# Patient Record
Sex: FEMALE | Race: WHITE | Employment: OTHER | RURAL
[De-identification: names, ages, dates, MRNs, and addresses within clinical notes are randomized per-mention and may not be internally consistent; named-entity substitution may affect disease eponyms.]

---

## 2017-01-02 ENCOUNTER — TELEPHONE (OUTPATIENT)
Dept: FAMILY MEDICINE CLINIC | Age: 52
End: 2017-01-02

## 2017-01-02 DIAGNOSIS — N30.00 ACUTE CYSTITIS WITHOUT HEMATURIA: Primary | ICD-10-CM

## 2017-01-02 DIAGNOSIS — D72.829 LEUKOCYTOSIS, UNSPECIFIED TYPE: ICD-10-CM

## 2017-01-02 RX ORDER — NITROFURANTOIN 25; 75 MG/1; MG/1
100 CAPSULE ORAL 2 TIMES DAILY
Qty: 20 CAP | Refills: 0 | Status: SHIPPED | OUTPATIENT
Start: 2017-01-02 | End: 2017-04-24 | Stop reason: ALTCHOICE

## 2017-01-03 NOTE — TELEPHONE ENCOUNTER
LVM - I will try to reach you on your cell. LVM - Dr Bear Max ordered a antibiotic for you. Please call back.

## 2017-01-04 ENCOUNTER — TELEPHONE (OUTPATIENT)
Dept: FAMILY MEDICINE CLINIC | Age: 52
End: 2017-01-04

## 2017-01-04 NOTE — TELEPHONE ENCOUNTER
Patient called and was under the impression that she Miranda Deysideepa would be calling in a prescription for a antibiotic. Please advise if this can be done today. The only one I saw was Cindi.      [facility]

## 2017-01-04 NOTE — TELEPHONE ENCOUNTER
[facility] did not get order. Also she has changed insurance. She will get info and call back. Called in antibiotic. They have it.

## 2017-01-04 NOTE — TELEPHONE ENCOUNTER
Please obtain [facility] referral for appt with Dr. Tia Julian. Also fax her recent labs to his office.

## 2017-01-06 ENCOUNTER — TELEPHONE (OUTPATIENT)
Dept: FAMILY MEDICINE CLINIC | Age: 52
End: 2017-01-06

## 2017-01-06 NOTE — TELEPHONE ENCOUNTER
According to [facility] website the pt does not require a referral authorization for seeing Dr Tommy Mejia for appt on 1/9/2017

## 2017-02-03 ENCOUNTER — OFFICE VISIT (OUTPATIENT)
Dept: FAMILY MEDICINE CLINIC | Age: 52
End: 2017-02-03

## 2017-02-03 VITALS
WEIGHT: 193 LBS | TEMPERATURE: 98.2 F | HEART RATE: 89 BPM | RESPIRATION RATE: 16 BRPM | BODY MASS INDEX: 34.2 KG/M2 | HEIGHT: 63 IN | SYSTOLIC BLOOD PRESSURE: 136 MMHG | DIASTOLIC BLOOD PRESSURE: 83 MMHG | OXYGEN SATURATION: 96 %

## 2017-02-03 DIAGNOSIS — M15.9 PRIMARY OSTEOARTHRITIS INVOLVING MULTIPLE JOINTS: ICD-10-CM

## 2017-02-03 DIAGNOSIS — J02.9 SORE THROAT: ICD-10-CM

## 2017-02-03 DIAGNOSIS — J06.9 URI WITH COUGH AND CONGESTION: Primary | ICD-10-CM

## 2017-02-03 DIAGNOSIS — R82.90 URINE MALODOR: ICD-10-CM

## 2017-02-03 DIAGNOSIS — R60.0 PEDAL EDEMA: ICD-10-CM

## 2017-02-03 LAB
BILIRUB UR QL STRIP: NEGATIVE
GLUCOSE UR-MCNC: NEGATIVE MG/DL
KETONES P FAST UR STRIP-MCNC: NEGATIVE MG/DL
PH UR STRIP: 6 [PH] (ref 4.6–8)
PROT UR QL STRIP: NEGATIVE MG/DL
S PYO AG THROAT QL: NEGATIVE
SP GR UR STRIP: 1 (ref 1–1.03)
UA UROBILINOGEN AMB POC: NORMAL (ref 0.2–1)
URINALYSIS CLARITY POC: CLEAR
URINALYSIS COLOR POC: NORMAL
URINE BLOOD POC: NORMAL
URINE LEUKOCYTES POC: NEGATIVE
URINE NITRITES POC: NEGATIVE
VALID INTERNAL CONTROL?: YES

## 2017-02-03 RX ORDER — FUROSEMIDE 20 MG/1
20 TABLET ORAL
Qty: 30 TAB | Refills: 2 | Status: SHIPPED | OUTPATIENT
Start: 2017-02-03 | End: 2017-04-05 | Stop reason: SDUPTHER

## 2017-02-03 NOTE — PROGRESS NOTES
Subjective:      Margie Clay is a 45 y.o. female here with complaint of sore throat, post nasal drip, bilateral ear fullness, green nasal discharge and pain while swallowing for 1 days. She denies a history of chills, fatigue, nausea, shortness of breath and vomiting. Evaluation to date: none  Treatment to date: nasal decongestant     - She reports swelling in the hands and feet for which she believes that she needs to be back on her fluid pill. - Would like to have her urine checked to see if her UTI has cleared. She has completed a course of antibiotics. - Reports that she has inflammatory arthritis for which she has been unable to tolerate NSAID therapy due to \"my stomach\". She would like referral to pain management specialist. She has been seen by Dr. Justine Wells with [facility] and Dr. Jimmy Coats for elevated WBC. Current Outpatient Prescriptions   Medication Sig Dispense Refill    nitrofurantoin, macrocrystal-monohydrate, (MACROBID) 100 mg capsule Take 1 Cap by mouth two (2) times a day. 20 Cap 0    benzonatate (TESSALON) 200 mg capsule       Iron-FA-Q83-P-Qsghanc Sodium 94-7--50 mg-mg-mcg-mg-mg tab Take  by mouth.  TURMERIC ROOT EXTRACT PO Take 1 Cap by mouth daily.  venlafaxine-SR (EFFEXOR XR) 75 mg capsule Take 1 Cap by mouth daily. 30 Cap 5    glucose blood VI test strips (ASCENSIA AUTODISC VI, ONE TOUCH ULTRA TEST VI) strip Test sugar daily. DX E 11.9 100 Strip 3    diltiazem hcl 180 mg Tb24 1 tab daily 30 Tab 5    losartan (COZAAR) 100 mg tablet Take 1 Tab by mouth daily. 30 Tab 5    metFORMIN (GLUCOPHAGE) 500 mg tablet Take 2 Tabs by mouth two (2) times daily (with meals). Indications: TYPE 2 DIABETES MELLITUS 120 Tab 5    gabapentin (NEURONTIN) 600 mg tablet Take 1 Tab by mouth four (4) times daily. 120 Tab 5    atorvastatin (LIPITOR) 40 mg tablet Take 1 Tab by mouth nightly.  Indications: HYPERCHOLESTEROLEMIA 30 Tab 5    rOPINIRole (REQUIP) 2 mg tablet Take 1 Tab by mouth two (2) times a day. 60 Tab 5    fluticasone-salmeterol (ADVAIR HFA) 115-21 mcg/actuation inhaler Take 2 Puffs by inhalation daily.  L GASSERI/B BIFIDUM/B LONGUM (mygola PO) Take  by mouth daily.  cinnamon bark 500 mg cap Take 1,000 mg by mouth two (2) times a day.  fish,bora,flax oils-om3,6,9 #1 1,200 mg cap Take 2 Caps by mouth daily.  prasterone, dhea, 50 mg tab Take  by mouth daily.  esomeprazole (NEXIUM) 20 mg capsule Take 40 mg by mouth daily. Allergies   Allergen Reactions    Mobic [Meloxicam] Hives    Penicillins Unknown (comments)     DOESN'T REMEMBER    Plaquenil [Hydroxychloroquine] Hives       Past Medical History   Diagnosis Date    Arrhythmia      TACHYCARDIA    Arthritis     Autoimmune disease ([facility] [address]      LUPUS    Diabetes ([facility] [address]     GERD (gastroesophageal reflux disease)     Hypertension     Psychiatric disorder      PANIC ATTACKS    Sleep apnea        Social History   Substance Use Topics    Smoking status: Current Every Day Smoker     Packs/day: 0.50     Years: 40.00    Smokeless tobacco: Never Used    Alcohol use No        Review of Systems  Pertinent items are noted in HPI.      Objective:     Visit Vitals    /83 (BP 1 Location: Left arm, BP Patient Position: Sitting)    Pulse 89    Temp 98.2 °F (36.8 °C) (Oral)    Resp 16    Ht 5' 3\" (1.6 m)    Wt 193 lb (87.5 kg)    SpO2 96%    BMI 34.19 kg/m2      General appearance - alert, well appearing, and in no distress  Eyes - pupils equal and reactive, extraocular eye movements intact, sclera anicteric  Ears - bilateral TM's and external ear canals normal  Nose - mucosal congestion, mucosal erythema and sinuses normal and nontender  Oropharyngx - mucous membranes moist, pharynx normal without lesions and erythematous  Neck - supple, no significant adenopathy  Chest - clear to auscultation, no wheezes, rales or rhonchi, symmetric air entry, no tachypnea, retractions or cyanosis  Heart - normal rate, regular rhythm, normal S1, S2, no murmurs, rubs, clicks or gallops      Recent Results (from the past 12 hour(s))   AMB POC RAPID STREP A    Collection Time: 02/03/17  9:10 AM   Result Value Ref Range    VALID INTERNAL CONTROL POC Yes     Group A Strep Ag Negative Negative   AMB POC URINALYSIS DIP STICK AUTO W/O MICRO    Collection Time: 02/03/17  9:10 AM   Result Value Ref Range    Color (UA POC) Eneida     Clarity (UA POC) Clear     Glucose (UA POC) Negative Negative    Bilirubin (UA POC) Negative Negative    Ketones (UA POC) Negative Negative    Specific gravity (UA POC) 1.005 1.001 - 1.035    Blood (UA POC) Trace Negative    pH (UA POC) 6.0 4.6 - 8.0    Protein (UA POC) Negative Negative mg/dL    Urobilinogen (UA POC) 0.2 mg/dL 0.2 - 1    Nitrites (UA POC) Negative Negative    Leukocyte esterase (UA POC) Negative Negative         Assessment/Plan:   Kirby Mcdaniels is a 45 y.o. female seen for:     1. URI with cough and congestion: likely viral in etiology. - Symptomatic therapy suggested: push fluids, rest, gargle warm salt water and use acetaminophen, cough suppressant of choice prn.    2. Sore throat  - AMB POC RAPID STREP A    3. Urine malodor: UA normal and appears as though infection has cleared. Patient reassured. - AMB POC URINALYSIS DIP STICK AUTO W/O MICRO    4. Primary osteoarthritis involving multiple joints: provided with list of area Pain Management physicians and she will see who is in-network for her. 5. Pedal edema: start low dose furosemide therapy as needed. Discussed that medication does have antihypertensive effects and will need to monitor BP to ensure not causing hypotension.   - furosemide (LASIX) 20 mg tablet; Take 1 Tab by mouth daily as needed. Indications: Edema  Dispense: 30 Tab; Refill: 2    I have discussed the diagnosis with the patient and the intended plan as seen in the above orders.  The patient has received an after-visit summary and questions were answered concerning future plans. I have discussed medication side effects and warnings with the patient as well. Patient verbalizes understanding of plan of care and denies further questions or concerns at this time. Informed patient to return to the office if symptoms worsen or if new symptoms arise. Follow-up Disposition:  Return if symptoms worsen or fail to improve.

## 2017-02-03 NOTE — PROGRESS NOTES
Chief Complaint   Patient presents with    Cough     all symptoms started yesterday    Sore Throat    Ear Pain     both ears feel full since yesterday    Urinary Odor     following up from recent UTI treated by Dr. Mattie Avitia  wants urine tested today     \"REVIEWED RECORD IN PREPARATION FOR VISIT AND HAVE OBTAINED THE NECESSARY DOCUMENTATION\"  1. Have you been to the ER, urgent care clinic since your last visit? Hospitalized since your last visit? No    2. Have you seen or consulted any other health care providers outside of the [facility] since your last visit? Include any pap smears or colon screening. No  Patient does not have advanced directives.

## 2017-02-03 NOTE — PATIENT INSTRUCTIONS

## 2017-02-03 NOTE — MR AVS SNAPSHOT
Visit Information Date & Time Provider Department Dept. Phone Encounter #  
 2/3/2017  8:45 AM Edmund Brunner Ajith [phone] [id number] Follow-up Instructions Return if symptoms worsen or fail to improve. Upcoming Health Maintenance Date Due  
 EYE EXAM RETINAL OR DILATED Q1 7/23/1974 Pneumococcal 19-64 Highest Risk (1 of 3 - PCV13) 7/23/1983 PAP AKA CERVICAL CYTOLOGY 7/23/1985 FOBT Q 1 YEAR AGE 50-75 7/23/2014 HEMOGLOBIN A1C Q6M 6/30/2017 FOOT EXAM Q1 12/30/2017 MICROALBUMIN Q1 12/30/2017 LIPID PANEL Q1 12/30/2017 BREAST CANCER SCRN MAMMOGRAM 4/18/2018 DTaP/Tdap/Td series (2 - Td) 9/7/2026 Allergies as of 2/3/2017  Review Complete On: 2/3/2017 By: David Diaz MD  
  
 Severity Noted Reaction Type Reactions Mobic [Meloxicam]  08/08/2016    Hives Penicillins  08/08/2016    Unknown (comments) DOESN'T REMEMBER Plaquenil [Hydroxychloroquine]  08/08/2016    Hives Current Immunizations  Never Reviewed No immunizations on file. Not reviewed this visit You Were Diagnosed With   
  
 Codes Comments URI with cough and congestion    -  Primary ICD-10-CM: J06.9 ICD-9-CM: 465.9 Sore throat     ICD-10-CM: J02.9 ICD-9-CM: 516 Urine malodor     ICD-10-CM: R82.90 ICD-9-CM: 791.9 Primary osteoarthritis involving multiple joints     ICD-10-CM: M15.0 ICD-9-CM: 715.09 Vitals BP Pulse Temp Resp Height(growth percentile) Weight(growth percentile) 136/83 (BP 1 Location: Left arm, BP Patient Position: Sitting) 89 98.2 °F (36.8 °C) (Oral) 16 5' 3\" (1.6 m) 193 lb (87.5 kg) SpO2 BMI OB Status Smoking Status 96% 34.19 kg/m2 Hysterectomy Current Every Day Smoker BMI and BSA Data Body Mass Index Body Surface Area  
 34.19 kg/m 2 1.97 m 2 Preferred Pharmacy Pharmacy Name Phone 5 [facility], [address] [place] [address] [phone] Your Updated Medication List  
  
   
This list is accurate as of: 2/3/17  9:29 AM.  Always use your most recent med list.  
  
  
  
  
 atorvastatin 40 mg tablet Commonly known as:  LIPITOR Take 1 Tab by mouth nightly. Indications: HYPERCHOLESTEROLEMIA  
  
 benzonatate 200 mg capsule Commonly known as:  TESSALON  
  
 cinnamon bark 500 mg Cap Take 1,000 mg by mouth two (2) times a day. diltiazem hcl 180 mg Tb24  
1 tab daily  
  
 esomeprazole 20 mg capsule Commonly known as:  Glendell Simper Take 40 mg by mouth daily. fish,bora,flax oils-om3,6,9 #1 1,200 mg Cap Take 2 Caps by mouth daily. fluticasone-salmeterol 115-21 mcg/actuation inhaler Commonly known as:  ADVAIR HFA Take 2 Puffs by inhalation daily. gabapentin 600 mg tablet Commonly known as:  NEURONTIN Take 1 Tab by mouth four (4) times daily. glucose blood VI test strips strip Commonly known as:  ASCENSIA AUTODISC VI, ONE TOUCH ULTRA TEST VI Test sugar daily. DX E 11.9 Iron-FA-K54-I-Bgbzkwd Sodium 86-8--50 mg-mg-mcg-mg-mg Tab Take  by mouth.  
  
 losartan 100 mg tablet Commonly known as:  COZAAR Take 1 Tab by mouth daily. metFORMIN 500 mg tablet Commonly known as:  GLUCOPHAGE Take 2 Tabs by mouth two (2) times daily (with meals). Indications: TYPE 2 DIABETES MELLITUS  
  
 nitrofurantoin (macrocrystal-monohydrate) 100 mg capsule Commonly known as:  MACROBID Take 1 Cap by mouth two (2) times a day. [address] Take  by mouth daily. prasterone (dhea) 50 mg Tab Take  by mouth daily. rOPINIRole 2 mg tablet Commonly known as:  Kenya Schimke Take 1 Tab by mouth two (2) times a day. TURMERIC ROOT EXTRACT PO Take 1 Cap by mouth daily. venlafaxine-SR 75 mg capsule Commonly known as:  EFFEXOR XR Take 1 Cap by mouth daily. We Performed the Following AMB POC RAPID STREP A [93365 CPT(R)] AMB POC URINALYSIS DIP STICK AUTO W/O MICRO [76421 CPT(R)] Follow-up Instructions Return if symptoms worsen or fail to improve. Patient Instructions Upper Respiratory Infection (Cold): Care Instructions Your Care Instructions An upper respiratory infection, or URI, is an infection of the nose, sinuses, or throat. URIs are spread by coughs, sneezes, and direct contact. The common cold is the most frequent kind of URI. The flu and sinus infections are other kinds of URIs. Almost all URIs are caused by viruses. Antibiotics won't cure them. But you can treat most infections with home care. This may include drinking lots of fluids and taking over-the-counter pain medicine. You will probably feel better in 4 to 10 days. The doctor has checked you carefully, but problems can develop later. If you notice any problems or new symptoms, get medical treatment right away. Follow-up care is a key part of your treatment and safety. Be sure to make and go to all appointments, and call your doctor if you are having problems. It's also a good idea to know your test results and keep a list of the medicines you take. How can you care for yourself at home? · To prevent dehydration, drink plenty of fluids, enough so that your urine is light yellow or clear like water. Choose water and other caffeine-free clear liquids until you feel better. If you have kidney, heart, or liver disease and have to limit fluids, talk with your doctor before you increase the amount of fluids you drink. · Take an over-the-counter pain medicine, such as acetaminophen (Tylenol), ibuprofen (Advil, Motrin), or naproxen (Aleve). Read and follow all instructions on the label. · Before you use cough and cold medicines, check the label. These medicines may not be safe for young children or for people with certain health problems.  
· Be careful when taking over-the-counter cold or flu medicines and Tylenol at the same time. Many of these medicines have acetaminophen, which is Tylenol. Read the labels to make sure that you are not taking more than the recommended dose. Too much acetaminophen (Tylenol) can be harmful. · Get plenty of rest. 
· Do not smoke or allow others to smoke around you. If you need help quitting, talk to your doctor about stop-smoking programs and medicines. These can increase your chances of quitting for good. When should you call for help? Call 911 anytime you think you may need emergency care. For example, call if: 
· You have severe trouble breathing. Call your doctor now or seek immediate medical care if: 
· You seem to be getting much sicker. · You have new or worse trouble breathing. · You have a new or higher fever. · You have a new rash. Watch closely for changes in your health, and be sure to contact your doctor if: 
· You have a new symptom, such as a sore throat, an earache, or sinus pain. · You cough more deeply or more often, especially if you notice more mucus or a change in the color of your mucus. · You do not get better as expected. Where can you learn more? Go to http://matti-yinka.info/. Enter V006 in the search box to learn more about \"Upper Respiratory Infection (Cold): Care Instructions. \" Current as of: June 30, 2016 Content Version: 11.1 © 2694-7094 [employer], Incorporated. Care instructions adapted under license by [employer] (which disclaims liability or warranty for this information). If you have questions about a medical condition or this instruction, always ask your healthcare professional. [place] [address] any warranty or liability for your use of this information. Introducing [address]! Dear Mitzi Pearce: Thank you for requesting a [employer] account. Our records indicate that you already have an active [employer] account.   You can access your account anytime at [email or web address] Did you know that you can access your hospital and ER discharge instructions at any time in [email or web address]? You can also review all of your test results from your hospital stay or ER visit. Additional Information If you have questions, please visit the Frequently Asked Questions section of the [email or web address] website at [email or web address] Remember, [email or web address] is NOT to be used for urgent needs. For medical emergencies, dial 911. Now available from your iPhone and Android! Please provide this summary of care documentation to your next provider. Your primary care clinician is listed as Sary Coffey. If you have any questions after today's visit, please call [phone].

## 2017-02-21 ENCOUNTER — TELEPHONE (OUTPATIENT)
Dept: FAMILY MEDICINE CLINIC | Age: 52
End: 2017-02-21

## 2017-02-21 DIAGNOSIS — G89.4 CHRONIC PAIN SYNDROME: Primary | ICD-10-CM

## 2017-02-21 NOTE — TELEPHONE ENCOUNTER
Pt called stating she did some research and found a pain management dr that her insurance would accept and she would need a dr referral and insurance referral.    The Dr she will be seeing is Dr. Saige Baca at [address] [place] [address] [facility]. She made an appt for March 15th.

## 2017-04-04 DIAGNOSIS — I10 ESSENTIAL HYPERTENSION WITH GOAL BLOOD PRESSURE LESS THAN 140/90: ICD-10-CM

## 2017-04-04 DIAGNOSIS — R60.0 PEDAL EDEMA: ICD-10-CM

## 2017-04-04 DIAGNOSIS — G25.81 RLS (RESTLESS LEGS SYNDROME): ICD-10-CM

## 2017-04-04 DIAGNOSIS — G63 POLYNEUROPATHY ASSOCIATED WITH UNDERLYING DISEASE (HCC): ICD-10-CM

## 2017-04-05 RX ORDER — FUROSEMIDE 20 MG/1
20 TABLET ORAL
Qty: 30 TAB | Refills: 2 | Status: SHIPPED | OUTPATIENT
Start: 2017-04-05 | End: 2017-08-05 | Stop reason: SDUPTHER

## 2017-04-05 RX ORDER — GABAPENTIN 600 MG/1
600 TABLET ORAL 4 TIMES DAILY
Qty: 120 TAB | Refills: 5 | Status: SHIPPED | OUTPATIENT
Start: 2017-04-05 | End: 2017-10-26 | Stop reason: SDUPTHER

## 2017-04-05 RX ORDER — ROPINIROLE 2 MG/1
TABLET, FILM COATED ORAL
Qty: 60 TAB | Refills: 5 | Status: SHIPPED | OUTPATIENT
Start: 2017-04-05 | End: 2017-10-05 | Stop reason: SDUPTHER

## 2017-04-05 RX ORDER — LOSARTAN POTASSIUM 100 MG/1
TABLET ORAL
Qty: 30 TAB | Refills: 5 | Status: SHIPPED | OUTPATIENT
Start: 2017-04-05 | End: 2017-10-05 | Stop reason: SDUPTHER

## 2017-04-05 RX ORDER — DILTIAZEM HYDROCHLORIDE 180 MG/1
CAPSULE, COATED, EXTENDED RELEASE ORAL
Qty: 30 CAP | Refills: 5 | Status: SHIPPED | OUTPATIENT
Start: 2017-04-05 | End: 2017-10-05 | Stop reason: SDUPTHER

## 2017-04-05 RX ORDER — METFORMIN HYDROCHLORIDE 500 MG/1
1000 TABLET ORAL 2 TIMES DAILY WITH MEALS
Qty: 120 TAB | Refills: 5 | Status: SHIPPED | OUTPATIENT
Start: 2017-04-05 | End: 2017-10-26 | Stop reason: SDUPTHER

## 2017-04-05 RX ORDER — ATORVASTATIN CALCIUM 40 MG/1
40 TABLET, FILM COATED ORAL
Qty: 30 TAB | Refills: 5 | Status: SHIPPED | OUTPATIENT
Start: 2017-04-05 | End: 2017-10-26 | Stop reason: SDUPTHER

## 2017-04-06 DIAGNOSIS — F34.1 DYSTHYMIA: ICD-10-CM

## 2017-04-06 RX ORDER — VENLAFAXINE HYDROCHLORIDE 75 MG/1
75 CAPSULE, EXTENDED RELEASE ORAL DAILY
Qty: 30 CAP | Refills: 5 | Status: CANCELLED | OUTPATIENT
Start: 2017-04-06

## 2017-04-06 NOTE — TELEPHONE ENCOUNTER
Patient called stating that she was planning on speaking with Dr Devante Paul about a change in medication tomorrow 4/7/2017 but changed her appointment to 4/24/17 due to provider out of office. Pt would like to know if it can be changed from 75mg back to 150mg.      Best contact:   (Self) [phone]

## 2017-04-24 ENCOUNTER — OFFICE VISIT (OUTPATIENT)
Dept: FAMILY MEDICINE CLINIC | Age: 52
End: 2017-04-24

## 2017-04-24 VITALS
TEMPERATURE: 98.4 F | BODY MASS INDEX: 33.98 KG/M2 | HEIGHT: 63 IN | RESPIRATION RATE: 20 BRPM | DIASTOLIC BLOOD PRESSURE: 68 MMHG | OXYGEN SATURATION: 96 % | SYSTOLIC BLOOD PRESSURE: 112 MMHG | HEART RATE: 91 BPM | WEIGHT: 191.8 LBS

## 2017-04-24 DIAGNOSIS — M17.11 PRIMARY LOCALIZED OSTEOARTHRITIS OF RIGHT KNEE: ICD-10-CM

## 2017-04-24 DIAGNOSIS — F34.1 DYSTHYMIA: Primary | ICD-10-CM

## 2017-04-24 DIAGNOSIS — H10.10 ALLERGIC CONJUNCTIVITIS, UNSPECIFIED LATERALITY: ICD-10-CM

## 2017-04-24 DIAGNOSIS — R10.11 RUQ ABDOMINAL PAIN: ICD-10-CM

## 2017-04-24 DIAGNOSIS — F41.0 PANIC ATTACKS: ICD-10-CM

## 2017-04-24 DIAGNOSIS — F17.210 CIGARETTE NICOTINE DEPENDENCE WITHOUT COMPLICATION: ICD-10-CM

## 2017-04-24 DIAGNOSIS — D72.829 LEUKOCYTOSIS, UNSPECIFIED TYPE: ICD-10-CM

## 2017-04-24 DIAGNOSIS — G47.33 OSA (OBSTRUCTIVE SLEEP APNEA): ICD-10-CM

## 2017-04-24 RX ORDER — VENLAFAXINE 75 MG/1
75 TABLET ORAL 2 TIMES DAILY
Qty: 60 TAB | Refills: 3 | Status: CANCELLED | OUTPATIENT
Start: 2017-04-24

## 2017-04-24 RX ORDER — VARENICLINE TARTRATE 1 MG/1
TABLET, FILM COATED ORAL
Qty: 60 TAB | Refills: 2 | Status: SHIPPED | OUTPATIENT
Start: 2017-04-24 | End: 2017-12-12 | Stop reason: SDUPTHER

## 2017-04-24 RX ORDER — LORAZEPAM 2 MG/1
TABLET ORAL
COMMUNITY
End: 2017-04-24 | Stop reason: DRUGHIGH

## 2017-04-24 RX ORDER — KETOTIFEN FUMARATE 0.35 MG/ML
1 SOLUTION/ DROPS OPHTHALMIC 2 TIMES DAILY
Qty: 5 ML | Refills: 3 | Status: SHIPPED | OUTPATIENT
Start: 2017-04-24 | End: 2018-06-20 | Stop reason: ALTCHOICE

## 2017-04-24 RX ORDER — TRAMADOL HYDROCHLORIDE 50 MG/1
50 TABLET ORAL
COMMUNITY
Start: 2017-01-24 | End: 2017-07-07 | Stop reason: ALTCHOICE

## 2017-04-24 RX ORDER — PREDNISOLONE ACETATE 10 MG/ML
SUSPENSION/ DROPS OPHTHALMIC
COMMUNITY
Start: 2017-03-23 | End: 2017-04-24 | Stop reason: ALTCHOICE

## 2017-04-24 RX ORDER — LORAZEPAM 0.5 MG/1
0.5 TABLET ORAL
Qty: 60 TAB | Refills: 0 | Status: SHIPPED | OUTPATIENT
Start: 2017-04-24 | End: 2018-02-14 | Stop reason: SDUPTHER

## 2017-04-24 RX ORDER — VENLAFAXINE HYDROCHLORIDE 150 MG/1
150 CAPSULE, EXTENDED RELEASE ORAL DAILY
Qty: 30 CAP | Refills: 5 | Status: SHIPPED | OUTPATIENT
Start: 2017-04-24 | End: 2017-10-05 | Stop reason: SDUPTHER

## 2017-04-24 RX ORDER — VARENICLINE TARTRATE 25 MG
KIT ORAL
Qty: 42 DOSE PACK | Refills: 0 | Status: SHIPPED | OUTPATIENT
Start: 2017-04-24 | End: 2017-07-07 | Stop reason: ALTCHOICE

## 2017-04-24 NOTE — PROGRESS NOTES
Identified pt with two pt identifiers(name and ). Chief Complaint   Patient presents with    Medication Evaluation     increase effexor back to 150mg    Nail Problem     both great toenails - coming back and just started bothering her    Rib Pain     right side under ribs hurting last 3 or 4 days - constant with [facility] - can't find pain management MD    Nicotine Dependence     chantex    Eye Drainage     went to Dr Claudy Valle - eye still draining        Health Maintenance Due   Topic    Pneumococcal 19-64 Highest Risk (1 of 3 - PCV13)    PAP AKA CERVICAL CYTOLOGY     FOBT Q 1 YEAR AGE 50-75        Wt Readings from Last 3 Encounters:   17 191 lb 12.8 oz (87 kg)   17 193 lb (87.5 kg)   16 190 lb 12.8 oz (86.5 kg)     Temp Readings from Last 3 Encounters:   17 98.4 °F (36.9 °C) (Oral)   17 98.2 °F (36.8 °C) (Oral)   16 98.3 °F (36.8 °C) (Oral)     BP Readings from Last 3 Encounters:   17 112/68   17 136/83   16 128/81     Pulse Readings from Last 3 Encounters:   17 91   17 89   16 77         Learning Assessment:  :     Learning Assessment 2016   PRIMARY LEARNER Patient   HIGHEST LEVEL OF EDUCATION - PRIMARY LEARNER  SOME COLLEGE   BARRIERS PRIMARY LEARNER NONE   CO-LEARNER CAREGIVER No   PRIMARY LANGUAGE ENGLISH   LEARNER PREFERENCE PRIMARY DEMONSTRATION   ANSWERED BY self   RELATIONSHIP SELF       Depression Screening:  :     PHQ 2 / 9, over the last two weeks 2016   Little interest or pleasure in doing things Several days   Feeling down, depressed or hopeless Several days   Total Score PHQ 2 2       Fall Risk Assessment:  :     Fall Risk Assessment, last 12 mths 2016   Able to walk? Yes   Fall in past 12 months? No       Abuse Screening:  :     Abuse Screening Questionnaire 2016   Do you ever feel afraid of your partner? N   Are you in a relationship with someone who physically or mentally threatens you?  Lev Acosta Is it safe for you to go home? Y       Coordination of Care Questionnaire:  :     1) Have you been to an emergency room, urgent care clinic since your last visit? no   Hospitalized since your last visit? no             2) Have you seen or consulted any other health care providers outside of [address] [place] [address] since your last visit? yes   Dr Hermes Peguero eye MD and Dr Froylan Theodore ortho injection in right knee. (Include any pap smears or colon screenings in this section.)    3) Do you have an Advance Directive on file? yes  Are you interested in receiving information about Advance Directives? no    Reviewed record in preparation for visit and have obtained necessary documentation. Medication reconciliation up to date and corrected with patient at this time.

## 2017-04-24 NOTE — PROGRESS NOTES
HISTORY OF PRESENT ILLNESS  Gt Dotson is a 45 y.o. female. HPI  FU chronic med conditions: depression, panic attacks, nicotine abuse, chronic pain, sleep apnea. She feels needs to increase dose of Effexor XR to 150 mg. She used to take this dose with good effect. Need refill Lorazepam PRN. She has not been able to get appt with Pain Clinic. She also has not scheduled appt with Sleep Clinic. Did not think she FU with Jose [facility] Covarrubias for elevated WBC since last blood test.  New problem RUQ abdo pain x 4 days. Hx cholecystectomy. She has been taking BC PRN for pain. She was seen by Ophthalmology for eye discharge in Feb.  Still crusting in AM.  Interested in retrying Chantix for smoking cessation. She has taken in past without adverse side effects. Review of Systems   Eyes: Positive for discharge. Negative for pain and redness. Crusting   Gastrointestinal: Positive for abdominal pain. Musculoskeletal: Positive for joint pain. Psychiatric/Behavioral: Positive for depression. The patient is nervous/anxious. All other systems reviewed and are negative.     Patient Active Problem List   Diagnosis Code    Primary localized osteoarthritis of right knee M17.11    Primary osteoarthritis of right knee M17.11    Essential hypertension with goal blood pressure less than 140/90 I10    Chronic obstructive pulmonary disease (HCC) J44.9    Diabetes mellitus type 2, controlled ([address] E11.9    Polyneuropathy associated with underlying disease ([facility] G63    RLS (restless legs syndrome) G25.81    Paroxysmal tachycardia (HCC) I47.9    Arthritis M19.90    Elevated WBC count D72.829    Cigarette nicotine dependence without complication H39.700    Dysthymia F34.1    YOLANDA (obstructive sleep apnea) G47.33     Current Outpatient Prescriptions on File Prior to Visit   Medication Sig Dispense Refill    rOPINIRole (REQUIP) 2 mg tablet TAKE ONE TABLET BY MOUTH TWO TIMES A DAY 60 Tab 5    losartan (COZAAR) 100 mg tablet TAKE ONE TABLET BY MOUTH EVERY DAY 30 Tab 5    dilTIAZem CD (CARDIZEM CD) 180 mg ER capsule TAKE ONE CAPSULE BY MOUTH EVERY DAY 30 Cap 5    furosemide (LASIX) 20 mg tablet Take 1 Tab by mouth daily as needed. Indications: Edema 30 Tab 2    gabapentin (NEURONTIN) 600 mg tablet Take 1 Tab by mouth four (4) times daily. 120 Tab 5    metFORMIN (GLUCOPHAGE) 500 mg tablet Take 2 Tabs by mouth two (2) times daily (with meals). Indications: type 2 diabetes mellitus 120 Tab 5    atorvastatin (LIPITOR) 40 mg tablet Take 1 Tab by mouth nightly. Indications: hypercholesterolemia 30 Tab 5    Iron-FA-G02-G-Nqlqpkn Sodium 51-3--50 mg-mg-mcg-mg-mg tab Take  by mouth.  TURMERIC ROOT EXTRACT PO Take 1 Cap by mouth daily.  glucose blood VI test strips (ASCENSIA AUTODISC VI, ONE TOUCH ULTRA TEST VI) strip Test sugar daily. DX E 11.9 100 Strip 3    fluticasone-salmeterol (ADVAIR HFA) 115-21 mcg/actuation inhaler Take 2 Puffs by inhalation daily.  L GASSERI/B BIFIDUM/B LONGUM (scrible PO) Take  by mouth daily.  cinnamon bark 500 mg cap Take 1,000 mg by mouth two (2) times a day.  fish,bora,flax oils-om3,6,9 #1 1,200 mg cap Take 2 Caps by mouth daily.  esomeprazole (NEXIUM) 20 mg capsule Take 40 mg by mouth daily. No current facility-administered medications on file prior to visit. Visit Vitals    /68 (BP 1 Location: Right arm, BP Patient Position: Sitting)    Pulse 91    Temp 98.4 °F (36.9 °C) (Oral)    Resp 20    Ht 5' 3\" (1.6 m)    Wt 191 lb 12.8 oz (87 kg)    SpO2 96%    BMI 33.98 kg/m2       Physical Exam   Constitutional: She appears well-developed and well-nourished. Abdominal: Normal appearance and bowel sounds are normal. She exhibits no mass. There is tenderness in the right upper quadrant. There is no rigidity, no rebound and no guarding. Vitals reviewed.       ASSESSMENT and PLAN    ICD-10-CM ICD-9-CM 1. Dysthymia F34.1 300.4 venlafaxine-SR (EFFEXOR XR) 150 mg capsule   2. Panic attacks F41.0 300.01 LORazepam (ATIVAN) 0.5 mg tablet   3. Primary localized osteoarthritis of right knee M17.11 715.16    4. YOLANDA (obstructive sleep apnea) G47.33 327.23 SLEEP MEDICINE REFERRAL   5. Cigarette nicotine dependence without complication P82.575 667.9 varenicline (CHANTIX STARTER MARCO) 0.5 mg (11)- 1 mg (42) DsPk      varenicline (CHANTIX) 1 mg tablet   6. Allergic conjunctivitis, unspecified laterality H10.10 372.14 ketotifen (ALAWAY) 0.025 % (0.035 %) ophthalmic solution   7. RUQ abdominal pain Q75.97 461.09 METABOLIC PANEL, COMPREHENSIVE      US ABD COMP   8. Leukocytosis, unspecified type D72.829 288.60 CBC WITH AUTOMATED DIFF     Increase dose Effexor  mg daily. Lorazepam PRN for panic attacks.  records show last filled June 2016. Alaway drops for eyes. Refer to Sleep Clinic. Order labs. Abdominal US. Chantix for smoking cessation. I have reviewed/discussed the above normal BMI with the patient. I have recommended the following interventions: dietary management education, guidance, and counseling and encourage exercise . Aura Ballesteros The patient was counseled on the dangers of tobacco use, and was advised to quit. Reviewed strategies to maximize success, including pharmacotherapy (Chantix).

## 2017-04-24 NOTE — MR AVS SNAPSHOT
Visit Information Date & Time Provider Department Dept. Phone Encounter #  
 7/44/2031  1:69 PM Dominick Arndt Edmund Ajith [phone] [id number] Upcoming Health Maintenance Date Due Pneumococcal 19-64 Highest Risk (1 of 3 - PCV13) 7/23/1983 PAP AKA CERVICAL CYTOLOGY 7/23/1985 FOBT Q 1 YEAR AGE 50-75 7/23/2014 HEMOGLOBIN A1C Q6M 6/30/2017 FOOT EXAM Q1 12/30/2017 MICROALBUMIN Q1 12/30/2017 LIPID PANEL Q1 12/30/2017 EYE EXAM RETINAL OR DILATED Q1 2/28/2018 BREAST CANCER SCRN MAMMOGRAM 4/18/2018 DTaP/Tdap/Td series (2 - Td) 9/7/2026 Allergies as of 4/24/2017  Review Complete On: 9/05/5792 By: Dominick Arndt MD  
  
 Severity Noted Reaction Type Reactions Mobic [Meloxicam]  08/08/2016    Hives Penicillins  08/08/2016    Unknown (comments) DOESN'T REMEMBER Plaquenil [Hydroxychloroquine]  08/08/2016    Hives Current Immunizations  Never Reviewed No immunizations on file. Not reviewed this visit You Were Diagnosed With   
  
 Codes Comments Dysthymia    -  Primary ICD-10-CM: F34.1 ICD-9-CM: 300.4 Panic attacks     ICD-10-CM: F41.0 ICD-9-CM: 300.01 Primary localized osteoarthritis of right knee     ICD-10-CM: M17.11 ICD-9-CM: 715.16   
 YOLANDA (obstructive sleep apnea)     ICD-10-CM: G47.33 
ICD-9-CM: 327.23 Cigarette nicotine dependence without complication     KDU-52-PZ: F17.210 ICD-9-CM: 305.1 Allergic conjunctivitis, unspecified laterality     ICD-10-CM: H10.10 ICD-9-CM: 372.14   
 RUQ abdominal pain     ICD-10-CM: R10.11 ICD-9-CM: 789.01 Leukocytosis, unspecified type     ICD-10-CM: D72.829 ICD-9-CM: 288.60 Vitals BP Pulse Temp Resp Height(growth percentile) Weight(growth percentile) 112/68 (BP 1 Location: Right arm, BP Patient Position: Sitting) 91 98.4 °F (36.9 °C) (Oral) 20 5' 3\" (1.6 m) 191 lb 12.8 oz (87 kg) SpO2 BMI OB Status Smoking Status 96% 33.98 kg/m2 Hysterectomy Current Every Day Smoker Vitals History BMI and BSA Data Body Mass Index Body Surface Area 33.98 kg/m 2 1.97 m 2 Preferred Pharmacy Pharmacy Name Phone [address], [address] [place] [address] [phone] Your Updated Medication List  
  
   
This list is accurate as of: 4/24/17  4:52 PM.  Always use your most recent med list.  
  
  
  
  
 atorvastatin 40 mg tablet Commonly known as:  LIPITOR Take 1 Tab by mouth nightly. Indications: hypercholesterolemia BC HEADACHE POWDER PO Take  by mouth.  
  
 cinnamon bark 500 mg Cap Take 1,000 mg by mouth two (2) times a day. dilTIAZem  mg ER capsule Commonly known as:  CARDIZEM CD  
TAKE ONE CAPSULE BY MOUTH EVERY DAY  
  
 esomeprazole 20 mg capsule Commonly known as:  Linda Bride Take 40 mg by mouth daily. fish,bora,flax oils-om3,6,9 #1 1,200 mg Cap Take 2 Caps by mouth daily. fluticasone-salmeterol 115-21 mcg/actuation inhaler Commonly known as:  ADVAIR HFA Take 2 Puffs by inhalation daily. furosemide 20 mg tablet Commonly known as:  LASIX Take 1 Tab by mouth daily as needed. Indications: Edema  
  
 gabapentin 600 mg tablet Commonly known as:  NEURONTIN Take 1 Tab by mouth four (4) times daily. glucose blood VI test strips strip Commonly known as:  ASCENSIA AUTODISC VI, ONE TOUCH ULTRA TEST VI Test sugar daily. DX E 11.9 Iron-FA-Z83-E-Evxfcjq Sodium 47-7--50 mg-mg-mcg-mg-mg Tab Take  by mouth.  
  
 ketotifen 0.025 % (0.035 %) ophthalmic solution Commonly known as:  ALAWAY Administer 1 Drop to both eyes two (2) times a day. LORazepam 0.5 mg tablet Commonly known as:  ATIVAN Take 1 Tab by mouth two (2) times daily as needed for Anxiety. Max Daily Amount: 1 mg.  
  
 losartan 100 mg tablet Commonly known as:  COZAAR  
TAKE ONE TABLET BY MOUTH EVERY DAY  
  
 metFORMIN 500 mg tablet Commonly known as:  GLUCOPHAGE Take 2 Tabs by mouth two (2) times daily (with meals). Indications: type 2 diabetes mellitus [address] Take  by mouth daily. rOPINIRole 2 mg tablet Commonly known as:  REQUIP  
TAKE ONE TABLET BY MOUTH TWO TIMES A DAY  
  
 traMADol 50 mg tablet Commonly known as:  ULTRAM  
Take 50 mg by mouth every eight (8) hours as needed. TURMERIC ROOT EXTRACT PO Take 1 Cap by mouth daily. * varenicline 0.5 mg (11)- 1 mg (42) Dspk Commonly known as:  CHANTIX STARTER MARCO As directed  Indications: SMOKING CESSATION  
  
 * varenicline 1 mg tablet Commonly known as:  Meliton Head Take 1 tab twice a day  Indications: SMOKING CESSATION  
  
 venlafaxine- mg capsule Commonly known as:  EFFEXOR XR Take 1 Cap by mouth daily. Indications: major depressive disorder * Notice: This list has 2 medication(s) that are the same as other medications prescribed for you. Read the directions carefully, and ask your doctor or other care provider to review them with you. Prescriptions Printed Refills LORazepam (ATIVAN) 0.5 mg tablet 0 Sig: Take 1 Tab by mouth two (2) times daily as needed for Anxiety. Max Daily Amount: 1 mg. Class: Print Route: Oral  
  
Prescriptions Sent to Pharmacy Refills  
 venlafaxine-SR (EFFEXOR XR) 150 mg capsule 5 Sig: Take 1 Cap by mouth daily. Indications: major depressive disorder Class: Normal  
 Pharmacy: [address] [place] [address] Ph #: [phone] Route: Oral  
 varenicline (CHANTIX STARTER MARCO) 0.5 mg (11)- 1 mg (42) DsPk 0 Sig: As directed  Indications: SMOKING CESSATION Class: Normal  
 Pharmacy: [address] [place] [address] Ph #: [phone]  
 varenicline (CHANTIX) 1 mg tablet 2 Sig: Take 1 tab twice a day  Indications: SMOKING CESSATION  Class: Normal  
 Pharmacy: [address] [place] [address] Ph #: [phone]  
 ketotifen (ALAWAY) 0.025 % (0.035 %) ophthalmic solution 3 Sig: Administer 1 Drop to both eyes two (2) times a day. Class: Normal  
 Pharmacy: [address] [place] [address] Ph #: [phone] Route: Both Eyes We Performed the Following CBC WITH AUTOMATED DIFF [75691 CPT(R)] METABOLIC PANEL, COMPREHENSIVE [38379 CPT(R)] SLEEP MEDICINE REFERRAL [AZW592 Custom] Comments:  
 Please evaluate patient for YOLANDA. To-Do List   
 04/24/2017 Imaging:  US ABD COMP Referral Information Referral ID Referred By Referred To  
  
 [id number] Franny CISNEROS MD   
   [address] [place] [address] Phone: [phone] Fax: [phone] Visits Status Start Date End Date 1 New Request 4/24/17 4/24/18 If your referral has a status of pending review or denied, additional information will be sent to support the outcome of this decision. Introducing [facility]! Dear Rhona Cedeno: Thank you for requesting a [employer] account. Our records indicate that you already have an active [employer] account. You can access your account anytime at [email or web address]. [email or web address] Did you know that you can access your hospital and ER discharge instructions at any time in [employer]? You can also review all of your test results from your hospital stay or ER visit. Additional Information If you have questions, please visit the Frequently Asked Questions section of the [employer] website at [email or web address] Remember, [employer] is NOT to be used for urgent needs. For medical emergencies, dial 911. Now available from your iPhone and Android! Please provide this summary of care documentation to your next provider. Your primary care clinician is listed as Guillermina Peterson. If you have any questions after today's visit, please call [phone].

## 2017-04-24 NOTE — LETTER
5/1/2017 5:36 PM 
 
Ms. Silvia Woo [address] [facility] [address] Dear Silvia Woo: 
 
Please find your most recent results below. Resulted Orders METABOLIC PANEL, COMPREHENSIVE Result Value Ref Range Glucose 101 (H) 65 - 99 mg/dL BUN 14 6 - 24 mg/dL Creatinine 0.58 0.57 - 1.00 mg/dL GFR est non- >59 mL/min/1.73 GFR est  >59 mL/min/1.73  
 BUN/Creatinine ratio 24 (H) 9 - 23 Sodium 145 (H) 134 - 144 mmol/L Potassium 4.1 3.5 - 5.2 mmol/L Chloride 104 96 - 106 mmol/L  
 CO2 22 18 - 29 mmol/L Calcium 9.3 8.7 - 10.2 mg/dL Protein, total 6.5 6.0 - 8.5 g/dL Albumin 4.3 3.5 - 5.5 g/dL GLOBULIN, TOTAL 2.2 1.5 - 4.5 g/dL A-G Ratio 2.0 1.2 - 2.2 Bilirubin, total <0.2 0.0 - 1.2 mg/dL Alk. phosphatase 134 (H) 39 - 117 IU/L  
 AST (SGOT) 15 0 - 40 IU/L  
 ALT (SGPT) 27 0 - 32 IU/L Narrative Performed at:  [address]  [id number] : Garry Choi MD, Phone:  [phone] CBC WITH AUTOMATED DIFF Result Value Ref Range WBC 15.3 (H) 3.4 - 10.8 x10E3/uL  
 RBC 4.81 3.77 - 5.28 x10E6/uL HGB 13.3 11.1 - 15.9 g/dL HCT 40.8 34.0 - 46.6 % MCV 85 79 - 97 fL  
 MCH 27.7 26.6 - 33.0 pg  
 MCHC 32.6 31.5 - 35.7 g/dL  
 RDW 14.2 12.3 - 15.4 % PLATELET 266 402 - 645 x10E3/uL NEUTROPHILS 73 % Lymphocytes 19 % MONOCYTES 3 % EOSINOPHILS 4 % BASOPHILS 1 %  
 ABS. NEUTROPHILS 11.1 (H) 1.4 - 7.0 x10E3/uL Abs Lymphocytes 2.9 0.7 - 3.1 x10E3/uL  
 ABS. MONOCYTES 0.5 0.1 - 0.9 x10E3/uL  
 ABS. EOSINOPHILS 0.6 (H) 0.0 - 0.4 x10E3/uL  
 ABS. BASOPHILS 0.1 0.0 - 0.2 x10E3/uL IMMATURE GRANULOCYTES 0 %  
 ABS. IMM. GRANS. 0.0 0.0 - 0.1 x10E3/uL Narrative Performed at:  [address]  [id number] : Garry Choi MD, Phone:  [phone] RECOMMENDATIONS: 
 Labs show normal liver and kidney tests. White blood counts remain elevated.  Make sure to follow up with Diego Kellogg. Please call me if you have any questions: [phone] Sincerely, Regi Galicia MD

## 2017-04-25 ENCOUNTER — LAB ONLY (OUTPATIENT)
Dept: FAMILY MEDICINE CLINIC | Age: 52
End: 2017-04-25

## 2017-04-26 LAB
ALBUMIN SERPL-MCNC: 4.3 G/DL (ref 3.5–5.5)
ALBUMIN/GLOB SERPL: 2 {RATIO} (ref 1.2–2.2)
ALP SERPL-CCNC: 134 IU/L (ref 39–117)
ALT SERPL-CCNC: 27 IU/L (ref 0–32)
AST SERPL-CCNC: 15 IU/L (ref 0–40)
BASOPHILS # BLD AUTO: 0.1 X10E3/UL (ref 0–0.2)
BASOPHILS NFR BLD AUTO: 1 %
BILIRUB SERPL-MCNC: <0.2 MG/DL (ref 0–1.2)
BUN SERPL-MCNC: 14 MG/DL (ref 6–24)
BUN/CREAT SERPL: 24 (ref 9–23)
CALCIUM SERPL-MCNC: 9.3 MG/DL (ref 8.7–10.2)
CHLORIDE SERPL-SCNC: 104 MMOL/L (ref 96–106)
CO2 SERPL-SCNC: 22 MMOL/L (ref 18–29)
CREAT SERPL-MCNC: 0.58 MG/DL (ref 0.57–1)
EOSINOPHIL # BLD AUTO: 0.6 X10E3/UL (ref 0–0.4)
EOSINOPHIL NFR BLD AUTO: 4 %
ERYTHROCYTE [DISTWIDTH] IN BLOOD BY AUTOMATED COUNT: 14.2 % (ref 12.3–15.4)
GLOBULIN SER CALC-MCNC: 2.2 G/DL (ref 1.5–4.5)
GLUCOSE SERPL-MCNC: 101 MG/DL (ref 65–99)
HCT VFR BLD AUTO: 40.8 % (ref 34–46.6)
HGB BLD-MCNC: 13.3 G/DL (ref 11.1–15.9)
IMM GRANULOCYTES # BLD: 0 X10E3/UL (ref 0–0.1)
IMM GRANULOCYTES NFR BLD: 0 %
LYMPHOCYTES # BLD AUTO: 2.9 X10E3/UL (ref 0.7–3.1)
LYMPHOCYTES NFR BLD AUTO: 19 %
MCH RBC QN AUTO: 27.7 PG (ref 26.6–33)
MCHC RBC AUTO-ENTMCNC: 32.6 G/DL (ref 31.5–35.7)
MCV RBC AUTO: 85 FL (ref 79–97)
MONOCYTES # BLD AUTO: 0.5 X10E3/UL (ref 0.1–0.9)
MONOCYTES NFR BLD AUTO: 3 %
NEUTROPHILS # BLD AUTO: 11.1 X10E3/UL (ref 1.4–7)
NEUTROPHILS NFR BLD AUTO: 73 %
PLATELET # BLD AUTO: 278 X10E3/UL (ref 150–379)
POTASSIUM SERPL-SCNC: 4.1 MMOL/L (ref 3.5–5.2)
PROT SERPL-MCNC: 6.5 G/DL (ref 6–8.5)
RBC # BLD AUTO: 4.81 X10E6/UL (ref 3.77–5.28)
SODIUM SERPL-SCNC: 145 MMOL/L (ref 134–144)
WBC # BLD AUTO: 15.3 X10E3/UL (ref 3.4–10.8)

## 2017-04-27 ENCOUNTER — HOSPITAL ENCOUNTER (OUTPATIENT)
Dept: ULTRASOUND IMAGING | Age: 52
Discharge: HOME | End: 2017-04-27
Attending: FAMILY MEDICINE
Payer: COMMERCIAL

## 2017-04-27 DIAGNOSIS — R10.11 RUQ ABDOMINAL PAIN: ICD-10-CM

## 2017-04-27 PROCEDURE — 76700 US EXAM ABDOM COMPLETE: CPT

## 2017-04-28 ENCOUNTER — TELEPHONE (OUTPATIENT)
Dept: FAMILY MEDICINE CLINIC | Age: 52
End: 2017-04-28

## 2017-04-28 NOTE — TELEPHONE ENCOUNTER
Call to advise pt her US was fine. Very small kidney cyst but I don't think this is cause of pain. I think probably muscle pain.

## 2017-04-28 NOTE — PROGRESS NOTES
Labs show normal liver and kidney tests. White blood counts remain elevated. Make sure to follow up with Vaishali North.

## 2017-04-28 NOTE — TELEPHONE ENCOUNTER
She was good. She discussed the labs and she wanted to make sure referrals were done. I told her I would check.

## 2017-05-23 ENCOUNTER — OFFICE VISIT (OUTPATIENT)
Dept: SLEEP MEDICINE | Age: 52
End: 2017-05-23

## 2017-05-23 VITALS
HEART RATE: 96 BPM | OXYGEN SATURATION: 98 % | BODY MASS INDEX: 33.49 KG/M2 | WEIGHT: 189 LBS | SYSTOLIC BLOOD PRESSURE: 112 MMHG | DIASTOLIC BLOOD PRESSURE: 74 MMHG | TEMPERATURE: 97.9 F | HEIGHT: 63 IN

## 2017-05-23 DIAGNOSIS — G47.61 PERIODIC LIMB MOVEMENTS OF SLEEP: ICD-10-CM

## 2017-05-23 DIAGNOSIS — G63 POLYNEUROPATHY ASSOCIATED WITH UNDERLYING DISEASE (HCC): ICD-10-CM

## 2017-05-23 DIAGNOSIS — G25.81 RLS (RESTLESS LEGS SYNDROME): ICD-10-CM

## 2017-05-23 DIAGNOSIS — I10 ESSENTIAL HYPERTENSION WITH GOAL BLOOD PRESSURE LESS THAN 140/90: ICD-10-CM

## 2017-05-23 DIAGNOSIS — G47.33 OSA (OBSTRUCTIVE SLEEP APNEA): Primary | ICD-10-CM

## 2017-05-23 DIAGNOSIS — J44.9 CHRONIC OBSTRUCTIVE PULMONARY DISEASE, UNSPECIFIED COPD TYPE (HCC): ICD-10-CM

## 2017-05-23 NOTE — PROGRESS NOTES
[address] [facility]. [place], [address]  Tel.  [phone]  Fax. 100 [facility] 60  [place], [address] [facility]  Tel.  [phone]  Fax. [phone] [address]   Tel.  [phone]  Fax. [phone]         Subjective:      Estrella Enrique is an 45 y.o. female referred for evaluation for a sleep disorder. She complains of snoring associated with periods of not breathing and daytime tiredness. Symptoms began several years ago, she had a sleep test in 2012 that indicated YOLANDA (AHI - 6.5 per hour of sleep). She was prescribed CPAP Therapy - device has been lost in the process of relocation. She not been on therapy since that time. She usually can fall asleep in <5 minutes. Family or house members note snoring and periods of not breathing. She denies feeling completely or partially paralyzed while falling asleep or waking up. Estrella Enrique does not wake up frequently at night. She is bothered by waking up too early and left unable to get back to sleep. She actually sleeps about 7 hours at night and wakes up about 3 times during the night. She does not work shifts:  . Mi Carr indicates she does not get too little sleep at night. Her bedtime is 2030. She awakens at 0430. She does not take naps. She has the following observed behaviors: Loud snoring, Light snoring, Kicking with legs, Twitching of legs or feet;  . Other remarks:   She denies of an urge to move extremities due to discomfort on medication (rRequip), denies of dream enactment behavior.       Corrigan Sleepiness Score: 4     Allergies   Allergen Reactions    Mobic [Meloxicam] Hives    Penicillins Unknown (comments)     DOESN'T REMEMBER    Plaquenil [Hydroxychloroquine] Hives         Current Outpatient Prescriptions:     Blood-Glucose Meter monitoring kit, E11.9, Disp: 1 Kit, Rfl: 0    glucose blood VI test strips (BLOOD GLUCOSE TEST) strip, Check Daily E11.9, Disp: 100 Strip, Rfl: 0    ASPIRIN/SALICYLAMIDE/CAFFEINE (BC HEADACHE POWDER PO), Take  by mouth., Disp: , Rfl:     venlafaxine-SR (EFFEXOR XR) 150 mg capsule, Take 1 Cap by mouth daily. Indications: major depressive disorder, Disp: 30 Cap, Rfl: 5    LORazepam (ATIVAN) 0.5 mg tablet, Take 1 Tab by mouth two (2) times daily as needed for Anxiety. Max Daily Amount: 1 mg., Disp: 60 Tab, Rfl: 0    varenicline (CHANTIX STARTER MARCO) 0.5 mg (11)- 1 mg (42) DsPk, As directed  Indications: SMOKING CESSATION, Disp: 42 Dose Pack, Rfl: 0    varenicline (CHANTIX) 1 mg tablet, Take 1 tab twice a day  Indications: SMOKING CESSATION, Disp: 60 Tab, Rfl: 2    rOPINIRole (REQUIP) 2 mg tablet, TAKE ONE TABLET BY MOUTH TWO TIMES A DAY, Disp: 60 Tab, Rfl: 5    losartan (COZAAR) 100 mg tablet, TAKE ONE TABLET BY MOUTH EVERY DAY, Disp: 30 Tab, Rfl: 5    dilTIAZem CD (CARDIZEM CD) 180 mg ER capsule, TAKE ONE CAPSULE BY MOUTH EVERY DAY, Disp: 30 Cap, Rfl: 5    furosemide (LASIX) 20 mg tablet, Take 1 Tab by mouth daily as needed. Indications: Edema, Disp: 30 Tab, Rfl: 2    gabapentin (NEURONTIN) 600 mg tablet, Take 1 Tab by mouth four (4) times daily. , Disp: 120 Tab, Rfl: 5    metFORMIN (GLUCOPHAGE) 500 mg tablet, Take 2 Tabs by mouth two (2) times daily (with meals). Indications: type 2 diabetes mellitus, Disp: 120 Tab, Rfl: 5    atorvastatin (LIPITOR) 40 mg tablet, Take 1 Tab by mouth nightly. Indications: hypercholesterolemia, Disp: 30 Tab, Rfl: 5    Iron-FA-D71-X-Kdliyfu Sodium 68-7--50 mg-mg-mcg-mg-mg tab, Take  by mouth., Disp: , Rfl:     TURMERIC ROOT EXTRACT PO, Take 1 Cap by mouth daily. , Disp: , Rfl:     fluticasone-salmeterol (ADVAIR HFA) 115-21 mcg/actuation inhaler, Take 2 Puffs by inhalation daily. , Disp: , Rfl:     L GASSERI/B BIFIDUM/B LONGUM (CRISTOBAL' [address]), Take  by mouth daily. , Disp: , Rfl:     cinnamon bark 500 mg cap, Take 1,000 mg by mouth two (2) times a day., Disp: , Rfl:     fish,bora,flax oils-om3,6,9 #1 1,200 mg cap, Take 2 Caps by mouth daily. , Disp: , Rfl:     esomeprazole (NEXIUM) 20 mg capsule, Take 40 mg by mouth daily. , Disp: , Rfl:     traMADol (ULTRAM) 50 mg tablet, Take 50 mg by mouth every eight (8) hours as needed. , Disp: , Rfl:     ketotifen (ALAWAY) 0.025 % (0.035 %) ophthalmic solution, Administer 1 Drop to both eyes two (2) times a day., Disp: 5 mL, Rfl: 3     She  has a past medical history of Arrhythmia; Arthritis; Autoimmune disease ([facility] Diabetes ([facility] [address] GERD (gastroesophageal reflux disease); Hypertension; Psychiatric disorder; and Sleep apnea. She  has a past surgical history that includes heent (1993); cholecystectomy (1998); tubal ligation (1989); hysterectomy (2008); cardiac surg procedure unlist (2013); knee arthroscopy (Bilateral, 2012); knee arthroscopy (Left, 2012); knee arthroscopy (Right, 2014); knee replacement (Left, 2012); and knee replacement (Right, 08/18/2016). She family history includes Alcohol abuse in her father; Charolet Killings in her brother, father, and mother; Diabetes in her brother; Heart Disease in her father; Heart Surgery in her father; Hypertension in her brother and father; Psychiatric Disorder in her father. There is no history of Anesth Problems. She  reports that she has been smoking. She has a 20.00 pack-year smoking history. She has never used smokeless tobacco. She reports that she does not drink alcohol or use illicit drugs.      Review of Systems:  Constitutional:  No significant weight loss or weight gain  Eyes:  No blurred vision  CVS:  No significant chest pain  Pulm:  No significant shortness of breath - she has COPD - this is current controlled with breathing treatments  GI:  No significant nausea or vomiting  :  No significant nocturia  Musculoskeletal:  significant joint pain at night  Skin:  No significant rashes  Neuro:  No significant dizziness   Psych:  No active mood issues    Sleep Review of Systems: notable for no difficulty falling asleep; infrequent awakenings at night;  regular dreaming not reported; no nightmares ; no early morning headaches; memory problems;  concentration issues; no history of any automobile or occupational accidents due to daytime drowsiness. Objective:     Visit Vitals    /74    Pulse 96    Temp 97.9 °F (36.6 °C)    Ht 5' 3\" (1.6 m)    Wt 189 lb (85.7 kg)    SpO2 98%    BMI 33.48 kg/m2         General:   Not in acute distress   Eyes:  Anicteric sclerae, no obvious strabismus   Nose:  No obvious nasal septum deviation    Oropharynx:   Class 4 oropharyngeal outlet, thick tongue base, uvula could not be seen due to low-lying soft palate, narrow tonsilo-pharyngeal pilars   Tonsils:   tonsils are not seen due to low-lying soft palate   Neck:   Neck circ. in \"inches\": 14; midline trachea   Chest/Lungs:  Equal lung expansion, clear on auscultation    CVS:  Normal rate, regular rhythm; no JVD   Skin:  Warm to touch; no obvious rashes   Neuro:  No focal deficits ; no obvious tremor    Psych:  Normal affect,  normal countenance;          Assessment:       ICD-10-CM ICD-9-CM    1. YOLANDA (obstructive sleep apnea) G47.33 327.23 POLYSOMNOGRAPHY 1 NIGHT   2. Chronic obstructive pulmonary disease, unspecified COPD type ([facility] J44.9 496    3. Essential hypertension with goal blood pressure less than 140/90 I10 401.9    4. Polyneuropathy associated with underlying disease ([facility] G63 357.4    5. Periodic limb movements of sleep G47.61 327.51    6. RLS (restless legs syndrome) G25.81 333.94          Plan:     * The patient currently has a Moderate Risk for having sleep apnea. STOP-BANG score 5.  * Sleep testing was ordered for initial evaluation. * She was provided information on sleep apnea including coresponding risk factors and the importance of proper treatment. * Treatment options if indicated were reviewed today. Patient agrees to a trial of OAT therapy if indicated.   * Counseling was provided regarding proper sleep hygiene (including effect of light on sleep) and safe driving. * Effect of sleep disturbance on weight was reviewed. We have recommended a dedicated weight loss through appropriate diet and an exercise regiment as significant weight reduction has been shown to reduce severity of obstructive sleep apnea. * Telephone [phone]  follow-up shortly after sleep study to review results and plan final management.     (patient has given permission for a message to be left regarding test results and further management if patient cannot be cannot be reached directly). Thank you for allowing us to participate in your patient's medical care. We'll keep you updated on these investigations. Randy Vásquez MD, FAASM  Electronically signed.  May 23, 2017

## 2017-05-23 NOTE — PATIENT INSTRUCTIONS
[facility] [place]. [place], [address]  Tel.  [phone]  Fax. 100 [facility] 60  [place], [address] [facility]  Tel.  [phone]  Fax. [phone] [address] [address] [address]  Tel.  [phone]  Fax. [phone]     Sleep Apnea: After Your Visit  Your Care Instructions  Sleep apnea occurs when you frequently stop breathing for 10 seconds or longer during sleep. It can be mild to severe, based on the number of times per hour that you stop breathing or have slowed breathing. Blocked or narrowed airways in your nose, mouth, or throat can cause sleep apnea. Your airway can become blocked when your throat muscles and tongue relax during sleep. Sleep apnea is common, occurring in 1 out of 20 individuals. Individuals having any of the following characteristics should be evaluated and treated right away due to high risk and detrimental consequences from untreated sleep apnea:  1. Obesity  2. Congestive Heart failure  3. Atrial Fibrillation  4. Uncontrolled Hypertension  5. Type II Diabetes  6. Night-time Arrhythmias  7. Stroke  8. Pulmonary Hypertension  9. High-risk Driving Populations (pilots, truck drivers, etc.)  10. Patients Considering Weight-loss Surgery    How do you know you have sleep apnea? You probably have sleep apnea if you answer 'yes' to 3 or more of the following questions:  S - Have you been told that you Snore? T - Are you often Tired during the day? O - Has anyone Observed you stop breathing while sleeping? P- Do you have (or are being treated for) high blood Pressure? B - Are you obese (Body Mass Index > 35)? A - Is your Age 48years old or older? N - Is your Neck size greater than 16 inches? G - Are you male Gender? A sleep physician can prescribe a breathing device that prevents tissues in the throat from blocking your airway.  Or your doctor may recommend using a dental device (oral breathing device) to help keep your airway open. In some cases, surgery may be needed to remove enlarged tissues in the throat. Follow-up care is a key part of your treatment and safety. Be sure to make and go to all appointments, and call your doctor if you are having problems. It's also a good idea to know your test results and keep a list of the medicines you take. How can you care for yourself at home? · Lose weight, if needed. It may reduce the number of times you stop breathing or have slowed breathing. · Go to bed at the same time every night. · Sleep on your side. It may stop mild apnea. If you tend to roll onto your back, sew a pocket in the back of your pajama top. Put a tennis ball into the pocket, and stitch the pocket shut. This will help keep you from sleeping on your back. · Avoid alcohol and medicines such as sleeping pills and sedatives before bed. · Do not smoke. Smoking can make sleep apnea worse. If you need help quitting, talk to your doctor about stop-smoking programs and medicines. These can increase your chances of quitting for good. · Prop up the head of your bed 4 to 6 inches by putting bricks under the legs of the bed. · Treat breathing problems, such as a stuffy nose, caused by a cold or allergies. · Use a continuous positive airway pressure (CPAP) breathing machine if lifestyle changes do not help your apnea and your doctor recommends it. The machine keeps your airway from closing when you sleep. · If CPAP does not help you, ask your doctor whether you should try other breathing machines. A bilevel positive airway pressure machine has two types of air pressureâone for breathing in and one for breathing out. Another device raises or lowers air pressure as needed while you breathe. · If your nose feels dry or bleeds when using one of these machines, talk with your doctor about increasing moisture in the air. A humidifier may help.   · If your nose is runny or stuffy from using a breathing machine, talk with your doctor about using decongestants or a corticosteroid nasal spray. When should you call for help? Watch closely for changes in your health, and be sure to contact your doctor if:  · You still have sleep apnea even though you have made lifestyle changes. · You are thinking of trying a device such as CPAP. · You are having problems using a CPAP or similar machine. Where can you learn more? Go to [employer]. Enter K823 in the search box to learn more about \"Sleep Apnea: After Your Visit. \"   © 7437-7536 [employer], [employer]. Care instructions adapted under license by Sriram Ojeda (which disclaims liability or warranty for this information). This care instruction is for use with your licensed healthcare professional. If you have questions about a medical condition or this instruction, always ask your healthcare professional. Marc Chamberlainss any warranty or liability for your use of this information. PROPER SLEEP HYGIENE    What to avoid  · Do not have drinks with caffeine, such as coffee or black tea, for 8 hours before bed. · Do not smoke or use other types of tobacco near bedtime. Nicotine is a stimulant and can keep you awake. · Avoid drinking alcohol late in the evening, because it can cause you to wake in the middle of the night. · Do not eat a big meal close to bedtime. If you are hungry, eat a light snack. · Do not drink a lot of water close to bedtime, because the need to urinate may wake you up during the night. · Do not read or watch TV in bed. Use the bed only for sleeping and sexual activity. What to try  · Go to bed at the same time every night, and wake up at the same time every morning. Do not take naps during the day. · Keep your bedroom quiet, dark, and cool. · Get regular exercise, but not within 3 to 4 hours of your bedtime. .  · Sleep on a comfortable pillow and mattress.   · If watching the clock makes you anxious, turn it facing away from you so you cannot see the time. · If you worry when you lie down, start a worry book. Well before bedtime, write down your worries, and then set the book and your concerns aside. · Try meditation or other relaxation techniques before you go to bed. · If you cannot fall asleep, get up and go to another room until you feel sleepy. Do something relaxing. Repeat your bedtime routine before you go to bed again. · Make your house quiet and calm about an hour before bedtime. Turn down the lights, turn off the TV, log off the computer, and turn down the volume on music. This can help you relax after a busy day. Drowsy Driving  The [address] [place] [address] Road Traffic Safety Administration cites drowsiness as a causing factor in more than 434,337 police reported crashes annually, resulting in 76,000 injuries and 1,500 deaths. Other surveys suggest 55% of people polled have driven while drowsy in the past year, 23% had fallen asleep but not crashed, 3% crashed, and 2% had and accident due to drowsy driving. Who is at risk? Young Drivers: One study of drowsy driving accidents states that 55% of the drivers were under 25 years. Of those, 75% were male. Shift Workers and Travelers: People who work overnight or travel across time zones frequently are at higher risk of experiencing Circadian Rhythm Disorders. They are trying to work and function when their body is programed to sleep. Sleep Deprived: Lack of sleep has a serious impact on your ability to pay attention or focus on a task. Consistently getting less than the average of 8 hours your body needs creates partial or cumulative sleep deprivation. Untreated Sleep Disorders: Sleep Apnea, Narcolepsy, R.L.S., and other sleep disorders (untreated) prevent a person from getting enough restful sleep. This leads to excessive daytime sleepiness and increases the risk for drowsy driving accidents by up to 7 times.   Medications / Alcohol: Even over the counter medications can cause drowsiness. Medications that impair a drivers attention should have a warning label. Alcohol naturally makes you sleepy and on its own can cause accidents. Combined with excessive drowsiness its effects are amplified. Signs of Drowsy Driving:   * You don't remember driving the last few miles   * You may drift out of your lakia   * You are unable to focus and your thoughts wander   * You may yawn more often than normal   * You have difficulty keeping your eyes open / nodding off   * Missing traffic signs, speeding, or tailgating  Prevention-   Good sleep hygiene, lifestyle and behavioral choices have the most impact on drowsy driving. There is no substitute for sleep and the average person requires 8 hours nightly. If you find yourself driving drowsy, stop and sleep. Consider the sleep hygiene tips provided during your visit as well. Medication Refill Policy: Refills for all medications require 1 week advance notice. Please have your pharmacy fax a refill request. We are unable to fax, or call in \"controled substance\" medications and you will need to pick these prescriptions up from our office. [employer] Activation    Thank you for requesting access to [employer]. Please follow the instructions below to securely access and download your online medical record. [employer]rix allows you to send messages to your doctor, view your test results, renew your prescriptions, schedule appointments, and more. How Do I Sign Up? 1. In your internet browser, go to [email or web address]. 2. Click on the First Time User? Click Here link in the Sign In box. You will see the New Member Sign Up page. 3. Enter your Equ[employer] Access Code exactly as it appears below. You will not need to use this code after youve completed the sign-up process. If you do not sign up before the expiration date, you must request a new code. [employer] Access Code:  Activation code not generated  Current [employer] Status: Active (This is the date your Digital Ocean access code will )    4. Enter the last four digits of your Social Security Number (xxxx) and Date of Birth (mm/dd/yyyy) as indicated and click Submit. You will be taken to the next sign-up page. 5. Create a [employer] ID. This will be your Digital Ocean login ID and cannot be changed, so think of one that is secure and easy to remember. 6. Create a Digital Ocean password. You can change your password at any time. 7. Enter your Password Reset Question and Answer. This can be used at a later time if you forget your password. 8. Enter your e-mail address. You will receive e-mail notification when new information is available in [address] 9. Click Sign Up. You can now view and download portions of your medical record. 10. Click the Download Summary menu link to download a portable copy of your medical information. Additional Information    If you have questions, please call [phone]. Remember, [employer] Ocean is NOT to be used for urgent needs. For medical emergencies, dial 911.

## 2017-05-24 ENCOUNTER — OFFICE VISIT (OUTPATIENT)
Dept: FAMILY MEDICINE CLINIC | Age: 52
End: 2017-05-24

## 2017-05-24 VITALS
WEIGHT: 188.8 LBS | TEMPERATURE: 98.5 F | DIASTOLIC BLOOD PRESSURE: 72 MMHG | RESPIRATION RATE: 20 BRPM | HEART RATE: 97 BPM | SYSTOLIC BLOOD PRESSURE: 111 MMHG | BODY MASS INDEX: 33.45 KG/M2 | OXYGEN SATURATION: 95 % | HEIGHT: 63 IN

## 2017-05-24 DIAGNOSIS — N39.0 URINARY TRACT INFECTION WITHOUT HEMATURIA, SITE UNSPECIFIED: ICD-10-CM

## 2017-05-24 DIAGNOSIS — J40 BRONCHITIS: Primary | ICD-10-CM

## 2017-05-24 DIAGNOSIS — Z12.39 SCREENING FOR BREAST CANCER: ICD-10-CM

## 2017-05-24 DIAGNOSIS — M17.11 PRIMARY OSTEOARTHRITIS OF RIGHT KNEE: ICD-10-CM

## 2017-05-24 DIAGNOSIS — Z12.11 SCREEN FOR COLON CANCER: ICD-10-CM

## 2017-05-24 LAB
BILIRUB UR QL STRIP: NEGATIVE
GLUCOSE UR-MCNC: NEGATIVE MG/DL
KETONES P FAST UR STRIP-MCNC: NEGATIVE MG/DL
PH UR STRIP: 6.5 [PH] (ref 4.6–8)
PROT UR QL STRIP: NEGATIVE MG/DL
SP GR UR STRIP: 1.01 (ref 1–1.03)
UA UROBILINOGEN AMB POC: NORMAL (ref 0.2–1)
URINALYSIS CLARITY POC: CLEAR
URINALYSIS COLOR POC: YELLOW
URINE BLOOD POC: NEGATIVE
URINE LEUKOCYTES POC: NEGATIVE
URINE NITRITES POC: NEGATIVE

## 2017-05-24 RX ORDER — PREDNISONE 10 MG/1
2.5 TABLET ORAL DAILY
COMMUNITY
End: 2018-02-14 | Stop reason: ALTCHOICE

## 2017-05-24 RX ORDER — AZITHROMYCIN 250 MG/1
TABLET, FILM COATED ORAL
Qty: 6 TAB | Refills: 0 | Status: SHIPPED | OUTPATIENT
Start: 2017-05-24 | End: 2017-05-29

## 2017-05-24 NOTE — PROGRESS NOTES
Identified pt with two pt identifiers(name and ). Chief Complaint   Patient presents with    Breast Problem     history - need mammogram     Cough     bad cough for over a week     Sore Throat     irritated throat     Abdominal Pain     Dr Lyndsay Hagen yesterday thought gallstones but US neg     Bladder Infection     burning for the last 3 days        Health Maintenance Due   Topic    Pneumococcal 19-64 Highest Risk (1 of 3 - PCV13)    PAP AKA CERVICAL CYTOLOGY     FOBT Q 1 YEAR AGE 50-75        Wt Readings from Last 3 Encounters:   17 188 lb 12.8 oz (85.6 kg)   17 189 lb (85.7 kg)   17 191 lb 12.8 oz (87 kg)     Temp Readings from Last 3 Encounters:   17 98.5 °F (36.9 °C) (Oral)   17 97.9 °F (36.6 °C)   17 98.4 °F (36.9 °C) (Oral)     BP Readings from Last 3 Encounters:   17 111/72   17 112/74   17 112/68     Pulse Readings from Last 3 Encounters:   17 97   17 96   17 91         Learning Assessment:  :     Learning Assessment 2016   PRIMARY LEARNER Patient Patient   HIGHEST LEVEL OF EDUCATION - PRIMARY LEARNER  - SOME COLLEGE   BARRIERS PRIMARY LEARNER - NONE   CO-LEARNER CAREGIVER - No   PRIMARY LANGUAGE ENGLISH ENGLISH   LEARNER PREFERENCE PRIMARY - DEMONSTRATION   ANSWERED BY patient self   RELATIONSHIP SELF SELF       Depression Screening:  :     PHQ over the last two weeks 2016   Little interest or pleasure in doing things Several days   Feeling down, depressed or hopeless Several days   Total Score PHQ 2 2       Fall Risk Assessment:  :     Fall Risk Assessment, last 12 mths 2016   Able to walk? Yes   Fall in past 12 months? No       Abuse Screening:  :     Abuse Screening Questionnaire 2016   Do you ever feel afraid of your partner? N   Are you in a relationship with someone who physically or mentally threatens you? N   Is it safe for you to go home?  Y       Coordination of Care Questionnaire:  : 1) Have you been to an emergency room, urgent care clinic since your last visit? no   Hospitalized since your last visit? no             2) Have you seen or consulted any other health care providers outside of [address] [place] [address] since your last visit? yes  Dr Shay Schroeder and Dr Jesica Whitten (Include any pap smears or colon screenings in this section.)    3) Do you have an Advance Directive on file? yes  Are you interested in receiving information about Advance Directives? no    Reviewed record in preparation for visit and have obtained necessary documentation. Medication reconciliation up to date and corrected with patient at this time.      Results for orders placed or performed in visit on 05/24/17   AMB POC URINALYSIS DIP STICK AUTO W/O MICRO   Result Value Ref Range    Color (UA POC) Yellow     Clarity (UA POC) Clear     Glucose (UA POC) Negative Negative    Bilirubin (UA POC) Negative Negative    Ketones (UA POC) Negative Negative    Specific gravity (UA POC) 1.015 1.001 - 1.035    Blood (UA POC) Negative Negative    pH (UA POC) 6.5 4.6 - 8.0    Protein (UA POC) Negative Negative mg/dL    Urobilinogen (UA POC) 0.2 mg/dL 0.2 - 1    Nitrites (UA POC) Negative Negative    Leukocyte esterase (UA POC) Negative Negative

## 2017-05-24 NOTE — PROGRESS NOTES
Subjective:   Perlita Dwyer is a 45 y.o. female who complains of sore throat and productive cough for 7 days, gradually worsening since that time. She denies a history of fevers and shortness of breath. Evaluation to date: none. Treatment to date: none. Patient does smoke cigarettes. Relevant PMH: . Patient Active Problem List    Diagnosis Date Noted    Cigarette nicotine dependence without complication 00/99/7982    Dysthymia 12/30/2016    YOLANDA (obstructive sleep apnea) 12/30/2016    Paroxysmal tachycardia ([facility] 09/09/2016    Arthritis 09/09/2016    Elevated WBC count 09/09/2016    Essential hypertension with goal blood pressure less than 140/90 09/07/2016    Chronic obstructive pulmonary disease ([facility] 09/07/2016    Diabetes mellitus type 2, controlled ([facility] 09/07/2016    Polyneuropathy associated with underlying disease ([facility] 09/07/2016    RLS (restless legs syndrome) 09/07/2016    Primary osteoarthritis of right knee 08/18/2016    Primary localized osteoarthritis of right knee 08/17/2016     Current Outpatient Prescriptions   Medication Sig Dispense Refill    predniSONE (DELTASONE) 10 mg tablet Take 10 mg by mouth daily. For 10 days then 5mg for 10 days then 2.5mg and see      azithromycin (ZITHROMAX) 250 mg tablet Take 2 tabs today, then 1 tab daily x 4 days. 6 Tab 0    Blood-Glucose Meter monitoring kit E11.9 1 Kit 0    glucose blood VI test strips (BLOOD GLUCOSE TEST) strip Check Daily E11.9 100 Strip 0    venlafaxine-SR (EFFEXOR XR) 150 mg capsule Take 1 Cap by mouth daily. Indications: major depressive disorder 30 Cap 5    LORazepam (ATIVAN) 0.5 mg tablet Take 1 Tab by mouth two (2) times daily as needed for Anxiety.  Max Daily Amount: 1 mg. 60 Tab 0    rOPINIRole (REQUIP) 2 mg tablet TAKE ONE TABLET BY MOUTH TWO TIMES A DAY 60 Tab 5    losartan (COZAAR) 100 mg tablet TAKE ONE TABLET BY MOUTH EVERY DAY 30 Tab 5    dilTIAZem CD (CARDIZEM CD) 180 mg ER capsule TAKE ONE CAPSULE BY MOUTH EVERY DAY 30 Cap 5    furosemide (LASIX) 20 mg tablet Take 1 Tab by mouth daily as needed. Indications: Edema 30 Tab 2    gabapentin (NEURONTIN) 600 mg tablet Take 1 Tab by mouth four (4) times daily. 120 Tab 5    metFORMIN (GLUCOPHAGE) 500 mg tablet Take 2 Tabs by mouth two (2) times daily (with meals). Indications: type 2 diabetes mellitus 120 Tab 5    atorvastatin (LIPITOR) 40 mg tablet Take 1 Tab by mouth nightly. Indications: hypercholesterolemia 30 Tab 5    Iron-FA-C61-U-Edqaach Sodium 74-1--50 mg-mg-mcg-mg-mg tab Take  by mouth.  TURMERIC ROOT EXTRACT PO Take 1 Cap by mouth daily.  fluticasone-salmeterol (ADVAIR HFA) 115-21 mcg/actuation inhaler Take 2 Puffs by inhalation daily.  L GASSERI/B BIFIDUM/B LONGUM ([employer] PO) Take  by mouth daily.  cinnamon bark 500 mg cap Take 1,000 mg by mouth two (2) times a day.  fish,bora,flax oils-om3,6,9 #1 1,200 mg cap Take 2 Caps by mouth daily.  esomeprazole (NEXIUM) 20 mg capsule Take 40 mg by mouth daily.  traMADol (ULTRAM) 50 mg tablet Take 50 mg by mouth every eight (8) hours as needed.  ASPIRIN/SALICYLAMIDE/CAFFEINE (BC HEADACHE POWDER PO) Take  by mouth.  varenicline (CHANTIX STARTER MARCO) 0.5 mg (11)- 1 mg (42) DsPk As directed  Indications: SMOKING CESSATION 42 Dose Pack 0    varenicline (CHANTIX) 1 mg tablet Take 1 tab twice a day  Indications: SMOKING CESSATION 60 Tab 2    ketotifen (ALAWAY) 0.025 % (0.035 %) ophthalmic solution Administer 1 Drop to both eyes two (2) times a day. 5 mL 3     Allergies   Allergen Reactions    Mobic [Meloxicam] Hives    Penicillins Unknown (comments)     DOESN'T REMEMBER    Plaquenil [Hydroxychloroquine] Hives        Review of Systems  Pertinent items are noted in HPI.     Objective:     Visit Vitals    /72 (BP 1 Location: Right arm, BP Patient Position: Sitting)    Pulse 97    Temp 98.5 °F (36.9 °C) (Oral)    Resp 20    Ht 5' 3\" (1.6 m)  Wt 188 lb 12.8 oz (85.6 kg)    SpO2 95%    BMI 33.44 kg/m2     General:  alert, cooperative, no distress   Eyes: negative   Ears: normal TM's and external ear canals AU   Sinuses: Normal paranasal sinuses without tenderness   Mouth:  Lips, mucosa, and tongue normal. Teeth and gums normal   Neck: supple, symmetrical, trachea midline and no adenopathy. Heart: S1 and S2 normal, no murmurs noted. Lungs: clear to auscultation bilaterally   Abdomen:         Results for orders placed or performed in visit on 05/24/17   AMB POC URINALYSIS DIP STICK AUTO W/O MICRO   Result Value Ref Range    Color (UA POC) Yellow     Clarity (UA POC) Clear     Glucose (UA POC) Negative Negative    Bilirubin (UA POC) Negative Negative    Ketones (UA POC) Negative Negative    Specific gravity (UA POC) 1.015 1.001 - 1.035    Blood (UA POC) Negative Negative    pH (UA POC) 6.5 4.6 - 8.0    Protein (UA POC) Negative Negative mg/dL    Urobilinogen (UA POC) 0.2 mg/dL 0.2 - 1    Nitrites (UA POC) Negative Negative    Leukocyte esterase (UA POC) Negative Negative       Assessment/Plan:         ICD-10-CM ICD-9-CM    1. Bronchitis J40 490 azithromycin (ZITHROMAX) 250 mg tablet   2. Urinary tract infection without hematuria, site unspecified N39.0 599.0 AMB POC URINALYSIS DIP STICK AUTO W/O MICRO   3. Screening for breast cancer Z12.39 V76.10 BRUNO MAMMO BI SCREENING INCL CAD   4. Primary osteoarthritis of right knee M17.11 715.16    5. Screen for colon cancer Z12.11 V76.51 OCCULT BLOOD, IMMUNOASSAY (FIT)   . She will try to make arrangement with Pain Clinic that is in network. Order mammogram.  Stool FIT kit given. Z [place] for bronchitis.

## 2017-05-24 NOTE — MR AVS SNAPSHOT
Visit Information Date & Time Provider Department Dept. Phone Encounter #  
 3/72/0757  3:19 PM Fredo PbEdmund lisa [phone] [id number] Upcoming Health Maintenance Date Due Pneumococcal 19-64 Highest Risk (1 of 3 - PCV13) 7/23/1983 PAP AKA CERVICAL CYTOLOGY 7/23/1985 FOBT Q 1 YEAR AGE 50-75 7/23/2014 HEMOGLOBIN A1C Q6M 6/30/2017 INFLUENZA AGE 9 TO ADULT 8/1/2017 FOOT EXAM Q1 12/30/2017 MICROALBUMIN Q1 12/30/2017 LIPID PANEL Q1 12/30/2017 EYE EXAM RETINAL OR DILATED Q1 2/28/2018 BREAST CANCER SCRN MAMMOGRAM 4/18/2018 DTaP/Tdap/Td series (2 - Td) 9/7/2026 Allergies as of 5/24/2017  Review Complete On: 2/30/8442 By: Fredo Duenas MD  
  
 Severity Noted Reaction Type Reactions Mobic [Meloxicam]  08/08/2016    Hives Penicillins  08/08/2016    Unknown (comments) DOESN'T REMEMBER Plaquenil [Hydroxychloroquine]  08/08/2016    Hives Current Immunizations  Never Reviewed No immunizations on file. Not reviewed this visit You Were Diagnosed With   
  
 Codes Comments Bronchitis    -  Primary ICD-10-CM: X39 ICD-9-CM: 671 Urinary tract infection without hematuria, site unspecified     ICD-10-CM: N39.0 ICD-9-CM: 599.0 Screening for breast cancer     ICD-10-CM: Z12.39 
ICD-9-CM: V76.10 Primary osteoarthritis of right knee     ICD-10-CM: M17.11 ICD-9-CM: 715.16 Vitals BP Pulse Temp Resp Height(growth percentile) Weight(growth percentile) 111/72 (BP 1 Location: Right arm, BP Patient Position: Sitting) 97 98.5 °F (36.9 °C) (Oral) 20 5' 3\" (1.6 m) 188 lb 12.8 oz (85.6 kg) SpO2 BMI OB Status Smoking Status 95% 33.44 kg/m2 Hysterectomy Current Every Day Smoker Vitals History BMI and BSA Data Body Mass Index Body Surface Area  
 33.44 kg/m 2 1.95 m 2 Preferred Pharmacy Pharmacy Name Phone [address] [place] [address] [phone] Your Updated Medication List  
  
   
This list is accurate as of: 5/24/17  3:19 PM.  Always use your most recent med list.  
  
  
  
  
 atorvastatin 40 mg tablet Commonly known as:  LIPITOR Take 1 Tab by mouth nightly. Indications: hypercholesterolemia  
  
 azithromycin 250 mg tablet Commonly known as:  Ruby Royal Take 2 tabs today, then 1 tab daily x 4 days. BC HEADACHE POWDER PO Take  by mouth. Blood-Glucose Meter monitoring kit E11.9  
  
 cinnamon bark 500 mg Cap Take 1,000 mg by mouth two (2) times a day. dilTIAZem  mg ER capsule Commonly known as:  CARDIZEM CD  
TAKE ONE CAPSULE BY MOUTH EVERY DAY  
  
 esomeprazole 20 mg capsule Commonly known as:  Diana Monserrat Take 40 mg by mouth daily. fish,bora,flax oils-om3,6,9 #1 1,200 mg Cap Take 2 Caps by mouth daily. fluticasone-salmeterol 115-21 mcg/actuation inhaler Commonly known as:  ADVAIR HFA Take 2 Puffs by inhalation daily. furosemide 20 mg tablet Commonly known as:  LASIX Take 1 Tab by mouth daily as needed. Indications: Edema  
  
 gabapentin 600 mg tablet Commonly known as:  NEURONTIN Take 1 Tab by mouth four (4) times daily. glucose blood VI test strips strip Commonly known as:  blood glucose test  
Check Daily E11.9 Iron-FA-I14-C-Jyftjqk Sodium 96-3--50 mg-mg-mcg-mg-mg Tab Take  by mouth.  
  
 ketotifen 0.025 % (0.035 %) ophthalmic solution Commonly known as:  ALAWAY Administer 1 Drop to both eyes two (2) times a day. LORazepam 0.5 mg tablet Commonly known as:  ATIVAN Take 1 Tab by mouth two (2) times daily as needed for Anxiety. Max Daily Amount: 1 mg.  
  
 losartan 100 mg tablet Commonly known as:  COZAAR  
TAKE ONE TABLET BY MOUTH EVERY DAY  
  
 metFORMIN 500 mg tablet Commonly known as:  GLUCOPHAGE  
 Take 2 Tabs by mouth two (2) times daily (with meals). Indications: type 2 diabetes mellitus [address] Take  by mouth daily. predniSONE 10 mg tablet Commonly known as:  Job Jose Maria Take 10 mg by mouth daily. For 10 days then 5mg for 10 days then 2.5mg and see  
  
 rOPINIRole 2 mg tablet Commonly known as:  REQUIP  
TAKE ONE TABLET BY MOUTH TWO TIMES A DAY  
  
 traMADol 50 mg tablet Commonly known as:  ULTRAM  
Take 50 mg by mouth every eight (8) hours as needed. TURMERIC ROOT EXTRACT PO Take 1 Cap by mouth daily. * varenicline 0.5 mg (11)- 1 mg (42) Dspk Commonly known as:  CHANTIX STARTER MARCO As directed  Indications: SMOKING CESSATION  
  
 * varenicline 1 mg tablet Commonly known as:  Shahida Hy Take 1 tab twice a day  Indications: SMOKING CESSATION  
  
 venlafaxine- mg capsule Commonly known as:  EFFEXOR XR Take 1 Cap by mouth daily. Indications: major depressive disorder * Notice: This list has 2 medication(s) that are the same as other medications prescribed for you. Read the directions carefully, and ask your doctor or other care provider to review them with you. Prescriptions Sent to Pharmacy Refills  
 azithromycin (ZITHROMAX) 250 mg tablet 0 Sig: Take 2 tabs today, then 1 tab daily x 4 days. Class: Normal  
 Pharmacy: [address] [place] [address] #: [phone] We Performed the Following AMB POC URINALYSIS DIP STICK AUTO W/O MICRO [76470 CPT(R)] To-Do List   
 05/24/2017 Imaging:  [facility] BI SCREENING INCL CAD   
  
 07/24/2017 8:30 PM  
  Appointment with BEDROOM 8 [facility] at [facility] ([phone]) 1. Do not take a nap the day of the study  2. No caffeine after 12 noon the day of the study  3. Bring a 2 piece sleeping garment  4.  Hair should be clean and dry, no oils, sprays, powders and remove wigs, weaves or other hair accessories  6. Patient should eat dinner prior to arriving for the test and a light breakfast will be provided upon discharge in the morning  7. Patient encouraged to bring activity items such as books, magazines, laptop, IPAD, etc, as well as toiletry items needed for the next morning  8. Bring all medications with you to the center  9. For specific questions please contact the sleep center directly, 830a to 5p  10. Do not arrive more than 15 minutes prior to appt time and use the doorbell to enter the sleep center. Introducing [facility] & [facility] SERVICES! Dear Yuliana Anglin: Thank you for requesting a GamingTurf account. Our records indicate that you already have an active GamingTurf account. You can access your account anytime at [email or web address] Did you know that you can access your hospital and ER discharge instructions at any time in GamingTurf? You can also review all of your test results from your hospital stay or ER visit. Additional Information If you have questions, please visit the Frequently Asked Questions section of the GamingTurf website at [email or web address] Remember, GamingTurf is NOT to be used for urgent needs. For medical emergencies, dial 911. Now available from your iPhone and Android! Please provide this summary of care documentation to your next provider. Your primary care clinician is listed as Vladimir Duffy. If you have any questions after today's visit, please call [phone].

## 2017-06-01 ENCOUNTER — HOSPITAL ENCOUNTER (OUTPATIENT)
Dept: MAMMOGRAPHY | Age: 52
Discharge: HOME | End: 2017-06-01
Attending: FAMILY MEDICINE
Payer: COMMERCIAL

## 2017-06-01 DIAGNOSIS — Z12.39 SCREENING FOR BREAST CANCER: ICD-10-CM

## 2017-06-01 PROCEDURE — 77067 SCR MAMMO BI INCL CAD: CPT

## 2017-06-07 LAB — HEMOCCULT STL QL IA: NEGATIVE

## 2017-06-26 ENCOUNTER — TELEPHONE (OUTPATIENT)
Dept: FAMILY MEDICINE CLINIC | Age: 52
End: 2017-06-26

## 2017-06-26 DIAGNOSIS — M25.512 LEFT SHOULDER PAIN, UNSPECIFIED CHRONICITY: Primary | ICD-10-CM

## 2017-06-26 NOTE — TELEPHONE ENCOUNTER
Pt needs a doctors referral and insurance referral for Dr Edilma Das with [facility] appt is set for July 5, 2017 - pt is being the left shoulder and fell into a fence

## 2017-07-07 ENCOUNTER — OFFICE VISIT (OUTPATIENT)
Dept: FAMILY MEDICINE CLINIC | Age: 52
End: 2017-07-07

## 2017-07-07 VITALS
HEART RATE: 77 BPM | RESPIRATION RATE: 20 BRPM | HEIGHT: 63 IN | TEMPERATURE: 98.1 F | SYSTOLIC BLOOD PRESSURE: 134 MMHG | BODY MASS INDEX: 33.84 KG/M2 | WEIGHT: 191 LBS | DIASTOLIC BLOOD PRESSURE: 77 MMHG | OXYGEN SATURATION: 96 %

## 2017-07-07 DIAGNOSIS — E11.9 CONTROLLED TYPE 2 DIABETES MELLITUS WITHOUT COMPLICATION, WITHOUT LONG-TERM CURRENT USE OF INSULIN (HCC): Primary | ICD-10-CM

## 2017-07-07 DIAGNOSIS — R53.83 FATIGUE, UNSPECIFIED TYPE: ICD-10-CM

## 2017-07-07 DIAGNOSIS — Z23 ENCOUNTER FOR IMMUNIZATION: ICD-10-CM

## 2017-07-07 NOTE — PROGRESS NOTES
Subjective:     Dilma Hsu is a 45 y.o. female seen for follow up of diabetes. She also has hyperlipidemia and obesity. Diabetic Review of Systems - medication compliance: compliant all of the time, diabetic diet compliance: compliant most of the time, home glucose monitoring: is performed sporadically. Other symptoms and concerns: ongoing fatigue. Due for A1C and TSH. She injured left shoulder and went to see Dr. Corrinne Lemma for cortisone injection. Hematology Dr. Tulio Flowers put her on tapering prednisone to treat whet he believes to be chronic inflammatory arthritis that is cause of elevated WBC. Her joints feel much better. She is on prednisone 2.5 mg daily. Patient Active Problem List    Diagnosis Date Noted    Cigarette nicotine dependence without complication 15/52/3782    Dysthymia 12/30/2016    YOLANDA (obstructive sleep apnea) 12/30/2016    Paroxysmal tachycardia ([facility] 09/09/2016    Arthritis 09/09/2016    Elevated WBC count 09/09/2016    Essential hypertension with goal blood pressure less than 140/90 09/07/2016    [facility] 09/07/2016    Diabetes mellitus type 2, controlled ([facility] 09/07/2016    Polyneuropathy associated with underlying disease ([facility] 09/07/2016    RLS (restless legs syndrome) 09/07/2016    Primary osteoarthritis of right knee 08/18/2016    Primary localized osteoarthritis of right knee 08/17/2016     Current Outpatient Prescriptions   Medication Sig Dispense Refill    pneumococcal 13 david conj dip (PREVNAR-13) 0.5 mL syrg injection 0.5 mL by IntraMUSCular route once for 1 dose. 0.5 mL 0    predniSONE (DELTASONE) 10 mg tablet Take 2.5 mg by mouth daily. For 10 days then 5mg for 10 days then 2.5mg and see       Blood-Glucose Meter monitoring kit E11.9 1 Kit 0    glucose blood VI test strips (BLOOD GLUCOSE TEST) strip Check Daily E11.9 100 Strip 0    venlafaxine-SR (EFFEXOR XR) 150 mg capsule Take 1 Cap by mouth daily.  Indications: major depressive disorder 30 Cap 5    LORazepam (ATIVAN) 0.5 mg tablet Take 1 Tab by mouth two (2) times daily as needed for Anxiety. Max Daily Amount: 1 mg. 60 Tab 0    varenicline (CHANTIX) 1 mg tablet Take 1 tab twice a day  Indications: SMOKING CESSATION 60 Tab 2    ketotifen (ALAWAY) 0.025 % (0.035 %) ophthalmic solution Administer 1 Drop to both eyes two (2) times a day. 5 mL 3    rOPINIRole (REQUIP) 2 mg tablet TAKE ONE TABLET BY MOUTH TWO TIMES A DAY 60 Tab 5    losartan (COZAAR) 100 mg tablet TAKE ONE TABLET BY MOUTH EVERY DAY 30 Tab 5    dilTIAZem CD (CARDIZEM CD) 180 mg ER capsule TAKE ONE CAPSULE BY MOUTH EVERY DAY 30 Cap 5    furosemide (LASIX) 20 mg tablet Take 1 Tab by mouth daily as needed. Indications: Edema 30 Tab 2    gabapentin (NEURONTIN) 600 mg tablet Take 1 Tab by mouth four (4) times daily. 120 Tab 5    metFORMIN (GLUCOPHAGE) 500 mg tablet Take 2 Tabs by mouth two (2) times daily (with meals). Indications: type 2 diabetes mellitus 120 Tab 5    atorvastatin (LIPITOR) 40 mg tablet Take 1 Tab by mouth nightly. Indications: hypercholesterolemia 30 Tab 5    Iron-FA-Y74-H-Fzmvgle Sodium 41-9--50 mg-mg-mcg-mg-mg tab Take  by mouth.  TURMERIC ROOT EXTRACT PO Take 1 Cap by mouth daily.  fluticasone-salmeterol (ADVAIR HFA) 115-21 mcg/actuation inhaler Take 2 Puffs by inhalation daily.  L GASSERI/B BIFIDUM/B LONGUM (mascotsecret PO) Take  by mouth daily.  cinnamon bark 500 mg cap Take 1,000 mg by mouth two (2) times a day.  esomeprazole (NEXIUM) 20 mg capsule Take 40 mg by mouth daily.  ASPIRIN/SALICYLAMIDE/CAFFEINE (BC HEADACHE POWDER PO) Take  by mouth.        Allergies   Allergen Reactions    Mobic [Meloxicam] Hives    Penicillins Unknown (comments)     DOESN'T REMEMBER    Plaquenil [Hydroxychloroquine] Hives     Past Medical History:   Diagnosis Date    Arrhythmia     TACHYCARDIA    Arthritis     Autoimmune disease ([facility]     LUPUS    Diabetes ([address]     GERD (gastroesophageal reflux disease)     Hypertension     Psychiatric disorder     PANIC ATTACKS    Sleep apnea      Past Surgical History:   Procedure Laterality Date    CARDIAC SURG PROCEDURE UNLIST  2013    ABLATION    HX BREAST BIOPSY Bilateral     benign    HX CHOLECYSTECTOMY  1998    HX HEENT  1993    SEPTUM SCRAPPED    HX HYSTERECTOMY  2008    HX KNEE ARTHROSCOPY Bilateral 2012    HX KNEE ARTHROSCOPY Left 2012    HX KNEE ARTHROSCOPY Right 2014    HX KNEE REPLACEMENT Left 2012    HX KNEE REPLACEMENT Right 08/18/2016    HX TUBAL LIGATION  1989     Family History   Problem Relation Age of Onset    Arthritis-osteo Mother     Psychiatric Disorder Father     Arthritis-osteo Father     Alcohol abuse Father     Heart Disease Father     Heart Surgery Father     Hypertension Father     Diabetes Brother     Arthritis-osteo Brother     Hypertension Brother     Anesth Problems Neg Hx      Social History   Substance Use Topics    Smoking status: Current Every Day Smoker     Packs/day: 0.50     Years: 40.00    Smokeless tobacco: Never Used    Alcohol use No             Review of Systems  Pertinent items are noted in HPI. Objective:     Visit Vitals    /77 (BP 1 Location: Right arm, BP Patient Position: Sitting)    Pulse 77    Temp 98.1 °F (36.7 °C)    Resp 20    Ht 5' 3\" (1.6 m)    Wt 191 lb (86.6 kg)    SpO2 96%    BMI 33.83 kg/m2     Appearance: alert, well appearing, and in no distress and overweight. Exam: heart sounds normal rate, regular rhythm, normal S1, S2, no murmurs, rubs, clicks or gallops, chest clear  Lab review: orders written for new lab studies as appropriate; see orders. Assessment/Plan:     diabetes . Diabetic issues reviewed with her: annual eye examinations at Ophthalmology discussed. ICD-10-CM ICD-9-CM    1.  Controlled type 2 diabetes mellitus without complication, without long-term current use of insulin ([facility]) E11.9 250.00 HEMOGLOBIN A1C WITH EAG      TSH 3RD GENERATION      pneumococcal 13 david conj dip (PREVNAR-13) 0.5 mL syrg injection   2. Encounter for immunization Z23 V03.89 pneumococcal 13 david conj dip (PREVNAR-13) 0.5 mL syrg injection      CANCELED: PNEUMOCOCCAL CONJ VACCINE 13 VALENT IM   3.  Fatigue, unspecified type R53.83 780.79

## 2017-07-07 NOTE — MR AVS SNAPSHOT
Visit Information Date & Time Provider Department Dept. Phone Encounter #  
 4/7/0057  2:26 PM Wilma Navas Tammy [address] [phone] [id number] Upcoming Health Maintenance Date Due Pneumococcal 19-64 Highest Risk (1 of 3 - PCV13) 7/23/1983 INFLUENZA AGE 9 TO ADULT 8/1/2017 FOOT EXAM Q1 12/30/2017 MICROALBUMIN Q1 12/30/2017 LIPID PANEL Q1 12/30/2017 HEMOGLOBIN A1C Q6M 1/7/2018 EYE EXAM RETINAL OR DILATED Q1 2/28/2018 FOBT Q 1 YEAR AGE 50-75 5/24/2018 BREAST CANCER SCRN MAMMOGRAM 6/1/2019 PAP AKA CERVICAL CYTOLOGY 5/24/2020 DTaP/Tdap/Td series (2 - Td) 9/7/2026 Allergies as of 7/7/2017  Review Complete On: 0/1/4060 By: Wilma Navas MD  
  
 Severity Noted Reaction Type Reactions Mobic [Meloxicam]  08/08/2016    Hives Penicillins  08/08/2016    Unknown (comments) DOESN'T REMEMBER Plaquenil [Hydroxychloroquine]  08/08/2016    Hives Current Immunizations  Reviewed on 7/7/2017 No immunizations on file. Reviewed by Wilma Navas MD on 7/7/2017 at  4:19 PM  
You Were Diagnosed With   
  
 Codes Comments Controlled type 2 diabetes mellitus without complication, without long-term current use of insulin ([facility]    -  Primary ICD-10-CM: E11.9 ICD-9-CM: 250.00 Encounter for immunization     ICD-10-CM: Q44 ICD-9-CM: V03.89 Fatigue, unspecified type     ICD-10-CM: R53.83 ICD-9-CM: 780.79 Vitals BP Pulse Temp Resp Height(growth percentile) Weight(growth percentile) 134/77 (BP 1 Location: Right arm, BP Patient Position: Sitting) 77 98.1 °F (36.7 °C) 20 5' 3\" (1.6 m) 191 lb (86.6 kg) SpO2 BMI OB Status Smoking Status 96% 33.83 kg/m2 Hysterectomy Current Every Day Smoker Vitals History BMI and BSA Data Body Mass Index Body Surface Area  
 33.83 kg/m 2 1.96 m 2 Preferred Pharmacy Pharmacy Name Phone [address] [place] [address] [phone] Your Updated Medication List  
  
   
This list is accurate as of: 7/7/17  4:34 PM.  Always use your most recent med list.  
  
  
  
  
 atorvastatin 40 mg tablet Commonly known as:  LIPITOR Take 1 Tab by mouth nightly. Indications: hypercholesterolemia BC HEADACHE POWDER PO Take  by mouth. Blood-Glucose Meter monitoring kit E11.9  
  
 cinnamon bark 500 mg Cap Take 1,000 mg by mouth two (2) times a day. dilTIAZem  mg ER capsule Commonly known as:  CARDIZEM CD  
TAKE ONE CAPSULE BY MOUTH EVERY DAY  
  
 esomeprazole 20 mg capsule Commonly known as:  Clemetine [place] Take 40 mg by mouth daily. fluticasone-salmeterol 115-21 mcg/actuation inhaler Commonly known as:  ADVAIR HFA Take 2 Puffs by inhalation daily. furosemide 20 mg tablet Commonly known as:  LASIX Take 1 Tab by mouth daily as needed. Indications: Edema  
  
 gabapentin 600 mg tablet Commonly known as:  NEURONTIN Take 1 Tab by mouth four (4) times daily. glucose blood VI test strips strip Commonly known as:  blood glucose test  
Check Daily E11.9 Iron-FA-P77-I-Qhdueoo Sodium 85-3--50 mg-mg-mcg-mg-mg Tab Take  by mouth.  
  
 ketotifen 0.025 % (0.035 %) ophthalmic solution Commonly known as:  ALAWAY Administer 1 Drop to both eyes two (2) times a day. LORazepam 0.5 mg tablet Commonly known as:  ATIVAN Take 1 Tab by mouth two (2) times daily as needed for Anxiety. Max Daily Amount: 1 mg.  
  
 losartan 100 mg tablet Commonly known as:  COZAAR  
TAKE ONE TABLET BY MOUTH EVERY DAY  
  
 metFORMIN 500 mg tablet Commonly known as:  GLUCOPHAGE Take 2 Tabs by mouth two (2) times daily (with meals). Indications: type 2 diabetes mellitus [address] Take  by mouth daily. pneumococcal 13 david conj dip 0.5 mL Syrg injection Commonly known as:  PREVNAR-13  
 0.5 mL by IntraMUSCular route once for 1 dose. predniSONE 10 mg tablet Commonly known as:  [facility] [place] Take 2.5 mg by mouth daily. For 10 days then 5mg for 10 days then 2.5mg and see  
  
 rOPINIRole 2 mg tablet Commonly known as:  REQUIP  
TAKE ONE TABLET BY MOUTH TWO TIMES A DAY  
  
 TURMERIC ROOT EXTRACT PO Take 1 Cap by mouth daily. varenicline 1 mg tablet Commonly known as:  Nedra Lipps Take 1 tab twice a day  Indications: SMOKING CESSATION  
  
 venlafaxine- mg capsule Commonly known as:  EFFEXOR XR Take 1 Cap by mouth daily. Indications: major depressive disorder Prescriptions Printed Refills  
 pneumococcal 13 david conj dip (PREVNAR-13) 0.5 mL syrg injection 0 Si.5 mL by IntraMUSCular route once for 1 dose. Class: Print Route: IntraMUSCular We Performed the Following HEMOGLOBIN A1C WITH EAG [49131 CPT(R)] TSH 3RD GENERATION [09361 CPT(R)] Introducing [facility] & [facility] SERVICES! Dear Nick: Thank you for requesting a Comic Reply account. Our records indicate that you already have an active Comic Reply account. You can access your account anytime at [email or web address] Did you know that you can access your hospital and ER discharge instructions at any time in Comic Reply? You can also review all of your test results from your hospital stay or ER visit. Additional Information If you have questions, please visit the Frequently Asked Questions section of the Comic Reply website at [email or web address] Remember, Comic Reply is NOT to be used for urgent needs. For medical emergencies, dial 911. Now available from your iPhone and Android! Please provide this summary of care documentation to your next provider. Your primary care clinician is listed as Marybel Ruby. If you have any questions after today's visit, please call [phone].

## 2017-07-07 NOTE — PROGRESS NOTES
Identified pt with two pt identifiers(name and ). Chief Complaint   Patient presents with    Diabetes     A1C    Fall     stepped off step and hurt her left shoulder - injection in shoulder         Health Maintenance Due   Topic    Pneumococcal 19-64 Highest Risk (1 of 3 - PCV13)    HEMOGLOBIN A1C Q6M        Wt Readings from Last 3 Encounters:   17 191 lb (86.6 kg)   17 188 lb 12.8 oz (85.6 kg)   17 189 lb (85.7 kg)     Temp Readings from Last 3 Encounters:   17 98.1 °F (36.7 °C)   17 98.5 °F (36.9 °C) (Oral)   17 97.9 °F (36.6 °C)     BP Readings from Last 3 Encounters:   17 134/77   17 111/72   17 112/74     Pulse Readings from Last 3 Encounters:   17 77   17 97   17 96         Learning Assessment:  :     Learning Assessment 2016   PRIMARY LEARNER Patient Patient   HIGHEST LEVEL OF EDUCATION - PRIMARY LEARNER  - SOME COLLEGE   BARRIERS PRIMARY LEARNER - NONE   CO-LEARNER CAREGIVER - No   PRIMARY LANGUAGE ENGLISH ENGLISH   LEARNER PREFERENCE PRIMARY - DEMONSTRATION   ANSWERED BY patient self   RELATIONSHIP SELF SELF       Depression Screening:  :     PHQ over the last two weeks 2016   Little interest or pleasure in doing things Several days   Feeling down, depressed or hopeless Several days   Total Score PHQ 2 2       Fall Risk Assessment:  :     Fall Risk Assessment, last 12 mths 2016   Able to walk? Yes   Fall in past 12 months? No       Abuse Screening:  :     Abuse Screening Questionnaire 2016   Do you ever feel afraid of your partner? N   Are you in a relationship with someone who physically or mentally threatens you? N   Is it safe for you to go home?  Y       Coordination of Care Questionnaire:  :     1) Have you been to an emergency room, urgent care clinic since your last visit? no   Hospitalized since your last visit? no             2) Have you seen or consulted any other health care providers outside of [address] [place] [address] since your last visit? yes  (Include any pap smears or colon screenings in this section.)    3) Do you have an Advance Directive on file? yes  Are you interested in receiving information about Advance Directives? no    Reviewed record in preparation for visit and have obtained necessary documentation. Medication reconciliation up to date and corrected with patient at this time.

## 2017-07-08 LAB
EST. AVERAGE GLUCOSE BLD GHB EST-MCNC: 128 MG/DL
HBA1C MFR BLD: 6.1 % (ref 4.8–5.6)
TSH SERPL DL<=0.005 MIU/L-ACNC: 1.91 UIU/ML (ref 0.45–4.5)

## 2017-07-11 ENCOUNTER — TELEPHONE (OUTPATIENT)
Dept: FAMILY MEDICINE CLINIC | Age: 52
End: 2017-07-11

## 2017-07-12 NOTE — TELEPHONE ENCOUNTER
I spoke with pharmacist and Pneumovax is NOT covered either by [employer]. Please let pt know that at this point there are no other alternatives. Will need to just wait until she is 71.

## 2017-07-12 NOTE — TELEPHONE ENCOUNTER
----- Message from Manjeet Velez LPN sent at 7/59/5094  6:22 PM EDT -----  Regarding: FW: Prescription Question  Contact: [phone]      ----- Message -----     From: Michelle Bundy     Sent: 7/10/2017   6:07 PM       To: Annika Henao  Subject: Prescription Question                            [facility] told me the prescription you gave me 7/7, is not covered by my insurance. Can you request my insurance to cover me because I'm a diabetic? Thank you.

## 2017-07-13 NOTE — TELEPHONE ENCOUNTER
LVM - Dr Sebastian Amin checked on the pneumovax and it is not covered until age 71. She said at this point it will have to wait till then.

## 2017-07-27 ENCOUNTER — HOSPITAL ENCOUNTER (OUTPATIENT)
Dept: MRI IMAGING | Age: 52
Discharge: HOME | End: 2017-07-27
Attending: ORTHOPAEDIC SURGERY
Payer: COMMERCIAL

## 2017-07-27 DIAGNOSIS — M25.512 LEFT SHOULDER PAIN: ICD-10-CM

## 2017-07-27 PROCEDURE — 73221 MRI JOINT UPR EXTREM W/O DYE: CPT

## 2017-08-05 DIAGNOSIS — R60.0 PEDAL EDEMA: ICD-10-CM

## 2017-08-06 RX ORDER — FUROSEMIDE 20 MG/1
TABLET ORAL
Qty: 30 TAB | Refills: 5 | Status: SHIPPED | OUTPATIENT
Start: 2017-08-06 | End: 2017-08-06 | Stop reason: SDUPTHER

## 2017-08-06 RX ORDER — FUROSEMIDE 20 MG/1
TABLET ORAL
Qty: 30 TAB | Refills: 5 | Status: SHIPPED | OUTPATIENT
Start: 2017-08-06 | End: 2018-02-01 | Stop reason: SDUPTHER

## 2017-10-05 DIAGNOSIS — G25.81 RLS (RESTLESS LEGS SYNDROME): ICD-10-CM

## 2017-10-05 DIAGNOSIS — F34.1 DYSTHYMIA: ICD-10-CM

## 2017-10-05 DIAGNOSIS — I10 ESSENTIAL HYPERTENSION WITH GOAL BLOOD PRESSURE LESS THAN 140/90: ICD-10-CM

## 2017-10-05 RX ORDER — VENLAFAXINE HYDROCHLORIDE 150 MG/1
CAPSULE, EXTENDED RELEASE ORAL
Qty: 30 CAP | Refills: 5 | Status: SHIPPED | OUTPATIENT
Start: 2017-10-05 | End: 2018-05-08 | Stop reason: SDUPTHER

## 2017-10-05 RX ORDER — ROPINIROLE 2 MG/1
TABLET, FILM COATED ORAL
Qty: 60 TAB | Refills: 5 | Status: SHIPPED | OUTPATIENT
Start: 2017-10-05 | End: 2018-05-11 | Stop reason: SDUPTHER

## 2017-10-05 RX ORDER — DILTIAZEM HYDROCHLORIDE 180 MG/1
CAPSULE, COATED, EXTENDED RELEASE ORAL
Qty: 30 CAP | Refills: 5 | Status: SHIPPED | OUTPATIENT
Start: 2017-10-05 | End: 2018-05-08 | Stop reason: SDUPTHER

## 2017-10-05 RX ORDER — LOSARTAN POTASSIUM 100 MG/1
TABLET ORAL
Qty: 30 TAB | Refills: 5 | Status: SHIPPED | OUTPATIENT
Start: 2017-10-05 | End: 2018-05-11 | Stop reason: SDUPTHER

## 2017-10-26 DIAGNOSIS — G63 POLYNEUROPATHY ASSOCIATED WITH UNDERLYING DISEASE (HCC): ICD-10-CM

## 2017-10-26 RX ORDER — ATORVASTATIN CALCIUM 40 MG/1
TABLET, FILM COATED ORAL
Qty: 30 TAB | Refills: 5 | Status: SHIPPED | OUTPATIENT
Start: 2017-10-26 | End: 2018-05-08 | Stop reason: SDUPTHER

## 2017-10-26 RX ORDER — GABAPENTIN 600 MG/1
TABLET ORAL
Qty: 120 TAB | Refills: 5 | Status: SHIPPED | OUTPATIENT
Start: 2017-10-26 | End: 2018-05-08 | Stop reason: SDUPTHER

## 2017-10-26 RX ORDER — METFORMIN HYDROCHLORIDE 500 MG/1
TABLET ORAL
Qty: 120 TAB | Refills: 5 | Status: SHIPPED | OUTPATIENT
Start: 2017-10-26 | End: 2018-05-11 | Stop reason: SDUPTHER

## 2017-11-02 ENCOUNTER — TELEPHONE (OUTPATIENT)
Dept: FAMILY MEDICINE CLINIC | Age: 52
End: 2017-11-02

## 2017-11-02 DIAGNOSIS — Z96.653 STATUS POST BILATERAL KNEE REPLACEMENTS: Primary | ICD-10-CM

## 2017-11-02 RX ORDER — CLINDAMYCIN HYDROCHLORIDE 300 MG/1
300 CAPSULE ORAL 2 TIMES DAILY
Qty: 14 CAP | Refills: 0 | Status: SHIPPED | OUTPATIENT
Start: 2017-11-02 | End: 2017-11-09

## 2017-11-02 NOTE — TELEPHONE ENCOUNTER
Patient called stating that she has a Dental appt today at 2:00PM and has 2 replaced knees and needs an antibiotic before going. I advised patient I can't answer to if this may be done but will pass the message along.  Thanks

## 2017-11-02 NOTE — TELEPHONE ENCOUNTER
I have sent in Clindamycin. Please let patient know that this is something that should have been discussed earlier, and we have 24-48 hours to respond to rx requests. In addition, antibiotics are usually supposed to be at least 1 hour before procedure, so would confer with dentist.  Thanks!

## 2017-12-12 DIAGNOSIS — F17.210 CIGARETTE NICOTINE DEPENDENCE WITHOUT COMPLICATION: ICD-10-CM

## 2017-12-13 RX ORDER — VARENICLINE TARTRATE 1 MG/1
TABLET, FILM COATED ORAL
Qty: 60 TAB | Refills: 2 | Status: SHIPPED | OUTPATIENT
Start: 2017-12-13 | End: 2018-02-14 | Stop reason: CLARIF

## 2018-01-16 ENCOUNTER — TELEPHONE (OUTPATIENT)
Dept: FAMILY MEDICINE CLINIC | Age: 53
End: 2018-01-16

## 2018-01-16 NOTE — TELEPHONE ENCOUNTER
Patient called stating that she feels that she is getting a cold but cannot come in because her insurance doesn't take effect till 2/1/18. Patient wants to know what she can take over the counter.      Best contact: [phone]

## 2018-02-01 DIAGNOSIS — R60.0 PEDAL EDEMA: ICD-10-CM

## 2018-02-01 RX ORDER — FUROSEMIDE 20 MG/1
TABLET ORAL
Qty: 30 TAB | Refills: 0 | Status: SHIPPED | OUTPATIENT
Start: 2018-02-01 | End: 2018-04-03 | Stop reason: SDUPTHER

## 2018-02-14 ENCOUNTER — OFFICE VISIT (OUTPATIENT)
Dept: FAMILY MEDICINE CLINIC | Age: 53
End: 2018-02-14

## 2018-02-14 VITALS
RESPIRATION RATE: 20 BRPM | OXYGEN SATURATION: 95 % | WEIGHT: 199.6 LBS | HEIGHT: 63 IN | SYSTOLIC BLOOD PRESSURE: 132 MMHG | DIASTOLIC BLOOD PRESSURE: 84 MMHG | TEMPERATURE: 98 F | BODY MASS INDEX: 35.37 KG/M2 | HEART RATE: 98 BPM

## 2018-02-14 DIAGNOSIS — N39.0 URINARY TRACT INFECTION WITHOUT HEMATURIA, SITE UNSPECIFIED: ICD-10-CM

## 2018-02-14 DIAGNOSIS — F17.210 CIGARETTE NICOTINE DEPENDENCE WITHOUT COMPLICATION: ICD-10-CM

## 2018-02-14 DIAGNOSIS — I10 ESSENTIAL HYPERTENSION WITH GOAL BLOOD PRESSURE LESS THAN 140/90: ICD-10-CM

## 2018-02-14 DIAGNOSIS — F41.0 PANIC ATTACKS: ICD-10-CM

## 2018-02-14 DIAGNOSIS — E11.9 CONTROLLED TYPE 2 DIABETES MELLITUS WITHOUT COMPLICATION, WITHOUT LONG-TERM CURRENT USE OF INSULIN (HCC): Primary | ICD-10-CM

## 2018-02-14 DIAGNOSIS — Z79.899 ENCOUNTER FOR LONG-TERM CURRENT USE OF MEDICATION: ICD-10-CM

## 2018-02-14 DIAGNOSIS — J44.9 CHRONIC OBSTRUCTIVE PULMONARY DISEASE, UNSPECIFIED COPD TYPE (HCC): ICD-10-CM

## 2018-02-14 PROBLEM — E11.40 TYPE 2 DIABETES MELLITUS WITH DIABETIC NEUROPATHY (HCC): Status: ACTIVE | Noted: 2018-02-14

## 2018-02-14 LAB
BILIRUB UR QL STRIP: NEGATIVE
GLUCOSE UR-MCNC: NEGATIVE MG/DL
KETONES P FAST UR STRIP-MCNC: NEGATIVE MG/DL
PH UR STRIP: 7 [PH] (ref 4.6–8)
PROT UR QL STRIP: NEGATIVE
SP GR UR STRIP: 1.01 (ref 1–1.03)
UA UROBILINOGEN AMB POC: NORMAL (ref 0.2–1)
URINALYSIS CLARITY POC: NORMAL
URINALYSIS COLOR POC: YELLOW
URINE BLOOD POC: NORMAL
URINE LEUKOCYTES POC: NORMAL
URINE NITRITES POC: POSITIVE

## 2018-02-14 RX ORDER — BENZONATATE 100 MG/1
100 CAPSULE ORAL
Qty: 60 CAP | Refills: 3 | Status: SHIPPED | OUTPATIENT
Start: 2018-02-14 | End: 2019-08-26 | Stop reason: SDUPTHER

## 2018-02-14 RX ORDER — DULOXETIN HYDROCHLORIDE 60 MG/1
60 CAPSULE, DELAYED RELEASE ORAL DAILY
COMMUNITY
End: 2018-02-14 | Stop reason: ALTCHOICE

## 2018-02-14 RX ORDER — BENZONATATE 100 MG/1
100 CAPSULE ORAL
COMMUNITY
End: 2018-02-14 | Stop reason: SDUPTHER

## 2018-02-14 RX ORDER — CIPROFLOXACIN 250 MG/1
250 TABLET, FILM COATED ORAL EVERY 12 HOURS
Qty: 14 TAB | Refills: 0 | Status: SHIPPED | OUTPATIENT
Start: 2018-02-14 | End: 2018-02-18 | Stop reason: ALTCHOICE

## 2018-02-14 RX ORDER — AA/PROT/LYSINE/METHIO/VIT C/B6 50-12.5 MG
TABLET ORAL
Status: ON HOLD | COMMUNITY
End: 2019-03-01

## 2018-02-14 RX ORDER — FLUTICASONE PROPIONATE AND SALMETEROL 250; 50 UG/1; UG/1
1 POWDER RESPIRATORY (INHALATION) 2 TIMES DAILY
Qty: 60 INHALER | Refills: 5 | Status: SHIPPED | OUTPATIENT
Start: 2018-02-14 | End: 2018-06-08 | Stop reason: SDUPTHER

## 2018-02-14 RX ORDER — DOXYCYCLINE 100 MG/1
100 CAPSULE ORAL DAILY
COMMUNITY
End: 2018-06-20 | Stop reason: ALTCHOICE

## 2018-02-14 RX ORDER — ALBUTEROL SULFATE 90 UG/1
2 AEROSOL, METERED RESPIRATORY (INHALATION)
Qty: 1 INHALER | Refills: 3 | Status: SHIPPED | OUTPATIENT
Start: 2018-02-14 | End: 2018-08-30

## 2018-02-14 RX ORDER — BUPROPION HYDROCHLORIDE 150 MG/1
150 TABLET ORAL
Qty: 30 TAB | Refills: 3 | Status: SHIPPED | OUTPATIENT
Start: 2018-02-14 | End: 2018-08-10

## 2018-02-14 RX ORDER — LORAZEPAM 0.5 MG/1
0.5 TABLET ORAL
Qty: 60 TAB | Refills: 2 | Status: SHIPPED | OUTPATIENT
Start: 2018-02-14 | End: 2018-06-12 | Stop reason: SDUPTHER

## 2018-02-14 NOTE — LETTER
2/19/2018 11:46 AM 
 
Ms. Beatrice Hamilton [address] [place] [address] Dear Beatrice Hamilton: 
 
Please find your most recent results below. Resulted Orders AMB POC URINALYSIS DIP STICK AUTO W/O MICRO Result Value Ref Range Color (UA POC) Yellow Clarity (UA POC) Slightly Cloudy Glucose (UA POC) Negative Negative Bilirubin (UA POC) Negative Negative Ketones (UA POC) Negative Negative Specific gravity (UA POC) 1.010 1.001 - 1.035 Blood (UA POC) Trace Negative pH (UA POC) 7.0 4.6 - 8.0 Protein (UA POC) Negative Negative Urobilinogen (UA POC) 0.2 mg/dL 0.2 - 1 Nitrites (UA POC) Positive Negative Leukocyte esterase (UA POC) Trace Negative CBC W/O DIFF Result Value Ref Range WBC 14.8 (H) 3.4 - 10.8 x10E3/uL  
 RBC 4.87 3.77 - 5.28 x10E6/uL HGB 13.5 11.1 - 15.9 g/dL HCT 40.0 34.0 - 46.6 % MCV 82 79 - 97 fL  
 MCH 27.7 26.6 - 33.0 pg  
 MCHC 33.8 31.5 - 35.7 g/dL  
 RDW 14.5 12.3 - 15.4 % PLATELET 101 037 - 792 x10E3/uL Narrative Performed at:  [address]  [id number] : Caroline Yun MD, Phone:  [phone] METABOLIC PANEL, COMPREHENSIVE Result Value Ref Range Glucose 85 65 - 99 mg/dL BUN 12 6 - 24 mg/dL Creatinine 0.63 0.57 - 1.00 mg/dL GFR est non- >59 mL/min/1.73 GFR est  >59 mL/min/1.73  
 BUN/Creatinine ratio 19 9 - 23 Sodium 150 (H) 134 - 144 mmol/L Potassium 4.4 3.5 - 5.2 mmol/L Chloride 102 96 - 106 mmol/L  
 CO2 27 18 - 29 mmol/L Calcium 10.2 8.7 - 10.2 mg/dL Protein, total 6.7 6.0 - 8.5 g/dL Albumin 4.5 3.5 - 5.5 g/dL GLOBULIN, TOTAL 2.2 1.5 - 4.5 g/dL A-G Ratio 2.0 1.2 - 2.2 Bilirubin, total 0.2 0.0 - 1.2 mg/dL Alk. phosphatase 161 (H) 39 - 117 IU/L  
 AST (SGOT) 22 0 - 40 IU/L  
 ALT (SGPT) 35 (H) 0 - 32 IU/L Narrative Performed at:  [address]  [id number] : Regla Fernando MD, Phone:  [phone] LIPID PANEL Result Value Ref Range Cholesterol, total 128 100 - 199 mg/dL Triglyceride 131 0 - 149 mg/dL HDL Cholesterol 43 >39 mg/dL VLDL, calculated 26 5 - 40 mg/dL LDL, calculated 59 0 - 99 mg/dL Narrative Performed at:  [address]  [id number] : Regla Fernando MD, Phone:  [phone] TSH 3RD GENERATION Result Value Ref Range TSH 1.520 0.450 - 4.500 uIU/mL Narrative Performed at:  [address]  [id number] : Regla Fernando MD, Phone:  [phone] HEMOGLOBIN A1C WITH EAG Result Value Ref Range Hemoglobin A1c 6.8 (H) 4.8 - 5.6 % Comment:  
            Pre-diabetes: 5.7 - 6.4 Diabetes: >6.4 Glycemic control for adults with diabetes: <7.0 Estimated average glucose 148 mg/dL Narrative Performed at:  [address]  [id number] : Regla Fernando MD, Phone:  [phone] CULTURE, URINE Result Value Ref Range Urine Culture, Routine Final report (A) Result 1 Escherichia coli (A) Comment:  
   Greater than 100,000 colony forming units per mL Cefazolin with an TJ <=16 predicts susceptibility to the oral agents 
cefaclor, cefdinir, cefpodoxime, cefprozil, cefuroxime, cephalexin, 
and loracarbef when used for therapy of uncomplicated urinary tract 
infections due to E. coli, Klebsiella pneumoniae, and Proteus 
mirabilis. Susceptibility Comment Comment:  
         ** S = Susceptible; I = Intermediate; R = Resistant ** P = Positive; N = Negative MICS are expressed in micrograms per mL Antibiotic                 RSLT#1    RSLT#2    RSLT#3    RSLT#4 Amoxicillin/Clavulanic Acid    I Ampicillin                     R 
Cefazolin                      S 
 Cefepime                       S 
Ceftriaxone                    S 
Cefuroxime                     I 
Cephalothin                    R 
Ciprofloxacin                  R 
Ertapenem                      S 
Gentamicin                     R 
Imipenem                       S 
Levofloxacin                   R 
Nitrofurantoin                 S 
Piperacillin                   R 
Tetracycline                   R 
Tobramycin                     I 
Trimethoprim/Sulfa             R 
  
 Narrative Performed at:  [address] [address], [place], [place]  [id number] : Dianna Zamora MD, Phone:  [phone] CVD REPORT Result Value Ref Range INTERPRETATION Note Comment:  
   Supplemental report is available. Narrative Performed at:  [address] [place] [address]  [id number] : Deandre Guardado PhD, Phone:  [phone] DIABETES PATIENT EDUCATION Result Value Ref Range PDF Image Not applicable Narrative Performed at:  [address] [place] [address]  [id number] : Deandre Guardado PhD, Phone:  [phone] RECOMMENDATIONS: 
Labs show stable elevated white blood count. A1C level is a little higher but is acceptable.  Sodium level is high.  Need to cut back on sodium in diet. Drink more water. Normal cholesterol numbers. Normal thyroid level.  Positive urine culture.  Need to switch antibiotics based on results. New prescription sent to pharmacy. Please call me if you have any questions: [phone] Sincerely, Rafa Hermosillo MD

## 2018-02-14 NOTE — PATIENT INSTRUCTIONS
Recommend discontinue Cymbalta due to similarity to Effexor XR taking for mood disorder. Let Dr. Balderas Courser know. Deciding About Using Medicines To Quit Smoking  Deciding About Using Medicines To Quit Smoking  What are the medicines you can use? Your doctor may prescribe varenicline (Chantix) or bupropion (Zyban). These medicines can help you cope with cravings for tobacco. They are pills that don't contain nicotine. You also can use nicotine replacement products. These do contain nicotine. There are many types. · Gum and lozenges slowly release nicotine into your mouth. · Patches stick to your skin. They slowly release nicotine into your bloodstream.  · An inhaler has a duff that contains nicotine. You breathe in a puff of nicotine vapor through your mouth and throat. · Nasal spray releases a mist that contains nicotine. What are key points about this decision? · Using medicines can double your chances of quitting smoking. They can ease cravings and withdrawal symptoms. · Getting counseling along with using medicine can raise your chances of quitting even more. · If you smoke fewer than 5 cigarettes a day, you may not need medicines to help you quit smoking. · These medicines have less nicotine than cigarettes. And by itself, nicotine is not nearly as harmful as smoking. The tars, carbon monoxide, and other toxic chemicals in tobacco cause the harmful effects. · The side effects of nicotine replacement products depend on the type of product. For example, a patch can make your skin red and itchy. Medicines in pill form can make you sick to your stomach. They can also cause dry mouth and trouble sleeping. For most people, the side effects are not bad enough to make them stop using the products. Why might you choose to use medicines to quit smoking? · You have tried on your own to stop smoking, but you were not able to stop. · You smoke more than 5 cigarettes a day.   · You want to increase your chances of quitting smoking. · You want to reduce your cravings and withdrawal symptoms. · You feel the benefits of medicine outweigh the side effects. Why might you choose not to use medicine? · You want to try quitting on your own by stopping all at once (\"cold turkey\"). · You want to cut back slowly on the number of cigarettes you smoke. · You smoke fewer than 5 cigarettes a day. · You do not like using medicine. · You feel the side effects of medicines outweigh the benefits. · You are worried about the cost of medicines. Your decision  Thinking about the facts and your feelings can help you make a decision that is right for you. Be sure you understand the benefits and risks of your options, and think about what else you need to do before you make the decision. Where can you learn more? Go to http://matti-yinka.info/. Enter O007 in the search box to learn more about \"Deciding About Using Medicines To Quit Smoking. \"  Current as of: March 20, 2017  Content Version: 11.4  © 1084-2979 [employer], Incorporated. Care instructions adapted under license by [employer] (which disclaims liability or warranty for this information). If you have questions about a medical condition or this instruction, always ask your healthcare professional. [address] any warranty or liability for your use of this information.

## 2018-02-14 NOTE — PROGRESS NOTES
Identified pt with two pt identifiers(name and ). Chief Complaint   Patient presents with    Bladder Infection     she has had since .  Diabetes     labs    Hypertension    Cough     she is still coughing - ? COPD is getting worse        Health Maintenance Due   Topic    Pneumococcal 19-64 Highest Risk (1 of 3 - PCV13)    LIPID PANEL Q1     EYE EXAM RETINAL OR DILATED Q1        Wt Readings from Last 3 Encounters:   18 199 lb 9.6 oz (90.5 kg)   17 191 lb (86.6 kg)   17 188 lb 12.8 oz (85.6 kg)     Temp Readings from Last 3 Encounters:   18 98 °F (36.7 °C) (Oral)   17 98.1 °F (36.7 °C)   17 98.5 °F (36.9 °C) (Oral)     BP Readings from Last 3 Encounters:   18 132/84   17 134/77   17 111/72     Pulse Readings from Last 3 Encounters:   18 98   17 77   17 97         Learning Assessment:  :     Learning Assessment 2016   PRIMARY LEARNER Patient Patient   HIGHEST LEVEL OF EDUCATION - PRIMARY LEARNER  - SOME COLLEGE   BARRIERS PRIMARY LEARNER - NONE   CO-LEARNER CAREGIVER - No   PRIMARY LANGUAGE ENGLISH ENGLISH   LEARNER PREFERENCE PRIMARY - DEMONSTRATION   ANSWERED BY patient self   RELATIONSHIP SELF SELF       Depression Screening:  :     PHQ over the last two weeks 2018   Little interest or pleasure in doing things Not at all   Feeling down, depressed or hopeless Several days   Total Score PHQ 2 1       Fall Risk Assessment:  :     Fall Risk Assessment, last 12 mths 2018   Able to walk? Yes   Fall in past 12 months? No       Abuse Screening:  :     Abuse Screening Questionnaire 2018   Do you ever feel afraid of your partner? N N   Are you in a relationship with someone who physically or mentally threatens you? N N   Is it safe for you to go home?  Y Y       Coordination of Care Questionnaire:  :     1) Have you been to an emergency room, urgent care clinic since your last visit? no Hospitalized since your last visit? no             2) Have you seen or consulted any other health care providers outside of [address] [place] [address] since your last visit? Yes Dr Tasha Morejon, Dr Tania Muro and Monday Dr Miky Cleary (Include any pap smears or colon screenings in this section.)    3) Do you have an Advance Directive on file? yes  Are you interested in receiving information about Advance Directives? no    Reviewed record in preparation for visit and have obtained necessary documentation. Medication reconciliation up to date and corrected with patient at this time.      Results for orders placed or performed in visit on 02/14/18   AMB POC URINALYSIS DIP STICK AUTO W/O MICRO   Result Value Ref Range    Color (UA POC) Yellow     Clarity (UA POC) Slightly Cloudy     Glucose (UA POC) Negative Negative    Bilirubin (UA POC) Negative Negative    Ketones (UA POC) Negative Negative    Specific gravity (UA POC) 1.010 1.001 - 1.035    Blood (UA POC) Trace Negative    pH (UA POC) 7.0 4.6 - 8.0    Protein (UA POC) Negative Negative    Urobilinogen (UA POC) 0.2 mg/dL 0.2 - 1    Nitrites (UA POC) Positive Negative    Leukocyte esterase (UA POC) Trace Negative

## 2018-02-14 NOTE — MR AVS SNAPSHOT
[address] [facility] 
[phone] Patient: Beatrice Hamilton MRN: [id number] :1965 Visit Information Date & Time Provider Department Dept. Phone Encounter #  
   9:76 PM Edmund Gandhi [phone] [id number] Upcoming Health Maintenance Date Due Pneumococcal 19-64 Highest Risk (1 of 3 - PCV13) 1983 EYE EXAM RETINAL OR DILATED Q1 2018 FOBT Q 1 YEAR AGE 50-75 2018 HEMOGLOBIN A1C Q6M 2018 FOOT EXAM Q1 2019 MICROALBUMIN Q1 2019 LIPID PANEL Q1 2019 BREAST CANCER SCRN MAMMOGRAM 2019 PAP AKA CERVICAL CYTOLOGY 2020 DTaP/Tdap/Td series (2 - Td) 2026 Allergies as of 2018  Review Complete On:  By: Luisa Ayon MD  
  
 Severity Noted Reaction Type Reactions Mobic [Meloxicam]  2016    Hives Penicillins  2016    Unknown (comments) DOESN'T REMEMBER Plaquenil [Hydroxychloroquine]  2016    Hives Current Immunizations  Reviewed on 2017 No immunizations on file. Not reviewed this visit You Were Diagnosed With   
  
 Codes Comments Controlled type 2 diabetes mellitus without complication, without long-term current use of insulin ([facility]    -  Primary ICD-10-CM: E11.9 ICD-9-CM: 250.00 Urinary tract infection without hematuria, site unspecified     ICD-10-CM: N39.0 ICD-9-CM: 599.0 Panic attacks     ICD-10-CM: F41.0 ICD-9-CM: 300.01 Essential hypertension with goal blood pressure less than 140/90     ICD-10-CM: I10 
ICD-9-CM: 401.9 Chronic obstructive pulmonary disease, unspecified COPD type ([facility]     ICD-10-CM: J44.9 ICD-9-CM: 522 Encounter for long-term current use of medication     ICD-10-CM: Z79.899 ICD-9-CM: V58.69 Cigarette nicotine dependence without complication     CGB-78-SD: F17.210 ICD-9-CM: 305.1 Vitals BP Pulse Temp Resp Height(growth percentile) Weight(growth percentile) 132/84 (BP 1 Location: Right arm, BP Patient Position: Sitting) 98 98 °F (36.7 °C) (Oral) 20 5' 3\" (1.6 m) 199 lb 9.6 oz (90.5 kg) SpO2 BMI OB Status Smoking Status 95% 35.36 kg/m2 Hysterectomy Current Every Day Smoker Vitals History BMI and BSA Data Body Mass Index Body Surface Area  
 35.36 kg/m 2 2.01 m 2 Preferred Pharmacy Pharmacy Name Phone [facility], [address] [place] [address] [phone] Your Updated Medication List  
  
   
This list is accurate as of: 2/14/18  2:54 PM.  Always use your most recent med list.  
  
  
  
  
 albuterol 90 mcg/actuation inhaler Commonly known as:  PROAIR HFA Take 2 Puffs by inhalation every four (4) hours as needed for Wheezing or Shortness of Breath. atorvastatin 40 mg tablet Commonly known as:  LIPITOR  
TAKE ONE TABLET BY MOUTH AT NIGHT  
  
 BC HEADACHE POWDER PO Take  by mouth.  
  
 benzonatate 100 mg capsule Commonly known as:  TESSALON Take 1 Cap by mouth three (3) times daily as needed for Cough. Blood-Glucose Meter monitoring kit E11.9 buPROPion  mg tablet Commonly known as:  Tisha Drew Take 1 Tab by mouth every morning. Indications: Smoking Cessation  
  
 cinnamon bark 500 mg Cap Take 1,000 mg by mouth two (2) times a day. ciprofloxacin HCl 250 mg tablet Commonly known as:  CIPRO Take 1 Tab by mouth every twelve (12) hours for 7 days. CO Q-10 10 mg Cap Generic drug:  coenzyme q10 Take  by mouth. dilTIAZem  mg ER capsule Commonly known as:  CARDIZEM CD  
TAKE ONE CAPSULE BY MOUTH EVERY DAY  
  
 doxycycline 100 mg capsule Commonly known as:  Maria Alejandrae Clemon Take 100 mg by mouth daily. esomeprazole 20 mg capsule Commonly known as:  Cheko Russell Take 40 mg by mouth daily. fluticasone-salmeterol 250-50 mcg/dose diskus inhaler Commonly known as:  ADVAIR Take 1 Puff by inhalation two (2) times a day. Indications: BRONCHOSPASM PREVENTION WITH COPD  
  
 furosemide 20 mg tablet Commonly known as:  LASIX TAKE ONE TABLET BY MOUTH EVERY DAY AS NEEDED  
  
 gabapentin 600 mg tablet Commonly known as:  NEURONTIN  
TAKE ONE TABLET BY MOUTH FOUR TIMES A DAY  
  
 glucose blood VI test strips strip Commonly known as:  blood glucose test  
Check Daily E11.9 Iron-[id number] Sodium 84-3--50 mg-mg-mcg-mg-mg Tab Take  by mouth.  
  
 ketotifen 0.025 % (0.035 %) ophthalmic solution Commonly known as:  ALAWAY Administer 1 Drop to both eyes two (2) times a day. LORazepam 0.5 mg tablet Commonly known as:  ATIVAN Take 1 Tab by mouth two (2) times daily as needed for Anxiety. Max Daily Amount: 1 mg.  
  
 losartan 100 mg tablet Commonly known as:  COZAAR  
TAKE ONE TABLET BY MOUTH EVERY DAY  
  
 metFORMIN 500 mg tablet Commonly known as:  GLUCOPHAGE  
TAKE TWO TABLETS BY MOUTH TWO TIMES A DAY WITH MEALS [address] Take  by mouth daily. rOPINIRole 2 mg tablet Commonly known as:  REQUIP  
TAKE ONE TABLET BY MOUTH TWO TIMES A DAY  
  
 TURMERIC ROOT EXTRACT PO Take 1 Cap by mouth daily. venlafaxine- mg capsule Commonly known as:  EFFEXOR-XR  
TAKE ONE CAPSULE BY MOUTH EVERY DAY Prescriptions Printed Refills LORazepam (ATIVAN) 0.5 mg tablet 2 Sig: Take 1 Tab by mouth two (2) times daily as needed for Anxiety. Max Daily Amount: 1 mg. Class: Print Route: Oral  
  
Prescriptions Sent to Pharmacy Refills  
 benzonatate (TESSALON) 100 mg capsule 3 Sig: Take 1 Cap by mouth three (3) times daily as needed for Cough. Class: Normal  
 Pharmacy: [address] [place] [address] Ph #: [phone]  Route: Oral  
 ciprofloxacin HCl (CIPRO) 250 mg tablet 0  
 Sig: Take 1 Tab by mouth every twelve (12) hours for 7 days. Class: Normal  
 Pharmacy: [address] [place] [address] Ph #: [phone] Route: Oral  
 fluticasone-salmeterol (ADVAIR) 250-50 mcg/dose diskus inhaler 5 Sig: Take 1 Puff by inhalation two (2) times a day. Indications: BRONCHOSPASM PREVENTION WITH COPD Class: Normal  
 Pharmacy: [address] [place] [address] Ph #: [phone] Route: Inhalation buPROPion XL (WELLBUTRIN XL) 150 mg tablet 3 Sig: Take 1 Tab by mouth every morning. Indications: Smoking Cessation Class: Normal  
 Pharmacy: [address] [place] [address] Ph #: [phone] Route: Oral  
 albuterol (PROAIR HFA) 90 mcg/actuation inhaler 3 Sig: Take 2 Puffs by inhalation every four (4) hours as needed for Wheezing or Shortness of Breath. Class: Normal  
 Pharmacy: [address] [place] [address] Ph #: [phone] Route: Inhalation We Performed the Following AMB POC URINALYSIS DIP STICK AUTO W/O MICRO [64157 CPT(R)] CBC W/O DIFF [52930 CPT(R)] CULTURE, URINE [id number] CPT(R)] HEMOGLOBIN A1C WITH EAG [10186 CPT(R)] LIPID PANEL [38560 CPT(R)] METABOLIC PANEL, COMPREHENSIVE [03026 CPT(R)] TSH 3RD GENERATION [92623 CPT(R)] Patient Instructions Recommend discontinue Cymbalta due to similarity to Effexor XR taking for mood disorder. Let Dr. Nathaniel Vance know. Deciding About Using Medicines To Quit Smoking Deciding About Using Medicines To Quit Smoking What are the medicines you can use? Your doctor may prescribe varenicline (Chantix) or bupropion (Zyban). These medicines can help you cope with cravings for tobacco. They are pills that don't contain nicotine. You also can use nicotine replacement products. These do contain nicotine. There are many types. · Gum and lozenges slowly release nicotine into your mouth. · Patches stick to your skin. They slowly release nicotine into your bloodstream. 
· An inhaler has a duff that contains nicotine. You breathe in a puff of nicotine vapor through your mouth and throat. · Nasal spray releases a mist that contains nicotine. What are key points about this decision? · Using medicines can double your chances of quitting smoking. They can ease cravings and withdrawal symptoms. · Getting counseling along with using medicine can raise your chances of quitting even more. · If you smoke fewer than 5 cigarettes a day, you may not need medicines to help you quit smoking. · These medicines have less nicotine than cigarettes. And by itself, nicotine is not nearly as harmful as smoking. The tars, carbon monoxide, and other toxic chemicals in tobacco cause the harmful effects. · The side effects of nicotine replacement products depend on the type of product. For example, a patch can make your skin red and itchy. Medicines in pill form can make you sick to your stomach. They can also cause dry mouth and trouble sleeping. For most people, the side effects are not bad enough to make them stop using the products. Why might you choose to use medicines to quit smoking? · You have tried on your own to stop smoking, but you were not able to stop. · You smoke more than 5 cigarettes a day. · You want to increase your chances of quitting smoking. · You want to reduce your cravings and withdrawal symptoms. · You feel the benefits of medicine outweigh the side effects. Why might you choose not to use medicine? · You want to try quitting on your own by stopping all at once (\"cold turkey\"). · You want to cut back slowly on the number of cigarettes you smoke. · You smoke fewer than 5 cigarettes a day. · You do not like using medicine. · You feel the side effects of medicines outweigh the benefits. · You are worried about the cost of medicines. Your decision Thinking about the facts and your feelings can help you make a decision that is right for you. Be sure you understand the benefits and risks of your options, and think about what else you need to do before you make the decision. Where can you learn more? Go to http://matti-yinka.info/. Enter T602 in the search box to learn more about \"Deciding About Using Medicines To Quit Smoking. \" Current as of: March 20, 2017 Content Version: 11.4 © 0055-3636 [employer]. Care instructions adapted under license by [employer] (which disclaims liability or warranty for this information). If you have questions about a medical condition or this instruction, always ask your healthcare professional. [address] any warranty or liability for your use of this information. Introducing [facility]! Dear Marisol Sykes: Thank you for requesting a [employer] account. Our records indicate that you have previously registered for a [employer] account but its currently inactive. Please call our [employer] support line at [phone]. Additional Information If you have questions, please visit the Frequently Asked Questions section of the [employer] website at [email or web address] Remember, [employer] is NOT to be used for urgent needs. For medical emergencies, dial 911. Now available from your iPhone and Android! Please provide this summary of care documentation to your next provider. Your primary care clinician is listed as Talha Benton. If you have any questions after today's visit, please call [phone].

## 2018-02-17 LAB
ALBUMIN SERPL-MCNC: 4.5 G/DL (ref 3.5–5.5)
ALBUMIN/GLOB SERPL: 2 {RATIO} (ref 1.2–2.2)
ALP SERPL-CCNC: 161 IU/L (ref 39–117)
ALT SERPL-CCNC: 35 IU/L (ref 0–32)
AST SERPL-CCNC: 22 IU/L (ref 0–40)
BACTERIA UR CULT: ABNORMAL
BACTERIA UR CULT: ABNORMAL
BILIRUB SERPL-MCNC: 0.2 MG/DL (ref 0–1.2)
BUN SERPL-MCNC: 12 MG/DL (ref 6–24)
BUN/CREAT SERPL: 19 (ref 9–23)
CALCIUM SERPL-MCNC: 10.2 MG/DL (ref 8.7–10.2)
CHLORIDE SERPL-SCNC: 102 MMOL/L (ref 96–106)
CHOLEST SERPL-MCNC: 128 MG/DL (ref 100–199)
CO2 SERPL-SCNC: 27 MMOL/L (ref 18–29)
CREAT SERPL-MCNC: 0.63 MG/DL (ref 0.57–1)
ERYTHROCYTE [DISTWIDTH] IN BLOOD BY AUTOMATED COUNT: 14.5 % (ref 12.3–15.4)
EST. AVERAGE GLUCOSE BLD GHB EST-MCNC: 148 MG/DL
GFR SERPLBLD CREATININE-BSD FMLA CKD-EPI: 103 ML/MIN/1.73
GFR SERPLBLD CREATININE-BSD FMLA CKD-EPI: 118 ML/MIN/1.73
GLOBULIN SER CALC-MCNC: 2.2 G/DL (ref 1.5–4.5)
GLUCOSE SERPL-MCNC: 85 MG/DL (ref 65–99)
HBA1C MFR BLD: 6.8 % (ref 4.8–5.6)
HCT VFR BLD AUTO: 40 % (ref 34–46.6)
HDLC SERPL-MCNC: 43 MG/DL
HGB BLD-MCNC: 13.5 G/DL (ref 11.1–15.9)
INTERPRETATION, 910389: NORMAL
LDLC SERPL CALC-MCNC: 59 MG/DL (ref 0–99)
Lab: NORMAL
MCH RBC QN AUTO: 27.7 PG (ref 26.6–33)
MCHC RBC AUTO-ENTMCNC: 33.8 G/DL (ref 31.5–35.7)
MCV RBC AUTO: 82 FL (ref 79–97)
OTHER ANTIBIOTIC SUSC ISLT: ABNORMAL
PLATELET # BLD AUTO: 281 X10E3/UL (ref 150–379)
POTASSIUM SERPL-SCNC: 4.4 MMOL/L (ref 3.5–5.2)
PROT SERPL-MCNC: 6.7 G/DL (ref 6–8.5)
RBC # BLD AUTO: 4.87 X10E6/UL (ref 3.77–5.28)
SODIUM SERPL-SCNC: 150 MMOL/L (ref 134–144)
TRIGL SERPL-MCNC: 131 MG/DL (ref 0–149)
TSH SERPL DL<=0.005 MIU/L-ACNC: 1.52 UIU/ML (ref 0.45–4.5)
VLDLC SERPL CALC-MCNC: 26 MG/DL (ref 5–40)
WBC # BLD AUTO: 14.8 X10E3/UL (ref 3.4–10.8)

## 2018-02-18 ENCOUNTER — TELEPHONE (OUTPATIENT)
Dept: FAMILY MEDICINE CLINIC | Age: 53
End: 2018-02-18

## 2018-02-18 DIAGNOSIS — N30.90 CYSTITIS: Primary | ICD-10-CM

## 2018-02-18 RX ORDER — NITROFURANTOIN 25; 75 MG/1; MG/1
100 CAPSULE ORAL 2 TIMES DAILY
Qty: 20 CAP | Refills: 0 | Status: SHIPPED | OUTPATIENT
Start: 2018-02-18 | End: 2018-06-20 | Stop reason: ALTCHOICE

## 2018-02-19 ENCOUNTER — TELEPHONE (OUTPATIENT)
Dept: FAMILY MEDICINE CLINIC | Age: 53
End: 2018-02-19

## 2018-02-19 NOTE — PROGRESS NOTES
Labs show stable elevated white blood count. A1C level is a little higher but is acceptable. Sodium level is high. Need to cut back on sodium in diet. Drink more water. Normal cholesterol numbers. Normal thyroid level. Positive urine culture. Need to switch antibiotics based on results. New prescription sent to pharmacy.

## 2018-02-19 NOTE — TELEPHONE ENCOUNTER
Please call pt to advise her urine CX showed bacteria is NOT sensitive to Cipro. Need to switch med to [employer]. I sent new RX to pharmacy. Stop taking Cipro.

## 2018-02-20 NOTE — TELEPHONE ENCOUNTER
She has almost finished cipro. She did get the macrobid. March 14, 2018 she will have left shoulder operated on by Dr Ara Hoffmann.

## 2018-02-21 ENCOUNTER — TELEPHONE (OUTPATIENT)
Dept: FAMILY MEDICINE CLINIC | Age: 53
End: 2018-02-21

## 2018-02-21 NOTE — TELEPHONE ENCOUNTER
Message forwarded from call center    Pt [phone] says that the free slip that was given to her to go to the [facility] has  in 2016 and pt would like a current one if available.

## 2018-02-21 NOTE — TELEPHONE ENCOUNTER
Please let pt know we have not received any current slips. She can still try to take it to her local [place]. It should work.

## 2018-02-22 NOTE — TELEPHONE ENCOUNTER
I explained we did not have anymore. She had talked to the [facility] and they were not doing it anymore. They will help with her anyway. She is waiting on letter I sent to take it to them.

## 2018-03-19 RX ORDER — FLUCONAZOLE 150 MG/1
150 TABLET ORAL DAILY
Qty: 1 TAB | Refills: 0 | Status: SHIPPED | OUTPATIENT
Start: 2018-03-19 | End: 2018-03-20

## 2018-03-19 NOTE — TELEPHONE ENCOUNTER
Patient called and stated that she was seen in hospital over the weekend and was given antibiotics. She now has yeast infection she is asking if Dr. Vilam Rice will send over oral medication for this. If this can be done please send over to [address] [place] [address].

## 2018-04-03 DIAGNOSIS — R60.0 PEDAL EDEMA: ICD-10-CM

## 2018-04-03 RX ORDER — FUROSEMIDE 20 MG/1
TABLET ORAL
Qty: 30 TAB | Refills: 0 | Status: SHIPPED | OUTPATIENT
Start: 2018-04-03 | End: 2018-05-08 | Stop reason: SDUPTHER

## 2018-05-07 DIAGNOSIS — R60.0 PEDAL EDEMA: ICD-10-CM

## 2018-05-07 DIAGNOSIS — G63 POLYNEUROPATHY ASSOCIATED WITH UNDERLYING DISEASE (HCC): ICD-10-CM

## 2018-05-07 DIAGNOSIS — F34.1 DYSTHYMIA: ICD-10-CM

## 2018-05-07 DIAGNOSIS — N30.90 CYSTITIS: ICD-10-CM

## 2018-05-07 NOTE — TELEPHONE ENCOUNTER
Patient is calling requesting to speak with Marion Israel. Patient states she works at [employer] and knows that there was a refill request sent to us last Thursday. We have no record of this. Patient states she needs all of her medications. I advised her for us to put the request in correctly we will need the pharmacy to fax this to us.      Best contact: [phone]

## 2018-05-08 RX ORDER — GABAPENTIN 600 MG/1
TABLET ORAL
Qty: 120 TAB | Refills: 5 | Status: SHIPPED | OUTPATIENT
Start: 2018-05-08 | End: 2018-06-11 | Stop reason: SDUPTHER

## 2018-05-08 RX ORDER — VENLAFAXINE HYDROCHLORIDE 150 MG/1
CAPSULE, EXTENDED RELEASE ORAL
Qty: 30 CAP | Refills: 5 | Status: SHIPPED | OUTPATIENT
Start: 2018-05-08 | End: 2018-06-12 | Stop reason: SDUPTHER

## 2018-05-08 RX ORDER — NITROFURANTOIN 25; 75 MG/1; MG/1
100 CAPSULE ORAL 2 TIMES DAILY
Qty: 20 CAP | Refills: 0 | Status: CANCELLED | OUTPATIENT
Start: 2018-05-08

## 2018-05-08 RX ORDER — ATORVASTATIN CALCIUM 40 MG/1
TABLET, FILM COATED ORAL
Qty: 30 TAB | Refills: 5 | Status: SHIPPED | OUTPATIENT
Start: 2018-05-08 | End: 2018-06-08 | Stop reason: SDUPTHER

## 2018-05-08 RX ORDER — DILTIAZEM HYDROCHLORIDE 180 MG/1
CAPSULE, COATED, EXTENDED RELEASE ORAL
Qty: 30 CAP | Refills: 5 | Status: SHIPPED | OUTPATIENT
Start: 2018-05-08 | End: 2018-06-08 | Stop reason: SDUPTHER

## 2018-05-08 RX ORDER — FUROSEMIDE 20 MG/1
TABLET ORAL
Qty: 30 TAB | Refills: 3 | Status: SHIPPED | OUTPATIENT
Start: 2018-05-08 | End: 2018-06-11 | Stop reason: SDUPTHER

## 2018-05-08 NOTE — TELEPHONE ENCOUNTER
She states this was faxed and e scribed last thur. They did it again yesterday and today. Last seen on 18. Last written on. Dr Los Pham looked her up and [facility] had the wrong . Dr Los Pham called her and we have the correct .

## 2018-05-08 NOTE — TELEPHONE ENCOUNTER
I spoke with pt. Pharmacy has incorrect birth date that is why the escribe refills are not crossing over. Her birth date is correct in our system as [birth year]. I will manually approve refills.

## 2018-05-10 DIAGNOSIS — I10 ESSENTIAL HYPERTENSION WITH GOAL BLOOD PRESSURE LESS THAN 140/90: ICD-10-CM

## 2018-05-10 DIAGNOSIS — G25.81 RLS (RESTLESS LEGS SYNDROME): ICD-10-CM

## 2018-05-10 NOTE — TELEPHONE ENCOUNTER
Message forwarded from the call center      Pt is requesting a refill on Rx metformin 500mg, losartan 100mg and requip(dosage unknown) sent to [address] [place] [address] on file.  P [phone]

## 2018-05-11 ENCOUNTER — TELEPHONE (OUTPATIENT)
Dept: FAMILY MEDICINE CLINIC | Age: 53
End: 2018-05-11

## 2018-05-11 RX ORDER — LOSARTAN POTASSIUM 100 MG/1
TABLET ORAL
Qty: 30 TAB | Refills: 5 | Status: SHIPPED | OUTPATIENT
Start: 2018-05-11 | End: 2018-06-11 | Stop reason: SDUPTHER

## 2018-05-11 RX ORDER — ROPINIROLE 2 MG/1
TABLET, FILM COATED ORAL
Qty: 60 TAB | Refills: 5 | Status: SHIPPED | OUTPATIENT
Start: 2018-05-11 | End: 2018-06-12 | Stop reason: SDUPTHER

## 2018-05-11 RX ORDER — METFORMIN HYDROCHLORIDE 500 MG/1
TABLET ORAL
Qty: 120 TAB | Refills: 5 | Status: SHIPPED | OUTPATIENT
Start: 2018-05-11 | End: 2018-06-12 | Stop reason: SDUPTHER

## 2018-05-11 NOTE — TELEPHONE ENCOUNTER
Message forwarded from call center      Pt stated it has been a week and she still have not received metformin,losartan, and genric for requil . But she is using [facility]. [phone].

## 2018-06-08 DIAGNOSIS — J44.9 CHRONIC OBSTRUCTIVE PULMONARY DISEASE, UNSPECIFIED COPD TYPE (HCC): ICD-10-CM

## 2018-06-08 RX ORDER — ATORVASTATIN CALCIUM 40 MG/1
TABLET, FILM COATED ORAL
Qty: 90 TAB | Refills: 1 | Status: SHIPPED | COMMUNITY
Start: 2018-06-08 | End: 2018-06-19 | Stop reason: SDUPTHER

## 2018-06-08 RX ORDER — DILTIAZEM HYDROCHLORIDE 180 MG/1
CAPSULE, COATED, EXTENDED RELEASE ORAL
Qty: 90 CAP | Refills: 1 | Status: SHIPPED | COMMUNITY
Start: 2018-06-08 | End: 2018-06-19 | Stop reason: SDUPTHER

## 2018-06-08 RX ORDER — FLUTICASONE PROPIONATE AND SALMETEROL 250; 50 UG/1; UG/1
1 POWDER RESPIRATORY (INHALATION) 2 TIMES DAILY
Qty: 3 INHALER | Refills: 1 | Status: SHIPPED | COMMUNITY
Start: 2018-06-08 | End: 2018-06-19 | Stop reason: SDUPTHER

## 2018-06-11 DIAGNOSIS — R60.0 PEDAL EDEMA: ICD-10-CM

## 2018-06-11 DIAGNOSIS — I10 ESSENTIAL HYPERTENSION WITH GOAL BLOOD PRESSURE LESS THAN 140/90: ICD-10-CM

## 2018-06-11 DIAGNOSIS — G63 POLYNEUROPATHY ASSOCIATED WITH UNDERLYING DISEASE (HCC): ICD-10-CM

## 2018-06-11 RX ORDER — GABAPENTIN 600 MG/1
TABLET ORAL
Qty: 360 TAB | Refills: 0 | Status: SHIPPED | OUTPATIENT
Start: 2018-06-11 | End: 2019-04-22

## 2018-06-11 RX ORDER — LOSARTAN POTASSIUM 100 MG/1
TABLET ORAL
Qty: 90 TAB | Refills: 0 | Status: SHIPPED | OUTPATIENT
Start: 2018-06-11 | End: 2018-08-13 | Stop reason: SDUPTHER

## 2018-06-11 RX ORDER — FUROSEMIDE 20 MG/1
TABLET ORAL
Qty: 90 TAB | Refills: 0 | Status: SHIPPED | OUTPATIENT
Start: 2018-06-11 | End: 2018-08-13 | Stop reason: SDUPTHER

## 2018-06-12 DIAGNOSIS — F34.1 DYSTHYMIA: ICD-10-CM

## 2018-06-12 DIAGNOSIS — G25.81 RLS (RESTLESS LEGS SYNDROME): ICD-10-CM

## 2018-06-12 DIAGNOSIS — F41.0 PANIC ATTACKS: ICD-10-CM

## 2018-06-12 RX ORDER — METFORMIN HYDROCHLORIDE 500 MG/1
TABLET ORAL
Qty: 360 TAB | Refills: 1 | Status: SHIPPED | COMMUNITY
Start: 2018-06-12 | End: 2020-01-10 | Stop reason: SDUPTHER

## 2018-06-12 RX ORDER — ROPINIROLE 2 MG/1
TABLET, FILM COATED ORAL
Qty: 180 TAB | Refills: 1 | Status: SHIPPED | COMMUNITY
Start: 2018-06-12 | End: 2018-12-03

## 2018-06-12 RX ORDER — LORAZEPAM 0.5 MG/1
0.5 TABLET ORAL
Qty: 180 TAB | Refills: 0 | Status: SHIPPED | OUTPATIENT
Start: 2018-06-12 | End: 2019-08-26 | Stop reason: SDUPTHER

## 2018-06-12 RX ORDER — INSULIN PUMP SYRINGE, 3 ML
EACH MISCELLANEOUS
Qty: 1 KIT | Refills: 0 | Status: SHIPPED | COMMUNITY
Start: 2018-06-12 | End: 2021-02-09 | Stop reason: SDDI

## 2018-06-12 RX ORDER — VENLAFAXINE HYDROCHLORIDE 150 MG/1
CAPSULE, EXTENDED RELEASE ORAL
Qty: 90 CAP | Refills: 1 | Status: SHIPPED | COMMUNITY
Start: 2018-06-12 | End: 2018-12-03 | Stop reason: SDUPTHER

## 2018-06-13 DIAGNOSIS — F41.0 PANIC ATTACKS: ICD-10-CM

## 2018-06-13 DIAGNOSIS — G25.81 RLS (RESTLESS LEGS SYNDROME): ICD-10-CM

## 2018-06-13 RX ORDER — LORAZEPAM 0.5 MG/1
0.5 TABLET ORAL
Qty: 180 TAB | Refills: 0 | Status: CANCELLED | OUTPATIENT
Start: 2018-06-13

## 2018-06-13 RX ORDER — METFORMIN HYDROCHLORIDE 500 MG/1
TABLET ORAL
Qty: 360 TAB | Refills: 1 | Status: CANCELLED | OUTPATIENT
Start: 2018-06-13

## 2018-06-13 RX ORDER — ROPINIROLE 2 MG/1
TABLET, FILM COATED ORAL
Qty: 180 TAB | Refills: 1 | Status: CANCELLED | OUTPATIENT
Start: 2018-06-13

## 2018-06-13 NOTE — TELEPHONE ENCOUNTER
Pt stated that [facility] sent over order to be filled. Pt can't afford advair anywhere can you suggest alternative medication?

## 2018-06-15 NOTE — TELEPHONE ENCOUNTER
[employer] formulary shows all asthma steroid inhalers are Tier 3 or higher. Advair is on the formulary list but is Tier 3. No cheaper options. She needs it for COPD.

## 2018-06-19 DIAGNOSIS — J44.9 CHRONIC OBSTRUCTIVE PULMONARY DISEASE, UNSPECIFIED COPD TYPE (HCC): ICD-10-CM

## 2018-06-20 ENCOUNTER — OFFICE VISIT (OUTPATIENT)
Dept: FAMILY MEDICINE CLINIC | Age: 53
End: 2018-06-20

## 2018-06-20 VITALS
BODY MASS INDEX: 35.51 KG/M2 | DIASTOLIC BLOOD PRESSURE: 82 MMHG | SYSTOLIC BLOOD PRESSURE: 134 MMHG | HEIGHT: 63 IN | WEIGHT: 200.4 LBS | RESPIRATION RATE: 20 BRPM | OXYGEN SATURATION: 98 % | TEMPERATURE: 97.8 F | HEART RATE: 87 BPM

## 2018-06-20 DIAGNOSIS — R53.83 FATIGUE, UNSPECIFIED TYPE: Primary | ICD-10-CM

## 2018-06-20 DIAGNOSIS — M79.671 RIGHT FOOT PAIN: ICD-10-CM

## 2018-06-20 DIAGNOSIS — G47.33 OSA (OBSTRUCTIVE SLEEP APNEA): ICD-10-CM

## 2018-06-20 DIAGNOSIS — E55.9 VITAMIN D DEFICIENCY: ICD-10-CM

## 2018-06-20 DIAGNOSIS — Z12.39 SCREENING FOR BREAST CANCER: ICD-10-CM

## 2018-06-20 DIAGNOSIS — M15.9 PRIMARY OSTEOARTHRITIS INVOLVING MULTIPLE JOINTS: ICD-10-CM

## 2018-06-20 DIAGNOSIS — Z79.899 ENCOUNTER FOR LONG-TERM CURRENT USE OF MEDICATION: ICD-10-CM

## 2018-06-20 DIAGNOSIS — D72.829 LEUKOCYTOSIS, UNSPECIFIED TYPE: ICD-10-CM

## 2018-06-20 PROBLEM — E66.01 SEVERE OBESITY (BMI 35.0-39.9): Status: ACTIVE | Noted: 2018-06-20

## 2018-06-20 RX ORDER — ATORVASTATIN CALCIUM 40 MG/1
TABLET, FILM COATED ORAL
Qty: 90 TAB | Refills: 1 | Status: SHIPPED | OUTPATIENT
Start: 2018-06-20 | End: 2019-05-24 | Stop reason: SDUPTHER

## 2018-06-20 RX ORDER — FLUTICASONE PROPIONATE AND SALMETEROL 250; 50 UG/1; UG/1
1 POWDER RESPIRATORY (INHALATION) 2 TIMES DAILY
Qty: 3 INHALER | Refills: 1 | Status: SHIPPED | OUTPATIENT
Start: 2018-06-20 | End: 2019-01-08 | Stop reason: ALTCHOICE

## 2018-06-20 RX ORDER — DILTIAZEM HYDROCHLORIDE 180 MG/1
CAPSULE, COATED, EXTENDED RELEASE ORAL
Qty: 90 CAP | Refills: 1 | Status: SHIPPED | OUTPATIENT
Start: 2018-06-20 | End: 2019-05-08 | Stop reason: SDUPTHER

## 2018-06-20 NOTE — PROGRESS NOTES
Identified pt with two pt identifiers(name and ). Chief Complaint   Patient presents with    Fatigue     no energy, occurs every 2 weeks    Arthritis     need new rheumatologist per insurance        Health Maintenance Due   Topic    Pneumococcal 19-64 Highest Risk (1 of 3 - PCV13)    EYE EXAM RETINAL OR DILATED Q1     MEDICARE YEARLY EXAM     FOBT Q 1 YEAR AGE 50-75        Wt Readings from Last 3 Encounters:   18 200 lb (90.7 kg)   18 199 lb 9.6 oz (90.5 kg)   17 191 lb (86.6 kg)     Temp Readings from Last 3 Encounters:   18 97.8 °F (36.6 °C) (Oral)   18 98 °F (36.7 °C) (Oral)   17 98.1 °F (36.7 °C)     BP Readings from Last 3 Encounters:   18 134/82   18 132/84   17 134/77     Pulse Readings from Last 3 Encounters:   18 87   18 98   17 77         Learning Assessment:  :     Learning Assessment 2016   PRIMARY LEARNER Patient Patient   HIGHEST LEVEL OF EDUCATION - PRIMARY LEARNER  - SOME COLLEGE   BARRIERS PRIMARY LEARNER - NONE   CO-LEARNER CAREGIVER - No   PRIMARY LANGUAGE ENGLISH ENGLISH   LEARNER PREFERENCE PRIMARY - DEMONSTRATION   ANSWERED BY patient self   RELATIONSHIP SELF SELF       Depression Screening:  :     PHQ over the last two weeks 2018   Little interest or pleasure in doing things Not at all   Feeling down, depressed or hopeless Several days   Total Score PHQ 2 1       Fall Risk Assessment:  :     Fall Risk Assessment, last 12 mths 2018   Able to walk? Yes   Fall in past 12 months? No       Abuse Screening:  :     Abuse Screening Questionnaire 2018   Do you ever feel afraid of your partner? N N   Are you in a relationship with someone who physically or mentally threatens you? N N   Is it safe for you to go home?  Y Y       Coordination of Care Questionnaire:  :     1) Have you been to an emergency room, urgent care clinic since your last visit? no   Hospitalized since your last visit? no             2) Have you seen or consulted any other health care providers outside of [address] [place] [address] since your last visit? yes  Dr Jerry Love (Include any pap smears or colon screenings in this section.)    3) Do you have an Advance Directive on file? yes  Are you interested in receiving information about Advance Directives? no    Reviewed record in preparation for visit and have obtained necessary documentation. Medication reconciliation up to date and corrected with patient at this time.

## 2018-06-20 NOTE — MR AVS SNAPSHOT
[address] [place] [address] 
 
 
 [address] [facility] 
[phone] Patient: Ally Mantilla MRN: [id number] :1965 Visit Information Date & Time Provider Department Dept. Phone Encounter #  
   5:46 PM Allie QuezadaTammy 108 [phone] [id number] Your Appointments 2018  9:00 AM  
New Patient with Luc Sampson MD  
[address] ([facility]) Appt Note: np est pcp ks 18  
 [address] [place] [address]  
[phone]  
  
   
 [address] [facility] [address] Upcoming Health Maintenance Date Due Pneumococcal 19-64 Highest Risk (1 of 3 - PCV13) 1983 EYE EXAM RETINAL OR DILATED Q1 2018 MEDICARE YEARLY EXAM 3/14/2018 FOBT Q 1 YEAR AGE 50-75 2018 Influenza Age 5 to Adult 2018 HEMOGLOBIN A1C Q6M 2018 FOOT EXAM Q1 2019 MICROALBUMIN Q1 2019 LIPID PANEL Q1 2019 BREAST CANCER SCRN MAMMOGRAM 2019 PAP AKA CERVICAL CYTOLOGY 2020 DTaP/Tdap/Td series (2 - Td) 2026 Allergies as of 2018  Review Complete On:  By: Allie Quezada MD  
  
 Severity Noted Reaction Type Reactions Mobic [Meloxicam]  2016    Hives Penicillins  2016    Unknown (comments) DOESN'T REMEMBER Plaquenil [Hydroxychloroquine]  2016    Hives Current Immunizations  Reviewed on 2017 No immunizations on file. Not reviewed this visit You Were Diagnosed With   
  
 Codes Comments Fatigue, unspecified type    -  Primary ICD-10-CM: R53.83 ICD-9-CM: 780.79 Leukocytosis, unspecified type     ICD-10-CM: D72.829 ICD-9-CM: 288.60 Encounter for long-term current use of medication     ICD-10-CM: Z79.899 ICD-9-CM: V58.69 Vitamin D deficiency     ICD-10-CM: E55.9 ICD-9-CM: 268.9 YOLANDA (obstructive sleep apnea)     ICD-10-CM: G47.33 
ICD-9-CM: 327.23 Screening for breast cancer     ICD-10-CM: Z12.31 
ICD-9-CM: V76.10 Right foot pain     ICD-10-CM: M79.671 ICD-9-CM: 729.5 Vitals BP Pulse Temp Resp Height(growth percentile) Weight(growth percentile) 134/82 (BP 1 Location: Right arm, BP Patient Position: Sitting) 87 97.8 °F (36.6 °C) (Oral) 20 5' 3\" (1.6 m) 200 lb 6.4 oz (90.9 kg) SpO2 BMI OB Status Smoking Status 98% 35.5 kg/m2 Hysterectomy Current Every Day Smoker Vitals History BMI and BSA Data Body Mass Index Body Surface Area 35.5 kg/m 2 2.01 m 2 [facility] [phone] Your Updated Medication List  
  
   
This list is accurate as of 6/20/18  2:34 PM.  Always use your most recent med list.  
  
  
  
  
 albuterol 90 mcg/actuation inhaler Commonly known as:  PROAIR HFA Take 2 Puffs by inhalation every four (4) hours as needed for Wheezing or Shortness of Breath. atorvastatin 40 mg tablet Commonly known as:  LIPITOR  
TAKE ONE TABLET BY MOUTH AT NIGHT  
  
 BC HEADACHE POWDER PO Take  by mouth.  
  
 benzonatate 100 mg capsule Commonly known as:  TESSALON Take 1 Cap by mouth three (3) times daily as needed for Cough. Blood-Glucose Meter monitoring kit E11.9 Accu-Chek Joyce Plus Meter buPROPion  mg tablet Commonly known as:  Harden Fuel Take 1 Tab by mouth every morning. Indications: Smoking Cessation  
  
 cinnamon bark 500 mg Cap Take 1,000 mg by mouth two (2) times a day. CO Q-10 10 mg Cap Generic drug:  coenzyme q10 Take  by mouth. dilTIAZem  mg ER capsule Commonly known as:  CARDIZEM CD  
TAKE ONE CAPSULE BY MOUTH EVERY DAY  
  
 esomeprazole 20 mg capsule Commonly known as:  Lyle Hayde Take 40 mg by mouth daily. fluticasone-salmeterol 250-50 mcg/dose diskus inhaler Commonly known as:  ADVAIR Take 1 Puff by inhalation two (2) times a day. Indications: BRONCHOSPASM PREVENTION WITH COPD  
  
 furosemide 20 mg tablet Commonly known as:  LASIX TAKE 1 TABLET BY MOUTH EVERY DAY AS NEEDED  
  
 gabapentin 600 mg tablet Commonly known as:  NEURONTIN  
TAKE ONE TABLET BY MOUTH FOUR TIMES A DAY  
  
 glucose blood VI test strips strip Commonly known as:  blood glucose test  
Check Daily E11.9 Accu-Chek Joyce Plus Test Strips Iron-FA-M81-Q-Menpghu Sodium 42-1--50 mg-mg-mcg-mg-mg Tab Take  by mouth. LORazepam 0.5 mg tablet Commonly known as:  ATIVAN Take 1 Tab by mouth two (2) times daily as needed for Anxiety. Max Daily Amount: 1 mg.  
  
 losartan 100 mg tablet Commonly known as:  COZAAR  
TAKE ONE TABLET BY MOUTH EVERY DAY  
  
 metFORMIN 500 mg tablet Commonly known as:  GLUCOPHAGE  
TAKE TWO TABLETS BY MOUTH TWO TIMES A DAY WITH MEALS [address] Take  by mouth daily. rOPINIRole 2 mg tablet Commonly known as:  REQUIP  
TAKE ONE TABLET BY MOUTH TWO TIMES A DAY  
  
 TURMERIC ROOT EXTRACT PO Take 1 Cap by mouth daily. venlafaxine- mg capsule Commonly known as:  EFFEXOR-XR  
TAKE ONE CAPSULE BY MOUTH EVERY DAY We Performed the Following CBC WITH AUTOMATED DIFF [33617 CPT(R)] METABOLIC PANEL, COMPREHENSIVE [98289 CPT(R)] REFERRAL TO PODIATRY [REF90 Custom] Comments:  
 Right [facility] SLEEP MEDICINE REFERRAL [OOI480 Custom] Comments:  
 Please evaluate patient for YOLANDA. Pt prefer to go to [place] [address]. She will call back. T4, FREE [id number] CPT(R)] TSH 3RD GENERATION [52748 CPT(R)] VITAMIN D, 25 HYDROXY [id number] CPT(R)] To-Do List   
 06/20/2018 Imaging:  [facility] BI SCREENING INCL CAD Referral Information Referral ID Referred By Referred To  
  
 [id number] Yoly Lopez Not Available Visits Status Start Date End Date 1 New Request 6/20/18 6/20/19 If your referral has a status of pending review or denied, additional information will be sent to support the outcome of this decision. Referral ID Referred By Referred To  
 [id number] Johnson Rodriguez Not Available Visits Status Start Date End Date 1 New Request 6/20/18 6/20/19 If your referral has a status of pending review or denied, additional information will be sent to support the outcome of this decision. Introducing [facility]! Dear Magnus Tellez: Thank you for requesting a [employer] account. Our records indicate that you have previously registered for a MediCard account but its currently inactive. Please call our MediCard support line at [phone]. Additional Information If you have questions, please visit the Frequently Asked Questions section of the MediCard website at [email or web address] Remember, MediCard is NOT to be used for urgent needs. For medical emergencies, dial 911. Now available from your iPhone and Android! Please provide this summary of care documentation to your next provider. Your primary care clinician is listed as Clarisse De La O. If you have any questions after today's visit, please call [phone].

## 2018-06-20 NOTE — LETTER
6/22/2018 5:38 PM 
 
Ms. Canales Handler [address] [place] [address] Dear Ally Handler: 
 
Please find your most recent results below. Resulted Orders CBC WITH AUTOMATED DIFF Result Value Ref Range WBC 15.9 (H) 3.4 - 10.8 x10E3/uL  
 RBC 5.01 3.77 - 5.28 x10E6/uL HGB 14.1 11.1 - 15.9 g/dL HCT 42.8 34.0 - 46.6 % MCV 85 79 - 97 fL  
 MCH 28.1 26.6 - 33.0 pg  
 MCHC 32.9 31.5 - 35.7 g/dL  
 RDW 14.9 12.3 - 15.4 % PLATELET 935 532 - 516 x10E3/uL NEUTROPHILS 63 Not Estab. % Lymphocytes 25 Not Estab. % MONOCYTES 4 Not Estab. % EOSINOPHILS 7 Not Estab. % BASOPHILS 1 Not Estab. %  
 ABS. NEUTROPHILS 10.0 (H) 1.4 - 7.0 x10E3/uL Abs Lymphocytes 4.0 (H) 0.7 - 3.1 x10E3/uL  
 ABS. MONOCYTES 0.6 0.1 - 0.9 x10E3/uL  
 ABS. EOSINOPHILS 1.0 (H) 0.0 - 0.4 x10E3/uL  
 ABS. BASOPHILS 0.1 0.0 - 0.2 x10E3/uL IMMATURE GRANULOCYTES 0 Not Estab. %  
 ABS. IMM. GRANS. 0.0 0.0 - 0.1 x10E3/uL Hematology comments: Note:   
   Comment:  
   Verified by microscopic examination. Narrative Performed at:  [address]  [id number] : Rosa Elena Kern MD, Phone:  [phone] METABOLIC PANEL, COMPREHENSIVE Result Value Ref Range Glucose 99 65 - 99 mg/dL BUN 12 6 - 24 mg/dL Creatinine 0.73 0.57 - 1.00 mg/dL GFR est non-AA 94 >59 mL/min/1.73 GFR est  >59 mL/min/1.73  
 BUN/Creatinine ratio 16 9 - 23 Sodium 145 (H) 134 - 144 mmol/L Potassium 4.8 3.5 - 5.2 mmol/L Chloride 103 96 - 106 mmol/L  
 CO2 20 20 - 29 mmol/L Comment: **Please note reference interval change** Calcium 10.4 (H) 8.7 - 10.2 mg/dL Protein, total 7.1 6.0 - 8.5 g/dL Albumin 4.6 3.5 - 5.5 g/dL GLOBULIN, TOTAL 2.5 1.5 - 4.5 g/dL A-G Ratio 1.8 1.2 - 2.2 Bilirubin, total <0.2 0.0 - 1.2 mg/dL Alk.  phosphatase 167 (H) 39 - 117 IU/L  
 AST (SGOT) 23 0 - 40 IU/L  
 ALT (SGPT) 24 0 - 32 IU/L  
 Narrative Performed at:  [address]  [id number] : Magali Oseguera MD, Phone:  [phone] TSH 3RD GENERATION Result Value Ref Range TSH 3.070 0.450 - 4.500 uIU/mL Narrative Performed at:  [address]  [id number] : Magali Oseguera MD, Phone:  [phone] T4, FREE Result Value Ref Range T4, Free 0.96 0.82 - 1.77 ng/dL Narrative Performed at:  [address]  [id number] : Magali Oseguera MD, Phone:  [phone] VITAMIN D, 25 HYDROXY Result Value Ref Range VITAMIN D, 25-HYDROXY 28.4 (L) 30.0 - 100.0 ng/mL Comment:  
   Vitamin D deficiency has been defined by the [address] [place] [address] practice guideline as a 
level of serum 25-OH vitamin D less than 20 ng/mL (1,2). The Endocrine Society went on to further define vitamin D 
insufficiency as a level between 21 and 29 ng/mL (2). 1. IOM ([place] of [employer]). 2010. Dietary reference 
   intakes for calcium and D. [facility]: The 
   Lycera. 2. Nicky MF, Lyle NC, Doreen MENDES, et al. 
   Evaluation, treatment, and prevention of vitamin D 
   deficiency: an Endocrine Society clinical practice 
   guideline. JCEM. 2011 Jul; 96(7):1911-30. Narrative Performed at:  [address]  [id number] : Magali Oseguera MD, Phone:  [phone] RECOMMENDATIONS: 
Labs cont to show high white blood cell count. Calcium is elevated. Try to cut back on supplement you may be taking. Vit D is still below desired value. Take a Vit D supplement 1,000 units daily. Normal thyroid. Make sure you follow up with Sleep Clinic. Please call me if you have any questions: [phone] Sincerely, Jennifer Gregory MD

## 2018-06-20 NOTE — PROGRESS NOTES
HISTORY OF PRESENT ILLNESS  Chilango Kramer is a 47 y.o. female. HPI  C/O fatigue. Lack of energy. Can sleep all the time. She gets ample amount of sleep at night. Hx YOLANDA but does not use CPAP! She never went back for sleep study last year. Request referral elsewhere. Asthma is bad. Needing use rescue inhaler because can't afford Advair. Increase stress. Her [de-identified]year old mother moved in with her. She will need referral to a different [facility] because Dr. Cassi Morgan is not in network. Has appt with Podiatrist on Friday for right bunion. Review of Systems   Constitutional: Positive for malaise/fatigue. Respiratory: Positive for shortness of breath and wheezing. Musculoskeletal: Positive for joint pain. All other systems reviewed and are negative.     Patient Active Problem List   Diagnosis Code    Primary localized osteoarthritis of right knee M17.11    Primary osteoarthritis of right knee M17.11    Essential hypertension with goal blood pressure less than 140/90 I10    Chronic obstructive pulmonary disease ([facility]) J44.9    Diabetes mellitus type 2, controlled ([facility] E11.9    Polyneuropathy associated with underlying disease ([facility] G63    RLS (restless legs syndrome) G25.81    Paroxysmal tachycardia ([facility]) I47.9    Arthritis M19.90    Elevated WBC count D72.829    Cigarette nicotine dependence without complication I62.500    Dysthymia F34.1    YOLANDA (obstructive sleep apnea) G47.33    Status post bilateral knee replacements Z96.653    Type 2 diabetes mellitus with diabetic neuropathy ([facility]) E11.40    Severe obesity (BMI 35.0-39.9) ([facility]) E66.01       Current Outpatient Prescriptions   Medication Sig Dispense Refill    atorvastatin (LIPITOR) 40 mg tablet TAKE ONE TABLET BY MOUTH AT NIGHT 90 Tab 1    dilTIAZem CD (CARDIZEM CD) 180 mg ER capsule TAKE ONE CAPSULE BY MOUTH EVERY DAY 90 Cap 1    venlafaxine-SR (EFFEXOR-XR) 150 mg capsule TAKE ONE CAPSULE BY MOUTH EVERY DAY 90 Cap 1    rOPINIRole (REQUIP) 2 mg tablet TAKE ONE TABLET BY MOUTH TWO TIMES A  Tab 1    metFORMIN (GLUCOPHAGE) 500 mg tablet TAKE TWO TABLETS BY MOUTH TWO TIMES A DAY WITH MEALS 360 Tab 1    LORazepam (ATIVAN) 0.5 mg tablet Take 1 Tab by mouth two (2) times daily as needed for Anxiety. Max Daily Amount: 1 mg. 180 Tab 0    furosemide (LASIX) 20 mg tablet TAKE 1 TABLET BY MOUTH EVERY DAY AS NEEDED 90 Tab 0    gabapentin (NEURONTIN) 600 mg tablet TAKE ONE TABLET BY MOUTH FOUR TIMES A  Tab 0    losartan (COZAAR) 100 mg tablet TAKE ONE TABLET BY MOUTH EVERY DAY 90 Tab 0    coenzyme q10 (CO Q-10) 10 mg cap Take  by mouth.  benzonatate (TESSALON) 100 mg capsule Take 1 Cap by mouth three (3) times daily as needed for Cough. 60 Cap 3    albuterol (PROAIR HFA) 90 mcg/actuation inhaler Take 2 Puffs by inhalation every four (4) hours as needed for Wheezing or Shortness of Breath. 1 Inhaler 3    ASPIRIN/SALICYLAMIDE/CAFFEINE (BC HEADACHE POWDER PO) Take  by mouth.  Iron-FA-V80-I-Huphpsx Sodium 43-3--50 mg-mg-mcg-mg-mg tab Take  by mouth.  TURMERIC ROOT EXTRACT PO Take 1 Cap by mouth daily.  L GASSERI/B BIFIDUM/B LONGUM ([facility] PO) Take  by mouth daily.  cinnamon bark 500 mg cap Take 1,000 mg by mouth two (2) times a day.  esomeprazole (NEXIUM) 20 mg capsule Take 40 mg by mouth daily.  fluticasone-salmeterol (ADVAIR) 250-50 mcg/dose diskus inhaler Take 1 Puff by inhalation two (2) times a day. Indications: BRONCHOSPASM PREVENTION WITH COPD 3 Inhaler 1    Blood-Glucose Meter monitoring kit E11.9 Accu-Chek Joyce Plus Meter 1 Kit 0    glucose blood VI test strips (BLOOD GLUCOSE TEST) strip Check Daily E11.9 Accu-Chek Joyce Plus Test Strips 100 Strip 3    buPROPion XL (WELLBUTRIN XL) 150 mg tablet Take 1 Tab by mouth every morning.  Indications: Smoking Cessation 30 Tab 3         Visit Vitals    /82 (BP 1 Location: Right arm, BP Patient Position: Sitting)  Comment: manual    Pulse 87    Temp 97.8 °F (36.6 °C) (Oral)    Resp 20    Ht 5' 3\" (1.6 m)    Wt 200 lb 6.4 oz (90.9 kg)    SpO2 98%    BMI 35.5 kg/m2       Physical Exam   Constitutional: She appears well-developed and well-nourished. Cardiovascular: Normal rate, regular rhythm and normal heart sounds. Pulmonary/Chest: Effort normal and breath sounds normal.   Neurological: She is alert. Vitals reviewed. ASSESSMENT and PLAN    ICD-10-CM ICD-9-CM    1. Fatigue, unspecified type R53.83 780.79 TSH 3RD GENERATION      T4, FREE   2. Leukocytosis, unspecified type D72.829 288.60 CBC WITH AUTOMATED DIFF   3. Encounter for long-term current use of medication O14.345 T68.44 METABOLIC PANEL, COMPREHENSIVE   4. Vitamin D deficiency E55.9 268.9 VITAMIN D, 25 HYDROXY   5. YOLANDA (obstructive sleep apnea) G47.33 327.23 SLEEP MEDICINE REFERRAL   6. Screening for breast cancer Z12.31 V76.10 [facility] MAMMO BI SCREENING INCL CAD   7.  Right foot pain M79.671 729.5 REFERRAL TO PODIATRY

## 2018-06-21 ENCOUNTER — TELEPHONE (OUTPATIENT)
Dept: FAMILY MEDICINE CLINIC | Age: 53
End: 2018-06-21

## 2018-06-21 DIAGNOSIS — D72.829 LEUKOCYTOSIS, UNSPECIFIED TYPE: Primary | ICD-10-CM

## 2018-06-21 LAB
25(OH)D3+25(OH)D2 SERPL-MCNC: 28.4 NG/ML (ref 30–100)
ALBUMIN SERPL-MCNC: 4.6 G/DL (ref 3.5–5.5)
ALBUMIN/GLOB SERPL: 1.8 {RATIO} (ref 1.2–2.2)
ALP SERPL-CCNC: 167 IU/L (ref 39–117)
ALT SERPL-CCNC: 24 IU/L (ref 0–32)
AST SERPL-CCNC: 23 IU/L (ref 0–40)
BASOPHILS # BLD AUTO: 0.1 X10E3/UL (ref 0–0.2)
BASOPHILS NFR BLD AUTO: 1 %
BILIRUB SERPL-MCNC: <0.2 MG/DL (ref 0–1.2)
BUN SERPL-MCNC: 12 MG/DL (ref 6–24)
BUN/CREAT SERPL: 16 (ref 9–23)
CALCIUM SERPL-MCNC: 10.4 MG/DL (ref 8.7–10.2)
CHLORIDE SERPL-SCNC: 103 MMOL/L (ref 96–106)
CO2 SERPL-SCNC: 20 MMOL/L (ref 20–29)
CREAT SERPL-MCNC: 0.73 MG/DL (ref 0.57–1)
EOSINOPHIL # BLD AUTO: 1 X10E3/UL (ref 0–0.4)
EOSINOPHIL NFR BLD AUTO: 7 %
ERYTHROCYTE [DISTWIDTH] IN BLOOD BY AUTOMATED COUNT: 14.9 % (ref 12.3–15.4)
GFR SERPLBLD CREATININE-BSD FMLA CKD-EPI: 109 ML/MIN/1.73
GFR SERPLBLD CREATININE-BSD FMLA CKD-EPI: 94 ML/MIN/1.73
GLOBULIN SER CALC-MCNC: 2.5 G/DL (ref 1.5–4.5)
GLUCOSE SERPL-MCNC: 99 MG/DL (ref 65–99)
HCT VFR BLD AUTO: 42.8 % (ref 34–46.6)
HGB BLD-MCNC: 14.1 G/DL (ref 11.1–15.9)
IMM GRANULOCYTES # BLD: 0 X10E3/UL (ref 0–0.1)
IMM GRANULOCYTES NFR BLD: 0 %
LYMPHOCYTES # BLD AUTO: 4 X10E3/UL (ref 0.7–3.1)
LYMPHOCYTES NFR BLD AUTO: 25 %
MCH RBC QN AUTO: 28.1 PG (ref 26.6–33)
MCHC RBC AUTO-ENTMCNC: 32.9 G/DL (ref 31.5–35.7)
MCV RBC AUTO: 85 FL (ref 79–97)
MONOCYTES # BLD AUTO: 0.6 X10E3/UL (ref 0.1–0.9)
MONOCYTES NFR BLD AUTO: 4 %
MORPHOLOGY BLD-IMP: ABNORMAL
NEUTROPHILS # BLD AUTO: 10 X10E3/UL (ref 1.4–7)
NEUTROPHILS NFR BLD AUTO: 63 %
PLATELET # BLD AUTO: 284 X10E3/UL (ref 150–379)
POTASSIUM SERPL-SCNC: 4.8 MMOL/L (ref 3.5–5.2)
PROT SERPL-MCNC: 7.1 G/DL (ref 6–8.5)
RBC # BLD AUTO: 5.01 X10E6/UL (ref 3.77–5.28)
SODIUM SERPL-SCNC: 145 MMOL/L (ref 134–144)
T4 FREE SERPL-MCNC: 0.96 NG/DL (ref 0.82–1.77)
TSH SERPL DL<=0.005 MIU/L-ACNC: 3.07 UIU/ML (ref 0.45–4.5)
WBC # BLD AUTO: 15.9 X10E3/UL (ref 3.4–10.8)

## 2018-06-21 NOTE — TELEPHONE ENCOUNTER
Patient called and ask that either Tl Ruby or Dr. Christina Ramirez call her. Please call her at [phone]. She was asking about something being scheduled at [facility].  I could not find anything.

## 2018-06-22 NOTE — PROGRESS NOTES
Labs cont to show high white blood cell count. Calcium is elevated. Try to cut back on supplement you may be taking. Vit D is still below desired value. Take a Vit D supplement 1,000 units daily. Normal thyroid. Make sure you follow up with Sleep Clinic.

## 2018-06-22 NOTE — TELEPHONE ENCOUNTER
Pt stated she got a call from someone at [facility] but not sure for what and stated it was something Dr Swati Askew ordered - pt is wondering what it is

## 2018-06-22 NOTE — TELEPHONE ENCOUNTER
She went to foot MD today and will have surgery end of oct. She will cut back on calcium, take Vit D 1,000 units. Should she see someone other than Dr Elbert Gonzalez? I spoke to Dr Marge He and she will refer her to another.

## 2018-08-08 ENCOUNTER — TELEPHONE (OUTPATIENT)
Dept: RHEUMATOLOGY | Age: 53
End: 2018-08-08

## 2018-08-08 NOTE — TELEPHONE ENCOUNTER
----- Message from Cornel Chaves sent at 8/6/2018  3:53 PM EDT -----  Regarding: Dr. Saul Spears / Telephone  Pt would like to know if the practice has received a referral from [facility] Dr. Adrianne Mittal ([phone]) for her upcoming appt on August 30, 2018.   Pt's best contact: [phone]

## 2018-08-10 ENCOUNTER — OFFICE VISIT (OUTPATIENT)
Dept: FAMILY MEDICINE CLINIC | Age: 53
End: 2018-08-10

## 2018-08-10 VITALS
WEIGHT: 200.8 LBS | RESPIRATION RATE: 20 BRPM | OXYGEN SATURATION: 97 % | HEART RATE: 98 BPM | DIASTOLIC BLOOD PRESSURE: 88 MMHG | SYSTOLIC BLOOD PRESSURE: 136 MMHG | BODY MASS INDEX: 35.58 KG/M2 | HEIGHT: 63 IN | TEMPERATURE: 98.1 F

## 2018-08-10 DIAGNOSIS — M25.511 BILATERAL SHOULDER PAIN, UNSPECIFIED CHRONICITY: ICD-10-CM

## 2018-08-10 DIAGNOSIS — I10 ESSENTIAL HYPERTENSION WITH GOAL BLOOD PRESSURE LESS THAN 140/90: ICD-10-CM

## 2018-08-10 DIAGNOSIS — M79.671 PAIN IN BOTH FEET: ICD-10-CM

## 2018-08-10 DIAGNOSIS — D72.829 LEUKOCYTOSIS, UNSPECIFIED TYPE: ICD-10-CM

## 2018-08-10 DIAGNOSIS — E11.40 TYPE 2 DIABETES MELLITUS WITH DIABETIC NEUROPATHY, WITHOUT LONG-TERM CURRENT USE OF INSULIN (HCC): Primary | ICD-10-CM

## 2018-08-10 DIAGNOSIS — M25.512 BILATERAL SHOULDER PAIN, UNSPECIFIED CHRONICITY: ICD-10-CM

## 2018-08-10 DIAGNOSIS — M19.90 ARTHRITIS: ICD-10-CM

## 2018-08-10 DIAGNOSIS — M79.672 PAIN IN BOTH FEET: ICD-10-CM

## 2018-08-10 NOTE — PROGRESS NOTES
Subjective:     Jassi Valdez is a 46 y.o. female who presents for follow up of diabetes, hypertension, hyperlipidemia and obesity. Diet and Lifestyle: generally follows a low fat low cholesterol diet, generally follows a low sodium diet, exercises sporadically  Home BP Monitoring: is not measured at home    Cardiovascular ROS: taking medications as instructed, no medication side effects noted, no TIA's, no chest pain on exertion, no dyspnea on exertion, no swelling of ankles. New concerns: her mother moved in with her. Increased stress. Feels has no privacy or time alone. Has appt lined up for [facility] and Hematology. For shoulder pains, foot pain, and high WBC.      Patient Active Problem List    Diagnosis Date Noted    Severe obesity (BMI 35.0-39.9) ([facility] 06/20/2018    Type 2 diabetes mellitus with diabetic neuropathy ([facility] 02/14/2018    Status post bilateral knee replacements 11/02/2017    Cigarette nicotine dependence without complication 80/13/7865    Dysthymia 12/30/2016    YOLANDA (obstructive sleep apnea) 12/30/2016    Paroxysmal tachycardia (HCC) 09/09/2016    Arthritis 09/09/2016    Elevated WBC count 09/09/2016    Essential hypertension with goal blood pressure less than 140/90 09/07/2016    Chronic obstructive pulmonary disease (HCC) 09/07/2016    Diabetes mellitus type 2, controlled ([facility] 09/07/2016    Polyneuropathy associated with underlying disease ([facility] 09/07/2016    RLS (restless legs syndrome) 09/07/2016    Primary osteoarthritis of right knee 08/18/2016     Current Outpatient Prescriptions   Medication Sig Dispense Refill    atorvastatin (LIPITOR) 40 mg tablet TAKE ONE TABLET BY MOUTH AT NIGHT 90 Tab 1    dilTIAZem CD (CARDIZEM CD) 180 mg ER capsule TAKE ONE CAPSULE BY MOUTH EVERY DAY 90 Cap 1    venlafaxine-SR (EFFEXOR-XR) 150 mg capsule TAKE ONE CAPSULE BY MOUTH EVERY DAY 90 Cap 1    rOPINIRole (REQUIP) 2 mg tablet TAKE ONE TABLET BY MOUTH TWO TIMES A  Tab 1    metFORMIN (GLUCOPHAGE) 500 mg tablet TAKE TWO TABLETS BY MOUTH TWO TIMES A DAY WITH MEALS 360 Tab 1    Blood-Glucose Meter monitoring kit E11.9 Accu-Chek Joyce Plus Meter 1 Kit 0    glucose blood VI test strips (BLOOD GLUCOSE TEST) strip Check Daily E11.9 Accu-Chek Joyce Plus Test Strips 100 Strip 3    furosemide (LASIX) 20 mg tablet TAKE 1 TABLET BY MOUTH EVERY DAY AS NEEDED 90 Tab 0    gabapentin (NEURONTIN) 600 mg tablet TAKE ONE TABLET BY MOUTH FOUR TIMES A  Tab 0    losartan (COZAAR) 100 mg tablet TAKE ONE TABLET BY MOUTH EVERY DAY 90 Tab 0    coenzyme q10 (CO Q-10) 10 mg cap Take  by mouth.  Iron-FA-P47-X-Yjmwfci Sodium 86-5--50 mg-mg-mcg-mg-mg tab Take  by mouth.  TURMERIC ROOT EXTRACT PO Take 1 Cap by mouth daily.  L GASSERI/B BIFIDUM/B LONGUM ([facility] PO) Take  by mouth daily.  cinnamon bark 500 mg cap Take 1,000 mg by mouth two (2) times a day.  esomeprazole (NEXIUM) 20 mg capsule Take 40 mg by mouth daily.  fluticasone-salmeterol (ADVAIR) 250-50 mcg/dose diskus inhaler Take 1 Puff by inhalation two (2) times a day. Indications: BRONCHOSPASM PREVENTION WITH COPD 3 Inhaler 1    LORazepam (ATIVAN) 0.5 mg tablet Take 1 Tab by mouth two (2) times daily as needed for Anxiety. Max Daily Amount: 1 mg. 180 Tab 0    benzonatate (TESSALON) 100 mg capsule Take 1 Cap by mouth three (3) times daily as needed for Cough. 60 Cap 3    albuterol (PROAIR HFA) 90 mcg/actuation inhaler Take 2 Puffs by inhalation every four (4) hours as needed for Wheezing or Shortness of Breath. 1 Inhaler 3    ASPIRIN/SALICYLAMIDE/CAFFEINE (BC HEADACHE POWDER PO) Take  by mouth.        Allergies   Allergen Reactions    Mobic [Meloxicam] Hives    Penicillins Unknown (comments)     DOESN'T REMEMBER    Plaquenil [Hydroxychloroquine] Hives     Past Medical History:   Diagnosis Date    Arrhythmia     TACHYCARDIA    Arthritis     [facility]immune disease [facility]     LUPUS    Diabetes ([facility]     GERD (gastroesophageal reflux disease)     Hypertension     Psychiatric disorder     PANIC ATTACKS    Sleep apnea      Past Surgical History:   Procedure Laterality Date    CARDIAC SURG PROCEDURE UNLIST  2013    ABLATION    HX BREAST BIOPSY Bilateral     benign    HX CHOLECYSTECTOMY  1998    HX HEENT  1993    SEPTUM SCRAPPED    HX HYSTERECTOMY  2008    HX KNEE ARTHROSCOPY Bilateral 2012    HX KNEE ARTHROSCOPY Left 2012    HX KNEE ARTHROSCOPY Right 2014    HX KNEE REPLACEMENT Left 2012    HX KNEE REPLACEMENT Right 08/18/2016    HX TUBAL LIGATION  1989     Family History   Problem Relation Age of Onset    Arthritis-osteo Mother     Psychiatric Disorder Father     Arthritis-osteo Father     Alcohol abuse Father     Heart Disease Father     Heart Surgery Father     Hypertension Father     Diabetes Brother     Arthritis-osteo Brother     Hypertension Brother     Anesth Problems Neg Hx      Social History   Substance Use Topics    Smoking status: Current Every Day Smoker     Packs/day: 1.00     Years: 40.00    Smokeless tobacco: Never Used    Alcohol use No             Review of Systems, additional:  Pertinent items are noted in HPI. Objective:     Visit Vitals    /88 (BP 1 Location: Left arm, BP Patient Position: Sitting)  Comment: manual    Pulse 98    Temp 98.1 °F (36.7 °C) (Oral)    Resp 20    Ht 5' 3\" (1.6 m)    Wt 200 lb 12.8 oz (91.1 kg)    SpO2 97%    BMI 35.57 kg/m2     Appearance: alert, well appearing, and in no distress and overweight. General exam: CVS exam BP noted to be well controlled today in office, S1, S2 normal, no gallop, no murmur, chest clear, no JVD, no HSM, no edema. Lab review: orders written for new lab studies as appropriate; see orders. Assessment/Plan:     .  reviewed diet, exercise and weight control. ICD-10-CM ICD-9-CM    1.  Type 2 diabetes mellitus with diabetic neuropathy, without long-term current use of insulin ([facility] E11.40 250.60 LIPID PANEL     357.2 HEMOGLOBIN A1C WITH EAG   2. Essential hypertension with goal blood pressure less than 140/90 I10 401.9    3. Leukocytosis, unspecified type D72.829 288.60 CBC WITH AUTOMATED DIFF   4. Arthritis M19.90 716.90 SED RATE (ESR)      C REACTIVE PROTEIN, QT      REFERRAL TO RHEUMATOLOGY   5. Bilateral shoulder pain, unspecified chronicity M25.511 719.41 REFERRAL TO ORTHOPEDIC SURGERY    M25.512     6.  Pain in both feet M79.671 729.5 REFERRAL TO ORTHOPEDIC SURGERY    M79.672       Remind to schedule AWV

## 2018-08-10 NOTE — PATIENT INSTRUCTIONS
Arthritis: Care Instructions  Your Care Instructions  Arthritis, also called osteoarthritis, is a breakdown of the cartilage that cushions your joints. When the cartilage wears down, your bones rub against each other. This causes pain and stiffness. Many people have some arthritis as they age. Arthritis most often affects the joints of the spine, hands, hips, knees, or feet. You can take simple measures to protect your joints, ease your pain, and help you stay active. Follow-up care is a key part of your treatment and safety. Be sure to make and go to all appointments, and call your doctor if you are having problems. It's also a good idea to know your test results and keep a list of the medicines you take. How can you care for yourself at home? · Stay at a healthy weight. Being overweight puts extra strain on your joints. · Talk to your doctor or physical therapist about exercises that will help ease joint pain. ¨ Stretch. You may enjoy gentle forms of yoga to help keep your joints and muscles flexible. ¨ Walk instead of jog. Other types of exercise that are less stressful on the joints include riding a bicycle, swimming, elliot chi, or water exercise. ¨ Lift weights. Strong muscles help reduce stress on your joints. Stronger thigh muscles, for example, take some of the stress off of the knees and hips. Learn the right way to lift weights so you do not make joint pain worse. · Take your medicines exactly as prescribed. Call your doctor if you think you are having a problem with your medicine. · Take pain medicines exactly as directed. ¨ If the doctor gave you a prescription medicine for pain, take it as prescribed. ¨ If you are not taking a prescription pain medicine, ask your doctor if you can take an over-the-counter medicine. · Use a cane, crutch, walker, or another device if you need help to get around. These can help rest your joints.  You also can use other things to make life easier, such as a higher toilet seat and padded handles on kitchen utensils. · Do not sit in low chairs, which can make it hard to get up. · Put heat or cold on your sore joints as needed. Use whichever helps you most. You also can take turns with hot and cold packs. ¨ Apply heat 2 or 3 times a day for 20 to 30 minutes-using a heating pad, hot shower, or hot pack-to relieve pain and stiffness. ¨ Put ice or a cold pack on your sore joint for 10 to 20 minutes at a time. Put a thin cloth between the ice and your skin. When should you call for help? Call your doctor now or seek immediate medical care if:    · You have sudden swelling, warmth, or pain in any joint.     · You have joint pain and a fever or rash.     · You have such bad pain that you cannot use a joint.    Watch closely for changes in your health, and be sure to contact your doctor if:    · You have mild joint symptoms that continue even with more than 6 weeks of care at home.     · You have stomach pain or other problems with your medicine. Where can you learn more? Go to http://matti-yinka.info/. Enter T901 in the search box to learn more about \"Arthritis: Care Instructions. \"  Current as of: October 10, 2017  Content Version: 11.7  © 8788-8712 [employer]. Care instructions adapted under license by [employer] (which disclaims liability or warranty for this information). If you have questions about a medical condition or this instruction, always ask your healthcare professional. [place] [address] any warranty or liability for your use of this information.

## 2018-08-10 NOTE — PROGRESS NOTES
Identified pt with two pt identifiers(name and ). Chief Complaint   Patient presents with    Diabetes    Arm Pain     tues and wed she could not lift her arms    Foot Pain     both feet are hurting today    Hypertension     she said it has been high        Health Maintenance Due   Topic    Pneumococcal 19-64 Highest Risk (1 of 3 - PCV13)    EYE EXAM RETINAL OR DILATED Q1     MEDICARE YEARLY EXAM     FOBT Q 1 YEAR AGE 50-75     Influenza Age 5 to Adult     HEMOGLOBIN A1C Q6M    [employer] 3/2/18    Wt Readings from Last 3 Encounters:   08/10/18 200 lb 12.8 oz (91.1 kg)   18 200 lb 6.4 oz (90.9 kg)   18 199 lb 9.6 oz (90.5 kg)     Temp Readings from Last 3 Encounters:   08/10/18 98.1 °F (36.7 °C) (Oral)   18 97.8 °F (36.6 °C) (Oral)   18 98 °F (36.7 °C) (Oral)     BP Readings from Last 3 Encounters:   08/10/18 136/88   18 134/82   18 132/84     Pulse Readings from Last 3 Encounters:   08/10/18 98   18 87   18 98         Learning Assessment:  :     Learning Assessment 2016   PRIMARY LEARNER Patient Patient   HIGHEST LEVEL OF EDUCATION - PRIMARY LEARNER  - SOME COLLEGE   BARRIERS PRIMARY LEARNER - NONE   CO-LEARNER CAREGIVER - No   PRIMARY LANGUAGE ENGLISH ENGLISH   LEARNER PREFERENCE PRIMARY - DEMONSTRATION   ANSWERED BY patient self   RELATIONSHIP SELF SELF       Depression Screening:  :     PHQ over the last two weeks 2018   Little interest or pleasure in doing things Not at all   Feeling down, depressed, irritable, or hopeless Several days   Total Score PHQ 2 1       Fall Risk Assessment:  :     Fall Risk Assessment, last 12 mths 2018   Able to walk? Yes   Fall in past 12 months? No       Abuse Screening:  :     Abuse Screening Questionnaire 2018   Do you ever feel afraid of your partner? N N   Are you in a relationship with someone who physically or mentally threatens you? N N   Is it safe for you to go home?  Lluvia Solomon Y       Coordination of Care Questionnaire:  :     1) Have you been to an emergency room, urgent care clinic since your last visit? no   Hospitalized since your last visit? no             2) Have you seen or consulted any other health care providers outside of [facility] since your last visit? no  (Include any pap smears or colon screenings in this section.)    3) Do you have an Advance Directive on file? yes  Are you interested in receiving information about Advance Directives? no    Reviewed record in preparation for visit and have obtained necessary documentation. Medication reconciliation up to date and corrected with patient at this time.    Wanted to ask about Bridger Ernst on 8/30/18  Dr Nan Mac 8/13/18  Dr Stan Rivera 8/21/18  Dr Estelle Cosme 9/5/18

## 2018-08-10 NOTE — MR AVS SNAPSHOT
[address] [facility] 
 
 
 [address] [facility] 
[phone] Patient: Chula Yost MRN: [id number] :1965 Visit Information Date & Time Provider Department Dept. Phone Encounter #  
  87:25 AM Edmund Bernardo Ajith [phone] [id number] Your Appointments 2018  9:00 AM  
New Patient with Teetee Peraza MD  
[facility]) Appt Note: np est pcp ks 18; . ..  
 [facility] [id number]  
47 [facility] [address]  
  
    
 2018 10:00 AM  
New Patient with Kristin Poe MD  
[facility] JULIANE Oncology at [facility] Appt Note: new pt- Leukocytosis (referred by Dr. Precious Leung) 217 [facility] [address]  
[phone]  
  
   
 [address] [facility] [address] Upcoming Health Maintenance Date Due Pneumococcal 19-64 Highest Risk (1 of 3 - PCV13) 1983 EYE EXAM RETINAL OR DILATED Q1 2018 MEDICARE YEARLY EXAM 3/14/2018 FOBT Q 1 YEAR AGE 50-75 2018 Influenza Age 5 to Adult 2018 HEMOGLOBIN A1C Q6M 2018 FOOT EXAM Q1 2019 MICROALBUMIN Q1 2019 LIPID PANEL Q1 2019 BREAST CANCER SCRN MAMMOGRAM 2019 PAP AKA CERVICAL CYTOLOGY 2020 DTaP/Tdap/Td series (2 - Td) 2026 Allergies as of 8/10/2018  Review Complete On:  By: Angelica Gaona MD  
  
 Severity Noted Reaction Type Reactions Mobic [Meloxicam]  2016    Hives Penicillins  2016    Unknown (comments) DOESN'T REMEMBER Plaquenil [Hydroxychloroquine]  2016    Hives Current Immunizations  Reviewed on 2017 No immunizations on file. Not reviewed this visit You Were Diagnosed With   
  
 Codes Comments Type 2 diabetes mellitus with diabetic neuropathy, without long-term current use of insulin (HCC)    -  Primary ICD-10-CM: E11.40 ICD-9-CM: 250.60, 357.2 Essential hypertension with goal blood pressure less than 140/90     ICD-10-CM: I10 
ICD-9-CM: 401.9 Leukocytosis, unspecified type     ICD-10-CM: D72.829 ICD-9-CM: 288.60 Arthritis     ICD-10-CM: M19.90 ICD-9-CM: 716.90 Bilateral shoulder pain, unspecified chronicity     ICD-10-CM: M25.511, M25.512 ICD-9-CM: 719.41 Pain in both feet     ICD-10-CM: M79.671, Z46.904 ICD-9-CM: 729.5 Vitals BP Pulse Temp Resp Height(growth percentile) Weight(growth percentile) 136/88 (BP 1 Location: Left arm, BP Patient Position: Sitting) 98 98.1 °F (36.7 °C) (Oral) 20 5' 3\" (1.6 m) 200 lb 12.8 oz (91.1 kg) SpO2 BMI OB Status Smoking Status 97% 35.57 kg/m2 Hysterectomy Current Every Day Smoker Vitals History BMI and BSA Data Body Mass Index Body Surface Area 35.57 kg/m 2 2.01 m 2 Preferred Pharmacy Pharmacy Name Phone Humberto Urbano [address] [place] [address] 5165 [facility] [address] [phone] Your Updated Medication List  
  
   
This list is accurate as of 8/10/18 10:47 AM.  Always use your most recent med list.  
  
  
  
  
 albuterol 90 mcg/actuation inhaler Commonly known as:  PROAIR HFA Take 2 Puffs by inhalation every four (4) hours as needed for Wheezing or Shortness of Breath. atorvastatin 40 mg tablet Commonly known as:  LIPITOR  
TAKE ONE TABLET BY MOUTH AT NIGHT  
  
 BC HEADACHE POWDER PO Take  by mouth.  
  
 benzonatate 100 mg capsule Commonly known as:  TESSALON Take 1 Cap by mouth three (3) times daily as needed for Cough. Blood-Glucose Meter monitoring kit E11.9 Accu-Chek Joyce Plus Meter  
  
 cinnamon bark 500 mg Cap Take 1,000 mg by mouth two (2) times a day. CO Q-10 10 mg Cap Generic drug:  coenzyme q10 Take  by mouth. dilTIAZem  mg ER capsule Commonly known as:  CARDIZEM CD  
TAKE ONE CAPSULE BY MOUTH EVERY DAY  
  
 esomeprazole 20 mg capsule Commonly known as:  Alejandrina Perez Take 40 mg by mouth daily. fluticasone-salmeterol 250-50 mcg/dose diskus inhaler Commonly known as:  ADVAIR Take 1 Puff by inhalation two (2) times a day. Indications: BRONCHOSPASM PREVENTION WITH COPD  
  
 furosemide 20 mg tablet Commonly known as:  LASIX TAKE 1 TABLET BY MOUTH EVERY DAY AS NEEDED  
  
 gabapentin 600 mg tablet Commonly known as:  NEURONTIN  
TAKE ONE TABLET BY MOUTH FOUR TIMES A DAY  
  
 glucose blood VI test strips strip Commonly known as:  blood glucose test  
Check Daily E11.9 Accu-Chek Joyce Plus Test Strips Iron-FA-M43-R-Wkwinsx Sodium 20-2--50 mg-mg-mcg-mg-mg Tab Take  by mouth. LORazepam 0.5 mg tablet Commonly known as:  ATIVAN Take 1 Tab by mouth two (2) times daily as needed for Anxiety. Max Daily Amount: 1 mg.  
  
 losartan 100 mg tablet Commonly known as:  COZAAR  
TAKE ONE TABLET BY MOUTH EVERY DAY  
  
 metFORMIN 500 mg tablet Commonly known as:  GLUCOPHAGE  
TAKE TWO TABLETS BY MOUTH TWO TIMES A DAY WITH MEALS [address] Take  by mouth daily. rOPINIRole 2 mg tablet Commonly known as:  REQUIP  
TAKE ONE TABLET BY MOUTH TWO TIMES A DAY  
  
 TURMERIC ROOT EXTRACT PO Take 1 Cap by mouth daily. venlafaxine- mg capsule Commonly known as:  EFFEXOR-XR  
TAKE ONE CAPSULE BY MOUTH EVERY DAY We Performed the Following C REACTIVE PROTEIN, QT [71479 CPT(R)] CBC WITH AUTOMATED DIFF [27702 CPT(R)] HEMOGLOBIN A1C WITH EAG [46245 CPT(R)] LIPID PANEL [98300 CPT(R)] REFERRAL TO ORTHOPEDIC SURGERY [REF62 Custom] Comments:  
 Shoulder pain. Appt 8/13/18 REFERRAL TO ORTHOPEDIC SURGERY [REF62 Custom] Comments:  
 Appt 8/21/18 REFERRAL TO RHEUMATOLOGY [NLB43 Custom] Comments: Please evaluate patient for arthritis. Appt 8/30/18 SED RATE (ESR) [id number] CPT(R)] Referral Information Referral ID Referred By Referred To  
  
 [id number] AMELIA, [address] [facility] Visits Status Start Date End Date 1 New Request 8/10/18 8/10/19 If your referral has a status of pending review or denied, additional information will be sent to support the outcome of this decision. Referral ID Referred By Referred To  
 [id number] AMELIASalvador, [address] [facility] [address] [place] [address] Phone: [phone] Fax: [phone] Visits Status Start Date End Date 1 New Request 8/10/18 8/10/19 If your referral has a status of pending review or denied, additional information will be sent to support the outcome of this decision. Referral ID Referred By Referred To  
 [id number] Ruth CISNEROS MD  
   [address] [place] [address], [address] [place] [address] Phone: [phone] Fax: [phone] Visits Status Start Date End Date 1 New Request 8/10/18 8/10/19 If your referral has a status of pending review or denied, additional information will be sent to support the outcome of this decision. Patient Instructions Arthritis: Care Instructions Your Care Instructions Arthritis, also called osteoarthritis, is a breakdown of the cartilage that cushions your joints. When the cartilage wears down, your bones rub against each other. This causes pain and stiffness. Many people have some arthritis as they age. Arthritis most often affects the joints of the spine, hands, hips, knees, or feet. You can take simple measures to protect your joints, ease your pain, and help you stay active. Follow-up care is a key part of your treatment and safety.  Be sure to make and go to all appointments, and call your doctor if you are having problems. It's also a good idea to know your test results and keep a list of the medicines you take. How can you care for yourself at home? · Stay at a healthy weight. Being overweight puts extra strain on your joints. · Talk to your doctor or physical therapist about exercises that will help ease joint pain. ¨ Stretch. You may enjoy gentle forms of yoga to help keep your joints and muscles flexible. ¨ Walk instead of jog. Other types of exercise that are less stressful on the joints include riding a bicycle, swimming, elliot chi, or water exercise. ¨ Lift weights. Strong muscles help reduce stress on your joints. Stronger thigh muscles, for example, take some of the stress off of the knees and hips. Learn the right way to lift weights so you do not make joint pain worse. · Take your medicines exactly as prescribed. Call your doctor if you think you are having a problem with your medicine. · Take pain medicines exactly as directed. ¨ If the doctor gave you a prescription medicine for pain, take it as prescribed. ¨ If you are not taking a prescription pain medicine, ask your doctor if you can take an over-the-counter medicine. · Use a cane, crutch, walker, or another device if you need help to get around. These can help rest your joints. You also can use other things to make life easier, such as a higher toilet seat and padded handles on kitchen utensils. · Do not sit in low chairs, which can make it hard to get up. · Put heat or cold on your sore joints as needed. Use whichever helps you most. You also can take turns with hot and cold packs. ¨ Apply heat 2 or 3 times a day for 20 to 30 minutes-using a heating pad, hot shower, or hot pack-to relieve pain and stiffness. ¨ Put ice or a cold pack on your sore joint for 10 to 20 minutes at a time. Put a thin cloth between the ice and your skin. When should you call for help? Call your doctor now or seek immediate medical care if:   · You have sudden swelling, warmth, or pain in any joint.  
  · You have joint pain and a fever or rash.  
  · You have such bad pain that you cannot use a joint.  
 Watch closely for changes in your health, and be sure to contact your doctor if: 
  · You have mild joint symptoms that continue even with more than 6 weeks of care at home.  
  · You have stomach pain or other problems with your medicine. Where can you learn more? Go to http://matti-yinka.info/. Enter I050 in the search box to learn more about \"Arthritis: Care Instructions. \" Current as of: October 10, 2017 Content Version: 11.7 © 3645-0658 Telera. Care instructions adapted under license by Datezr (which disclaims liability or warranty for this information). If you have questions about a medical condition or this instruction, always ask your healthcare professional. [place] [address] any warranty or liability for your use of this information. Introducing [facility] & HEALTH SERVICES! [employer] introduces Idea [employer]ower patient portal. Now you can access parts of your medical record, email your doctor's office, and request medication refills online. 1. In your internet browser, go to [email or web address] 2. Click on the First Time User? Click Here link in the Sign In box. You will see the New Member Sign Up page. 3. Enter your Idea Shower Access Code exactly as it appears below. You will not need to use this code after youve completed the sign-up process. If you do not sign up before the expiration date, you must request a new code. · Idea Shower Access Code: [email or web address] Expires: 9/19/2018 11:08 AM 
 
4. Enter the last four digits of your Social Security Number (xxxx) and Date of Birth (mm/dd/yyyy) as indicated and click Submit. You will be taken to the next sign-up page. 5. Create a Idea Shower ID.  This will be your Idea Shower login ID and cannot be changed, so think of one that is secure and easy to remember. 6. Create a [facility] password. You can change your password at any time. 7. Enter your Password Reset Question and Answer. This can be used at a later time if you forget your password. 8. Enter your e-mail address. You will receive e-mail notification when new information is available in [address] 9. Click Sign Up. You can now view and download portions of your medical record. 10. Click the Download Summary menu link to download a portable copy of your medical information. If you have questions, please visit the Frequently Asked Questions section of the [facility] website. Remember, [facility] is NOT to be used for urgent needs. For medical emergencies, dial 911. Now available from your iPhone and Android! Please provide this summary of care documentation to your next provider. Your primary care clinician is listed as Oscar Esteban. If you have any questions after today's visit, please call [phone].

## 2018-08-13 DIAGNOSIS — I10 ESSENTIAL HYPERTENSION WITH GOAL BLOOD PRESSURE LESS THAN 140/90: ICD-10-CM

## 2018-08-13 DIAGNOSIS — R60.0 PEDAL EDEMA: ICD-10-CM

## 2018-08-13 RX ORDER — FUROSEMIDE 20 MG/1
TABLET ORAL
Qty: 90 TAB | Refills: 0 | Status: SHIPPED | OUTPATIENT
Start: 2018-08-13 | End: 2018-10-25 | Stop reason: SDUPTHER

## 2018-08-13 RX ORDER — LOSARTAN POTASSIUM 100 MG/1
TABLET ORAL
Qty: 90 TAB | Refills: 0 | Status: SHIPPED | OUTPATIENT
Start: 2018-08-13 | End: 2018-10-25 | Stop reason: SDUPTHER

## 2018-08-14 LAB
BASOPHILS # BLD AUTO: 0.1 X10E3/UL (ref 0–0.2)
BASOPHILS NFR BLD AUTO: 1 %
CHOLEST SERPL-MCNC: 114 MG/DL (ref 100–199)
CRP SERPL-MCNC: 5.1 MG/L (ref 0–4.9)
EOSINOPHIL # BLD AUTO: 0.3 X10E3/UL (ref 0–0.4)
EOSINOPHIL NFR BLD AUTO: 3 %
ERYTHROCYTE [DISTWIDTH] IN BLOOD BY AUTOMATED COUNT: 15.1 % (ref 12.3–15.4)
ERYTHROCYTE [SEDIMENTATION RATE] IN BLOOD BY WESTERGREN METHOD: 2 MM/HR (ref 0–40)
EST. AVERAGE GLUCOSE BLD GHB EST-MCNC: 140 MG/DL
HBA1C MFR BLD: 6.5 % (ref 4.8–5.6)
HCT VFR BLD AUTO: 41 % (ref 34–46.6)
HDLC SERPL-MCNC: 39 MG/DL
HGB BLD-MCNC: 13.5 G/DL (ref 11.1–15.9)
IMM GRANULOCYTES # BLD: 0 X10E3/UL (ref 0–0.1)
IMM GRANULOCYTES NFR BLD: 0 %
INTERPRETATION, 910389: NORMAL
LDLC SERPL CALC-MCNC: 42 MG/DL (ref 0–99)
LYMPHOCYTES # BLD AUTO: 2.4 X10E3/UL (ref 0.7–3.1)
LYMPHOCYTES NFR BLD AUTO: 18 %
Lab: NORMAL
MCH RBC QN AUTO: 27.7 PG (ref 26.6–33)
MCHC RBC AUTO-ENTMCNC: 32.9 G/DL (ref 31.5–35.7)
MCV RBC AUTO: 84 FL (ref 79–97)
MONOCYTES # BLD AUTO: 0.5 X10E3/UL (ref 0.1–0.9)
MONOCYTES NFR BLD AUTO: 4 %
NEUTROPHILS # BLD AUTO: 10 X10E3/UL (ref 1.4–7)
NEUTROPHILS NFR BLD AUTO: 74 %
PLATELET # BLD AUTO: 281 X10E3/UL (ref 150–379)
RBC # BLD AUTO: 4.88 X10E6/UL (ref 3.77–5.28)
TRIGL SERPL-MCNC: 163 MG/DL (ref 0–149)
VLDLC SERPL CALC-MCNC: 33 MG/DL (ref 5–40)
WBC # BLD AUTO: 13.4 X10E3/UL (ref 3.4–10.8)

## 2018-08-16 ENCOUNTER — TELEPHONE (OUTPATIENT)
Dept: FAMILY MEDICINE CLINIC | Age: 53
End: 2018-08-16

## 2018-08-16 DIAGNOSIS — E11.9 CONTROLLED TYPE 2 DIABETES MELLITUS WITHOUT COMPLICATION, WITHOUT LONG-TERM CURRENT USE OF INSULIN (HCC): Primary | ICD-10-CM

## 2018-08-17 RX ORDER — BLOOD-GLUCOSE METER
EACH MISCELLANEOUS
Qty: 1 EACH | Refills: 0 | Status: SHIPPED | OUTPATIENT
Start: 2018-08-17 | End: 2020-07-14 | Stop reason: SDUPTHER

## 2018-08-30 ENCOUNTER — OFFICE VISIT (OUTPATIENT)
Dept: RHEUMATOLOGY | Age: 53
End: 2018-08-30

## 2018-08-30 VITALS
RESPIRATION RATE: 18 BRPM | SYSTOLIC BLOOD PRESSURE: 152 MMHG | BODY MASS INDEX: 34.94 KG/M2 | TEMPERATURE: 98.6 F | OXYGEN SATURATION: 93 % | DIASTOLIC BLOOD PRESSURE: 96 MMHG | HEIGHT: 63 IN | WEIGHT: 197.2 LBS | HEART RATE: 90 BPM

## 2018-08-30 DIAGNOSIS — D72.829 LEUKOCYTOSIS, UNSPECIFIED TYPE: ICD-10-CM

## 2018-08-30 DIAGNOSIS — M76.31 ILIOTIBIAL BAND TENDINITIS OF RIGHT SIDE: Primary | ICD-10-CM

## 2018-08-30 DIAGNOSIS — M19.041 PRIMARY OSTEOARTHRITIS OF BOTH HANDS: ICD-10-CM

## 2018-08-30 DIAGNOSIS — G89.29 CHRONIC RIGHT SHOULDER PAIN: ICD-10-CM

## 2018-08-30 DIAGNOSIS — M25.511 CHRONIC RIGHT SHOULDER PAIN: ICD-10-CM

## 2018-08-30 DIAGNOSIS — M19.042 PRIMARY OSTEOARTHRITIS OF BOTH HANDS: ICD-10-CM

## 2018-08-30 NOTE — MR AVS SNAPSHOT
[address] [facility] [address] 
[phone] Patient: Margie Clay MRN: [id number] :1965 Visit Information Date & Time Provider Department Dept. Phone Encounter #  
 2018  9:00 AM Nayanayeyoradha Maddie [facility] [phone] [id number] Your Appointments 2018  8:30 AM  
Medicare Physical with Adilia Rodriguez MD  
[facility] Appt Note: mwv  
 [address] [place] 1067 [facility]  
  
   
 [address] [place] [address]  
  
    
 2018 10:00 AM  
New Patient with Rocío Portillo MD  
[facility] Oncology at [facility]) Appt Note: new pt- Leukocytosis (referred by Dr. Yuri Quesada) 217 [facility] [address] [address]  
[phone]  
  
   
 [address] [facility] [address] Upcoming Health Maintenance Date Due Pneumococcal 19-64 Highest Risk (1 of 3 - PCV13) 1983 EYE EXAM RETINAL OR DILATED Q1 2018 MEDICARE YEARLY EXAM 3/14/2018 FOBT Q 1 YEAR AGE 50-75 2018 Influenza Age 5 to Adult 2018 HEMOGLOBIN A1C Q6M 2/10/2019 FOOT EXAM Q1 2019 MICROALBUMIN Q1 2019 BREAST CANCER SCRN MAMMOGRAM 2019 LIPID PANEL Q1 8/10/2019 PAP AKA CERVICAL CYTOLOGY 2020 DTaP/Tdap/Td series (2 - Td) 2026 Allergies as of 2018  Review Complete On: 2018 By: Fany Rodriguez LPN Severity Noted Reaction Type Reactions Mobic [Meloxicam]  2016    Hives Penicillins  2016    Unknown (comments) DOESN'T REMEMBER Plaquenil [Hydroxychloroquine]  2016    Hives Current Immunizations  Reviewed on 2017 No immunizations on file. Not reviewed this visit You Were Diagnosed With   
  
 Codes Comments Iliotibial band tendinitis of right side    -  Primary ICD-10-CM: M76.31 
ICD-9-CM: 728.89 Chronic right shoulder pain     ICD-10-CM: M25.511, G89.29 ICD-9-CM: 719.41, 338.29 Primary osteoarthritis of both hands     ICD-10-CM: M19.041, T76.665 ICD-9-CM: 715.14 Vitals BP Pulse Temp Resp Height(growth percentile) Weight(growth percentile) (!) 152/96 (BP 1 Location: Left arm, BP Patient Position: Sitting) 90 98.6 °F (37 °C) (Oral) 18 5' 3\" (1.6 m) 197 lb 3.2 oz (89.4 kg) SpO2 BMI OB Status Smoking Status 93% 34.93 kg/m2 Hysterectomy Current Every Day Smoker Vitals History BMI and BSA Data Body Mass Index Body Surface Area 34.93 kg/m 2 1.99 m 2 Preferred Pharmacy Pharmacy Name Phone 8[address] [facility], [address] [place] [address] [phone] Your Updated Medication List  
  
   
This list is accurate as of 8/30/18  9:12 AM.  Always use your most recent med list.  
  
  
  
  
 atorvastatin 40 mg tablet Commonly known as:  LIPITOR  
TAKE ONE TABLET BY MOUTH AT NIGHT  
  
 BC HEADACHE POWDER PO Take  by mouth.  
  
 benzonatate 100 mg capsule Commonly known as:  TESSALON Take 1 Cap by mouth three (3) times daily as needed for Cough. * Blood-Glucose Meter monitoring kit E11.9 Accu-Chek Joyce Plus Meter * Blood-Glucose Meter Misc Commonly known as:  ACCU-CHEK JOYCE PLUS METER Test blood sugar daily. E 11.9  
  
 cinnamon bark 500 mg Cap Take 1,000 mg by mouth two (2) times a day. CO Q-10 10 mg Cap Generic drug:  coenzyme q10 Take  by mouth. dilTIAZem  mg ER capsule Commonly known as:  CARDIZEM CD  
TAKE ONE CAPSULE BY MOUTH EVERY DAY  
  
 esomeprazole 20 mg capsule Commonly known as:  Rufina Legato Take 40 mg by mouth daily. fluticasone-salmeterol 250-50 mcg/dose diskus inhaler Commonly known as:  ADVAIR Take 1 Puff by inhalation two (2) times a day.  Indications: BRONCHOSPASM PREVENTION WITH COPD  
  
 furosemide 20 mg tablet Commonly known as:  LASIX TAKE 1 TABLET EVERY DAY AS NEEDED  
  
 gabapentin 600 mg tablet Commonly known as:  NEURONTIN  
TAKE ONE TABLET BY MOUTH FOUR TIMES A DAY  
  
 glucose blood VI test strips strip Commonly known as:  blood glucose test  
Check Daily E11.9 Accu-Chek Joyce Plus Test Strips Iron-FA-P50-T-Lqhihlk Sodium 74-0--50 mg-mg-mcg-mg-mg Tab Take  by mouth. LORazepam 0.5 mg tablet Commonly known as:  ATIVAN Take 1 Tab by mouth two (2) times daily as needed for Anxiety. Max Daily Amount: 1 mg.  
  
 losartan 100 mg tablet Commonly known as:  COZAAR  
TAKE 1 TABLET EVERY DAY  
  
 metFORMIN 500 mg tablet Commonly known as:  GLUCOPHAGE  
TAKE TWO TABLETS BY MOUTH TWO TIMES A DAY WITH MEALS [address] Take  by mouth daily. rOPINIRole 2 mg tablet Commonly known as:  REQUIP  
TAKE ONE TABLET BY MOUTH TWO TIMES A DAY  
  
 TURMERIC ROOT EXTRACT PO Take 1 Cap by mouth daily. venlafaxine- mg capsule Commonly known as:  EFFEXOR-XR  
TAKE ONE CAPSULE BY MOUTH EVERY DAY  
  
 * Notice: This list has 2 medication(s) that are the same as other medications prescribed for you. Read the directions carefully, and ask your doctor or other care provider to review them with you. We Performed the Following REFERRAL TO PHYSICAL THERAPY [PDP09 Custom] Comments:  
 Evaluate and treat for right iliotibial band syndrome and chronic left shoulder pain Referral Information Referral ID Referred By Referred To  
  
 [id number] Cheko Narayan Not Available Visits Status Start Date End Date 1 New Request 8/30/18 8/30/19 If your referral has a status of pending review or denied, additional information will be sent to support the outcome of this decision. Patient Instructions HAND OSTEOARTHRITIS. Osteoarthritis is also known as \"wear and tear arthritis,\" mechanical arthritis, or non-inflammatory arthritis. There are hereditary and vocational components and post-traumatic joint injuries may also predispose to it. It is not Rheumatoid Arthritis, however, patients with Rheumatoid Arthritis may also have osteoarthritis. I recommend: 
 
(1) Non-medicinal modalities such as keeping hands warm, avoid activities that case pain, warm water soaks 
 
(2) Oral medications, such as acetaminophen as first line (3000 mg maximum per day) and oral NSAIDs, such as naproxen (Aleve) two tablets of 220 mg twice daily with food, OR ibuprofen (Advil) two tablets of 200 mg three times daily, with food as second line therapy. Naproxen (Aleve) is associated with lower cardiovascular risk than other NSAIDs (Montse FRIAS, Irwin OSUNA. Clinical Pharmacology and Cardiovascular Safety of Naproxen. Am J Cardiovasc Drugs. 2016 Nov 8). NSAIDs should not be used in patients on blood thinners, chronic kidney disease, high risk coronary artery disease, and inflammatory bowel disease (ulcerative colitis or Crohn's disease) 
 
(3) Topical medications, such as Capsaicin (which may cause a burning sensation) or NSAIDs, such as diclofenac gel The combination of acetaminophen and NSAIDs are safe together. Please do not combine NSAIDs together, such as Aleve, Advil and Mobic (meloxicam) Please do hand ball squeezing and holding until your hand fatigues and then alternate to the next hand. Do that 10 times twice daily Introducing [facility] & HEALTH SERVICES! [employer] introduces [employer] patient portal. Now you can access parts of your medical record, email your doctor's office, and request medication refills online. 1. In your internet browser, go to [email or web address] 2. Click on the First Time User? Click Here link in the Sign In box. You will see the New Member Sign Up page. 3. Enter your [employer] Access Code exactly as it appears below. You will not need to use this code after youve completed the sign-up process. If you do not sign up before the expiration date, you must request a new code. · [employer] Access Code: [email or web address] Expires: 9/19/2018 11:08 AM 
 
4. Enter the last four digits of your Social Security Number (xxxx) and Date of Birth (mm/dd/yyyy) as indicated and click Submit. You will be taken to the next sign-up page. 5. Create a [employer] Design ID. This will be your [employer] login ID and cannot be changed, so think of one that is secure and easy to remember. 6. Create a [employer] password. You can change your password at any time. 7. Enter your Password Reset Question and Answer. This can be used at a later time if you forget your password. 8. Enter your e-mail address. You will receive e-mail notification when new information is available in [address] 9. Click Sign Up. You can now view and download portions of your medical record. 10. Click the Download Summary menu link to download a portable copy of your medical information. If you have questions, please visit the Frequently Asked Questions section of the [employer] website. Remember, [employer] is NOT to be used for urgent needs. For medical emergencies, dial 911. Now available from your iPhone and Android! Please provide this summary of care documentation to your next provider. Your primary care clinician is listed as Tariq Crabtree. If you have any questions after today's visit, please call [phone].

## 2018-08-30 NOTE — PATIENT INSTRUCTIONS
HAND OSTEOARTHRITIS. Osteoarthritis is also known as \"wear and tear arthritis,\" mechanical arthritis, or non-inflammatory arthritis. There are hereditary and vocational components and post-traumatic joint injuries may also predispose to it. It is not Rheumatoid Arthritis, however, patients with Rheumatoid Arthritis may also have osteoarthritis. I recommend:    (1) Non-medicinal modalities such as keeping hands warm, avoid activities that case pain, warm water soaks    (2) Oral medications, such as acetaminophen as first line (3000 mg maximum per day) and oral NSAIDs, such as naproxen (Aleve) two tablets of 220 mg twice daily with food, OR ibuprofen (Advil) two tablets of 200 mg three times daily, with food as second line therapy. Naproxen (Aleve) is associated with lower cardiovascular risk than other NSAIDs (Montse FRIAS, Irwin OSUNA. Clinical Pharmacology and Cardiovascular Safety of Naproxen. Am J Cardiovasc Drugs. 2016 Nov 8). NSAIDs should not be used in patients on blood thinners, chronic kidney disease, high risk coronary artery disease, and inflammatory bowel disease (ulcerative colitis or Crohn's disease)    (3) Topical medications, such as Capsaicin (which may cause a burning sensation) or NSAIDs, such as diclofenac gel    The combination of acetaminophen and NSAIDs are safe together. Please do not combine NSAIDs together, such as Aleve, Advil and Mobic (meloxicam)    Please do hand ball squeezing and holding until your hand fatigues and then alternate to the next hand.  Do that 10 times twice daily

## 2018-08-30 NOTE — PROGRESS NOTES
REASON FOR VISIT    This is the initial evaluation for Ms. Violeta Liz a 46 y.o.  female for question of an inflammatory arthritis. The patient is referred to the [facility] at the request of Dr. Stanley Tellez. HISTORY OF PRESENT ILLNESS      I [facility] and [facility] Soledad Lopez office note    In 2002, she developed diffuse pain. Shew as seen by Dr. Governor Mcanlly, rheumatology, who treated her with hydroxychloroquine for leukocytosis, osteoarthritis and positive anti-RNP    In 7/27/2017, MRI Left Shoulder without contrast showed A.C. joint: Moderate osteoarthrosis. Mild capsular distention with trace intra-articular fluid. Anterior acromion process type: 2-3. Bone marrow: Minimal apposing edema at acromioclavicular joint margins. Otherwise, normal. No acute fracture, dislocation, or marrow replacing process. Joint fluid: No substantial joint or bursal effusion. Rotator cuff tendons: Mild diffuse tendinopathy. No full-thickness or substantial partial-thickness tendon tearing. Biceps tendon: Anatomically located. Heterogeneous signal at origin though no rupture. Mild intracapsular tendinopathy. Muscles: No edema or atrophy. Glenoid labrum: Tearing of superior and posterior labrum with tiny intralabral cyst at 11:00. Tearing appears to extend into proximal substance of long head biceps. No labral displacement. Glenohumeral joint capsule: within normal limits. Glenohumeral articular cartilage: No substantial arthrosis. Soft tissue mass: None.     In 2/19/2018, Right Shoulder showed AC joint arthrosis and maybe some mild labral calcifications otherwise normal. Left Shoulder showed AC joint arthrosis otherwise normal    In 8/10/2018, labs showed WBC 13.4, lymphocytes 2.4, Hct 41.0%, platelets 895,161, ESR 2 mm/hr, CRP 5.1 mg/L.     In 8/13/2018, Right Shoulder showed calcific tendinitis of the subscapularis tendon mild AC joint arthrosis glenohumeral joint looks good    In 8/21/2018, Right foot radiograph showed moderate bunion deformity without arthrosis    She is a smoker of 30 years. Today, she complains of diffuse pain and has had arthritis most of her adult life. She used to receive cortisone injections and then had bilateral knee replacement. She has had right shoulder injection for calcific tendinitis. She received shots in her CMCs and has bunions. She had been treated with Cymbalta by Dr. Natividad Bedoya, which helped. She had left shoulder surgery in March for spurs. She has reactions to NSAIDs. She is tired of injections and surgeries. Arthritis BC helps for 2-3 hours but causes upset stomach. NSAIDs make her stomach hurt. Therapy History includes:    Current DMARD therapy includes: none  Prior DMARD therapy includes: hydroxychloroquine  The following DMARDs have been ineffective: none  The following DMARDs were stopped because of side effects: hydroxychloroquine (hives)    REVIEW OF SYSTEMS    A 15 point review of systems was performed and summarized below. The questionnaire was reviewed with the patient and scanned into the patient's medical record.     General: endorses fatigue, weakness, denies recent weight gain, recent weight loss, fever, night sweats  Musculoskeletal: endorses joint pain, joint swelling, morning stiffness (lasting 45 minutes), muscle pain  Ears: denies ringing in ears, loss of hearing, deafness  Eyes: endorses redness, foreign body sensation, denies pain, loss of vision, double vision, blurred vision, dryness  Mouth: denies sore tongue, oral ulcers, bleeding gums, loss of taste, dryness, increased dental caries  Nose: denies nosebleeds, loss of smell, nasal ulcers  Throat: denies frequent sore throats, hoarseness, difficulty in swallowing, pain in jaw while chewing  Neck: denies swollen glands, tender glands  Cardiopulmonary: endorses sudden changes in heart beat, dry cough, denies pain in chest, irregular heart beat, shortness of breath, difficulty breathing at night, productive cough, coughing of blood, wheezing  Gastrointestinal: denies nausea, heartburn, stomach pain relieved by food, vomiting of blood/\"coffee grounds\", jaundice, increasing constipation, persistent diarrhea, blood in stools, black stools  Genitourinary: denies nocturia, difficult urination, pain or burning on urination, blood in urine, cloudy urine, pus in urine, genital discharge, frequent urination, vaginal dryness, rash/ulcers, sexual difficulties  Hematologic: denies anemia, bleeding tendency, blood clots  Skin: denies easy bruising, sun sensitive, rash, redness, hives, skin tightness, nodules/bumps, hair loss, color changes of hands or feet in the cold (Raynaud's)  Neurologic: endorses muscle weakness, numbness or tingling in hands/feet,denies headaches, dizziness,  memory loss  Psychiatric: endorses excessive worries, denies depression, PTSD, Bipolar  Sleep: endorses poor sleep (6 hours), snoring, obstructive sleep apnea not on CPAP, denies daytime somnolence, difficulty falling asleep, difficulty staying asleep     PAST MEDICAL HISTORY    She has a past medical history of Arrhythmia; Arthritis; Autoimmune disease ([facility] [address] Diabetes ([facility] [address] GERD (gastroesophageal reflux disease); Hypertension; Psychiatric disorder; and Sleep apnea. FAMILY HISTORY    Her family history includes Alcohol abuse in her father; Juliaette Noun in her brother, father, and mother; Diabetes in her brother; Heart Disease in her father; Heart Surgery in her father; Hypertension in her brother and father; Psychiatric Disorder in her father. There is no history of Anesth Problems. SOCIAL HISTORY    She reports that she has been smoking. She has a 40.00 pack-year smoking history. She has never used smokeless tobacco. She reports that she does not drink alcohol or use illicit drugs.     GYNECOLOGIC HISTORY     1, Para 1, Living 1, Miscarriage 0    She denies severe pre-eclampsia, eclampsia or placental insufficiency    HEALTH MAINTENANCE    Immunizations  There is no immunization history for the selected administration types on file for this patient. MEDICATIONS    Current Outpatient Prescriptions   Medication Sig Dispense Refill    furosemide (LASIX) 20 mg tablet TAKE 1 TABLET EVERY DAY AS NEEDED 90 Tab 0    losartan (COZAAR) 100 mg tablet TAKE 1 TABLET EVERY DAY 90 Tab 0    fluticasone-salmeterol (ADVAIR) 250-50 mcg/dose diskus inhaler Take 1 Puff by inhalation two (2) times a day. Indications: BRONCHOSPASM PREVENTION WITH COPD 3 Inhaler 1    atorvastatin (LIPITOR) 40 mg tablet TAKE ONE TABLET BY MOUTH AT NIGHT 90 Tab 1    dilTIAZem CD (CARDIZEM CD) 180 mg ER capsule TAKE ONE CAPSULE BY MOUTH EVERY DAY 90 Cap 1    venlafaxine-SR (EFFEXOR-XR) 150 mg capsule TAKE ONE CAPSULE BY MOUTH EVERY DAY 90 Cap 1    rOPINIRole (REQUIP) 2 mg tablet TAKE ONE TABLET BY MOUTH TWO TIMES A  Tab 1    metFORMIN (GLUCOPHAGE) 500 mg tablet TAKE TWO TABLETS BY MOUTH TWO TIMES A DAY WITH MEALS 360 Tab 1    LORazepam (ATIVAN) 0.5 mg tablet Take 1 Tab by mouth two (2) times daily as needed for Anxiety. Max Daily Amount: 1 mg. 180 Tab 0    Blood-Glucose Meter monitoring kit E11.9 Accu-Chek Joyce Plus Meter 1 Kit 0    glucose blood VI test strips (BLOOD GLUCOSE TEST) strip Check Daily E11.9 Accu-Chek Joyce Plus Test Strips 100 Strip 3    gabapentin (NEURONTIN) 600 mg tablet TAKE ONE TABLET BY MOUTH FOUR TIMES A  Tab 0    coenzyme q10 (CO Q-10) 10 mg cap Take  by mouth.  benzonatate (TESSALON) 100 mg capsule Take 1 Cap by mouth three (3) times daily as needed for Cough. 60 Cap 3    ASPIRIN/SALICYLAMIDE/CAFFEINE (BC HEADACHE POWDER PO) Take  by mouth.  Iron-FA-D32-O-Szmqjms Sodium 10-2--50 mg-mg-mcg-mg-mg tab Take  by mouth.  TURMERIC ROOT EXTRACT PO Take 1 Cap by mouth daily.       L GASSERI/B BIFIDUM/B LONGUM (Foodtoeat PO) Take  by mouth daily.      cinnamon bark 500 mg cap Take 1,000 mg by mouth two (2) times a day.  esomeprazole (NEXIUM) 20 mg capsule Take 40 mg by mouth daily.  Blood-Glucose Meter (ACCU-CHEK AJAY PLUS METER) misc Test blood sugar daily. E 11.9 1 Each 0       ALLERGIES    Allergies   Allergen Reactions    Mobic [Meloxicam] Hives    Penicillins Unknown (comments)     DOESN'T REMEMBER    Plaquenil [Hydroxychloroquine] Hives       PHYSICAL EXAMINATION    Visit Vitals    BP (!) 152/96 (BP 1 Location: Left arm, BP Patient Position: Sitting)    Pulse 90    Temp 98.6 °F (37 °C) (Oral)    Resp 18    Ht 5' 3\" (1.6 m)    Wt 197 lb 3.2 oz (89.4 kg)    SpO2 93%    BMI 34.93 kg/m2     Body mass index is 34.93 kg/(m^2). General: Patient is alert, oriented x 3, not in acute distress    HEENT:   Conjunctiva are not injected and appear moist, there is no alopecia, neck is supple, Salivary glands are normal    Cardiovascular:  Heart is regular rate and rhythm, no murmurs. Chest:  Lungs are clear to auscultation bilaterally. Extremities:  Free of clubbing, cyanosis, edema, extremities well perfused. Neurological exam:  No focal sensory deficits, muscle strength is full in upper and lower extremities. Skin exam:  There are no rashes, no tophi, no psoriasis, no active Raynaud's, no livedo reticularis, no periungual erythema. Musculoskeletal exam:  A comprehensive musculoskeletal exam was performed for all joints of each upper and lower extremity and assessed for swelling, tenderness and range of motion. Pertinent results are documented as below:    Bilateral CMC squaring  Right iliotibial band tenderness  Bilateral knee replacement. No synovitis    DATA REVIEW    Prior medical records were reviewed and are summarized as below:    Laboratory data: summarized in the HPI    Imaging: summarized in the HPI. ASSESSMENT AND PLAN    1) Bilateral CMC Osteoarthritis.  The patient has osteoarthritis, which is also known as \"wear and tear arthritis,\" non-inflammatory arthritis or mechanical arthritis. There are hereditary, vocational and posttraumatic joint injuries predisposing factors. I recommend non-pharmacologic modalities such as keeping hands warm, avoid activities that case pain, warm water soaks, in addition to (2) pharmacologic modalities such as acetaminophen as first line, oral and topical NSAIDs as second line. Naproxen is a low GONZALEZ-2 selectivity inhibitor that poses lower cardiovascular risk than other NSAIDs (Angiolillhiwot DJ, Irwin SM. Clinical Pharmacology and Cardiovascular Safety of Naproxen. Am J Cardiovasc Drugs. 2016 Nov 8). NSAIDs should not be used in patients on blood thinners, chronic kidney disease, high risk coronary artery disease, and inflammatory bowel disease (ulcerative colitis or Crohn's disease) I recommended ball squeezing and holding until hand fatigue then alternating to other hand exercises 10 times twice daily. 2) Right Iliotibial Band Syndrome. I referred her to physical therapy. 3) Chronic Left Shoulder Pain. I referred her to physical therapy. 4) Leukocytosis. This is unlikely systemic lupus erythematosus as lupus would cause leukopenia. She had seen oncology previously but has not had a bone marrow biopsy. She will be seeing Dr. Zackery Cha on 9/05. The patient voiced understanding of the aforementioned assessment and plan. Summary of plan was provided in the After Visit Summary patient instructions. I also provided education about MyChart setup and utility.     TODAY'S ORDERS    Orders Placed This Encounter    REFERRAL TO PHYSICAL THERAPY     Future Appointments  Date Time Provider Department Center   7/42/7805 5:72 AM Tika Villanueva MD [facility]   9/5/2018 10:00 AM MD Andressa Payne MD, [address] [facility]    Adult Rheumatology   Rheumatology Ultrasound Certified  [address] [facility] [address], [address] [facility], [address] [facility]   Phone [phone]  Fax [phone]

## 2018-08-31 ENCOUNTER — OFFICE VISIT (OUTPATIENT)
Dept: FAMILY MEDICINE CLINIC | Age: 53
End: 2018-08-31

## 2018-08-31 VITALS
WEIGHT: 201.8 LBS | HEIGHT: 63 IN | DIASTOLIC BLOOD PRESSURE: 80 MMHG | HEART RATE: 96 BPM | TEMPERATURE: 97.9 F | RESPIRATION RATE: 20 BRPM | SYSTOLIC BLOOD PRESSURE: 136 MMHG | BODY MASS INDEX: 35.75 KG/M2 | OXYGEN SATURATION: 97 %

## 2018-08-31 DIAGNOSIS — N39.0 URINARY TRACT INFECTION WITH HEMATURIA, SITE UNSPECIFIED: ICD-10-CM

## 2018-08-31 DIAGNOSIS — G89.4 CHRONIC PAIN SYNDROME: ICD-10-CM

## 2018-08-31 DIAGNOSIS — Z00.00 MEDICARE ANNUAL WELLNESS VISIT, SUBSEQUENT: Primary | ICD-10-CM

## 2018-08-31 DIAGNOSIS — Z12.11 SCREEN FOR COLON CANCER: ICD-10-CM

## 2018-08-31 DIAGNOSIS — R31.9 URINARY TRACT INFECTION WITH HEMATURIA, SITE UNSPECIFIED: ICD-10-CM

## 2018-08-31 LAB
BILIRUB UR QL STRIP: NEGATIVE
GLUCOSE UR-MCNC: NEGATIVE MG/DL
KETONES P FAST UR STRIP-MCNC: NEGATIVE MG/DL
PH UR STRIP: 6.5 [PH] (ref 4.6–8)
PROT UR QL STRIP: NEGATIVE
SP GR UR STRIP: 1 (ref 1–1.03)
UA UROBILINOGEN AMB POC: NORMAL (ref 0.2–1)
URINALYSIS CLARITY POC: CLEAR
URINALYSIS COLOR POC: YELLOW
URINE BLOOD POC: NORMAL
URINE LEUKOCYTES POC: NEGATIVE
URINE NITRITES POC: POSITIVE

## 2018-08-31 RX ORDER — CIPROFLOXACIN 250 MG/1
250 TABLET, FILM COATED ORAL EVERY 12 HOURS
Qty: 14 TAB | Refills: 0 | Status: SHIPPED | OUTPATIENT
Start: 2018-08-31 | End: 2018-09-07

## 2018-08-31 NOTE — MR AVS SNAPSHOT
[address] [place] [address] 
 
 
 [address] [facility] 
[phone] Patient: Margie Clay MRN: [id number] :1965 Visit Information Date & Time Provider Department Dept. Phone Encounter #  
   7:87 AM Edmund Jones Ajith [phone] [id number] Your Appointments 2018 10:00 AM  
New Patient with Rocío Portillo MD  
[address] [place] [address] Oncology at [facility] Appt Note: new pt- Leukocytosis (referred by Dr. Yuri Quesada) [address] [facility] [address] [address] [phone]  
  
   
 [address] [facility] [address] Upcoming Health Maintenance Date Due Pneumococcal 19-64 Highest Risk (1 of 3 - PCV13) 1983 EYE EXAM RETINAL OR DILATED Q1 2018 FOBT Q 1 YEAR AGE 50-75 2018 Influenza Age 5 to Adult 2018 HEMOGLOBIN A1C Q6M 2/10/2019 FOOT EXAM Q1 2019 MICROALBUMIN Q1 2019 BREAST CANCER SCRN MAMMOGRAM 2019 LIPID PANEL Q1 8/10/2019 MEDICARE YEARLY EXAM 2019 PAP AKA CERVICAL CYTOLOGY 2020 DTaP/Tdap/Td series (2 - Td) 2026 Allergies as of 2018  Review Complete On:  By: Adilia Rodriguez MD  
  
 Severity Noted Reaction Type Reactions Mobic [Meloxicam]  2016    Hives Penicillins  2016    Unknown (comments) DOESN'T REMEMBER Plaquenil [Hydroxychloroquine]  2016    Hives Current Immunizations  Reviewed on 2018 No immunizations on file. Reviewed by Adilia Rodriguez MD on 2018 at  9:10 AM  
You Were Diagnosed With   
  
 Codes Comments Medicare annual wellness visit, subsequent    -  Primary ICD-10-CM: Z00.00 ICD-9-CM: V70.0 Urinary tract infection with hematuria, site unspecified     ICD-10-CM: N39.0, R31.9 ICD-9-CM: 599.0 Screen for colon cancer     ICD-10-CM: Z12.11 ICD-9-CM: V76.51   
 Chronic pain syndrome     ICD-10-CM: G89.4 ICD-9-CM: 338. 4 Vitals BP Pulse Temp Resp Height(growth percentile) Weight(growth percentile) 136/80 (BP 1 Location: Left arm, BP Patient Position: Sitting) 96 97.9 °F (36.6 °C) (Oral) 20 5' 3\" (1.6 m) 201 lb 12.8 oz (91.5 kg) SpO2 BMI OB Status Smoking Status 97% 35.75 kg/m2 Hysterectomy Current Every Day Smoker Vitals History BMI and BSA Data Body Mass Index Body Surface Area 35.75 kg/m 2 2.02 m 2 Preferred Pharmacy Pharmacy Name Phone [address] [facility], [address] [place] [address] [phone] Your Updated Medication List  
  
   
This list is accurate as of 8/31/18  9:33 AM.  Always use your most recent med list.  
  
  
  
  
 atorvastatin 40 mg tablet Commonly known as:  LIPITOR  
TAKE ONE TABLET BY MOUTH AT NIGHT  
  
 BC HEADACHE POWDER PO Take  by mouth.  
  
 benzonatate 100 mg capsule Commonly known as:  TESSALON Take 1 Cap by mouth three (3) times daily as needed for Cough. * Blood-Glucose Meter monitoring kit E11.9 Accu-Chek Joyce Plus Meter * Blood-Glucose Meter Misc Commonly known as:  ACCU-CHEK JOYCE PLUS METER Test blood sugar daily. E 11.9  
  
 cinnamon bark 500 mg Cap Take 1,000 mg by mouth two (2) times a day. ciprofloxacin HCl 250 mg tablet Commonly known as:  CIPRO Take 1 Tab by mouth every twelve (12) hours for 7 days. CO Q-10 10 mg Cap Generic drug:  coenzyme q10 Take  by mouth. dilTIAZem  mg ER capsule Commonly known as:  CARDIZEM CD  
TAKE ONE CAPSULE BY MOUTH EVERY DAY  
  
 esomeprazole 20 mg capsule Commonly known as:  Elena Calkin Take 40 mg by mouth daily. fluticasone-salmeterol 250-50 mcg/dose diskus inhaler Commonly known as:  ADVAIR Take 1 Puff by inhalation two (2) times a day. Indications: BRONCHOSPASM PREVENTION WITH COPD  
  
 furosemide 20 mg tablet Commonly known as:  LASIX TAKE 1 TABLET EVERY DAY AS NEEDED  
  
 gabapentin 600 mg tablet Commonly known as:  NEURONTIN  
TAKE ONE TABLET BY MOUTH FOUR TIMES A DAY  
  
 garlic 1 mg Cap Take  by mouth. glucose blood VI test strips strip Commonly known as:  blood glucose test  
Check Daily E11.9 Accu-Chek Joyce Plus Test Strips Iron-FA-G62-K-Ltyxscb Sodium 93-2--50 mg-mg-mcg-mg-mg Tab Take  by mouth. LORazepam 0.5 mg tablet Commonly known as:  ATIVAN Take 1 Tab by mouth two (2) times daily as needed for Anxiety. Max Daily Amount: 1 mg.  
  
 losartan 100 mg tablet Commonly known as:  COZAAR  
TAKE 1 TABLET EVERY DAY  
  
 metFORMIN 500 mg tablet Commonly known as:  GLUCOPHAGE  
TAKE TWO TABLETS BY MOUTH TWO TIMES A DAY WITH MEALS [address] Take  by mouth daily. rOPINIRole 2 mg tablet Commonly known as:  REQUIP  
TAKE ONE TABLET BY MOUTH TWO TIMES A DAY  
  
 TURMERIC ROOT EXTRACT PO Take 1 Cap by mouth daily. venlafaxine- mg capsule Commonly known as:  EFFEXOR-XR  
TAKE ONE CAPSULE BY MOUTH EVERY DAY  
  
 * Notice: This list has 2 medication(s) that are the same as other medications prescribed for you. Read the directions carefully, and ask your doctor or other care provider to review them with you. Prescriptions Sent to Pharmacy Refills  
 ciprofloxacin HCl (CIPRO) 250 mg tablet 0 Sig: Take 1 Tab by mouth every twelve (12) hours for 7 days. Class: Normal  
 Pharmacy: [address] [place] [address] #: [phone] Route: Oral  
  
We Performed the Following AMB POC URINALYSIS DIP STICK AUTO W/O MICRO [31288 CPT(R)] CULTURE, URINE [id number] CPT(R)] OCCULT BLOOD IMMUNOASSAY,DIAGNOSTIC [75946 CPT(R)] Patient Instructions Medicare Wellness Visit, Female The best way to live healthy is to have a lifestyle where you eat a well-balanced diet, exercise regularly, limit alcohol use, and quit all forms of tobacco/nicotine, if applicable. Regular preventive services are another way to keep healthy. Preventive services (vaccines, screening tests, monitoring & exams) can help personalize your care plan, which helps you manage your own care. Screening tests can find health problems at the earliest stages, when they are easiest to treat. [facility] follows the current, evidence-based guidelines published by the [facility] Ralph Remigio ([facility]) when recommending preventive services for our patients. Because we follow these guidelines, sometimes recommendations change over time as research supports it. (For example, mammograms used to be recommended annually. Even though Medicare will still pay for an annual mammogram, the newer guidelines recommend a mammogram every two years for women of average risk.) Of course, you and your doctor may decide to screen more often for some diseases, based on your risk and your health status. Preventive services for you include: - Medicare offers their members a free annual wellness visit, which is time for you and your primary care provider to discuss and plan for your preventive service needs. Take advantage of this benefit every year! 
-All adults over the age of 72 should receive the recommended pneumonia vaccines. Current USPSTF guidelines recommend a series of two vaccines for the best pneumonia protection.  
-All adults should have a flu vaccine yearly and a tetanus vaccine every 10 years. All adults age 61 and older should receive a shingles vaccine once in their lifetime.   
-A bone mass density test is recommended when a woman turns 65 to screen for osteoporosis. This test is only recommended one time, as a screening. Some providers will use this same test as a disease monitoring tool if you already have osteoporosis. -All adults age 38-68 who are overweight should have a diabetes screening test once every three years.  
-Other screening tests and preventive services for persons with diabetes include: an eye exam to screen for diabetic retinopathy, a kidney function test, a foot exam, and stricter control over your cholesterol.  
-Cardiovascular screening for adults with routine risk involves an electrocardiogram (ECG) at intervals determined by your doctor.  
-Colorectal cancer screenings should be done for adults age 54-65 with no increased risk factors for colorectal cancer. There are a number of acceptable methods of screening for this type of cancer. Each test has its own benefits and drawbacks. Discuss with your doctor what is most appropriate for you during your annual wellness visit. The different tests include: colonoscopy (considered the best screening method), a fecal occult blood test, a fecal DNA test, and sigmoidoscopy. -Breast cancer screenings are recommended every other year for women of normal risk, age 54-69. 
-Cervical cancer screenings for women over age 72 are only recommended with certain risk factors.  
-All adults born [facility] should be screened once for Hepatitis C. Here is a list of your current Health Maintenance items (your personalized list of preventive services) with a due date: 
Health Maintenance Due Topic Date Due  Pneumococcal Vaccine (1 of 3 - PCV13) 07[birth year]28/1983 [employer] Eye Exam  02/28/2018  Stool testing for trace blood  05/24/2018  Flu Vaccine  08/01/2018 Introducing [address] Amara Sánchez introduces [employer] patient portal. Now you can access parts of your medical record, email your doctor's office, and request medication refills online. 1. In your internet browser, go to [email or web address] 2. Click on the First Time User? Click Here link in the Sign In box. You will see the New Member Sign Up page. 3. Enter your [employer] Access Code exactly as it appears below. You will not need to use this code after youve completed the sign-up process. If you do not sign up before the expiration date, you must request a new code. · [employer] Access Code: [email or web address] Expires: 9/19/2018 11:08 AM 
 
4. Enter the last four digits of your Social Security Number (xxxx) and Date of Birth (mm/dd/yyyy) as indicated and click Submit. You will be taken to the next sign-up page. 5. Create a Bridjt ID. This will be your [employer] login ID and cannot be changed, so think of one that is secure and easy to remember. 6. Create a [employer] password. You can change your password at any time. 7. Enter your Password Reset Question and Answer. This can be used at a later time if you forget your password. 8. Enter your e-mail address. You will receive e-mail notification when new information is available in [address] 9. Click Sign Up. You can now view and download portions of your medical record. 10. Click the Download Summary menu link to download a portable copy of your medical information. If you have questions, please visit the Frequently Asked Questions section of the [employer] website. Remember, [employer] is NOT to be used for urgent needs. For medical emergencies, dial 911. Now available from your iPhone and Android! Please provide this summary of care documentation to your next provider. Your primary care clinician is listed as Murali Potts. If you have any questions after today's visit, please call [phone].

## 2018-08-31 NOTE — PATIENT INSTRUCTIONS
Medicare Wellness Visit, Female The best way to live healthy is to have a lifestyle where you eat a well-balanced diet, exercise regularly, limit alcohol use, and quit all forms of tobacco/nicotine, if applicable. Regular preventive services are another way to keep healthy. Preventive services (vaccines, screening tests, monitoring & exams) can help personalize your care plan, which helps you manage your own care. Screening tests can find health problems at the earliest stages, when they are easiest to treat. [facility] follows the current, evidence-based guidelines published by the [facility] Remigio ([facility]) when recommending preventive services for our patients. Because we follow these guidelines, sometimes recommendations change over time as research supports it. (For example, mammograms used to be recommended annually. Even though Medicare will still pay for an annual mammogram, the newer guidelines recommend a mammogram every two years for women of average risk.) Of course, you and your doctor may decide to screen more often for some diseases, based on your risk and your health status. Preventive services for you include: - Medicare offers their members a free annual wellness visit, which is time for you and your primary care provider to discuss and plan for your preventive service needs. Take advantage of this benefit every year! 
-All adults over the age of 72 should receive the recommended pneumonia vaccines. Current USPSTF guidelines recommend a series of two vaccines for the best pneumonia protection.  
-All adults should have a flu vaccine yearly and a tetanus vaccine every 10 years. All adults age 61 and older should receive a shingles vaccine once in their lifetime.   
-A bone mass density test is recommended when a woman turns 65 to screen for osteoporosis. This test is only recommended one time, as a screening. Some providers will use this same test as a disease monitoring tool if you already have osteoporosis. -All adults age 38-68 who are overweight should have a diabetes screening test once every three years.  
-Other screening tests and preventive services for persons with diabetes include: an eye exam to screen for diabetic retinopathy, a kidney function test, a foot exam, and stricter control over your cholesterol.  
-Cardiovascular screening for adults with routine risk involves an electrocardiogram (ECG) at intervals determined by your doctor.  
-Colorectal cancer screenings should be done for adults age 54-65 with no increased risk factors for colorectal cancer. There are a number of acceptable methods of screening for this type of cancer. Each test has its own benefits and drawbacks. Discuss with your doctor what is most appropriate for you during your annual wellness visit. The different tests include: colonoscopy (considered the best screening method), a fecal occult blood test, a fecal DNA test, and sigmoidoscopy. -Breast cancer screenings are recommended every other year for women of normal risk, age 54-69. 
-Cervical cancer screenings for women over age 72 are only recommended with certain risk factors.  
-All adults born between [facility] should be screened once for Hepatitis C. Here is a list of your current Health Maintenance items (your personalized list of preventive services) with a due date: 
Health Maintenance Due Topic Date Due  Pneumococcal Vaccine (1 of 3 - PCV13) 07/28/1984 [address] [place] [address] Eye Exam  02/28/2018  Stool testing for trace blood  05/24/2018  Flu Vaccine  08/01/2018

## 2018-08-31 NOTE — PROGRESS NOTES
Identified pt with two pt identifiers(name and ). Chief Complaint Patient presents with  Annual Wellness Visit  
  she knows there will be a seperate fees  Bladder Infection 3 or 4 days  Diarrhea  
  wed - she ate beef that night  Vomiting  
  wed Health Maintenance Due Topic  Pneumococcal 19-64 Highest Risk (1 of 3 - PCV13)  EYE EXAM RETINAL OR DILATED Q1   
 MEDICARE YEARLY EXAM   
 FOBT Q 1 YEAR AGE 50-75  Influenza Age 5 to Adult Had it done - waiting for paperwork Wt Readings from Last 3 Encounters:  
18 201 lb 12.8 oz (91.5 kg) 18 197 lb 3.2 oz (89.4 kg) 08/10/18 200 lb 12.8 oz (91.1 kg) Temp Readings from Last 3 Encounters:  
18 97.9 °F (36.6 °C) (Oral) 18 98.6 °F (37 °C) (Oral) 08/10/18 98.1 °F (36.7 °C) (Oral) BP Readings from Last 3 Encounters:  
18 136/80  
18 (!) 152/96  
08/10/18 136/88 Pulse Readings from Last 3 Encounters:  
18 96  
18 90  
08/10/18 98 Learning Assessment: 
:  
 
Learning Assessment 2016 PRIMARY LEARNER Patient Patient HIGHEST LEVEL OF EDUCATION - PRIMARY LEARNER  - SOME COLLEGE  
BARRIERS PRIMARY LEARNER - NONE  
CO-LEARNER CAREGIVER - No  
PRIMARY LANGUAGE ENGLISH ENGLISH  
LEARNER PREFERENCE PRIMARY - DEMONSTRATION  
ANSWERED BY patient self RELATIONSHIP SELF SELF Depression Screening: 
:  
 
PHQ over the last two weeks 2018 Little interest or pleasure in doing things Not at all Feeling down, depressed, irritable, or hopeless Several days Total Score PHQ 2 1 Fall Risk Assessment: 
:  
 
Fall Risk Assessment, last 12 mths 2018 Able to walk? Yes Fall in past 12 months? No  
 
 
Abuse Screening: 
:  
 
Abuse Screening Questionnaire 2018 Do you ever feel afraid of your partner? Drinda Buerger Are you in a relationship with someone who physically or mentally threatens you?  Drinda Buerger  
 Is it safe for you to go home? Mario Crisostomo Coordination of Care Questionnaire: 
:  
 
1) Have you been to an emergency room, urgent care clinic since your last visit? no  
Hospitalized since your last visit? no          
 
2) Have you seen or consulted any other health care providers outside of [address] [place] [address] since your last visit? yes  Dr Jake Stack (Include any pap smears or colon screenings in this section.) 3) Do you have an Advance Directive on file? yes Are you interested in receiving information about Advance Directives? no 
 
Reviewed record in preparation for visit and have obtained necessary documentation. Medication reconciliation up to date and corrected with patient at this time. Results for orders placed or performed in visit on 08/31/18 AMB POC URINALYSIS DIP STICK AUTO W/O MICRO Result Value Ref Range Color (UA POC) Yellow Clarity (UA POC) Clear Glucose (UA POC) Negative Negative Bilirubin (UA POC) Negative Negative Ketones (UA POC) Negative Negative Specific gravity (UA POC) 1.005 1.001 - 1.035 Blood (UA POC) 1+ Negative pH (UA POC) 6.5 4.6 - 8.0 Protein (UA POC) Negative Negative Urobilinogen (UA POC) 0.2 mg/dL 0.2 - 1 Nitrites (UA POC) Positive Negative Leukocyte esterase (UA POC) Negative Negative

## 2018-08-31 NOTE — PROGRESS NOTES
This is the Subsequent Medicare Annual Wellness Exam, performed 12 months or more after the Initial AWV or the last Subsequent AWV I have reviewed the patient's medical history in detail and updated the computerized patient record. History Past Medical History:  
Diagnosis Date  Arrhythmia TACHYCARDIA  Arthritis  Autoimmune disease ([facility] LUPUS  
 Diabetes ([facility]  GERD (gastroesophageal reflux disease)  Hypertension  Psychiatric disorder PANIC ATTACKS  Sleep apnea Past Surgical History:  
Procedure Laterality Date  CARDIAC SURG PROCEDURE UNLIST  2013 ABLATION  
 HX BREAST BIOPSY Bilateral   
 benign [address] SEPTUM SCRAPPED  HX HYSTERECTOMY  2008  HX KNEE ARTHROSCOPY Bilateral 2012  HX KNEE ARTHROSCOPY Left 2012  HX KNEE ARTHROSCOPY Right 2014  HX KNEE REPLACEMENT Left 2012  HX KNEE REPLACEMENT Right 08/18/2016 [address] Current Outpatient Prescriptions Medication Sig Dispense Refill  garlic 1 mg cap Take  by mouth.  Blood-Glucose Meter (ACCU-CHEK AJAY PLUS METER) misc Test blood sugar daily. E 11.9 1 Each 0  
 furosemide (LASIX) 20 mg tablet TAKE 1 TABLET EVERY DAY AS NEEDED 90 Tab 0  
 losartan (COZAAR) 100 mg tablet TAKE 1 TABLET EVERY DAY 90 Tab 0  
 atorvastatin (LIPITOR) 40 mg tablet TAKE ONE TABLET BY MOUTH AT NIGHT 90 Tab 1  
 dilTIAZem CD (CARDIZEM CD) 180 mg ER capsule TAKE ONE CAPSULE BY MOUTH EVERY DAY 90 Cap 1  venlafaxine-SR (EFFEXOR-XR) 150 mg capsule TAKE ONE CAPSULE BY MOUTH EVERY DAY 90 Cap 1  
 rOPINIRole (REQUIP) 2 mg tablet TAKE ONE TABLET BY MOUTH TWO TIMES A  Tab 1  
 metFORMIN (GLUCOPHAGE) 500 mg tablet TAKE TWO TABLETS BY MOUTH TWO TIMES A DAY WITH MEALS 360 Tab 1  Blood-Glucose Meter monitoring kit E11.9 Accu-Chek Ajay Plus Meter 1 Kit 0  
 glucose blood VI test strips (BLOOD GLUCOSE TEST) strip Check Daily E11.9 Accu-Chek Joyce Plus Test Strips 100 Strip 3  
 gabapentin (NEURONTIN) 600 mg tablet TAKE ONE TABLET BY MOUTH FOUR TIMES A  Tab 0  
 coenzyme q10 (CO Q-10) 10 mg cap Take  by mouth.  benzonatate (TESSALON) 100 mg capsule Take 1 Cap by mouth three (3) times daily as needed for Cough. 60 Cap 3  
 Iron-FA-B68-L-Goxmygb Sodium 38-0--50 mg-mg-mcg-mg-mg tab Take  by mouth.  TURMERIC ROOT EXTRACT PO Take 1 Cap by mouth daily.  L GASSERI/B BIFIDUM/B LONGUM ([facility] PO) Take  by mouth daily.  cinnamon bark 500 mg cap Take 1,000 mg by mouth two (2) times a day.  esomeprazole (NEXIUM) 20 mg capsule Take 40 mg by mouth daily.  fluticasone-salmeterol (ADVAIR) 250-50 mcg/dose diskus inhaler Take 1 Puff by inhalation two (2) times a day. Indications: BRONCHOSPASM PREVENTION WITH COPD 3 Inhaler 1  
 LORazepam (ATIVAN) 0.5 mg tablet Take 1 Tab by mouth two (2) times daily as needed for Anxiety. Max Daily Amount: 1 mg. 180 Tab 0  ASPIRIN/SALICYLAMIDE/CAFFEINE (BC HEADACHE POWDER PO) Take  by mouth. Allergies Allergen Reactions  Mobic [Meloxicam] Hives  Penicillins Unknown (comments) DOESN'T REMEMBER  
 Plaquenil [Hydroxychloroquine] Hives Family History Problem Relation Age of Onset Will Miners Arthritis-osteo Mother  Psychiatric Disorder Father  Arthritis-osteo Father  Alcohol abuse Father  Heart Disease Father  Heart Surgery Father  Hypertension Father  Diabetes Brother  Arthritis-osteo Brother  Hypertension Brother  Anesth Problems Neg Hx Social History Substance Use Topics  Smoking status: Current Every Day Smoker Packs/day: 1.00 Years: 40.00  Smokeless tobacco: Never Used  Alcohol use No  
 
Patient Active Problem List  
Diagnosis Code  Primary osteoarthritis of right knee M17.11  
 Essential hypertension with goal blood pressure less than 140/90 I10  
  Chronic obstructive pulmonary disease ([facility] J44.9  Diabetes mellitus type 2, controlled ([facility] E11.9  
 Polyneuropathy associated with underlying disease ([facility] G63  
 RLS (restless legs syndrome) G25.81  
 Paroxysmal tachycardia ([facility]) I47.9  Arthritis M19.90  Elevated WBC count D72.829  
 Cigarette nicotine dependence without complication L37.516  Dysthymia F34.1  YOLANDA (obstructive sleep apnea) G47.33  
 Status post bilateral knee replacements B04.623  Type 2 diabetes mellitus with diabetic neuropathy ([facility]) E11.40  Severe obesity (BMI 35.0-39.9) ([facility]) E66.01  
 Iliotibial band tendinitis of right side M76.31 Depression Risk Factor Screening: PHQ over the last two weeks 2/14/2018 Little interest or pleasure in doing things Not at all Feeling down, depressed, irritable, or hopeless Several days Total Score PHQ 2 1 Alcohol Risk Factor Screening: You do not drink alcohol or very rarely. Functional Ability and Level of Safety:  
Hearing Loss Hearing is good. Activities of Daily Living The home contains: no safety equipment. Patient does total self care Fall Risk Fall Risk Assessment, last 12 mths 2/14/2018 Able to walk? Yes Fall in past 12 months? No  
 
 
Abuse Screen Patient is not abused Cognitive Screening Evaluation of Cognitive Function: 
Has your family/caregiver stated any concerns about your memory: no 
Normal 
 
Patient Care Team  
Patient Care Team: 
Israel Le MD as PCP - [facility]) Magaly Montana MD as Physician (Sleep Medicine) Assessment/Plan Education and counseling provided: 
Are appropriate based on today's review and evaluation End-of-Life planning (with patient's consent) Influenza Vaccine Screening Mammography Colorectal cancer screening tests Cardiovascular screening blood test 
Screening for glaucoma Diabetes screening test 
 
Diagnoses and all orders for this visit: 
 
 1. Urinary tract infection with hematuria, site unspecified -     AMB POC URINALYSIS DIP STICK AUTO W/O MICRO 2. Medicare annual wellness visit, subsequent 3. Screen for colon cancer -     OCCULT BLOOD, IMMUNOASSAY (FIT) ([id number]) Health Maintenance Due Topic Date Due  Pneumococcal 19-64 Highest Risk (1 of 3 - PCV13) 07/28/1984  
 EYE EXAM RETINAL OR DILATED Q1  02/28/2018  FOBT Q 1 YEAR AGE 50-75  05/24/2018  Influenza Age 5 to Adult  08/01/2018 Subjective:  
 
Eric La is a 46 y.o. female who complains of dysuria, frequency, urgency for a few days. Patient denies flank pain, vomiting, fever, unusual vaginal discharge. Patient does not have a history of recurrent UTI. Patient does not have a history of pyelonephritis. We also discussed visit with RHEUM yesterday. Referred to PT. She plans to go to [facility] in [place] [address] Disappointed not able to get help for chronic pain. She has tried to find provider for Pain Clinic but not able to find one in network with [address]. Patient Active Problem List  
 Diagnosis Date Noted  Iliotibial band tendinitis of right side 08/30/2018  Severe obesity (BMI 35.0-39.9) ([address] 06/20/2018  Type 2 diabetes mellitus with diabetic neuropathy ([facility] 02/14/2018  Status post bilateral knee replacements 11/02/2017  Cigarette nicotine dependence without complication 04/36/8149  Dysthymia 12/30/2016  YOLANDA (obstructive sleep apnea) 12/30/2016  Paroxysmal tachycardia ([address] 09/09/2016  Arthritis 09/09/2016  Elevated WBC count 09/09/2016  Essential hypertension with goal blood pressure less than 140/90 09/07/2016  Chronic obstructive pulmonary disease ([address] 09/07/2016  Diabetes mellitus type 2, controlled ([address] 09/07/2016  Polyneuropathy associated with underlying disease ([address] 09/07/2016  RLS (restless legs syndrome) 09/07/2016  Primary osteoarthritis of right knee 08/18/2016 Allergies Allergen Reactions  Mobic [Meloxicam] Hives  Penicillins Unknown (comments) DOESN'T REMEMBER  
 Plaquenil [Hydroxychloroquine] Hives Review of Systems Musculoskeletal:positive for arthralgias Objective:  
 
Visit Vitals  /80 (BP 1 Location: Left arm, BP Patient Position: Sitting) Comment: manual  
 Pulse 96  Temp 97.9 °F (36.6 °C) (Oral)  Resp 20  
 Ht 5' 3\" (1.6 m)  Wt 201 lb 12.8 oz (91.5 kg)  SpO2 97%  BMI 35.75 kg/m2 General:  alert, cooperative, no distress, moderately obese Abdomen: soft, nontender, nondistended, no masses or organomegaly. Back:  CVA tenderness absent :  defer exam  
 
Laboratory:  
Urine dipstick shows positive for RBC's and positive for nitrates. Micro exam: not done. Assessment/Plan: UTI 1. ciprofloxacin 2. Maintain adequate hydration 3. May use OTC pyridium as desired, which will turn urine orange/red color 4. Follow up if symptoms not improving, and prn. ICD-10-CM ICD-9-CM 1. Medicare annual wellness visit, subsequent Z00.00 V70.0 2. Urinary tract infection with hematuria, site unspecified N39.0 599.0 AMB POC URINALYSIS DIP STICK AUTO W/O MICRO  
 R31.9  CULTURE, URINE  
   ciprofloxacin HCl (CIPRO) 250 mg tablet 3. Screen for colon cancer Z12.11 V76.51 OCCULT BLOOD IMMUNOASSAY,DIAGNOSTIC 4. Chronic pain syndrome G89.4 338.4 Ellis Reasoner Treat with Cipro. She will consider switching off gabapentin to try Lyrica to see if better pain control. She will cont to try to find in network Pain Clinician.

## 2018-09-02 LAB
BACTERIA UR CULT: NORMAL
BACTERIA UR CULT: NORMAL

## 2018-09-05 ENCOUNTER — OFFICE VISIT (OUTPATIENT)
Dept: ONCOLOGY | Age: 53
End: 2018-09-05

## 2018-09-05 VITALS
HEIGHT: 63 IN | BODY MASS INDEX: 35.83 KG/M2 | WEIGHT: 202.2 LBS | OXYGEN SATURATION: 93 % | RESPIRATION RATE: 20 BRPM | SYSTOLIC BLOOD PRESSURE: 132 MMHG | HEART RATE: 83 BPM | DIASTOLIC BLOOD PRESSURE: 85 MMHG | TEMPERATURE: 98.7 F

## 2018-09-05 DIAGNOSIS — D72.829 LEUKOCYTOSIS, UNSPECIFIED TYPE: ICD-10-CM

## 2018-09-05 DIAGNOSIS — R79.9 ABNORMAL FINDING OF BLOOD CHEMISTRY: ICD-10-CM

## 2018-09-05 DIAGNOSIS — F17.210 CIGARETTE NICOTINE DEPENDENCE WITHOUT COMPLICATION: Primary | ICD-10-CM

## 2018-09-05 NOTE — PROGRESS NOTES
[facility] at [address], [address], [address] [place] [address]  W: [phone]  F: [phone]    Reason for Visit:   Onel Sosa is a 46 y.o. female who is seen in consultation at the request of Dr. Jose De Jesus Roth for evaluation of leukocytosis. History of Present Illness:   Ms. Piyush Williamson is a 46 y.o. female with a history of arthritis, diabetes, gerd, and htn who presents today for evaluation for leukocytosis. Presents today for elevated WBC count. She says she has had an elevated WBC count since she was 44years old. Previously saw a hematologist at [address] [place] [address] who was thought leukocytosis was due to her osteoarthritis. Most recent WBC count is 13.4 and neutrophils are 10. However, upon chart review which dates back to 2014 her WBC have remained consistently elevated with the highest being 17.4 K. Today she denies fevers/chills. Having intermittent sweats but believes this to be due to fluctuating blood sugars. Also reports poor appetite for the last 3-4 months. Denies changes in weight. Denies history of blood clots and denies history of blood in stool or urine. Smokes 1 PPD since she was 14years old.      Next mammogram scheudled 9/24/18  Has occult blood test at home from PCP she will send in.     FH: grandparents both with cancer, unsure type; brother has had blood clots    Past Medical History:   Diagnosis Date    Arrhythmia     TACHYCARDIA    Arthritis     Autoimmune disease ([address]     LUPUS    Diabetes ([address]     GERD (gastroesophageal reflux disease)     Hypertension     Psychiatric disorder     PANIC ATTACKS    Sleep apnea       Past Surgical History:   Procedure Laterality Date    CARDIAC SURG PROCEDURE UNLIST  2013    ABLATION    HX BREAST BIOPSY Bilateral     benign    HX CHOLECYSTECTOMY  1998    HX HEENT  1993    SEPTUM SCRAPPED    HX HYSTERECTOMY  2008    HX KNEE ARTHROSCOPY Bilateral 2012    HX KNEE ARTHROSCOPY Left 2012    HX KNEE ARTHROSCOPY Right 2014    HX KNEE REPLACEMENT Left 2012    HX KNEE REPLACEMENT Right 08/18/2016    HX TUBAL LIGATION  1989      Social History   Substance Use Topics    Smoking status: Current Every Day Smoker     Packs/day: 1.00     Years: 40.00    Smokeless tobacco: Never Used    Alcohol use No      Family History   Problem Relation Age of Onset    Arthritis-osteo Mother     Psychiatric Disorder Father     Arthritis-osteo Father     Alcohol abuse Father     Heart Disease Father     Heart Surgery Father     Hypertension Father     Diabetes Brother     Arthritis-osteo Brother     Hypertension Brother     Anesth Problems Neg Hx      Current Outpatient Prescriptions   Medication Sig    Aspirin-Caffeine (BC ARTHRITIS) 1,000-65 mg pwpk Take  by mouth.  garlic 1 mg cap Take  by mouth.  ciprofloxacin HCl (CIPRO) 250 mg tablet Take 1 Tab by mouth every twelve (12) hours for 7 days.  Blood-Glucose Meter (ACCU-CHEK AJAY PLUS METER) misc Test blood sugar daily. E 11.9    furosemide (LASIX) 20 mg tablet TAKE 1 TABLET EVERY DAY AS NEEDED    losartan (COZAAR) 100 mg tablet TAKE 1 TABLET EVERY DAY    fluticasone-salmeterol (ADVAIR) 250-50 mcg/dose diskus inhaler Take 1 Puff by inhalation two (2) times a day. Indications: BRONCHOSPASM PREVENTION WITH COPD    atorvastatin (LIPITOR) 40 mg tablet TAKE ONE TABLET BY MOUTH AT NIGHT    dilTIAZem CD (CARDIZEM CD) 180 mg ER capsule TAKE ONE CAPSULE BY MOUTH EVERY DAY    venlafaxine-SR (EFFEXOR-XR) 150 mg capsule TAKE ONE CAPSULE BY MOUTH EVERY DAY    rOPINIRole (REQUIP) 2 mg tablet TAKE ONE TABLET BY MOUTH TWO TIMES A DAY    metFORMIN (GLUCOPHAGE) 500 mg tablet TAKE TWO TABLETS BY MOUTH TWO TIMES A DAY WITH MEALS    LORazepam (ATIVAN) 0.5 mg tablet Take 1 Tab by mouth two (2) times daily as needed for Anxiety. Max Daily Amount: 1 mg.     Blood-Glucose Meter monitoring kit E11.9 Accu-Chek Ajay Plus Meter    glucose blood VI test strips (BLOOD GLUCOSE TEST) strip Check Daily E11.9 Accu-Chek Joyce Plus Test Strips    gabapentin (NEURONTIN) 600 mg tablet TAKE ONE TABLET BY MOUTH FOUR TIMES A DAY    benzonatate (TESSALON) 100 mg capsule Take 1 Cap by mouth three (3) times daily as needed for Cough.  TURMERIC ROOT EXTRACT PO Take 1 Cap by mouth daily.  L GASSERI/B BIFIDUM/B LONGUM ([employer] HEALTH PO) Take  by mouth daily.  cinnamon bark 500 mg cap Take 1,000 mg by mouth two (2) times a day.  esomeprazole (NEXIUM) 20 mg capsule Take 40 mg by mouth daily.  coenzyme q10 (CO Q-10) 10 mg cap Take  by mouth.  ASPIRIN/SALICYLAMIDE/CAFFEINE (BC HEADACHE POWDER PO) Take  by mouth.  Iron-FA-P69-M-Madozvn Sodium 55-2--50 mg-mg-mcg-mg-mg tab Take  by mouth. No current facility-administered medications for this visit. Allergies   Allergen Reactions    Mobic [Meloxicam] Hives    Penicillins Unknown (comments)     DOESN'T REMEMBER    Plaquenil [Hydroxychloroquine] Hives        Review of Systems: A complete review of systems was obtained, negative except as described above. Physical Exam:     Visit Vitals    /85    Pulse 83    Temp 98.7 °F (37.1 °C)    Resp 20    Ht 5' 3\" (1.6 m)    Wt 202 lb 3.2 oz (91.7 kg)    SpO2 93%    BMI 35.82 kg/m2     General: No distress  Eyes: PERRLA, anicteric sclerae  HENT: Atraumatic, OP clear  Neck: Supple  Lymphatic: No cervical, supraclavicular, or inguinal adenopathy  Respiratory: CTAB, normal respiratory effort  CV: Normal rate, regular rhythm, no murmurs, no peripheral edema  GI: Soft, nontender, nondistended, no masses, no hepatomegaly, no splenomegaly  MS: Normal gait and station. Digits without clubbing or cyanosis. Skin: No rashes, ecchymoses, or petechiae. Normal temperature, turgor, and texture.   Psych: Alert, oriented, appropriate affect, normal judgment/insight    Results:     Lab Results   Component Value Date/Time    WBC 13.4 (H) 08/10/2018 10:53 AM    HGB 13.5 08/10/2018 10:53 AM    HCT 41.0 08/10/2018 10:53 AM    PLATELET 691 92/98/1737 10:53 AM    MCV 84 08/10/2018 10:53 AM    ABS. NEUTROPHILS 10.0 (H) 08/10/2018 10:53 AM     Lab Results   Component Value Date/Time    Sodium 145 (H) 06/20/2018 02:22 PM    Potassium 4.8 06/20/2018 02:22 PM    Chloride 103 06/20/2018 02:22 PM    CO2 20 06/20/2018 02:22 PM    Glucose 99 06/20/2018 02:22 PM    BUN 12 06/20/2018 02:22 PM    Creatinine 0.73 06/20/2018 02:22 PM    GFR est  06/20/2018 02:22 PM    GFR est non-AA 94 06/20/2018 02:22 PM    Calcium 10.4 (H) 06/20/2018 02:22 PM    Glucose (POC) 106 (H) 08/22/2016 11:20 AM     Lab Results   Component Value Date/Time    Bilirubin, total <0.2 06/20/2018 02:22 PM    ALT (SGPT) 24 06/20/2018 02:22 PM    AST (SGOT) 23 06/20/2018 02:22 PM    Alk. phosphatase 167 (H) 06/20/2018 02:22 PM    Protein, total 7.1 06/20/2018 02:22 PM    Albumin 4.6 06/20/2018 02:22 PM    Globulin 3.0 01/28/2015 09:50 AM       Records reviewed and summarized above. Pathology report(s) reviewed above. Radiology report(s) reviewed above. Assessment:   1) Leukocytosis   Our records show WBC range from 12-17K dating back to 2014  Hgb normal at 13.5 mg/dL, PLT normal at 281K  Likely causes include reactive such as arthritis, smoking, undiagnosed cancer etc.  Less likely to represent a bone marrow disorder such as myeloproliferative syndromes given the relative stability of counts and no change in hemoglobin and platelet counts.     The most likely etiology of her neutrophilic leukocytosis is smoking  She is up-to-date with age-appropriate cancer screening    However she has a long history of smoking and hence should be considered for a low-dose CT scan for lung cancer screening-this however is unlikely to be approved by her insurance until after she is 54    2) Extensive smoking history  1 PPD for 13 years   Likely cause of neutrophilia and leukocytosis      3) Arthritis   Follows with rheumatology and goes to physical therapy    Plan:     · US of abdomen to evaluate hepatosplenomegaly   · CBC, peripheral smear, iron profile, ferritin, erythropoietin   · Consider low dose CT of chest when patient turns 54 as insurance will not cover it now    Follow-up in 3 weeks    I appreciate the opportunity to participate in Ms. Tawny Ferrell's care.     Signed By: Miranda Corrales MD

## 2018-09-06 LAB
BASOPHILS # BLD AUTO: 0.1 X10E3/UL (ref 0–0.2)
BASOPHILS NFR BLD AUTO: 1 %
EOSINOPHIL # BLD AUTO: 0.4 X10E3/UL (ref 0–0.4)
EOSINOPHIL NFR BLD AUTO: 4 %
EPO SERPL-ACNC: 16.7 MIU/ML (ref 2.6–18.5)
ERYTHROCYTE [DISTWIDTH] IN BLOOD BY AUTOMATED COUNT: 14.8 % (ref 12.3–15.4)
FERRITIN SERPL-MCNC: 17 NG/ML (ref 15–150)
HCT VFR BLD AUTO: 40.7 % (ref 34–46.6)
HGB BLD-MCNC: 13.1 G/DL (ref 11.1–15.9)
IMM GRANULOCYTES # BLD: 0 X10E3/UL (ref 0–0.1)
IMM GRANULOCYTES NFR BLD: 0 %
IRON SATN MFR SERPL: 14 % (ref 15–55)
IRON SERPL-MCNC: 59 UG/DL (ref 27–159)
LYMPHOCYTES # BLD AUTO: 2.5 X10E3/UL (ref 0.7–3.1)
LYMPHOCYTES NFR BLD AUTO: 21 %
MCH RBC QN AUTO: 27.6 PG (ref 26.6–33)
MCHC RBC AUTO-ENTMCNC: 32.2 G/DL (ref 31.5–35.7)
MCV RBC AUTO: 86 FL (ref 79–97)
MONOCYTES # BLD AUTO: 0.3 X10E3/UL (ref 0.1–0.9)
MONOCYTES NFR BLD AUTO: 3 %
NEUTROPHILS # BLD AUTO: 8.3 X10E3/UL (ref 1.4–7)
NEUTROPHILS NFR BLD AUTO: 71 %
PLATELET # BLD AUTO: 257 X10E3/UL (ref 150–379)
RBC # BLD AUTO: 4.74 X10E6/UL (ref 3.77–5.28)
TIBC SERPL-MCNC: 431 UG/DL (ref 250–450)
UIBC SERPL-MCNC: 372 UG/DL (ref 131–425)
WBC # BLD AUTO: 11.6 X10E3/UL (ref 3.4–10.8)

## 2018-09-06 RX ORDER — FLUCONAZOLE 150 MG/1
TABLET ORAL
Qty: 1 TAB | Refills: 0 | Status: SHIPPED | OUTPATIENT
Start: 2018-09-06 | End: 2018-09-26 | Stop reason: ALTCHOICE

## 2018-09-10 LAB
BASOPHILS # BLD AUTO: 0.1 X10E3/UL (ref 0–0.2)
BASOPHILS NFR BLD AUTO: 1 %
EOSINOPHIL # BLD AUTO: 0.5 X10E3/UL (ref 0–0.4)
EOSINOPHIL NFR BLD AUTO: 4 %
ERYTHROCYTE [DISTWIDTH] IN BLOOD BY AUTOMATED COUNT: 14.8 % (ref 12.3–15.4)
HCT VFR BLD AUTO: 40.3 % (ref 34–46.6)
HGB BLD-MCNC: 12.8 G/DL (ref 11.1–15.9)
IMM GRANULOCYTES # BLD: 0 X10E3/UL (ref 0–0.1)
IMM GRANULOCYTES NFR BLD: 0 %
LYMPHOCYTES # BLD AUTO: 2.7 X10E3/UL (ref 0.7–3.1)
LYMPHOCYTES NFR BLD AUTO: 22 %
MCH RBC QN AUTO: 27.5 PG (ref 26.6–33)
MCHC RBC AUTO-ENTMCNC: 31.8 G/DL (ref 31.5–35.7)
MCV RBC AUTO: 87 FL (ref 79–97)
MONOCYTES # BLD AUTO: 0.4 X10E3/UL (ref 0.1–0.9)
MONOCYTES NFR BLD AUTO: 3 %
NEUTROPHILS # BLD AUTO: 8.7 X10E3/UL (ref 1.4–7)
NEUTROPHILS NFR BLD AUTO: 70 %
PATH REV BLD -IMP: ABNORMAL
PATHOLOGIST NAME: ABNORMAL
PLATELET # BLD AUTO: 248 X10E3/UL (ref 150–379)
RBC # BLD AUTO: 4.66 X10E6/UL (ref 3.77–5.28)
WBC # BLD AUTO: 12.5 X10E3/UL (ref 3.4–10.8)

## 2018-09-11 LAB — HEMOCCULT STL QL IA: POSITIVE

## 2018-09-12 ENCOUNTER — TELEPHONE (OUTPATIENT)
Dept: FAMILY MEDICINE CLINIC | Age: 53
End: 2018-09-12

## 2018-09-12 DIAGNOSIS — R19.5 FECAL OCCULT BLOOD TEST POSITIVE: Primary | ICD-10-CM

## 2018-09-12 NOTE — TELEPHONE ENCOUNTER
Call pt. Positive stool occult test. Need to refer for colonoscopy. Please give her # for Dr. Roxi Pham.

## 2018-09-13 ENCOUNTER — TELEPHONE (OUTPATIENT)
Dept: FAMILY MEDICINE CLINIC | Age: 53
End: 2018-09-13

## 2018-09-14 NOTE — TELEPHONE ENCOUNTER
Pt called back and front office read message from doctor.  Pt has number to dr Delia Merrill and will call to schedule appt

## 2018-09-21 ENCOUNTER — HOSPITAL ENCOUNTER (OUTPATIENT)
Dept: ULTRASOUND IMAGING | Age: 53
Discharge: HOME | End: 2018-09-21
Attending: INTERNAL MEDICINE
Payer: MEDICARE

## 2018-09-21 DIAGNOSIS — D72.829 LEUKOCYTOSIS, UNSPECIFIED TYPE: ICD-10-CM

## 2018-09-21 DIAGNOSIS — F17.210 CIGARETTE NICOTINE DEPENDENCE WITHOUT COMPLICATION: ICD-10-CM

## 2018-09-21 PROCEDURE — 76700 US EXAM ABDOM COMPLETE: CPT

## 2018-09-26 ENCOUNTER — OFFICE VISIT (OUTPATIENT)
Dept: ONCOLOGY | Age: 53
End: 2018-09-26

## 2018-09-26 VITALS
RESPIRATION RATE: 20 BRPM | HEIGHT: 63 IN | BODY MASS INDEX: 35.51 KG/M2 | HEART RATE: 98 BPM | TEMPERATURE: 98.6 F | SYSTOLIC BLOOD PRESSURE: 133 MMHG | DIASTOLIC BLOOD PRESSURE: 87 MMHG | OXYGEN SATURATION: 94 % | WEIGHT: 200.4 LBS

## 2018-09-26 DIAGNOSIS — D72.829 LEUKOCYTOSIS, UNSPECIFIED TYPE: Primary | ICD-10-CM

## 2018-09-26 DIAGNOSIS — R92.8 ABNORMAL MAMMOGRAM OF LEFT BREAST: ICD-10-CM

## 2018-09-26 DIAGNOSIS — R79.9 ABNORMAL FINDING OF BLOOD CHEMISTRY: ICD-10-CM

## 2018-09-26 DIAGNOSIS — G47.33 OSA (OBSTRUCTIVE SLEEP APNEA): ICD-10-CM

## 2018-09-26 NOTE — MR AVS SNAPSHOT
[address] [address] [address] 
[phone] Patient: Yazmin Dacosta MRN: [id number] Visit Information Date & Time Provider Department Dept. Phone Encounter #  
 2018 11:00 AM Jerica Fraga MD [facility] at [address] [id number] Upcoming Health Maintenance Date Due Pneumococcal 19-64 Highest Risk (1 of 3 - PCV13) 1983 Shingrix Vaccine Age 50> (1 of 2) 2014 Influenza Age 5 to Adult 2018 HEMOGLOBIN A1C Q6M 2/10/2019 FOOT EXAM Q1 2019 MICROALBUMIN Q1 2019 EYE EXAM RETINAL OR DILATED Q1 3/2/2019 BREAST CANCER SCRN MAMMOGRAM 2019 LIPID PANEL Q1 8/10/2019 FOBT Q 1 YEAR AGE 50-75 2019 MEDICARE YEARLY EXAM 2019 PAP AKA CERVICAL CYTOLOGY 2020 DTaP/Tdap/Td series (2 - Td) 2026 Allergies as of 2018  Review Complete On: 2018 By: Zohreh Mason Severity Noted Reaction Type Reactions Mobic [Meloxicam]  2016    Hives Penicillins  2016    Unknown (comments) DOESN'T REMEMBER Plaquenil [Hydroxychloroquine]  2016    Hives Current Immunizations  Reviewed on 2018 No immunizations on file. Not reviewed this visit You Were Diagnosed With   
  
 Codes Comments Leukocytosis, unspecified type    -  Primary ICD-10-CM: D72.829 ICD-9-CM: 288.60 Abnormal finding of blood chemistry     ICD-10-CM: R79.9 ICD-9-CM: 790.6 Vitals BP Pulse Temp Resp Height(growth percentile) Weight(growth percentile) 133/87 98 98.6 °F (37 °C) 20 5' 3\" (1.6 m) 200 lb 6.4 oz (90.9 kg) SpO2 BMI OB Status Smoking Status 94% 35.5 kg/m2 Hysterectomy Current Every Day Smoker Vitals History BMI and BSA Data Body Mass Index Body Surface Area 35.5 kg/m 2 2.01 m 2 Preferred Pharmacy Pharmacy Name Phone [address] [address] [phone] Your Updated Medication List  
  
   
This list is accurate as of 9/26/18 11:27 AM.  Always use your most recent med list.  
  
  
  
  
 atorvastatin 40 mg tablet Commonly known as:  LIPITOR  
TAKE ONE TABLET BY MOUTH AT NIGHT  
  
 BC ARTHRITIS 1,000-65 mg Pwpk Generic drug:  Aspirin-Caffeine Take  by mouth. BC HEADACHE POWDER PO Take  by mouth.  
  
 benzonatate 100 mg capsule Commonly known as:  TESSALON Take 1 Cap by mouth three (3) times daily as needed for Cough. * Blood-Glucose Meter monitoring kit E11.9 Accu-Chek Joyce Plus Meter * Blood-Glucose Meter Misc Commonly known as:  ACCU-CHEK JOYCE PLUS METER Test blood sugar daily. E 11.9  
  
 cinnamon bark 500 mg Cap Take 1,000 mg by mouth two (2) times a day. CO Q-10 10 mg Cap Generic drug:  coenzyme q10 Take  by mouth. dilTIAZem  mg ER capsule Commonly known as:  CARDIZEM CD  
TAKE ONE CAPSULE BY MOUTH EVERY DAY  
  
 esomeprazole 20 mg capsule Commonly known as:  [place] Take 40 mg by mouth daily. fluticasone-salmeterol 250-50 mcg/dose diskus inhaler Commonly known as:  ADVAIR Take 1 Puff by inhalation two (2) times a day. Indications: BRONCHOSPASM PREVENTION WITH COPD  
  
 furosemide 20 mg tablet Commonly known as:  LASIX TAKE 1 TABLET EVERY DAY AS NEEDED  
  
 gabapentin 600 mg tablet Commonly known as:  NEURONTIN  
TAKE ONE TABLET BY MOUTH FOUR TIMES A DAY  
  
 garlic 1 mg Cap Take  by mouth. glucose blood VI test strips strip Commonly known as:  blood glucose test  
Check Daily E11.9 Accu-Chek Joyce Plus Test Strips Iron-FA-Q04-O-Zadnkkv Sodium 09-6--50 mg-mg-mcg-mg-mg Tab Take  by mouth. LORazepam 0.5 mg tablet Commonly known as:  ATIVAN Take 1 Tab by mouth two (2) times daily as needed for Anxiety. Max Daily Amount: 1 mg.  
  
 losartan 100 mg tablet Commonly known as:  COZAAR  
TAKE 1 TABLET EVERY DAY  
  
 metFORMIN 500 mg tablet Commonly known as:  GLUCOPHAGE  
TAKE TWO TABLETS BY MOUTH TWO TIMES A DAY WITH MEALS [address] Take  by mouth daily. rOPINIRole 2 mg tablet Commonly known as:  REQUIP  
TAKE ONE TABLET BY MOUTH TWO TIMES A DAY  
  
 TURMERIC ROOT EXTRACT PO Take 1 Cap by mouth daily. venlafaxine- mg capsule Commonly known as:  EFFEXOR-XR  
TAKE ONE CAPSULE BY MOUTH EVERY DAY  
  
 * Notice: This list has 2 medication(s) that are the same as other medications prescribed for you. Read the directions carefully, and ask your doctor or other care provider to review them with you. To-Do List   
 12/26/2018 Lab:  CBC WITH AUTOMATED DIFF   
  
 12/26/2018 Lab:  FERRITIN   
  
 12/26/2018 Lab:  IRON PROFILE Introducing [facility] SERVICES! Craigkaz Jauregui introduces [employer] patient portal. Now you can access parts of your medical record, email your doctor's office, and request medication refills online. 1. In your internet browser, go to [email or web address] 2. Click on the First Time User? Click Here link in the Sign In box. You will see the New Member Sign Up page. 3. Enter your [employer] Access Code exactly as it appears below. You will not need to use this code after youve completed the sign-up process. If you do not sign up before the expiration date, you must request a new code. · [employer] Access Code: [email or web address] Expires: 12/19/2018  2:54 PM 
 
4. Enter the last four digits of your Social Security Number (xxxx) and Date of Birth (mm/dd/yyyy) as indicated and click Submit. You will be taken to the next sign-up page. 5. Create a [employer] ID. This will be your [employer] login ID and cannot be changed, so think of one that is secure and easy to remember. 6. Create a [employer]Met password. You can change your password at any time. 7. Enter your Password Reset Question and Answer. This can be used at a later time if you forget your password. 8. Enter your e-mail address. You will receive e-mail notification when new information is available in [address] 9. Click Sign Up. You can now view and download portions of your medical record. 10. Click the Download Summary menu link to download a portable copy of your medical information. If you have questions, please visit the Frequently Asked Questions section of the If You Can website. Remember, If You Can is NOT to be used for urgent needs. For medical emergencies, dial 911. Now available from your iPhone and Android! Please provide this summary of care documentation to your next provider. Your primary care clinician is listed as Nadia Gaines. If you have any questions after today's visit, please call [phone].

## 2018-09-26 NOTE — PROGRESS NOTES
Onel Sosa is a 46 y.o. female here today for follow up, leukocytosis. Will be weaning off of gabapentin and starting lyrica once prescription is received for pain management.

## 2018-09-26 NOTE — PROGRESS NOTES
[facility] at [address] [address] [address]  W: [phone]  F: [phone]    Reason for Visit:   Ebonie Mata is a 46 y.o. female who is seen in consultation at the request of Dr. Carmela Barton for follow up of leukocytosis. History of Present Illness:   Ms. Adam Mckee is a 46 y.o. female with a history of arthritis, diabetes, gerd, and htn who presents today for follow up of leukocytosis. Presents today for elevated WBC count. She says she has had an elevated WBC count since she was 44years old. Previously saw a hematologist at [address] [place] [address] who was thought leukocytosis was due to her osteoarthritis. Most recent WBC count is 13.4 and neutrophils are 10. However, upon chart review which dates back to 2014 her WBC have remained consistently elevated with the highest being 17.4 K. She now comes to discuss further work up. Since last seen she has had a mammogram- had a mass in the left breast which she is scheduled for a biopsy. She has had some insomnia. She denies fevers/chills. She has no bleeding and she has a Colonoscopy scheduled next month. Denies changes in weight. Denies history of blood clots and denies history of blood in stool or urine. Chronic back pain. Smokes 1 PPD since she was 14years old.      FH: grandparents both with cancer, unsure type; brother has had blood clots    Past Medical History:   Diagnosis Date    Arrhythmia     TACHYCARDIA    Arthritis     Autoimmune disease ([address]     LUPUS    Diabetes ([facility]     GERD (gastroesophageal reflux disease)     Hypertension     Psychiatric disorder     PANIC ATTACKS    Sleep apnea       Past Surgical History:   Procedure Laterality Date    CARDIAC SURG PROCEDURE UNLIST  2013    ABLATION    HX BREAST BIOPSY Bilateral     benign    HX CHOLECYSTECTOMY  1998    HX HEENT  1993    SEPTUM SCRAPPED    HX HYSTERECTOMY  2008    HX KNEE ARTHROSCOPY Bilateral 2012    HX KNEE ARTHROSCOPY Left 2012  HX KNEE ARTHROSCOPY Right 2014    HX KNEE REPLACEMENT Left 2012    HX KNEE REPLACEMENT Right 08/18/2016    HX TUBAL LIGATION  1989      Social History   Substance Use Topics    Smoking status: Current Every Day Smoker     Packs/day: 1.00     Years: 40.00    Smokeless tobacco: Never Used    Alcohol use No      Family History   Problem Relation Age of Onset    Arthritis-osteo Mother     Psychiatric Disorder Father     Arthritis-osteo Father     Alcohol abuse Father     Heart Disease Father     Heart Surgery Father     Hypertension Father     Diabetes Brother     Arthritis-osteo Brother     Hypertension Brother     Anesth Problems Neg Hx      Current Outpatient Prescriptions   Medication Sig    Aspirin-Caffeine (BC ARTHRITIS) 1,000-65 mg pwpk Take  by mouth.  garlic 1 mg cap Take  by mouth.  Blood-Glucose Meter (ACCU-CHEK AJAY PLUS METER) misc Test blood sugar daily. E 11.9    furosemide (LASIX) 20 mg tablet TAKE 1 TABLET EVERY DAY AS NEEDED    losartan (COZAAR) 100 mg tablet TAKE 1 TABLET EVERY DAY    atorvastatin (LIPITOR) 40 mg tablet TAKE ONE TABLET BY MOUTH AT NIGHT    dilTIAZem CD (CARDIZEM CD) 180 mg ER capsule TAKE ONE CAPSULE BY MOUTH EVERY DAY    venlafaxine-SR (EFFEXOR-XR) 150 mg capsule TAKE ONE CAPSULE BY MOUTH EVERY DAY    rOPINIRole (REQUIP) 2 mg tablet TAKE ONE TABLET BY MOUTH TWO TIMES A DAY    metFORMIN (GLUCOPHAGE) 500 mg tablet TAKE TWO TABLETS BY MOUTH TWO TIMES A DAY WITH MEALS    LORazepam (ATIVAN) 0.5 mg tablet Take 1 Tab by mouth two (2) times daily as needed for Anxiety. Max Daily Amount: 1 mg.  Blood-Glucose Meter monitoring kit E11.9 Accu-Chek Ajay Plus Meter    glucose blood VI test strips (BLOOD GLUCOSE TEST) strip Check Daily E11.9 Accu-Chek Ajay Plus Test Strips    gabapentin (NEURONTIN) 600 mg tablet TAKE ONE TABLET BY MOUTH FOUR TIMES A DAY    coenzyme q10 (CO Q-10) 10 mg cap Take  by mouth.     benzonatate (TESSALON) 100 mg capsule Take 1 Cap by mouth three (3) times daily as needed for Cough.  TURMERIC ROOT EXTRACT PO Take 1 Cap by mouth daily.  L GASSERI/B BIFIDUM/B LONGUM ([employer] PO) Take  by mouth daily.  cinnamon bark 500 mg cap Take 1,000 mg by mouth two (2) times a day.  esomeprazole (NEXIUM) 20 mg capsule Take 40 mg by mouth daily.  fluticasone-salmeterol (ADVAIR) 250-50 mcg/dose diskus inhaler Take 1 Puff by inhalation two (2) times a day. Indications: BRONCHOSPASM PREVENTION WITH COPD    ASPIRIN/SALICYLAMIDE/CAFFEINE (BC HEADACHE POWDER PO) Take  by mouth.  Iron-FA-O56-S-Qamjmld Sodium 00-6--50 mg-mg-mcg-mg-mg tab Take  by mouth. No current facility-administered medications for this visit. Allergies   Allergen Reactions    Mobic [Meloxicam] Hives    Penicillins Unknown (comments)     DOESN'T REMEMBER    Plaquenil [Hydroxychloroquine] Hives        Review of Systems: A complete review of systems was obtained, negative except as described above. Physical Exam:     Visit Vitals    /87    Pulse 98    Temp 98.6 °F (37 °C)    Resp 20    Ht 5' 3\" (1.6 m)    Wt 200 lb 6.4 oz (90.9 kg)    SpO2 94%    BMI 35.5 kg/m2     General: No distress  Eyes: PERRLA, anicteric sclerae  MS: Normal gait and station. Digits without clubbing or cyanosis. Skin: No rashes, ecchymoses, or petechiae. Normal temperature, turgor, and texture. Psych: Alert, oriented, appropriate affect, normal judgment/insight    Results:     Lab Results   Component Value Date/Time    WBC 12.5 (H) 09/05/2018 11:26 AM    HGB 12.8 09/05/2018 11:26 AM    HCT 40.3 09/05/2018 11:26 AM    PLATELET 634 23/07/7223 11:26 AM    MCV 87 09/05/2018 11:26 AM    ABS.  NEUTROPHILS 8.7 (H) 09/05/2018 11:26 AM     Lab Results   Component Value Date/Time    Sodium 145 (H) 06/20/2018 02:22 PM    Potassium 4.8 06/20/2018 02:22 PM    Chloride 103 06/20/2018 02:22 PM    CO2 20 06/20/2018 02:22 PM    Glucose 99 06/20/2018 02:22 PM    BUN 12 06/20/2018 02:22 PM    Creatinine 0.73 06/20/2018 02:22 PM    GFR est  06/20/2018 02:22 PM    GFR est non-AA 94 06/20/2018 02:22 PM    Calcium 10.4 (H) 06/20/2018 02:22 PM    Glucose (POC) 106 (H) 08/22/2016 11:20 AM     Lab Results   Component Value Date/Time    Bilirubin, total <0.2 06/20/2018 02:22 PM    ALT (SGPT) 24 06/20/2018 02:22 PM    AST (SGOT) 23 06/20/2018 02:22 PM    Alk. phosphatase 167 (H) 06/20/2018 02:22 PM    Protein, total 7.1 06/20/2018 02:22 PM    Albumin 4.6 06/20/2018 02:22 PM    Globulin 3.0 01/28/2015 09:50 AM     Lab Results   Component Value Date/Time    Iron % saturation 14 (L) 09/05/2018 11:25 AM    TIBC 431 09/05/2018 11:25 AM    Ferritin 17 09/05/2018 11:25 AM    Erythropoietin 16.7 09/05/2018 11:25 AM    Sed rate (ESR) 2 08/10/2018 10:53 AM    Sed rate, automated 9 02/09/2015 10:00 AM    C-Reactive Protein, Qt 5.1 (H) 08/10/2018 10:53 AM    TSH 3.070 06/20/2018 02:22 PM    CALEB, Direct Positive (A) 09/04/2014 11:00 AM    Hep C Virus Ab 0.1 04/22/2014 11:15 AM    HIV 1/2 Interpretation NONREACTIVE 04/22/2014 11:15 AM     Lab Results   Component Value Date/Time    INR 1.4 (H) 08/22/2016 02:49 AM       Smear  Leukocytosis, absolute neutrophilia and eosinophilia type. Reactivity   noted. Blasts not seen. Records reviewed and summarized above. Pathology report(s) reviewed above. Radiology report(s) reviewed above. Ultrasound abdomen  IMPRESSION  IMPRESSION:   1. No splenomegaly. 2. Absent gallbladder.         Assessment:   1) Leukocytosis   Our records show WBC range from 12-17K dating back to 2014  She has no anemia or thrombocytopenia. Workup thus far is largely unremarkable with no evidence of splenomegaly, negative HIV and nonreactive hepatitis. Her leukocytosis is likely reactive to smoking and arthritis.     2) Extensive smoking history  1 PPD for 13 years   Likely cause of neutrophilia and leukocytosis      3) Arthritis   Follows with rheumatology and goes to physical therapy    4) iron deficiency    She has evidence of iron deficiency without overt anemia. She has not had a colonoscopy and I encouraged her to follow-up with her GI. States that she has a colonoscopy scheduled next month. 5) abnormal mammogram on 9/24/18  Due to have a suspicious abnormality on the left breast.  She is scheduled for a biopsy. She will call me if the biopsy returns as a malignant lesion. Plan:     · Proceed with left breast mass biopsy is scheduled  · Follow-up with GI for screening colonoscopy  · Return to clinic in 3 months with iron studies and CBC    I appreciate the opportunity to participate in Ms. Velma Ferrell's care.     Signed By: Lillian Pineda MD

## 2018-10-03 ENCOUNTER — TELEPHONE (OUTPATIENT)
Dept: FAMILY MEDICINE CLINIC | Age: 53
End: 2018-10-03

## 2018-10-03 NOTE — TELEPHONE ENCOUNTER
Patient called stating that she had a mammogram done on 9/24/18 and there was some stuff she was concerned about. She wanted to know if she could speak with Pamela/Dr Janene Peralta.     Best contact: [phone]

## 2018-10-04 NOTE — TELEPHONE ENCOUNTER
They are going to do biopsy in left breast on 10/24 and 10/25/18. They are doing to two different areas. She has been having pain in left breast since mammogram.   She has been very busy with doctor's appts.

## 2018-10-04 NOTE — TELEPHONE ENCOUNTER
Please call pt. I saw her mammogram report come in from [facility]. It was sent to scanning. Please call pt to find out what she needs.

## 2018-10-24 ENCOUNTER — TELEPHONE (OUTPATIENT)
Dept: FAMILY MEDICINE CLINIC | Age: 53
End: 2018-10-24

## 2018-10-24 DIAGNOSIS — I10 ESSENTIAL HYPERTENSION WITH GOAL BLOOD PRESSURE LESS THAN 140/90: ICD-10-CM

## 2018-10-24 DIAGNOSIS — R60.0 PEDAL EDEMA: ICD-10-CM

## 2018-10-24 NOTE — TELEPHONE ENCOUNTER
----- Message from Abbi Petty sent at 10/24/2018 12:36 PM EDT -----  Regarding: Dr. Rubio Levels  Contact: [phone]  Pt requesting call back in regards to some of the specialist she has been seeing. Pt wants your feedback. She really would like a call back today she said or from the nurse as she is having a hard time with all the specialist she is seeing.

## 2018-10-25 RX ORDER — LOSARTAN POTASSIUM 100 MG/1
TABLET ORAL
Qty: 90 TAB | Refills: 1 | Status: SHIPPED | OUTPATIENT
Start: 2018-10-25 | End: 2019-05-16 | Stop reason: SDUPTHER

## 2018-10-25 RX ORDER — FUROSEMIDE 20 MG/1
TABLET ORAL
Qty: 90 TAB | Refills: 1 | Status: SHIPPED | OUTPATIENT
Start: 2018-10-25 | End: 2020-01-10 | Stop reason: SDUPTHER

## 2018-10-25 NOTE — TELEPHONE ENCOUNTER
She wanted to talk to Dr Denver Angst. She has been going to all her doctors. She has been through a lot and just wants to talk about it. She will call and make a appt. She said [facility] said they never received losartan or lasix. I called [facility] and they shipped them on 8/15/18, but she needs a refill.

## 2018-11-08 ENCOUNTER — HOSPITAL ENCOUNTER (OUTPATIENT)
Dept: GENERAL RADIOLOGY | Age: 53
Discharge: HOME | End: 2018-11-08
Attending: PAIN MEDICINE
Payer: MEDICARE

## 2018-11-08 DIAGNOSIS — M54.50 LOW BACK PAIN: ICD-10-CM

## 2018-11-08 PROCEDURE — 72114 X-RAY EXAM L-S SPINE BENDING: CPT

## 2018-11-19 ENCOUNTER — HOSPITAL ENCOUNTER (OUTPATIENT)
Dept: GENERAL RADIOLOGY | Age: 53
Discharge: HOME | End: 2018-11-19
Attending: PAIN MEDICINE
Payer: MEDICARE

## 2018-11-19 DIAGNOSIS — M54.2 CERVICALGIA: ICD-10-CM

## 2018-11-19 PROCEDURE — 72052 X-RAY EXAM NECK SPINE 6/>VWS: CPT

## 2018-12-03 ENCOUNTER — OFFICE VISIT (OUTPATIENT)
Dept: FAMILY MEDICINE CLINIC | Age: 53
End: 2018-12-03

## 2018-12-03 ENCOUNTER — TELEPHONE (OUTPATIENT)
Dept: FAMILY MEDICINE CLINIC | Age: 53
End: 2018-12-03

## 2018-12-03 VITALS
SYSTOLIC BLOOD PRESSURE: 130 MMHG | BODY MASS INDEX: 35.08 KG/M2 | HEIGHT: 63 IN | WEIGHT: 198 LBS | DIASTOLIC BLOOD PRESSURE: 90 MMHG | OXYGEN SATURATION: 97 % | RESPIRATION RATE: 20 BRPM | TEMPERATURE: 98 F | HEART RATE: 95 BPM

## 2018-12-03 DIAGNOSIS — M47.12 SPONDYLOSIS OF CERVICAL SPINE WITH MYELOPATHY AND RADICULOPATHY: ICD-10-CM

## 2018-12-03 DIAGNOSIS — Z23 NEED FOR STREPTOCOCCUS PNEUMONIAE VACCINATION: ICD-10-CM

## 2018-12-03 DIAGNOSIS — J01.90 ACUTE SINUSITIS, RECURRENCE NOT SPECIFIED, UNSPECIFIED LOCATION: Primary | ICD-10-CM

## 2018-12-03 DIAGNOSIS — G25.81 RLS (RESTLESS LEGS SYNDROME): ICD-10-CM

## 2018-12-03 DIAGNOSIS — M47.22 SPONDYLOSIS OF CERVICAL SPINE WITH MYELOPATHY AND RADICULOPATHY: ICD-10-CM

## 2018-12-03 DIAGNOSIS — F34.1 DYSTHYMIA: ICD-10-CM

## 2018-12-03 DIAGNOSIS — R30.0 BURNING WITH URINATION: ICD-10-CM

## 2018-12-03 PROBLEM — M47.816 SPONDYLOSIS OF LUMBAR SPINE: Status: ACTIVE | Noted: 2018-12-03

## 2018-12-03 LAB
BACTERIA UA POCT, BACTPOCT: NORMAL
BILIRUB UR QL STRIP: NEGATIVE
CASTS UA POCT: NORMAL
CLUE CELLS, CLUEPOCT: NORMAL
CRYSTALS UA POCT, CRYSPOCT: NORMAL
EPITHELIAL CELLS POCT: NORMAL
GLUCOSE UR-MCNC: NEGATIVE MG/DL
KETONES P FAST UR STRIP-MCNC: NEGATIVE MG/DL
MUCUS UA POCT, MUCPOCT: NORMAL
PH UR STRIP: 7 [PH] (ref 4.6–8)
PROT UR QL STRIP: NEGATIVE
RBC UA POCT, RBCPOCT: NORMAL
SP GR UR STRIP: 1.01 (ref 1–1.03)
TRICH UA POCT, TRICHPOC: NORMAL
UA UROBILINOGEN AMB POC: NORMAL (ref 0.2–1)
URINALYSIS CLARITY POC: CLEAR
URINALYSIS COLOR POC: YELLOW
URINE BLOOD POC: NEGATIVE
URINE CULT COMMENT, POCT: NORMAL
URINE LEUKOCYTES POC: NEGATIVE
URINE NITRITES POC: NEGATIVE
WBC UA POCT, WBCPOCT: NORMAL
YEAST UA POCT, YEASTPOC: NORMAL

## 2018-12-03 RX ORDER — TOPIRAMATE 25 MG/1
TABLET ORAL
COMMUNITY
Start: 2018-11-19 | End: 2018-12-03 | Stop reason: SDUPTHER

## 2018-12-03 RX ORDER — ROPINIROLE 2 MG/1
2 TABLET, FILM COATED ORAL
Qty: 270 TAB | Refills: 1 | Status: SHIPPED | OUTPATIENT
Start: 2018-12-03 | End: 2019-04-22 | Stop reason: SDUPTHER

## 2018-12-03 RX ORDER — TOPIRAMATE 25 MG/1
25 TABLET ORAL DAILY
Qty: 180 TAB | Refills: 1 | Status: SHIPPED | OUTPATIENT
Start: 2018-12-03 | End: 2019-01-03 | Stop reason: SDUPTHER

## 2018-12-03 RX ORDER — DOXYCYCLINE 100 MG/1
100 CAPSULE ORAL 2 TIMES DAILY
Qty: 20 CAP | Refills: 0 | Status: SHIPPED | OUTPATIENT
Start: 2018-12-03 | End: 2018-12-07 | Stop reason: SINTOL

## 2018-12-03 NOTE — PATIENT INSTRUCTIONS
Sinusitis: Care Instructions  Your Care Instructions    Sinusitis is an infection of the lining of the sinus cavities in your head. Sinusitis often follows a cold. It causes pain and pressure in your head and face. In most cases, sinusitis gets better on its own in 1 to 2 weeks. But some mild symptoms may last for several weeks. Sometimes antibiotics are needed. Follow-up care is a key part of your treatment and safety. Be sure to make and go to all appointments, and call your doctor if you are having problems. It's also a good idea to know your test results and keep a list of the medicines you take. How can you care for yourself at home? · Take an over-the-counter pain medicine, such as acetaminophen (Tylenol), ibuprofen (Advil, Motrin), or naproxen (Aleve). Read and follow all instructions on the label. · If the doctor prescribed antibiotics, take them as directed. Do not stop taking them just because you feel better. You need to take the full course of antibiotics. · Be careful when taking over-the-counter cold or flu medicines and Tylenol at the same time. Many of these medicines have acetaminophen, which is Tylenol. Read the labels to make sure that you are not taking more than the recommended dose. Too much acetaminophen (Tylenol) can be harmful. · Breathe warm, moist air from a steamy shower, a hot bath, or a sink filled with hot water. Avoid cold, dry air. Using a humidifier in your home may help. Follow the directions for cleaning the machine. · Use saline (saltwater) nasal washes to help keep your nasal passages open and wash out mucus and bacteria. You can buy saline nose drops at a grocery store or drugstore. Or you can make your own at home by adding 1 teaspoon of salt and 1 teaspoon of baking soda to 2 cups of distilled water. If you make your own, fill a bulb syringe with the solution, insert the tip into your nostril, and squeeze gently. [place] your nose.   · Put a hot, wet towel or a warm gel pack on your face 3 or 4 times a day for 5 to 10 minutes each time. · Try a decongestant nasal spray like oxymetazoline (Afrin). Do not use it for more than 3 days in a row. Using it for more than 3 days can make your congestion worse. When should you call for help? Call your doctor now or seek immediate medical care if:    · You have new or worse swelling or redness in your face or around your eyes.     · You have a new or higher fever.    Watch closely for changes in your health, and be sure to contact your doctor if:    · You have new or worse facial pain.     · The mucus from your nose becomes thicker (like pus) or has new blood in it.     · You are not getting better as expected. Where can you learn more? Go to http://matti-yinka.info/. Enter F972 in the search box to learn more about \"Sinusitis: Care Instructions. \"  Current as of: March 28, 2018  Content Version: 11.8  © 9726-8210 [employer]. Care instructions adapted under license by [employer] (which disclaims liability or warranty for this information). If you have questions about a medical condition or this instruction, always ask your healthcare professional. [place] [address] any warranty or liability for your use of this information. Take prednisone 40 mg daily x 3 days, then stop.

## 2018-12-03 NOTE — PROGRESS NOTES
Identified pt with two pt identifiers(name and ). Chief Complaint   Patient presents with    Cold Symptoms     Complains of cough and sinus congestion; greenish flem    Sore Throat     Complains of \"feels like pins sticking in my throat. Health Maintenance Due   Topic    Pneumococcal 19-64 Highest Risk (1 of 3 - PCV13)    Shingrix Vaccine Age 50> (1 of 2)    Influenza Age 5 to Adult        Wt Readings from Last 3 Encounters:   18 200 lb 6.4 oz (90.9 kg)   18 202 lb 3.2 oz (91.7 kg)   18 201 lb 12.8 oz (91.5 kg)     Temp Readings from Last 3 Encounters:   18 98.6 °F (37 °C)   18 98.7 °F (37.1 °C)   18 97.9 °F (36.6 °C) (Oral)     BP Readings from Last 3 Encounters:   18 133/87   18 132/85   18 136/80     Pulse Readings from Last 3 Encounters:   18 98   18 83   18 96         Learning Assessment:  :     Learning Assessment 2016   PRIMARY LEARNER Patient Patient Patient   HIGHEST LEVEL OF EDUCATION - PRIMARY LEARNER  - - SOME COLLEGE   BARRIERS PRIMARY LEARNER - - NONE   CO-LEARNER CAREGIVER - - No   PRIMARY LANGUAGE ENGLISH ENGLISH ENGLISH   LEARNER PREFERENCE PRIMARY LISTENING - DEMONSTRATION   ANSWERED BY patient patient self   RELATIONSHIP SELF SELF SELF       Depression Screening:  :     PHQ over the last two weeks 2018   Little interest or pleasure in doing things Not at all   Feeling down, depressed, irritable, or hopeless Several days   Total Score PHQ 2 1       Fall Risk Assessment:  :     Fall Risk Assessment, last 12 mths 2018   Able to walk? Yes   Fall in past 12 months? No       Abuse Screening:  :     Abuse Screening Questionnaire 2018   Do you ever feel afraid of your partner? N N   Are you in a relationship with someone who physically or mentally threatens you? N N   Is it safe for you to go home?  Y Y       Coordination of Care Questionnaire:  :     1) Have you been to an emergency room, urgent care clinic since your last visit? no   Hospitalized since your last visit? no             2) Have you seen or consulted any other health care providers outside of [address] [place] [address] since your last visit? yes Pain Management  (Include any pap smears or colon screenings in this section.)    3) Do you have an Advance Directive on file? yes  Are you interested in receiving information about Advance Directives? no    Reviewed record in preparation for visit and have obtained necessary documentation. Medication reconciliation up to date and corrected with patient at this time.

## 2018-12-03 NOTE — PROGRESS NOTES
Subjective:   Unknown Kendrick is a 46 y.o. female who complains of congestion, sore throat, productive cough, myalgias, headache and green nasal discharge for 8 days, unchanged since that time. Ribs hurt. She has also been incontinence related to severe coughing. She has been to several specialists including: [facility]. Evaluation to date: none. Treatment to date: OTC products. Patient does smoke cigarettes. Relevant PMH: No pertinent additional PMH. Patient Active Problem List    Diagnosis Date Noted    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 12/03/2018    Spondylosis of lumbar spine 12/03/2018    Abnormal mammogram of left breast 09/26/2018    Iliotibial band tendinitis of right side 08/30/2018    Severe obesity (BMI 35.0-39.9) 06/20/2018    Type 2 diabetes mellitus with diabetic neuropathy ([facility] [address] 02/14/2018    Status post bilateral knee replacements 11/02/2017    Cigarette nicotine dependence without complication 72/61/1670    Dysthymia 12/30/2016    YOLANDA (obstructive sleep apnea) 12/30/2016    Paroxysmal tachycardia ([facility] [address] 09/09/2016    Arthritis 09/09/2016    Leukocytosis 09/09/2016    Essential hypertension with goal blood pressure less than 140/90 09/07/2016    Chronic obstructive pulmonary disease ([address] 09/07/2016    Diabetes mellitus type 2, controlled ([facility] [address] 09/07/2016    Polyneuropathy associated with underlying disease ([facility] [address] 09/07/2016    RLS (restless legs syndrome) 09/07/2016    Primary osteoarthritis of right knee 08/18/2016     Current Outpatient Medications   Medication Sig Dispense Refill    rOPINIRole (REQUIP) 2 mg tablet Take 1 Tab by mouth Before breakfast, lunch, and dinner. TAKE ONE TABLET BY MOUTH TWO TIMES A  Tab 1    topiramate (TOPAMAX) 25 mg tablet Take 1 Tab by mouth daily. 180 Tab 1    doxycycline (VIBRAMYCIN) 100 mg capsule Take 1 Cap by mouth two (2) times a day for 10 days.  20 Cap 0    losartan (COZAAR) 100 mg tablet TAKE 1 TABLET EVERY DAY 90 Tab 1    furosemide (LASIX) 20 mg tablet TAKE 1 TABLET EVERY DAY AS NEEDED 90 Tab 1    Aspirin-Caffeine (BC ARTHRITIS) 1,000-65 mg pwpk Take  by mouth.  garlic 1 mg cap Take  by mouth.  Blood-Glucose Meter (ACCU-CHEK AJAY PLUS METER) misc Test blood sugar daily. E 11.9 1 Each 0    atorvastatin (LIPITOR) 40 mg tablet TAKE ONE TABLET BY MOUTH AT NIGHT 90 Tab 1    dilTIAZem CD (CARDIZEM CD) 180 mg ER capsule TAKE ONE CAPSULE BY MOUTH EVERY DAY 90 Cap 1    venlafaxine-SR (EFFEXOR-XR) 150 mg capsule TAKE ONE CAPSULE BY MOUTH EVERY DAY 90 Cap 1    metFORMIN (GLUCOPHAGE) 500 mg tablet TAKE TWO TABLETS BY MOUTH TWO TIMES A DAY WITH MEALS 360 Tab 1    LORazepam (ATIVAN) 0.5 mg tablet Take 1 Tab by mouth two (2) times daily as needed for Anxiety. Max Daily Amount: 1 mg. 180 Tab 0    Blood-Glucose Meter monitoring kit E11.9 Accu-Chek Ajay Plus Meter 1 Kit 0    glucose blood VI test strips (BLOOD GLUCOSE TEST) strip Check Daily E11.9 Accu-Chek Ajay Plus Test Strips 100 Strip 3    gabapentin (NEURONTIN) 600 mg tablet TAKE ONE TABLET BY MOUTH FOUR TIMES A DAY (Patient taking differently: 600 mg two (2) times a day. TAKE ONE TABLET BY MOUTH FOUR TIMES A DAY) 360 Tab 0    coenzyme q10 (CO Q-10) 10 mg cap Take  by mouth.  benzonatate (TESSALON) 100 mg capsule Take 1 Cap by mouth three (3) times daily as needed for Cough. 60 Cap 3    ASPIRIN/SALICYLAMIDE/CAFFEINE (BC HEADACHE POWDER PO) Take  by mouth.  Iron-FA-G13-Q-Ktlclzu Sodium 76-4--50 mg-mg-mcg-mg-mg tab Take  by mouth.  TURMERIC ROOT EXTRACT PO Take 1 Cap by mouth daily.  L GASSERI/B BIFIDUM/B LONGUM ([place] PO) Take  by mouth daily.  cinnamon bark 500 mg cap Take 1,000 mg by mouth two (2) times a day.  esomeprazole (NEXIUM) 20 mg capsule Take 40 mg by mouth daily.       fluticasone-salmeterol (ADVAIR) 250-50 mcg/dose diskus inhaler Take 1 Puff by inhalation two (2) times a day. Indications: BRONCHOSPASM PREVENTION WITH COPD 3 Inhaler 1     Allergies   Allergen Reactions    Mobic [Meloxicam] Hives    Penicillins Unknown (comments)     DOESN'T REMEMBER    Plaquenil [Hydroxychloroquine] Hives     Past Medical History:   Diagnosis Date    Arrhythmia     TACHYCARDIA    Arthritis     Autoimmune disease ([address]     LUPUS    Diabetes [facility]     GERD (gastroesophageal reflux disease)     Hypertension     Psychiatric disorder     PANIC ATTACKS    Sleep apnea      Past Surgical History:   Procedure Laterality Date    CARDIAC SURG PROCEDURE UNLIST  2013    ABLATION    HX BREAST BIOPSY Bilateral     benign    HX CHOLECYSTECTOMY  1998    HX HEENT  1993    SEPTUM SCRAPPED    HX HYSTERECTOMY  2008    HX KNEE ARTHROSCOPY Bilateral 2012    HX KNEE ARTHROSCOPY Left 2012    HX KNEE ARTHROSCOPY Right 2014    HX KNEE REPLACEMENT Left 2012    HX KNEE REPLACEMENT Right 08/18/2016    HX TUBAL LIGATION  1989     Family History   Problem Relation Age of Onset    Arthritis-osteo Mother     Psychiatric Disorder Father     Arthritis-osteo Father     Alcohol abuse Father     Heart Disease Father     Heart Surgery Father     Hypertension Father     Diabetes Brother     Arthritis-osteo Brother     Hypertension Brother     Anesth Problems Neg Hx      Social History     Tobacco Use    Smoking status: Current Every Day Smoker     Packs/day: 1.00     Years: 40.00     Pack years: 40.00    Smokeless tobacco: Never Used   Substance Use Topics    Alcohol use: No        Review of Systems  Pertinent items are noted in HPI.     Objective:     Visit Vitals  /90 (BP 1 Location: Left arm, BP Patient Position: Sitting)   Pulse 95   Temp 98 °F (36.7 °C) (Oral)   Resp 20   Ht 5' 3\" (1.6 m)   Wt 198 lb (89.8 kg)   SpO2 97%   BMI 35.07 kg/m²     General:  alert, cooperative, no distress   Eyes: negative   Ears: normal TM's and external ear canals AU   Sinuses: tenderness over bilateral maxillary and frontal   Mouth:  Lips, mucosa, and tongue normal. Teeth and gums normal   Neck: supple, symmetrical, trachea midline and no adenopathy. Heart: S1 and S2 normal, no murmurs noted. Lungs: clear to auscultation bilaterally   Abdomen:         Assessment/Plan:         ICD-10-CM ICD-9-CM    1. Acute sinusitis, recurrence not specified, unspecified location J01.90 461.9 doxycycline (VIBRAMYCIN) 100 mg capsule   2. RLS (restless legs syndrome) G25.81 333.94 rOPINIRole (REQUIP) 2 mg tablet   3. 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 M47.12 721.1 topiramate (TOPAMAX) 25 mg tablet    M47.22     4. Need for Streptococcus pneumoniae vaccination Z23 V03.82 pneumococcal 23-valent (PNEUMOVAX 23) 25 mcg/0.5 mL injection   5. Burning with urination R30.0 788.1 AMB POC URINALYSIS DIP STICK AUTO W/ MICRO    . Order antibiotic for sinusitis. Med refills. Pain Clinic is switching her to Topamax. Need send RX to mail order. Order Pneumovax 23.

## 2018-12-04 RX ORDER — VENLAFAXINE HYDROCHLORIDE 150 MG/1
CAPSULE, EXTENDED RELEASE ORAL
Qty: 90 CAP | Refills: 1 | Status: SHIPPED | OUTPATIENT
Start: 2018-12-04 | End: 2019-04-22 | Stop reason: SDUPTHER

## 2018-12-07 ENCOUNTER — TELEPHONE (OUTPATIENT)
Dept: FAMILY MEDICINE CLINIC | Age: 53
End: 2018-12-07

## 2018-12-07 DIAGNOSIS — J01.90 ACUTE SINUSITIS, RECURRENCE NOT SPECIFIED, UNSPECIFIED LOCATION: Primary | ICD-10-CM

## 2018-12-07 RX ORDER — SULFAMETHOXAZOLE AND TRIMETHOPRIM 800; 160 MG/1; MG/1
1 TABLET ORAL 2 TIMES DAILY
Qty: 20 TAB | Refills: 0 | Status: SHIPPED | OUTPATIENT
Start: 2018-12-07 | End: 2018-12-17

## 2018-12-07 NOTE — TELEPHONE ENCOUNTER
Pt called and stated medication given to her on 12/3/18 makes her itch and she stopped taking it and needs something else sent over to [facility]

## 2018-12-17 ENCOUNTER — TELEPHONE (OUTPATIENT)
Dept: SLEEP MEDICINE | Age: 53
End: 2018-12-17

## 2018-12-26 DIAGNOSIS — R79.9 ABNORMAL FINDING OF BLOOD CHEMISTRY: ICD-10-CM

## 2018-12-26 DIAGNOSIS — D72.829 LEUKOCYTOSIS, UNSPECIFIED TYPE: ICD-10-CM

## 2019-01-02 ENCOUNTER — TELEPHONE (OUTPATIENT)
Dept: FAMILY MEDICINE CLINIC | Age: 54
End: 2019-01-02

## 2019-01-02 NOTE — TELEPHONE ENCOUNTER
Patient called stating that the medication topiramate 25mg, she normally takes 2 times a day. One in the morning and one at night. She wanted to know if this could be changed and sent to [facility] [employer] mail order.      Best contact: [phone]

## 2019-01-03 DIAGNOSIS — M47.12 SPONDYLOSIS OF CERVICAL SPINE WITH MYELOPATHY AND RADICULOPATHY: ICD-10-CM

## 2019-01-03 DIAGNOSIS — M47.22 SPONDYLOSIS OF CERVICAL SPINE WITH MYELOPATHY AND RADICULOPATHY: ICD-10-CM

## 2019-01-03 RX ORDER — TOPIRAMATE 25 MG/1
25 TABLET ORAL 2 TIMES DAILY
Qty: 180 TAB | Refills: 0 | Status: SHIPPED | OUTPATIENT
Start: 2019-01-03 | End: 2019-04-01 | Stop reason: SDUPTHER

## 2019-01-08 ENCOUNTER — OFFICE VISIT (OUTPATIENT)
Dept: FAMILY MEDICINE CLINIC | Age: 54
End: 2019-01-08

## 2019-01-08 VITALS
SYSTOLIC BLOOD PRESSURE: 110 MMHG | BODY MASS INDEX: 35.61 KG/M2 | RESPIRATION RATE: 18 BRPM | OXYGEN SATURATION: 98 % | TEMPERATURE: 98.4 F | HEART RATE: 66 BPM | WEIGHT: 201 LBS | DIASTOLIC BLOOD PRESSURE: 80 MMHG | HEIGHT: 63 IN

## 2019-01-08 DIAGNOSIS — I10 ESSENTIAL HYPERTENSION WITH GOAL BLOOD PRESSURE LESS THAN 140/90: ICD-10-CM

## 2019-01-08 DIAGNOSIS — J06.9 URI WITH COUGH AND CONGESTION: ICD-10-CM

## 2019-01-08 DIAGNOSIS — H65.01 RIGHT ACUTE SEROUS OTITIS MEDIA, RECURRENCE NOT SPECIFIED: Primary | ICD-10-CM

## 2019-01-08 RX ORDER — CEFDINIR 300 MG/1
300 CAPSULE ORAL 2 TIMES DAILY
Qty: 20 CAP | Refills: 0 | Status: SHIPPED | OUTPATIENT
Start: 2019-01-08 | End: 2019-01-18

## 2019-01-08 RX ORDER — DILTIAZEM HYDROCHLORIDE 180 MG/1
180 CAPSULE, COATED, EXTENDED RELEASE ORAL DAILY
Qty: 30 CAP | Refills: 0 | Status: SHIPPED | OUTPATIENT
Start: 2019-01-08 | End: 2019-02-07

## 2019-01-08 RX ORDER — DILTIAZEM HYDROCHLORIDE 180 MG/1
CAPSULE, COATED, EXTENDED RELEASE ORAL
Qty: 90 CAP | Refills: 1 | Status: CANCELLED | OUTPATIENT
Start: 2019-01-08

## 2019-01-08 NOTE — PATIENT INSTRUCTIONS

## 2019-01-08 NOTE — PROGRESS NOTES
Subjective:      Jose Luis Almanza is a 46 y.o. female here with complaint of cough, right ear pain, nausea without vomiting, nasal congestion for 7 days. She denies a history of chills, fevers and shortness of breath. Evaluation to date: none   Treatment to date: Tessalon Perles       Current Outpatient Medications   Medication Sig Dispense Refill    topiramate (TOPAMAX) 25 mg tablet Take 1 Tab by mouth two (2) times a day. 180 Tab 0    venlafaxine-SR (EFFEXOR-XR) 150 mg capsule TAKE 1 CAPSULE EVERY DAY 90 Cap 1    rOPINIRole (REQUIP) 2 mg tablet Take 1 Tab by mouth Before breakfast, lunch, and dinner. TAKE ONE TABLET BY MOUTH TWO TIMES A  Tab 1    losartan (COZAAR) 100 mg tablet TAKE 1 TABLET EVERY DAY 90 Tab 1    furosemide (LASIX) 20 mg tablet TAKE 1 TABLET EVERY DAY AS NEEDED 90 Tab 1    Aspirin-Caffeine (BC ARTHRITIS) 1,000-65 mg pwpk Take  by mouth.  Blood-Glucose Meter (ACCU-CHEK AJAY PLUS METER) misc Test blood sugar daily. E 11.9 1 Each 0    atorvastatin (LIPITOR) 40 mg tablet TAKE ONE TABLET BY MOUTH AT NIGHT 90 Tab 1    dilTIAZem CD (CARDIZEM CD) 180 mg ER capsule TAKE ONE CAPSULE BY MOUTH EVERY DAY 90 Cap 1    metFORMIN (GLUCOPHAGE) 500 mg tablet TAKE TWO TABLETS BY MOUTH TWO TIMES A DAY WITH MEALS 360 Tab 1    LORazepam (ATIVAN) 0.5 mg tablet Take 1 Tab by mouth two (2) times daily as needed for Anxiety. Max Daily Amount: 1 mg. 180 Tab 0    Blood-Glucose Meter monitoring kit E11.9 Accu-Chek Ajay Plus Meter 1 Kit 0    glucose blood VI test strips (BLOOD GLUCOSE TEST) strip Check Daily E11.9 Accu-Chek Ajay Plus Test Strips 100 Strip 3    gabapentin (NEURONTIN) 600 mg tablet TAKE ONE TABLET BY MOUTH FOUR TIMES A DAY (Patient taking differently: 600 mg two (2) times a day. TAKE ONE TABLET BY MOUTH FOUR TIMES A DAY) 360 Tab 0    coenzyme q10 (CO Q-10) 10 mg cap Take  by mouth.       benzonatate (TESSALON) 100 mg capsule Take 1 Cap by mouth three (3) times daily as needed for Cough. 60 Cap 3    Iron-FA-B11-C-Mcznvtw Sodium 96-7--50 mg-mg-mcg-mg-mg tab Take  by mouth.  TURMERIC ROOT EXTRACT PO Take 1 Cap by mouth daily.  L GASSERI/B BIFIDUM/B LONGUM (Codewars PO) Take  by mouth daily.  cinnamon bark 500 mg cap Take 1,000 mg by mouth two (2) times a day. Allergies   Allergen Reactions    Mobic [Meloxicam] Hives    Penicillins Unknown (comments)     DOESN'T REMEMBER    Plaquenil [Hydroxychloroquine] Hives       Past Medical History:   Diagnosis Date    Arrhythmia     TACHYCARDIA    Arthritis     Autoimmune disease ([facility] [address]     LUPUS    Diabetes ([facility] [address] [facility]     GERD (gastroesophageal reflux disease)     Hypertension     Psychiatric disorder     PANIC ATTACKS    Sleep apnea        Social History     Tobacco Use    Smoking status: Current Every Day Smoker     Packs/day: 1.00     Years: 40.00     Pack years: 40.00    Smokeless tobacco: Never Used   Substance Use Topics    Alcohol use: No          Review of Systems  Pertinent items are noted in HPI.      Objective:     Visit Vitals  /80 (BP 1 Location: Right arm, BP Patient Position: Sitting) Comment: Mnaual   Pulse 66   Temp 98.4 °F (36.9 °C) (Oral) Comment: .   Resp 18   Ht 5' 3\" (1.6 m)   Wt 201 lb (91.2 kg)   SpO2 98%   BMI 35.61 kg/m²      General appearance - alert, well appearing, and in no distress  Eyes - pupils equal and reactive, extraocular eye movements intact, sclera anicteric  Ears - right TM with yellow serous fluid, left TM normal, bilateral canals normal  Nose - normal and patent, no erythema, discharge or polyps  Oropharyngx - mucous membranes moist, pharynx normal without lesions  Neck - supple, no significant adenopathy  Chest - clear to auscultation, no wheezes, rales or rhonchi, symmetric air entry, no tachypnea, retractions or cyanosis  Heart - normal rate, regular rhythm, normal S1, S2, no murmurs, rubs, clicks or gallops    Assessment/Plan:   Sosa Matson is a 46 y.o. female seen for:     1. Right acute serous otitis media, recurrence not specified  - cefdinir (OMNICEF) 300 mg capsule; Take 1 Cap by mouth two (2) times a day for 10 days. Dispense: 20 Cap; Refill: 0    2. URI with cough and congestion  - cefdinir (OMNICEF) 300 mg capsule; Take 1 Cap by mouth two (2) times a day for 10 days. Dispense: 20 Cap; Refill: 0  - Symptomatic therapy suggested: push fluids, rest and use acetaminophen, ibuprofen, cough suppressant of choice prn.     3. Essential hypertension with goal blood pressure less than 140/90: will send short term supply of medication while awaiting mail order.   - dilTIAZem CD (CARDIZEM CD) 180 mg ER capsule; Take 1 Cap by mouth daily for 30 days. Dispense: 30 Cap; Refill: 0    I have discussed the diagnosis with the patient and the intended plan as seen in the above orders. The patient has received an after-visit summary and questions were answered concerning future plans. I have discussed medication side effects and warnings with the patient as well. Patient verbalizes understanding of plan of care and denies further questions or concerns at this time. Informed patient to return to the office if symptoms worsen or if new symptoms arise. Follow-up Disposition:  Return if symptoms worsen or fail to improve.

## 2019-01-08 NOTE — PROGRESS NOTES
Identified pt with two pt identifiers(name and ). Chief Complaint   Patient presents with    Ear Pain     Right ear began hurting about a week ago    Nasal Congestion    Medication Refill     [employer] has delayed patients diltiazem refill and patient is now out. Health Maintenance Due   Topic    Pneumococcal 19-64 Medium Risk (1 of 1 - PPSV23)    Shingrix Vaccine Age 50> (1 of 2)    Influenza Age 5 to Adult        Wt Readings from Last 3 Encounters:   19 201 lb (91.2 kg)   18 198 lb (89.8 kg)   18 200 lb 6.4 oz (90.9 kg)     Temp Readings from Last 3 Encounters:   19 98.4 °F (36.9 °C) (Oral)   18 98 °F (36.7 °C) (Oral)   18 98.6 °F (37 °C)     BP Readings from Last 3 Encounters:   19 110/80   18 130/90   18 133/87     Pulse Readings from Last 3 Encounters:   19 66   18 95   18 98         Learning Assessment:  :     Learning Assessment 2016   PRIMARY LEARNER Patient Patient Patient   HIGHEST LEVEL OF EDUCATION - PRIMARY LEARNER  - - SOME COLLEGE   BARRIERS PRIMARY LEARNER - - NONE   CO-LEARNER CAREGIVER - - No   PRIMARY LANGUAGE ENGLISH ENGLISH ENGLISH   LEARNER PREFERENCE PRIMARY LISTENING - DEMONSTRATION   ANSWERED BY patient patient self   RELATIONSHIP SELF SELF SELF       Depression Screening:  :     PHQ over the last two weeks 2018   Little interest or pleasure in doing things Not at all   Feeling down, depressed, irritable, or hopeless Several days   Total Score PHQ 2 1       Fall Risk Assessment:  :     Fall Risk Assessment, last 12 mths 2018   Able to walk? Yes   Fall in past 12 months? No       Abuse Screening:  :     Abuse Screening Questionnaire 2018   Do you ever feel afraid of your partner? N N   Are you in a relationship with someone who physically or mentally threatens you? N N   Is it safe for you to go home?  Y Y       Coordination of Care Questionnaire:  :     1) Have you been to an emergency room, urgent care clinic since your last visit? no   Hospitalized since your last visit? no             2) Have you seen or consulted any other health care providers outside of [address] [place] [address] since your last visit? no  (Include any pap smears or colon screenings in this section.)    3) Do you have an Advance Directive on file? no  Are you interested in receiving information about Advance Directives? no    Reviewed record in preparation for visit and have obtained necessary documentation. Medication reconciliation up to date and corrected with patient at this time.

## 2019-02-05 ENCOUNTER — PATIENT OUTREACH (OUTPATIENT)
Dept: ONCOLOGY | Age: 54
End: 2019-02-05

## 2019-02-18 ENCOUNTER — TELEPHONE (OUTPATIENT)
Dept: FAMILY MEDICINE CLINIC | Age: 54
End: 2019-02-18

## 2019-02-20 ENCOUNTER — TELEPHONE (OUTPATIENT)
Dept: FAMILY MEDICINE CLINIC | Age: 54
End: 2019-02-20

## 2019-02-20 DIAGNOSIS — F34.1 DYSTHYMIA: Primary | ICD-10-CM

## 2019-02-20 NOTE — TELEPHONE ENCOUNTER
Pt needs a referral put in the system for Shandra Bradley at [facility] and then needs the insurance referral done because pt has [address]

## 2019-03-01 ENCOUNTER — HOSPITAL ENCOUNTER (OUTPATIENT)
Age: 54
Setting detail: OUTPATIENT SURGERY
Discharge: HOME | End: 2019-03-01
Attending: INTERNAL MEDICINE | Admitting: INTERNAL MEDICINE
Payer: MEDICARE

## 2019-03-01 ENCOUNTER — ANESTHESIA (OUTPATIENT)
Dept: ENDOSCOPY | Age: 54
End: 2019-03-01
Payer: MEDICARE

## 2019-03-01 ENCOUNTER — ANESTHESIA EVENT (OUTPATIENT)
Dept: ENDOSCOPY | Age: 54
End: 2019-03-01
Payer: MEDICARE

## 2019-03-01 VITALS
BODY MASS INDEX: 33.63 KG/M2 | HEART RATE: 80 BPM | DIASTOLIC BLOOD PRESSURE: 80 MMHG | SYSTOLIC BLOOD PRESSURE: 134 MMHG | RESPIRATION RATE: 26 BRPM | OXYGEN SATURATION: 96 % | HEIGHT: 64 IN | WEIGHT: 197 LBS

## 2019-03-01 PROCEDURE — 74011250636 HC RX REV CODE- 250/636

## 2019-03-01 PROCEDURE — 77030013992 HC SNR POLYP ENDOSC BSC -B: Performed by: INTERNAL MEDICINE

## 2019-03-01 PROCEDURE — 77030027957 HC TBNG IRR ENDOGTR BUSS -B: Performed by: INTERNAL MEDICINE

## 2019-03-01 PROCEDURE — 76060000032 HC ANESTHESIA 0.5 TO 1 HR: Performed by: INTERNAL MEDICINE

## 2019-03-01 PROCEDURE — 77030009426 HC FCPS BIOP ENDOSC BSC -B: Performed by: INTERNAL MEDICINE

## 2019-03-01 PROCEDURE — 88305 TISSUE EXAM BY PATHOLOGIST: CPT

## 2019-03-01 PROCEDURE — 88342 IMHCHEM/IMCYTCHM 1ST ANTB: CPT

## 2019-03-01 PROCEDURE — 76040000007: Performed by: INTERNAL MEDICINE

## 2019-03-01 RX ORDER — PROPOFOL 10 MG/ML
INJECTION, EMULSION INTRAVENOUS AS NEEDED
Status: DISCONTINUED | OUTPATIENT
Start: 2019-03-01 | End: 2019-03-01 | Stop reason: HOSPADM

## 2019-03-01 RX ORDER — EPINEPHRINE 0.1 MG/ML
1 INJECTION INTRACARDIAC; INTRAVENOUS
Status: DISCONTINUED | OUTPATIENT
Start: 2019-03-01 | End: 2019-03-01 | Stop reason: HOSPADM

## 2019-03-01 RX ORDER — ATROPINE SULFATE 0.1 MG/ML
0.5 INJECTION INTRAVENOUS
Status: DISCONTINUED | OUTPATIENT
Start: 2019-03-01 | End: 2019-03-01 | Stop reason: HOSPADM

## 2019-03-01 RX ORDER — NALOXONE HYDROCHLORIDE 0.4 MG/ML
0.4 INJECTION, SOLUTION INTRAMUSCULAR; INTRAVENOUS; SUBCUTANEOUS
Status: DISCONTINUED | OUTPATIENT
Start: 2019-03-01 | End: 2019-03-01 | Stop reason: HOSPADM

## 2019-03-01 RX ORDER — SODIUM CHLORIDE 0.9 % (FLUSH) 0.9 %
5-40 SYRINGE (ML) INJECTION EVERY 8 HOURS
Status: DISCONTINUED | OUTPATIENT
Start: 2019-03-01 | End: 2019-03-01 | Stop reason: HOSPADM

## 2019-03-01 RX ORDER — LIDOCAINE HYDROCHLORIDE 20 MG/ML
INJECTION, SOLUTION INFILTRATION; PERINEURAL AS NEEDED
Status: DISCONTINUED | OUTPATIENT
Start: 2019-03-01 | End: 2019-03-01 | Stop reason: HOSPADM

## 2019-03-01 RX ORDER — SODIUM CHLORIDE 9 MG/ML
INJECTION, SOLUTION INTRAVENOUS
Status: DISCONTINUED | OUTPATIENT
Start: 2019-03-01 | End: 2019-03-01 | Stop reason: HOSPADM

## 2019-03-01 RX ORDER — SODIUM CHLORIDE 9 MG/ML
50 INJECTION, SOLUTION INTRAVENOUS CONTINUOUS
Status: DISCONTINUED | OUTPATIENT
Start: 2019-03-01 | End: 2019-03-01 | Stop reason: HOSPADM

## 2019-03-01 RX ORDER — MIDAZOLAM HYDROCHLORIDE 1 MG/ML
.25-1 INJECTION, SOLUTION INTRAMUSCULAR; INTRAVENOUS
Status: DISCONTINUED | OUTPATIENT
Start: 2019-03-01 | End: 2019-03-01 | Stop reason: HOSPADM

## 2019-03-01 RX ORDER — DEXTROMETHORPHAN/PSEUDOEPHED 2.5-7.5/.8
1.2 DROPS ORAL
Status: DISCONTINUED | OUTPATIENT
Start: 2019-03-01 | End: 2019-03-01 | Stop reason: HOSPADM

## 2019-03-01 RX ORDER — FENTANYL CITRATE 50 UG/ML
100 INJECTION, SOLUTION INTRAMUSCULAR; INTRAVENOUS
Status: DISCONTINUED | OUTPATIENT
Start: 2019-03-01 | End: 2019-03-01 | Stop reason: HOSPADM

## 2019-03-01 RX ORDER — SODIUM CHLORIDE 0.9 % (FLUSH) 0.9 %
5-40 SYRINGE (ML) INJECTION AS NEEDED
Status: DISCONTINUED | OUTPATIENT
Start: 2019-03-01 | End: 2019-03-01 | Stop reason: HOSPADM

## 2019-03-01 RX ORDER — FLUMAZENIL 0.1 MG/ML
0.2 INJECTION INTRAVENOUS
Status: DISCONTINUED | OUTPATIENT
Start: 2019-03-01 | End: 2019-03-01 | Stop reason: HOSPADM

## 2019-03-01 RX ADMIN — SODIUM CHLORIDE: 9 INJECTION, SOLUTION INTRAVENOUS at 14:39

## 2019-03-01 RX ADMIN — PROPOFOL 50 MG: 10 INJECTION, EMULSION INTRAVENOUS at 15:08

## 2019-03-01 RX ADMIN — PROPOFOL 50 MG: 10 INJECTION, EMULSION INTRAVENOUS at 15:16

## 2019-03-01 RX ADMIN — PROPOFOL 50 MG: 10 INJECTION, EMULSION INTRAVENOUS at 15:23

## 2019-03-01 RX ADMIN — PROPOFOL 50 MG: 10 INJECTION, EMULSION INTRAVENOUS at 15:14

## 2019-03-01 RX ADMIN — PROPOFOL 50 MG: 10 INJECTION, EMULSION INTRAVENOUS at 15:01

## 2019-03-01 RX ADMIN — PROPOFOL 50 MG: 10 INJECTION, EMULSION INTRAVENOUS at 15:05

## 2019-03-01 RX ADMIN — PROPOFOL 100 MG: 10 INJECTION, EMULSION INTRAVENOUS at 14:57

## 2019-03-01 RX ADMIN — PROPOFOL 50 MG: 10 INJECTION, EMULSION INTRAVENOUS at 14:58

## 2019-03-01 RX ADMIN — PROPOFOL 50 MG: 10 INJECTION, EMULSION INTRAVENOUS at 15:19

## 2019-03-01 RX ADMIN — PROPOFOL 50 MG: 10 INJECTION, EMULSION INTRAVENOUS at 15:11

## 2019-03-01 RX ADMIN — LIDOCAINE HYDROCHLORIDE 80 MG: 20 INJECTION, SOLUTION INFILTRATION; PERINEURAL at 14:55

## 2019-03-01 NOTE — PROCEDURES
118 [facility].  217 [facility] [address], [address]   [phone]                           Colonoscopy and EGD Procedure Note      Indications:  Epigastric pain, + FOBT      :  Marvel Montgomery MD    Referring Provider: Linsey Meza MD    Sedation:  MAC anesthesia    Procedure Details:  After informed consent was obtained with all risks and benefits of procedure explained and pre-operative exam completed, pt was placed in the left lateral decubitus position. Following sequential administration of sedation as per above, the gastroscope was inserted into the mouth and advanced under direct vision to second portion of the duodenum. A careful inspection was made as the gastroscope was withdrawn, including a retroflexed view of the proximal stomach; findings and interventions are described below. EGD Findings:  Esophagus:normal  Stomach:diffuse mild gastritis, most predominant in antrum. Biopsies obtained. Two small (3-4 mm) antral nodules, biopsies obtained  Duodenum/jejunum:normal    EGD Interventions:  biopsies    The bed was then turned and upon sequential sedation as per above, a digital rectal exam was performed per below. The Olympus videocolonoscope was inserted in the rectum and carefully advanced to the cecum, which was identified by the ileocecal valve and appendiceal orifice. The quality of preparation was good. Custar Bowel Prep Score  3/3/2. The colonoscope was slowly withdrawn with careful evaluation between folds. Retroflexion in the rectum was performed.      Colon Findings:   Rectum:normal  Sigmoid:  mild diverticulosis  Descending Colon: mild diverticulosis  Transverse Colon: 4 mm sessile polyp, removed with cold snare, few diverticula  Ascending Colon: 7 mm sessile polyp at hepatic flexure, removed with cold snare, few diverticula  Cecum: normal    Colonoscopy Interventions:  Polypectomy via snare           Specimens Removed:    ID Type Source Tests Collected by Time Destination   1 : Random Gastric bx Preservative   Nydia Pérez MD 3/1/2019 1500 Pathology   2 : Antral Nodule bx Pressunative   Nydia Pérez MD 3/1/2019 1502 Pathology   3 : Right Sided Colon Polyps Preservative   Nydia Pérez MD 3/1/2019 1523 Pathology       Complications: None. EBL:  Minimal    Impression:    See Postoperative diagnosis above    Recommendations:   - Await pathology. You should receive a letter within 2 weeks. - Resume normal medications.  - Recommend repeat colonoscopy in 5 years. Discharge Disposition:  Home in the company of a  when able to ambulate.     Nori Mobley MD  3/1/2019  3:35 PM

## 2019-03-01 NOTE — PROGRESS NOTES
Received report from anesthesia staff on vital signs and status of patient.  
 
Scopes precleaned at bedside by Tao Rosas

## 2019-03-01 NOTE — DISCHARGE INSTRUCTIONS
118 [facility]  217 [facility] [address], [address]  [address]   [id number]  1965    It was my pleasure seeing you for your procedure. You will also receive a summary report with the findings from this procedure and any further recommendations. If you had polyps removed or biopsies taken during your procedure, you will receive a separate letter from me within the next 2 weeks. If you don't receive this letter or if you have any questions, please call my office [phone]. Please take note of the post procedure instructions listed below. Derek Ray,    Dr. Branden Courtney    These instructions give you information on caring for yourself after your procedure. Call your doctor if you have any problems or questions after your procedure. HOME CARE  · Walk if you have belly cramping or gas. Walking will help get rid of the air and reduce the bloated feeling in your belly (abdomen). · Your IV site (where you received drugs) may be tender to touch. Place warm towels on the site; keep your arm up on two pillows if you have any swelling or soreness in the area. · You may shower. ACTIVITY:  · Take frequent rest periods and move at a slower pace for the next 24 hours. .  · You may resume your regular activity tomorrow if you are feeling back to normal.  · Do not drive or ride a bicycle for at least 24 hours (because of the medicine (anesthesia) used during the test). · Do not sign any important legal documents or use or operate any machinery for 24 hours  · Do not take sleeping medicines/nerve drugs for 24 hours unless the doctor tells you. · You can return to work/school tomorrow unless otherwise instructed. NUTRITION:  · Drink plenty of fluids to keep your pee (urine) clear or pale yellow  · Begin with a light meal and progress to your normal diet.  Heavy or fried foods are harder to digest and may make you feel sick to your stomach (nauseated). · Once you are feeling back to normal, you may resume your normal diet as instructed by your doctor. · Avoid alcoholic beverages for 24 hours or as instructed. IF YOU HAD BIOPSIES TAKEN OR POLYPS REMOVED DURING THE PROCEDURE:  · For the next 7 days, avoid all non-steroidal antiinflammatory medications such as Ibuprofen, Motrin, Advil, Alleve, Regine-seltzer, Goody's powder, BC powder. · If you do not have an heart condition that requires you to take a daily aspirin, you should avoid taking aspirin for 7 days. · Eat a soft diet for 24 hours. · Monitor your stools for any blood or dark black, tar-like, stools as this may be a sign of bleeding and if you see any blood, notify your doctor immediately. GET HELP RIGHT AWAY AND SEEK IMMEDIATE MEDICAL CARE IF:  · You have more than a spotting of blood in your stool. · You pass clumps of tissue (blood clots) or fill the toilet with blood. · Your belly is painfully swollen or puffy (abdominal distention). · You throw up (vomit). · You have a fever. · You have redness, pain or swelling at the IV site that last greater than two days. · You have abdominal pain or discomfort that is severe or gets worse throughout the day. Post-procedure diagnosis:    Gastritis  Antral Nodule  Diverticulosis  Colon Polyps    EGD Findings:  Esophagus:normal  Stomach:diffuse mild gastritis, most predominant in antrum. Biopsies obtained. Two small (3-4 mm) antral nodules, biopsies obtained  Duodenum/jejunum:normal    Colon Findings:   Rectum:normal  Sigmoid:  mild diverticulosis  Descending Colon: mild diverticulosis  Transverse Colon: 4 mm sessile polyp, removed with cold snare, few diverticula  Ascending Colon: 7 mm sessile polyp at hepatic flexure, removed with cold snare, few diverticula  Cecum: normal    Recommendations:   - Await pathology. You should receive a letter within 2 weeks. - Resume normal medications.   - Recommend repeat colonoscopy in 5 years.

## 2019-03-01 NOTE — ANESTHESIA POSTPROCEDURE EVALUATION
Procedure(s): ESOPHAGOGASTRODUODENOSCOPY (EGD) COLONOSCOPY 
ESOPHAGOGASTRODUODENAL (EGD) BIOPSY ENDOSCOPIC POLYPECTOMY. Anesthesia Post Evaluation Multimodal analgesia: multimodal analgesia not used between 6 hours prior to anesthesia start to PACU discharge Patient location during evaluation: PACU Patient participation: complete - patient participated Level of consciousness: awake Pain score: 0 Pain management: adequate Airway patency: patent Anesthetic complications: no 
Cardiovascular status: acceptable Respiratory status: acceptable Hydration status: acceptable Comments: I have evaluated the patient and meets criteria for discharge from PACU. Caleb Martínez MD 
 
 
 
Visit Vitals /82 Pulse 97 Resp 16 Ht 5' 3.5\" (1.613 m) Wt 89.4 kg (197 lb) SpO2 98% Breastfeeding? No  
BMI 34.35 kg/m²

## 2019-03-01 NOTE — ANESTHESIA PREPROCEDURE EVALUATION
Anesthetic History No history of anesthetic complications Review of Systems / Medical History Patient summary reviewed, nursing notes reviewed and pertinent labs reviewed Pulmonary Within defined limits COPD Sleep apnea Neuro/Psych Within defined limits Cardiovascular Within defined limits Hypertension Dysrhythmias GI/Hepatic/Renal 
Within defined limits GERD Endo/Other Within defined limits Diabetes Morbid obesity and arthritis Other Findings Physical Exam 
 
Airway Mallampati: II 
TM Distance: > 6 cm Neck ROM: normal range of motion Mouth opening: Normal 
 
 Cardiovascular Regular rate and rhythm,  S1 and S2 normal,  no murmur, click, rub, or gallop Dental 
No notable dental hx Pulmonary Breath sounds clear to auscultation Abdominal 
GI exam deferred Other Findings Anesthetic Plan ASA: 3 Anesthesia type: MAC Anesthetic plan and risks discussed with: Patient

## 2019-03-01 NOTE — ROUTINE PROCESS
Lord Mares 1965 
[id number] Situation: 
Verbal report received from: BAY Machado. Procedure: Procedure(s): ESOPHAGOGASTRODUODENOSCOPY (EGD) COLONOSCOPY 
ESOPHAGOGASTRODUODENAL (EGD) BIOPSY ENDOSCOPIC POLYPECTOMY Background: 
 
Preoperative diagnosis: Abdominal pain Postoperative diagnosis: Gastritis Antral Nodule Diverticulosis Colon Polyps :  Dr. Li Zavala Assistant(s): Endoscopy Technician-1: Elisa Meeks Endoscopy RN-1: Shnadra Kimball RN Specimens:  
ID Type Source Tests Collected by Time Destination 1 : Random Gastric bx Preservative   Shaniqua Blanton MD 3/1/2019 1500 Pathology 2 : Antral Nodule bx Preservative   Shaniqua Blanton MD 3/1/2019 1502 Pathology 3 : Right Sided Colon Polyps Preservative   Shaniqua Blanton MD 3/1/2019 1523 Pathology H. Pylori  no Assessment: 
Intra-procedure medications Anesthesia gave intra-procedure sedation and medications, see anesthesia flow sheet yes Intravenous fluids:  400  NS @ Kristofer Oris Vital signs stable yes Abdominal assessment: round and soft yes Recommendation: 
Discharge patient per MD order yes. Return to floor no Family or Friend : brother Permission to share finding with family or friend yes

## 2019-03-01 NOTE — H&P
[address] 
217 [facility] [address] [facility], [address] 
[phone] History and Physical  
 
NAME: Maico Zamora :  1965 MRN:  [id number] HPI:  The patient was seen and examined. Past Surgical History:  
Procedure Laterality Date  CARDIAC SURG PROCEDURE UNLIST   ABLATION  
 HX BREAST BIOPSY Bilateral   
 benign [address] SEPTUM SCRAPED  HX HYSTERECTOMY    HX KNEE ARTHROSCOPY Bilateral   HX KNEE ARTHROSCOPY Left   HX KNEE ARTHROSCOPY Right   HX KNEE REPLACEMENT Left   HX KNEE REPLACEMENT Right 2016  HX TUBAL LIGATION  1989  
 NEUROLOGICAL PROCEDURE UNLISTED    
 lumbar nerve block Past Medical History:  
Diagnosis Date  Arrhythmia TACHYCARDIA  Arthritis  Diabetes ([address]  GERD (gastroesophageal reflux disease)  Hypertension  Psychiatric disorder PANIC ATTACKS  Sleep apnea Social History Tobacco Use  Smoking status: Current Every Day Smoker Packs/day: 1.00 Years: 40.00 Pack years: 40.00  Smokeless tobacco: Never Used Substance Use Topics  Alcohol use: No  
 Drug use: No  
 
Allergies Allergen Reactions  Mobic [Meloxicam] Hives  Penicillins Unknown (comments) DOESN'T REMEMBER  
 Plaquenil [Hydroxychloroquine] Hives Family History Problem Relation Age of Onset 24 \[facility] [place] [facility]\"" Arthritis-osteo Mother  Psychiatric Disorder Father  Arthritis-osteo Father  Alcohol abuse Father  Heart Disease Father  Heart Surgery Father  Hypertension Father  Diabetes Brother  Arthritis-osteo Brother  Hypertension Brother  Anesth Problems Neg Hx No current facility-administered medications for this encounter. PHYSICAL EXAM: 
General: WD, WN. Alert, cooperative, no acute distress   
HEENT: NC, Atraumatic. PERRLA, EOMI. Anicteric sclerae. Lungs:  CTA Bilaterally. No Wheezing/Rhonchi/Rales. Heart:  Regular  rhythm,  No murmur, No Rubs, No Gallops Abdomen: Soft, Non distended, Non tender.  +Bowel sounds, no HSM Extremities: No c/c/e Neurologic:  CN 2-12 gi, Alert and oriented X 3. No acute neurological distress Psych:   Good insight. Not anxious nor agitated. The heart, lungs and mental status were satisfactory for the administration of MAC sedation and for the procedure. Mallampati score: 2 Assessment:  
· Epigastric pain, positive FOB Plan:  
· Endoscopic procedure :EGD and colonoscopy · MAC sedation

## 2019-03-18 ENCOUNTER — OFFICE VISIT (OUTPATIENT)
Dept: FAMILY MEDICINE CLINIC | Age: 54
End: 2019-03-18

## 2019-03-18 VITALS
SYSTOLIC BLOOD PRESSURE: 134 MMHG | WEIGHT: 196.2 LBS | HEIGHT: 64 IN | DIASTOLIC BLOOD PRESSURE: 86 MMHG | OXYGEN SATURATION: 96 % | TEMPERATURE: 97.9 F | HEART RATE: 81 BPM | RESPIRATION RATE: 20 BRPM | BODY MASS INDEX: 33.49 KG/M2

## 2019-03-18 DIAGNOSIS — M47.12 SPONDYLOSIS OF CERVICAL SPINE WITH MYELOPATHY AND RADICULOPATHY: ICD-10-CM

## 2019-03-18 DIAGNOSIS — Z13.9 SCREENING FOR CONDITION: ICD-10-CM

## 2019-03-18 DIAGNOSIS — Z23 ENCOUNTER FOR IMMUNIZATION: ICD-10-CM

## 2019-03-18 DIAGNOSIS — Z91.09 ENVIRONMENTAL ALLERGIES: ICD-10-CM

## 2019-03-18 DIAGNOSIS — R92.8 ABNORMAL MAMMOGRAM OF LEFT BREAST: ICD-10-CM

## 2019-03-18 DIAGNOSIS — J44.9 CHRONIC OBSTRUCTIVE PULMONARY DISEASE, UNSPECIFIED COPD TYPE (HCC): ICD-10-CM

## 2019-03-18 DIAGNOSIS — I10 ESSENTIAL HYPERTENSION WITH GOAL BLOOD PRESSURE LESS THAN 140/90: ICD-10-CM

## 2019-03-18 DIAGNOSIS — K29.30 CHRONIC SUPERFICIAL GASTRITIS WITHOUT BLEEDING: ICD-10-CM

## 2019-03-18 DIAGNOSIS — M47.22 SPONDYLOSIS OF CERVICAL SPINE WITH MYELOPATHY AND RADICULOPATHY: ICD-10-CM

## 2019-03-18 DIAGNOSIS — Z79.899 ENCOUNTER FOR LONG-TERM CURRENT USE OF MEDICATION: ICD-10-CM

## 2019-03-18 DIAGNOSIS — F34.1 DYSTHYMIA: ICD-10-CM

## 2019-03-18 DIAGNOSIS — R19.7 DIARRHEA, UNSPECIFIED TYPE: ICD-10-CM

## 2019-03-18 DIAGNOSIS — E11.40 TYPE 2 DIABETES MELLITUS WITH DIABETIC NEUROPATHY, WITHOUT LONG-TERM CURRENT USE OF INSULIN (HCC): Primary | ICD-10-CM

## 2019-03-18 DIAGNOSIS — D12.6 COLON ADENOMA: ICD-10-CM

## 2019-03-18 DIAGNOSIS — G25.81 RLS (RESTLESS LEGS SYNDROME): ICD-10-CM

## 2019-03-18 DIAGNOSIS — M47.816 SPONDYLOSIS OF LUMBAR SPINE: ICD-10-CM

## 2019-03-18 LAB
ALBUMIN UR QL STRIP: 30 MG/L
BILIRUB UR QL STRIP: NEGATIVE
CREATININE, URINE POC: 200 MG/DL
GLUCOSE UR-MCNC: NEGATIVE MG/DL
KETONES P FAST UR STRIP-MCNC: NEGATIVE MG/DL
MICROALBUMIN/CREAT RATIO POC: <30 MG/G
PH UR STRIP: 7.5 [PH] (ref 4.6–8)
PROT UR QL STRIP: NEGATIVE
SP GR UR STRIP: 1.01 (ref 1–1.03)
UA UROBILINOGEN AMB POC: NORMAL (ref 0.2–1)
URINALYSIS CLARITY POC: NORMAL
URINALYSIS COLOR POC: YELLOW
URINE BLOOD POC: NEGATIVE
URINE LEUKOCYTES POC: NEGATIVE
URINE NITRITES POC: NEGATIVE

## 2019-03-18 RX ORDER — OMEPRAZOLE 40 MG/1
40 CAPSULE, DELAYED RELEASE ORAL
COMMUNITY
Start: 2019-03-01 | End: 2020-03-12 | Stop reason: SDUPTHER

## 2019-03-18 NOTE — PATIENT INSTRUCTIONS
Diabetes Foot Health: Care Instructions  Your Care Instructions    When you have diabetes, your feet need extra care and attention. Diabetes can damage the nerve endings and blood vessels in your feet, making you less likely to notice when your feet are injured. Diabetes also limits your body's ability to fight infection and get blood to areas that need it. If you get a minor foot injury, it could become an ulcer or a serious infection. With good foot care, you can prevent most of these problems. Caring for your feet can be quick and easy. Most of the care can be done when you are bathing or getting ready for bed. Follow-up care is a key part of your treatment and safety. Be sure to make and go to all appointments, and call your doctor if you are having problems. It's also a good idea to know your test results and keep a list of the medicines you take. How can you care for yourself at home? · Keep your blood sugar close to normal by watching what and how much you eat, monitoring blood sugar, taking medicines if prescribed, and getting regular exercise. · Do not smoke. Smoking affects blood flow and can make foot problems worse. If you need help quitting, talk to your doctor about stop-smoking programs and medicines. These can increase your chances of quitting for good. · Eat a diet that is low in fats. High fat intake can cause fat to build up in your blood vessels and decrease blood flow. · Inspect your feet daily for blisters, cuts, cracks, or sores. If you cannot see well, use a mirror or have someone help you. · Take care of your feet:  ? Wash your feet every day. Use warm (not hot) water. Check the water temperature with your wrists or other part of your body, not your feet. ? Dry your feet well. Pat them dry. Do not rub the skin on your feet too hard. Dry well between your toes. If the skin on your feet stays moist, bacteria or a fungus can grow, which can lead to infection. ?  Keep your skin soft. Use moisturizing skin cream to keep the skin on your feet soft and prevent calluses and cracks. But do not put the cream between your toes, and stop using any cream that causes a rash. ? Clean underneath your toenails carefully. Do not use a sharp object to clean underneath your toenails. Use the blunt end of a nail file or other rounded tool. ? Trim and file your toenails straight across to prevent ingrown toenails. Use a nail clipper, not scissors. Use an emery board to smooth the edges. · Change socks daily. Socks without seams are best, because seams often rub the feet. You can find socks for people with diabetes from specialty catalogs. · Look inside your shoes every day for things like gravel or torn linings, which could cause blisters or sores. · Buy shoes that fit well:  ? Look for shoes that have plenty of space around the toes. This helps prevent bunions and blisters. ? Try on shoes while wearing the kind of socks you will usually wear with the shoes. ? Avoid plastic shoes. They may rub your feet and cause blisters. Good shoes should be made of materials that are flexible and breathable, such as leather or cloth. ? Break in new shoes slowly by wearing them for no more than an hour a day for several days. Take extra time to check your feet for red areas, blisters, or other problems after you wear new shoes. · Do not go barefoot. Do not wear sandals, and do not wear shoes with very thin soles. Thin soles are easy to puncture. They also do not protect your feet from hot pavement or cold weather. · Have your doctor check your feet during each visit. If you have a foot problem, see your doctor. Do not try to treat an early foot problem at home. Home remedies or treatments that you can buy without a prescription (such as corn removers) can be harmful. · Always get early treatment for foot problems. A minor irritation can lead to a major problem if not properly cared for early.   When should you call for help? Call your doctor now or seek immediate medical care if:    · You have a foot sore, an ulcer or break in the skin that is not healing after 4 days, bleeding corns or calluses, or an ingrown toenail.     · You have blue or black areas, which can mean bruising or blood flow problems.     · You have peeling skin or tiny blisters between your toes or cracking or oozing of the skin.     · You have a fever for more than 24 hours and a foot sore.     · You have new numbness or tingling in your feet that does not go away after you move your feet or change positions.     · You have unexplained or unusual swelling of the foot or ankle.    Watch closely for changes in your health, and be sure to contact your doctor if:    · You cannot do proper foot care. Where can you learn more? Go to http://matti-yinka.info/. Enter A739 in the search box to learn more about \"Diabetes Foot Health: Care Instructions. \"  Current as of: July 25, 2018  Content Version: 11.9  © 0886-0816 [employer]. Care instructions adapted under license by [employer] (which disclaims liability or warranty for this information). If you have questions about a medical condition or this instruction, always ask your healthcare professional. [address] any warranty or liability for your use of this information.

## 2019-03-18 NOTE — PROGRESS NOTES
Subjective:     Dilma Hsu is a 46 y.o. female seen for follow up of diabetes. She also has hypertension, hyperlipidemia and obesity. Diabetic Review of Systems - medication compliance: compliant most of the time, diabetic diet compliance: compliant most of the time, last eye exam approximately 1 year ago. Other symptoms and concerns: 1. Had EGD and colonoscopy per Dr Suzanne Aldana. + gastritis, tubular adenoma. 2. Pain clinic. Has received TERRA to L spine. Scheduled for neck injection next week. Not given narcotic. They started her on Topamax and weaning down gabapentin. 3. Request Hep B titer. 4. Bunion R foot. 5. Occasional diarrhea. 6. COPD. Trouble breathing once in a while. Cannot afford steroid inhaler.       Patient Active Problem List    Diagnosis Date Noted    Colon adenoma 03/18/2019    Chronic superficial gastritis without bleeding 03/18/2019    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 12/03/2018    Spondylosis of lumbar spine 12/03/2018    Abnormal mammogram of left breast 09/26/2018    Iliotibial band tendinitis of right side 08/30/2018    Severe obesity (BMI 35.0-39.9) 06/20/2018    Type 2 diabetes mellitus with diabetic neuropathy ([address] 02/14/2018    Status post bilateral knee replacements 11/02/2017    Cigarette nicotine dependence without complication 11/25/0709    Dysthymia 12/30/2016    YOLANDA (obstructive sleep apnea) 12/30/2016    Paroxysmal tachycardia ([address] 09/09/2016    Arthritis 09/09/2016    Leukocytosis 09/09/2016    Essential hypertension with goal blood pressure less than 140/90 09/07/2016    Chronic obstructive pulmonary disease ([address] 09/07/2016    Diabetes mellitus type 2, controlled ([address] 09/07/2016    Polyneuropathy associated with underlying disease ([address] 09/07/2016    RLS (restless legs syndrome) 09/07/2016    Primary osteoarthritis of right knee 08/18/2016     Current Outpatient Medications   Medication Sig Dispense Refill    omeprazole (PRILOSEC) 40 mg capsule Take 40 mg by mouth Before breakfast and dinner.  GARLIC EXTRACT PO Take  by mouth.  pneumococcal 23-david ps vaccine (PNEUMOVAX 23) 25 mcg/0.5 mL injection 0.5 mL by IntraMUSCular route once for 1 dose. 0.5 mL 0    varicella-zoster recombinant, PF, (SHINGRIX, PF,) 50 mcg/0.5 mL susr injection 0.5mL by IntraMUSCular route once now and then repeat in 2-6 months 0.5 mL 1    tiotropium (SPIRIVA) 18 mcg inhalation capsule Take 1 Cap by inhalation daily. 90 Cap 1    topiramate (TOPAMAX) 25 mg tablet Take 1 Tab by mouth two (2) times a day. 180 Tab 0    venlafaxine-SR (EFFEXOR-XR) 150 mg capsule TAKE 1 CAPSULE EVERY DAY 90 Cap 1    rOPINIRole (REQUIP) 2 mg tablet Take 1 Tab by mouth Before breakfast, lunch, and dinner. TAKE ONE TABLET BY MOUTH TWO TIMES A DAY (Patient taking differently: Take 2 mg by mouth Before breakfast, lunch, and dinner.) 270 Tab 1    losartan (COZAAR) 100 mg tablet TAKE 1 TABLET EVERY DAY 90 Tab 1    atorvastatin (LIPITOR) 40 mg tablet TAKE ONE TABLET BY MOUTH AT NIGHT 90 Tab 1    dilTIAZem CD (CARDIZEM CD) 180 mg ER capsule TAKE ONE CAPSULE BY MOUTH EVERY DAY 90 Cap 1    metFORMIN (GLUCOPHAGE) 500 mg tablet TAKE TWO TABLETS BY MOUTH TWO TIMES A DAY WITH MEALS 360 Tab 1    gabapentin (NEURONTIN) 600 mg tablet TAKE ONE TABLET BY MOUTH FOUR TIMES A DAY (Patient taking differently: 600 mg two (2) times a day. TAKE ONE TABLET BY MOUTH FOUR TIMES A DAY) 360 Tab 0    TURMERIC ROOT EXTRACT PO Take 1 Cap by mouth daily.  L GASSERI/B BIFIDUM/B LONGUM ([facility] COLON HEALTH PO) Take  by mouth daily.  cinnamon bark 500 mg cap Take 1,000 mg by mouth two (2) times a day.  furosemide (LASIX) 20 mg tablet TAKE 1 TABLET EVERY DAY AS NEEDED 90 Tab 1    Aspirin-Caffeine (BC ARTHRITIS) 1,000-65 mg pwpk Take  by mouth.  Blood-Glucose Meter (ACCU-CHEK AJAY PLUS METER) misc Test blood sugar daily.  E 11.9 1 Each 0    LORazepam (ATIVAN) 0.5 mg tablet Take 1 Tab by mouth two (2) times daily as needed for Anxiety. Max Daily Amount: 1 mg. 180 Tab 0    Blood-Glucose Meter monitoring kit E11.9 Accu-Chek Joyce Plus Meter 1 Kit 0    glucose blood VI test strips (BLOOD GLUCOSE TEST) strip Check Daily E11.9 Accu-Chek Joyce Plus Test Strips 100 Strip 3    benzonatate (TESSALON) 100 mg capsule Take 1 Cap by mouth three (3) times daily as needed for Cough.  61 Cap 3     Allergies   Allergen Reactions    Mobic [Meloxicam] Hives    Penicillins Unknown (comments)     DOESN'T REMEMBER    Plaquenil [Hydroxychloroquine] Hives     Past Medical History:   Diagnosis Date    Arrhythmia     TACHYCARDIA    Arthritis     Diabetes ([address]     GERD (gastroesophageal reflux disease)     Hypertension     Psychiatric disorder     PANIC ATTACKS    Sleep apnea      Past Surgical History:   Procedure Laterality Date    CARDIAC SURG PROCEDURE UNLIST  2013    ABLATION    COLONOSCOPY N/A 3/1/2019    COL[facility] ENDOSCOPY    HX BREAST BIOPSY Bilateral     benign    HX CHOLECYSTECTOMY  1998    HX HEENT  1993    SEPTUM SCRAPED    HX HYSTERECTOMY  2008    HX KNEE ARTHROSCOPY Bilateral 2012    HX KNEE ARTHROSCOPY Left 2012    HX KNEE ARTHROSCOPY Right 2014    HX KNEE REPLACEMENT Left 2012    HX KNEE REPLACEMENT Right 08/18/2016    HX TUBAL LIGATION  1989    NEUROLOGICAL PROCEDURE UNLISTED      lumbar nerve block     Family History   Problem Relation Age of Onset    Arthritis-osteo Mother     Psychiatric Disorder Father     Arthritis-osteo Father     Alcohol abuse Father     Heart Disease Father     Heart Surgery Father     Hypertension Father     Diabetes Brother     Arthritis-osteo Brother     Hypertension Brother     Anesth Problems Neg Hx      Social History     Tobacco Use    Smoking status: Current Every Day Smoker     Packs/day: 1.00     Years: 40.00     Pack years: 40.00    Smokeless tobacco: Never Used Substance Use Topics    Alcohol use: Yes     Alcohol/week: 3.6 - 4.8 oz     Types: 6 - 8 Shots of liquor per week             Review of Systems  Pertinent items are noted in HPI. Objective:     Visit Vitals  /86 (BP 1 Location: Right arm, BP Patient Position: Sitting) Comment: manual   Pulse 81   Temp 97.9 °F (36.6 °C) (Oral)   Resp 20   Ht 5' 3.5\" (1.613 m)   Wt 196 lb 3.2 oz (89 kg)   SpO2 96%   BMI 34.21 kg/m²     Appearance: alert, well appearing, and in no distress and overweight. Exam: heart sounds normal rate, regular rhythm, normal S1, S2, no murmurs, rubs, clicks or gallops, chest clear  Diabetic foot exam:     Left Foot:   Visual Exam: normal    Pulse DP: 2+ (normal)   Filament test: normal sensation          Right Foot:   Visual Exam: bunion   Pulse DP: 2+ (normal)   Filament test: normal sensation        Lab review: orders written for new lab studies as appropriate; see orders. Assessment/Plan:     diabetes , hypertension , hyperlipidemia . Diabetic issues reviewed with her: annual eye examinations at Ophthalmology discussed. ICD-10-CM ICD-9-CM    1. Type 2 diabetes mellitus with diabetic neuropathy, without long-term current use of insulin (HCC) E11.40 250.60 LIPID PANEL     357.2 HEMOGLOBIN A1C WITH EAG      HM DIABETES FOOT EXAM   2. Chronic superficial gastritis without bleeding K29.30 535.10    3. Colon adenoma D12.6 211.3    4. Encounter for immunization Z23 V03.89 pneumococcal 23-david ps vaccine (PNEUMOVAX 23) 25 mcg/0.5 mL injection      varicella-zoster recombinant, PF, (SHINGRIX, PF,) 50 mcg/0.5 mL susr injection   5. Essential hypertension with goal blood pressure less than 140/90 I10 401.9 TSH 3RD GENERATION   6. 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 M47.12 721.1     M47.22     7. Spondylosis of lumbar spine M47.816 721.3    8. RLS (restless legs syndrome) G25.81 333.94    9. Dysthymia F34.1 300.4    10.  Encounter for long-term current use of medication Z79.899 V58.69 CBC WITH AUTOMATED DIFF      METABOLIC PANEL, COMPREHENSIVE   11. Diarrhea, unspecified type R19.7 787.91    12. Screening for condition Z13.9 V82.9 HEPATITIS B SURF AB QUANT   13. Environmental allergies Z91.09 V15.09    14. Chronic obstructive pulmonary disease, unspecified COPD type (HCC) J44.9 496 tiotropium (SPIRIVA) 18 mcg inhalation capsule   15.  Abnormal mammogram of left breast R92.8 793.80 BRUNO MAMMO LT DX INCL CAD

## 2019-03-18 NOTE — PROGRESS NOTES
Identified pt with two pt identifiers(name and ). Chief Complaint   Patient presents with    COPD    Diabetes        Health Maintenance Due   Topic    Pneumococcal 19-64 Medium Risk (1 of 1 - PPSV23)    Shingrix Vaccine Age 50> (1 of 2)       Wt Readings from Last 3 Encounters:   19 196 lb 3.2 oz (89 kg)   19 197 lb (89.4 kg)   19 201 lb (91.2 kg)     Temp Readings from Last 3 Encounters:   19 97.9 °F (36.6 °C) (Oral)   19 98.4 °F (36.9 °C) (Oral)   18 98 °F (36.7 °C) (Oral)     BP Readings from Last 3 Encounters:   19 134/86   19 134/80   19 110/80     Pulse Readings from Last 3 Encounters:   19 81   19 80   19 66         Learning Assessment:  :     Learning Assessment 2016   PRIMARY LEARNER Patient Patient Patient   HIGHEST LEVEL OF EDUCATION - PRIMARY LEARNER  - - SOME COLLEGE   BARRIERS PRIMARY LEARNER - - NONE   CO-LEARNER CAREGIVER - - No   PRIMARY LANGUAGE ENGLISH ENGLISH ENGLISH   LEARNER PREFERENCE PRIMARY LISTENING - DEMONSTRATION   ANSWERED BY patient patient self   RELATIONSHIP SELF SELF SELF       Depression Screening:  :     3 most recent PHQ Screens 2018   Little interest or pleasure in doing things Not at all   Feeling down, depressed, irritable, or hopeless Several days   Total Score PHQ 2 1       Fall Risk Assessment:  :     Fall Risk Assessment, last 12 mths 2018   Able to walk? Yes   Fall in past 12 months? No       Abuse Screening:  :     Abuse Screening Questionnaire 2018   Do you ever feel afraid of your partner? N N   Are you in a relationship with someone who physically or mentally threatens you? N N   Is it safe for you to go home?  Y Y       Coordination of Care Questionnaire:  :     1) Have you been to an emergency room, urgent care clinic since your last visit? no   Hospitalized since your last visit? no             2) Have you seen or consulted any other health care providers outside of [address] [place] [address] since your last visit? Yes Dr Lizette Goyal, pain management   (Include any pap smears or colon screenings in this section.)    3) Do you have an Advance Directive on file? yes  Are you interested in receiving information about Advance Directives? no    Reviewed record in preparation for visit and have obtained necessary documentation. Medication reconciliation up to date and corrected with patient at this time.      Results for orders placed or performed in visit on 03/18/19   AMB POC URINE, MICROALBUMIN, SEMIQUANT (3 RESULTS)   Result Value Ref Range    ALBUMIN, URINE POC 30 Negative mg/L    CREATININE, URINE  mg/dL    Microalbumin/creat ratio (POC) <30 <30 MG/G   AMB POC URINALYSIS DIP STICK AUTO W/O MICRO   Result Value Ref Range    Color (UA POC) Yellow     Clarity (UA POC) Cloudy     Glucose (UA POC) Negative Negative    Bilirubin (UA POC) Negative Negative    Ketones (UA POC) Negative Negative    Specific gravity (UA POC) 1.015 1.001 - 1.035    Blood (UA POC) Negative Negative    pH (UA POC) 7.5 4.6 - 8.0    Protein (UA POC) Negative Negative    Urobilinogen (UA POC) 1 mg/dL 0.2 - 1    Nitrites (UA POC) Negative Negative    Leukocyte esterase (UA POC) Negative Negative

## 2019-03-19 LAB
ALBUMIN SERPL-MCNC: 4.3 G/DL (ref 3.5–5.5)
ALBUMIN/GLOB SERPL: 1.7 {RATIO} (ref 1.2–2.2)
ALP SERPL-CCNC: 240 IU/L (ref 39–117)
ALT SERPL-CCNC: 159 IU/L (ref 0–32)
AST SERPL-CCNC: 54 IU/L (ref 0–40)
BASOPHILS # BLD AUTO: 0.1 X10E3/UL (ref 0–0.2)
BASOPHILS NFR BLD AUTO: 1 %
BILIRUB SERPL-MCNC: <0.2 MG/DL (ref 0–1.2)
BUN SERPL-MCNC: 9 MG/DL (ref 6–24)
BUN/CREAT SERPL: 13 (ref 9–23)
CALCIUM SERPL-MCNC: 9.6 MG/DL (ref 8.7–10.2)
CHLORIDE SERPL-SCNC: 105 MMOL/L (ref 96–106)
CHOLEST SERPL-MCNC: 128 MG/DL (ref 100–199)
CO2 SERPL-SCNC: 23 MMOL/L (ref 20–29)
CREAT SERPL-MCNC: 0.72 MG/DL (ref 0.57–1)
EOSINOPHIL # BLD AUTO: 0.5 X10E3/UL (ref 0–0.4)
EOSINOPHIL NFR BLD AUTO: 4 %
ERYTHROCYTE [DISTWIDTH] IN BLOOD BY AUTOMATED COUNT: 15.4 % (ref 12.3–15.4)
EST. AVERAGE GLUCOSE BLD GHB EST-MCNC: 134 MG/DL
GLOBULIN SER CALC-MCNC: 2.5 G/DL (ref 1.5–4.5)
GLUCOSE SERPL-MCNC: 119 MG/DL (ref 65–99)
HBA1C MFR BLD: 6.3 % (ref 4.8–5.6)
HBV SURFACE AB SER-ACNC: 15.3 MIU/ML
HCT VFR BLD AUTO: 41.3 % (ref 34–46.6)
HDLC SERPL-MCNC: 49 MG/DL
HGB BLD-MCNC: 13.7 G/DL (ref 11.1–15.9)
IMM GRANULOCYTES # BLD AUTO: 0 X10E3/UL (ref 0–0.1)
IMM GRANULOCYTES NFR BLD AUTO: 0 %
INTERPRETATION, 910389: NORMAL
LDLC SERPL CALC-MCNC: 57 MG/DL (ref 0–99)
LYMPHOCYTES # BLD AUTO: 3 X10E3/UL (ref 0.7–3.1)
LYMPHOCYTES NFR BLD AUTO: 22 %
Lab: NORMAL
MCH RBC QN AUTO: 28.5 PG (ref 26.6–33)
MCHC RBC AUTO-ENTMCNC: 33.2 G/DL (ref 31.5–35.7)
MCV RBC AUTO: 86 FL (ref 79–97)
MONOCYTES # BLD AUTO: 0.5 X10E3/UL (ref 0.1–0.9)
MONOCYTES NFR BLD AUTO: 4 %
NEUTROPHILS # BLD AUTO: 9.5 X10E3/UL (ref 1.4–7)
NEUTROPHILS NFR BLD AUTO: 69 %
PLATELET # BLD AUTO: 258 X10E3/UL (ref 150–379)
POTASSIUM SERPL-SCNC: 3.9 MMOL/L (ref 3.5–5.2)
PROT SERPL-MCNC: 6.8 G/DL (ref 6–8.5)
RBC # BLD AUTO: 4.81 X10E6/UL (ref 3.77–5.28)
SODIUM SERPL-SCNC: 146 MMOL/L (ref 134–144)
TRIGL SERPL-MCNC: 108 MG/DL (ref 0–149)
TSH SERPL DL<=0.005 MIU/L-ACNC: 2.25 UIU/ML (ref 0.45–4.5)
VLDLC SERPL CALC-MCNC: 22 MG/DL (ref 5–40)
WBC # BLD AUTO: 13.7 X10E3/UL (ref 3.4–10.8)

## 2019-04-01 DIAGNOSIS — M47.12 SPONDYLOSIS OF CERVICAL SPINE WITH MYELOPATHY AND RADICULOPATHY: ICD-10-CM

## 2019-04-01 DIAGNOSIS — M47.22 SPONDYLOSIS OF CERVICAL SPINE WITH MYELOPATHY AND RADICULOPATHY: ICD-10-CM

## 2019-04-02 RX ORDER — TOPIRAMATE 25 MG/1
TABLET ORAL
Qty: 180 TAB | Refills: 0 | Status: SHIPPED | OUTPATIENT
Start: 2019-04-02 | End: 2019-05-24 | Stop reason: DRUGHIGH

## 2019-04-03 ENCOUNTER — TELEPHONE (OUTPATIENT)
Dept: FAMILY MEDICINE CLINIC | Age: 54
End: 2019-04-03

## 2019-04-03 NOTE — TELEPHONE ENCOUNTER
Patient is calling asking what the best OTC cough syrup is that she can take.     Best contact: [phone]

## 2019-04-22 ENCOUNTER — OFFICE VISIT (OUTPATIENT)
Dept: FAMILY MEDICINE CLINIC | Age: 54
End: 2019-04-22

## 2019-04-22 VITALS
WEIGHT: 190 LBS | BODY MASS INDEX: 32.44 KG/M2 | HEART RATE: 92 BPM | RESPIRATION RATE: 20 BRPM | DIASTOLIC BLOOD PRESSURE: 80 MMHG | TEMPERATURE: 98.2 F | HEIGHT: 64 IN | OXYGEN SATURATION: 97 % | SYSTOLIC BLOOD PRESSURE: 150 MMHG

## 2019-04-22 DIAGNOSIS — R74.8 ABNORMAL LIVER ENZYMES: Primary | ICD-10-CM

## 2019-04-22 DIAGNOSIS — R20.2 TINGLING SENSATION IN FACE: ICD-10-CM

## 2019-04-22 DIAGNOSIS — G25.81 RLS (RESTLESS LEGS SYNDROME): ICD-10-CM

## 2019-04-22 DIAGNOSIS — E66.01 SEVERE OBESITY WITH BODY MASS INDEX (BMI) OF 35.0 TO 39.9 WITH SERIOUS COMORBIDITY (HCC): ICD-10-CM

## 2019-04-22 DIAGNOSIS — I10 ESSENTIAL HYPERTENSION WITH GOAL BLOOD PRESSURE LESS THAN 140/90: ICD-10-CM

## 2019-04-22 DIAGNOSIS — Z91.09 ENVIRONMENTAL ALLERGIES: ICD-10-CM

## 2019-04-22 DIAGNOSIS — F34.1 DYSTHYMIA: ICD-10-CM

## 2019-04-22 DIAGNOSIS — G63 POLYNEUROPATHY ASSOCIATED WITH UNDERLYING DISEASE (HCC): ICD-10-CM

## 2019-04-22 RX ORDER — LEVOCETIRIZINE DIHYDROCHLORIDE 5 MG/1
5 TABLET, FILM COATED ORAL DAILY
Qty: 90 TAB | Refills: 1 | Status: SHIPPED | OUTPATIENT
Start: 2019-04-22 | End: 2019-08-26 | Stop reason: SDUPTHER

## 2019-04-22 RX ORDER — FLUTICASONE PROPIONATE 50 MCG
SPRAY, SUSPENSION (ML) NASAL
Qty: 3 BOTTLE | Refills: 3 | Status: ON HOLD | OUTPATIENT
Start: 2019-04-22 | End: 2021-05-13 | Stop reason: ALTCHOICE

## 2019-04-22 NOTE — PROGRESS NOTES
Identified pt with two pt identifiers(name and ). Chief Complaint   Patient presents with    Abnormal liver tests    Neck Pain     she had shot in it on monday - hurting now    Tingling     face started tingling off and on for a month but the last few days it has been all the time    Medication Evaluation     Dr Stephy Kiran wants to increase topamax         Health Maintenance Due   Topic    Shingrix Vaccine Age 50> (1 of 2)       Wt Readings from Last 3 Encounters:   19 190 lb (86.2 kg)   19 196 lb 3.2 oz (89 kg)   19 197 lb (89.4 kg)     Temp Readings from Last 3 Encounters:   19 98.2 °F (36.8 °C) (Oral)   19 97.9 °F (36.6 °C) (Oral)   19 98.4 °F (36.9 °C) (Oral)     BP Readings from Last 3 Encounters:   19 (!) 138/98   19 134/86   19 134/80     Pulse Readings from Last 3 Encounters:   19 92   19 81   19 80         Learning Assessment:  :     Learning Assessment 2016   PRIMARY LEARNER Patient Patient Patient   HIGHEST LEVEL OF EDUCATION - PRIMARY LEARNER  - - SOME COLLEGE   BARRIERS PRIMARY LEARNER - - NONE   CO-LEARNER CAREGIVER - - No   PRIMARY LANGUAGE ENGLISH ENGLISH ENGLISH   LEARNER PREFERENCE PRIMARY LISTENING - DEMONSTRATION   ANSWERED BY patient patient self   RELATIONSHIP SELF SELF SELF       Depression Screening:  :     3 most recent PHQ Screens 3/18/2019   Little interest or pleasure in doing things More than half the days   Feeling down, depressed, irritable, or hopeless More than half the days   Total Score PHQ 2 4       Fall Risk Assessment:  :     Fall Risk Assessment, last 12 mths 2018   Able to walk? Yes   Fall in past 12 months? No       Abuse Screening:  :     Abuse Screening Questionnaire 3/18/2019 2018 2016   Do you ever feel afraid of your partner? N N N   Are you in a relationship with someone who physically or mentally threatens you? N N N   Is it safe for you to go home?  Teodoro Tobar Y Y       Coordination of Care Questionnaire:  :     1) Have you been to an emergency room, urgent care clinic since your last visit? no   Hospitalized since your last visit? no             2) Have you seen or consulted any other health care providers outside of [address] [place] [address] since your last visit? yes  Dr Nishant Hall (Include any pap smears or colon screenings in this section.)    3) Do you have an Advance Directive on file? yes  Are you interested in receiving information about Advance Directives? no    Reviewed record in preparation for visit and have obtained necessary documentation. Medication reconciliation up to date and corrected with patient at this time.

## 2019-04-22 NOTE — PROGRESS NOTES
HISTORY OF PRESENT ILLNESS  Allegra Flowers is a 46 y.o. female. HPI  FU abn liver function last month. She has abstained from all alcohol since then. Also has stopped taking gabapentin about 3 weeks ago. Pain Clinic gave her injection a week ago to trapezius area. Supposed to adjust up dose Topamax? Since then C/O tingling in face both sides across top and cheeks and above mouth. Review of Systems   Respiratory: Positive for cough. Musculoskeletal: Positive for neck pain. Neurological: Positive for tingling. Visit Vitals  /80 (BP 1 Location: Right arm, BP Patient Position: Sitting)   Pulse 92   Temp 98.2 °F (36.8 °C) (Oral)   Resp 20   Ht 5' 3.5\" (1.613 m)   Wt 190 lb (86.2 kg)   SpO2 97%   BMI 33.13 kg/m²       Physical Exam   Constitutional: She appears well-developed and well-nourished. HENT:   Right Ear: External ear normal.   Left Ear: External ear normal.   Mouth/Throat: Oropharynx is clear and moist.   Neck: Neck supple. Cardiovascular: Normal rate, regular rhythm and normal heart sounds. Pulmonary/Chest: Effort normal and breath sounds normal. She has no wheezes. She has no rales. Vitals reviewed. ASSESSMENT and PLAN    ICD-10-CM ICD-9-CM    1. Abnormal liver enzymes T57.6 264.5 METABOLIC PANEL, COMPREHENSIVE      CANCELED: HEPATIC FUNCTION PANEL   2. Essential hypertension with goal blood pressure less than 140/90 I10 401.9    3. Environmental allergies Z91.09 V15.09 levocetirizine (XYZAL) 5 mg tablet      fluticasone propionate (FLONASE) 50 mcg/actuation nasal spray   4. Polyneuropathy associated with underlying disease ([address] G63 357.4    5. Severe obesity with body mass index (BMI) of 35.0 to 39.9 with serious comorbidity (HCC) E66.01 278.01    6. Tingling sensation in face R20.2 782.0      Draw labs. FU with pain clinician to adjust Topamax.   Monitor BP  Order allergy meds

## 2019-04-23 ENCOUNTER — TELEPHONE (OUTPATIENT)
Dept: FAMILY MEDICINE CLINIC | Age: 54
End: 2019-04-23

## 2019-04-23 LAB
ALBUMIN SERPL-MCNC: 4.2 G/DL (ref 3.5–5.5)
ALBUMIN/GLOB SERPL: 2 {RATIO} (ref 1.2–2.2)
ALP SERPL-CCNC: 136 IU/L (ref 39–117)
ALT SERPL-CCNC: 27 IU/L (ref 0–32)
AST SERPL-CCNC: 16 IU/L (ref 0–40)
BILIRUB SERPL-MCNC: 0.3 MG/DL (ref 0–1.2)
BUN SERPL-MCNC: 7 MG/DL (ref 6–24)
BUN/CREAT SERPL: 12 (ref 9–23)
CALCIUM SERPL-MCNC: 9.8 MG/DL (ref 8.7–10.2)
CHLORIDE SERPL-SCNC: 107 MMOL/L (ref 96–106)
CO2 SERPL-SCNC: 23 MMOL/L (ref 20–29)
CREAT SERPL-MCNC: 0.6 MG/DL (ref 0.57–1)
GLOBULIN SER CALC-MCNC: 2.1 G/DL (ref 1.5–4.5)
GLUCOSE SERPL-MCNC: 104 MG/DL (ref 65–99)
POTASSIUM SERPL-SCNC: 4.6 MMOL/L (ref 3.5–5.2)
PROT SERPL-MCNC: 6.3 G/DL (ref 6–8.5)
SODIUM SERPL-SCNC: 143 MMOL/L (ref 134–144)

## 2019-04-23 RX ORDER — ROPINIROLE 2 MG/1
TABLET, FILM COATED ORAL
Qty: 270 TAB | Refills: 1 | Status: SHIPPED | OUTPATIENT
Start: 2019-04-23 | End: 2019-05-24 | Stop reason: SDUPTHER

## 2019-04-23 RX ORDER — VENLAFAXINE HYDROCHLORIDE 150 MG/1
CAPSULE, EXTENDED RELEASE ORAL
Qty: 90 CAP | Refills: 1 | Status: SHIPPED | OUTPATIENT
Start: 2019-04-23 | End: 2019-09-11 | Stop reason: SDUPTHER

## 2019-05-08 DIAGNOSIS — I10 ESSENTIAL HYPERTENSION WITH GOAL BLOOD PRESSURE LESS THAN 140/90: Primary | ICD-10-CM

## 2019-05-08 RX ORDER — DILTIAZEM HYDROCHLORIDE 180 MG/1
360 CAPSULE, COATED, EXTENDED RELEASE ORAL DAILY
Qty: 180 CAP | Refills: 1 | Status: SHIPPED | OUTPATIENT
Start: 2019-05-08 | End: 2019-05-09 | Stop reason: SDUPTHER

## 2019-05-08 NOTE — TELEPHONE ENCOUNTER
Patient called and stated that instead of taking one diltiazem she has been taking 2 as discussed and now she is completely out.   She is asking if a short script can be sent over to [facility] and then at least 90 day supply to [facility]

## 2019-05-09 DIAGNOSIS — I10 ESSENTIAL HYPERTENSION WITH GOAL BLOOD PRESSURE LESS THAN 140/90: ICD-10-CM

## 2019-05-09 RX ORDER — DILTIAZEM HYDROCHLORIDE 180 MG/1
360 CAPSULE, COATED, EXTENDED RELEASE ORAL DAILY
Qty: 20 CAP | Refills: 1 | Status: SHIPPED | OUTPATIENT
Start: 2019-05-09 | End: 2019-05-19

## 2019-05-09 NOTE — TELEPHONE ENCOUNTER
The pt is calling to see if a short supply of her diltiazem can be sent to the [facility], she states Franco Houser will not have it to her for a week or two and she is out

## 2019-05-15 NOTE — TELEPHONE ENCOUNTER
The pt has still not received this medication from the mail order, when she called they stated they needed more info from the dr office?     It is [facility]

## 2019-05-16 ENCOUNTER — TELEPHONE (OUTPATIENT)
Dept: FAMILY MEDICINE CLINIC | Age: 54
End: 2019-05-16

## 2019-05-16 RX ORDER — LOSARTAN POTASSIUM 100 MG/1
100 TABLET ORAL DAILY
Qty: 10 TAB | Refills: 0 | OUTPATIENT
Start: 2019-05-16 | End: 2019-06-20 | Stop reason: SDUPTHER

## 2019-05-16 RX ORDER — LOSARTAN POTASSIUM 100 MG/1
TABLET ORAL
Qty: 90 TAB | Refills: 1 | Status: SHIPPED | OUTPATIENT
Start: 2019-05-16 | End: 2019-10-02 | Stop reason: SDUPTHER

## 2019-05-16 NOTE — TELEPHONE ENCOUNTER
[facility] and for her to call tomorrow and make sure it was on the way. If not let me know so we could call more in to [address]. She told me her B/P was running 209-117 yesterday and 170/103 11:30 am today - it has been running high. We went over her meds and she has been out of losartan for the last week and half. Call to [facility] now for #10 and [facility] 1 refill. I advised her to go to ED if she felt anything different. We discussed her having a stroke and worse. She promised to go.

## 2019-05-16 NOTE — TELEPHONE ENCOUNTER
Pt called stating that her blood pressure is staying up high and wanted to know what to do. I advised pt she could make an appointment and be seen today, pt refused.     Best contact: [phone]     Most recent BP Readin/118

## 2019-05-23 ENCOUNTER — TELEPHONE (OUTPATIENT)
Dept: FAMILY MEDICINE CLINIC | Age: 54
End: 2019-05-23

## 2019-05-23 NOTE — TELEPHONE ENCOUNTER
Patient called and stated that she wanted to report her b/p to [facility].     5/16      211/118

## 2019-05-24 ENCOUNTER — OFFICE VISIT (OUTPATIENT)
Dept: FAMILY MEDICINE CLINIC | Age: 54
End: 2019-05-24

## 2019-05-24 VITALS
DIASTOLIC BLOOD PRESSURE: 84 MMHG | HEIGHT: 64 IN | SYSTOLIC BLOOD PRESSURE: 144 MMHG | BODY MASS INDEX: 32.1 KG/M2 | HEART RATE: 78 BPM | OXYGEN SATURATION: 96 % | RESPIRATION RATE: 18 BRPM | WEIGHT: 188 LBS | TEMPERATURE: 98.4 F

## 2019-05-24 DIAGNOSIS — G25.81 RLS (RESTLESS LEGS SYNDROME): ICD-10-CM

## 2019-05-24 DIAGNOSIS — M47.12 SPONDYLOSIS OF CERVICAL SPINE WITH MYELOPATHY AND RADICULOPATHY: ICD-10-CM

## 2019-05-24 DIAGNOSIS — M47.22 SPONDYLOSIS OF CERVICAL SPINE WITH MYELOPATHY AND RADICULOPATHY: ICD-10-CM

## 2019-05-24 DIAGNOSIS — I10 ESSENTIAL HYPERTENSION WITH GOAL BLOOD PRESSURE LESS THAN 140/90: Primary | ICD-10-CM

## 2019-05-24 DIAGNOSIS — E78.00 HYPERCHOLESTEROLEMIA: ICD-10-CM

## 2019-05-24 RX ORDER — ROPINIROLE 2 MG/1
TABLET, FILM COATED ORAL
Qty: 270 TAB | Refills: 1 | Status: SHIPPED | OUTPATIENT
Start: 2019-05-24 | End: 2019-11-11 | Stop reason: SDUPTHER

## 2019-05-24 RX ORDER — TOPIRAMATE 50 MG/1
50 TABLET, FILM COATED ORAL 2 TIMES DAILY
Qty: 180 TAB | Refills: 1 | Status: SHIPPED | OUTPATIENT
Start: 2019-05-24 | End: 2019-10-15 | Stop reason: SDUPTHER

## 2019-05-24 RX ORDER — DILTIAZEM HYDROCHLORIDE 180 MG/1
360 CAPSULE, COATED, EXTENDED RELEASE ORAL DAILY
Qty: 180 CAP | Refills: 1
Start: 2019-05-24 | End: 2019-10-02 | Stop reason: SDUPTHER

## 2019-05-24 RX ORDER — TOPIRAMATE 25 MG/1
TABLET ORAL
Qty: 180 TAB | Refills: 0 | Status: CANCELLED | OUTPATIENT
Start: 2019-05-24

## 2019-05-24 RX ORDER — ATORVASTATIN CALCIUM 40 MG/1
TABLET, FILM COATED ORAL
Qty: 90 TAB | Refills: 1 | Status: SHIPPED | OUTPATIENT
Start: 2019-05-24 | End: 2019-10-15 | Stop reason: SDUPTHER

## 2019-05-24 NOTE — PROGRESS NOTES
HISTORY OF PRESENT ILLNESS  Iron Marquez is a 46 y.o. female. HPI  FU HTN. She had run out of Losartan x 1 week. High BP readings at home. She had also checked at pharmacy. She is back on all meds. Pt wishes to increase dose Topamax that was prescribed by Pain Clinic. For some reason they cannot send RX to mail order.  + home stress. ROS     Patient Active Problem List   Diagnosis Code    Primary osteoarthritis of right knee M17.11    Essential hypertension with goal blood pressure less than 140/90 I10    Chronic obstructive pulmonary disease (HCC) J44.9    Diabetes mellitus type 2, controlled ([facility] E11.9    Polyneuropathy associated with underlying disease ([facility] G63    RLS (restless legs syndrome) G25.81    Arthritis M19.90    Leukocytosis D72.829    Cigarette nicotine dependence without complication C99.993    Dysthymia F34.1    YOLANDA (obstructive sleep apnea) G47.33    Status post bilateral knee replacements Z96.653    Type 2 diabetes mellitus with diabetic neuropathy ([facility]) E11.40    Severe obesity (BMI 35.0-39. 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 TAKE ONE CAPSULE BY MOUTH EVERY DAY  Indications: high blood pressure 180 Cap 1    topiramate (TOPAMAX) 50 mg tablet Take 1 Tab by mouth two (2) times a day.  180 Tab 1    losartan (COZAAR) 100 mg tablet TAKE 1 TABLET EVERY DAY 90 Tab 1    venlafaxine-SR (EFFEXOR-XR) 150 mg capsule TAKE 1 CAPSULE EVERY DAY 90 Cap 1    levocetirizine (XYZAL) 5 mg tablet Take 1 Tab by mouth daily. 90 Tab 1    fluticasone propionate (FLONASE) 50 mcg/actuation nasal spray 2 sprays per nostril once a day  Indications: inflammation of the nose due to an allergy 3 Bottle 3    glucose blood VI test strips (ACCU-CHEK AJAY PLUS TEST STRP) strip TEST BLOOD SUGAR EVERY  Strip 0    omeprazole (PRILOSEC) 40 mg capsule Take 40 mg by mouth Before breakfast and dinner.  GARLIC EXTRACT PO Take  by mouth.  varicella-zoster recombinant, PF, (SHINGRIX, PF,) 50 mcg/0.5 mL susr injection 0.5mL by IntraMUSCular route once now and then repeat in 2-6 months 0.5 mL 1    tiotropium (SPIRIVA) 18 mcg inhalation capsule Take 1 Cap by inhalation daily. 90 Cap 1    furosemide (LASIX) 20 mg tablet TAKE 1 TABLET EVERY DAY AS NEEDED 90 Tab 1    Blood-Glucose Meter (ACCU-CHEK AJAY PLUS METER) misc Test blood sugar daily. E 11.9 1 Each 0    metFORMIN (GLUCOPHAGE) 500 mg tablet TAKE TWO TABLETS BY MOUTH TWO TIMES A DAY WITH MEALS 360 Tab 1    LORazepam (ATIVAN) 0.5 mg tablet Take 1 Tab by mouth two (2) times daily as needed for Anxiety. Max Daily Amount: 1 mg. 180 Tab 0    Blood-Glucose Meter monitoring kit E11.9 Accu-Chek Ajay Plus Meter 1 Kit 0    benzonatate (TESSALON) 100 mg capsule Take 1 Cap by mouth three (3) times daily as needed for Cough. 60 Cap 3    TURMERIC ROOT EXTRACT PO Take 1 Cap by mouth daily.  L GASSERI/B BIFIDUM/B LONGUM ([employer] [facility] PO) Take  by mouth daily.  cinnamon bark 500 mg cap Take 1,000 mg by mouth two (2) times a day.  losartan (COZAAR) 100 mg tablet Take 1 Tab by mouth daily. 10 Tab 0       Visit Vitals  /84   Pulse 78   Temp 98.4 °F (36.9 °C) (Oral) Comment: .   Resp 18   Ht 5' 3.5\" (1.613 m)   Wt 188 lb (85.3 kg)   SpO2 96%   BMI 32.78 kg/m²       Physical Exam   Constitutional: She appears well-developed and well-nourished.    Cardiovascular: Normal rate, regular rhythm and normal heart sounds. Pulmonary/Chest: Effort normal and breath sounds normal.   Vitals reviewed. ASSESSMENT and PLAN    ICD-10-CM ICD-9-CM    1. Essential hypertension with goal blood pressure less than 140/90 I10 401.9 dilTIAZem CD (CARDIZEM CD) 180 mg ER capsule   2. RLS (restless legs syndrome) G25.81 333.94 rOPINIRole (REQUIP) 2 mg tablet   3. 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 M47.12 721.1 topiramate (TOPAMAX) 50 mg tablet    M47.22     4. Hypercholesterolemia E78.00 272.0 atorvastatin (LIPITOR) 40 mg tablet     Med refills. Cont Diltiazem and Losartan as prescribed. Take Lasix 20 mg daily. Monitor BP daily. FU 1 month.

## 2019-05-24 NOTE — PATIENT INSTRUCTIONS
DASH Diet: Care Instructions  Your Care Instructions    The DASH diet is an eating plan that can help lower your blood pressure. DASH stands for Dietary Approaches to Stop Hypertension. Hypertension is high blood pressure. The DASH diet focuses on eating foods that are high in calcium, potassium, and magnesium. These nutrients can lower blood pressure. The foods that are highest in these nutrients are fruits, vegetables, low-fat dairy products, nuts, seeds, and legumes. But taking calcium, potassium, and magnesium supplements instead of eating foods that are high in those nutrients does not have the same effect. The DASH diet also includes whole grains, fish, and poultry. The DASH diet is one of several lifestyle changes your doctor may recommend to lower your high blood pressure. Your doctor may also want you to decrease the amount of sodium in your diet. Lowering sodium while following the DASH diet can lower blood pressure even further than just the DASH diet alone. Follow-up care is a key part of your treatment and safety. Be sure to make and go to all appointments, and call your doctor if you are having problems. It's also a good idea to know your test results and keep a list of the medicines you take. How can you care for yourself at home? Following the DASH diet  · Eat 4 to 5 servings of fruit each day. A serving is 1 medium-sized piece of fruit, ½ cup chopped or canned fruit, 1/4 cup dried fruit, or 4 ounces (½ cup) of fruit juice. Choose fruit more often than fruit juice. · Eat 4 to 5 servings of vegetables each day. A serving is 1 cup of lettuce or raw leafy vegetables, ½ cup of chopped or cooked vegetables, or 4 ounces (½ cup) of vegetable juice. Choose vegetables more often than vegetable juice. · Get 2 to 3 servings of low-fat and fat-free dairy each day. A serving is 8 ounces of milk, 1 cup of yogurt, or 1 ½ ounces of cheese. · Eat 6 to 8 servings of grains each day.  A serving is 1 slice of bread, 1 ounce of dry cereal, or ½ cup of cooked rice, pasta, or cooked cereal. Try to choose whole-grain products as much as possible. · Limit lean meat, poultry, and fish to 2 servings each day. A serving is 3 ounces, about the size of a deck of cards. · Eat 4 to 5 servings of nuts, seeds, and legumes (cooked dried beans, lentils, and split peas) each week. A serving is 1/3 cup of nuts, 2 tablespoons of seeds, or ½ cup of cooked beans or peas. · Limit fats and oils to 2 to 3 servings each day. A serving is 1 teaspoon of vegetable oil or 2 tablespoons of salad dressing. · Limit sweets and added sugars to 5 servings or less a week. A serving is 1 tablespoon jelly or jam, ½ cup sorbet, or 1 cup of lemonade. · Eat less than 2,300 milligrams (mg) of sodium a day. If you limit your sodium to 1,500 mg a day, you can lower your blood pressure even more. Tips for success  · Start small. Do not try to make dramatic changes to your diet all at once. You might feel that you are missing out on your favorite foods and then be more likely to not follow the plan. Make small changes, and stick with them. Once those changes become habit, add a few more changes. · Try some of the following:  ? Make it a goal to eat a fruit or vegetable at every meal and at snacks. This will make it easy to get the recommended amount of fruits and vegetables each day. ? Try yogurt topped with fruit and nuts for a snack or healthy dessert. ? Add lettuce, tomato, cucumber, and onion to sandwiches. ? Combine a ready-made pizza crust with low-fat mozzarella cheese and lots of vegetable toppings. Try using tomatoes, squash, spinach, broccoli, carrots, cauliflower, and onions. ? Have a variety of cut-up vegetables with a low-fat dip as an appetizer instead of chips and dip. ? Sprinkle sunflower seeds or chopped almonds over salads. Or try adding chopped walnuts or almonds to cooked vegetables.   ? Try some vegetarian meals using beans and peas. Add garbanzo or kidney beans to salads. Make burritos and tacos with mashed aaron beans or black beans. Where can you learn more? Go to http://matti-yinka.info/. Enter Z546 in the search box to learn more about \"DASH Diet: Care Instructions. \"  Current as of: July 22, 2018  Content Version: 11.9  © 4441-6920 [employer]. Care instructions adapted under license by [employer] (which disclaims liability or warranty for this information). If you have questions about a medical condition or this instruction, always ask your healthcare professional. [address] any warranty or liability for your use of this information.

## 2019-05-24 NOTE — PROGRESS NOTES
Identified pt with two pt identifiers(name and ). Chief Complaint   Patient presents with    Blood Pressure Check        Health Maintenance Due   Topic    Shingrix Vaccine Age 50> (1 of 2)       Wt Readings from Last 3 Encounters:   19 188 lb (85.3 kg)   19 190 lb (86.2 kg)   19 196 lb 3.2 oz (89 kg)     Temp Readings from Last 3 Encounters:   19 98.4 °F (36.9 °C) (Oral)   19 98.2 °F (36.8 °C) (Oral)   19 97.9 °F (36.6 °C) (Oral)     BP Readings from Last 3 Encounters:   19 142/84   19 150/80   19 134/86     Pulse Readings from Last 3 Encounters:   19 78   19 92   19 81         Learning Assessment:  :     Learning Assessment 2016   PRIMARY LEARNER Patient Patient Patient   HIGHEST LEVEL OF EDUCATION - PRIMARY LEARNER  - - SOME COLLEGE   BARRIERS PRIMARY LEARNER - - NONE   CO-LEARNER CAREGIVER - - No   PRIMARY LANGUAGE ENGLISH ENGLISH ENGLISH   LEARNER PREFERENCE PRIMARY LISTENING - DEMONSTRATION   ANSWERED BY patient patient self   RELATIONSHIP SELF SELF SELF       Depression Screening:  :     3 most recent PHQ Screens 3/18/2019   Little interest or pleasure in doing things More than half the days   Feeling down, depressed, irritable, or hopeless More than half the days   Total Score PHQ 2 4       Fall Risk Assessment:  :     Fall Risk Assessment, last 12 mths 2018   Able to walk? Yes   Fall in past 12 months? No       Abuse Screening:  :     Abuse Screening Questionnaire 3/18/2019 2018 2016   Do you ever feel afraid of your partner? N N N   Are you in a relationship with someone who physically or mentally threatens you? N N N   Is it safe for you to go home?  Y Y Y       Coordination of Care Questionnaire:  :     1) Have you been to an emergency room, urgent care clinic since your last visit? no   Hospitalized since your last visit? no             2) Have you seen or consulted any other health care providers outside of [address] [place] [address] since your last visit? no  (Include any pap smears or colon screenings in this section.)    3) Do you have an Advance Directive on file? no  Are you interested in receiving information about Advance Directives? no    Reviewed record in preparation for visit and have obtained necessary documentation. Medication reconciliation up to date and corrected with patient at this time.

## 2019-06-19 ENCOUNTER — TELEPHONE (OUTPATIENT)
Dept: FAMILY MEDICINE CLINIC | Age: 54
End: 2019-06-19

## 2019-06-20 ENCOUNTER — OFFICE VISIT (OUTPATIENT)
Dept: FAMILY MEDICINE CLINIC | Age: 54
End: 2019-06-20

## 2019-06-20 VITALS
HEIGHT: 64 IN | WEIGHT: 184 LBS | HEART RATE: 88 BPM | SYSTOLIC BLOOD PRESSURE: 136 MMHG | RESPIRATION RATE: 20 BRPM | OXYGEN SATURATION: 97 % | DIASTOLIC BLOOD PRESSURE: 88 MMHG | BODY MASS INDEX: 31.41 KG/M2 | TEMPERATURE: 99 F

## 2019-06-20 DIAGNOSIS — Z79.899 ENCOUNTER FOR LONG-TERM CURRENT USE OF MEDICATION: ICD-10-CM

## 2019-06-20 DIAGNOSIS — I10 ESSENTIAL HYPERTENSION WITH GOAL BLOOD PRESSURE LESS THAN 140/90: Primary | ICD-10-CM

## 2019-06-20 DIAGNOSIS — J40 BRONCHITIS: ICD-10-CM

## 2019-06-20 DIAGNOSIS — Z12.39 SCREENING FOR BREAST CANCER: ICD-10-CM

## 2019-06-20 DIAGNOSIS — M54.2 NECK PAIN: ICD-10-CM

## 2019-06-20 RX ORDER — AZITHROMYCIN 250 MG/1
TABLET, FILM COATED ORAL
Qty: 6 TAB | Refills: 0 | Status: SHIPPED | OUTPATIENT
Start: 2019-06-20 | End: 2019-06-25

## 2019-06-20 NOTE — PROGRESS NOTES
Identified pt with two pt identifiers(name and ). Chief Complaint   Patient presents with    Hypertension    Leg Pain     legs feel heavy    Knee Swelling     they feel swollen but they are not     Neck Pain     went to pain management    Breast Problem     needs referral        Health Maintenance Due   Topic    Shingrix Vaccine Age 50> (1 of 2)       Wt Readings from Last 3 Encounters:   19 184 lb (83.5 kg)   19 188 lb (85.3 kg)   19 190 lb (86.2 kg)     Temp Readings from Last 3 Encounters:   19 99 °F (37.2 °C) (Oral)   19 98.4 °F (36.9 °C) (Oral)   19 98.2 °F (36.8 °C) (Oral)     BP Readings from Last 3 Encounters:   19 146/90   19 144/84   19 150/80     Pulse Readings from Last 3 Encounters:   19 88   19 78   19 92         Learning Assessment:  :     Learning Assessment 2016   PRIMARY LEARNER Patient Patient Patient   HIGHEST LEVEL OF EDUCATION - PRIMARY LEARNER  - - SOME COLLEGE   BARRIERS PRIMARY LEARNER - - NONE   CO-LEARNER CAREGIVER - - No   PRIMARY LANGUAGE ENGLISH ENGLISH ENGLISH   LEARNER PREFERENCE PRIMARY LISTENING - DEMONSTRATION   ANSWERED BY patient patient self   RELATIONSHIP SELF SELF SELF       Depression Screening:  :     3 most recent PHQ Screens 3/18/2019   Little interest or pleasure in doing things More than half the days   Feeling down, depressed, irritable, or hopeless More than half the days   Total Score PHQ 2 4       Fall Risk Assessment:  :     Fall Risk Assessment, last 12 mths 2018   Able to walk? Yes   Fall in past 12 months? No       Abuse Screening:  :     Abuse Screening Questionnaire 3/18/2019 2018 2016   Do you ever feel afraid of your partner? N N N   Are you in a relationship with someone who physically or mentally threatens you? N N N   Is it safe for you to go home?  Fish Rodriguez       Coordination of Care Questionnaire:  :     1) Have you been to an emergency room, urgent care clinic since your last visit? no   Hospitalized since your last visit? no             2) Have you seen or consulted any other health care providers outside of [address] [place] [address] since your last visit? no  (Include any pap smears or colon screenings in this section.)    3) Do you have an Advance Directive on file? yes  Are you interested in receiving information about Advance Directives? no    Reviewed record in preparation for visit and have obtained necessary documentation. Medication reconciliation up to date and corrected with patient at this time.

## 2019-06-20 NOTE — PROGRESS NOTES
Subjective:     Chula Yost is a 46 y.o. female who presents for follow up of hypertension. Diet and Lifestyle: generally follows a low fat low cholesterol diet, generally follows a low sodium diet, sedentary  Home BP Monitoring: is not well controlled at home. She uses a wrist monitor and brought it in today. Compared to office arm cuff sphig, her wrist monitor reads a lot higher. Cardiovascular ROS: taking medications as instructed, no medication side effects noted, no TIA's, no chest pain on exertion, no dyspnea on exertion, no swelling of ankles. New concerns: productive cough, green phlegm. Higher temp than baseline. Also under a lot of stress having her mother live with her almost a year now. No relief from her other sibs. Feels depressed about situation.          Patient Active Problem List    Diagnosis Date Noted    Colon adenoma 03/18/2019    Chronic superficial gastritis without bleeding 03/18/2019    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 12/03/2018    Spondylosis of lumbar spine 12/03/2018    Abnormal mammogram of left breast 09/26/2018    Iliotibial band tendinitis of right side 08/30/2018    Severe obesity (BMI 35.0-39.9) 06/20/2018    Type 2 diabetes mellitus with diabetic neuropathy ([address] 02/14/2018    Status post bilateral knee replacements 11/02/2017    Cigarette nicotine dependence without complication 23/50/4249    Dysthymia 12/30/2016    YOLANDA (obstructive sleep apnea) 12/30/2016    Arthritis 09/09/2016    Leukocytosis 09/09/2016    Essential hypertension with goal blood pressure less than 140/90 09/07/2016    Chronic obstructive pulmonary disease ([address] 09/07/2016    Diabetes mellitus type 2, controlled ([address] 09/07/2016    Polyneuropathy associated with underlying disease ([address] 09/07/2016    RLS (restless legs syndrome) 09/07/2016    Primary osteoarthritis of right knee 08/18/2016     Current Outpatient Medications   Medication Sig Dispense Refill    azithromycin (ZITHROMAX) 250 mg tablet Take 2 tablets today, then take 1 tablet daily 6 Tab 0    glucose blood VI test strips (ACCU-CHEK AJAY PLUS TEST STRP) strip TEST BLOOD SUGAR EVERY  Strip 0    atorvastatin (LIPITOR) 40 mg tablet TAKE ONE TABLET BY MOUTH AT NIGHT 90 Tab 1    rOPINIRole (REQUIP) 2 mg tablet TAKE 1 TABLET THREE TIMES DAILY 270 Tab 1    dilTIAZem CD (CARDIZEM CD) 180 mg ER capsule Take 2 Caps by mouth daily. TAKE ONE CAPSULE BY MOUTH EVERY DAY  Indications: high blood pressure 180 Cap 1    topiramate (TOPAMAX) 50 mg tablet Take 1 Tab by mouth two (2) times a day. 180 Tab 1    losartan (COZAAR) 100 mg tablet TAKE 1 TABLET EVERY DAY 90 Tab 1    venlafaxine-SR (EFFEXOR-XR) 150 mg capsule TAKE 1 CAPSULE EVERY DAY 90 Cap 1    levocetirizine (XYZAL) 5 mg tablet Take 1 Tab by mouth daily. 90 Tab 1    fluticasone propionate (FLONASE) 50 mcg/actuation nasal spray 2 sprays per nostril once a day  Indications: inflammation of the nose due to an allergy 3 Bottle 3    omeprazole (PRILOSEC) 40 mg capsule Take 40 mg by mouth Before breakfast and dinner.  GARLIC EXTRACT PO Take  by mouth.  tiotropium (SPIRIVA) 18 mcg inhalation capsule Take 1 Cap by inhalation daily. 90 Cap 1    furosemide (LASIX) 20 mg tablet TAKE 1 TABLET EVERY DAY AS NEEDED 90 Tab 1    Blood-Glucose Meter (ACCU-CHEK AJAY PLUS METER) misc Test blood sugar daily. E 11.9 1 Each 0    metFORMIN (GLUCOPHAGE) 500 mg tablet TAKE TWO TABLETS BY MOUTH TWO TIMES A DAY WITH MEALS 360 Tab 1    Blood-Glucose Meter monitoring kit E11.9 Accu-Chek Ajay Plus Meter 1 Kit 0    TURMERIC ROOT EXTRACT PO Take 1 Cap by mouth daily.  L GASSERI/B BIFIDUM/B LONGUM (QuantaSol PO) Take  by mouth daily.  cinnamon bark 500 mg cap Take 1,000 mg by mouth two (2) times a day.       varicella-zoster recombinant, PF, (SHINGRIX, PF,) 50 mcg/0.5 mL susr injection 0.5mL by IntraMUSCular route once now and then repeat in 2-6 months 0.5 mL 1    LORazepam (ATIVAN) 0.5 mg tablet Take 1 Tab by mouth two (2) times daily as needed for Anxiety. Max Daily Amount: 1 mg. 180 Tab 0    benzonatate (TESSALON) 100 mg capsule Take 1 Cap by mouth three (3) times daily as needed for Cough.  61 Cap 3     Allergies   Allergen Reactions    Mobic [Meloxicam] Hives    Penicillins Unknown (comments)     DOESN'T REMEMBER    Plaquenil [Hydroxychloroquine] Hives     Past Medical History:   Diagnosis Date    Arrhythmia     TACHYCARDIA    Arthritis     Diabetes ([address]     GERD (gastroesophageal reflux disease)     Hypertension     Psychiatric disorder     PANIC ATTACKS    Sleep apnea      Past Surgical History:   Procedure Laterality Date    CARDIAC SURG PROCEDURE UNLIST  2013    ABLATION    COLONOSCOPY N/A 3/1/2019    COLONOS[facility] ENDOSCOPY    HX BREAST BIOPSY Bilateral     benign    HX CHOLECYSTECTOMY  1998    HX HEENT  1993    SEPTUM SCRAPED    HX HYSTERECTOMY  2008    HX KNEE ARTHROSCOPY Bilateral 2012    HX KNEE ARTHROSCOPY Left 2012    HX KNEE ARTHROSCOPY Right 2014    HX KNEE REPLACEMENT Left 2012    HX KNEE REPLACEMENT Right 08/18/2016    HX TUBAL LIGATION  1989    NEUROLOGICAL PROCEDURE UNLISTED      lumbar nerve block     Family History   Problem Relation Age of Onset    Arthritis-osteo Mother     Psychiatric Disorder Father     Arthritis-osteo Father     Alcohol abuse Father     Heart Disease Father     Heart Surgery Father     Hypertension Father     Diabetes Brother     Arthritis-osteo Brother     Hypertension Brother     Anesth Problems Neg Hx      Social History     Tobacco Use    Smoking status: Current Every Day Smoker     Packs/day: 1.00     Years: 40.00     Pack years: 40.00    Smokeless tobacco: Never Used   Substance Use Topics    Alcohol use: Not Currently     Comment: stopped a month ago             Review of Systems, additional:  Pertinent items are noted in HPI. Objective:     Visit Vitals  /88 Comment: L arm manual, wrist monitor 195/112   Pulse 88   Temp 99 °F (37.2 °C) (Oral)   Resp 20   Ht 5' 3.5\" (1.613 m)   Wt 184 lb (83.5 kg)   SpO2 97%   BMI 32.08 kg/m²     Appearance: alert, well appearing, and in no distress and overweight. General exam: CVS exam BP noted to be well controlled today in office, S1, S2 normal, no gallop, no murmur, chest clear, no JVD, no HSM, no edema. Lab review: orders written for new lab studies as appropriate; see orders. Assessment/Plan:     hypertension stable. current treatment plan is effective, no change in therapy. ICD-10-CM ICD-9-CM    1. Essential hypertension with goal blood pressure less than 140/90 I10 401.9    2. Encounter for long-term current use of medication S77.994 F83.06 METABOLIC PANEL, BASIC   3. Screening for breast cancer Z12.31 V76.10 [facility] MAMMO BI SCREENING INCL CAD   4. Bronchitis J40 490 azithromycin (ZITHROMAX) 250 mg tablet   5. Neck pain M54.2 723.1 REFERRAL TO ORTHOPEDIC SURGERY     Cont current BP medicines. Get a new BP monitor. Recommend arm cuff. Treat for bronchitis. Refer for neck pain to [facility] [address] [facility]. Order mammogram- Zachray Bentley.

## 2019-06-21 LAB
BUN SERPL-MCNC: 9 MG/DL (ref 6–24)
BUN/CREAT SERPL: 14 (ref 9–23)
CALCIUM SERPL-MCNC: 9.7 MG/DL (ref 8.7–10.2)
CHLORIDE SERPL-SCNC: 106 MMOL/L (ref 96–106)
CO2 SERPL-SCNC: 20 MMOL/L (ref 20–29)
CREAT SERPL-MCNC: 0.64 MG/DL (ref 0.57–1)
GLUCOSE SERPL-MCNC: 91 MG/DL (ref 65–99)
POTASSIUM SERPL-SCNC: 4.3 MMOL/L (ref 3.5–5.2)
SODIUM SERPL-SCNC: 143 MMOL/L (ref 134–144)

## 2019-07-18 ENCOUNTER — OFFICE VISIT (OUTPATIENT)
Dept: FAMILY MEDICINE CLINIC | Age: 54
End: 2019-07-18

## 2019-07-18 VITALS
HEIGHT: 64 IN | OXYGEN SATURATION: 97 % | WEIGHT: 179 LBS | HEART RATE: 92 BPM | SYSTOLIC BLOOD PRESSURE: 128 MMHG | DIASTOLIC BLOOD PRESSURE: 84 MMHG | RESPIRATION RATE: 20 BRPM | BODY MASS INDEX: 30.56 KG/M2 | TEMPERATURE: 98 F

## 2019-07-18 DIAGNOSIS — R21 RASH OF NECK: Primary | ICD-10-CM

## 2019-07-18 RX ORDER — CLOBETASOL PROPIONATE 0.5 MG/G
CREAM TOPICAL 2 TIMES DAILY
Qty: 45 G | Refills: 0 | Status: SHIPPED | OUTPATIENT
Start: 2019-07-18 | End: 2020-07-21

## 2019-07-18 NOTE — PROGRESS NOTES
Chief Complaint:  Chief Complaint   Patient presents with    Rash     Unknown etiology. Noted red large patchy rash to right neck and neckline       History of Present Illness:  She is a 46 y.o. female who presents with c/o rash on the right side of her neck that started about 1-weeks ago. It is intensely itchy. It has not spread to any other areas. She reports going to the beach 2-weeks ago and then to the [facility]. Rash seemed to start after she was at the beach. She has been using OTC products, but is not helpful. She denies fever, chills or other malaise. Reviewed PmHx, RxHx, FmHx, SocHx, AllgHx and updated and dated in the chart. Patient Active Problem List    Diagnosis    Colon adenoma     2019- Dr Jenni Mcelroy Chronic superficial gastritis without bleeding     EGD      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    Spondylosis of lumbar spine    Abnormal mammogram of left breast    Iliotibial band tendinitis of right side    Severe obesity (BMI 35.0-39. 9)    Type 2 diabetes mellitus with diabetic neuropathy (HCC)    Status post bilateral knee replacements    Cigarette nicotine dependence without complication    Dysthymia    YOLANDA (obstructive sleep apnea)    Arthritis     Rheumatology Dr. Riddhi Blanc hypertension with goal blood pressure less than 140/90    Chronic obstructive pulmonary disease ([address]    Diabetes mellitus type 2, controlled ([address]    Polyneuropathy associated with underlying disease ([address]    RLS (restless legs syndrome)    Primary osteoarthritis of right knee       Review of Systems - negative except as listed above in the HPI    Objective:     Vitals:    07/18/19 1556   BP: 128/84   Pulse: 92   Resp: 20   Temp: 98 °F (36.7 °C)   TempSrc: Oral   SpO2: 97%   Weight: 179 lb (81.2 kg)   Height: 5' 3.5\" (1.613 m)     Physical Examination:     Physical Exam   HENT:   Head: Normocephalic.    Eyes: Pupils are equal, round, and reactive to light. Neck:       Cardiovascular: Normal rate and regular rhythm. Pulmonary/Chest: Effort normal and breath sounds normal.   Musculoskeletal: Normal range of motion. Neurological: She is alert. Skin: Skin is warm. Nursing note and vitals reviewed. Assessment/ Plan:      Diagnoses and all orders for this visit:    1. Rash of neck  -     clobetasol (TEMOVATE) 0.05 % topical cream; Apply  to affected area two (2) times a day. Also encouraged to keep the area moisturized with Aquaphor or Vaseline several times per day. Worrisome symptoms discussed in detail. Wash has lichenified eczematous morphology. Whole body was with Hibiclens encouraged. I have discussed the diagnosis with the patient and the intended treatment plan as seen in the above orders. The patient has received an after-visit summary and questions were answered concerning future plans. Asked to return should symptoms worsen or not improve with treatment. Any pending labs and studies will be relayed to patient when they become available. Pt verbalizes understanding of plan of care and denies further questions or concerns at this time. Follow-up and Dispositions    · Return if symptoms worsen or fail to improve.        Myra Johnston MD

## 2019-07-18 NOTE — PROGRESS NOTES
Patient presents for rash to right side of neck and neckline. Complains of itching off and on and has used several OTC products.

## 2019-07-24 NOTE — LETTER
7/10/2017 3:54 PM 
 
Ms. Conrad Goldman [address] [facility] [address] Dear Conrad Goldman: 
 
Please find your most recent results below. Resulted Orders HEMOGLOBIN A1C WITH EAG Result Value Ref Range Hemoglobin A1c 6.1 (H) 4.8 - 5.6 % Comment:  
            Pre-diabetes: 5.7 - 6.4 Diabetes: >6.4 Glycemic control for adults with diabetes: <7.0 Estimated average glucose 128 mg/dL Narrative Performed at:  [address]  [id number] : Luis Avelar MD, Phone:  [phone] TSH 3RD GENERATION Result Value Ref Range TSH 1.910 0.450 - 4.500 uIU/mL Narrative Performed at:  [address]  [id number] : Luis Avelar MD, Phone:  [phone] RECOMMENDATIONS: 
A1C level remains good.  Diabetes is under control. Normal thyroid test.  
 
Please call me if you have any questions: [phone] Sincerely, Swati eKmp MD 
 Clinical Pharmacy Note: Medication Education Group     Intervention:  Open Forum    Length of Group: 60 min     Methods/resources used: verbal discussion     Topics Discussed: Medication adherence, side effect management, nonpharmacologic strategies, medication effectiveness and indications      Time spent in group: Left group early      Patient Specific concerns: Amparo presented to group today dressed in street clothing and appeared well-groomed and alert and oriented to person, place and time  Amparo seemed content  Patient's affect was mainly positive and pleasant and she listened attentively and asked a few questions  Patient was respectful of others throughout and interacted appropriately with peers  Amparo did not have specific concerns, but did endorse the role of medications to another peer  Amparo shared information about her 22 year struggle with mental health and discussed medications she has tried        Patient understood counseling: yes     Electronically signed by: Jaki Winters, Pharmacist

## 2019-08-26 ENCOUNTER — OFFICE VISIT (OUTPATIENT)
Dept: FAMILY MEDICINE CLINIC | Age: 54
End: 2019-08-26

## 2019-08-26 VITALS
SYSTOLIC BLOOD PRESSURE: 120 MMHG | RESPIRATION RATE: 20 BRPM | WEIGHT: 182.4 LBS | TEMPERATURE: 98.4 F | BODY MASS INDEX: 31.14 KG/M2 | HEART RATE: 89 BPM | HEIGHT: 64 IN | OXYGEN SATURATION: 97 % | DIASTOLIC BLOOD PRESSURE: 78 MMHG

## 2019-08-26 DIAGNOSIS — J44.9 CHRONIC OBSTRUCTIVE PULMONARY DISEASE, UNSPECIFIED COPD TYPE (HCC): ICD-10-CM

## 2019-08-26 DIAGNOSIS — M47.816 SPONDYLOSIS OF LUMBAR SPINE: ICD-10-CM

## 2019-08-26 DIAGNOSIS — F41.0 PANIC ATTACKS: ICD-10-CM

## 2019-08-26 DIAGNOSIS — G89.29 CHRONIC KNEE PAIN, UNSPECIFIED LATERALITY: ICD-10-CM

## 2019-08-26 DIAGNOSIS — Z91.09 ENVIRONMENTAL ALLERGIES: ICD-10-CM

## 2019-08-26 DIAGNOSIS — M25.569 CHRONIC KNEE PAIN, UNSPECIFIED LATERALITY: ICD-10-CM

## 2019-08-26 DIAGNOSIS — N39.0 URINARY TRACT INFECTION WITHOUT HEMATURIA, SITE UNSPECIFIED: ICD-10-CM

## 2019-08-26 DIAGNOSIS — J32.9 SINUSITIS, UNSPECIFIED CHRONICITY, UNSPECIFIED LOCATION: Primary | ICD-10-CM

## 2019-08-26 LAB
BILIRUB UR QL STRIP: NEGATIVE
GLUCOSE UR-MCNC: NEGATIVE MG/DL
KETONES P FAST UR STRIP-MCNC: NEGATIVE MG/DL
PH UR STRIP: 7 [PH] (ref 4.6–8)
PROT UR QL STRIP: NEGATIVE
SP GR UR STRIP: 1 (ref 1–1.03)
UA UROBILINOGEN AMB POC: NORMAL (ref 0.2–1)
URINALYSIS CLARITY POC: CLEAR
URINALYSIS COLOR POC: NORMAL
URINE BLOOD POC: NORMAL
URINE LEUKOCYTES POC: NORMAL
URINE NITRITES POC: NEGATIVE

## 2019-08-26 RX ORDER — BENZONATATE 100 MG/1
100 CAPSULE ORAL
Qty: 60 CAP | Refills: 3 | Status: SHIPPED | OUTPATIENT
Start: 2019-08-26 | End: 2020-07-14 | Stop reason: SDUPTHER

## 2019-08-26 RX ORDER — AZITHROMYCIN 250 MG/1
TABLET, FILM COATED ORAL
Qty: 6 TAB | Refills: 0 | Status: SHIPPED | OUTPATIENT
Start: 2019-08-26 | End: 2019-08-31

## 2019-08-26 RX ORDER — LORAZEPAM 0.5 MG/1
0.5 TABLET ORAL
Qty: 180 TAB | Refills: 0 | Status: SHIPPED | OUTPATIENT
Start: 2019-08-26 | End: 2021-02-08 | Stop reason: SDUPTHER

## 2019-08-26 RX ORDER — LEVOCETIRIZINE DIHYDROCHLORIDE 5 MG/1
5 TABLET, FILM COATED ORAL DAILY
Qty: 90 TAB | Refills: 1 | Status: SHIPPED | OUTPATIENT
Start: 2019-08-26 | End: 2020-01-10 | Stop reason: SDUPTHER

## 2019-08-26 NOTE — PROGRESS NOTES
Subjective:   Dante Winkler is a 53 y.o. female who complains of congestion, productive cough and headache for few days, gradually worsening since that time. She denies a history of shortness of breath and wheezing. Evaluation to date: none. Treatment to date: OTC products. Patient does smoke cigarettes. Relevant PMH: No pertinent additional PMH. Patient Active Problem List    Diagnosis Date Noted    Colon adenoma 03/18/2019    Chronic superficial gastritis without bleeding 03/18/2019    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 12/03/2018    Spondylosis of lumbar spine 12/03/2018    Abnormal mammogram of left breast 09/26/2018    Iliotibial band tendinitis of right side 08/30/2018    Severe obesity (BMI 35.0-39.9) 06/20/2018    Type 2 diabetes mellitus with diabetic neuropathy ([facility] 02/14/2018    Status post bilateral knee replacements 11/02/2017    Cigarette nicotine dependence without complication 81/85/0167    Dysthymia 12/30/2016    YOLANDA (obstructive sleep apnea) 12/30/2016    Arthritis 09/09/2016    Leukocytosis 09/09/2016    Essential hypertension with goal blood pressure less than 140/90 09/07/2016    Chronic obstructive pulmonary disease ([facility] 09/07/2016    Diabetes mellitus type 2, controlled ([facility] 09/07/2016    Polyneuropathy associated with underlying disease ([facility] 09/07/2016    RLS (restless legs syndrome) 09/07/2016    Primary osteoarthritis of right knee 08/18/2016     Allergies   Allergen Reactions    Mobic [Meloxicam] Hives    Penicillins Unknown (comments)     DOESN'T REMEMBER    Plaquenil [Hydroxychloroquine] Hives        Review of Systems  Musculoskeletal:positive for arthralgias. Knee pain, worse Left. Also some low R sided back pain. She has not FU with Pain Clinic. Does not think the Topamax is helpful for pain.     Objective:     Visit Vitals  /78 (BP 1 Location: Left arm, BP Patient Position: Sitting) Comment: manual   Pulse 89 Temp 98.4 °F (36.9 °C) (Oral)   Resp 20   Ht 5' 3.5\" (1.613 m)   Wt 182 lb 6.4 oz (82.7 kg)   SpO2 97%   BMI 31.80 kg/m²     General:  alert, cooperative, no distress   Eyes: negative   Ears: normal TM's and external ear canals AU   Sinuses: Normal paranasal sinuses without tenderness   Mouth:  Lips, mucosa, and tongue normal. Teeth and gums normal   Neck: supple, symmetrical, trachea midline and no adenopathy. Heart: S1 and S2 normal, no murmurs noted. Lungs: clear to auscultation bilaterally   Abdomen:         Assessment/Plan:         ICD-10-CM ICD-9-CM    1. Sinusitis, unspecified chronicity, unspecified location J32.9 473.9 azithromycin (ZITHROMAX) 250 mg tablet   2. Environmental allergies Z91.09 V15.09 levocetirizine (XYZAL) 5 mg tablet   3. Panic attacks F41.0 300.01 LORazepam (ATIVAN) 0.5 mg tablet   4. Chronic obstructive pulmonary disease, unspecified COPD type ([facility]) J44.9 496 benzonatate (TESSALON) 100 mg capsule   5. Urinary tract infection without hematuria, site unspecified N39.0 599.0 AMB POC URINALYSIS DIP STICK AUTO W/O MICRO   6. Chronic knee pain, unspecified laterality M25.569 719.46     G89.29 338.29    7. Spondylosis of lumbar spine M47.816 721.3    . Order antibiotic. Cont allergy medicines. Refill Lorazepam.  FU with Pain Clinic.

## 2019-08-26 NOTE — PROGRESS NOTES
Identified pt with two pt identifiers(name and ). Chief Complaint   Patient presents with    Cold Symptoms     started wed -     Back Pain     right bottom and right upper thigh pain     Knee Pain     left knee hurting and warm        Health Maintenance Due   Topic    MEDICARE YEARLY EXAM     HEMOGLOBIN A1C Q6M        Wt Readings from Last 3 Encounters:   19 182 lb 6.4 oz (82.7 kg)   19 179 lb (81.2 kg)   19 184 lb (83.5 kg)     Temp Readings from Last 3 Encounters:   19 98.4 °F (36.9 °C) (Oral)   19 98 °F (36.7 °C) (Oral)   19 99 °F (37.2 °C) (Oral)     BP Readings from Last 3 Encounters:   19 120/78   19 128/84   19 136/88     Pulse Readings from Last 3 Encounters:   19 89   19 92   19 88         Learning Assessment:  :     Learning Assessment 2016   PRIMARY LEARNER Patient Patient Patient   HIGHEST LEVEL OF EDUCATION - PRIMARY LEARNER  - - SOME COLLEGE   BARRIERS PRIMARY LEARNER - - NONE   CO-LEARNER CAREGIVER - - No   PRIMARY LANGUAGE ENGLISH ENGLISH ENGLISH   LEARNER PREFERENCE PRIMARY LISTENING - DEMONSTRATION   ANSWERED BY patient patient self   RELATIONSHIP SELF SELF SELF       Depression Screening:  :     3 most recent PHQ Screens 3/18/2019   Little interest or pleasure in doing things More than half the days   Feeling down, depressed, irritable, or hopeless More than half the days   Total Score PHQ 2 4       Fall Risk Assessment:  :     Fall Risk Assessment, last 12 mths 2018   Able to walk? Yes   Fall in past 12 months? No       Abuse Screening:  :     Abuse Screening Questionnaire 3/18/2019 2018 2016   Do you ever feel afraid of your partner? N N N   Are you in a relationship with someone who physically or mentally threatens you? N N N   Is it safe for you to go home?  Neisha Guaman       Coordination of Care Questionnaire:  :     1) Have you been to an emergency room, urgent care clinic since your last visit? no   Hospitalized since your last visit? no             2) Have you seen or consulted any other health care providers outside of [address] [place] [address] since your last visit? no  (Include any pap smears or colon screenings in this section.)    3) Do you have an Advance Directive on file? yes  Are you interested in receiving information about Advance Directives? no    Reviewed record in preparation for visit and have obtained necessary documentation. Medication reconciliation up to date and corrected with patient at this time.      Results for orders placed or performed in visit on 08/26/19   AMB POC URINALYSIS DIP STICK AUTO W/O MICRO   Result Value Ref Range    Color (UA POC) Light Yellow     Clarity (UA POC) Clear     Glucose (UA POC) Negative Negative    Bilirubin (UA POC) Negative Negative    Ketones (UA POC) Negative Negative    Specific gravity (UA POC) 1.005 1.001 - 1.035    Blood (UA POC) Trace Negative    pH (UA POC) 7.0 4.6 - 8.0    Protein (UA POC) Negative Negative    Urobilinogen (UA POC) 0.2 mg/dL 0.2 - 1    Nitrites (UA POC) Negative Negative    Leukocyte esterase (UA POC) Trace Negative

## 2019-09-11 DIAGNOSIS — F34.1 DYSTHYMIA: ICD-10-CM

## 2019-09-12 RX ORDER — VENLAFAXINE HYDROCHLORIDE 150 MG/1
CAPSULE, EXTENDED RELEASE ORAL
Qty: 90 CAP | Refills: 1 | Status: SHIPPED | OUTPATIENT
Start: 2019-09-12 | End: 2020-01-10 | Stop reason: SDUPTHER

## 2019-09-20 ENCOUNTER — OFFICE VISIT (OUTPATIENT)
Dept: FAMILY MEDICINE CLINIC | Age: 54
End: 2019-09-20

## 2019-09-20 VITALS
BODY MASS INDEX: 31.1 KG/M2 | OXYGEN SATURATION: 97 % | HEART RATE: 80 BPM | RESPIRATION RATE: 20 BRPM | TEMPERATURE: 98.2 F | HEIGHT: 64 IN | DIASTOLIC BLOOD PRESSURE: 80 MMHG | WEIGHT: 182.2 LBS | SYSTOLIC BLOOD PRESSURE: 134 MMHG

## 2019-09-20 DIAGNOSIS — E11.40 TYPE 2 DIABETES MELLITUS WITH DIABETIC NEUROPATHY, WITHOUT LONG-TERM CURRENT USE OF INSULIN (HCC): ICD-10-CM

## 2019-09-20 DIAGNOSIS — R53.81 MALAISE AND FATIGUE: ICD-10-CM

## 2019-09-20 DIAGNOSIS — R53.83 MALAISE AND FATIGUE: ICD-10-CM

## 2019-09-20 DIAGNOSIS — Z00.00 MEDICARE ANNUAL WELLNESS VISIT, SUBSEQUENT: Primary | ICD-10-CM

## 2019-09-20 DIAGNOSIS — E53.8 VITAMIN B 12 DEFICIENCY: ICD-10-CM

## 2019-09-20 DIAGNOSIS — G63 POLYNEUROPATHY ASSOCIATED WITH UNDERLYING DISEASE (HCC): ICD-10-CM

## 2019-09-20 DIAGNOSIS — E55.9 VITAMIN D DEFICIENCY: ICD-10-CM

## 2019-09-20 DIAGNOSIS — N64.4 BREAST PAIN, LEFT: ICD-10-CM

## 2019-09-20 DIAGNOSIS — M47.816 SPONDYLOSIS OF LUMBAR SPINE: ICD-10-CM

## 2019-09-20 DIAGNOSIS — I10 ESSENTIAL HYPERTENSION WITH GOAL BLOOD PRESSURE LESS THAN 140/90: ICD-10-CM

## 2019-09-20 RX ORDER — GABAPENTIN 300 MG/1
300 CAPSULE ORAL 3 TIMES DAILY
Qty: 90 CAP | Refills: 0
Start: 2019-09-20 | End: 2020-01-11

## 2019-09-20 RX ORDER — TIZANIDINE 2 MG/1
2 TABLET ORAL 2 TIMES DAILY
Qty: 60 TAB | Refills: 0 | Status: SHIPPED | OUTPATIENT
Start: 2019-09-20 | End: 2019-10-21 | Stop reason: SDUPTHER

## 2019-09-20 NOTE — PROGRESS NOTES
Identified pt with two pt identifiers(name and ). Chief Complaint   Patient presents with    Fatigue    Generalized Body Aches     went to chiropractor and he can make her feel a little better but he can't fix her     Labs     Polycythemia disease runs in her family, her grandfather had it and her cousin has it. - ? Lizeth Pierre for breast cancer    Dizziness    Hypertension     it was high yesterday    Breast Problem     need mammogram        Health Maintenance Due   Topic    HEMOGLOBIN A1C Q6M     MEDICARE YEARLY EXAM        Wt Readings from Last 3 Encounters:   19 182 lb 3.2 oz (82.6 kg)   19 182 lb 6.4 oz (82.7 kg)   19 179 lb (81.2 kg)     Temp Readings from Last 3 Encounters:   19 98.2 °F (36.8 °C) (Oral)   19 98.4 °F (36.9 °C) (Oral)   19 98 °F (36.7 °C) (Oral)     BP Readings from Last 3 Encounters:   19 134/80   19 120/78   19 128/84     Pulse Readings from Last 3 Encounters:   19 80   19 89   19 92         Learning Assessment:  :     Learning Assessment 2016   PRIMARY LEARNER Patient Patient Patient   HIGHEST LEVEL OF EDUCATION - PRIMARY LEARNER  - - SOME COLLEGE   BARRIERS PRIMARY LEARNER - - NONE   CO-LEARNER CAREGIVER - - No   PRIMARY LANGUAGE ENGLISH ENGLISH ENGLISH   LEARNER PREFERENCE PRIMARY LISTENING - DEMONSTRATION   ANSWERED BY patient patient self   RELATIONSHIP SELF SELF SELF       Depression Screening:  :     3 most recent PHQ Screens 3/18/2019   Little interest or pleasure in doing things More than half the days   Feeling down, depressed, irritable, or hopeless More than half the days   Total Score PHQ 2 4       Fall Risk Assessment:  :     Fall Risk Assessment, last 12 mths 2018   Able to walk? Yes   Fall in past 12 months? No       Abuse Screening:  :     Abuse Screening Questionnaire 3/18/2019 2018 2016   Do you ever feel afraid of your partner?  N N N   Are you in a relationship with someone who physically or mentally threatens you? N N N   Is it safe for you to go home? Rosa Monte       Coordination of Care Questionnaire:  :     1) Have you been to an emergency room, urgent care clinic since your last visit? no   Hospitalized since your last visit? no             2) Have you seen or consulted any other health care providers outside of [address] [place] [address] since your last visit? yes  Dr Sarthak Tamayo (Include any pap smears or colon screenings in this section.)    3) Do you have an Advance Directive on file? yes  Are you interested in receiving information about Advance Directives? no    Reviewed record in preparation for visit and have obtained necessary documentation. Medication reconciliation up to date and corrected with patient at this time.

## 2019-09-20 NOTE — PROGRESS NOTES
This is the Subsequent Medicare Annual Wellness Exam, performed 12 months or more after the Initial AWV or the last Subsequent AWV    I have reviewed the patient's medical history in detail and updated the computerized patient record. History     Past Medical History:   Diagnosis Date    Arrhythmia     TACHYCARDIA    Arthritis     Diabetes ([address]     GERD (gastroesophageal reflux disease)     Hypertension     Psychiatric disorder     PANIC ATTACKS    Sleep apnea       Past Surgical History:   Procedure Laterality Date    CARDIAC SURG PROCEDURE UNLIST  2013    ABLATION    COLONOSCOPY N/A 3/1/2019    COLONOSCOPY performed by Dom Multani MD at [address] [place] [address] ENDOSCOPY    HX BREAST BIOPSY Bilateral     benign    HX CHOLECYSTECTOMY  1998    HX HEENT  1993    SEPTUM SCRAPED    HX HYSTERECTOMY  2008    HX KNEE ARTHROSCOPY Bilateral 2012    HX KNEE ARTHROSCOPY Left 2012    HX KNEE ARTHROSCOPY Right 2014    HX KNEE REPLACEMENT Left 2012    HX KNEE REPLACEMENT Right 08/18/2016    HX TUBAL LIGATION  1989    NEUROLOGICAL PROCEDURE UNLISTED      lumbar nerve block     Current Outpatient Medications   Medication Sig Dispense Refill    venlafaxine-SR (EFFEXOR-XR) 150 mg capsule TAKE 1 CAPSULE EVERY DAY 90 Cap 1    levocetirizine (XYZAL) 5 mg tablet Take 1 Tab by mouth daily. 90 Tab 1    glucose blood VI test strips (ACCU-CHEK AJAY PLUS TEST STRP) strip TEST BLOOD SUGAR EVERY  Strip 0    atorvastatin (LIPITOR) 40 mg tablet TAKE ONE TABLET BY MOUTH AT NIGHT 90 Tab 1    rOPINIRole (REQUIP) 2 mg tablet TAKE 1 TABLET THREE TIMES DAILY 270 Tab 1    dilTIAZem CD (CARDIZEM CD) 180 mg ER capsule Take 2 Caps by mouth daily. TAKE ONE CAPSULE BY MOUTH EVERY DAY  Indications: high blood pressure 180 Cap 1    topiramate (TOPAMAX) 50 mg tablet Take 1 Tab by mouth two (2) times a day.  180 Tab 1    losartan (COZAAR) 100 mg tablet TAKE 1 TABLET EVERY DAY 90 Tab 1    omeprazole (PRILOSEC) 40 mg capsule Take 40 mg by mouth Before breakfast and dinner.  GARLIC EXTRACT PO Take  by mouth.  tiotropium (SPIRIVA) 18 mcg inhalation capsule Take 1 Cap by inhalation daily. 90 Cap 1    Blood-Glucose Meter (ACCU-CHEK AJAY PLUS METER) misc Test blood sugar daily. E 11.9 1 Each 0    metFORMIN (GLUCOPHAGE) 500 mg tablet TAKE TWO TABLETS BY MOUTH TWO TIMES A DAY WITH MEALS 360 Tab 1    Blood-Glucose Meter monitoring kit E11.9 Accu-Chek Ajay Plus Meter 1 Kit 0    TURMERIC ROOT EXTRACT PO Take 1 Cap by mouth daily.  L GASSERI/B BIFIDUM/B LONGUM ([employer] PO) Take  by mouth daily.  cinnamon bark 500 mg cap Take 1,000 mg by mouth two (2) times a day.  LORazepam (ATIVAN) 0.5 mg tablet Take 1 Tab by mouth two (2) times daily as needed for Anxiety. Max Daily Amount: 1 mg. Indications: anxious 180 Tab 0    benzonatate (TESSALON) 100 mg capsule Take 1 Cap by mouth three (3) times daily as needed for Cough. 60 Cap 3    clobetasol (TEMOVATE) 0.05 % topical cream Apply  to affected area two (2) times a day.  45 g 0    fluticasone propionate (FLONASE) 50 mcg/actuation nasal spray 2 sprays per nostril once a day  Indications: inflammation of the nose due to an allergy 3 Bottle 3    varicella-zoster recombinant, PF, (SHINGRIX, PF,) 50 mcg/0.5 mL susr injection 0.5mL by IntraMUSCular route once now and then repeat in 2-6 months 0.5 mL 1    furosemide (LASIX) 20 mg tablet TAKE 1 TABLET EVERY DAY AS NEEDED 90 Tab 1     Allergies   Allergen Reactions    Mobic [Meloxicam] Hives    Penicillins Unknown (comments)     DOESN'T REMEMBER    Plaquenil [Hydroxychloroquine] Hives     Family History   Problem Relation Age of Onset    Arthritis-osteo Mother     Psychiatric Disorder Father     Arthritis-osteo Father     Alcohol abuse Father     Heart Disease Father     Heart Surgery Father     Hypertension Father     Diabetes Brother     Arthritis-osteo Brother     Hypertension Brother     Anesth Problems Neg Hx      Social History     Tobacco Use    Smoking status: Current Every Day Smoker     Packs/day: 1.00     Years: 40.00     Pack years: 40.00    Smokeless tobacco: Never Used   Substance Use Topics    Alcohol use: Not Currently     Comment: stopped a month ago     Patient Active Problem List   Diagnosis Code    Primary osteoarthritis of right knee M17.11    Essential hypertension with goal blood pressure less than 140/90 I10    Chronic obstructive pulmonary disease (HCC) J44.9    Diabetes mellitus type 2, controlled ([facility] E11.9    Polyneuropathy associated with underlying disease ([facility] G63    RLS (restless legs syndrome) G25.81    Arthritis M19.90    Leukocytosis D72.829    Cigarette nicotine dependence without complication K27.274    Dysthymia F34.1    YOLANDA (obstructive sleep apnea) G47.33    Status post bilateral knee replacements Z96.653    Type 2 diabetes mellitus with diabetic neuropathy ([facility] E11.40    Severe obesity (BMI 35.0-39. 9) E66.01    Iliotibial band tendinitis of right side M76.31    Abnormal mammogram of left breast R92.8    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 M47.12, M47.22    Spondylosis of lumbar spine M47.816    Colon adenoma D12.6    Chronic superficial gastritis without bleeding K29.30       Depression Risk Factor Screening:     3 most recent PHQ Screens 3/18/2019   Little interest or pleasure in doing things More than half the days   Feeling down, depressed, irritable, or hopeless More than half the days   Total Score PHQ 2 4     Alcohol Risk Factor Screening: You do not drink alcohol or very rarely. Functional Ability and Level of Safety:   Hearing Loss  Hearing is good. Activities of Daily Living  The home contains: no safety equipment. Patient does total self care    Fall Risk  Fall Risk Assessment, last 12 mths 2/14/2018   Able to walk? Yes   Fall in past 12 months?  No       Abuse Screen  Patient is not abused    Cognitive Screening   Evaluation of Cognitive Function:  Has your family/caregiver stated any concerns about your memory: no  Normal    Patient Care Team   Patient Care Team:  Franky Walker MD as PCP - General (Family Practice)  Tito Rivers MD as Physician (Sleep Medicine)    Assessment/Plan   Education and counseling provided:  Are appropriate based on today's review and evaluation  End-of-Life planning (with patient's consent)  Pneumococcal Vaccine  Influenza Vaccine  Screening Mammography  Screening Pap and pelvic (covered once every 2 years)  Colorectal cancer screening tests  Cardiovascular screening blood test  Screening for glaucoma  Diabetes screening test    Diagnoses and all orders for this visit:    1. Medicare annual wellness visit, subsequent        Health Maintenance Due   Topic Date Due    HEMOGLOBIN A1C Q6M  09/18/2019    MEDICARE YEARLY EXAM  09/01/2019       Subjective:     Micheline Menendez is a 53 y.o. female who presents for follow up of hypertension, hyperlipidemia, obesity and chronic pain. Diet and Lifestyle: generally follows a low fat low cholesterol diet, generally follows a low sodium diet, sedentary, smoker . Home BP Monitoring: is not measured at home    Cardiovascular ROS: taking medications as instructed, no medication side effects noted, no TIA's, no chest pain on exertion, no dyspnea on exertion, no swelling of ankles. New concerns: seeing chiropractor for chornic pain in neck and back. Plans to wean off Topamax and go back on Gabapentin. She has not followed up with Pain Clinic. C/O breast pain. She is due for mammogram done at [facility].     Patient Active Problem List    Diagnosis Date Noted    Colon adenoma 03/18/2019    Chronic superficial gastritis without bleeding 03/18/2019    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 12/03/2018    Spondylosis of lumbar spine 12/03/2018    Abnormal mammogram of left breast 09/26/2018    Iliotibial band tendinitis of right side 08/30/2018    Severe obesity (BMI 35.0-39.9) 06/20/2018    Type 2 diabetes mellitus with diabetic neuropathy ([facility] 02/14/2018    Status post bilateral knee replacements 11/02/2017    Cigarette nicotine dependence without complication 63/58/3428    Dysthymia 12/30/2016    YOLANDA (obstructive sleep apnea) 12/30/2016    Arthritis 09/09/2016    Leukocytosis 09/09/2016    Essential hypertension with goal blood pressure less than 140/90 09/07/2016    Chronic obstructive pulmonary disease ([facility] 09/07/2016    Diabetes mellitus type 2, controlled ([facility] 09/07/2016    Polyneuropathy associated with underlying disease ([facility] 09/07/2016    RLS (restless legs syndrome) 09/07/2016    Primary osteoarthritis of right knee 08/18/2016     Current Outpatient Medications   Medication Sig Dispense Refill    tiZANidine (ZANAFLEX) 2 mg tablet Take 1 Tab by mouth two (2) times a day. Indications: muscle spasm 60 Tab 0    gabapentin (NEURONTIN) 300 mg capsule Take 1 Cap by mouth three (3) times daily. Max Daily Amount: 900 mg. 90 Cap 0    venlafaxine-SR (EFFEXOR-XR) 150 mg capsule TAKE 1 CAPSULE EVERY DAY 90 Cap 1    levocetirizine (XYZAL) 5 mg tablet Take 1 Tab by mouth daily. 90 Tab 1    glucose blood VI test strips (ACCU-CHEK AJAY PLUS TEST STRP) strip TEST BLOOD SUGAR EVERY  Strip 0    atorvastatin (LIPITOR) 40 mg tablet TAKE ONE TABLET BY MOUTH AT NIGHT 90 Tab 1    rOPINIRole (REQUIP) 2 mg tablet TAKE 1 TABLET THREE TIMES DAILY 270 Tab 1    dilTIAZem CD (CARDIZEM CD) 180 mg ER capsule Take 2 Caps by mouth daily. TAKE ONE CAPSULE BY MOUTH EVERY DAY  Indications: high blood pressure 180 Cap 1    topiramate (TOPAMAX) 50 mg tablet Take 1 Tab by mouth two (2) times a day. 180 Tab 1    losartan (COZAAR) 100 mg tablet TAKE 1 TABLET EVERY DAY 90 Tab 1    omeprazole (PRILOSEC) 40 mg capsule Take 40 mg by mouth Before breakfast and dinner.       GARLIC EXTRACT PO Take  by mouth.      tiotropium (SPIRIVA) 18 mcg inhalation capsule Take 1 Cap by inhalation daily. 90 Cap 1    Blood-Glucose Meter (ACCU-CHEK AJAY PLUS METER) misc Test blood sugar daily. E 11.9 1 Each 0    metFORMIN (GLUCOPHAGE) 500 mg tablet TAKE TWO TABLETS BY MOUTH TWO TIMES A DAY WITH MEALS 360 Tab 1    Blood-Glucose Meter monitoring kit E11.9 Accu-Chek Ajay Plus Meter 1 Kit 0    TURMERIC ROOT EXTRACT PO Take 1 Cap by mouth daily.  L GASSERI/B BIFIDUM/B LONGUM ([employer] PO) Take  by mouth daily.  cinnamon bark 500 mg cap Take 1,000 mg by mouth two (2) times a day.  LORazepam (ATIVAN) 0.5 mg tablet Take 1 Tab by mouth two (2) times daily as needed for Anxiety. Max Daily Amount: 1 mg. Indications: anxious 180 Tab 0    benzonatate (TESSALON) 100 mg capsule Take 1 Cap by mouth three (3) times daily as needed for Cough. 60 Cap 3    clobetasol (TEMOVATE) 0.05 % topical cream Apply  to affected area two (2) times a day.  45 g 0    fluticasone propionate (FLONASE) 50 mcg/actuation nasal spray 2 sprays per nostril once a day  Indications: inflammation of the nose due to an allergy 3 Bottle 3    furosemide (LASIX) 20 mg tablet TAKE 1 TABLET EVERY DAY AS NEEDED 90 Tab 1     Allergies   Allergen Reactions    Mobic [Meloxicam] Hives    Penicillins Unknown (comments)     DOESN'T REMEMBER    Plaquenil [Hydroxychloroquine] Hives     Past Medical History:   Diagnosis Date    Arrhythmia     TACHYCARDIA    Arthritis     Diabetes [facility]     GERD (gastroesophageal reflux disease)     Hypertension     Psychiatric disorder     PANIC ATTACKS    Sleep apnea      Past Surgical History:   Procedure Laterality Date    CARDIAC SURG PROCEDURE UNLIST  2013    ABLATION    COLONOSCOPY N/A 3/1/2019    COLON[facility] performed by Claudia [facility]o [facility] at [facility] ENDOSCOPY    HX BREAST BIOPSY Bilateral     benign    HX CHOLECYSTECTOMY  1998    HX HEENT  1993    SEPTUM SCRAPED    HX HYSTERECTOMY  2008    HX KNEE ARTHROSCOPY Bilateral 2012    HX KNEE ARTHROSCOPY Left 2012    HX KNEE ARTHROSCOPY Right 2014    HX KNEE REPLACEMENT Left 2012    HX KNEE REPLACEMENT Right 08/18/2016    HX TUBAL LIGATION  1989    NEUROLOGICAL PROCEDURE UNLISTED      lumbar nerve block     Family History   Problem Relation Age of Onset    Arthritis-osteo Mother     Psychiatric Disorder Father     Arthritis-osteo Father     Alcohol abuse Father     Heart Disease Father     Heart Surgery Father     Hypertension Father     Diabetes Brother     Arthritis-osteo Brother     Hypertension Brother     Anesth Problems Neg Hx      Social History     Tobacco Use    Smoking status: Current Every Day Smoker     Packs/day: 1.00     Years: 40.00     Pack years: 40.00    Smokeless tobacco: Never Used   Substance Use Topics    Alcohol use: Not Currently     Comment: stopped a month ago             Review of Systems, additional:  Pertinent items are noted in HPI. Objective:     Visit Vitals  /80 (BP 1 Location: Right arm, BP Patient Position: Sitting) Comment: manual   Pulse 80   Temp 98.2 °F (36.8 °C) (Oral)   Resp 20   Ht 5' 3.5\" (1.613 m)   Wt 182 lb 3.2 oz (82.6 kg)   SpO2 97%   BMI 31.77 kg/m²     Appearance: alert, well appearing, and in no distress and overweight. General exam: CVS exam BP noted to be well controlled today in office, S1, S2 normal, no gallop, no murmur, chest clear, no JVD, no HSM, no edema. Lab review: orders written for new lab studies as appropriate; see orders. Assessment/Plan:     . Nirmal Serra ICD-10-CM ICD-9-CM    1. Medicare annual wellness visit, subsequent Z00.00 V70.0    2. Breast pain, left N64.4 611.71 BRUNO MAMMO BI DX INCL CAD   3. Type 2 diabetes mellitus with diabetic neuropathy, without long-term current use of insulin (HCC) E11.40 250.60 HEMOGLOBIN A1C WITH EAG     357.2    4. Essential hypertension with goal blood pressure less than 140/90 I10 401.9 TSH 3RD GENERATION      T4, FREE   5. Malaise and fatigue R53.81 780.79 CBC WITH AUTOMATED DIFF    R53.83     6. Vitamin D deficiency E55.9 268.9 VITAMIN D, 25 HYDROXY   7. Vitamin B 12 deficiency E53.8 266.2 VITAMIN B12   8. Spondylosis of lumbar spine M47.816 721.3 tiZANidine (ZANAFLEX) 2 mg tablet      gabapentin (NEURONTIN) 300 mg capsule   9. Polyneuropathy associated with underlying disease ([address] G63 357.4      Order labs. Restart back on Gabapentin at lower dose, wean off Topamax. Order Tizanidine as muscle relaxer.   Order DX mammogram.

## 2019-09-20 NOTE — PATIENT INSTRUCTIONS
Medicare Wellness Visit, Female The best way to live healthy is to have a lifestyle where you eat a well-balanced diet, exercise regularly, limit alcohol use, and quit all forms of tobacco/nicotine, if applicable. Regular preventive services are another way to keep healthy. Preventive services (vaccines, screening tests, monitoring & exams) can help personalize your care plan, which helps you manage your own care. Screening tests can find health problems at the earliest stages, when they are easiest to treat. [facility] follows the current, evidence-based guidelines published by the [facility] ([facility]) when recommending preventive services for our patients. Because we follow these guidelines, sometimes recommendations change over time as research supports it. (For example, mammograms used to be recommended annually. Even though Medicare will still pay for an annual mammogram, the newer guidelines recommend a mammogram every two years for women of average risk.) Of course, you and your doctor may decide to screen more often for some diseases, based on your risk and your health status. Preventive services for you include: - Medicare offers their members a free annual wellness visit, which is time for you and your primary care provider to discuss and plan for your preventive service needs. Take advantage of this benefit every year! 
-All adults over the age of 72 should receive the recommended pneumonia vaccines. Current USPSTF guidelines recommend a series of two vaccines for the best pneumonia protection.  
-All adults should have a flu vaccine yearly and a tetanus vaccine every 10 years. All adults age 61 and older should receive a shingles vaccine once in their lifetime.   
-A bone mass density test is recommended when a woman turns 65 to screen for osteoporosis. This test is only recommended one time, as a screening. Some providers will use this same test as a disease monitoring tool if you already have osteoporosis. -All adults age 38-68 who are overweight should have a diabetes screening test once every three years.  
-Other screening tests and preventive services for persons with diabetes include: an eye exam to screen for diabetic retinopathy, a kidney function test, a foot exam, and stricter control over your cholesterol.  
-Cardiovascular screening for adults with routine risk involves an electrocardiogram (ECG) at intervals determined by your doctor.  
-Colorectal cancer screenings should be done for adults age 54-65 with no increased risk factors for colorectal cancer. There are a number of acceptable methods of screening for this type of cancer. Each test has its own benefits and drawbacks. Discuss with your doctor what is most appropriate for you during your annual wellness visit. The different tests include: colonoscopy (considered the best screening method), a fecal occult blood test, a fecal DNA test, and sigmoidoscopy. -Breast cancer screenings are recommended every other year for women of normal risk, age 54-69. 
-Cervical cancer screenings for women over age 72 are only recommended with certain risk factors.  
-All adults born between [facility] should be screened once for Hepatitis C. Here is a list of your current Health Maintenance items (your personalized list of preventive services) with a due date: 
Health Maintenance Due Topic Date Due  
 Hemoglobin A1C    09/18/2019 [facility] Annual Well Visit  09/01/2019

## 2019-09-21 LAB
25(OH)D3+25(OH)D2 SERPL-MCNC: 40.2 NG/ML (ref 30–100)
BASOPHILS # BLD AUTO: 0.2 X10E3/UL (ref 0–0.2)
BASOPHILS NFR BLD AUTO: 1 %
EOSINOPHIL # BLD AUTO: 0.6 X10E3/UL (ref 0–0.4)
EOSINOPHIL NFR BLD AUTO: 5 %
ERYTHROCYTE [DISTWIDTH] IN BLOOD BY AUTOMATED COUNT: 13.5 % (ref 12.3–15.4)
EST. AVERAGE GLUCOSE BLD GHB EST-MCNC: 120 MG/DL
HBA1C MFR BLD: 5.8 % (ref 4.8–5.6)
HCT VFR BLD AUTO: 38.8 % (ref 34–46.6)
HGB BLD-MCNC: 12.6 G/DL (ref 11.1–15.9)
IMM GRANULOCYTES # BLD AUTO: 0 X10E3/UL (ref 0–0.1)
IMM GRANULOCYTES NFR BLD AUTO: 0 %
LYMPHOCYTES # BLD AUTO: 3.4 X10E3/UL (ref 0.7–3.1)
LYMPHOCYTES NFR BLD AUTO: 24 %
MCH RBC QN AUTO: 27 PG (ref 26.6–33)
MCHC RBC AUTO-ENTMCNC: 32.5 G/DL (ref 31.5–35.7)
MCV RBC AUTO: 83 FL (ref 79–97)
MONOCYTES # BLD AUTO: 0.6 X10E3/UL (ref 0.1–0.9)
MONOCYTES NFR BLD AUTO: 4 %
NEUTROPHILS # BLD AUTO: 9.2 X10E3/UL (ref 1.4–7)
NEUTROPHILS NFR BLD AUTO: 66 %
PLATELET # BLD AUTO: 296 X10E3/UL (ref 150–450)
RBC # BLD AUTO: 4.66 X10E6/UL (ref 3.77–5.28)
T4 FREE SERPL-MCNC: 1 NG/DL (ref 0.82–1.77)
TSH SERPL DL<=0.005 MIU/L-ACNC: 1.51 UIU/ML (ref 0.45–4.5)
VIT B12 SERPL-MCNC: 1827 PG/ML (ref 232–1245)
WBC # BLD AUTO: 14.1 X10E3/UL (ref 3.4–10.8)

## 2019-10-02 DIAGNOSIS — I10 ESSENTIAL HYPERTENSION WITH GOAL BLOOD PRESSURE LESS THAN 140/90: ICD-10-CM

## 2019-10-03 RX ORDER — LOSARTAN POTASSIUM 100 MG/1
TABLET ORAL
Qty: 90 TAB | Refills: 1 | Status: SHIPPED | OUTPATIENT
Start: 2019-10-03 | End: 2020-01-10 | Stop reason: SDUPTHER

## 2019-10-03 RX ORDER — DILTIAZEM HYDROCHLORIDE 180 MG/1
CAPSULE, COATED, EXTENDED RELEASE ORAL
Qty: 180 CAP | Refills: 1 | Status: SHIPPED | OUTPATIENT
Start: 2019-10-03 | End: 2020-05-03

## 2019-10-15 DIAGNOSIS — E78.00 HYPERCHOLESTEROLEMIA: ICD-10-CM

## 2019-10-15 DIAGNOSIS — M47.12 SPONDYLOSIS OF CERVICAL SPINE WITH MYELOPATHY AND RADICULOPATHY: ICD-10-CM

## 2019-10-15 DIAGNOSIS — M47.22 SPONDYLOSIS OF CERVICAL SPINE WITH MYELOPATHY AND RADICULOPATHY: ICD-10-CM

## 2019-10-15 RX ORDER — TOPIRAMATE 50 MG/1
TABLET, FILM COATED ORAL
Qty: 180 TAB | Refills: 1 | Status: SHIPPED | OUTPATIENT
Start: 2019-10-15 | End: 2020-01-10 | Stop reason: SDUPTHER

## 2019-10-15 RX ORDER — ATORVASTATIN CALCIUM 40 MG/1
TABLET, FILM COATED ORAL
Qty: 90 TAB | Refills: 1 | Status: SHIPPED | OUTPATIENT
Start: 2019-10-15 | End: 2020-06-17

## 2019-10-21 DIAGNOSIS — N64.4 BREAST PAIN, LEFT: ICD-10-CM

## 2019-10-21 DIAGNOSIS — M47.816 SPONDYLOSIS OF LUMBAR SPINE: ICD-10-CM

## 2019-10-21 RX ORDER — TIZANIDINE 2 MG/1
TABLET ORAL
Qty: 60 TAB | Refills: 0 | Status: SHIPPED | OUTPATIENT
Start: 2019-10-21 | End: 2019-11-22 | Stop reason: SDUPTHER

## 2019-11-11 DIAGNOSIS — G25.81 RLS (RESTLESS LEGS SYNDROME): ICD-10-CM

## 2019-11-12 RX ORDER — ROPINIROLE 2 MG/1
TABLET, FILM COATED ORAL
Qty: 270 TAB | Refills: 0 | Status: SHIPPED | OUTPATIENT
Start: 2019-11-12 | End: 2020-09-02 | Stop reason: SDUPTHER

## 2019-11-22 DIAGNOSIS — M47.816 SPONDYLOSIS OF LUMBAR SPINE: ICD-10-CM

## 2019-11-22 RX ORDER — TIZANIDINE 2 MG/1
TABLET ORAL
Qty: 60 TAB | Refills: 0 | Status: SHIPPED | OUTPATIENT
Start: 2019-11-22 | End: 2020-01-10 | Stop reason: SDUPTHER

## 2020-01-10 ENCOUNTER — HOSPITAL ENCOUNTER (OUTPATIENT)
Dept: LAB | Age: 55
Discharge: HOME | End: 2020-01-10

## 2020-01-10 ENCOUNTER — OFFICE VISIT (OUTPATIENT)
Dept: FAMILY MEDICINE CLINIC | Age: 55
End: 2020-01-10

## 2020-01-10 VITALS
RESPIRATION RATE: 20 BRPM | HEART RATE: 86 BPM | HEIGHT: 64 IN | OXYGEN SATURATION: 98 % | BODY MASS INDEX: 30.66 KG/M2 | WEIGHT: 179.6 LBS | SYSTOLIC BLOOD PRESSURE: 142 MMHG | DIASTOLIC BLOOD PRESSURE: 84 MMHG | TEMPERATURE: 98.2 F

## 2020-01-10 DIAGNOSIS — E11.9 CONTROLLED TYPE 2 DIABETES MELLITUS WITHOUT COMPLICATION, WITHOUT LONG-TERM CURRENT USE OF INSULIN (HCC): ICD-10-CM

## 2020-01-10 DIAGNOSIS — Z91.09 ENVIRONMENTAL ALLERGIES: ICD-10-CM

## 2020-01-10 DIAGNOSIS — R60.0 PEDAL EDEMA: ICD-10-CM

## 2020-01-10 DIAGNOSIS — Z79.899 ENCOUNTER FOR LONG-TERM CURRENT USE OF MEDICATION: ICD-10-CM

## 2020-01-10 DIAGNOSIS — M47.22 SPONDYLOSIS OF CERVICAL SPINE WITH MYELOPATHY AND RADICULOPATHY: ICD-10-CM

## 2020-01-10 DIAGNOSIS — M47.12 SPONDYLOSIS OF CERVICAL SPINE WITH MYELOPATHY AND RADICULOPATHY: ICD-10-CM

## 2020-01-10 DIAGNOSIS — R20.2 NUMBNESS AND TINGLING OF RIGHT FACE: ICD-10-CM

## 2020-01-10 DIAGNOSIS — M47.816 SPONDYLOSIS OF LUMBAR SPINE: ICD-10-CM

## 2020-01-10 DIAGNOSIS — J01.90 ACUTE SINUSITIS, RECURRENCE NOT SPECIFIED, UNSPECIFIED LOCATION: Primary | ICD-10-CM

## 2020-01-10 DIAGNOSIS — I10 ESSENTIAL HYPERTENSION WITH GOAL BLOOD PRESSURE LESS THAN 140/90: ICD-10-CM

## 2020-01-10 DIAGNOSIS — Q67.0 FACIAL ASYMMETRY: ICD-10-CM

## 2020-01-10 DIAGNOSIS — R20.0 NUMBNESS AND TINGLING OF RIGHT FACE: ICD-10-CM

## 2020-01-10 DIAGNOSIS — F34.1 DYSTHYMIA: ICD-10-CM

## 2020-01-10 LAB
ALBUMIN SERPL-MCNC: 3.8 G/DL (ref 3.5–5)
ALBUMIN/GLOB SERPL: 1.3 {RATIO} (ref 1.1–2.2)
ALP SERPL-CCNC: 154 U/L (ref 45–117)
ALT SERPL-CCNC: 30 U/L (ref 12–78)
ANION GAP SERPL CALC-SCNC: 5 MMOL/L (ref 5–15)
AST SERPL-CCNC: 13 U/L (ref 15–37)
BILIRUB SERPL-MCNC: 0.2 MG/DL (ref 0.2–1)
BUN SERPL-MCNC: 10 MG/DL (ref 6–20)
BUN/CREAT SERPL: 13 (ref 12–20)
CALCIUM SERPL-MCNC: 9.4 MG/DL (ref 8.5–10.1)
CHLORIDE SERPL-SCNC: 112 MMOL/L (ref 97–108)
CHOLEST SERPL-MCNC: 131 MG/DL
CO2 SERPL-SCNC: 26 MMOL/L (ref 21–32)
CREAT SERPL-MCNC: 0.75 MG/DL (ref 0.55–1.02)
EST. AVERAGE GLUCOSE BLD GHB EST-MCNC: 123 MG/DL
GLOBULIN SER CALC-MCNC: 2.9 G/DL (ref 2–4)
GLUCOSE SERPL-MCNC: 85 MG/DL (ref 65–100)
HBA1C MFR BLD: 5.9 % (ref 4–5.6)
HDLC SERPL-MCNC: 54 MG/DL
HDLC SERPL: 2.4 {RATIO} (ref 0–5)
LDLC SERPL CALC-MCNC: 58.8 MG/DL (ref 0–100)
LIPID PROFILE,FLP: NORMAL
POTASSIUM SERPL-SCNC: 4.5 MMOL/L (ref 3.5–5.1)
PROT SERPL-MCNC: 6.7 G/DL (ref 6.4–8.2)
SODIUM SERPL-SCNC: 143 MMOL/L (ref 136–145)
TRIGL SERPL-MCNC: 91 MG/DL (ref ?–150)
VLDLC SERPL CALC-MCNC: 18.2 MG/DL

## 2020-01-10 RX ORDER — LOSARTAN POTASSIUM 100 MG/1
100 TABLET ORAL DAILY
Qty: 90 TAB | Refills: 3 | Status: SHIPPED | OUTPATIENT
Start: 2020-01-10 | End: 2020-07-21

## 2020-01-10 RX ORDER — FUROSEMIDE 20 MG/1
TABLET ORAL
Qty: 90 TAB | Refills: 1 | Status: SHIPPED | OUTPATIENT
Start: 2020-01-10 | End: 2020-05-22

## 2020-01-10 RX ORDER — LEVOCETIRIZINE DIHYDROCHLORIDE 5 MG/1
5 TABLET, FILM COATED ORAL DAILY
Qty: 90 TAB | Refills: 3 | Status: SHIPPED | OUTPATIENT
Start: 2020-01-10 | End: 2021-03-18

## 2020-01-10 RX ORDER — TIZANIDINE 2 MG/1
TABLET ORAL
Qty: 60 TAB | Refills: 0 | Status: SHIPPED | OUTPATIENT
Start: 2020-01-10 | End: 2020-05-22

## 2020-01-10 RX ORDER — GLUCOSAMINE/CHONDR SU A SOD 750-600 MG
TABLET ORAL
COMMUNITY
End: 2020-07-21

## 2020-01-10 RX ORDER — VENLAFAXINE HYDROCHLORIDE 150 MG/1
150 CAPSULE, EXTENDED RELEASE ORAL DAILY
Qty: 90 CAP | Refills: 3 | Status: SHIPPED | OUTPATIENT
Start: 2020-01-10 | End: 2020-12-29

## 2020-01-10 RX ORDER — METFORMIN HYDROCHLORIDE 500 MG/1
TABLET ORAL
Qty: 360 TAB | Refills: 3 | Status: SHIPPED | OUTPATIENT
Start: 2020-01-10 | End: 2020-07-21

## 2020-01-10 RX ORDER — AZITHROMYCIN 250 MG/1
TABLET, FILM COATED ORAL
Qty: 6 TAB | Refills: 0 | Status: SHIPPED | OUTPATIENT
Start: 2020-01-10 | End: 2020-01-15

## 2020-01-10 RX ORDER — TIZANIDINE 2 MG/1
TABLET ORAL
Qty: 90 TAB | Refills: 1 | Status: SHIPPED | OUTPATIENT
Start: 2020-01-10 | End: 2020-01-10 | Stop reason: SDUPTHER

## 2020-01-10 RX ORDER — TOPIRAMATE 50 MG/1
TABLET, FILM COATED ORAL
Qty: 180 TAB | Refills: 1 | Status: SHIPPED | OUTPATIENT
Start: 2020-01-10 | End: 2020-07-21

## 2020-01-10 NOTE — PROGRESS NOTES
Subjective:   Dimitri Keen is a 53 y.o. female who complains of congestion, post nasal drip, bilateral sinus pain and green nasal discharge for 7 days, gradually worsening since that time. She denies a history of fevers, shortness of breath and wheezing. Also R sided facial numbness and tingling. Dizziness +. Off balance. Started 2-3 weeks. Some tingling R fingers. + headache. Also pt has switched off Gabapentin back to taking Topamax for chronic neck and back pain. It seems to work better. Need med refills. Evaluation to date: none. Treatment to date: none. Patient does not smoke cigarettes. Relevant PMH: No pertinent additional PMH. Patient Active Problem List    Diagnosis Date Noted    Colon adenoma 03/18/2019    Chronic superficial gastritis without bleeding 03/18/2019    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 12/03/2018    Spondylosis of lumbar spine 12/03/2018    Abnormal mammogram of left breast 09/26/2018    Iliotibial band tendinitis of right side 08/30/2018    Severe obesity (BMI 35.0-39.9) 06/20/2018    Type 2 diabetes mellitus with diabetic neuropathy ([facility] [address] 02/14/2018    Status post bilateral knee replacements 11/02/2017    Cigarette nicotine dependence without complication 94/66/9568    Dysthymia 12/30/2016    YOLANDA (obstructive sleep apnea) 12/30/2016    Arthritis 09/09/2016    Leukocytosis 09/09/2016    Essential hypertension with goal blood pressure less than 140/90 09/07/2016    Chronic obstructive pulmonary disease ([address] 09/07/2016    Diabetes mellitus type 2, controlled ([address] 09/07/2016    Polyneuropathy associated with underlying disease ([facility] [address] 09/07/2016    RLS (restless legs syndrome) 09/07/2016    Primary osteoarthritis of right knee 08/18/2016     Current Outpatient Medications   Medication Sig Dispense Refill    Biotin 2,500 mcg cap Take  by mouth.       topiramate (TOPAMAX) 50 mg tablet TAKE 1 TABLET TWICE DAILY 180 Tab 1  losartan (COZAAR) 100 mg tablet Take 1 Tab by mouth daily. 90 Tab 3    levocetirizine (XYZAL) 5 mg tablet Take 1 Tab by mouth daily. 90 Tab 3    furosemide (LASIX) 20 mg tablet TAKE 1 TABLET EVERY DAY AS NEEDED 90 Tab 1    metFORMIN (GLUCOPHAGE) 500 mg tablet TAKE TWO TABLETS BY MOUTH TWO TIMES A DAY WITH MEALS 360 Tab 3    venlafaxine-SR (EFFEXOR-XR) 150 mg capsule Take 1 Cap by mouth daily. 90 Cap 3    tiZANidine (ZANAFLEX) 2 mg tablet TAKE 1 TABLET BY MOUTH TWICE DAILY FOR MUSCLE SPASM  Indications: muscle spasm 60 Tab 0    rOPINIRole (REQUIP) 2 mg tablet TAKE 1 TABLET THREE TIMES DAILY 270 Tab 0    atorvastatin (LIPITOR) 40 mg tablet TAKE ONE TABLET BY MOUTH AT NIGHT 90 Tab 1    dilTIAZem CD (CARDIZEM CD) 180 mg ER capsule TAKE 2 CAPSULES EVERY DAY FOR HIGH BLOOD PRESSURE 180 Cap 1    glucose blood VI test strips (ACCU-CHEK AJAY PLUS TEST STRP) strip TEST BLOOD SUGAR EVERY  Strip 0    fluticasone propionate (FLONASE) 50 mcg/actuation nasal spray 2 sprays per nostril once a day  Indications: inflammation of the nose due to an allergy 3 Bottle 3    omeprazole (PRILOSEC) 40 mg capsule Take 40 mg by mouth Before breakfast and dinner.  tiotropium (SPIRIVA) 18 mcg inhalation capsule Take 1 Cap by inhalation daily. 90 Cap 1    Blood-Glucose Meter (ACCU-CHEK AJAY PLUS METER) misc Test blood sugar daily. E 11.9 1 Each 0    Blood-Glucose Meter monitoring kit E11.9 Accu-Chek Ajay Plus Meter 1 Kit 0    TURMERIC ROOT EXTRACT PO Take 1 Cap by mouth daily.  gabapentin (NEURONTIN) 300 mg capsule Take 1 Cap by mouth three (3) times daily. Max Daily Amount: 900 mg. 90 Cap 0    LORazepam (ATIVAN) 0.5 mg tablet Take 1 Tab by mouth two (2) times daily as needed for Anxiety. Max Daily Amount: 1 mg. Indications: anxious 180 Tab 0    benzonatate (TESSALON) 100 mg capsule Take 1 Cap by mouth three (3) times daily as needed for Cough.  60 Cap 3    clobetasol (TEMOVATE) 0.05 % topical cream Apply to affected area two (2) times a day. 45 g 0     Allergies   Allergen Reactions    Mobic [Meloxicam] Hives    Penicillins Unknown (comments)     DOESN'T REMEMBER    Plaquenil [Hydroxychloroquine] Hives        Review of Systems  Pertinent items are noted in HPI. Objective:     Visit Vitals  /84 (BP 1 Location: Left arm, BP Patient Position: Sitting) Comment: manual   Pulse 86   Temp 98.2 °F (36.8 °C) (Oral)   Resp 20   Ht 5' 3.5\" (1.613 m)   Wt 179 lb 9.6 oz (81.5 kg)   SpO2 98%   BMI 31.32 kg/m²     General:  alert, cooperative, no distress   Eyes: negative   Ears: normal TM's and external ear canals AU   Sinuses: Normal paranasal sinuses without tenderness   Mouth:  Lips, mucosa, and tongue normal. Teeth and gums normal   Neck: supple, symmetrical, trachea midline and no adenopathy. Heart: S1 and S2 normal, no murmurs noted. Lungs: clear to auscultation bilaterally   Abdomen:         Assessment/Plan:         ICD-10-CM ICD-9-CM    1. Acute sinusitis, recurrence not specified, unspecified location J01.90 461.9 azithromycin (ZITHROMAX) 250 mg tablet   2. Facial asymmetry Q67.0 754.0 CT HEAD W WO CONT   3. Numbness and tingling of right face R20.0 782.0 CT HEAD W WO CONT    R20.2     4. Spondylosis of lumbar spine M47.816 721.3 tiZANidine (ZANAFLEX) 2 mg tablet      DISCONTINUED: tiZANidine (ZANAFLEX) 2 mg tablet   5. 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 M47.12 721.1 topiramate (TOPAMAX) 50 mg tablet    M47.22 723.4    6. Essential hypertension with goal blood pressure less than 140/90 I10 401.9 losartan (COZAAR) 100 mg tablet   7. Environmental allergies Z91.09 V15.09 levocetirizine (XYZAL) 5 mg tablet   8. Pedal edema R60.0 782.3 furosemide (LASIX) 20 mg tablet   9. Dysthymia F34.1 300.4 venlafaxine-SR (EFFEXOR-XR) 150 mg capsule   10.  Controlled type 2 diabetes mellitus without complication, without long-term current use of insulin (HCC) E11.9 250.00 metFORMIN (GLUCOPHAGE) 500 mg tablet      LIPID PANEL      HEMOGLOBIN A1C WITH EAG   11. Encounter for long-term current use of medication K13.863 O39.88 METABOLIC PANEL, COMPREHENSIVE   . Med refills ordered. Order CT head. May need referral to Neurology. Treat sinusitis. Check labs.

## 2020-01-10 NOTE — PATIENT INSTRUCTIONS
Sinusitis: Care Instructions  Your Care Instructions    Sinusitis is an infection of the lining of the sinus cavities in your head. Sinusitis often follows a cold. It causes pain and pressure in your head and face. In most cases, sinusitis gets better on its own in 1 to 2 weeks. But some mild symptoms may last for several weeks. Sometimes antibiotics are needed. Follow-up care is a key part of your treatment and safety. Be sure to make and go to all appointments, and call your doctor if you are having problems. It's also a good idea to know your test results and keep a list of the medicines you take. How can you care for yourself at home? · Take an over-the-counter pain medicine, such as acetaminophen (Tylenol), ibuprofen (Advil, Motrin), or naproxen (Aleve). Read and follow all instructions on the label. · If the doctor prescribed antibiotics, take them as directed. Do not stop taking them just because you feel better. You need to take the full course of antibiotics. · Be careful when taking over-the-counter cold or flu medicines and Tylenol at the same time. Many of these medicines have acetaminophen, which is Tylenol. Read the labels to make sure that you are not taking more than the recommended dose. Too much acetaminophen (Tylenol) can be harmful. · Breathe warm, moist air from a steamy shower, a hot bath, or a sink filled with hot water. Avoid cold, dry air. Using a humidifier in your home may help. Follow the directions for cleaning the machine. · Use saline (saltwater) nasal washes to help keep your nasal passages open and wash out mucus and bacteria. You can buy saline nose drops at a grocery store or drugstore. Or you can make your own at home by adding 1 teaspoon of salt and 1 teaspoon of baking soda to 2 cups of distilled water. If you make your own, fill a bulb syringe with the solution, insert the tip into your nostril, and squeeze gently. Petr Bees your nose.   · Put a hot, wet towel or a warm gel pack on your face 3 or 4 times a day for 5 to 10 minutes each time. · Try a decongestant nasal spray like oxymetazoline (Afrin). Do not use it for more than 3 days in a row. Using it for more than 3 days can make your congestion worse. When should you call for help? Call your doctor now or seek immediate medical care if:    · You have new or worse swelling or redness in your face or around your eyes.     · You have a new or higher fever.    Watch closely for changes in your health, and be sure to contact your doctor if:    · You have new or worse facial pain.     · The mucus from your nose becomes thicker (like pus) or has new blood in it.     · You are not getting better as expected. Where can you learn more? Go to http://matti-yinka.info/. Enter F935 in the search box to learn more about \"Sinusitis: Care Instructions. \"  Current as of: October 21, 2018  Content Version: 12.2  © 0925-9442 [employer]. Care instructions adapted under license by [employer] (which disclaims liability or warranty for this information). If you have questions about a medical condition or this instruction, always ask your healthcare professional. [place] [address] any warranty or liability for your use of this information.

## 2020-01-10 NOTE — PROGRESS NOTES
Identified pt with two pt identifiers(name and ). Chief Complaint   Patient presents with    Numbness     right side of face and fingers - started several months ago    Sinus Infection    Dizziness        There are no preventive care reminders to display for this patient. Wt Readings from Last 3 Encounters:   01/10/20 179 lb 9.6 oz (81.5 kg)   19 182 lb 3.2 oz (82.6 kg)   19 182 lb 6.4 oz (82.7 kg)     Temp Readings from Last 3 Encounters:   01/10/20 98.2 °F (36.8 °C) (Oral)   19 98.2 °F (36.8 °C) (Oral)   19 98.4 °F (36.9 °C) (Oral)     BP Readings from Last 3 Encounters:   01/10/20 142/84   19 134/80   19 120/78     Pulse Readings from Last 3 Encounters:   01/10/20 86   19 80   19 89         Learning Assessment:  :     Learning Assessment 2016   PRIMARY LEARNER Patient Patient Patient   HIGHEST LEVEL OF EDUCATION - PRIMARY LEARNER  - - SOME COLLEGE   BARRIERS PRIMARY LEARNER - - NONE   CO-LEARNER CAREGIVER - - No   PRIMARY LANGUAGE ENGLISH ENGLISH ENGLISH   LEARNER PREFERENCE PRIMARY LISTENING - DEMONSTRATION   ANSWERED BY patient patient self   RELATIONSHIP SELF SELF SELF       Depression Screening:  :     3 most recent PHQ Screens 1/10/2020   Little interest or pleasure in doing things Not at all   Feeling down, depressed, irritable, or hopeless Several days   Total Score PHQ 2 1       Fall Risk Assessment:  :     Fall Risk Assessment, last 12 mths 2018   Able to walk? Yes   Fall in past 12 months? No       Abuse Screening:  :     Abuse Screening Questionnaire 1/10/2020 3/18/2019 2018 2016   Do you ever feel afraid of your partner? N N N N   Are you in a relationship with someone who physically or mentally threatens you? N N N N   Is it safe for you to go home?  Y Y Y Y       Coordination of Care Questionnaire:  :     1) Have you been to an emergency room, urgent care clinic since your last visit? no   Hospitalized since your last visit? no             2) Have you seen or consulted any other health care providers outside of [address] [place] [address] since your last visit? no  (Include any pap smears or colon screenings in this section.)    3) Do you have an Advance Directive on file? yes  Are you interested in receiving information about Advance Directives? no    Reviewed record in preparation for visit and have obtained necessary documentation. Medication reconciliation up to date and corrected with patient at this time.

## 2020-01-16 ENCOUNTER — TELEPHONE (OUTPATIENT)
Dept: FAMILY MEDICINE CLINIC | Age: 55
End: 2020-01-16

## 2020-01-16 ENCOUNTER — HOSPITAL ENCOUNTER (OUTPATIENT)
Dept: CT IMAGING | Age: 55
Discharge: HOME | End: 2020-01-16
Attending: FAMILY MEDICINE
Payer: MEDICARE

## 2020-01-16 DIAGNOSIS — Q67.0 FACIAL ASYMMETRY: ICD-10-CM

## 2020-01-16 DIAGNOSIS — R20.0 NUMBNESS AND TINGLING OF RIGHT FACE: ICD-10-CM

## 2020-01-16 DIAGNOSIS — R20.2 NUMBNESS AND TINGLING OF RIGHT FACE: ICD-10-CM

## 2020-01-16 PROCEDURE — 74011636320 HC RX REV CODE- 636/320: Performed by: FAMILY MEDICINE

## 2020-01-16 PROCEDURE — 74011000258 HC RX REV CODE- 258: Performed by: FAMILY MEDICINE

## 2020-01-16 PROCEDURE — 70470 CT HEAD/BRAIN W/O & W/DYE: CPT

## 2020-01-16 RX ORDER — SODIUM CHLORIDE 0.9 % (FLUSH) 0.9 %
10 SYRINGE (ML) INJECTION
Status: COMPLETED | OUTPATIENT
Start: 2020-01-16 | End: 2020-01-16

## 2020-01-16 RX ADMIN — IOPAMIDOL 100 ML: 612 INJECTION, SOLUTION INTRAVENOUS at 15:43

## 2020-01-16 RX ADMIN — Medication 10 ML: at 15:43

## 2020-01-16 RX ADMIN — SODIUM CHLORIDE 100 ML: 900 INJECTION, SOLUTION INTRAVENOUS at 15:43

## 2020-01-24 ENCOUNTER — TELEPHONE (OUTPATIENT)
Dept: FAMILY MEDICINE CLINIC | Age: 55
End: 2020-01-24

## 2020-01-24 DIAGNOSIS — R20.2 NUMBNESS AND TINGLING OF RIGHT FACE: Primary | ICD-10-CM

## 2020-01-24 DIAGNOSIS — R20.0 NUMBNESS AND TINGLING OF RIGHT FACE: Primary | ICD-10-CM

## 2020-01-24 NOTE — TELEPHONE ENCOUNTER
----- Message from Markham Holter, LPN sent at 3/86/8695  7:56 AM EST -----  Regarding: FW: Visit Follow-Up Question  Contact: [phone]    ----- Message -----  From: Ebonie Mata  Sent: 1/23/2020   8:07 PM EST  To: [facility]  Subject: RE: Visit Follow-Up Question                     Miah Singh  ----- Message -----  From: Madisyn Valadez MD  Sent: 1/23/2020  4:10 PM EST  To: Ebonie Mata  Subject: RE: Visit Follow-Up Question  I don't have a good explanation. Recommend see Neurology. Who is your mom's Neurologist?      ----- Message -----     From: Ebonie Mata     Sent: 1/23/2020 10:21 AM EST       To: Madisyn Valadez MD  Subject: RE: Visit Follow-Up Question    Hello,  I do understand that ct came back a o k, however, my face today is tingly and numb. What do u think? I can ignore if safe.   Thank you for thoughts

## 2020-02-24 ENCOUNTER — OFFICE VISIT (OUTPATIENT)
Dept: FAMILY MEDICINE CLINIC | Age: 55
End: 2020-02-24

## 2020-02-24 VITALS
HEIGHT: 64 IN | WEIGHT: 185 LBS | BODY MASS INDEX: 31.58 KG/M2 | OXYGEN SATURATION: 97 % | DIASTOLIC BLOOD PRESSURE: 82 MMHG | HEART RATE: 87 BPM | SYSTOLIC BLOOD PRESSURE: 138 MMHG | RESPIRATION RATE: 20 BRPM | TEMPERATURE: 97.9 F

## 2020-02-24 DIAGNOSIS — G47.00 INSOMNIA, UNSPECIFIED TYPE: Primary | ICD-10-CM

## 2020-02-24 DIAGNOSIS — Z91.09 ENVIRONMENTAL ALLERGIES: ICD-10-CM

## 2020-02-24 DIAGNOSIS — E11.9 CONTROLLED TYPE 2 DIABETES MELLITUS WITHOUT COMPLICATION, WITHOUT LONG-TERM CURRENT USE OF INSULIN (HCC): ICD-10-CM

## 2020-02-24 LAB
ALBUMIN UR QL STRIP: 10 MG/L
CREATININE, URINE POC: 50 MG/DL
MICROALBUMIN/CREAT RATIO POC: <30 MG/G

## 2020-02-24 RX ORDER — TRAZODONE HYDROCHLORIDE 50 MG/1
50 TABLET ORAL
Qty: 30 TAB | Refills: 1 | Status: SHIPPED | OUTPATIENT
Start: 2020-02-24 | End: 2020-03-12 | Stop reason: SDUPTHER

## 2020-02-24 RX ORDER — AZELASTINE 1 MG/ML
2 SPRAY, METERED NASAL 2 TIMES DAILY
Qty: 3 BOTTLE | Refills: 3 | Status: SHIPPED | OUTPATIENT
Start: 2020-02-24 | End: 2020-07-21

## 2020-02-24 NOTE — PROGRESS NOTES
Subjective:     Олег Wilson is a 53 y.o. female seen for follow up of diabetes. She also has hypertension and hyperlipidemia. Diabetic Review of Systems - medication compliance: compliant most of the time, diabetic diet compliance: compliant most of the time, last eye exam approximately 11 months ago. Other symptoms and concerns: poor sleep x months. Wakes up at 2:30 AM and unable to sleep again. Frontal HA. Coughing. Post nasal drip. + nausea mid day. Clear mucus. Patient Active Problem List    Diagnosis Date Noted    Colon adenoma 03/18/2019    Chronic superficial gastritis without bleeding 03/18/2019    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 12/03/2018    Spondylosis of lumbar spine 12/03/2018    Abnormal mammogram of left breast 09/26/2018    Iliotibial band tendinitis of right side 08/30/2018    Severe obesity (BMI 35.0-39.9) 06/20/2018    Type 2 diabetes mellitus with diabetic neuropathy ([facility] [address] 02/14/2018    Status post bilateral knee replacements 11/02/2017    Cigarette nicotine dependence without complication 54/98/8300    Dysthymia 12/30/2016    YOLANDA (obstructive sleep apnea) 12/30/2016    Arthritis 09/09/2016    Leukocytosis 09/09/2016    Essential hypertension with goal blood pressure less than 140/90 09/07/2016    Chronic obstructive pulmonary disease ([facility] [address] 09/07/2016    Diabetes mellitus type 2, controlled ([facility] [address] 09/07/2016    Polyneuropathy associated with underlying disease ([facility] [address] 09/07/2016    RLS (restless legs syndrome) 09/07/2016    Primary osteoarthritis of right knee 08/18/2016     Current Outpatient Medications   Medication Sig Dispense Refill    azelastine (ASTELIN) 137 mcg (0.1 %) nasal spray 2 Sprays by Both Nostrils route two (2) times a day. Use in each nostril as directed  Indications: seasonal runny nose 3 Bottle 3    Biotin 2,500 mcg cap Take  by mouth.       topiramate (TOPAMAX) 50 mg tablet TAKE 1 TABLET TWICE DAILY 180 Tab 1    losartan (COZAAR) 100 mg tablet Take 1 Tab by mouth daily. 90 Tab 3    levocetirizine (XYZAL) 5 mg tablet Take 1 Tab by mouth daily. 90 Tab 3    metFORMIN (GLUCOPHAGE) 500 mg tablet TAKE TWO TABLETS BY MOUTH TWO TIMES A DAY WITH MEALS 360 Tab 3    venlafaxine-SR (EFFEXOR-XR) 150 mg capsule Take 1 Cap by mouth daily. 90 Cap 3    tiZANidine (ZANAFLEX) 2 mg tablet TAKE 1 TABLET BY MOUTH TWICE DAILY FOR MUSCLE SPASM  Indications: muscle spasm 60 Tab 0    rOPINIRole (REQUIP) 2 mg tablet TAKE 1 TABLET THREE TIMES DAILY 270 Tab 0    atorvastatin (LIPITOR) 40 mg tablet TAKE ONE TABLET BY MOUTH AT NIGHT 90 Tab 1    dilTIAZem CD (CARDIZEM CD) 180 mg ER capsule TAKE 2 CAPSULES EVERY DAY FOR HIGH BLOOD PRESSURE 180 Cap 1    glucose blood VI test strips (ACCU-CHEK AJAY PLUS TEST STRP) strip TEST BLOOD SUGAR EVERY  Strip 0    fluticasone propionate (FLONASE) 50 mcg/actuation nasal spray 2 sprays per nostril once a day  Indications: inflammation of the nose due to an allergy 3 Bottle 3    omeprazole (PRILOSEC) 40 mg capsule Take 40 mg by mouth Before breakfast and dinner.  tiotropium (SPIRIVA) 18 mcg inhalation capsule Take 1 Cap by inhalation daily. 90 Cap 1    TURMERIC ROOT EXTRACT PO Take 1 Cap by mouth daily.  traZODone (DESYREL) 50 mg tablet Take 1 Tab by mouth nightly. Indications: insomnia 30 Tab 1    furosemide (LASIX) 20 mg tablet TAKE 1 TABLET EVERY DAY AS NEEDED 90 Tab 1    LORazepam (ATIVAN) 0.5 mg tablet Take 1 Tab by mouth two (2) times daily as needed for Anxiety. Max Daily Amount: 1 mg. Indications: anxious 180 Tab 0    benzonatate (TESSALON) 100 mg capsule Take 1 Cap by mouth three (3) times daily as needed for Cough. 60 Cap 3    clobetasol (TEMOVATE) 0.05 % topical cream Apply  to affected area two (2) times a day. 45 g 0    Blood-Glucose Meter (ACCU-CHEK AJAY PLUS METER) misc Test blood sugar daily.  E 11.9 1 Each 0    Blood-Glucose Meter monitoring kit E11.9 Accu-Chek [place] Plus Meter 1 Kit 0     Allergies   Allergen Reactions    Mobic [Meloxicam] Hives    Penicillins Unknown (comments)     DOESN'T REMEMBER    Plaquenil [Hydroxychloroquine] Hives     Past Medical History:   Diagnosis Date    Arrhythmia     TACHYCARDIA    Arthritis     Diabetes ([address]     GERD (gastroesophageal reflux disease)     Hypertension     Psychiatric disorder     PANIC ATTACKS    Sleep apnea         Lab Results   Component Value Date/Time    Hemoglobin A1c 5.9 (H) 01/10/2020 12:23 PM    Hemoglobin A1c 5.8 (H) 09/20/2019 02:10 PM    Hemoglobin A1c 6.3 (H) 03/18/2019 11:56 AM    Glucose 85 01/10/2020 12:23 PM    Glucose (POC) 106 (H) 08/22/2016 11:20 AM    Microalbumin/Creat ratio (mg/g creat)  04/22/2014 11:15 AM     Cannot calculate ratio due to micro albumin result outside reportable range. Microalbumin,urine random <0.50 04/22/2014 11:15 AM    LDL, calculated 58.8 01/10/2020 12:23 PM    Creatinine 0.75 01/10/2020 12:23 PM         Review of Systems  Pertinent items are noted in HPI. Objective:     Visit Vitals  /82 (BP 1 Location: Right arm, BP Patient Position: Sitting)   Pulse 87   Temp 97.9 °F (36.6 °C) (Oral)   Resp 20   Ht 5' 3.5\" (1.613 m)   Wt 185 lb (83.9 kg)   SpO2 97%   BMI 32.26 kg/m²     Appearance: alert, well appearing, and in no distress and overweight. Exam:ENT clear TM's, pharynx clear,   heart sounds normal rate, regular rhythm, normal S1, S2, no murmurs, rubs, clicks or gallops, chest clear  Diabetic foot exam:     Left Foot:   Visual Exam: normal    Pulse DP: 2+ (normal)   Filament test: normal sensation          Right Foot:   Visual Exam: bunion   Pulse DP: 2+ (normal)   Filament test: normal sensation          Lab review: labs are reviewed, up to date and normal.    Assessment/Plan:     diabetes stable, hypertension stable.   Diabetic issues reviewed with her: annual eye examinations at Ophthalmology discussed and glycohemoglobin and other lab monitoring discussed. ICD-10-CM ICD-9-CM    1. Insomnia, unspecified type G47.00 780.52 traZODone (DESYREL) 50 mg tablet   2. Controlled type 2 diabetes mellitus without complication, without long-term current use of insulin (HCC) E11.9 250.00  DIABETES FOOT EXAM      AMB POC URINE, MICROALBUMIN, SEMIQUANT (3 RESULTS)   3.  Environmental allergies Z91.09 V15.09 azelastine (ASTELIN) 137 mcg (0.1 %) nasal spray

## 2020-02-24 NOTE — PATIENT INSTRUCTIONS
Diabetes Foot Health: Care Instructions Your Care Instructions When you have diabetes, your feet need extra care and attention. Diabetes can damage the nerve endings and blood vessels in your feet, making you less likely to notice when your feet are injured. Diabetes also limits your body's ability to fight infection and get blood to areas that need it. If you get a minor foot injury, it could become an ulcer or a serious infection. With good foot care, you can prevent most of these problems. Caring for your feet can be quick and easy. Most of the care can be done when you are bathing or getting ready for bed. Follow-up care is a key part of your treatment and safety. Be sure to make and go to all appointments, and call your doctor if you are having problems. It's also a good idea to know your test results and keep a list of the medicines you take. How can you care for yourself at home? · Keep your blood sugar close to normal by watching what and how much you eat, monitoring blood sugar, taking medicines if prescribed, and getting regular exercise. · Do not smoke. Smoking affects blood flow and can make foot problems worse. If you need help quitting, talk to your doctor about stop-smoking programs and medicines. These can increase your chances of quitting for good. · Eat a diet that is low in fats. High fat intake can cause fat to build up in your blood vessels and decrease blood flow. · Inspect your feet daily for blisters, cuts, cracks, or sores. If you cannot see well, use a mirror or have someone help you. · Take care of your feet: 
? Wash your feet every day. Use warm (not hot) water. Check the water temperature with your wrists or other part of your body, not your feet. ? Dry your feet well. Pat them dry. Do not rub the skin on your feet too hard. Dry well between your toes. If the skin on your feet stays moist, bacteria or a fungus can grow, which can lead to infection. ? Keep your skin soft. Use moisturizing skin cream to keep the skin on your feet soft and prevent calluses and cracks. But do not put the cream between your toes, and stop using any cream that causes a rash. ? Clean underneath your toenails carefully. Do not use a sharp object to clean underneath your toenails. Use the blunt end of a nail file or other rounded tool. ? Trim and file your toenails straight across to prevent ingrown toenails. Use a nail clipper, not scissors. Use an emery board to smooth the edges. · Change socks daily. Socks without seams are best, because seams often rub the feet. You can find socks for people with diabetes from specialty catalogs. · Look inside your shoes every day for things like gravel or torn linings, which could cause blisters or sores. · Buy shoes that fit well: 
? Look for shoes that have plenty of space around the toes. This helps prevent bunions and blisters. ? Try on shoes while wearing the kind of socks you will usually wear with the shoes. ? Avoid plastic shoes. They may rub your feet and cause blisters. Good shoes should be made of materials that are flexible and breathable, such as leather or cloth. ? Break in new shoes slowly by wearing them for no more than an hour a day for several days. Take extra time to check your feet for red areas, blisters, or other problems after you wear new shoes. · Do not go barefoot. Do not wear sandals, and do not wear shoes with very thin soles. Thin soles are easy to puncture. They also do not protect your feet from hot pavement or cold weather. · Have your doctor check your feet during each visit. If you have a foot problem, see your doctor. Do not try to treat an early foot problem at home. Home remedies or treatments that you can buy without a prescription (such as corn removers) can be harmful. · Always get early treatment for foot problems. A minor irritation can lead to a major problem if not properly cared for early. When should you call for help? Call your doctor now or seek immediate medical care if: 
  · You have a foot sore, an ulcer or break in the skin that is not healing after 4 days, bleeding corns or calluses, or an ingrown toenail.  
  · You have blue or black areas, which can mean bruising or blood flow problems.  
  · You have peeling skin or tiny blisters between your toes or cracking or oozing of the skin.  
  · You have a fever for more than 24 hours and a foot sore.  
  · You have new numbness or tingling in your feet that does not go away after you move your feet or change positions.  
  · You have unexplained or unusual swelling of the foot or ankle.  
 Watch closely for changes in your health, and be sure to contact your doctor if: 
  · You cannot do proper foot care. Where can you learn more? Go to http://matti-yinka.info/. Enter A739 in the search box to learn more about \"Diabetes Foot Health: Care Instructions. \" Current as of: April 16, 2019 Content Version: 12.2 © 1216-0276 [employer]. Care instructions adapted under license by [employer] (which disclaims liability or warranty for this information). If you have questions about a medical condition or this instruction, always ask your healthcare professional. [address] any warranty or liability for your use of this information.

## 2020-02-24 NOTE — PROGRESS NOTES
Identified pt with two pt identifiers(name and ). Chief Complaint   Patient presents with   Charity Correa     she has about 2 weeks, cough    Sleep Problem     she can't sleep        Health Maintenance Due   Topic    Foot Exam Q1     MICROALBUMIN Q1     PAP AKA CERVICAL CYTOLOGY        Wt Readings from Last 3 Encounters:   20 185 lb (83.9 kg)   01/10/20 179 lb 9.6 oz (81.5 kg)   19 182 lb 3.2 oz (82.6 kg)     Temp Readings from Last 3 Encounters:   20 97.9 °F (36.6 °C) (Oral)   01/10/20 98.2 °F (36.8 °C) (Oral)   19 98.2 °F (36.8 °C) (Oral)     BP Readings from Last 3 Encounters:   20 138/82   01/10/20 142/84   19 134/80     Pulse Readings from Last 3 Encounters:   20 87   01/10/20 86   19 80         Learning Assessment:  :     Learning Assessment 2016   PRIMARY LEARNER Patient Patient Patient   HIGHEST LEVEL OF EDUCATION - PRIMARY LEARNER  - - SOME COLLEGE   BARRIERS PRIMARY LEARNER - - NONE   CO-LEARNER CAREGIVER - - No   PRIMARY LANGUAGE ENGLISH ENGLISH ENGLISH   LEARNER PREFERENCE PRIMARY LISTENING - DEMONSTRATION   ANSWERED BY patient patient self   RELATIONSHIP SELF SELF SELF       Depression Screening:  :     3 most recent PHQ Screens 1/10/2020   Little interest or pleasure in doing things Not at all   Feeling down, depressed, irritable, or hopeless Several days   Total Score PHQ 2 1       Fall Risk Assessment:  :     Fall Risk Assessment, last 12 mths 2020   Able to walk? Yes   Fall in past 12 months? No       Abuse Screening:  :     Abuse Screening Questionnaire 1/10/2020 3/18/2019 2018 2016   Do you ever feel afraid of your partner? N N N N   Are you in a relationship with someone who physically or mentally threatens you? N N N N   Is it safe for you to go home?  Y Y Y Y       Coordination of Care Questionnaire:  :     1) Have you been to an emergency room, urgent care clinic since your last visit? no   Hospitalized since your last visit? no             2) Have you seen or consulted any other health care providers outside of [address] [place] [place] [address] since your last visit? no  (Include any pap smears or colon screenings in this section.)    3) Do you have an Advance Directive on file? yes  Are you interested in receiving information about Advance Directives? no    Reviewed record in preparation for visit and have obtained necessary documentation. Medication reconciliation up to date and corrected with patient at this time.

## 2020-03-12 ENCOUNTER — PATIENT MESSAGE (OUTPATIENT)
Dept: FAMILY MEDICINE CLINIC | Age: 55
End: 2020-03-12

## 2020-03-12 ENCOUNTER — TELEPHONE (OUTPATIENT)
Dept: FAMILY MEDICINE CLINIC | Age: 55
End: 2020-03-12

## 2020-03-12 DIAGNOSIS — G47.00 INSOMNIA, UNSPECIFIED TYPE: ICD-10-CM

## 2020-03-12 DIAGNOSIS — K29.30 CHRONIC SUPERFICIAL GASTRITIS WITHOUT BLEEDING: ICD-10-CM

## 2020-03-12 DIAGNOSIS — K29.30 CHRONIC SUPERFICIAL GASTRITIS WITHOUT BLEEDING: Primary | ICD-10-CM

## 2020-03-12 RX ORDER — OMEPRAZOLE 40 MG/1
40 CAPSULE, DELAYED RELEASE ORAL
Qty: 180 CAP | Refills: 3 | Status: SHIPPED | OUTPATIENT
Start: 2020-03-12 | End: 2020-03-12 | Stop reason: SDUPTHER

## 2020-03-12 RX ORDER — TRAZODONE HYDROCHLORIDE 50 MG/1
50 TABLET ORAL
Qty: 90 TAB | Refills: 3 | Status: SHIPPED | OUTPATIENT
Start: 2020-03-12 | End: 2021-03-18

## 2020-03-12 RX ORDER — OMEPRAZOLE 40 MG/1
40 CAPSULE, DELAYED RELEASE ORAL
Qty: 15 CAP | Refills: 0 | Status: SHIPPED | OUTPATIENT
Start: 2020-03-12 | End: 2020-03-12 | Stop reason: SDUPTHER

## 2020-03-12 RX ORDER — OMEPRAZOLE 40 MG/1
40 CAPSULE, DELAYED RELEASE ORAL
Qty: 180 CAP | Refills: 3 | Status: SHIPPED | OUTPATIENT
Start: 2020-03-12 | End: 2020-09-02 | Stop reason: SDUPTHER

## 2020-03-12 NOTE — TELEPHONE ENCOUNTER
----- Message from Eze Thornton LPN sent at 0/23/9298  6:15 PM EDT -----  Regarding: FW: Prescription Question  Contact: [phone]    ----- Message -----  From: Catherine dAams  Sent: 3/12/2020   6:02 PM EDT  To: Katiana Henao  Subject: RE: Prescription Question                        2 a day.

## 2020-03-12 NOTE — TELEPHONE ENCOUNTER
----- Message from Xander Daniel LPN sent at 5/52/8430  6:15 PM EDT -----  Regarding: FW: Prescription Question  Contact: [phone]    ----- Message -----  From: Wing Flannery  Sent: 3/12/2020   6:02 PM EDT  To: Lashonda Henao  Subject: RE: Prescription Question                        2 a day.

## 2020-03-12 NOTE — TELEPHONE ENCOUNTER
----- Message from Onelia Brooke LPN sent at 7/56/6149  8:35 AM EDT -----  Regarding: FW: Prescription Question  Contact: [phone]    ----- Message -----  From: Dimitri Keen  Sent: 3/12/2020   7:47 AM EDT  To: Etienne Henao  Subject: Prescription Question                            On my last visit,  did u send prescription refill of omeprazole to [facility]? I have one left. Can u send a prescription for 15 count or so to [facility] to get me through till the other comes? I think Trazodone is working. Can u send prescription to [facility]? Also, is there anything that you can prescribe to help me with gas? It's nonstop. Thank you in advance for your help.

## 2020-03-16 ENCOUNTER — OFFICE VISIT (OUTPATIENT)
Dept: NEUROLOGY | Age: 55
End: 2020-03-16

## 2020-03-16 VITALS
OXYGEN SATURATION: 98 % | HEART RATE: 78 BPM | BODY MASS INDEX: 31.58 KG/M2 | TEMPERATURE: 96.6 F | HEIGHT: 64 IN | RESPIRATION RATE: 16 BRPM | WEIGHT: 185 LBS | SYSTOLIC BLOOD PRESSURE: 142 MMHG | DIASTOLIC BLOOD PRESSURE: 80 MMHG

## 2020-03-16 DIAGNOSIS — E11.40 TYPE 2 DIABETES MELLITUS WITH DIABETIC NEUROPATHY, WITHOUT LONG-TERM CURRENT USE OF INSULIN (HCC): ICD-10-CM

## 2020-03-16 DIAGNOSIS — M54.12 CERVICAL RADICULOPATHY: Primary | ICD-10-CM

## 2020-03-16 DIAGNOSIS — T88.7XXA SIDE EFFECT OF MEDICATION: ICD-10-CM

## 2020-03-16 RX ORDER — PREGABALIN 75 MG/1
75 CAPSULE ORAL 2 TIMES DAILY
Qty: 60 CAP | Refills: 5 | Status: SHIPPED | OUTPATIENT
Start: 2020-03-16 | End: 2020-04-15 | Stop reason: SDUPTHER

## 2020-03-16 NOTE — PROGRESS NOTES
Chief Complaint   Patient presents with    Neurologic Problem     Numbness and tingling       Referred by: Dr. Cantor Gentry is a 62-year-old woman with diabetes, hypertension, degenerative disc disease of the spine here for paresthesias. She is had diabetic neuropathy in both feet going on 10 years. She has a history of spine disease requiring radiofrequency ablation. She is here because in the past few months he has noticed shooting tingling in both of her hands painful and also tingling across her whole face. Review of the record shows that she was initiated on Topamax a few months ago for off label use of musculoskeletal pain. She had been on gabapentin without any benefit. No weakness or numbness. She does have left arm weakness long-term. Review of Systems   Eyes: Negative for double vision. Musculoskeletal: Positive for back pain and neck pain. Negative for falls. Neurological: Positive for tingling and sensory change. All other systems reviewed and are negative.       Past Medical History:   Diagnosis Date    Arrhythmia     TACHYCARDIA    Arthritis     Diabetes ([address]     GERD (gastroesophageal reflux disease)     Hypertension     Psychiatric disorder     PANIC ATTACKS    Sleep apnea      Family History   Problem Relation Age of Onset    Arthritis-osteo Mother     Psychiatric Disorder Father     Arthritis-osteo Father     Alcohol abuse Father     Heart Disease Father     Heart Surgery Father     Hypertension Father     Diabetes Brother     Arthritis-osteo Brother     Hypertension Brother     Anesth Problems Neg Hx      Social History     Socioeconomic History    Marital status: SINGLE     Spouse name: Not on file    Number of children: Not on file    Years of education: Not on file    Highest education level: Not on file   Occupational History    Not on file   Social Needs    Financial resource strain: Not on file    Food insecurity     Worry: Not on file Inability: Not on file    Transportation needs     Medical: Not on file     Non-medical: Not on file   Tobacco Use    Smoking status: Current Every Day Smoker     Packs/day: 1.00     Years: 40.00     Pack years: 40.00    Smokeless tobacco: Never Used   Substance and Sexual Activity    Alcohol use: Not Currently     Comment: stopped a month ago    Drug use: No    Sexual activity: Never   Lifestyle    Physical activity     Days per week: Not on file     Minutes per session: Not on file    Stress: Not on file   Relationships    Social connections     Talks on phone: Not on file     Gets together: Not on file     Attends Scientologist service: Not on file     Active member of club or organization: Not on file     Attends meetings of clubs or organizations: Not on file     Relationship status: Not on file    Intimate partner violence     Fear of current or ex partner: Not on file     Emotionally abused: Not on file     Physically abused: Not on file     Forced sexual activity: Not on file   Other Topics Concern    Not on file   Social History Narrative    Not on file     Current Outpatient Medications   Medication Sig    pregabalin (LYRICA) 75 mg capsule Take 1 Cap by mouth two (2) times a day. Max Daily Amount: 150 mg. Indications: diabetic complication causing injury to some body nerves    traZODone (DESYREL) 50 mg tablet Take 1 Tab by mouth nightly. Indications: insomnia    omeprazole (PRILOSEC) 40 mg capsule Take 1 Cap by mouth Before breakfast and dinner.  azelastine (ASTELIN) 137 mcg (0.1 %) nasal spray 2 Sprays by Both Nostrils route two (2) times a day. Use in each nostril as directed  Indications: seasonal runny nose    Biotin 2,500 mcg cap Take  by mouth.  topiramate (TOPAMAX) 50 mg tablet TAKE 1 TABLET TWICE DAILY    losartan (COZAAR) 100 mg tablet Take 1 Tab by mouth daily.  levocetirizine (XYZAL) 5 mg tablet Take 1 Tab by mouth daily.     furosemide (LASIX) 20 mg tablet TAKE 1 TABLET EVERY DAY AS NEEDED    metFORMIN (GLUCOPHAGE) 500 mg tablet TAKE TWO TABLETS BY MOUTH TWO TIMES A DAY WITH MEALS (Patient taking differently: 1 TABLET BY MOUTH TWO TIMES A DAY WITH MEALS)    venlafaxine-SR (EFFEXOR-XR) 150 mg capsule Take 1 Cap by mouth daily.  tiZANidine (ZANAFLEX) 2 mg tablet TAKE 1 TABLET BY MOUTH TWICE DAILY FOR MUSCLE SPASM  Indications: muscle spasm    rOPINIRole (REQUIP) 2 mg tablet TAKE 1 TABLET THREE TIMES DAILY    atorvastatin (LIPITOR) 40 mg tablet TAKE ONE TABLET BY MOUTH AT NIGHT    dilTIAZem CD (CARDIZEM CD) 180 mg ER capsule TAKE 2 CAPSULES EVERY DAY FOR HIGH BLOOD PRESSURE    LORazepam (ATIVAN) 0.5 mg tablet Take 1 Tab by mouth two (2) times daily as needed for Anxiety. Max Daily Amount: 1 mg. Indications: anxious    glucose blood VI test strips (ACCU-CHEK AJAY PLUS TEST STRP) strip TEST BLOOD SUGAR EVERY DAY    fluticasone propionate (FLONASE) 50 mcg/actuation nasal spray 2 sprays per nostril once a day  Indications: inflammation of the nose due to an allergy    tiotropium (SPIRIVA) 18 mcg inhalation capsule Take 1 Cap by inhalation daily.  Blood-Glucose Meter (ACCU-CHEK AJAY PLUS METER) misc Test blood sugar daily. E 11.9    Blood-Glucose Meter monitoring kit E11.9 Accu-Chek Ajay Plus Meter    TURMERIC ROOT EXTRACT PO Take 1 Cap by mouth daily.  benzonatate (TESSALON) 100 mg capsule Take 1 Cap by mouth three (3) times daily as needed for Cough.  clobetasol (TEMOVATE) 0.05 % topical cream Apply  to affected area two (2) times a day. No current facility-administered medications for this visit. Allergies   Allergen Reactions    Mobic [Meloxicam] Hives    Penicillins Unknown (comments)     DOESN'T REMEMBER    Plaquenil [Hydroxychloroquine] Hives         Neurologic Exam     Mental Status   Oriented to person, place, and time. Cranial Nerves   Cranial nerves II through XII intact.      Motor Exam   Muscle bulk: normalLeft arm infraspinatus 4+ with some atrophy compared to the right     Sensory Exam   Right leg light touch: decreased from toes  Left leg light touch: decreased from toes    Gait, Coordination, and Reflexes     Gait  Gait: normal    Coordination   Finger to nose coordination: normal    Tremor   Resting tremor: absentBilateral patella 2/4 right upper extremity 2/4 left upper extremity 1/4     Physical Exam   Constitutional: She is oriented to person, place, and time. She appears well-developed and well-nourished. Cardiovascular: Normal rate. Pulmonary/Chest: Effort normal.   Neurological: She is oriented to person, place, and time. She has a normal Finger-Nose-Finger Test. Gait normal.   Skin: Skin is warm and dry. Psychiatric: Her behavior is normal.   Vitals reviewed. Visit Vitals  /80   Pulse 78   Temp 96.6 °F (35.9 °C)   Resp 16   Ht 5' 3.5\" (1.613 m)   Wt 83.9 kg (185 lb)   SpO2 98%   BMI 32.26 kg/m²       Lab Results   Component Value Date/Time    WBC 14.1 (H) 09/20/2019 02:10 PM    HGB 12.6 09/20/2019 02:10 PM    HCT 38.8 09/20/2019 02:10 PM    PLATELET 751 57/04/8957 02:10 PM    MCV 83 09/20/2019 02:10 PM     Lab Results   Component Value Date/Time    Hemoglobin A1c 5.9 (H) 01/10/2020 12:23 PM    Hemoglobin A1c 5.8 (H) 09/20/2019 02:10 PM    Hemoglobin A1c 6.3 (H) 03/18/2019 11:56 AM    Glucose 85 01/10/2020 12:23 PM    Glucose (POC) 106 (H) 08/22/2016 11:20 AM    Microalbumin/Creat ratio (mg/g creat)  04/22/2014 11:15 AM     Cannot calculate ratio due to micro albumin result outside reportable range.     Microalbumin,urine random <0.50 04/22/2014 11:15 AM    LDL, calculated 58.8 01/10/2020 12:23 PM    Creatinine 0.75 01/10/2020 12:23 PM      Lab Results   Component Value Date/Time    Cholesterol, total 131 01/10/2020 12:23 PM    HDL Cholesterol 54 01/10/2020 12:23 PM    LDL, calculated 58.8 01/10/2020 12:23 PM    Triglyceride 91 01/10/2020 12:23 PM    CHOL/HDL Ratio 2.4 01/10/2020 12:23 PM     Lab Results   Component Value Date/Time    ALT (SGPT) 30 01/10/2020 12:23 PM    AST (SGOT) 13 (L) 01/10/2020 12:23 PM    Alk. phosphatase 154 (H) 01/10/2020 12:23 PM    Bilirubin, direct 0.1 10/27/2014 09:38 AM    Bilirubin, total 0.2 01/10/2020 12:23 PM    Albumin 3.8 01/10/2020 12:23 PM    Protein, total 6.7 01/10/2020 12:23 PM    INR 1.4 (H) 08/22/2016 02:49 AM    Prothrombin time 14.4 (H) 08/22/2016 02:49 AM    PLATELET 144 65/74/8915 02:10 PM        CT Results (maximum last 3): Results from [place] encounter on 01/16/20   CT HEAD W WO CONT    Narrative EXAM: CT HEAD W WO CONT  History: Facial asymmetry and numbness  INDICATION: facial asymmetry and numbness    COMPARISON: None. CONTRAST: 100 mL of Isovue-300. TECHNIQUE:CT of the head was performed prior to and following uneventful rapid  bolus intravenous administration of contrast.  Brain and bone windows were  generated. CT dose reduction was achieved through use of a standardized  protocol tailored for this examination and automatic exposure control for dose  modulation. FINDINGS:  The ventricles and sulci are normal in size, shape and configuration and  symmetrical and midline. There is no significant white matter disease. There is  no intracranial hemorrhage, extra-axial collection, mass, mass effect or midline  shift. The basilar cisterns are open. No acute infarct is identified. Following intravenous contrast administration, there is normal enhancement. No  enhancing masses or other abnormal areas of enhancement are identified. The bone windows demonstrate no abnormalities. The visualized portions of the  paranasal sinuses and mastoid air cells are clear. Impression IMPRESSION:     No acute intracranial process is identified. No intracranial mass, hemorrhage or evidence of acute infarction. Marisol Mejia MRI Results (maximum last 3):   Results from [place] encounter on 07/27/17   MRI SHOULDER LT WO CONT    Narrative EXAM:  MRI SHOULDER LT WO CONT    INDICATION: Left shoulder pain M 25.512. Injury. Has painful range of motion  especially with raising arm above the head. Injury occurred 4 weeks ago. COMPARISON: None    TECHNIQUE: Axial proton density fat-saturated; oblique coronal T1, T2  fat-saturated, and proton density fat-saturated; and oblique sagittal T2  fat-saturated MRI of the left shoulder . CONTRAST: None. FINDINGS: A.C. joint: Moderate osteoarthrosis. Mild capsular distention with  trace intra-articular fluid. Anterior acromion process type: 2-3. Bone marrow: Minimal apposing edema at acromioclavicular joint margins. Otherwise, normal. No acute fracture, dislocation, or marrow replacing process. Joint fluid: No substantial joint or bursal effusion. Rotator cuff tendons: Mild diffuse tendinopathy. No full-thickness or  substantial partial-thickness tendon tearing. Biceps tendon: Anatomically located. Heterogeneous signal at origin though no  rupture. Mild intracapsular tendinopathy. Muscles: No edema or atrophy. Glenoid labrum: Tearing of superior and posterior labrum with tiny intralabral  cyst at 11:00. Tearing appears to extend into proximal substance of long head  biceps. No labral displacement. Glenohumeral joint capsule: within normal limits. Glenohumeral articular cartilage: No substantial arthrosis P    Soft tissue mass: None. Impression IMPRESSION: Superior and posterior labral tear. No rotator cuff tear  demonstrated. PET Results (maximum last 3): No results found for this or any previous visit. Assessment and Plan   Diagnoses and all orders for this visit:    1. Cervical radiculopathy  -     REFERRAL TO ORTHOPEDIC SURGERY  -     REFERRAL TO PAIN MANAGEMENT    2. Side effect of medication    3. Type 2 diabetes mellitus with diabetic neuropathy, without long-term current use of insulin (HCC)  -     pregabalin (LYRICA) 75 mg capsule;  Take 1 Cap by mouth two (2) times a day. Max Daily Amount: 150 mg. Indications: diabetic complication causing injury to some body nerves      77-year-old woman who I suspect may be having a side effect from topiramate causing the facial paresthesias as well as the shooting pain in the hands. I recommend she stop. I have given her a prescription for Lyrica for diabetic neuropathy which if that is helpful she can continue or not depending on how she feels. I think it would be reasonable for her to see a spine specialist for the degenerative disc disease. She does have left arm weakness and a reduced reflex consistent with a cervical radiculopathy. I am going to send her to pain management for another evaluation if there are any options for better pain control. Questions answered. I reviewed and decided to continue the current medications. This clinical note was dictated with an electronic dictation software that can make unintentional errors. If there are any questions, please contact me directly for clarification. A notice of this visit/encounter being completed has been sent electronically to the patient's PCP and/or referring provider.      [address] [place] [address], [facility] [address]  Diplomate SUSUN

## 2020-03-17 ENCOUNTER — TELEPHONE (OUTPATIENT)
Dept: NEUROLOGY | Age: 55
End: 2020-03-17

## 2020-03-17 NOTE — TELEPHONE ENCOUNTER
----- Message from Cici Maradiaga Page sent at 3/17/2020 12:47 PM EDT -----  Regarding: Dr. Santizo Latin Refill  Medication Refill    : 1965  ID numbers: #[id number] C#[id number]    Caller (if not patient): N/a  Relationship of caller (if not patient): n/a  Best contact number(s):  [phone]  Name of medication and dosage if known: Lyrica  Is patient out of this medication (yes/no): yes .  New prescription  Pharmacy name: [address] [place] [address] listed in chart? (yes/no): Yes  Pharmacy phone number:   Date of last visit:       2020 10:00 AM

## 2020-04-15 ENCOUNTER — TELEPHONE (OUTPATIENT)
Dept: FAMILY MEDICINE CLINIC | Age: 55
End: 2020-04-15

## 2020-04-15 DIAGNOSIS — E11.40 TYPE 2 DIABETES MELLITUS WITH DIABETIC NEUROPATHY, WITHOUT LONG-TERM CURRENT USE OF INSULIN (HCC): ICD-10-CM

## 2020-04-15 RX ORDER — PREGABALIN 75 MG/1
75 CAPSULE ORAL 2 TIMES DAILY
Qty: 180 CAP | Refills: 1 | Status: SHIPPED | OUTPATIENT
Start: 2020-04-15 | End: 2020-06-15 | Stop reason: DRUGHIGH

## 2020-04-15 NOTE — TELEPHONE ENCOUNTER
----- Message from Johanna Villela LPN sent at 8/57/8995  8:23 AM EDT -----  Regarding: FW: Prescription Question  Contact: [phone]    ----- Message -----  From: Ann Meigs  Sent: 4/15/2020   4:38 AM EDT  To: Ryne Clark Pool  Subject: Prescription Question                            Good morning   Can you please send the prescription PREGABALIN (Lyrica) 75mg to [facility] please.   Thank you

## 2020-05-01 DIAGNOSIS — I10 ESSENTIAL HYPERTENSION WITH GOAL BLOOD PRESSURE LESS THAN 140/90: ICD-10-CM

## 2020-05-03 RX ORDER — DILTIAZEM HYDROCHLORIDE 180 MG/1
CAPSULE, EXTENDED RELEASE ORAL
Qty: 180 CAP | Refills: 1 | Status: SHIPPED | OUTPATIENT
Start: 2020-05-03 | End: 2020-07-21

## 2020-05-21 DIAGNOSIS — M47.816 SPONDYLOSIS OF LUMBAR SPINE: ICD-10-CM

## 2020-05-21 DIAGNOSIS — R60.0 PEDAL EDEMA: ICD-10-CM

## 2020-05-22 RX ORDER — TIZANIDINE 2 MG/1
TABLET ORAL
Qty: 180 TAB | Refills: 1 | Status: SHIPPED | OUTPATIENT
Start: 2020-05-22 | End: 2020-10-01

## 2020-05-22 RX ORDER — FUROSEMIDE 20 MG/1
TABLET ORAL
Qty: 90 TAB | Refills: 1 | Status: SHIPPED | OUTPATIENT
Start: 2020-05-22 | End: 2020-12-29

## 2020-05-28 ENCOUNTER — HOSPITAL ENCOUNTER (OUTPATIENT)
Dept: GENERAL RADIOLOGY | Age: 55
Discharge: HOME | End: 2020-05-28
Attending: PAIN MEDICINE
Payer: MEDICARE

## 2020-05-28 ENCOUNTER — HOSPITAL ENCOUNTER (OUTPATIENT)
Dept: MRI IMAGING | Age: 55
Discharge: HOME | End: 2020-05-28
Attending: PAIN MEDICINE
Payer: MEDICARE

## 2020-05-28 ENCOUNTER — TELEPHONE (OUTPATIENT)
Dept: FAMILY MEDICINE CLINIC | Age: 55
End: 2020-05-28

## 2020-05-28 DIAGNOSIS — M47.812 SPONDYLOSIS WITHOUT MYELOPATHY OR RADICULOPATHY, CERVICAL REGION: ICD-10-CM

## 2020-05-28 DIAGNOSIS — M47.816 SPONDYLOSIS OF LUMBAR REGION WITHOUT MYELOPATHY OR RADICULOPATHY: ICD-10-CM

## 2020-05-28 DIAGNOSIS — M47.812 SPONDYLOSIS OF CERVICAL JOINT WITHOUT MYELOPATHY: ICD-10-CM

## 2020-05-28 DIAGNOSIS — M47.816 SPONDYLOSIS WITHOUT MYELOPATHY OR RADICULOPATHY, LUMBAR REGION: ICD-10-CM

## 2020-05-28 PROCEDURE — 72148 MRI LUMBAR SPINE W/O DYE: CPT

## 2020-05-28 PROCEDURE — 72110 X-RAY EXAM L-2 SPINE 4/>VWS: CPT

## 2020-05-28 PROCEDURE — 72141 MRI NECK SPINE W/O DYE: CPT

## 2020-05-28 PROCEDURE — 72050 X-RAY EXAM NECK SPINE 4/5VWS: CPT

## 2020-05-28 NOTE — TELEPHONE ENCOUNTER
Dr Branden Markham called to inform Dr Mandeep Gonzalez that they ordered a MRI for her back. Results show a renal mass. He wanted to have a plan when he called her to tell her. I faxed him a copy of MRI to [phone]. I called Dr Mandeep Gonzalez and informed her. She wants a virtual appt tomorrow. She will order the CT IVP. Called Dr Branden Markham at [phone] ext [phone] ask for Harwood Prader and told him. He will have [place] call me when he talks to her. If I have not heard from her in 30 minutes I will call her to make appt. She has something in morning so we scheduled her for 3:30pm, if she gets done earlier she will call.

## 2020-05-29 ENCOUNTER — VIRTUAL VISIT (OUTPATIENT)
Dept: FAMILY MEDICINE CLINIC | Age: 55
End: 2020-05-29

## 2020-05-29 ENCOUNTER — HOSPITAL ENCOUNTER (OUTPATIENT)
Dept: CT IMAGING | Age: 55
Discharge: HOME | End: 2020-05-29
Attending: FAMILY MEDICINE
Payer: MEDICARE

## 2020-05-29 VITALS — HEIGHT: 64 IN | WEIGHT: 191 LBS | BODY MASS INDEX: 32.61 KG/M2

## 2020-05-29 DIAGNOSIS — N28.89 RENAL MASS, RIGHT: ICD-10-CM

## 2020-05-29 DIAGNOSIS — N28.89 RENAL MASS, RIGHT: Primary | ICD-10-CM

## 2020-05-29 DIAGNOSIS — M54.50 CHRONIC LOW BACK PAIN, UNSPECIFIED BACK PAIN LATERALITY, UNSPECIFIED WHETHER SCIATICA PRESENT: ICD-10-CM

## 2020-05-29 DIAGNOSIS — G89.29 CHRONIC LOW BACK PAIN, UNSPECIFIED BACK PAIN LATERALITY, UNSPECIFIED WHETHER SCIATICA PRESENT: ICD-10-CM

## 2020-05-29 LAB — CREAT BLD-MCNC: 0.7 MG/DL (ref 0.6–1.3)

## 2020-05-29 PROCEDURE — 82565 ASSAY OF CREATININE: CPT

## 2020-05-29 PROCEDURE — 74011250636 HC RX REV CODE- 250/636: Performed by: FAMILY MEDICINE

## 2020-05-29 PROCEDURE — 74178 CT ABD&PLV WO CNTR FLWD CNTR: CPT

## 2020-05-29 PROCEDURE — 74011636320 HC RX REV CODE- 636/320: Performed by: FAMILY MEDICINE

## 2020-05-29 RX ORDER — SODIUM CHLORIDE 0.9 % (FLUSH) 0.9 %
10 SYRINGE (ML) INJECTION
Status: COMPLETED | OUTPATIENT
Start: 2020-05-29 | End: 2020-05-29

## 2020-05-29 RX ORDER — SODIUM CHLORIDE 9 MG/ML
50 INJECTION, SOLUTION INTRAVENOUS
Status: COMPLETED | OUTPATIENT
Start: 2020-05-29 | End: 2020-05-29

## 2020-05-29 RX ADMIN — Medication 10 ML: at 18:21

## 2020-05-29 RX ADMIN — SODIUM CHLORIDE 50 ML/HR: 900 INJECTION, SOLUTION INTRAVENOUS at 18:21

## 2020-05-29 RX ADMIN — IOPAMIDOL 100 ML: 755 INJECTION, SOLUTION INTRAVENOUS at 18:21

## 2020-05-29 NOTE — PROGRESS NOTES
Angelito Samuel is a 53 y.o. female who was seen by synchronous (real-time) audio-video technology on 5/29/2020. Consent: Angelito Samuel, who was seen by synchronous (real-time) audio-video technology, and/or her healthcare decision maker, is aware that this patient-initiated, Telehealth encounter on 5/29/2020 is a billable service, with coverage as determined by her insurance carrier. She is aware that she may receive a bill and has provided verbal consent to proceed: Yes. Assessment & Plan:   Diagnoses and all orders for this visit:    1. Renal mass, right  -     CT ABD PELV W WO CONT; Future    2. Chronic low back pain, unspecified back pain laterality, unspecified whether sciatica present  -     CT ABD PELV W WO CONT; Future      Order CT IVP stat. Will then need Urology referral.    I spent at least 15 minutes on this visit with this established patient. 712  Subjective:   Angelito Samuel is a 53 y.o. female who was seen for Back Pain and Flank Pain (discuss MRI results)      Prior to Admission medications    Medication Sig Start Date End Date Taking? Authorizing Provider   Cartia  mg capsule TAKE 2 CAPSULES EVERY DAY FOR HIGH BLOOD PRESSURE 9/1/27  Yes Boris De La Cruz MD   pregabalin (LYRICA) 75 mg capsule Take 1 Cap by mouth two (2) times a day. Max Daily Amount: 150 mg. Indications: diabetic complication causing injury to some body nerves 4/79/87  Yes Boris De La Cruz MD   traZODone (DESYREL) 50 mg tablet Take 1 Tab by mouth nightly. Indications: insomnia 1/95/76  Yes Boris De La Cruz MD   omeprazole (PRILOSEC) 40 mg capsule Take 1 Cap by mouth Before breakfast and dinner. 7/23/10  Yes Boris De La Cruz MD   azelastine (ASTELIN) 137 mcg (0.1 %) nasal spray 2 Sprays by Both Nostrils route two (2) times a day. Use in each nostril as directed  Indications: seasonal runny nose 8/53/30  Yes Boris De La Cruz MD   Biotin 2,500 mcg cap Take  by mouth.    Yes Provider, Historical   losartan (COZAAR) 100 mg tablet Take 1 Tab by mouth daily. 6/09/25  Yes Monse Oswald MD   levocetirizine (XYZAL) 5 mg tablet Take 1 Tab by mouth daily. 1/84/71  Yes Monse Oswald MD   metFORMIN (GLUCOPHAGE) 500 mg tablet TAKE TWO TABLETS BY MOUTH TWO TIMES A DAY WITH MEALS  Patient taking differently: 1 TABLET BY MOUTH TWO TIMES A DAY WITH MEALS 4/10/25  Yes Monse Oswald MD   venlafaxine-SR [facility] 150 mg capsule Take 1 Cap by mouth daily. 6/03/04  Yes Monse Oswald MD   rOPINIRole (REQUIP) 2 mg tablet TAKE 1 TABLET THREE TIMES DAILY 76/53/56  Yes Monse Oswald MD   atorvastatin (LIPITOR) 40 mg tablet TAKE ONE TABLET BY MOUTH AT NIGHT 46/52/33  Yes Monse Oswald MD   benzonatate (TESSALON) 100 mg capsule Take 1 Cap by mouth three (3) times daily as needed for Cough. 6/87/34  Yes Monse Oswald MD   glucose blood VI test strips (ACCU-CHEK AJAY PLUS TEST STRP) strip TEST BLOOD SUGAR EVERY DAY 7/22/28  Yes Monse Oswald MD   tiotropium [facility]) 18 mcg inhalation capsule Take 1 Cap by inhalation daily. 3/65/81  Yes Monse Oswald MD   Blood-Glucose Meter (ACCU-CHEK AJAY PLUS METER) misc Test blood sugar daily. E 11.9 7/04/29  Yes Monse Oswald MD   Blood-Glucose Meter monitoring kit E11.9 Accu-Chek Ajay Plus Meter 8/74/28  Yes Monse Oswald MD   TURMERIC ROOT EXTRACT PO Take 1 Cap by mouth daily. Yes Provider, Historical   tiZANidine (ZANAFLEX) 2 mg tablet TAKE 1 TABLET BY MOUTH TWICE DAILY FOR MUSCLE SPASM 5/06/66   Monse Oswald MD   furosemide (LASIX) 20 mg tablet TAKE 1 TABLET EVERY DAY AS NEEDED 5/07/87   Monse Oswald MD   topiramate (TOPAMAX) 50 mg tablet TAKE 1 TABLET TWICE DAILY 3/19/40   Monse Oswald MD   LORazepam (ATIVAN) 0.5 mg tablet Take 1 Tab by mouth two (2) times daily as needed for Anxiety. Max Daily Amount: 1 mg. Indications: anxious 7/41/91   Monse Oswald MD   clobetasol (TEMOVATE) 0.05 % topical cream Apply  to affected area two (2) times a day.  7/18/19   Griselda Hendrix, Moi Tripp MD   fluticasone propionate (FLONASE) 50 mcg/actuation nasal spray 2 sprays per nostril once a day  Indications: inflammation of the nose due to an allergy 5/34/20   Angeline Wheeler MD     Allergies   Allergen Reactions    Nsaids (Non-Steroidal Anti-Inflammatory Drug) Other (comments)     Stomach hurts    Mobic [Meloxicam] Hives    Penicillins Unknown (comments)     DOESN'T REMEMBER    Plaquenil [Hydroxychloroquine] Hives       Patient Active Problem List    Diagnosis Date Noted    Colon adenoma 03/18/2019    Chronic superficial gastritis without bleeding 03/18/2019    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 12/03/2018    Spondylosis of lumbar spine 12/03/2018    Abnormal mammogram of left breast 09/26/2018    Iliotibial band tendinitis of right side 08/30/2018    Severe obesity (BMI 35.0-39.9) 06/20/2018    Type 2 diabetes mellitus with diabetic neuropathy ([facility] [address] 02/14/2018    Status post bilateral knee replacements 11/02/2017    Cigarette nicotine dependence without complication 30/19/6858    Dysthymia 12/30/2016    YOLANDA (obstructive sleep apnea) 12/30/2016    Arthritis 09/09/2016    Leukocytosis 09/09/2016    Essential hypertension with goal blood pressure less than 140/90 09/07/2016    Chronic obstructive pulmonary disease ([facility] [address] 09/07/2016    Diabetes mellitus type 2, controlled ([facility] [address] 09/07/2016    Polyneuropathy associated with underlying disease ([facility] [address] 09/07/2016    RLS (restless legs syndrome) 09/07/2016    Primary osteoarthritis of right knee 08/18/2016     Current Outpatient Medications   Medication Sig Dispense Refill    Cartia  mg capsule TAKE 2 CAPSULES EVERY DAY FOR HIGH BLOOD PRESSURE 180 Cap 1    pregabalin (LYRICA) 75 mg capsule Take 1 Cap by mouth two (2) times a day. Max Daily Amount: 150 mg. Indications: diabetic complication causing injury to some body nerves 180 Cap 1    traZODone (DESYREL) 50 mg tablet Take 1 Tab by mouth nightly. Indications: insomnia 90 Tab 3    omeprazole (PRILOSEC) 40 mg capsule Take 1 Cap by mouth Before breakfast and dinner. 180 Cap 3    azelastine (ASTELIN) 137 mcg (0.1 %) nasal spray 2 Sprays by Both Nostrils route two (2) times a day. Use in each nostril as directed  Indications: seasonal runny nose 3 Bottle 3    Biotin 2,500 mcg cap Take  by mouth.  losartan (COZAAR) 100 mg tablet Take 1 Tab by mouth daily. 90 Tab 3    levocetirizine (XYZAL) 5 mg tablet Take 1 Tab by mouth daily. 90 Tab 3    metFORMIN (GLUCOPHAGE) 500 mg tablet TAKE TWO TABLETS BY MOUTH TWO TIMES A DAY WITH MEALS (Patient taking differently: 1 TABLET BY MOUTH TWO TIMES A DAY WITH MEALS) 360 Tab 3    venlafaxine-SR (EFFEXOR-XR) 150 mg capsule Take 1 Cap by mouth daily. 90 Cap 3    rOPINIRole (REQUIP) 2 mg tablet TAKE 1 TABLET THREE TIMES DAILY 270 Tab 0    atorvastatin (LIPITOR) 40 mg tablet TAKE ONE TABLET BY MOUTH AT NIGHT 90 Tab 1    benzonatate (TESSALON) 100 mg capsule Take 1 Cap by mouth three (3) times daily as needed for Cough. 60 Cap 3    glucose blood VI test strips (ACCU-CHEK AJAY PLUS TEST STRP) strip TEST BLOOD SUGAR EVERY  Strip 0    tiotropium (SPIRIVA) 18 mcg inhalation capsule Take 1 Cap by inhalation daily. 90 Cap 1    Blood-Glucose Meter (ACCU-CHEK AJAY PLUS METER) misc Test blood sugar daily. E 11.9 1 Each 0    Blood-Glucose Meter monitoring kit E11.9 Accu-Chek Ajay Plus Meter 1 Kit 0    TURMERIC ROOT EXTRACT PO Take 1 Cap by mouth daily.  tiZANidine (ZANAFLEX) 2 mg tablet TAKE 1 TABLET BY MOUTH TWICE DAILY FOR MUSCLE SPASM 180 Tab 1    furosemide (LASIX) 20 mg tablet TAKE 1 TABLET EVERY DAY AS NEEDED 90 Tab 1    topiramate (TOPAMAX) 50 mg tablet TAKE 1 TABLET TWICE DAILY 180 Tab 1    LORazepam (ATIVAN) 0.5 mg tablet Take 1 Tab by mouth two (2) times daily as needed for Anxiety. Max Daily Amount: 1 mg.  Indications: anxious 180 Tab 0    clobetasol (TEMOVATE) 0.05 % topical cream Apply  to affected area two (2) times a day. 45 g 0    fluticasone propionate (FLONASE) 50 mcg/actuation nasal spray 2 sprays per nostril once a day  Indications: inflammation of the nose due to an allergy 3 Bottle 3     Allergies   Allergen Reactions    Nsaids (Non-Steroidal Anti-Inflammatory Drug) Other (comments)     Stomach hurts    Mobic [Meloxicam] Hives    Penicillins Unknown (comments)     DOESN'T REMEMBER    Plaquenil [Hydroxychloroquine] Hives     Past Medical History:   Diagnosis Date    Arrhythmia     TACHYCARDIA    Arthritis     Diabetes ([address]     GERD (gastroesophageal reflux disease)     Hypertension     Psychiatric disorder     PANIC ATTACKS    Sleep apnea      Past Surgical History:   Procedure Laterality Date    CARDIAC SURG PROCEDURE UNLIST  2013    ABLATION    COLONOSCOPY N/A 3/1/2019    COLONOS[facility] Dar[facility]pherson MD at [facility] ENDOSCOPY    HX BREAST BIOPSY Bilateral     benign    HX CHOLECYSTECTOMY  1998    HX HEENT  1993    SEPTUM SCRAPED    HX HYSTERECTOMY  2008    HX KNEE ARTHROSCOPY Bilateral 2012    HX KNEE ARTHROSCOPY Left 2012    HX KNEE ARTHROSCOPY Right 2014    HX KNEE REPLACEMENT Left 2012    HX KNEE REPLACEMENT Right 08/18/2016    HX TUBAL LIGATION  1989    NEUROLOGICAL PROCEDURE UNLISTED      lumbar nerve block     Family History   Problem Relation Age of Onset    Arthritis-osteo Mother     Psychiatric Disorder Father     Arthritis-osteo Father     Alcohol abuse Father     Heart Disease Father     Heart Surgery Father     Hypertension Father     Diabetes Brother     Arthritis-osteo Brother     Hypertension Brother     Anesth Problems Neg Hx        ROS  Chronic back pain evaluated by Dr Lior Hinojosa. MRI of L spine showed R renal mass. Needs further testing.     Objective:     Visit Vitals  Ht 5' 3.5\" (1.613 m)   Wt 191 lb (86.6 kg)   BMI 33.30 kg/m²      General: alert, cooperative, no distress   Mental  status: normal mood, behavior, speech, dress, motor activity, and thought processes, able to follow commands   HENT: NCAT   Neck: no visualized mass   Resp: no respiratory distress   Neuro: no gross deficits   Skin: no discoloration or lesions of concern on visible areas   Psychiatric: normal affect, consistent with stated mood, no evidence of hallucinations     Additional exam findings: We discussed the expected course, resolution and complications of the diagnosis(es) in detail. Medication risks, benefits, costs, interactions, and alternatives were discussed as indicated. I advised her to contact the office if her condition worsens, changes or fails to improve as anticipated. She expressed understanding with the diagnosis(es) and plan. Argenis Villagomez is a 53 y.o. female who was evaluated by a video visit encounter for concerns as above. Patient identification was verified prior to start of the visit. A caregiver was present when appropriate. Due to this being a TeleHealth encounter (During SEIWW-01 public health emergency), evaluation of the following organ systems was limited: Vitals/Constitutional/EENT/Resp/CV/GI//MS/Neuro/Skin/Heme-Lymph-Imm. Pursuant to the emergency declaration under the [facility]5 waiver authority and the [employer] Act, this Virtual  Visit was conducted, with patient's (and/or legal guardian's) consent, to reduce the patient's risk of exposure to COVID-19 and provide necessary medical care. Services were provided through a video synchronous discussion virtually to substitute for in-person clinic visit. This visit was completed virtually using [employer] ME. Patient was instructed to follow up as needed. POS Patient home. Provider was at the office.           Daniel Franklin MD

## 2020-05-29 NOTE — PROGRESS NOTES
Identified pt with two pt identifiers(name and ). Chief Complaint   Patient presents with    Back Pain    Flank Pain     discuss MRI results        Health Maintenance Due   Topic    PAP AKA CERVICAL CYTOLOGY        Wt Readings from Last 3 Encounters:   20 191 lb (86.6 kg)   20 185 lb (83.9 kg)   20 185 lb (83.9 kg)     Temp Readings from Last 3 Encounters:   20 96.6 °F (35.9 °C)   20 97.9 °F (36.6 °C) (Oral)   01/10/20 98.2 °F (36.8 °C) (Oral)     BP Readings from Last 3 Encounters:   20 142/80   20 138/82   01/10/20 142/84     Pulse Readings from Last 3 Encounters:   20 78   20 87   01/10/20 86         Learning Assessment:  :     Learning Assessment 2016   PRIMARY LEARNER Patient Patient Patient   HIGHEST LEVEL OF EDUCATION - PRIMARY LEARNER  - - SOME COLLEGE   BARRIERS PRIMARY LEARNER - - NONE   CO-LEARNER CAREGIVER - - No   PRIMARY LANGUAGE ENGLISH ENGLISH ENGLISH   LEARNER PREFERENCE PRIMARY LISTENING - DEMONSTRATION   ANSWERED BY patient patient self   RELATIONSHIP SELF SELF SELF       Depression Screening:  :     3 most recent PHQ Screens 1/10/2020   Little interest or pleasure in doing things Not at all   Feeling down, depressed, irritable, or hopeless Several days   Total Score PHQ 2 1       Fall Risk Assessment:  :     Fall Risk Assessment, last 12 mths 2020   Able to walk? Yes   Fall in past 12 months? No       Abuse Screening:  :     Abuse Screening Questionnaire 1/10/2020 3/18/2019 2018 2016   Do you ever feel afraid of your partner? N N N N   Are you in a relationship with someone who physically or mentally threatens you? N N N N   Is it safe for you to go home?  Y Y Y Y       Coordination of Care Questionnaire:  :     1) Have you been to an emergency room, urgent care clinic since your last visit? no   Hospitalized since your last visit? no             2) Have you seen or consulted any other health care providers outside of [address] [place] [address] since your last visit? yes  Dr Israel Orozco (Include any pap smears or colon screenings in this section.)    3) Do you have an Advance Directive on file? no  Are you interested in receiving information about Advance Directives? no    Reviewed record in preparation for visit and have obtained necessary documentation. Medication reconciliation up to date and corrected with patient at this time.

## 2020-05-31 ENCOUNTER — TELEPHONE (OUTPATIENT)
Dept: FAMILY MEDICINE CLINIC | Age: 55
End: 2020-05-31

## 2020-05-31 DIAGNOSIS — N28.89 RENAL MASS, RIGHT: Primary | ICD-10-CM

## 2020-05-31 NOTE — TELEPHONE ENCOUNTER
Call pt to discuss need to refer to [facility] regarding R renal mass. Please set up appt.

## 2020-06-01 NOTE — TELEPHONE ENCOUNTER
I spoke with pt. Need urgent referral with [facility] for R renal mass. Pt will get copy of CT scan done at [address] center. Please set up urgent appt. Fax my notes, MRI, CT report.

## 2020-06-15 ENCOUNTER — VIRTUAL VISIT (OUTPATIENT)
Dept: FAMILY MEDICINE CLINIC | Age: 55
End: 2020-06-15

## 2020-06-15 DIAGNOSIS — G89.29 CHRONIC BILATERAL LOW BACK PAIN, UNSPECIFIED WHETHER SCIATICA PRESENT: Primary | ICD-10-CM

## 2020-06-15 DIAGNOSIS — E11.40 TYPE 2 DIABETES MELLITUS WITH DIABETIC NEUROPATHY, WITHOUT LONG-TERM CURRENT USE OF INSULIN (HCC): ICD-10-CM

## 2020-06-15 DIAGNOSIS — N28.89 RENAL MASS, RIGHT: ICD-10-CM

## 2020-06-15 DIAGNOSIS — M47.816 SPONDYLOSIS OF LUMBAR SPINE: ICD-10-CM

## 2020-06-15 DIAGNOSIS — M54.50 CHRONIC BILATERAL LOW BACK PAIN, UNSPECIFIED WHETHER SCIATICA PRESENT: Primary | ICD-10-CM

## 2020-06-15 RX ORDER — PREGABALIN 150 MG/1
150 CAPSULE ORAL 2 TIMES DAILY
Qty: 180 CAP | Refills: 1 | Status: SHIPPED | OUTPATIENT
Start: 2020-06-15 | End: 2020-07-14 | Stop reason: SDUPTHER

## 2020-06-15 RX ORDER — HYDROCODONE BITARTRATE AND ACETAMINOPHEN 7.5; 325 MG/1; MG/1
1 TABLET ORAL
Qty: 20 TAB | Refills: 0 | Status: SHIPPED | OUTPATIENT
Start: 2020-06-15 | End: 2020-06-22

## 2020-06-15 NOTE — PROGRESS NOTES
Beth Saavedra is a 53 y.o. female who was seen by synchronous (real-time) audio-video technology on 6/15/2020. Consent: Beth Saavedra, who was seen by synchronous (real-time) audio-video technology, and/or her healthcare decision maker, is aware that this patient-initiated, Telehealth encounter on 6/15/2020 is a billable service, with coverage as determined by her insurance carrier. She is aware that she may receive a bill and has provided verbal consent to proceed: Yes. Assessment & Plan:   Diagnoses and all orders for this visit:    1. Chronic bilateral low back pain, unspecified whether sciatica present  -     HYDROcodone-acetaminophen (NORCO) 7.5-325 mg per tablet; Take 1 Tab by mouth every eight (8) hours as needed for Pain for up to 7 days. Max Daily Amount: 3 Tabs. Indications: pain    2. Renal mass, right    3. Spondylosis of lumbar spine    4. Type 2 diabetes mellitus with diabetic neuropathy, without long-term current use of insulin ([facility])  -     pregabalin (LYRICA) 150 mg capsule; Take 1 Cap by mouth two (2) times a day. Max Daily Amount: 300 mg.    plan to increase dose Lyrica 150 mg BID. Sent to [facility]. Order 7 day supply of Hydrocodone for PRN pain. FU with Dr Lior Hinojosa. I spent at least 15 minutes on this visit with this established patient. 712  Subjective:   Beth Saavedra is a 53 y.o. female who was seen for Back Pain and Renal Mass (plan for nephrectomy July 28 Dr. Tonja Vasquez)      Prior to Admission medications    Medication Sig Start Date End Date Taking? Authorizing Provider   pregabalin (LYRICA) 150 mg capsule Take 1 Cap by mouth two (2) times a day. Max Daily Amount: 300 mg. 6/00/81  Yes Tara Jewell MD   HYDROcodone-acetaminophen [facility]) 7.5-325 mg per tablet Take 1 Tab by mouth every eight (8) hours as needed for Pain for up to 7 days. Max Daily Amount: 3 Tabs.  Indications: pain 6/15/20 3/25/35 Yes Tara Jewell MD   tiZANidine (ZANAFLEX) 2 mg tablet TAKE 1 TABLET BY MOUTH TWICE DAILY FOR MUSCLE SPASM 0/96/20   Ericka Duncan MD   furosemide (LASIX) 20 mg tablet TAKE 1 TABLET EVERY DAY AS NEEDED 1/90/47   Ericka Duncan MD   Cartia  mg capsule TAKE 2 CAPSULES EVERY DAY FOR HIGH BLOOD PRESSURE 0/6/70   Ericka Duncan MD   traZODone (DESYREL) 50 mg tablet Take 1 Tab by mouth nightly. Indications: insomnia 2/58/88   Ericka Duncan MD   omeprazole (PRILOSEC) 40 mg capsule Take 1 Cap by mouth Before breakfast and dinner. 7/85/88   Ericka Duncan MD   azelastine (ASTELIN) 137 mcg (0.1 %) nasal spray 2 Sprays by Both Nostrils route two (2) times a day. Use in each nostril as directed  Indications: seasonal runny nose 7/90/87   Ericka Duncan MD   Biotin 2,500 mcg cap Take  by mouth. Provider, Historical   topiramate (TOPAMAX) 50 mg tablet TAKE 1 TABLET TWICE DAILY 4/53/14   Ericka Duncan MD   losartan (COZAAR) 100 mg tablet Take 1 Tab by mouth daily. 4/94/96   Ericka Duncan MD   levocetirizine (XYZAL) 5 mg tablet Take 1 Tab by mouth daily. 6/87/51   Ericka Duncan MD   metFORMIN (GLUCOPHAGE) 500 mg tablet TAKE TWO TABLETS BY MOUTH TWO TIMES A DAY WITH MEALS  Patient taking differently: 1 TABLET BY MOUTH TWO TIMES A DAY WITH MEALS 0/25/87   Ericka Duncan MD   venlafaxine-SR [facility] 150 mg capsule Take 1 Cap by mouth daily. 9/82/74   Ericka Duncan MD   rOPINIRole (REQUIP) 2 mg tablet TAKE 1 TABLET THREE TIMES DAILY 22/97/28   Ericka Duncan MD   atorvastatin (LIPITOR) 40 mg tablet TAKE ONE TABLET BY MOUTH AT NIGHT 08/37/63   Ericka Duncan MD   LORazepam (ATIVAN) 0.5 mg tablet Take 1 Tab by mouth two (2) times daily as needed for Anxiety. Max Daily Amount: 1 mg. Indications: anxious 4/61/22   Ericka Duncan MD   benzonatate (TESSALON) 100 mg capsule Take 1 Cap by mouth three (3) times daily as needed for Cough.  7/22/77   Ericka Duncan MD   glucose blood VI test strips (ACCU-CHEK AJAY PLUS TEST STRP) strip TEST BLOOD SUGAR EVERY DAY 8/15/19 Beryle Felts, MD   clobetasol (TEMOVATE) 0.05 % topical cream Apply  to affected area two (2) times a day. 7/18/19   Josefina Avalos MD   fluticasone propionate (FLONASE) 50 mcg/actuation nasal spray 2 sprays per nostril once a day  Indications: inflammation of the nose due to an allergy 8/43/20   Beryle Felts, MD   tiotropium [facility]) 18 mcg inhalation capsule Take 1 Cap by inhalation daily. 3/14/07   Beryle Felts, MD   Blood-Glucose Meter (ACCU-CHEK JOYCE PLUS METER) misc Test blood sugar daily. E 11.9 3/76/33   Beryle Felts, MD   Blood-Glucose Meter monitoring kit E11.9 Accu-Chek Joyce Plus Meter 7/33/65   Beryle Felts, MD   TURMERIC ROOT EXTRACT PO Take 1 Cap by mouth daily.     Provider, Historical     Allergies   Allergen Reactions    Nsaids (Non-Steroidal Anti-Inflammatory Drug) Other (comments)     Stomach hurts    Mobic [Meloxicam] Hives    Penicillins Unknown (comments)     DOESN'T REMEMBER    Plaquenil [Hydroxychloroquine] Hives       Patient Active Problem List    Diagnosis Date Noted    Colon adenoma 03/18/2019    Chronic superficial gastritis without bleeding 03/18/2019    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 12/03/2018    Spondylosis of lumbar spine 12/03/2018    Abnormal mammogram of left breast 09/26/2018    Iliotibial band tendinitis of right side 08/30/2018    Severe obesity (BMI 35.0-39.9) 06/20/2018    Type 2 diabetes mellitus with diabetic neuropathy ([facility] 02/14/2018    Status post bilateral knee replacements 11/02/2017    Cigarette nicotine dependence without complication 33/85/6498    Dysthymia 12/30/2016    YOLANDA (obstructive sleep apnea) 12/30/2016    Arthritis 09/09/2016    Leukocytosis 09/09/2016    Essential hypertension with goal blood pressure less than 140/90 09/07/2016    Chronic obstructive pulmonary disease ([facility] 09/07/2016    Diabetes mellitus type 2, controlled ([facility] 09/07/2016    Polyneuropathy associated with underlying disease ([facility] 09/07/2016    RLS (restless legs syndrome) 09/07/2016    Primary osteoarthritis of right knee 08/18/2016     Current Outpatient Medications   Medication Sig Dispense Refill    pregabalin (LYRICA) 150 mg capsule Take 1 Cap by mouth two (2) times a day. Max Daily Amount: 300 mg. 180 Cap 1    HYDROcodone-acetaminophen (NORCO) 7.5-325 mg per tablet Take 1 Tab by mouth every eight (8) hours as needed for Pain for up to 7 days. Max Daily Amount: 3 Tabs. Indications: pain 20 Tab 0    tiZANidine (ZANAFLEX) 2 mg tablet TAKE 1 TABLET BY MOUTH TWICE DAILY FOR MUSCLE SPASM 180 Tab 1    furosemide (LASIX) 20 mg tablet TAKE 1 TABLET EVERY DAY AS NEEDED 90 Tab 1    Cartia  mg capsule TAKE 2 CAPSULES EVERY DAY FOR HIGH BLOOD PRESSURE 180 Cap 1    traZODone (DESYREL) 50 mg tablet Take 1 Tab by mouth nightly. Indications: insomnia 90 Tab 3    omeprazole (PRILOSEC) 40 mg capsule Take 1 Cap by mouth Before breakfast and dinner. 180 Cap 3    azelastine (ASTELIN) 137 mcg (0.1 %) nasal spray 2 Sprays by Both Nostrils route two (2) times a day. Use in each nostril as directed  Indications: seasonal runny nose 3 Bottle 3    Biotin 2,500 mcg cap Take  by mouth.  topiramate (TOPAMAX) 50 mg tablet TAKE 1 TABLET TWICE DAILY 180 Tab 1    losartan (COZAAR) 100 mg tablet Take 1 Tab by mouth daily. 90 Tab 3    levocetirizine (XYZAL) 5 mg tablet Take 1 Tab by mouth daily. 90 Tab 3    metFORMIN (GLUCOPHAGE) 500 mg tablet TAKE TWO TABLETS BY MOUTH TWO TIMES A DAY WITH MEALS (Patient taking differently: 1 TABLET BY MOUTH TWO TIMES A DAY WITH MEALS) 360 Tab 3    venlafaxine-SR (EFFEXOR-XR) 150 mg capsule Take 1 Cap by mouth daily. 90 Cap 3    rOPINIRole (REQUIP) 2 mg tablet TAKE 1 TABLET THREE TIMES DAILY 270 Tab 0    atorvastatin (LIPITOR) 40 mg tablet TAKE ONE TABLET BY MOUTH AT NIGHT 90 Tab 1    LORazepam (ATIVAN) 0.5 mg tablet Take 1 Tab by mouth two (2) times daily as needed for Anxiety.  Max Daily Amount: 1 mg. Indications: anxious 180 Tab 0    benzonatate (TESSALON) 100 mg capsule Take 1 Cap by mouth three (3) times daily as needed for Cough. 60 Cap 3    glucose blood VI test strips (ACCU-CHEK AJAY PLUS TEST STRP) strip TEST BLOOD SUGAR EVERY  Strip 0    clobetasol (TEMOVATE) 0.05 % topical cream Apply  to affected area two (2) times a day. 45 g 0    fluticasone propionate (FLONASE) 50 mcg/actuation nasal spray 2 sprays per nostril once a day  Indications: inflammation of the nose due to an allergy 3 Bottle 3    tiotropium (SPIRIVA) 18 mcg inhalation capsule Take 1 Cap by inhalation daily. 90 Cap 1    Blood-Glucose Meter (ACCU-CHEK AJAY PLUS METER) misc Test blood sugar daily. E 11.9 1 Each 0    Blood-Glucose Meter monitoring kit E11.9 Accu-Chek Ajay Plus Meter 1 Kit 0    TURMERIC ROOT EXTRACT PO Take 1 Cap by mouth daily.        Allergies   Allergen Reactions    Nsaids (Non-Steroidal Anti-Inflammatory Drug) Other (comments)     Stomach hurts    Mobic [Meloxicam] Hives    Penicillins Unknown (comments)     DOESN'T REMEMBER    Plaquenil [Hydroxychloroquine] Hives     Past Medical History:   Diagnosis Date    Arrhythmia     TACHYCARDIA    Arthritis     Diabetes ([address]     GERD (gastroesophageal reflux disease)     Hypertension     Psychiatric disorder     PANIC ATTACKS    Sleep apnea      Past Surgical History:   Procedure Laterality Date    CARDIAC SURG PROCEDURE UNLIST  2013    ABLATION    COLONOSCOPY N/A 3/1/2019    COLONOSCOPY performed [facility] Kalpesh [facility]w [facility] ENDOSCOPY    HX BREAST BIOPSY Bilateral     benign    HX CHOLECYSTECTOMY  1998    HX HEENT  1993    SEPTUM SCRAPED    HX HYSTERECTOMY  2008    HX KNEE ARTHROSCOPY Bilateral 2012    HX KNEE ARTHROSCOPY Left 2012    HX KNEE ARTHROSCOPY Right 2014    HX KNEE REPLACEMENT Left 2012    HX KNEE REPLACEMENT Right 08/18/2016    HX TUBAL LIGATION  1989    NEUROLOGICAL PROCEDURE UNLISTED      lumbar nerve block     Family History   Problem Relation Age of Onset    Arthritis-osteo Mother     Psychiatric Disorder Father     Arthritis-osteo Father     Alcohol abuse Father     Heart Disease Father     Heart Surgery Father     Hypertension Father     Diabetes Brother     Arthritis-osteo Brother     Hypertension Brother     Anesth Problems Neg Hx      Social History     Tobacco Use    Smoking status: Current Every Day Smoker     Packs/day: 1.00     Years: 40.00     Pack years: 40.00    Smokeless tobacco: Never Used   Substance Use Topics    Alcohol use: Not Currently     Comment: stopped a month ago       ROS  C/O worsening back pain. Pt unable to reach Dr Jose Rafael Quiroz office. She was dx with R renal carcinoma. Scheduled for R nephrectomy next month by Dr Mirta Otero. She is taking Tizanidine BID and a lot of BC's. Back has burning pain and tightness. Objective: There were no vitals taken for this visit. General: alert, cooperative, no distress   Mental  status: normal mood, behavior, speech, dress, motor activity, and thought processes, able to follow commands   HENT: NCAT   Neck: no visualized mass   Resp: no respiratory distress   Neuro: no gross deficits   Skin: no discoloration or lesions of concern on visible areas   Psychiatric: normal affect, consistent with stated mood, no evidence of hallucinations     Additional exam findings: We discussed the expected course, resolution and complications of the diagnosis(es) in detail. Medication risks, benefits, costs, interactions, and alternatives were discussed as indicated. I advised her to contact the office if her condition worsens, changes or fails to improve as anticipated. She expressed understanding with the diagnosis(es) and plan. Clarence Hoang is a 53 y.o. female who was evaluated by a video visit encounter for concerns as above. Patient identification was verified prior to start of the visit. A caregiver was present when appropriate. Due to this being a TeleHealth encounter (During BVATV-94 public health emergency), evaluation of the following organ systems was limited: Vitals/Constitutional/EENT/Resp/CV/GI//MS/Neuro/Skin/Heme-Lymph-Imm. Pursuant to the emergency declaration under the [address] [place] [address], [facility] waiver authority and the meets and [employer] Act, this Virtual  Visit was conducted, with patient's (and/or legal guardian's) consent, to reduce the patient's risk of exposure to COVID-19 and provide necessary medical care. Services were provided through a video synchronous discussion virtually to substitute for in-person clinic visit. This visit was completed virtually using [employer]. ME. Patient was instructed to follow up as needed. POS Patient home. Provider was at the office.           Rodrigo Sharif MD

## 2020-06-16 ENCOUNTER — TELEPHONE (OUTPATIENT)
Dept: FAMILY MEDICINE CLINIC | Age: 55
End: 2020-06-16

## 2020-06-16 NOTE — TELEPHONE ENCOUNTER
Pt was taken 150mg of lyrica at morning and night but is the same as what pt is already taken?     Order was sent to [facility] and will need new order changed if so     Call pt at [phone]

## 2020-06-17 DIAGNOSIS — E78.00 HYPERCHOLESTEROLEMIA: ICD-10-CM

## 2020-06-17 RX ORDER — ATORVASTATIN CALCIUM 40 MG/1
TABLET, FILM COATED ORAL
Qty: 90 TAB | Refills: 1 | Status: SHIPPED | OUTPATIENT
Start: 2020-06-17 | End: 2020-09-02 | Stop reason: SDUPTHER

## 2020-06-17 NOTE — TELEPHONE ENCOUNTER
I spoke with pt. It appears that Dr Annalee Jimenez is the one who provided pt with a new script for Lyrica 150 mg BID that was filled at [address] She will FU with him next week.

## 2020-06-22 ENCOUNTER — VIRTUAL VISIT (OUTPATIENT)
Dept: FAMILY MEDICINE CLINIC | Age: 55
End: 2020-06-22

## 2020-06-22 DIAGNOSIS — M79.89 SWELLING OF LEFT HAND: ICD-10-CM

## 2020-06-22 DIAGNOSIS — M79.89 RIGHT LEG SWELLING: Primary | ICD-10-CM

## 2020-06-22 DIAGNOSIS — N28.89 RENAL MASS, RIGHT: ICD-10-CM

## 2020-06-22 NOTE — PROGRESS NOTES
Beth Saavedra is a 53 y.o. female who was seen by synchronous (real-time) audio-video technology on 6/22/2020. Consent: Beth Saavedra, who was seen by synchronous (real-time) audio-video technology, and/or her healthcare decision maker, is aware that this patient-initiated, Telehealth encounter on 6/22/2020 is a billable service, with coverage as determined by her insurance carrier. She is aware that she may receive a bill and has provided verbal consent to proceed: Yes. Assessment & Plan:   Diagnoses and all orders for this visit:    1. Right leg swelling  -     DUPLEX LOWER EXT VENOUS RIGHT; Future    2. Swelling of left hand  -     DUPLEX UPPER EXT VENOUS LEFT; Future    3. Renal mass, right    order duplex to rule out DVT. I spent at least 10 minutes on this visit with this established patient. 712  Subjective:   Beth Saavedra is a 53 y.o. female who was seen for Leg Swelling (right leg bigger than left - noticed yesterday) and Hand Swelling (left hand is bigger than right - her hand has been doing it for a while )      Prior to Admission medications    Medication Sig Start Date End Date Taking? Authorizing Provider   atorvastatin (LIPITOR) 40 mg tablet TAKE ONE TABLET BY MOUTH AT NIGHT 2/63/85  Yes Tara Jewell MD   pregabalin (LYRICA) 150 mg capsule Take 1 Cap by mouth two (2) times a day. Max Daily Amount: 300 mg. 2/68/77  Yes Tara Jewell MD   Cartia  mg capsule TAKE 2 CAPSULES EVERY DAY FOR HIGH BLOOD PRESSURE 0/7/84  Yes Tara Jewell MD   traZODone (DESYREL) 50 mg tablet Take 1 Tab by mouth nightly. Indications: insomnia 0/93/68  Yes Tara Jewell MD   omeprazole (PRILOSEC) 40 mg capsule Take 1 Cap by mouth Before breakfast and dinner. 0/39/66  Yes Tara Jewell MD   azelastine (ASTELIN) 137 mcg (0.1 %) nasal spray 2 Sprays by Both Nostrils route two (2) times a day.  Use in each nostril as directed  Indications: seasonal runny nose 9/26/42  Yes Tara Jewell MD Biotin 2,500 mcg cap Take  by mouth. Yes Provider, Historical   topiramate (TOPAMAX) 50 mg tablet TAKE 1 TABLET TWICE DAILY 2/00/34  Yes Jose Mcconnell MD   losartan (COZAAR) 100 mg tablet Take 1 Tab by mouth daily. 1/76/66  Yes Jose Mcconnell MD   levocetirizine (XYZAL) 5 mg tablet Take 1 Tab by mouth daily. 4/27/09  Yes Jose Mcconnell MD   metFORMIN (GLUCOPHAGE) 500 mg tablet TAKE TWO TABLETS BY MOUTH TWO TIMES A DAY WITH MEALS  Patient taking differently: 1 TABLET BY MOUTH TWO TIMES A DAY WITH MEALS 0/69/91  Yes Jose Mcconnell MD   venlafaxine-SR [facility] 150 mg capsule Take 1 Cap by mouth daily. 7/77/49  Yes Jose Mcconnell MD   rOPINIRole (REQUIP) 2 mg tablet TAKE 1 TABLET THREE TIMES DAILY 91/14/39  Yes Jose Mcconnell MD   glucose blood VI test strips (ACCU-CHEK AJAY PLUS TEST STRP) strip TEST BLOOD SUGAR EVERY DAY 0/68/68  Yes Jose Mcconnell MD   tiotropium [facility]) 18 mcg inhalation capsule Take 1 Cap by inhalation daily. 5/41/13  Yes Jose Mcconnell MD   Blood-Glucose Meter (ACCU-CHEK AJAY PLUS METER) misc Test blood sugar daily. E 11.9 2/67/01  Yes Jose Mcconnell MD   Blood-Glucose Meter monitoring kit E11.9 Accu-Chek Ajay Plus Meter 6/93/81  Yes Jose Mcconnell MD   TURMERIC ROOT EXTRACT PO Take 1 Cap by mouth daily. Yes Provider, Historical   HYDROcodone-acetaminophen (NORCO) 7.5-325 mg per tablet Take 1 Tab by mouth every eight (8) hours as needed for Pain for up to 7 days. Max Daily Amount: 3 Tabs. Indications: pain 6/15/20 5/47/37  Jose Mcconnell MD   tiZANidine (ZANAFLEX) 2 mg tablet TAKE 1 TABLET BY MOUTH TWICE DAILY FOR MUSCLE SPASM 4/96/42   Jose Mcconnell MD   furosemide (LASIX) 20 mg tablet TAKE 1 TABLET EVERY DAY AS NEEDED 4/55/35   Jose Mcconnell MD   LORazepam (ATIVAN) 0.5 mg tablet Take 1 Tab by mouth two (2) times daily as needed for Anxiety. Max Daily Amount: 1 mg.  Indications: anxious 7/03/76   Jose Mcconnell MD   benzonatate (TESSALON) 100 mg capsule Take 1 Cap by mouth three (3) times daily as needed for Cough. 9/89/43   Monse Oswald MD   clobetasol (TEMOVATE) 0.05 % topical cream Apply  to affected area two (2) times a day.  7/18/19   Aidan Castro MD   fluticasone propionate [facility]) 50 mcg/actuation nasal spray 2 sprays per nostril once a day  Indications: inflammation of the nose due to an allergy 7/61/60   Monse Oswald MD     Allergies   Allergen Reactions    Nsaids (Non-Steroidal Anti-Inflammatory Drug) Other (comments)     Stomach hurts    Mobic [Meloxicam] Hives    Penicillins Unknown (comments)     DOESN'T REMEMBER    Plaquenil [Hydroxychloroquine] Hives       Patient Active Problem List    Diagnosis Date Noted    Colon adenoma 03/18/2019    Chronic superficial gastritis without bleeding 03/18/2019    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 12/03/2018    Spondylosis of lumbar spine 12/03/2018    Abnormal mammogram of left breast 09/26/2018    Iliotibial band tendinitis of right side 08/30/2018    Severe obesity (BMI 35.0-39.9) 06/20/2018    Type 2 diabetes mellitus with diabetic neuropathy ([facility] 02/14/2018    Status post bilateral knee replacements 11/02/2017    Cigarette nicotine dependence without complication 44/91/3457    Dysthymia 12/30/2016    YOLANDA (obstructive sleep apnea) 12/30/2016    Arthritis 09/09/2016    Leukocytosis 09/09/2016    Essential hypertension with goal blood pressure less than 140/90 09/07/2016    Chronic obstructive pulmonary disease ([address] 09/07/2016    Diabetes mellitus type 2, controlled ([address] 09/07/2016    Polyneuropathy associated with underlying disease ([facility] 09/07/2016    RLS (restless legs syndrome) 09/07/2016    Primary osteoarthritis of right knee 08/18/2016     Current Outpatient Medications   Medication Sig Dispense Refill    atorvastatin (LIPITOR) 40 mg tablet TAKE ONE TABLET BY MOUTH AT NIGHT 90 Tab 1    pregabalin (LYRICA) 150 mg capsule Take 1 Cap by mouth two (2) times a day. Max Daily Amount: 300 mg. 180 Cap 1    Cartia  mg capsule TAKE 2 CAPSULES EVERY DAY FOR HIGH BLOOD PRESSURE 180 Cap 1    traZODone (DESYREL) 50 mg tablet Take 1 Tab by mouth nightly. Indications: insomnia 90 Tab 3    omeprazole (PRILOSEC) 40 mg capsule Take 1 Cap by mouth Before breakfast and dinner. 180 Cap 3    azelastine (ASTELIN) 137 mcg (0.1 %) nasal spray 2 Sprays by Both Nostrils route two (2) times a day. Use in each nostril as directed  Indications: seasonal runny nose 3 Bottle 3    Biotin 2,500 mcg cap Take  by mouth.  topiramate (TOPAMAX) 50 mg tablet TAKE 1 TABLET TWICE DAILY 180 Tab 1    losartan (COZAAR) 100 mg tablet Take 1 Tab by mouth daily. 90 Tab 3    levocetirizine (XYZAL) 5 mg tablet Take 1 Tab by mouth daily. 90 Tab 3    metFORMIN (GLUCOPHAGE) 500 mg tablet TAKE TWO TABLETS BY MOUTH TWO TIMES A DAY WITH MEALS (Patient taking differently: 1 TABLET BY MOUTH TWO TIMES A DAY WITH MEALS) 360 Tab 3    venlafaxine-SR (EFFEXOR-XR) 150 mg capsule Take 1 Cap by mouth daily. 90 Cap 3    rOPINIRole (REQUIP) 2 mg tablet TAKE 1 TABLET THREE TIMES DAILY 270 Tab 0    glucose blood VI test strips (ACCU-CHEK AJAY PLUS TEST STRP) strip TEST BLOOD SUGAR EVERY  Strip 0    tiotropium (SPIRIVA) 18 mcg inhalation capsule Take 1 Cap by inhalation daily. 90 Cap 1    Blood-Glucose Meter (ACCU-CHEK AJAY PLUS METER) misc Test blood sugar daily. E 11.9 1 Each 0    Blood-Glucose Meter monitoring kit E11.9 Accu-Chek Ajay Plus Meter 1 Kit 0    TURMERIC ROOT EXTRACT PO Take 1 Cap by mouth daily.  tiZANidine (ZANAFLEX) 2 mg tablet TAKE 1 TABLET BY MOUTH TWICE DAILY FOR MUSCLE SPASM 180 Tab 1    furosemide (LASIX) 20 mg tablet TAKE 1 TABLET EVERY DAY AS NEEDED 90 Tab 1    LORazepam (ATIVAN) 0.5 mg tablet Take 1 Tab by mouth two (2) times daily as needed for Anxiety. Max Daily Amount: 1 mg.  Indications: anxious 180 Tab 0    benzonatate (TESSALON) 100 mg capsule Take 1 Cap by mouth three (3) times daily as needed for Cough. 60 Cap 3    clobetasol (TEMOVATE) 0.05 % topical cream Apply  to affected area two (2) times a day.  45 g 0    fluticasone propionate (FLONASE) 50 mcg/actuation nasal spray 2 sprays per nostril once a day  Indications: inflammation of the nose due to an allergy 3 Bottle 3     Allergies   Allergen Reactions    Nsaids (Non-Steroidal Anti-Inflammatory Drug) Other (comments)     Stomach hurts    Mobic [Meloxicam] Hives    Penicillins Unknown (comments)     DOESN'T REMEMBER    Plaquenil [Hydroxychloroquine] Hives     Past Medical History:   Diagnosis Date    Arrhythmia     TACHYCARDIA    Arthritis     Diabetes ([address]     GERD (gastroesophageal reflux disease)     Hypertension     Psychiatric disorder     PANIC ATTACKS    Sleep apnea      Past Surgical History:   Procedure Laterality Date    CARDIAC SURG PROCEDURE UNLIST  2013    ABLATION    COLONOSCOPY N/A 3/1/2019    COLONOSCOPY performed [facility] at [facility] ENDOSCOPY    HX BREAST BIOPSY Bilateral     benign    HX CHOLECYSTECTOMY  1998    HX HEENT  1993    SEPTUM SCRAPED    HX HYSTERECTOMY  2008    HX KNEE ARTHROSCOPY Bilateral 2012    HX KNEE ARTHROSCOPY Left 2012    HX KNEE ARTHROSCOPY Right 2014    HX KNEE REPLACEMENT Left 2012    HX KNEE REPLACEMENT Right 08/18/2016    HX TUBAL LIGATION  1989    NEUROLOGICAL PROCEDURE UNLISTED      lumbar nerve block     Family History   Problem Relation Age of Onset    Arthritis-osteo Mother     Psychiatric Disorder Father     Arthritis-osteo Father     Alcohol abuse Father     Heart Disease Father     Heart Surgery Father     Hypertension Father     Diabetes Brother     Arthritis-osteo Brother     Hypertension Brother     Anesth Problems Neg Hx      Social History     Tobacco Use    Smoking status: Current Every Day Smoker     Packs/day: 1.00     Years: 40.00     Pack years: 40.00    Smokeless tobacco: Never Used Substance Use Topics    Alcohol use: Not Currently     Comment: stopped a month ago       ROS  C/O noticeable swelling of R lower leg and ankle x few days. No pain. Also L hand is larger than R hand. Some stiffness. She has appt with Dr Gregg Taylor on Thurs. Her  R nephrectomy is still scheduled July 25 to remove a mass. Objective: There were no vitals taken for this visit. General: alert, cooperative, no distress   Mental  status: normal mood, behavior, speech, dress, motor activity, and thought processes, able to follow commands   HENT: NCAT   Neck: no visualized mass   Resp: no respiratory distress   Neuro: no gross deficits   Skin: no discoloration or lesions of concern on visible areas   Psychiatric: normal affect, consistent with stated mood, no evidence of hallucinations     Additional exam findings:   + swelling R lower leg and ankle. + swelling L hand. We discussed the expected course, resolution and complications of the diagnosis(es) in detail. Medication risks, benefits, costs, interactions, and alternatives were discussed as indicated. I advised her to contact the office if her condition worsens, changes or fails to improve as anticipated. She expressed understanding with the diagnosis(es) and plan. Kinjal Youssef is a 53 y.o. female who was evaluated by a video visit encounter for concerns as above. Patient identification was verified prior to start of the visit. A caregiver was present when appropriate. Due to this being a TeleHealth encounter (During [facility]- public health emergency), evaluation of the following organ systems was limited: Vitals/Constitutional/EENT/Resp/CV/GI//MS/Neuro/Skin/Heme-Lymph-Imm.   Pursuant to the emergency declaration under the [facility] waiver authority and the [employer] Act, this Virtual  Visit was conducted, with patient's (and/or legal guardian's) consent, to reduce the patient's risk of exposure to COVID-19 and provide necessary medical care. Services were provided through a video synchronous discussion virtually to substitute for in-person clinic visit. Patient and provider were located at their individual homes.       Johnnye Oppenheim, MD

## 2020-06-22 NOTE — PROGRESS NOTES
Identified pt with two pt identifiers(name and ). Chief Complaint   Patient presents with    Leg Swelling     right leg bigger than left - noticed yesterday    Hand Swelling     left hand is bigger than right - her hand has been doing it for a while         Health Maintenance Due   Topic    PAP AKA CERVICAL CYTOLOGY        Wt Readings from Last 3 Encounters:   20 191 lb (86.6 kg)   20 185 lb (83.9 kg)   20 185 lb (83.9 kg)     Temp Readings from Last 3 Encounters:   20 96.6 °F (35.9 °C)   20 97.9 °F (36.6 °C) (Oral)   01/10/20 98.2 °F (36.8 °C) (Oral)     BP Readings from Last 3 Encounters:   20 142/80   20 138/82   01/10/20 142/84     Pulse Readings from Last 3 Encounters:   20 78   20 87   01/10/20 86         Learning Assessment:  :     Learning Assessment 2016   PRIMARY LEARNER Patient Patient Patient   HIGHEST LEVEL OF EDUCATION - PRIMARY LEARNER  - - SOME COLLEGE   BARRIERS PRIMARY LEARNER - - NONE   CO-LEARNER CAREGIVER - - No   PRIMARY LANGUAGE ENGLISH ENGLISH ENGLISH   LEARNER PREFERENCE PRIMARY LISTENING - DEMONSTRATION   ANSWERED BY patient patient self   RELATIONSHIP SELF SELF SELF       Depression Screening:  :     3 most recent PHQ Screens 1/10/2020   Little interest or pleasure in doing things Not at all   Feeling down, depressed, irritable, or hopeless Several days   Total Score PHQ 2 1       Fall Risk Assessment:  :     Fall Risk Assessment, last 12 mths 2020   Able to walk? Yes   Fall in past 12 months? No       Abuse Screening:  :     Abuse Screening Questionnaire 1/10/2020 3/18/2019 2018 2016   Do you ever feel afraid of your partner? N N N N   Are you in a relationship with someone who physically or mentally threatens you? N N N N   Is it safe for you to go home?  Joyce Burkett       Coordination of Care Questionnaire:  :     1) Have you been to an emergency room, urgent care clinic since your last visit? no   Hospitalized since your last visit? no             2) Have you seen or consulted any other health care providers outside of [address] [place] [address] since your last visit? yes  Dr Scott Goyal (Include any pap smears or colon screenings in this section.)    3) Do you have an Advance Directive on file? yes  Are you interested in receiving information about Advance Directives? yes    Patient is accompanied by mother I have received verbal consent from Bri Goel to discuss any/all medical information while they are present in the room. Reviewed record in preparation for visit and have obtained necessary documentation. Medication reconciliation up to date and corrected with patient at this time.

## 2020-06-23 ENCOUNTER — HOSPITAL ENCOUNTER (OUTPATIENT)
Dept: ULTRASOUND IMAGING | Age: 55
Discharge: HOME | End: 2020-06-23
Attending: FAMILY MEDICINE
Payer: MEDICARE

## 2020-06-23 DIAGNOSIS — M79.89 SWELLING OF LEFT HAND: ICD-10-CM

## 2020-06-23 DIAGNOSIS — M79.89 RIGHT LEG SWELLING: ICD-10-CM

## 2020-06-23 PROCEDURE — 93971 EXTREMITY STUDY: CPT

## 2020-07-21 ENCOUNTER — HOSPITAL ENCOUNTER (OUTPATIENT)
Dept: GENERAL RADIOLOGY | Age: 55
Discharge: HOME | End: 2020-07-21
Attending: UROLOGY
Payer: MEDICARE

## 2020-07-21 ENCOUNTER — HOSPITAL ENCOUNTER (OUTPATIENT)
Dept: PREADMISSION TESTING | Age: 55
Discharge: HOME | End: 2020-07-21
Payer: MEDICARE

## 2020-07-21 VITALS
HEIGHT: 64 IN | DIASTOLIC BLOOD PRESSURE: 79 MMHG | HEART RATE: 81 BPM | OXYGEN SATURATION: 96 % | SYSTOLIC BLOOD PRESSURE: 151 MMHG | BODY MASS INDEX: 33.3 KG/M2 | RESPIRATION RATE: 20 BRPM | TEMPERATURE: 98.2 F

## 2020-07-21 LAB
ALBUMIN SERPL-MCNC: 3.7 G/DL (ref 3.5–5)
ALBUMIN/GLOB SERPL: 1.2 {RATIO} (ref 1.1–2.2)
ALP SERPL-CCNC: 189 U/L (ref 45–117)
ALT SERPL-CCNC: 44 U/L (ref 12–78)
ANION GAP SERPL CALC-SCNC: 3 MMOL/L (ref 5–15)
APPEARANCE UR: CLEAR
AST SERPL-CCNC: 29 U/L (ref 15–37)
BACTERIA URNS QL MICRO: NEGATIVE /HPF
BASOPHILS # BLD: 0.2 K/UL (ref 0–0.1)
BASOPHILS NFR BLD: 1 % (ref 0–1)
BILIRUB SERPL-MCNC: 0.3 MG/DL (ref 0.2–1)
BILIRUB UR QL: NEGATIVE
BUN SERPL-MCNC: 7 MG/DL (ref 6–20)
BUN/CREAT SERPL: 10 (ref 12–20)
CALCIUM SERPL-MCNC: 9 MG/DL (ref 8.5–10.1)
CHLORIDE SERPL-SCNC: 107 MMOL/L (ref 97–108)
CO2 SERPL-SCNC: 30 MMOL/L (ref 21–32)
COLOR UR: NORMAL
CREAT SERPL-MCNC: 0.7 MG/DL (ref 0.55–1.02)
DIFFERENTIAL METHOD BLD: ABNORMAL
EOSINOPHIL # BLD: 0.7 K/UL (ref 0–0.4)
EOSINOPHIL NFR BLD: 6 % (ref 0–7)
EPITH CASTS URNS QL MICRO: NORMAL /LPF
ERYTHROCYTE [DISTWIDTH] IN BLOOD BY AUTOMATED COUNT: 14.7 % (ref 11.5–14.5)
GLOBULIN SER CALC-MCNC: 3 G/DL (ref 2–4)
GLUCOSE SERPL-MCNC: 78 MG/DL (ref 65–100)
GLUCOSE UR STRIP.AUTO-MCNC: NEGATIVE MG/DL
HCT VFR BLD AUTO: 40.7 % (ref 35–47)
HGB BLD-MCNC: 13.1 G/DL (ref 11.5–16)
HGB UR QL STRIP: NEGATIVE
IMM GRANULOCYTES # BLD AUTO: 0 K/UL (ref 0–0.04)
IMM GRANULOCYTES NFR BLD AUTO: 0 % (ref 0–0.5)
KETONES UR QL STRIP.AUTO: NEGATIVE MG/DL
LEUKOCYTE ESTERASE UR QL STRIP.AUTO: NEGATIVE
LYMPHOCYTES # BLD: 2.8 K/UL (ref 0.8–3.5)
LYMPHOCYTES NFR BLD: 23 % (ref 12–49)
MCH RBC QN AUTO: 27 PG (ref 26–34)
MCHC RBC AUTO-ENTMCNC: 32.2 G/DL (ref 30–36.5)
MCV RBC AUTO: 83.7 FL (ref 80–99)
MONOCYTES # BLD: 0.6 K/UL (ref 0–1)
MONOCYTES NFR BLD: 5 % (ref 5–13)
NEUTS SEG # BLD: 8.2 K/UL (ref 1.8–8)
NEUTS SEG NFR BLD: 65 % (ref 32–75)
NITRITE UR QL STRIP.AUTO: NEGATIVE
NRBC # BLD: 0 K/UL (ref 0–0.01)
NRBC BLD-RTO: 0 PER 100 WBC
PH UR STRIP: 7.5 [PH] (ref 5–8)
PLATELET # BLD AUTO: 228 K/UL (ref 150–400)
PMV BLD AUTO: 10.1 FL (ref 8.9–12.9)
POTASSIUM SERPL-SCNC: 3.6 MMOL/L (ref 3.5–5.1)
PROT SERPL-MCNC: 6.7 G/DL (ref 6.4–8.2)
PROT UR STRIP-MCNC: NEGATIVE MG/DL
RBC # BLD AUTO: 4.86 M/UL (ref 3.8–5.2)
RBC #/AREA URNS HPF: NORMAL /HPF (ref 0–5)
SODIUM SERPL-SCNC: 140 MMOL/L (ref 136–145)
SP GR UR REFRACTOMETRY: 1 (ref 1–1.03)
UA: UC IF INDICATED,UAUC: NORMAL
UROBILINOGEN UR QL STRIP.AUTO: 0.2 EU/DL (ref 0.2–1)
WBC # BLD AUTO: 12.4 K/UL (ref 3.6–11)
WBC URNS QL MICRO: NORMAL /HPF (ref 0–4)

## 2020-07-21 PROCEDURE — 81001 URINALYSIS AUTO W/SCOPE: CPT

## 2020-07-21 PROCEDURE — 93005 ELECTROCARDIOGRAM TRACING: CPT

## 2020-07-21 PROCEDURE — 85025 COMPLETE CBC W/AUTO DIFF WBC: CPT

## 2020-07-21 PROCEDURE — 86920 COMPATIBILITY TEST SPIN: CPT

## 2020-07-21 PROCEDURE — 36415 COLL VENOUS BLD VENIPUNCTURE: CPT

## 2020-07-21 PROCEDURE — 86900 BLOOD TYPING SEROLOGIC ABO: CPT

## 2020-07-21 PROCEDURE — 80053 COMPREHEN METABOLIC PANEL: CPT

## 2020-07-21 PROCEDURE — 71046 X-RAY EXAM CHEST 2 VIEWS: CPT

## 2020-07-21 RX ORDER — BENZONATATE 100 MG/1
100 CAPSULE ORAL
COMMUNITY
End: 2020-12-15

## 2020-07-21 RX ORDER — CEFUROXIME AXETIL 500 MG/1
500 TABLET ORAL 2 TIMES DAILY
COMMUNITY
End: 2020-07-29

## 2020-07-21 RX ORDER — GLUCOSAMINE/CHONDR SU A SOD 750-600 MG
TABLET ORAL DAILY
Status: ON HOLD | COMMUNITY
End: 2021-05-13 | Stop reason: ALTCHOICE

## 2020-07-21 RX ORDER — CYCLOBENZAPRINE HCL 10 MG
TABLET ORAL
COMMUNITY
End: 2020-10-01 | Stop reason: ALTCHOICE

## 2020-07-21 RX ORDER — GARLIC 1000 MG
CAPSULE ORAL DAILY
COMMUNITY
End: 2020-10-30 | Stop reason: ALTCHOICE

## 2020-07-21 RX ORDER — DILTIAZEM HYDROCHLORIDE EXTENDED-RELEASE TABLETS 180 MG/1
360 TABLET, EXTENDED RELEASE ORAL DAILY
COMMUNITY
End: 2020-09-02 | Stop reason: SDUPTHER

## 2020-07-21 RX ORDER — LOSARTAN POTASSIUM 100 MG/1
100 TABLET ORAL
COMMUNITY
End: 2021-01-17

## 2020-07-21 RX ORDER — HYDROCODONE BITARTRATE AND ACETAMINOPHEN 7.5; 325 MG/1; MG/1
1 TABLET ORAL
COMMUNITY
End: 2020-07-31 | Stop reason: ALTCHOICE

## 2020-07-21 RX ORDER — MULTIVITAMIN
TABLET ORAL DAILY
COMMUNITY
End: 2020-10-30 | Stop reason: ALTCHOICE

## 2020-07-21 RX ORDER — CYANOCOBALAMIN (VITAMIN B-12) 1000 MCG
1 TABLET, EXTENDED RELEASE ORAL DAILY
COMMUNITY
End: 2020-10-30 | Stop reason: ALTCHOICE

## 2020-07-21 RX ORDER — METFORMIN HYDROCHLORIDE 500 MG/1
500 TABLET ORAL 2 TIMES DAILY WITH MEALS
COMMUNITY
End: 2020-12-29

## 2020-07-21 NOTE — H&P
Preoperative Evaluation                     History and Physical with Surgical Risk Stratification     7/21/2020    CC: Renal cell carcinoma  Surgery: Right nephrectomy     HPI:   Rosalind Portillo is a 53 y.o. female referred for pre-operative evaluation by Dr. Ayesha Marcum for surgery on 7/28/20. Ms. Paige Prather states she had some testing that discovered by accident her kidney tumor. She denies pain or urinary difficulty. The patient was evaluated in the surgeon's office and it was determined that the most appropriate plan of care is to proceed with surgical intervention. Patient's PCP Lori Abad MD    Review of Systems     Constitutional: Negative for chills and fever  HENT: Negative for congestion and sore throat  Eyes: negative for blurred vision and double vision  Respiratory: Negative for cough, shortness of breath and wheezing  Mouth: Negative for loose, broken or chipped teeth. Cardiovascular: Negative for chest pain and palpitations  Gastrointestinal: Negative for abdominal pain, constipation, diarrhea and nausea  Genitourinary: Negative for dysuria and hematuria  Musculoskeletal: Arthritis  Skin: Negative for rash, open wounds. Negative for bruises easily  Neurological: Negative for dizziness, tremors and headaches  Psychiatric: Negative for depression. The patient is not nervous/anxious.     Inherent Risk of Surgery     Surgical risk:  Intermediate  Low:  EIntermediate:   abdominalHigh:    Patient Cardiac Risk Assessment     Revised Cardiac Risk Index (RCRI)    Rate if cardiac death, nonfatal MI, nonfatal cardiac arrest by number of risk factor- 0.4%    JOSE ADVID/AHA 2007 Guidelines:   1) Surgery Emergency, Non-cardiac -> to surgery  2) If not, look at clinical predictors    Major Intermediate Minor   Abnormal EKGDiabetes    Blood Thinner: NA    METS      EQUAL TO 4 Care for self Walk indoors around house Walk 2-3 blocks on level ground (2-3 mph) Light work around house (dust, dishes) Other Risk Factors:   Screening for ETOH use:  Done and low risk  Smoking status:  Currently     Personal or FH of bleeding problems:  No  Personal or FH of blood clots:  Yes- brother  Personal or FH of anesthesia problems:   No    Pulmonary Risk:  Asthma or COPD:  Yes  Body mass index is 33.3 kg/m². Known YOLANDA:  Yes  Albumin normal, BUN normal    Past Medical, Surgical, Social History     Allergies: Allergies   Allergen Reactions    Nsaids (Non-Steroidal Anti-Inflammatory Drug) Other (comments)     Stomach hurts    Mobic [Meloxicam] Hives    Penicillins Unknown (comments)     DOESN'T REMEMBER    Plaquenil [Hydroxychloroquine] Hives       Medication Documentation Review Audit     Reviewed by John Paul Tadeo RN (Registered Nurse) on 07/21/20 at     Medication Sig Documenting Provider Last Dose Status Taking?   ascorbic acid (C-500 PO) Take  by mouth daily. Provider, Historical 7/14/2020 Active Yes   atorvastatin (LIPITOR) 40 mg tablet TAKE ONE TABLET BY MOUTH AT Fouzia Box MD  Active Yes   benzonatate (Tessalon Perles) 100 mg capsule Take 100 mg by mouth three (3) times daily as needed for Cough. Provider, Historical  Active Yes   Biotin 2,500 mcg cap Take  by mouth daily. Provider, Historical 7/14/2020 Active Yes   Blood-Glucose Meter (Accu-Chek Joyce Plus Meter) misc Test blood sugar daily. E 11.9 Horacio Xiao NP  Active Yes   Blood-Glucose Meter monitoring kit E11.9 Accu-Chek Joyce Plus Meter Caitlyn Gregorio MD  Active Yes   cefUROXime (CEFTIN) 500 mg tablet Take 500 mg by mouth two (2) times a day. Provider, Historical  Active Yes   cinnamon bark (Cinnamon) 500 mg cap Take  by mouth daily. Provider, Historical 7/14/2020 Active Yes   cyclobenzaprine (FLEXERIL) 10 mg tablet Take  by mouth nightly. Provider, Historical  Active Yes   dilTIAZem ER (CARDIZEM LA) 180 mg tablet Take 360 mg by mouth daily.  Provider, Historical  Active Yes   fluticasone propionate (FLONASE) 50 mcg/actuation nasal spray 2 sprays per nostril once a day  Indications: inflammation of the nose due to an allergy Oxana Duarte MD  Active Yes           Med Note Fiona Hinton, [address] [facility] Jul 21, 2020 11:26 AM)     furosemide (LASIX) 20 mg tablet TAKE 1 TABLET EVERY DAY AS NEEDED Oxana Duarte MD  Active Yes           Med Note Fiona Hinton, [address] [facility] Jul 21, 2020 15:53 AM)     garlic 2,304 mg cap Take  by mouth daily. Provider, Historical 7/14/2020 Active Yes   glucose blood VI test strips (ACCU-CHEK AJAY PLUS TEST STRP) strip TEST BLOOD SUGAR EVERY DAY Surendra Plummer MD  Active Yes   HYDROcodone-acetaminophen (NORCO) 7.5-325 mg per tablet Take 1 Tab by mouth every eight (8) hours as needed for Pain. Provider, Historical  Active Yes   L gasseri/B bifidum/B longum ([employer] HEALTH PO) Take  by mouth daily. Provider, Historical  Active Yes   levocetirizine (XYZAL) 5 mg tablet Take 1 Tab by mouth daily. Oxana Duarte MD 6/17/2084 Active Yes   LORazepam (ATIVAN) 0.5 mg tablet Take 1 Tab by mouth two (2) times daily as needed for Anxiety. Max Daily Amount: 1 mg. Indications: anxious Oxana Duarte MD  Active Yes           Med Note Fiona Hinton, [address] [facility] Jul 21, 2020 11:15 AM)     losartan (COZAAR) 100 mg tablet Take 100 mg by mouth nightly. Provider, Historical  Active Yes   metFORMIN (GLUCOPHAGE) 500 mg tablet Take 500 mg by mouth two (2) times daily (with meals). Provider, Historical  Active Yes   omeprazole (PRILOSEC) 40 mg capsule Take 1 Cap by mouth Before breakfast and dinner. Oxana Duarte MD  Active Yes   OTHER 1,000 mg daily. Black elderberry Provider, Historical 7/14/2020 Active Yes   pregabalin (LYRICA) 150 mg capsule Take 1 Cap by mouth two (2) times a day. Max Daily Amount: 300 mg. René Garibay NP  Active Yes   rOPINIRole (REQUIP) 2 mg tablet TAKE 1 TABLET THREE TIMES DAILY Surendra Plummer MD  Active Yes   selenium 200 mcg cap Take 1 Cap by mouth daily.  Provider, Historical 7/14/2020 Active Yes   tiotropium (SPIRIVA) 18 mcg inhalation capsule Take 1 Cap by inhalation daily. Marci Kingston MD  Active Yes   tiZANidine (ZANAFLEX) 2 mg tablet TAKE 1 TABLET BY MOUTH TWICE DAILY FOR MUSCLE SPASM Marci Kingston MD  Active Yes           Med Note Ale Nick, [address] [facility] Jul 21, 2020 11:29 AM)     traZODone (DESYREL) 50 mg tablet Take 1 Tab by mouth nightly. Indications: insomnia ElianTimoteo MD  Active Yes   TURMERIC ROOT EXTRACT PO Take 1 Cap by mouth daily. Provider, Historical 7/14/2020 Active Yes   venlafaxine-SR (EFFEXOR-XR) 150 mg capsule Take 1 Cap by mouth daily.  Marci Kingston MD  Active Yes              Past Medical History:   Diagnosis Date    Arthritis     Diabetes ([address]     GERD (gastroesophageal reflux disease)     Hypertension     Kidney stones 2020    Leg swelling 07/2020    Doppler negative    Panic attacks     Sleep apnea     Does not use CPAP- raises the head of bed up    Spondylolisthesis of lumbar region     Tachycardia      Past Surgical History:   Procedure Laterality Date    COLONOSCOPY N/A 3/1/2019    COLONOSCOPY performed by Karlie Piper MD at [facility] HX BREAST BIOPSY Bilateral     benign    HX CHOLECYSTECTOMY  1998    HX HYSTERECTOMY  2008    HX KNEE ARTHROSCOPY Bilateral 2012    HX KNEE REPLACEMENT Left 2012    HX KNEE REPLACEMENT Right 08/18/2016    HX SEPTOPLASTY  1993    HX SVT ABLATION  2013    HX TUBAL LIGATION  1989    NEUROLOGICAL PROCEDURE UNLISTED      lumbar nerve block     Social History     Tobacco Use    Smoking status: Current Every Day Smoker     Packs/day: 1.00     Years: 40.00     Pack years: 40.00     Types: Cigarettes    Smokeless tobacco: Never Used   Substance Use Topics    Alcohol use: Not Currently    Drug use: No     Family History   Problem Relation Age of Onset    Arthritis-osteo Mother     Psychiatric Disorder Father     Arthritis-osteo Father     Alcohol abuse Father     Heart Disease Father     Heart Surgery Father     Hypertension Father     Diabetes Brother     Arthritis-osteo Brother     Hypertension Brother     Deep Vein Thrombosis Brother         Unknown cause    Anesth Problems Neg Hx        Objective     Vitals:    07/21/20 1123   BP: 151/79   Pulse: 81   Resp: 20   Temp: 98.2 °F (36.8 °C)   SpO2: 96%   Height: 5' 3.5\" (1.613 m)       Constitutional:  Appears well,  No Acute Distress, Vitals noted  Psychiatric:   Affect normal, Alert and Oriented to person/place/time    Eyes:   Pupils equally round and reactive, EOMI, conjunctiva clear, eyelids normal  ENT:   External ears and nose normal, teeth normal, gums normal, TMs and Orophyarynx normal  Neck:   General inspection and Thyroid normal.  No abnormal cervical or supraclavicular nodes    Lungs:   Clear to auscultation, good respiratory effort  Heart: Ausculation normal.  Regular rhythm. No cardiac murmurs. No carotid bruits or palpable thrills  Chest wall normal  Musculoskeletal: Normal gait  Extremities:   Without edema, good peripheral pulses  Skin:   Warm to palpation, without rashes, bruising, or suspicious lesions     Recent Results (from the past 72 hour(s))   CBC WITH AUTOMATED DIFF    Collection Time: 07/21/20 12:30 PM   Result Value Ref Range    WBC 12.4 (H) 3.6 - 11.0 K/uL    RBC 4.86 3.80 - 5.20 M/uL    HGB 13.1 11.5 - 16.0 g/dL    HCT 40.7 35.0 - 47.0 %    MCV 83.7 80.0 - 99.0 FL    MCH 27.0 26.0 - 34.0 PG    MCHC 32.2 30.0 - 36.5 g/dL    RDW 14.7 (H) 11.5 - 14.5 %    PLATELET 643 222 - 712 K/uL    MPV 10.1 8.9 - 12.9 FL    NRBC 0.0 0  WBC    ABSOLUTE NRBC 0.00 0.00 - 0.01 K/uL    NEUTROPHILS 65 32 - 75 %    LYMPHOCYTES 23 12 - 49 %    MONOCYTES 5 5 - 13 %    EOSINOPHILS 6 0 - 7 %    BASOPHILS 1 0 - 1 %    IMMATURE GRANULOCYTES 0 0.0 - 0.5 %    ABS. NEUTROPHILS 8.2 (H) 1.8 - 8.0 K/UL    ABS. LYMPHOCYTES 2.8 0.8 - 3.5 K/UL    ABS. MONOCYTES 0.6 0.0 - 1.0 K/UL    ABS. EOSINOPHILS 0.7 (H) 0.0 - 0.4 K/UL    ABS. BASOPHILS 0.2 (H) 0.0 - 0.1 K/UL    ABS. IMM. GRANS. 0.0 0.00 - 0.04 K/UL    DF AUTOMATED     METABOLIC PANEL, COMPREHENSIVE    Collection Time: 07/21/20 12:30 PM   Result Value Ref Range    Sodium 140 136 - 145 mmol/L    Potassium 3.6 3.5 - 5.1 mmol/L    Chloride 107 97 - 108 mmol/L    CO2 30 21 - 32 mmol/L    Anion gap 3 (L) 5 - 15 mmol/L    Glucose 78 65 - 100 mg/dL    BUN 7 6 - 20 MG/DL    Creatinine 0.70 0.55 - 1.02 MG/DL    BUN/Creatinine ratio 10 (L) 12 - 20      GFR est AA >60 >60 ml/min/1.73m2    GFR est non-AA >60 >60 ml/min/1.73m2    Calcium 9.0 8.5 - 10.1 MG/DL    Bilirubin, total 0.3 0.2 - 1.0 MG/DL    ALT (SGPT) 44 12 - 78 U/L    AST (SGOT) 29 15 - 37 U/L    Alk.  phosphatase 189 (H) 45 - 117 U/L    Protein, total 6.7 6.4 - 8.2 g/dL    Albumin 3.7 3.5 - 5.0 g/dL    Globulin 3.0 2.0 - 4.0 g/dL    A-G Ratio 1.2 1.1 - 2.2     URINALYSIS W/ REFLEX CULTURE    Collection Time: 07/21/20 12:30 PM    Specimen: Urine   Result Value Ref Range    Color YELLOW/STRAW      Appearance CLEAR CLEAR      Specific gravity 1.005 1.003 - 1.030      pH (UA) 7.5 5.0 - 8.0      Protein Negative NEG mg/dL    Glucose Negative NEG mg/dL    Ketone Negative NEG mg/dL    Bilirubin Negative NEG      Blood Negative NEG      Urobilinogen 0.2 0.2 - 1.0 EU/dL    Nitrites Negative NEG      Leukocyte Esterase Negative NEG      WBC 0-4 0 - 4 /hpf    RBC 0-5 0 - 5 /hpf    Epithelial cells FEW FEW /lpf    Bacteria Negative NEG /hpf    UA:UC IF INDICATED CULTURE NOT INDICATED BY UA RESULT CNI     TYPE + CROSSMATCH    Collection Time: 07/21/20 12:30 PM   Result Value Ref Range    Crossmatch Expiration 07/31/2020     ABO/Rh(D) O POSITIVE     Antibody screen NEG    EKG, 12 LEAD, INITIAL    Collection Time: 07/21/20  1:20 PM   Result Value Ref Range    Ventricular Rate 73 BPM    Atrial Rate 73 BPM    P-R Interval 144 ms    QRS Duration 94 ms    Q-T Interval 450 ms    QTC Calculation (Bezet) 495 ms    Calculated P Axis 8 degrees    Calculated R Axis -12 degrees    Calculated T Axis 14 degrees    Diagnosis       Normal sinus rhythm  Minimal voltage criteria for LVH, may be normal variant  Prolonged QT  Abnormal ECG  When compared with ECG of 08-AUG-2016 09:05,  No significant change was found         Assessment and Plan     Assessment/Plan:   1) Renal cell cancer  2) Pre-Operative Evaluation    Labs, CXR and EKG reviewed. Preoperative Clearance  Per RCRI, the patient has a 0.4% risk of cardiac death, nonfatal MI, nonfatal cardiac arrest based on no risk factors. Per ACC/AHA guidelines, patient is intermediate risk for a(n) intermediate risk surgery and may proceed to planned surgery with the above noted risk.     Bernadette Dobbins NP

## 2020-07-21 NOTE — PERIOP NOTES
[address] [facility] [place] [address], [address] [facility]   MAIN OR                                  [phone]   MAIN PRE OP                          [phone]                                                                                AMBULATORY PRE OP          [phone]  PRE-ADMISSION TESTING    [phone]   Surgery Date:  7/28/20       Is surgery arrival time given by surgeon? NO  If NO, [address] [facility] staff will call you between 3 and 7pm the day before your surgery with your arrival time. (If your surgery is on a Monday, we will call you the Friday before.)    Call [phone] after 7pm Monday-Friday if you did not receive this call. INSTRUCTIONS BEFORE YOUR SURGERY   When You  Arrive Arrive at the 2nd [address] [facility] on the day of your surgery  Have your insurance card, photo ID, and any copayment (if needed)   Food   and   Drink NO food or drink after midnight the night before surgery    This means NO water, gum, mints, coffee, juice, etc.  No alcohol (beer, wine, liquor) 24 hours before and after surgery   Medications to   TAKE   Morning of Surgery MEDICATIONS TO TAKE THE MORNING OF SURGERY WITH A SIP OF WATER:    Effexor,Diltiazem,Ropinirole,Omeprazole,Cymbalta,Spiriva   Medications  To  STOP      7 days before surgery  Non-Steroidal anti-inflammatory Drugs (NSAID's): for example, Ibuprofen (Advil, Motrin), Naproxen (Aleve)   Aspirin, if taking for pain    Herbal supplements, vitamins, and fish oil   Other:  (Pain medications not listed above, including Tylenol may be taken)   Blood  Thinners  If you take  Aspirin, Plavix, Coumadin, or any blood-thinning or anti-blood clot medicine, talk to the doctor who prescribed the medications for pre-operative instructions.    Bathing Clothing  Jewelry  Valuables      If you shower the morning of surgery, please do not apply anything to your skin (lotions, powders, deodorant, or makeup, especially carlito)   Follow Chlorhexidine Care Fusion body wash instructions provided to you during PAT appointment. Begin 3 days prior to surgery.  Do not shave or trim anywhere 24 hours before surgery   Wear your hair loose or down; no pony-tails, buns, or metal hair clips   Wear loose, comfortable, clean clothes   Wear glasses instead of contacts   Leave money, valuables, and jewelry, including body piercings, at home   Going Home - or Spending the Night  SAME-DAY SURGERY: You must have a responsible adult drive you home and stay with you 24 hours after surgery   ADMITS: If your doctor is keeping you in the hospital after surgery, leave personal belongings/luggage in your car until you have a hospital room number. Hospital discharge time is 12 noon  Drivers must be here before 12 noon unless you are told differently   Special Instructions It is now mandated that all surgical patients be tested for COVID-19 prior to surgery. Testing has to be exactly 4 days prior to surgery. Your COVID test date is 7/24/20 between 8:00 am and 11:00 am.       COVID testing will be performed curbside at the [address] [place] [address] Dr lopez. There will be signs leading you to the testing site. You will need to bring a photo ID with you to be swabbed. Patients are advised to self-quarantine at home after testing and prior to your surgery date. You will be notified if your results are positive.     What to watch for:   Coronavirus (COVID-19) affects different people in different ways   It also appears with a wide range of symptoms from mild to severe   Signs usually appear 2-14 days after exposure     If you develop any of the following, notify your doctor immediately:  o Fever  o Chills, with or without a shiver  o Muscle pain  o Headache  o Sore throat  o Dry cough  o New loss of taste or smell  o Tiredness      If you develop any of the following, call 911:  o Shortness of breath  o Difficulty breathing  o Chest pain  o New confusion  o Blueness of fingers and/or lips     Follow all instructions so your surgery wont be cancelled. Please, be on time. If a situation occurs and you are delayed the day of surgery, call [phone] . If your physical condition changes (like a fever, cold, flu, etc.) call your surgeon. Home medication(s) reviewed and verified via     LIST     during PAT appointment. The patient was contacted by      IN-PERSON  The patient verbalizes understanding of all instructions and     DOES NOT   need reinforcement.

## 2020-07-22 LAB
ATRIAL RATE: 73 BPM
CALCULATED P AXIS, ECG09: 8 DEGREES
CALCULATED R AXIS, ECG10: -12 DEGREES
CALCULATED T AXIS, ECG11: 14 DEGREES
DIAGNOSIS, 93000: NORMAL
P-R INTERVAL, ECG05: 144 MS
Q-T INTERVAL, ECG07: 450 MS
QRS DURATION, ECG06: 94 MS
QTC CALCULATION (BEZET), ECG08: 495 MS
VENTRICULAR RATE, ECG03: 73 BPM

## 2020-07-24 ENCOUNTER — HOSPITAL ENCOUNTER (OUTPATIENT)
Dept: PREADMISSION TESTING | Age: 55
Discharge: HOME | End: 2020-07-24
Payer: MEDICARE

## 2020-07-24 PROCEDURE — 87635 SARS-COV-2 COVID-19 AMP PRB: CPT

## 2020-07-25 LAB — SARS-COV-2, COV2NT: NOT DETECTED

## 2020-07-27 ENCOUNTER — ANESTHESIA EVENT (OUTPATIENT)
Dept: SURGERY | Age: 55
DRG: 658 | End: 2020-07-27
Payer: MEDICARE

## 2020-07-28 ENCOUNTER — HOSPITAL ENCOUNTER (INPATIENT)
Age: 55
LOS: 1 days | Discharge: HOME | DRG: 658 | End: 2020-07-29
Attending: UROLOGY | Admitting: UROLOGY
Payer: MEDICARE

## 2020-07-28 ENCOUNTER — APPOINTMENT (OUTPATIENT)
Dept: GENERAL RADIOLOGY | Age: 55
DRG: 658 | End: 2020-07-28
Attending: UROLOGY
Payer: MEDICARE

## 2020-07-28 ENCOUNTER — ANESTHESIA (OUTPATIENT)
Dept: SURGERY | Age: 55
DRG: 658 | End: 2020-07-28
Payer: MEDICARE

## 2020-07-28 DIAGNOSIS — N28.89 RIGHT RENAL MASS: Primary | ICD-10-CM

## 2020-07-28 LAB
GLUCOSE BLD STRIP.AUTO-MCNC: 101 MG/DL (ref 65–100)
GLUCOSE BLD STRIP.AUTO-MCNC: 143 MG/DL (ref 65–100)
GLUCOSE BLD STRIP.AUTO-MCNC: 146 MG/DL (ref 65–100)
GLUCOSE BLD STRIP.AUTO-MCNC: 171 MG/DL (ref 65–100)
HCT VFR BLD AUTO: 38.6 % (ref 35–47)
HGB BLD-MCNC: 12 G/DL (ref 11.5–16)
SERVICE CMNT-IMP: ABNORMAL

## 2020-07-28 PROCEDURE — 74011000250 HC RX REV CODE- 250: Performed by: UROLOGY

## 2020-07-28 PROCEDURE — 77030031139 HC SUT VCRL2 J&J -A: Performed by: UROLOGY

## 2020-07-28 PROCEDURE — 74011000250 HC RX REV CODE- 250: Performed by: NURSE ANESTHETIST, CERTIFIED REGISTERED

## 2020-07-28 PROCEDURE — 77030002933 HC SUT MCRYL J&J -A: Performed by: UROLOGY

## 2020-07-28 PROCEDURE — 77030011810 HC STPLR ENDOSC J&J -G: Performed by: UROLOGY

## 2020-07-28 PROCEDURE — 76010000171 HC OR TIME 2 TO 2.5 HR INTENSV-TIER 1: Performed by: UROLOGY

## 2020-07-28 PROCEDURE — 74011250636 HC RX REV CODE- 250/636: Performed by: NURSE ANESTHETIST, CERTIFIED REGISTERED

## 2020-07-28 PROCEDURE — 77030014008 HC SPNG HEMSTAT J&J -C: Performed by: UROLOGY

## 2020-07-28 PROCEDURE — 74011250637 HC RX REV CODE- 250/637: Performed by: UROLOGY

## 2020-07-28 PROCEDURE — 77030009527 HC GEL PRT SYS AMR -E: Performed by: UROLOGY

## 2020-07-28 PROCEDURE — 77030016151 HC PROTCTR LNS DFOG COVD -B: Performed by: UROLOGY

## 2020-07-28 PROCEDURE — 0TT00ZZ RESECTION OF RIGHT KIDNEY, OPEN APPROACH: ICD-10-PCS | Performed by: UROLOGY

## 2020-07-28 PROCEDURE — 77030026438 HC STYL ET INTUB CARD -A: Performed by: ANESTHESIOLOGY

## 2020-07-28 PROCEDURE — 36415 COLL VENOUS BLD VENIPUNCTURE: CPT

## 2020-07-28 PROCEDURE — 85018 HEMOGLOBIN: CPT

## 2020-07-28 PROCEDURE — 77030008684 HC TU ET CUF COVD -B: Performed by: ANESTHESIOLOGY

## 2020-07-28 PROCEDURE — 77030037032 HC INSRT SCIS CLICKLLINE DISP STOR -B: Performed by: UROLOGY

## 2020-07-28 PROCEDURE — 88307 TISSUE EXAM BY PATHOLOGIST: CPT

## 2020-07-28 PROCEDURE — 76060000035 HC ANESTHESIA 2 TO 2.5 HR: Performed by: UROLOGY

## 2020-07-28 PROCEDURE — 77030040361 HC SLV COMPR DVT MDII -B

## 2020-07-28 PROCEDURE — 74011250636 HC RX REV CODE- 250/636: Performed by: UROLOGY

## 2020-07-28 PROCEDURE — 77030008608 HC TRCR ENDOSC SMTH AMR -B: Performed by: UROLOGY

## 2020-07-28 PROCEDURE — 82962 GLUCOSE BLOOD TEST: CPT

## 2020-07-28 PROCEDURE — 77030005513 HC CATH URETH FOL11 MDII -B: Performed by: UROLOGY

## 2020-07-28 PROCEDURE — 77030002966 HC SUT PDS J&J -A: Performed by: UROLOGY

## 2020-07-28 PROCEDURE — 87086 URINE CULTURE/COLONY COUNT: CPT

## 2020-07-28 PROCEDURE — 77030010935 HC CLP LIG ABSRB TELE -B: Performed by: UROLOGY

## 2020-07-28 PROCEDURE — 77030008557 HC TBNG SMK EVAC STOR -B: Performed by: UROLOGY

## 2020-07-28 PROCEDURE — 77030009967 HC RELD STPLR ENDOSC J&J -C: Performed by: UROLOGY

## 2020-07-28 PROCEDURE — 77030034628 HC LIGASURE LAP SEAL DIV MD COVD -F: Performed by: UROLOGY

## 2020-07-28 PROCEDURE — 99218 HC RM OBSERVATION: CPT

## 2020-07-28 PROCEDURE — 77030011640 HC PAD GRND REM COVD -A: Performed by: UROLOGY

## 2020-07-28 PROCEDURE — 77030018836 HC SOL IRR NACL ICUM -A: Performed by: UROLOGY

## 2020-07-28 PROCEDURE — 74011250636 HC RX REV CODE- 250/636: Performed by: ANESTHESIOLOGY

## 2020-07-28 PROCEDURE — 77030006984: Performed by: UROLOGY

## 2020-07-28 PROCEDURE — 77030010507 HC ADH SKN DERMBND J&J -B: Performed by: UROLOGY

## 2020-07-28 PROCEDURE — 76210000016 HC OR PH I REC 1 TO 1.5 HR: Performed by: UROLOGY

## 2020-07-28 PROCEDURE — 77030040922 HC BLNKT HYPOTHRM STRY -A

## 2020-07-28 PROCEDURE — 77030008756 HC TU IRR SUC STRY -B: Performed by: UROLOGY

## 2020-07-28 PROCEDURE — 77030013079 HC BLNKT BAIR HGGR 3M -A: Performed by: ANESTHESIOLOGY

## 2020-07-28 PROCEDURE — 71045 X-RAY EXAM CHEST 1 VIEW: CPT

## 2020-07-28 RX ORDER — MIDAZOLAM HYDROCHLORIDE 1 MG/ML
INJECTION, SOLUTION INTRAMUSCULAR; INTRAVENOUS AS NEEDED
Status: DISCONTINUED | OUTPATIENT
Start: 2020-07-28 | End: 2020-07-28 | Stop reason: HOSPADM

## 2020-07-28 RX ORDER — DIPHENHYDRAMINE HCL 25 MG
25 CAPSULE ORAL
Status: DISCONTINUED | OUTPATIENT
Start: 2020-07-28 | End: 2020-07-29 | Stop reason: HOSPADM

## 2020-07-28 RX ORDER — PREGABALIN 75 MG/1
150 CAPSULE ORAL 2 TIMES DAILY
Status: DISCONTINUED | OUTPATIENT
Start: 2020-07-28 | End: 2020-07-29 | Stop reason: HOSPADM

## 2020-07-28 RX ORDER — FENTANYL CITRATE 50 UG/ML
INJECTION, SOLUTION INTRAMUSCULAR; INTRAVENOUS AS NEEDED
Status: DISCONTINUED | OUTPATIENT
Start: 2020-07-28 | End: 2020-07-28 | Stop reason: HOSPADM

## 2020-07-28 RX ORDER — TIZANIDINE 2 MG/1
2 TABLET ORAL
Status: DISCONTINUED | OUTPATIENT
Start: 2020-07-28 | End: 2020-07-29 | Stop reason: HOSPADM

## 2020-07-28 RX ORDER — BENZONATATE 100 MG/1
100 CAPSULE ORAL
Status: DISCONTINUED | OUTPATIENT
Start: 2020-07-28 | End: 2020-07-29 | Stop reason: HOSPADM

## 2020-07-28 RX ORDER — NALOXONE HYDROCHLORIDE 0.4 MG/ML
0.4 INJECTION, SOLUTION INTRAMUSCULAR; INTRAVENOUS; SUBCUTANEOUS AS NEEDED
Status: DISCONTINUED | OUTPATIENT
Start: 2020-07-28 | End: 2020-07-29 | Stop reason: HOSPADM

## 2020-07-28 RX ORDER — SODIUM CHLORIDE 0.9 % (FLUSH) 0.9 %
5-40 SYRINGE (ML) INJECTION EVERY 8 HOURS
Status: DISCONTINUED | OUTPATIENT
Start: 2020-07-28 | End: 2020-07-29 | Stop reason: HOSPADM

## 2020-07-28 RX ORDER — IPRATROPIUM BROMIDE 0.5 MG/2.5ML
0.5 SOLUTION RESPIRATORY (INHALATION)
Status: DISCONTINUED | OUTPATIENT
Start: 2020-07-28 | End: 2020-07-29 | Stop reason: HOSPADM

## 2020-07-28 RX ORDER — NEOSTIGMINE METHYLSULFATE 1 MG/ML
INJECTION, SOLUTION INTRAVENOUS AS NEEDED
Status: DISCONTINUED | OUTPATIENT
Start: 2020-07-28 | End: 2020-07-28 | Stop reason: HOSPADM

## 2020-07-28 RX ORDER — OXYBUTYNIN CHLORIDE 5 MG/1
5 TABLET ORAL
Status: DISCONTINUED | OUTPATIENT
Start: 2020-07-28 | End: 2020-07-29 | Stop reason: HOSPADM

## 2020-07-28 RX ORDER — DEXAMETHASONE SODIUM PHOSPHATE 4 MG/ML
INJECTION, SOLUTION INTRA-ARTICULAR; INTRALESIONAL; INTRAMUSCULAR; INTRAVENOUS; SOFT TISSUE AS NEEDED
Status: DISCONTINUED | OUTPATIENT
Start: 2020-07-28 | End: 2020-07-28 | Stop reason: HOSPADM

## 2020-07-28 RX ORDER — LEVOFLOXACIN 5 MG/ML
INJECTION, SOLUTION INTRAVENOUS AS NEEDED
Status: DISCONTINUED | OUTPATIENT
Start: 2020-07-28 | End: 2020-07-28 | Stop reason: HOSPADM

## 2020-07-28 RX ORDER — GLYCOPYRROLATE 0.2 MG/ML
INJECTION INTRAMUSCULAR; INTRAVENOUS AS NEEDED
Status: DISCONTINUED | OUTPATIENT
Start: 2020-07-28 | End: 2020-07-28 | Stop reason: HOSPADM

## 2020-07-28 RX ORDER — OXYCODONE HYDROCHLORIDE 5 MG/1
5 TABLET ORAL
Status: DISCONTINUED | OUTPATIENT
Start: 2020-07-28 | End: 2020-07-29 | Stop reason: HOSPADM

## 2020-07-28 RX ORDER — INSULIN LISPRO 100 [IU]/ML
INJECTION, SOLUTION INTRAVENOUS; SUBCUTANEOUS EVERY 6 HOURS
Status: DISCONTINUED | OUTPATIENT
Start: 2020-07-28 | End: 2020-07-29 | Stop reason: HOSPADM

## 2020-07-28 RX ORDER — SUCCINYLCHOLINE CHLORIDE 20 MG/ML
INJECTION INTRAMUSCULAR; INTRAVENOUS AS NEEDED
Status: DISCONTINUED | OUTPATIENT
Start: 2020-07-28 | End: 2020-07-28 | Stop reason: HOSPADM

## 2020-07-28 RX ORDER — DILTIAZEM HYDROCHLORIDE 180 MG/1
360 CAPSULE, COATED, EXTENDED RELEASE ORAL DAILY
Status: DISCONTINUED | OUTPATIENT
Start: 2020-07-29 | End: 2020-07-29 | Stop reason: HOSPADM

## 2020-07-28 RX ORDER — LIDOCAINE HYDROCHLORIDE 20 MG/ML
INJECTION, SOLUTION EPIDURAL; INFILTRATION; INTRACAUDAL; PERINEURAL AS NEEDED
Status: DISCONTINUED | OUTPATIENT
Start: 2020-07-28 | End: 2020-07-28 | Stop reason: HOSPADM

## 2020-07-28 RX ORDER — VENLAFAXINE HYDROCHLORIDE 150 MG/1
150 CAPSULE, EXTENDED RELEASE ORAL DAILY
Status: DISCONTINUED | OUTPATIENT
Start: 2020-07-29 | End: 2020-07-29 | Stop reason: HOSPADM

## 2020-07-28 RX ORDER — ONDANSETRON 2 MG/ML
INJECTION INTRAMUSCULAR; INTRAVENOUS AS NEEDED
Status: DISCONTINUED | OUTPATIENT
Start: 2020-07-28 | End: 2020-07-28 | Stop reason: HOSPADM

## 2020-07-28 RX ORDER — ROCURONIUM BROMIDE 10 MG/ML
INJECTION, SOLUTION INTRAVENOUS AS NEEDED
Status: DISCONTINUED | OUTPATIENT
Start: 2020-07-28 | End: 2020-07-28 | Stop reason: HOSPADM

## 2020-07-28 RX ORDER — PANTOPRAZOLE SODIUM 40 MG/1
40 TABLET, DELAYED RELEASE ORAL
Status: DISCONTINUED | OUTPATIENT
Start: 2020-07-29 | End: 2020-07-29 | Stop reason: HOSPADM

## 2020-07-28 RX ORDER — SODIUM CHLORIDE, SODIUM LACTATE, POTASSIUM CHLORIDE, CALCIUM CHLORIDE 600; 310; 30; 20 MG/100ML; MG/100ML; MG/100ML; MG/100ML
100 INJECTION, SOLUTION INTRAVENOUS CONTINUOUS
Status: DISCONTINUED | OUTPATIENT
Start: 2020-07-28 | End: 2020-07-28 | Stop reason: HOSPADM

## 2020-07-28 RX ORDER — ACETAMINOPHEN 325 MG/1
650 TABLET ORAL
Status: DISCONTINUED | OUTPATIENT
Start: 2020-07-28 | End: 2020-07-29 | Stop reason: HOSPADM

## 2020-07-28 RX ORDER — LIDOCAINE HYDROCHLORIDE 10 MG/ML
0.1 INJECTION, SOLUTION EPIDURAL; INFILTRATION; INTRACAUDAL; PERINEURAL AS NEEDED
Status: DISCONTINUED | OUTPATIENT
Start: 2020-07-28 | End: 2020-07-28 | Stop reason: HOSPADM

## 2020-07-28 RX ORDER — MAGNESIUM SULFATE 100 %
4 CRYSTALS MISCELLANEOUS AS NEEDED
Status: DISCONTINUED | OUTPATIENT
Start: 2020-07-28 | End: 2020-07-29 | Stop reason: HOSPADM

## 2020-07-28 RX ORDER — PROPOFOL 10 MG/ML
INJECTION, EMULSION INTRAVENOUS AS NEEDED
Status: DISCONTINUED | OUTPATIENT
Start: 2020-07-28 | End: 2020-07-28 | Stop reason: HOSPADM

## 2020-07-28 RX ORDER — ROPINIROLE 1 MG/1
2 TABLET, FILM COATED ORAL 3 TIMES DAILY
Status: DISCONTINUED | OUTPATIENT
Start: 2020-07-28 | End: 2020-07-29 | Stop reason: HOSPADM

## 2020-07-28 RX ORDER — HYDROMORPHONE HYDROCHLORIDE 1 MG/ML
.25-1 INJECTION, SOLUTION INTRAMUSCULAR; INTRAVENOUS; SUBCUTANEOUS
Status: DISCONTINUED | OUTPATIENT
Start: 2020-07-28 | End: 2020-07-28 | Stop reason: HOSPADM

## 2020-07-28 RX ORDER — ONDANSETRON 2 MG/ML
4 INJECTION INTRAMUSCULAR; INTRAVENOUS
Status: DISCONTINUED | OUTPATIENT
Start: 2020-07-28 | End: 2020-07-29 | Stop reason: HOSPADM

## 2020-07-28 RX ORDER — DOCUSATE SODIUM 100 MG/1
100 CAPSULE, LIQUID FILLED ORAL 2 TIMES DAILY
Status: DISCONTINUED | OUTPATIENT
Start: 2020-07-28 | End: 2020-07-29 | Stop reason: HOSPADM

## 2020-07-28 RX ORDER — FLUTICASONE PROPIONATE 50 MCG
2 SPRAY, SUSPENSION (ML) NASAL DAILY
Status: DISCONTINUED | OUTPATIENT
Start: 2020-07-28 | End: 2020-07-29 | Stop reason: HOSPADM

## 2020-07-28 RX ORDER — SODIUM CHLORIDE 0.9 % (FLUSH) 0.9 %
5-40 SYRINGE (ML) INJECTION AS NEEDED
Status: DISCONTINUED | OUTPATIENT
Start: 2020-07-28 | End: 2020-07-29 | Stop reason: HOSPADM

## 2020-07-28 RX ORDER — PHENYLEPHRINE HCL IN 0.9% NACL 0.4MG/10ML
SYRINGE (ML) INTRAVENOUS AS NEEDED
Status: DISCONTINUED | OUTPATIENT
Start: 2020-07-28 | End: 2020-07-28 | Stop reason: HOSPADM

## 2020-07-28 RX ORDER — DEXTROSE 50 % IN WATER (D50W) INTRAVENOUS SYRINGE
25-50 AS NEEDED
Status: DISCONTINUED | OUTPATIENT
Start: 2020-07-28 | End: 2020-07-29 | Stop reason: HOSPADM

## 2020-07-28 RX ORDER — EPHEDRINE SULFATE/0.9% NACL/PF 50 MG/5 ML
SYRINGE (ML) INTRAVENOUS AS NEEDED
Status: DISCONTINUED | OUTPATIENT
Start: 2020-07-28 | End: 2020-07-28 | Stop reason: HOSPADM

## 2020-07-28 RX ORDER — TRAZODONE HYDROCHLORIDE 50 MG/1
50 TABLET ORAL
Status: DISCONTINUED | OUTPATIENT
Start: 2020-07-28 | End: 2020-07-29 | Stop reason: HOSPADM

## 2020-07-28 RX ORDER — HYDROMORPHONE HYDROCHLORIDE 1 MG/ML
1 INJECTION, SOLUTION INTRAMUSCULAR; INTRAVENOUS; SUBCUTANEOUS
Status: DISCONTINUED | OUTPATIENT
Start: 2020-07-28 | End: 2020-07-29 | Stop reason: HOSPADM

## 2020-07-28 RX ORDER — LORAZEPAM 0.5 MG/1
0.5 TABLET ORAL
Status: DISCONTINUED | OUTPATIENT
Start: 2020-07-28 | End: 2020-07-29 | Stop reason: HOSPADM

## 2020-07-28 RX ORDER — SODIUM CHLORIDE, SODIUM LACTATE, POTASSIUM CHLORIDE, CALCIUM CHLORIDE 600; 310; 30; 20 MG/100ML; MG/100ML; MG/100ML; MG/100ML
125 INJECTION, SOLUTION INTRAVENOUS CONTINUOUS
Status: DISCONTINUED | OUTPATIENT
Start: 2020-07-28 | End: 2020-07-29

## 2020-07-28 RX ORDER — SODIUM CHLORIDE, SODIUM LACTATE, POTASSIUM CHLORIDE, CALCIUM CHLORIDE 600; 310; 30; 20 MG/100ML; MG/100ML; MG/100ML; MG/100ML
INJECTION, SOLUTION INTRAVENOUS
Status: DISCONTINUED | OUTPATIENT
Start: 2020-07-28 | End: 2020-07-28 | Stop reason: HOSPADM

## 2020-07-28 RX ADMIN — HYDROMORPHONE HYDROCHLORIDE 0.5 MG: 1 INJECTION, SOLUTION INTRAMUSCULAR; INTRAVENOUS; SUBCUTANEOUS at 17:35

## 2020-07-28 RX ADMIN — WATER 2 G: 1 INJECTION INTRAMUSCULAR; INTRAVENOUS; SUBCUTANEOUS at 21:53

## 2020-07-28 RX ADMIN — Medication 100 MCG: at 14:54

## 2020-07-28 RX ADMIN — Medication 10 ML: at 21:55

## 2020-07-28 RX ADMIN — BENZONATATE 100 MG: 100 CAPSULE ORAL at 23:53

## 2020-07-28 RX ADMIN — Medication 100 MCG: at 14:35

## 2020-07-28 RX ADMIN — ROPINIROLE HYDROCHLORIDE 2 MG: 1 TABLET, FILM COATED ORAL at 21:54

## 2020-07-28 RX ADMIN — ONDANSETRON HYDROCHLORIDE 4 MG: 2 SOLUTION INTRAMUSCULAR; INTRAVENOUS at 15:46

## 2020-07-28 RX ADMIN — SODIUM CHLORIDE, POTASSIUM CHLORIDE, SODIUM LACTATE AND CALCIUM CHLORIDE: 600; 310; 30; 20 INJECTION, SOLUTION INTRAVENOUS at 14:15

## 2020-07-28 RX ADMIN — GLYCOPYRROLATE 0.4 MG: 0.2 INJECTION INTRAMUSCULAR; INTRAVENOUS at 15:52

## 2020-07-28 RX ADMIN — LIDOCAINE HYDROCHLORIDE 80 MG: 20 INJECTION, SOLUTION EPIDURAL; INFILTRATION; INTRACAUDAL; PERINEURAL at 14:04

## 2020-07-28 RX ADMIN — Medication 3 MG: at 15:52

## 2020-07-28 RX ADMIN — PREGABALIN 150 MG: 75 CAPSULE ORAL at 21:54

## 2020-07-28 RX ADMIN — TRAZODONE HYDROCHLORIDE 50 MG: 50 TABLET ORAL at 21:54

## 2020-07-28 RX ADMIN — SUCCINYLCHOLINE CHLORIDE 100 MG: 20 INJECTION, SOLUTION INTRAMUSCULAR; INTRAVENOUS; PARENTERAL at 14:05

## 2020-07-28 RX ADMIN — CEFAZOLIN SODIUM 2 G: 1 POWDER, FOR SOLUTION INTRAMUSCULAR; INTRAVENOUS at 14:15

## 2020-07-28 RX ADMIN — PROPOFOL 150 MG: 10 INJECTION, EMULSION INTRAVENOUS at 14:04

## 2020-07-28 RX ADMIN — HYDROMORPHONE HYDROCHLORIDE 0.5 MG: 1 INJECTION, SOLUTION INTRAMUSCULAR; INTRAVENOUS; SUBCUTANEOUS at 17:00

## 2020-07-28 RX ADMIN — HYDROMORPHONE HYDROCHLORIDE 0.5 MG: 1 INJECTION, SOLUTION INTRAMUSCULAR; INTRAVENOUS; SUBCUTANEOUS at 17:11

## 2020-07-28 RX ADMIN — OXYCODONE 5 MG: 5 TABLET ORAL at 21:54

## 2020-07-28 RX ADMIN — Medication 100 MCG: at 14:12

## 2020-07-28 RX ADMIN — Medication 10 MG: at 14:41

## 2020-07-28 RX ADMIN — FENTANYL CITRATE 50 MCG: 0.05 INJECTION, SOLUTION INTRAMUSCULAR; INTRAVENOUS at 16:00

## 2020-07-28 RX ADMIN — DOCUSATE SODIUM 100 MG: 100 CAPSULE, LIQUID FILLED ORAL at 21:54

## 2020-07-28 RX ADMIN — SODIUM CHLORIDE, SODIUM LACTATE, POTASSIUM CHLORIDE, AND CALCIUM CHLORIDE 100 ML/HR: 600; 310; 30; 20 INJECTION, SOLUTION INTRAVENOUS at 12:19

## 2020-07-28 RX ADMIN — GLYCOPYRROLATE 0.2 MG: 0.2 INJECTION INTRAMUSCULAR; INTRAVENOUS at 14:40

## 2020-07-28 RX ADMIN — ROCURONIUM BROMIDE 40 MG: 10 INJECTION, SOLUTION INTRAVENOUS at 14:11

## 2020-07-28 RX ADMIN — HYDROMORPHONE HYDROCHLORIDE 1 MG: 1 INJECTION, SOLUTION INTRAMUSCULAR; INTRAVENOUS; SUBCUTANEOUS at 23:59

## 2020-07-28 RX ADMIN — LEVOFLOXACIN 500 MG: 5 INJECTION, SOLUTION INTRAVENOUS at 14:30

## 2020-07-28 RX ADMIN — ONDANSETRON 4 MG: 2 INJECTION INTRAMUSCULAR; INTRAVENOUS at 18:51

## 2020-07-28 RX ADMIN — PROPOFOL 50 MG: 10 INJECTION, EMULSION INTRAVENOUS at 14:05

## 2020-07-28 RX ADMIN — MIDAZOLAM HYDROCHLORIDE 2 MG: 2 INJECTION, SOLUTION INTRAMUSCULAR; INTRAVENOUS at 13:52

## 2020-07-28 RX ADMIN — FENTANYL CITRATE 100 MCG: 0.05 INJECTION, SOLUTION INTRAMUSCULAR; INTRAVENOUS at 14:02

## 2020-07-28 RX ADMIN — Medication 150 MCG: at 15:05

## 2020-07-28 RX ADMIN — DEXAMETHASONE SODIUM PHOSPHATE 4 MG: 4 INJECTION, SOLUTION INTRAMUSCULAR; INTRAVENOUS at 14:50

## 2020-07-28 RX ADMIN — Medication 50 MCG: at 14:45

## 2020-07-28 NOTE — PERIOP NOTES
Consent updated to read Dr. Annalisa Cuellar as primary surgeon  and Dr. Danette Alcala as assisting. Consent initialed by Dr. Annalisa Cuellar and patient.

## 2020-07-28 NOTE — OP NOTES
[facility] [address]  OPERATIVE REPORT    Name:  Moises Betancur  MR#:  [id number]  :  1965  ACCOUNT #:  [de-identified]  DATE OF SERVICE:  2020    PREOPERATIVE DIAGNOSIS:  Right renal mass. POSTOPERATIVE DIAGNOSIS:  Right renal mass. PROCEDURE PERFORMED:  Hand-assisted laparoscopic right nephrectomy. SURGEON:  Ancelmo Erickson MD    ASSISTANT:  Lindsay Hankins MD    ANESTHESIA:  General.    COMPLICATIONS:  None. SPECIMENS REMOVED:  Right kidney. IMPLANTS:  none    ESTIMATED BLOOD LOSS:  50 mL. ANTIBIOTICS:  Ancef 3 g IV and Levaquin 500 mg IV. TUBES AND DRAINS:  Haji catheter. FLUIDS:  See Anesthesia record. URINE OUTPUT:  See Anesthesia record. DISPOSITION:  The patient will be monitored on the floor overnight and plan to discharge tomorrow if meeting criteria. PROCEDURE FINDINGS:  Endophytic right renal mass. INDICATION FOR PROCEDURE:  A 62-year-old female who had incidental finding of about 3-cm endophytic right renal mass on CT and we counseled her on the findings suspicious for renal cell carcinoma. She elected for a nephrectomy as the completely endophytic mass is centrally located and it would be very difficult or nearly impossible to reconstruct the kidney after partial nephrectomy and presents today. PROCEDURE IN DETAIL:  After informed consent was obtained, the patient was taken to the operating room and timeout performed. Preoperative antibiotics administered and anesthesia induced. She was prepped and draped in lateral decubitus position with the right side down, left side up and flexed on the bed. All pressure points were padded and SCDs were placed. A Haji catheter had been placed and there was some cloudy urine which we noted and she had been recently treated for UTI so we extended her antibiotics with the Levaquin.   We made a periumbilical incision sharply after injecting with a mixture of 1% lidocaine and 0.25% Marcaine which we carried down through Hui's fascia. The rectus sheath was incised in the midline and we entered the peritoneal cavity. She had some omental adhesion present near the umbilicus from her previous hysterectomy. We were able to ligate this with the LigaSure and inspect the area. There was no sign of any bowel in the area of adhesion. We then placed a GelPort and placed two additional 12-mm ports in the right upper quadrant under direct vision. We began our dissection along the mesentery of the ascending colon. We took this down off of the abdominal wall and Gerota's fascia and reflected this medially. This was taken down to the pelvic brim and there were also some adhesions on the liver from her previous cholecystectomy which we resected with LigaSure. This revealed Gerota's fascia and we carefully dissected the duodenum medially. We entered Gerota's fascia and identified the gonadal vein which was reflected medially and the ureter was identified. Blunt dissection and LigaSure dissection were used to dissect superiorly up to the hilum. We identified two renal arteries and two renal veins and we were able to ligate the hilar vessels with a 45-mm vascular staple load without difficulty. The stump of the hilar vessels was hemostatic and we continued our dissection, hugging the superior pole of the kidney to try and spare the adrenal gland and the kidney was able to be removed and sent for permanent pathology. We used a lap to dry the nephrectomy bed and then dropped the abdominal pressure from the insufflation and there was no sign of any hemorrhage. The hilar vessel stump was inspected again. There was no sign of any hemorrhage. We then visually removed the ports and we also placed a 5-mm port near the falciform ligament for liver retraction and this appeared to be hemostatic also.   The fascia was closed with #1 PDS from either apex and we looked back in with the camera and saw no sign of any entrapment of peritoneal contents in the closure. We then closed the more medial 12-mm port site with a 2-0 Vicryl. The skin was closed with Monocryl and Dermabond and she was awakened from anesthesia without complication and transferred to the PACU in stable condition. All counts were correct at the end of the case.       MD GENIA Logan/[email or web address]/  D:  07/28/2020 16:00  T:  07/28/2020 19:28  JOB #:  [id number]

## 2020-07-28 NOTE — ANESTHESIA PREPROCEDURE EVALUATION
Anesthetic History   No history of anesthetic complications            Review of Systems / Medical History  Patient summary reviewed, nursing notes reviewed and pertinent labs reviewed    Pulmonary    COPD    Sleep apnea           Neuro/Psych         Psychiatric history     Cardiovascular    Hypertension        Dysrhythmias            GI/Hepatic/Renal     GERD           Endo/Other    Diabetes    Morbid obesity and arthritis     Other Findings              Physical Exam    Airway  Mallampati: II  TM Distance: > 6 cm  Neck ROM: normal range of motion   Mouth opening: Normal     Cardiovascular    Rhythm: regular  Rate: normal         Dental  No notable dental hx       Pulmonary  Breath sounds clear to auscultation               Abdominal  GI exam deferred       Other Findings            Anesthetic Plan    ASA: 3  Anesthesia type: general          Induction: Intravenous  Anesthetic plan and risks discussed with: Patient

## 2020-07-28 NOTE — BRIEF OP NOTE
Brief Postoperative Note    Patient: Margie Mello  YOB: 1965  MRN: [id number]    Date of Procedure: 7/28/2020     Pre-Op Diagnosis: RIGHT RENAL MASS    Post-Op Diagnosis: Same as preoperative diagnosis.       Procedure(s):  HAND ASSISTED LAPAROSCOPIC RIGHT NEPHRECTOMY    Surgeon(s):  MD Yolanda Samuels MD    Surgical Assistant: None    Anesthesia: General     Estimated Blood Loss (mL): less than 50     Complications: None    Specimens:   ID Type Source Tests Collected by Time Destination   1 :  Right Kidney Preservative Kidney, Right  Hannah Larsen MD 7/28/2020 1528 Pathology   1 : Urine Culture Urine Haji Specimen CULTURE, URINE Hannah Larsen MD 7/28/2020 1529 Microbiology        Implants: * No implants in log *    Drains: * No LDAs found *    Findings: endophytic right renal mass    Electronically Signed by Tara Duncan MD on 7/28/2020 at 3:53 PM    OEAM#[id number]

## 2020-07-28 NOTE — ANESTHESIA POSTPROCEDURE EVALUATION
Procedure(s):  HAND ASSISTED LAPAROSCOPIC RIGHT NEPHRECTOMY. general    Anesthesia Post Evaluation      Multimodal analgesia: multimodal analgesia not used between 6 hours prior to anesthesia start to PACU discharge  Patient location during evaluation: PACU  Patient participation: complete - patient participated  Level of consciousness: awake and alert  Pain score: 2  Pain management: adequate  Airway patency: patent  Anesthetic complications: no  Cardiovascular status: acceptable  Respiratory status: acceptable  Hydration status: acceptable  Post anesthesia nausea and vomiting:  none  Final Post Anesthesia Temperature Assessment:  Normothermia (36.0-37.5 degrees C)      INITIAL Post-op Vital signs:   Vitals Value Taken Time   /58 7/28/2020  5:20 PM   Temp 36.4 °C (97.6 °F) 7/28/2020  4:30 PM   Pulse 87 7/28/2020  5:22 PM   Resp 20 7/28/2020  5:22 PM   SpO2 95 % 7/28/2020  5:22 PM   Vitals shown include unvalidated device data.

## 2020-07-28 NOTE — PERIOP NOTES
TRANSFER - OUT REPORT:    Verbal report given to RN Hilda Hale on Margie Mello  being transferred to [phone] for routine post - op       Report consisted of patients Situation, Background, Assessment and   Recommendations(SBAR). Information from the following report(s) SBAR, OR Summary, Procedure Summary, Intake/Output, MAR and Cardiac Rhythm nsr was reviewed with the receiving nurse. Lines:   Peripheral IV 07/28/20 Left Forearm (Active)   Site Assessment Clean, dry, & intact 07/28/20 1234   Phlebitis Assessment 0 07/28/20 1234   Infiltration Assessment 0 07/28/20 1234   Dressing Type Transparent;Tape 07/28/20 1234   Hub Color/Line Status Pink;Patent; Infusing 07/28/20 1234   Alcohol Cap Used Yes 07/28/20 1234       Peripheral IV 07/28/20 Right Hand (Active)        Opportunity for questions and clarification was provided.       Patient transported with:   O2 @ 2 liters  Registered Nurse

## 2020-07-29 VITALS
HEART RATE: 91 BPM | SYSTOLIC BLOOD PRESSURE: 123 MMHG | TEMPERATURE: 97.6 F | DIASTOLIC BLOOD PRESSURE: 65 MMHG | OXYGEN SATURATION: 92 % | WEIGHT: 200 LBS | BODY MASS INDEX: 34.87 KG/M2 | RESPIRATION RATE: 18 BRPM

## 2020-07-29 PROBLEM — N28.89 RENAL MASS, RIGHT: Status: ACTIVE | Noted: 2020-07-29

## 2020-07-29 LAB
ANION GAP SERPL CALC-SCNC: 4 MMOL/L (ref 5–15)
BUN SERPL-MCNC: 10 MG/DL (ref 6–20)
BUN/CREAT SERPL: 10 (ref 12–20)
CALCIUM SERPL-MCNC: 8.7 MG/DL (ref 8.5–10.1)
CHLORIDE SERPL-SCNC: 110 MMOL/L (ref 97–108)
CO2 SERPL-SCNC: 25 MMOL/L (ref 21–32)
CREAT SERPL-MCNC: 1 MG/DL (ref 0.55–1.02)
GLUCOSE BLD STRIP.AUTO-MCNC: 154 MG/DL (ref 65–100)
GLUCOSE SERPL-MCNC: 143 MG/DL (ref 65–100)
HCT VFR BLD AUTO: 37.3 % (ref 35–47)
HGB BLD-MCNC: 11.6 G/DL (ref 11.5–16)
POTASSIUM SERPL-SCNC: 4 MMOL/L (ref 3.5–5.1)
SERVICE CMNT-IMP: ABNORMAL
SODIUM SERPL-SCNC: 139 MMOL/L (ref 136–145)

## 2020-07-29 PROCEDURE — 65270000029 HC RM PRIVATE

## 2020-07-29 PROCEDURE — 82962 GLUCOSE BLOOD TEST: CPT

## 2020-07-29 PROCEDURE — 99218 HC RM OBSERVATION: CPT

## 2020-07-29 PROCEDURE — 74011000250 HC RX REV CODE- 250: Performed by: UROLOGY

## 2020-07-29 PROCEDURE — 74011250636 HC RX REV CODE- 250/636: Performed by: UROLOGY

## 2020-07-29 PROCEDURE — 74011250637 HC RX REV CODE- 250/637: Performed by: UROLOGY

## 2020-07-29 PROCEDURE — 94760 N-INVAS EAR/PLS OXIMETRY 1: CPT

## 2020-07-29 PROCEDURE — 36415 COLL VENOUS BLD VENIPUNCTURE: CPT

## 2020-07-29 PROCEDURE — 80048 BASIC METABOLIC PNL TOTAL CA: CPT

## 2020-07-29 PROCEDURE — 74011250637 HC RX REV CODE- 250/637: Performed by: NURSE PRACTITIONER

## 2020-07-29 PROCEDURE — 85018 HEMOGLOBIN: CPT

## 2020-07-29 RX ORDER — IBUPROFEN 200 MG
1 TABLET ORAL DAILY
Status: DISCONTINUED | OUTPATIENT
Start: 2020-07-29 | End: 2020-07-29 | Stop reason: HOSPADM

## 2020-07-29 RX ORDER — OXYCODONE HYDROCHLORIDE 5 MG/1
5 TABLET ORAL
Qty: 12 TAB | Refills: 0 | Status: SHIPPED | OUTPATIENT
Start: 2020-07-29 | End: 2020-08-01

## 2020-07-29 RX ORDER — CEFUROXIME AXETIL 500 MG/1
500 TABLET ORAL 2 TIMES DAILY
Qty: 6 TAB | Refills: 0 | Status: SHIPPED | OUTPATIENT
Start: 2020-07-29 | End: 2020-08-21 | Stop reason: ALTCHOICE

## 2020-07-29 RX ADMIN — HYDROMORPHONE HYDROCHLORIDE 1 MG: 1 INJECTION, SOLUTION INTRAMUSCULAR; INTRAVENOUS; SUBCUTANEOUS at 06:58

## 2020-07-29 RX ADMIN — PANTOPRAZOLE SODIUM 40 MG: 40 TABLET, DELAYED RELEASE ORAL at 06:58

## 2020-07-29 RX ADMIN — PREGABALIN 150 MG: 75 CAPSULE ORAL at 08:35

## 2020-07-29 RX ADMIN — DOCUSATE SODIUM 100 MG: 100 CAPSULE, LIQUID FILLED ORAL at 08:35

## 2020-07-29 RX ADMIN — OXYCODONE 5 MG: 5 TABLET ORAL at 04:49

## 2020-07-29 RX ADMIN — DILTIAZEM HYDROCHLORIDE 360 MG: 180 CAPSULE, COATED, EXTENDED RELEASE ORAL at 08:35

## 2020-07-29 RX ADMIN — WATER 2 G: 1 INJECTION INTRAMUSCULAR; INTRAVENOUS; SUBCUTANEOUS at 04:49

## 2020-07-29 RX ADMIN — Medication 10 ML: at 06:58

## 2020-07-29 RX ADMIN — VENLAFAXINE HYDROCHLORIDE 150 MG: 150 CAPSULE, EXTENDED RELEASE ORAL at 08:35

## 2020-07-29 RX ADMIN — OXYCODONE 5 MG: 5 TABLET ORAL at 11:01

## 2020-07-29 RX ADMIN — ROPINIROLE HYDROCHLORIDE 2 MG: 1 TABLET, FILM COATED ORAL at 08:35

## 2020-07-29 NOTE — ROUTINE PROCESS
The following appointments have been successfully scheduled: 
 
Date/time Monday, August 03, 2020 02:45 PM 
Patient Roxanne Espinosa 1965 (95GQ F) #[id number] Department [facility] Appointment type Transitional Care Provider Stephanie Rios

## 2020-07-29 NOTE — PROGRESS NOTES
0845 amador removed. Self care completed. Patient will inform RN when she voids. 1100 Patient voided and walked in yanez with RN.     1200 Discharge information provided to patient. Time for questions and concerns allowed. IV removed. Family will transport home. RN will continue to monitor until discharge.

## 2020-07-29 NOTE — PROGRESS NOTES
Urology Progress Note    Subjective:     Daily Progress Note: 2020 8:01 AM    Mariana Goyal is 1 Day Post-Op and doing good. She reports pain is well controlled. She has no new complaints. She is tolerating a solid diet and ambulating with assistance. Indwelling catheter is draining well. Labs okay. Good urine output. Requesting nicotine patch. Culture sent yesterday due to cloudy urine. Culture from office 2020 with gram negative organism and streptococcus. She was on cefuroxime and completed a couple of days ago.      Objective:     Visit Vitals  /74 (BP 1 Location: Left arm, BP Patient Position: At rest)   Pulse 89   Temp 97.4 °F (36.3 °C)   Resp 20   Wt 90.7 kg (200 lb)   SpO2 91%   BMI 34.87 kg/m²        Temp (24hrs), Av.9 °F (36.6 °C), Min:97.4 °F (36.3 °C), Max:98.7 °F (37.1 °C)      Intake and Output:   190 -  0700  In: 9080 [I.V.:2240]  Out: 2600 [Urine:2550]   07 -  1900  In: 480 [P.O.:480]  Out: -     Physical Exam:   General appearance: alert, cooperative, no distress, appears stated age  Lungs: no distress  Abdomen: soft, appropriately tender    Incision: clean and dry    Data Review:    Recent Results (from the past 24 hour(s))   GLUCOSE, POC    Collection Time: 20 12:02 PM   Result Value Ref Range    Glucose (POC) 101 (H) 65 - 100 mg/dL    Performed by Yessi Dallas, POC    Collection Time: 20  4:33 PM   Result Value Ref Range    Glucose (POC) 143 (H) 65 - 100 mg/dL    Performed by Teodoro Lindsay    HGB & HCT    Collection Time: 20  4:39 PM   Result Value Ref Range    HGB 12.0 11.5 - 16.0 g/dL    HCT 38.6 35.0 - 47.0 %   GLUCOSE, POC    Collection Time: 20  6:41 PM   Result Value Ref Range    Glucose (POC) 146 (H) 65 - 100 mg/dL    Performed by Kendra Manning (PCT)    GLUCOSE, POC    Collection Time: 20 11:55 PM   Result Value Ref Range    Glucose (POC) 171 (H) 65 - 100 mg/dL    Performed by Livia Sheldon (PCT) GLUCOSE, POC    Collection Time: 07/29/20  5:53 AM   Result Value Ref Range    Glucose (POC) 154 (H) 65 - 100 mg/dL    Performed by [facility]    METABOLIC PANEL, BASIC    Collection Time: 07/29/20  5:55 AM   Result Value Ref Range    Sodium 139 136 - 145 mmol/L    Potassium 4.0 3.5 - 5.1 mmol/L    Chloride 110 (H) 97 - 108 mmol/L    CO2 25 21 - 32 mmol/L    Anion gap 4 (L) 5 - 15 mmol/L    Glucose 143 (H) 65 - 100 mg/dL    BUN 10 6 - 20 MG/DL    Creatinine 1.00 0.55 - 1.02 MG/DL    BUN/Creatinine ratio 10 (L) 12 - 20      GFR est AA >60 >60 ml/min/1.73m2    GFR est non-AA 57 (L) >60 ml/min/1.73m2    Calcium 8.7 8.5 - 10.1 MG/DL   HGB & HCT    Collection Time: 07/29/20  5:55 AM   Result Value Ref Range    HGB 11.6 11.5 - 16.0 g/dL    HCT 37.3 35.0 - 47.0 %       Assessment/Plan:     Active Problems:    Right renal mass (7/28/2020)        Status Post:  Procedure(s):  HAND ASSISTED LAPAROSCOPIC RIGHT NEPHRECTOMY     Plan:    DC IV Fluids  Ambulate  Incentive Spirometer  DC amador  Nicotine Patch  Cefuroxime x 3 days pending culture results    Hopefully home later today.       Signed By: Bety Zapata NP                         July 29, 2020 decreased weight-shifting ability

## 2020-07-29 NOTE — PROGRESS NOTES
1930: Bedside and Verbal shift change report given to Bre Galindo RN (oncoming nurse) by Sanjuanita Thomason RN (offgoing nurse). Report included the following information SBAR, Kardex, MAR, and Accordion. 0730: Bedside and Verbal shift change report given to Jennifer Palacio RN (oncoming nurse) by Bre Galindo RN (offgoing nurse). Report included the following information SBAR, Kardex, MAR, and Accordion.      Problem: Patient Education: Go to Patient Education Activity  Goal: Patient/Family Education  Outcome: Progressing Towards Goal     Problem: Patient Education: Go to Patient Education Activity  Goal: Patient/Family Education  Outcome: Progressing Towards Goal     Problem: Surgical Pathway Day of Surgery  Goal: Activity/Safety  Outcome: Progressing Towards Goal     Problem: Surgical Pathway Day of Surgery  Goal: Consults, if ordered  Outcome: Progressing Towards Goal

## 2020-07-29 NOTE — PROGRESS NOTES
7/29/2020   CARE MANAGEMENT NOTE:    Reason for Admission:   Right renal mass and subsequent s/p lap right nephrectomy on 7/28                   RUR Score:   11%; Low risk                  Plan for utilizing home health:   No       PCP: First and Last name:  Dr. Monsalve Cancer   Name of Practice:    Are you a current patient: Yes/No: Yes   Approximate date of last visit: Unknown; full assessment not complete at this writing   Can you participate in a virtual visit with your PCP:  Unknown                    Current Advanced Directive/Advance Care Plan: \"Prior\"  Adv. Care Plan on file                         Transition of Care Plan:     1. Pt will return home; no anticipated post discharge needs at this writing  2. OBS changed to inpt status on 7/29  3  Outpt f/u  4  Pt will arrange her own transportation upon discharge    CM will continue to follow pt until discharged.   Adeola

## 2020-07-29 NOTE — DISCHARGE INSTRUCTIONS
Dr. Mary Jane Kitchen    ? Dr. Joyce Ramey or his associate will see you back in the office in 1-2 weeks. ? At that time your final pathology report will be reviewed with you. DISCHARGE MEDICATIONS    ? Upon discharge you will be given a prescription for pain control (Oxycodonet)  ? Most patients experience only minimal discomfort after this minimally invasive surgery. We recommend using Tylenol (acetaminophen) for pain first and reserve the narcotic pain medication as an alternative as it tends to cause constipation. ? You may resume your normal medications upon discharge from the hospital with the exception of any blood-thinning products (such as coumadin, plavix, Xeralto, aspirin, etc). You may resume blood-thinning medications in 1 week unless otherwise instructed by the prescribing physician. ACTIVITY    ? Please refrain from driving for the 1st week after your surgery. ? You may shower upon discharge from the hospital. Avoid bathtubs, hot tubs or swimming pools during the 1st 2 weeks. ? It is important to be mobile during your recovery period. Avoid sitting in one position for longer than 45 minutes to 1 hour. Walking and climbing stairs are OK. Be careful not to overdo it. ? Avoid any heavy lifting or strenuous activity for 4-6 weeks. ? Most patients ease into normal activities (including employment) after 2 weeks of recuperation. ? Most patients resume driving by the 2nd week. Please use your best judgment. CARE OF YOUR INCISION SITES    ? Application of ointments or creams to the incision sites is not recommended. ? The adhesive clear wound dressing will slough off naturally in 5 - 10 days. ? Do not pick at or apply ointments/medications to the adhesive dressing. ? Do not scrub, soak or expose incisions to prolonged moisture. DIET    ?  Please limit carbonated beverages, dairy products and any other gas producing foods (such as flour, beans, or broccoli) for the 1st week or so. Small meals are best until bowel habits return to normal.        THINGS YOU MAY ENCOUNTER AFTER SURGERY    ? Bruising around incision sites. Not at all uncommon and should not alarm you. This will resolve with time. ? Abdominal distention, constipation or bloating. Make sure you are following the dietary instructions. If you dont have a bowel movement or pass gas or are feeling uncomfortable 24 hours after surgery, try taking Milk of Magnesia as directed on the bottle. If after two doses of Milk of Magnesia, you still have not had a bowel movement, it is safe to use a Dulcolax suppository (available over the counter at your local pharmacy). ? Males may have Scrotal/Penile swelling and bruising. This is quite common and should not alarm you. It may appear immediately after surgery or may start 4 -5 days after surgery. It should resolve in about 7 - 14 days. You may try elevating your scrotum with a small towel or washcloth when lying down. ? Lower legs/ankle swelling. This is not abnormal when it occurs in both legs and should not alarm you. It should resolve in about 7 - 14 days. Elevation of your legs while sitting will help. CONTACT MD IMMEDIATELY IF YOU ARE EXPERIENCING ANY OF THE FOLLOWING SYMPTOMS:    ? Oral Temperature over 101° F.  ? Unable to urinate. ? Any pain not relieved by pain medication prescribed. ? Persistent nausea/vomiting. ? Uneven swelling of legs or ankles. ? Redness and/or large amount of swelling around your incision sites. ? Excessive bleeding or drainage from your incision sites.         [address]  [phone]

## 2020-07-29 NOTE — DISCHARGE SUMMARY
Urology Discharge Summary    Patient: Anita New MRN: [id number]  SSN: [id number]    YOB: 1965  Age: 63 y.o.   Sex: female               ADMISSION:  to Silvio Hoyos MD by Tariq Ruiz MD  7/28/2020 ADMISSION DIAGNOSIS: Right renal mass [N28.89]  Renal mass, right [N28.89]  7/29/2020 DISCHARGE DIAGNOSIS: [x]    Same  CONSULTS: None  PROCEDURES: POD# 1 Day Post-Op Procedure(s):  HAND ASSISTED LAPAROSCOPIC RIGHT NEPHRECTOMY    RECENT LABS:   Recent Results (from the past 24 hour(s))   GLUCOSE, POC    Collection Time: 07/28/20 12:02 PM   Result Value Ref Range    Glucose (POC) 101 (H) 65 - 100 mg/dL    Performed by Sneha Weller, POC    Collection Time: 07/28/20  4:33 PM   Result Value Ref Range    Glucose (POC) 143 (H) 65 - 100 mg/dL    Performed by Kelsey Delacruz    HGB & HCT    Collection Time: 07/28/20  4:39 PM   Result Value Ref Range    HGB 12.0 11.5 - 16.0 g/dL    HCT 38.6 35.0 - 47.0 %   GLUCOSE, POC    Collection Time: 07/28/20  6:41 PM   Result Value Ref Range    Glucose (POC) 146 (H) 65 - 100 mg/dL    Performed by Monse Delacruz (PCT)    GLUCOSE, POC    Collection Time: 07/28/20 11:55 PM   Result Value Ref Range    Glucose (POC) 171 (H) 65 - 100 mg/dL    Performed by Kimberly Kenny (PCT)    GLUCOSE, POC    Collection Time: 07/29/20  5:53 AM   Result Value Ref Range    Glucose (POC) 154 (H) 65 - 100 mg/dL    Performed by [facility] ([facility] within [facility])    METABOLIC PANEL, BASIC    Collection Time: 07/29/20  5:55 AM   Result Value Ref Range    Sodium 139 136 - 145 mmol/L    Potassium 4.0 3.5 - 5.1 mmol/L    Chloride 110 (H) 97 - 108 mmol/L    CO2 25 21 - 32 mmol/L    Anion gap 4 (L) 5 - 15 mmol/L    Glucose 143 (H) 65 - 100 mg/dL    BUN 10 6 - 20 MG/DL    Creatinine 1.00 0.55 - 1.02 MG/DL    BUN/Creatinine ratio 10 (L) 12 - 20      GFR est AA >60 >60 ml/min/1.73m2    GFR est non-AA 57 (L) >60 ml/min/1.73m2    Calcium 8.7 8.5 - 10.1 MG/DL   HGB & HCT    Collection Time: 07/29/20  5:55 AM   Result Value Ref Range    HGB 11.6 11.5 - 16.0 g/dL    HCT 37.3 35.0 - 47.0 %          HOSPITAL COURSE: [x]    Uncomplicated. COMPLICATIONS: [x]    None identified  DISCHARGE TO:     [x]    Home  []    Rehab []    SNF  FOLLOWUP: one week  DISCHARGE MEDS:     [x]    IT IS INTENDED THAT YOU CONTINUE TO TAKE YOUR PRIOR MEDICATIONS WITHOUT CHANGES WITH THE EXCEPTION OF:  []    NONE    Current Discharge Medication List      START taking these medications    Details   oxyCODONE IR (ROXICODONE) 5 mg immediate release tablet Take 1 Tab by mouth every four (4) hours as needed for Pain for up to 3 days. Max Daily Amount: 30 mg.  Qty: 12 Tab, Refills: 0    Associated Diagnoses: Right renal mass         CONTINUE these medications which have CHANGED    Details   cefUROXime (CEFTIN) 500 mg tablet Take 1 Tab by mouth two (2) times a day. Qty: 6 Tab, Refills: 0         CONTINUE these medications which have NOT CHANGED    Details   metFORMIN (GLUCOPHAGE) 500 mg tablet Take 500 mg by mouth two (2) times daily (with meals). dilTIAZem ER (CARDIZEM LA) 180 mg tablet Take 360 mg by mouth daily. losartan (COZAAR) 100 mg tablet Take 100 mg by mouth nightly. Blood-Glucose Meter (Accu-Chek Joyce Plus Meter) misc Test blood sugar daily. E 11.9  Qty: 1 Each, Refills: 0    Associated Diagnoses: Controlled type 2 diabetes mellitus without complication, without long-term current use of insulin ([facility])      pregabalin (LYRICA) 150 mg capsule Take 1 Cap by mouth two (2) times a day. Max Daily Amount: 300 mg.   Qty: 180 Cap, Refills: 0    Associated Diagnoses: Type 2 diabetes mellitus with diabetic neuropathy, without long-term current use of insulin (H[facility])      atorvastatin (LIPITOR) 40 mg tablet TAKE ONE TABLET BY MOUTH AT NIGHT  Qty: 90 Tab, Refills: 1    Associated Diagnoses: Hypercholesterolemia      tiZANidine (ZANAFLEX) 2 mg tablet TAKE 1 TABLET BY MOUTH TWICE DAILY FOR MUSCLE SPASM  Qty: 180 Tab, Refills: 1    Associated Diagnoses: Spondylosis of lumbar spine      furosemide (LASIX) 20 mg tablet TAKE 1 TABLET EVERY DAY AS NEEDED  Qty: 90 Tab, Refills: 1    Associated Diagnoses: Pedal edema      traZODone (DESYREL) 50 mg tablet Take 1 Tab by mouth nightly. Indications: insomnia  Qty: 90 Tab, Refills: 3    Associated Diagnoses: Insomnia, unspecified type      omeprazole (PRILOSEC) 40 mg capsule Take 1 Cap by mouth Before breakfast and dinner. Qty: 180 Cap, Refills: 3    Associated Diagnoses: Chronic superficial gastritis without bleeding      venlafaxine-SR (EFFEXOR-XR) 150 mg capsule Take 1 Cap by mouth daily. Qty: 90 Cap, Refills: 3    Associated Diagnoses: Dysthymia      rOPINIRole (REQUIP) 2 mg tablet TAKE 1 TABLET THREE TIMES DAILY  Qty: 270 Tab, Refills: 0    Associated Diagnoses: RLS (restless legs syndrome)      glucose blood VI test strips (ACCU-CHEK AJAY PLUS TEST STRP) strip TEST BLOOD SUGAR EVERY DAY  Qty: 100 Strip, Refills: 0      fluticasone propionate (FLONASE) 50 mcg/actuation nasal spray 2 sprays per nostril once a day  Indications: inflammation of the nose due to an allergy  Qty: 3 Bottle, Refills: 3    Associated Diagnoses: Environmental allergies      tiotropium (SPIRIVA) 18 mcg inhalation capsule Take 1 Cap by inhalation daily. Qty: 90 Cap, Refills: 1    Associated Diagnoses: Chronic obstructive pulmonary disease, unspecified COPD type ([facility])      Blood-Glucose Meter monitoring kit E11.9 Accu-Chek Ajay Plus Meter  Qty: 1 Kit, Refills: 0      cyclobenzaprine (FLEXERIL) 10 mg tablet Take  by mouth nightly. HYDROcodone-acetaminophen (NORCO) 7.5-325 mg per tablet Take 1 Tab by mouth every eight (8) hours as needed for Pain. ascorbic acid (C-500 PO) Take  by mouth daily. selenium 200 mcg cap Take 1 Cap by mouth daily. OTHER 1,000 mg daily. Black elderberry      garlic 4,447 mg cap Take  by mouth daily. L gasseri/B bifidum/B longum ([employer] PO) Take  by mouth daily. cinnamon bark (Cinnamon) 500 mg cap Take  by mouth daily. benzonatate (Tessalon Perles) 100 mg capsule Take 100 mg by mouth three (3) times daily as needed for Cough. Biotin 2,500 mcg cap Take  by mouth daily. levocetirizine (XYZAL) 5 mg tablet Take 1 Tab by mouth daily. Qty: 90 Tab, Refills: 3    Associated Diagnoses: Environmental allergies      LORazepam (ATIVAN) 0.5 mg tablet Take 1 Tab by mouth two (2) times daily as needed for Anxiety. Max Daily Amount: 1 mg. Indications: anxious  Qty: 180 Tab, Refills: 0    Associated Diagnoses: Panic attacks      TURMERIC ROOT EXTRACT PO Take 1 Cap by mouth daily.                  Karma Fatima NP 7/29/2020 11:24 AM

## 2020-07-30 ENCOUNTER — PATIENT OUTREACH (OUTPATIENT)
Dept: CASE MANAGEMENT | Age: 55
End: 2020-07-30

## 2020-07-30 LAB
ABO + RH BLD: NORMAL
BACTERIA SPEC CULT: ABNORMAL
BLD PROD TYP BPU: NORMAL
BLD PROD TYP BPU: NORMAL
BLOOD GROUP ANTIBODIES SERPL: NORMAL
BPU ID: NORMAL
BPU ID: NORMAL
CC UR VC: ABNORMAL
CROSSMATCH RESULT,%XM: NORMAL
CROSSMATCH RESULT,%XM: NORMAL
SERVICE CMNT-IMP: ABNORMAL
SPECIMEN EXP DATE BLD: NORMAL
STATUS OF UNIT,%ST: NORMAL
STATUS OF UNIT,%ST: NORMAL
UNIT DIVISION, %UDIV: 0
UNIT DIVISION, %UDIV: 0

## 2020-07-30 NOTE — PROGRESS NOTES
Patient was admitted to [facility] on 2020 and discharged on 2020 for right renal mass. Patient was contacted within 1 business days of discharge. Top Discharge Challenges to be reviewed by the provider   Additional needs identified to be addressed with provider yes pain not controlled with current pain medication. Patient calling surgeon and PCP  medications- oxycodone IR  Discussed COVID-19 related testing which was available at this time. Test results were negative. Patient informed of results, if available? no   Method of communication with provider : chart routing       Advance Care Planning:   Does patient have an Advance Directive:  reviewed and current     Inpatient Readmission Risk score:   Was this a readmission? no   Patient stated reason for the admission: surgery  Patients top risk factors for readmission: medical condition  Interventions to address risk factors: educated    Care Transition Nurse (CTN) contacted the patient by telephone to perform post hospital discharge assessment. Verified name and  with patient as identifiers. Provided introduction to self, and explanation of the CTN role. CTN reviewed discharge instructions, medical action plan and red flags with patient who verbalized understanding. Patient given an opportunity to ask questions and does not have any further questions or concerns at this time. The patient agrees to contact the PCP office for questions related to their healthcare. Medication reconciliation was performed with patient, who verbalizes understanding of administration of home medications. Advised obtaining a 90-day supply of all daily and as-needed medications.    Referral to Pharm D needed: no     Home Health/Outpatient orders at discharge: [address]: n/a  Date of initial visit: [address] [facility] ordered at discharge: none  [address] received: n/a    Covid Risk Education    Patient has following risk factors of: COPD, asthma and diabetes. Education provided regarding infection prevention, and signs and symptoms of COVID-19 and when to seek medical attention with patient who verbalized understanding. Discussed exposure protocols and quarantine From CDC: Are you at higher risk for severe illness?  and given an opportunity for questions and concerns. The patient agrees to contact the COVID-19 hotline [phone] or PCP office for questions related to COVID-19. For more information on steps you can take to protect yourself, see CDC's How to Protect Yourself     Patient/family/caregiver given information for [employer] [facility] and agrees to enroll no  Patient's preferred e-mail: declines  Patient's preferred phone number: declines    Discussed follow-up appointments. If no appointment was previously scheduled, appointment scheduling offered: libia  [address] follow up appointment(s):   Future Appointments   Date Time Provider Samantha James   7/31/2020  2:77 PM Kristen العراقي MD [address] [facility] [address]   8/3/2020  6:13 PM Kristen العراقي MD [address] [facility] 17[address]   9/15/2020  9:40 AM Ronnie Galan,  NEU BS [facility]     Non-[facility] follow up appointment(s):   Plan for follow-up call in 5-7 days based on severity of symptoms and risk factors. CTN provided contact information for future needs. Goals Addressed                 This Visit's Progress     Prevent complications post hospitalization. 7/30/2020 Patient reports she is having pain, using oxycodone IR every 4 hours as prescribed miles snot feel like it is controlled. Patient advised to contact surgeon office regarding adjustment in pain medication. Denies chest pain, SOB, fever, N/V/D. Appointment with PCP is virtual on 7/31/2020 and in person if needed on 8/3/2020. Patient will contact surgeon office, attend appointment and report plan of care changes at next outreach.  TEJAS

## 2020-07-31 ENCOUNTER — VIRTUAL VISIT (OUTPATIENT)
Dept: FAMILY MEDICINE CLINIC | Age: 55
End: 2020-07-31

## 2020-07-31 DIAGNOSIS — J44.9 CHRONIC OBSTRUCTIVE PULMONARY DISEASE, UNSPECIFIED COPD TYPE (HCC): ICD-10-CM

## 2020-07-31 DIAGNOSIS — M47.816 SPONDYLOSIS OF LUMBAR SPINE: ICD-10-CM

## 2020-07-31 DIAGNOSIS — E66.01 SEVERE OBESITY WITH BODY MASS INDEX (BMI) OF 35.0 TO 39.9 WITH SERIOUS COMORBIDITY (HCC): ICD-10-CM

## 2020-07-31 DIAGNOSIS — G63 POLYNEUROPATHY ASSOCIATED WITH UNDERLYING DISEASE (HCC): ICD-10-CM

## 2020-07-31 DIAGNOSIS — E11.9 CONTROLLED TYPE 2 DIABETES MELLITUS WITHOUT COMPLICATION, WITHOUT LONG-TERM CURRENT USE OF INSULIN (HCC): ICD-10-CM

## 2020-07-31 DIAGNOSIS — N28.89 RENAL MASS, RIGHT: Primary | ICD-10-CM

## 2020-07-31 NOTE — PROGRESS NOTES
Transitional Care Management Progress Note    Patient: Shanita Diaz  :   PCP: Katherine Cruz MD    Date of admission:   Date of discharge:     Patient was contacted by Transitional Care Management services within two days after her discharge: Yes. This encounter and supporting documentation was reviewed if available. Medication reconciliation was performed today (2020). Assessment/Plan:   Diagnoses and all orders for this visit:    1. Renal mass, right  -     NC DISCHARGE MEDS RECONCILED W/ CURRENT OUTPATIENT MED LIST    2. Controlled type 2 diabetes mellitus without complication, without long-term current use of insulin ([facility]  -     NC DISCHARGE MEDS RECONCILED W/ CURRENT OUTPATIENT MED LIST    3. Chronic obstructive pulmonary disease, unspecified COPD type ([address]  -     NC DISCHARGE MEDS RECONCILED W/ CURRENT OUTPATIENT MED LIST    4. Spondylosis of lumbar spine  -     NC DISCHARGE MEDS RECONCILED W/ CURRENT OUTPATIENT MED LIST    5. Polyneuropathy associated with underlying disease ([facility]  -     NC DISCHARGE MEDS RECONCILED W/ CURRENT OUTPATIENT MED LIST    6. Severe obesity with body mass index (BMI) of 35.0 to 39.9 with serious comorbidity (HCC)  -     NC DISCHARGE MEDS RECONCILED W/ CURRENT OUTPATIENT MED LIST         Subjective:   Shanita Diaz is a 63 y.o. female presenting today for follow-up after being discharged from [facility]. The discharge summary was reviewed or requested. The main problem requiring admission was renal mass. Complications during admission: none    Interval history/Current status: stable. Good pain control. + constipated. Taking Colace. Admitting symptoms have:     Medications marked \"taking\" at this time:  Home Medications    Medication Sig Start Date End Date Taking? Authorizing Provider   oxyCODONE IR (ROXICODONE) 5 mg immediate release tablet Take 1 Tab by mouth every four (4) hours as needed for Pain for up to 3 days.  Max Daily Amount: 30 mg. 7/29/20 8/1/20 Yes Roise Samples D, NP   cefUROXime (CEFTIN) 500 mg tablet Take 1 Tab by mouth two (2) times a day. 7/29/20  Yes Roise Samples D, NP   cyclobenzaprine (FLEXERIL) 10 mg tablet Take  by mouth nightly. Yes Provider, Historical   metFORMIN (GLUCOPHAGE) 500 mg tablet Take 500 mg by mouth two (2) times daily (with meals). Yes Provider, Historical   dilTIAZem ER (CARDIZEM LA) 180 mg tablet Take 360 mg by mouth daily. Yes Provider, Historical   ascorbic acid (C-500 PO) Take  by mouth daily. Yes Provider, Historical   garlic 5,355 mg cap Take  by mouth daily. Yes Provider, Historical   losartan (COZAAR) 100 mg tablet Take 100 mg by mouth nightly. Yes Provider, Historical   pregabalin (LYRICA) 150 mg capsule Take 1 Cap by mouth two (2) times a day. Max Daily Amount: 300 mg. 7/14/20  Yes Nathaniel Xiao NP   atorvastatin (LIPITOR) 40 mg tablet TAKE ONE TABLET BY MOUTH AT NIGHT 9/50/40  Yes Jorge Cano MD   omeprazole (PRILOSEC) 40 mg capsule Take 1 Cap by mouth Before breakfast and dinner. 9/56/35  Yes Jorge Cano MD   levocetirizine (XYZAL) 5 mg tablet Take 1 Tab by mouth daily. 2/17/83  Yes Jorge Cano MD   venlafaxine-SR [facility]) 150 mg capsule Take 1 Cap by mouth daily. 5/62/03  Yes Jorge Cano MD   rOPINIRole (REQUIP) 2 mg tablet TAKE 1 TABLET THREE TIMES DAILY 69/15/90  Yes Jorge Cano MD   LORazepam (ATIVAN) 0.5 mg tablet Take 1 Tab by mouth two (2) times daily as needed for Anxiety. Max Daily Amount: 1 mg. Indications: anxious 9/73/56  Yes Jorge Cano MD   fluticasone propionate (FLONASE) 50 mcg/actuation nasal spray 2 sprays per nostril once a day  Indications: inflammation of the nose due to an allergy 6/85/68  Yes Jorge Cano MD   tiotropium [facility]) 18 mcg inhalation capsule Take 1 Cap by inhalation daily. 6/45/35  Yes Jorge Cano MD   selenium 200 mcg cap Take 1 Cap by mouth daily.     Provider, Historical   OTHER 1,000 mg daily. Black elderberry    Provider, Historical   cinnamon bark (Cinnamon) 500 mg cap Take  by mouth daily. Provider, Historical   benzonatate (Tessalon Perles) 100 mg capsule Take 100 mg by mouth three (3) times daily as needed for Cough. Provider, Historical   Biotin 2,500 mcg cap Take  by mouth daily. Provider, Historical   Blood-Glucose Meter (Accu-Chek Joyce Plus Meter) misc Test blood sugar daily. E 11.9 7/14/20   Raquel Xiao NP   tiZANidine (ZANAFLEX) 2 mg tablet TAKE 1 TABLET BY MOUTH TWICE DAILY FOR MUSCLE SPASM 9/57/65   Christopher Silva MD   furosemide (LASIX) 20 mg tablet TAKE 1 TABLET EVERY DAY AS NEEDED 1/45/82   Christopher Silva MD   traZODone (DESYREL) 50 mg tablet Take 1 Tab by mouth nightly. Indications: insomnia 6/25/09   Christopher Silva MD   glucose blood VI test strips (ACCU-CHEK JOYCE PLUS TEST STRP) strip TEST BLOOD SUGAR EVERY DAY 9/01/58   Christopher Silva MD   Blood-Glucose Meter monitoring kit E11.9 Accu-Chek Joyce Plus Meter 9/66/11   Christopher Silva MD   TURMERIC ROOT EXTRACT PO Take 1 Cap by mouth daily.     Provider, Historical        Review of Systems:  Negative except back pain and constipation       Patient Active Problem List    Diagnosis Date Noted    Renal mass, right 07/29/2020    Right renal mass 07/28/2020    Colon adenoma 03/18/2019    Chronic superficial gastritis without bleeding 03/18/2019    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 12/03/2018    Spondylosis of lumbar spine 12/03/2018    Abnormal mammogram of left breast 09/26/2018    Iliotibial band tendinitis of right side 08/30/2018    Severe obesity (BMI 35.0-39.9) 06/20/2018    Type 2 diabetes mellitus with diabetic neuropathy ([facility] 02/14/2018    Status post bilateral knee replacements 11/02/2017    Cigarette nicotine dependence without complication 38/58/5233    Dysthymia 12/30/2016    YOLANDA (obstructive sleep apnea) 12/30/2016    Arthritis 09/09/2016    Leukocytosis 09/09/2016    Essential hypertension with goal blood pressure less than 140/90 09/07/2016    Chronic obstructive pulmonary disease ([facility] 09/07/2016    Diabetes mellitus type 2, controlled ([facility] 09/07/2016    Polyneuropathy associated with underlying disease ([facility] 09/07/2016    RLS (restless legs syndrome) 09/07/2016    Primary osteoarthritis of right knee 08/18/2016     Current Outpatient Medications   Medication Sig Dispense Refill    oxyCODONE IR (ROXICODONE) 5 mg immediate release tablet Take 1 Tab by mouth every four (4) hours as needed for Pain for up to 3 days. Max Daily Amount: 30 mg. 12 Tab 0    cefUROXime (CEFTIN) 500 mg tablet Take 1 Tab by mouth two (2) times a day. 6 Tab 0    cyclobenzaprine (FLEXERIL) 10 mg tablet Take  by mouth nightly.  metFORMIN (GLUCOPHAGE) 500 mg tablet Take 500 mg by mouth two (2) times daily (with meals).  dilTIAZem ER (CARDIZEM LA) 180 mg tablet Take 360 mg by mouth daily.  ascorbic acid (C-500 PO) Take  by mouth daily.  garlic 7,122 mg cap Take  by mouth daily.  losartan (COZAAR) 100 mg tablet Take 100 mg by mouth nightly.  pregabalin (LYRICA) 150 mg capsule Take 1 Cap by mouth two (2) times a day. Max Daily Amount: 300 mg. 180 Cap 0    atorvastatin (LIPITOR) 40 mg tablet TAKE ONE TABLET BY MOUTH AT NIGHT 90 Tab 1    omeprazole (PRILOSEC) 40 mg capsule Take 1 Cap by mouth Before breakfast and dinner. 180 Cap 3    levocetirizine (XYZAL) 5 mg tablet Take 1 Tab by mouth daily. 90 Tab 3    venlafaxine-SR (EFFEXOR-XR) 150 mg capsule Take 1 Cap by mouth daily. 90 Cap 3    rOPINIRole (REQUIP) 2 mg tablet TAKE 1 TABLET THREE TIMES DAILY 270 Tab 0    LORazepam (ATIVAN) 0.5 mg tablet Take 1 Tab by mouth two (2) times daily as needed for Anxiety. Max Daily Amount: 1 mg.  Indications: anxious 180 Tab 0    fluticasone propionate (FLONASE) 50 mcg/actuation nasal spray 2 sprays per nostril once a day  Indications: inflammation of the nose due to an allergy 3 Bottle 3    tiotropium (SPIRIVA) 18 mcg inhalation capsule Take 1 Cap by inhalation daily. 90 Cap 1    selenium 200 mcg cap Take 1 Cap by mouth daily.  OTHER 1,000 mg daily. Black elderberry      cinnamon bark (Cinnamon) 500 mg cap Take  by mouth daily.  benzonatate (Tessalon Perles) 100 mg capsule Take 100 mg by mouth three (3) times daily as needed for Cough.  Biotin 2,500 mcg cap Take  by mouth daily.  Blood-Glucose Meter (Accu-Chek Joyce Plus Meter) misc Test blood sugar daily. E 11.9 1 Each 0    tiZANidine (ZANAFLEX) 2 mg tablet TAKE 1 TABLET BY MOUTH TWICE DAILY FOR MUSCLE SPASM 180 Tab 1    furosemide (LASIX) 20 mg tablet TAKE 1 TABLET EVERY DAY AS NEEDED 90 Tab 1    traZODone (DESYREL) 50 mg tablet Take 1 Tab by mouth nightly. Indications: insomnia 90 Tab 3    glucose blood VI test strips (ACCU-CHEK JOYCE PLUS TEST STRP) strip TEST BLOOD SUGAR EVERY  Strip 0    Blood-Glucose Meter monitoring kit E11.9 Accu-Chek Joyce Plus Meter 1 Kit 0    TURMERIC ROOT EXTRACT PO Take 1 Cap by mouth daily.        Allergies   Allergen Reactions    Nsaids (Non-Steroidal Anti-Inflammatory Drug) Other (comments)     Stomach hurts    Mobic [Meloxicam] Hives    Penicillins Unknown (comments)     DOESN'T REMEMBER  Patient states tolerating PO amoxicillin    Plaquenil [Hydroxychloroquine] Hives     Past Medical History:   Diagnosis Date    Arthritis     Diabetes ([facility]     Elevated WBC count 2002 to present    GERD (gastroesophageal reflux disease)     Hypertension     Kidney stones 2020    Leg swelling 07/2020    Doppler negative    Panic attacks     Sleep apnea     Does not use CPAP- raises the head of bed up    Spondylolisthesis of lumbar region     Tachycardia      Past Surgical History:   Procedure Laterality Date    COLONOSCOPY N/A 3/1/2019    COLONOSCOPY performed by [facility]isha Macias MD at [facility] ENDOSCOPY    HX BREAST BIOPSY Bilateral     benign    HX CHOLECYSTECTOMY  1998    HX HYSTERECTOMY  2008    HX KNEE ARTHROSCOPY Bilateral 2012    HX KNEE REPLACEMENT Left 2012    HX KNEE REPLACEMENT Right 08/18/2016    HX SEPTOPLASTY  1993    HX SVT ABLATION  2013    HX TUBAL LIGATION  1989    NEUROLOGICAL PROCEDURE UNLISTED      lumbar nerve block     Family History   Problem Relation Age of Onset    Arthritis-osteo Mother     Psychiatric Disorder Father     Arthritis-osteo Father     Alcohol abuse Father     Heart Disease Father     Heart Surgery Father     Hypertension Father     Diabetes Brother     Arthritis-osteo Brother     Hypertension Brother     Deep Vein Thrombosis Brother         Unknown cause    Anesth Problems Neg Hx      Social History     Tobacco Use    Smoking status: Current Every Day Smoker     Packs/day: 1.00     Years: 40.00     Pack years: 40.00     Types: Cigarettes    Smokeless tobacco: Never Used   Substance Use Topics    Alcohol use: Not Currently          Objective:     Patient-Reported Vitals 6/22/2020   Patient-Reported Systolic  422   Patient-Reported Diastolic 79      General: alert, cooperative, no distress   Mental  status: normal mood, behavior, speech, dress, motor activity, and thought processes, able to follow commands   HENT: NCAT   Neck: no visualized mass   Resp: no respiratory distress   Neuro: no gross deficits   Skin: no discoloration or lesions of concern on visible areas   Psychiatric: normal affect, consistent with stated mood, no evidence of hallucinations     Additional exam findings: We discussed the expected course, resolution and complications of the diagnosis(es) in detail. Medication risks, benefits, costs, interactions, and alternatives were discussed as indicated. I advised her to contact the office if her condition worsens, changes or fails to improve as anticipated. She expressed understanding with the diagnosis(es) and plan.        Robb Gomes, who was evaluated through a synchronous (real-time) audio-video encounter and/or her healthcare decision maker, is aware that it is a billable service, with coverage as determined by her insurance carrier. She provided verbal consent to proceed: Yes, and patient identification was verified. It was conducted pursuant to the emergency declaration under the [address] [place] [address], [address] [place] [address] authority and the [email or web address]. A caregiver was present when appropriate. Ability to conduct physical exam was limited. I was in the office. The patient was at home.       Montserrat Ervin MD

## 2020-08-12 ENCOUNTER — TELEPHONE (OUTPATIENT)
Dept: FAMILY MEDICINE CLINIC | Age: 55
End: 2020-08-12

## 2020-08-12 NOTE — TELEPHONE ENCOUNTER
----- Message from Callum Brady LPN sent at 6/56/0086  4:48 PM EDT -----  Regarding: FW: Non-Urgent Medical Question  Contact: [phone]    ----- Message -----  From: Celine Montiel  Sent: 8/12/2020   3:45 PM EDT  To: [facility]  Subject: Non-Urgent Medical Question                      I saw Dr. Oren Talavera this morning. I would like to have a virtual office visit with you to help me understand a couple things. You explain things so I can understand. He told me he sent you pathology report. I have tried 5 times to get through to front office. Three times I held for 10 plus minutes. 2 times I didn't make it past 7 minutes. Please help. ..

## 2020-08-21 ENCOUNTER — VIRTUAL VISIT (OUTPATIENT)
Dept: FAMILY MEDICINE CLINIC | Age: 55
End: 2020-08-21
Payer: MEDICARE

## 2020-08-21 DIAGNOSIS — F17.210 CIGARETTE NICOTINE DEPENDENCE WITHOUT COMPLICATION: ICD-10-CM

## 2020-08-21 DIAGNOSIS — E53.8 VITAMIN B 12 DEFICIENCY: ICD-10-CM

## 2020-08-21 DIAGNOSIS — E11.9 CONTROLLED TYPE 2 DIABETES MELLITUS WITHOUT COMPLICATION, WITHOUT LONG-TERM CURRENT USE OF INSULIN (HCC): Primary | ICD-10-CM

## 2020-08-21 DIAGNOSIS — E55.9 VITAMIN D DEFICIENCY: ICD-10-CM

## 2020-08-21 DIAGNOSIS — Z79.899 ENCOUNTER FOR LONG-TERM CURRENT USE OF MEDICATION: ICD-10-CM

## 2020-08-21 DIAGNOSIS — C64.1 RENAL CANCER, RIGHT (HCC): ICD-10-CM

## 2020-08-21 PROCEDURE — 99443 PR PHYS/QHP TELEPHONE EVALUATION 21-30 MIN: CPT | Performed by: FAMILY MEDICINE

## 2020-08-21 NOTE — PROGRESS NOTES
Francisco Cuenca is a 63 y.o. female, evaluated via audio-only technology on 8/21/2020 for Hospital Follow Up  . Assessment & Plan:   Diagnoses and all orders for this visit:    1. Controlled type 2 diabetes mellitus without complication, without long-term current use of insulin (HCC)  -     HEMOGLOBIN A1C WITH EAG; Future  -     LIPID PANEL; Future    2. Renal cancer, right ([address]    3. Vitamin D deficiency  -     VITAMIN D, 25 HYDROXY; Future    4. Vitamin B 12 deficiency  -     VITAMIN B12 & FOLATE; Future    5. Encounter for long-term current use of medication  -     CBC W/O DIFF; Future  -     METABOLIC PANEL, COMPREHENSIVE; Future    6. Cigarette nicotine dependence without complication       Order fasting labs. Pt will schedule lab appt. FU with [facility] in 1 year for repeat CT scan, CXR  Encourage to quit smoking. 12  Subjective:       Prior to Admission medications    Medication Sig Start Date End Date Taking? Authorizing Provider   cyclobenzaprine (FLEXERIL) 10 mg tablet Take  by mouth nightly. Yes Provider, Historical   metFORMIN (GLUCOPHAGE) 500 mg tablet Take 500 mg by mouth two (2) times daily (with meals). Yes Provider, Historical   dilTIAZem ER (CARDIZEM LA) 180 mg tablet Take 360 mg by mouth daily. Yes Provider, Historical   ascorbic acid (C-500 PO) Take  by mouth daily. Yes Provider, Historical   selenium 200 mcg cap Take 1 Cap by mouth daily. Yes Provider, Historical   OTHER 1,000 mg daily. Black elderberry   Yes Provider, Historical   garlic 2,669 mg cap Take  by mouth daily. Yes Provider, Historical   cinnamon bark (Cinnamon) 500 mg cap Take  by mouth daily. Yes Provider, Historical   benzonatate (Tessalon Perles) 100 mg capsule Take 100 mg by mouth three (3) times daily as needed for Cough. Yes Provider, Historical   Biotin 2,500 mcg cap Take  by mouth daily. Yes Provider, Historical   losartan (COZAAR) 100 mg tablet Take 100 mg by mouth nightly.    Yes Provider, Historical   pregabalin (LYRICA) 150 mg capsule Take 1 Cap by mouth two (2) times a day. Max Daily Amount: 300 mg. 7/14/20  Yes Trisha Xiao NP   atorvastatin (LIPITOR) 40 mg tablet TAKE ONE TABLET BY MOUTH AT NIGHT 0/63/14  Yes Ammy Kent MD   tiZANidine (ZANAFLEX) 2 mg tablet TAKE 1 TABLET BY MOUTH TWICE DAILY FOR MUSCLE SPASM 8/03/89  Yes Ammy Kent MD   traZODone (DESYREL) 50 mg tablet Take 1 Tab by mouth nightly. Indications: insomnia 9/27/32  Yes Ammy Kent MD   omeprazole (PRILOSEC) 40 mg capsule Take 1 Cap by mouth Before breakfast and dinner. 7/42/32  Yes Ammy Kent MD   levocetirizine (XYZAL) 5 mg tablet Take 1 Tab by mouth daily. 8/84/91  Yes Ammy Kent MD   venlafaxine-SR [facility]) 150 mg capsule Take 1 Cap by mouth daily. 1/58/82  Yes Ammy Kent MD   rOPINIRole (REQUIP) 2 mg tablet TAKE 1 TABLET THREE TIMES DAILY 73/16/70  Yes Ammy Kent MD   LORazepam (ATIVAN) 0.5 mg tablet Take 1 Tab by mouth two (2) times daily as needed for Anxiety. Max Daily Amount: 1 mg. Indications: anxious 4/62/05  Yes Ammy Kent MD   fluticasone propionate (FLONASE) 50 mcg/actuation nasal spray 2 sprays per nostril once a day  Indications: inflammation of the nose due to an allergy 2/14/09  Yes Ammy Kent MD   tiotropium [facility]) 18 mcg inhalation capsule Take 1 Cap by inhalation daily. 0/19/24  Yes Ammy Kent MD   TURMERIC ROOT EXTRACT PO Take 1 Cap by mouth daily. Yes Provider, Historical   Blood-Glucose Meter (Accu-Chek Joyce Plus Meter) misc Test blood sugar daily.  E 11.9 7/14/20   Elena Gibbs NP   furosemide (LASIX) 20 mg tablet TAKE 1 TABLET EVERY DAY AS NEEDED 2/35/69   Ammy Kent MD   glucose blood VI test strips (ACCU-CHEK JOYCE PLUS TEST STRP) strip TEST BLOOD SUGAR EVERY DAY 7/57/73   Ammy Kent MD   Blood-Glucose Meter monitoring kit E11.9 Accu-Chek Joyce Plus Meter 8/93/45   Ammy Kent MD     Patient Active Problem List    Diagnosis Date Noted    Renal mass, right 07/29/2020    Right renal mass 07/28/2020    Colon adenoma 03/18/2019    Chronic superficial gastritis without bleeding 03/18/2019    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 12/03/2018    Spondylosis of lumbar spine 12/03/2018    Abnormal mammogram of left breast 09/26/2018    Iliotibial band tendinitis of right side 08/30/2018    Severe obesity (BMI 35.0-39.9) 06/20/2018    Type 2 diabetes mellitus with diabetic neuropathy ([facility] 02/14/2018    Status post bilateral knee replacements 11/02/2017    Cigarette nicotine dependence without complication 89/92/8983    Dysthymia 12/30/2016    YOLANDA (obstructive sleep apnea) 12/30/2016    Arthritis 09/09/2016    Leukocytosis 09/09/2016    Essential hypertension with goal blood pressure less than 140/90 09/07/2016    Chronic obstructive pulmonary disease ([facility] 09/07/2016    Diabetes mellitus [facility] 09/07/2016    Polyneuropathy associated with [facility] disease [facility] 09/07/2016    RLS (restless legs syndrome) 09/07/2016    Primary osteoarthritis of right knee 08/18/2016     Current Outpatient Medications   Medication Sig Dispense Refill    cyclobenzaprine (FLEXERIL) 10 mg tablet Take  by mouth nightly.  metFORMIN (GLUCOPHAGE) 500 mg tablet Take 500 mg by mouth two (2) times daily (with meals).  dilTIAZem ER (CARDIZEM LA) 180 mg tablet Take 360 mg by mouth daily.  ascorbic acid (C-500 PO) Take  by mouth daily.  selenium 200 mcg cap Take 1 Cap by mouth daily.  OTHER 1,000 mg daily. Black elderberry      garlic 1,199 mg cap Take  by mouth daily.  cinnamon bark (Cinnamon) 500 mg cap Take  by mouth daily.  benzonatate (Tessalon Perles) 100 mg capsule Take 100 mg by mouth three (3) times daily as needed for Cough.  Biotin 2,500 mcg cap Take  by mouth daily.  losartan (COZAAR) 100 mg tablet Take 100 mg by mouth nightly.       pregabalin (LYRICA) 150 mg capsule Take 1 Cap by mouth two (2) times a day. Max Daily Amount: 300 mg. 180 Cap 0    atorvastatin (LIPITOR) 40 mg tablet TAKE ONE TABLET BY MOUTH AT NIGHT 90 Tab 1    tiZANidine (ZANAFLEX) 2 mg tablet TAKE 1 TABLET BY MOUTH TWICE DAILY FOR MUSCLE SPASM 180 Tab 1    traZODone (DESYREL) 50 mg tablet Take 1 Tab by mouth nightly. Indications: insomnia 90 Tab 3    omeprazole (PRILOSEC) 40 mg capsule Take 1 Cap by mouth Before breakfast and dinner. 180 Cap 3    levocetirizine (XYZAL) 5 mg tablet Take 1 Tab by mouth daily. 90 Tab 3    venlafaxine-SR (EFFEXOR-XR) 150 mg capsule Take 1 Cap by mouth daily. 90 Cap 3    rOPINIRole (REQUIP) 2 mg tablet TAKE 1 TABLET THREE TIMES DAILY 270 Tab 0    LORazepam (ATIVAN) 0.5 mg tablet Take 1 Tab by mouth two (2) times daily as needed for Anxiety. Max Daily Amount: 1 mg. Indications: anxious 180 Tab 0    fluticasone propionate (FLONASE) 50 mcg/actuation nasal spray 2 sprays per nostril once a day  Indications: inflammation of the nose due to an allergy 3 Bottle 3    tiotropium (SPIRIVA) 18 mcg inhalation capsule Take 1 Cap by inhalation daily. 90 Cap 1    TURMERIC ROOT EXTRACT PO Take 1 Cap by mouth daily.  Blood-Glucose Meter (Accu-Chek Joyce Plus Meter) misc Test blood sugar daily.  E 11.9 1 Each 0    furosemide (LASIX) 20 mg tablet TAKE 1 TABLET EVERY DAY AS NEEDED 90 Tab 1    glucose blood VI test strips (ACCU-CHEK JOYCE PLUS TEST STRP) strip TEST BLOOD SUGAR EVERY  Strip 0    Blood-Glucose Meter monitoring kit E11.9 Accu-Chek Joyce Plus Meter 1 Kit 0     Allergies   Allergen Reactions    Nsaids (Non-Steroidal Anti-Inflammatory Drug) Other (comments)     Stomach hurts    Mobic [Meloxicam] Hives    Penicillins Unknown (comments)     DOESN'T REMEMBER  Patient states tolerating PO amoxicillin    Plaquenil [Hydroxychloroquine] Hives     Past Medical History:   Diagnosis Date    Arthritis     Diabetes (HCC)     Elevated WBC count 2002 to present    GERD (gastroesophageal reflux disease)     Hypertension     Kidney stones 2020    Leg swelling 07/2020    Doppler negative    Panic attacks     Sleep apnea     Does not use CPAP- raises the head of bed up    Spondylolisthesis of lumbar region     Tachycardia      Past Surgical History:   Procedure Laterality Date    COLONOSCOPY N/A 3/1/2019    COLONOSCOPY performed by Peyman Trujillo MD at [address] HX BREAST BIOPSY Bilateral     benign    C/Casia 10    HX HYSTERECTOMY  2008    HX KNEE ARTHROSCOPY Bilateral 2012    HX KNEE REPLACEMENT Left 2012    HX KNEE REPLACEMENT Right 08/18/2016    HX SEPTOPLASTY  1993    HX SVT ABLATION  2013    HX TUBAL LIGATION  1989    HX UROLOGICAL Right 07/2020    nephrectomy    NEUROLOGICAL PROCEDURE UNLISTED      lumbar nerve block       ROS   Discuss results of R nephrectomy. She is 4 weeks post op. Back to regular activities. Pathology results showed clear cell renal cell carcinoma. She has had FU with Dr Palak Sams - Urology. Plan annual CT scan, CXR, CMP x 5 years. Pt C/O fatigue. Craving ice. Recent CBC showed normal Hgb. Patient-Reported Vitals 6/22/2020   Patient-Reported Systolic  149   Patient-Reported Diastolic 78        John Paul Berman, who was evaluated through a patient-initiated, synchronous (real-time) audio only encounter, and/or her healthcare decision maker, is aware that it is a billable service, with coverage as determined by her insurance carrier. She provided verbal consent to proceed: Yes. She has not had a related appointment within my department in the past 7 days or scheduled within the next 24 hours.       Total Time: minutes: 46-50 minutes    Cely Butt MD

## 2020-08-24 ENCOUNTER — APPOINTMENT (OUTPATIENT)
Dept: CT IMAGING | Age: 55
End: 2020-08-24
Attending: STUDENT IN AN ORGANIZED HEALTH CARE EDUCATION/TRAINING PROGRAM
Payer: MEDICARE

## 2020-08-24 ENCOUNTER — HOSPITAL ENCOUNTER (EMERGENCY)
Age: 55
Discharge: HOME | End: 2020-08-24
Attending: EMERGENCY MEDICINE | Admitting: EMERGENCY MEDICINE
Payer: MEDICARE

## 2020-08-24 ENCOUNTER — VIRTUAL VISIT (OUTPATIENT)
Dept: FAMILY MEDICINE CLINIC | Age: 55
End: 2020-08-24
Payer: MEDICARE

## 2020-08-24 ENCOUNTER — APPOINTMENT (OUTPATIENT)
Dept: GENERAL RADIOLOGY | Age: 55
End: 2020-08-24
Attending: EMERGENCY MEDICINE
Payer: MEDICARE

## 2020-08-24 VITALS
OXYGEN SATURATION: 95 % | SYSTOLIC BLOOD PRESSURE: 132 MMHG | RESPIRATION RATE: 16 BRPM | WEIGHT: 201.28 LBS | TEMPERATURE: 98.1 F | DIASTOLIC BLOOD PRESSURE: 84 MMHG | HEART RATE: 78 BPM | BODY MASS INDEX: 34.36 KG/M2 | HEIGHT: 64 IN

## 2020-08-24 DIAGNOSIS — R07.9 CHEST PAIN, UNSPECIFIED TYPE: Primary | ICD-10-CM

## 2020-08-24 DIAGNOSIS — C64.1 RENAL CELL CARCINOMA OF RIGHT KIDNEY (HCC): ICD-10-CM

## 2020-08-24 DIAGNOSIS — R09.1 PLEURISY: ICD-10-CM

## 2020-08-24 DIAGNOSIS — R07.81 PLEURITIC CHEST PAIN: Primary | ICD-10-CM

## 2020-08-24 DIAGNOSIS — R06.02 SHORTNESS OF BREATH: ICD-10-CM

## 2020-08-24 LAB
ALBUMIN SERPL-MCNC: 3.5 G/DL (ref 3.5–5)
ALBUMIN/GLOB SERPL: 1 {RATIO} (ref 1.1–2.2)
ALP SERPL-CCNC: 228 U/L (ref 45–117)
ALT SERPL-CCNC: 34 U/L (ref 12–78)
ANION GAP SERPL CALC-SCNC: 11 MMOL/L (ref 5–15)
APPEARANCE UR: CLEAR
APTT PPP: 24.8 SEC (ref 22.1–32)
AST SERPL-CCNC: 34 U/L (ref 15–37)
BACTERIA URNS QL MICRO: NEGATIVE /HPF
BASOPHILS # BLD: 0.1 K/UL (ref 0–0.1)
BASOPHILS NFR BLD: 1 % (ref 0–1)
BILIRUB SERPL-MCNC: 0.3 MG/DL (ref 0.2–1)
BILIRUB UR QL: NEGATIVE
BNP SERPL-MCNC: 73 PG/ML (ref 0–125)
BUN SERPL-MCNC: 12 MG/DL (ref 6–20)
BUN/CREAT SERPL: 9 (ref 12–20)
CALCIUM SERPL-MCNC: 9 MG/DL (ref 8.5–10.1)
CHLORIDE SERPL-SCNC: 102 MMOL/L (ref 97–108)
CO2 SERPL-SCNC: 27 MMOL/L (ref 21–32)
COLOR UR: ABNORMAL
CREAT SERPL-MCNC: 1.37 MG/DL (ref 0.55–1.02)
D DIMER PPP FEU-MCNC: 0.57 MG/L FEU (ref 0–0.65)
DIFFERENTIAL METHOD BLD: ABNORMAL
EOSINOPHIL # BLD: 0.8 K/UL (ref 0–0.4)
EOSINOPHIL NFR BLD: 7 % (ref 0–7)
EPITH CASTS URNS QL MICRO: ABNORMAL /LPF
ERYTHROCYTE [DISTWIDTH] IN BLOOD BY AUTOMATED COUNT: 15.7 % (ref 11.5–14.5)
GLOBULIN SER CALC-MCNC: 3.5 G/DL (ref 2–4)
GLUCOSE SERPL-MCNC: 206 MG/DL (ref 65–100)
GLUCOSE UR STRIP.AUTO-MCNC: 100 MG/DL
HCT VFR BLD AUTO: 37.8 % (ref 35–47)
HGB BLD-MCNC: 12 G/DL (ref 11.5–16)
HGB UR QL STRIP: NEGATIVE
IMM GRANULOCYTES # BLD AUTO: 0 K/UL (ref 0–0.04)
IMM GRANULOCYTES NFR BLD AUTO: 0 % (ref 0–0.5)
INR PPP: 0.9 (ref 0.9–1.1)
KETONES UR QL STRIP.AUTO: NEGATIVE MG/DL
LEUKOCYTE ESTERASE UR QL STRIP.AUTO: ABNORMAL
LYMPHOCYTES # BLD: 2.4 K/UL (ref 0.8–3.5)
LYMPHOCYTES NFR BLD: 21 % (ref 12–49)
MCH RBC QN AUTO: 27 PG (ref 26–34)
MCHC RBC AUTO-ENTMCNC: 31.7 G/DL (ref 30–36.5)
MCV RBC AUTO: 85.1 FL (ref 80–99)
MONOCYTES # BLD: 0.5 K/UL (ref 0–1)
MONOCYTES NFR BLD: 4 % (ref 5–13)
NEUTS SEG # BLD: 7.8 K/UL (ref 1.8–8)
NEUTS SEG NFR BLD: 67 % (ref 32–75)
NITRITE UR QL STRIP.AUTO: NEGATIVE
NRBC # BLD: 0 K/UL (ref 0–0.01)
NRBC BLD-RTO: 0 PER 100 WBC
PH UR STRIP: 7 [PH] (ref 5–8)
PLATELET # BLD AUTO: 210 K/UL (ref 150–400)
PMV BLD AUTO: 11.5 FL (ref 8.9–12.9)
POTASSIUM SERPL-SCNC: 3.9 MMOL/L (ref 3.5–5.1)
PROT SERPL-MCNC: 7 G/DL (ref 6.4–8.2)
PROT UR STRIP-MCNC: NEGATIVE MG/DL
PROTHROMBIN TIME: 9.8 SEC (ref 9–11.1)
RBC # BLD AUTO: 4.44 M/UL (ref 3.8–5.2)
RBC #/AREA URNS HPF: ABNORMAL /HPF (ref 0–5)
RBC MORPH BLD: ABNORMAL
SODIUM SERPL-SCNC: 140 MMOL/L (ref 136–145)
SP GR UR REFRACTOMETRY: 1.01 (ref 1–1.03)
THERAPEUTIC RANGE,PTTT: NORMAL SECS (ref 58–77)
TROPONIN I SERPL-MCNC: <0.05 NG/ML
UA: UC IF INDICATED,UAUC: ABNORMAL
UROBILINOGEN UR QL STRIP.AUTO: 0.2 EU/DL (ref 0.2–1)
WBC # BLD AUTO: 11.6 K/UL (ref 3.6–11)
WBC URNS QL MICRO: ABNORMAL /HPF (ref 0–4)

## 2020-08-24 PROCEDURE — 84484 ASSAY OF TROPONIN QUANT: CPT

## 2020-08-24 PROCEDURE — 80053 COMPREHEN METABOLIC PANEL: CPT

## 2020-08-24 PROCEDURE — 85610 PROTHROMBIN TIME: CPT

## 2020-08-24 PROCEDURE — 3017F COLORECTAL CA SCREEN DOC REV: CPT | Performed by: FAMILY MEDICINE

## 2020-08-24 PROCEDURE — 74011000636 HC RX REV CODE- 636: Performed by: RADIOLOGY

## 2020-08-24 PROCEDURE — G9717 DOC PT DX DEP/BP F/U NT REQ: HCPCS | Performed by: FAMILY MEDICINE

## 2020-08-24 PROCEDURE — 71045 X-RAY EXAM CHEST 1 VIEW: CPT

## 2020-08-24 PROCEDURE — G9899 SCRN MAM PERF RSLTS DOC: HCPCS | Performed by: FAMILY MEDICINE

## 2020-08-24 PROCEDURE — G8427 DOCREV CUR MEDS BY ELIG CLIN: HCPCS | Performed by: FAMILY MEDICINE

## 2020-08-24 PROCEDURE — 71275 CT ANGIOGRAPHY CHEST: CPT

## 2020-08-24 PROCEDURE — 81001 URINALYSIS AUTO W/SCOPE: CPT

## 2020-08-24 PROCEDURE — 85730 THROMBOPLASTIN TIME PARTIAL: CPT

## 2020-08-24 PROCEDURE — 36415 COLL VENOUS BLD VENIPUNCTURE: CPT

## 2020-08-24 PROCEDURE — 99285 EMERGENCY DEPT VISIT HI MDM: CPT

## 2020-08-24 PROCEDURE — 99213 OFFICE O/P EST LOW 20 MIN: CPT | Performed by: FAMILY MEDICINE

## 2020-08-24 PROCEDURE — 85379 FIBRIN DEGRADATION QUANT: CPT

## 2020-08-24 PROCEDURE — 85025 COMPLETE CBC W/AUTO DIFF WBC: CPT

## 2020-08-24 PROCEDURE — 1111F DSCHRG MED/CURRENT MED MERGE: CPT | Performed by: FAMILY MEDICINE

## 2020-08-24 PROCEDURE — G8756 NO BP MEASURE DOC: HCPCS | Performed by: FAMILY MEDICINE

## 2020-08-24 PROCEDURE — 93005 ELECTROCARDIOGRAM TRACING: CPT

## 2020-08-24 PROCEDURE — 74011000258 HC RX REV CODE- 258: Performed by: RADIOLOGY

## 2020-08-24 PROCEDURE — 83880 ASSAY OF NATRIURETIC PEPTIDE: CPT

## 2020-08-24 RX ORDER — SODIUM CHLORIDE 0.9 % (FLUSH) 0.9 %
10 SYRINGE (ML) INJECTION
Status: COMPLETED | OUTPATIENT
Start: 2020-08-24 | End: 2020-08-24

## 2020-08-24 RX ADMIN — Medication 10 ML: at 21:43

## 2020-08-24 RX ADMIN — IOPAMIDOL 80 ML: 755 INJECTION, SOLUTION INTRAVENOUS at 21:43

## 2020-08-24 RX ADMIN — SODIUM CHLORIDE 100 ML: 900 INJECTION, SOLUTION INTRAVENOUS at 21:42

## 2020-08-24 NOTE — ED NOTES
Pt refusing transportation at this time d/t not wanting to wait another hour. Pt agreeable to have IV removed and sign AMA-Refusal of medical transport. Pt to drive POV to [facility] ED. Lyndsey Ca RN updated at [facility].

## 2020-08-24 NOTE — ED NOTES
Patient signed out to me by Dr. Izabela Uribe pending transfer. Patient has now declined medical transport. patient signed AMA for medical transfer and is driving to the ER at [facility]. IV removed. [facility] updated.

## 2020-08-24 NOTE — ED NOTES
Pt updated on POC and needing to be transferred to [facility] [place] ED for further testing. Pt agreeable to POC.

## 2020-08-24 NOTE — ED TRIAGE NOTES
Pt has right upper chest pain, worse with deep breath. This has been going on since 7/28 when pt had kidney removed secondary to CA.

## 2020-08-24 NOTE — ED NOTES
TRANSFER - OUT REPORT:    Verbal report given to [facility], RN(name) on Sagar Monique  being transferred to [facility] ed(unit) for routine progression of care       Report consisted of patients Situation, Background, Assessment and   Recommendations(SBAR). Information from the following report(s) SBAR was reviewed with the receiving nurse. Lines:   Peripheral IV 08/24/20 Right Antecubital (Active)   Site Assessment Clean, dry, & intact 08/24/20 1747   Phlebitis Assessment 0 08/24/20 1747   Infiltration Assessment 0 08/24/20 1747   Dressing Status Clean, dry, & intact 08/24/20 1747   Action Taken Blood drawn 08/24/20 1747        Opportunity for questions and clarification was provided.       Patient transported with:   Monitor

## 2020-08-24 NOTE — ED PROVIDER NOTES
HPI   Pt with rt precordialcp  Pleuritic 2 weeks  S/p nephrectomy for ca 4 weeks pta no f/chills no cough   Smokes 1ppd  Past Medical History:   Diagnosis Date    Arthritis     Diabetes ([address]     Elevated WBC count 2002 to present    GERD (gastroesophageal reflux disease)     Hypertension     Kidney stones 2020    Leg swelling 07/2020    Doppler negative    Panic attacks     Sleep apnea     Does not use CPAP- raises the head of bed up    Spondylolisthesis of lumbar region     Tachycardia        Past Surgical History:   Procedure Laterality Date    COLONOSCOPY N/A 3/1/2019    COLONOSCOPY performed by Marina Matthews MD at [address] HX BREAST BIOPSY Bilateral     benign    HX [address]    HX HYSTERECTOMY  2008    HX KNEE ARTHROSCOPY Bilateral 2012    HX KNEE REPLACEMENT Left 2012    HX KNEE REPLACEMENT Right 08/18/2016    HX SEPTOPLASTY  1993    HX SVT ABLATION  2013    HX TUBAL LIGATION  1989    HX UROLOGICAL Right 07/2020    nephrectomy    NEUROLOGICAL PROCEDURE UNLISTED      lumbar nerve block         Family History:   Problem Relation Age of Onset    Arthritis-osteo Mother     Psychiatric Disorder Father     Arthritis-osteo Father     Alcohol abuse Father     Heart Disease Father     Heart Surgery Father     Hypertension Father     Diabetes Brother     Arthritis-osteo Brother     Hypertension Brother     Deep Vein Thrombosis Brother         Unknown cause    Anesth Problems Neg Hx        Social History     Socioeconomic History    Marital status: SINGLE     Spouse name: Not on file    Number of children: Not on file    Years of education: Not on file    Highest education level: Not on file   Occupational History    Not on file   Social Needs    Financial resource strain: Not on file    Food insecurity     Worry: Not on file     Inability: Not on file    Transportation needs     Medical: Not on file     Non-medical: Not on file   Tobacco Use    Smoking status: Current Every Day Smoker     Packs/day: 1.00     Years: 40.00     Pack years: 40.00     Types: Cigarettes    Smokeless tobacco: Never Used   Substance and Sexual Activity    Alcohol use: Not Currently    Drug use: No    Sexual activity: Never   Lifestyle    Physical activity     Days per week: Not on file     Minutes per session: Not on file    Stress: Not on file   Relationships    Social connections     Talks on phone: Not on file     Gets together: Not on file     Attends [employer] service: Not on file     Active member of club or organization: Not on file     Attends meetings of clubs or organizations: Not on file     Relationship status: Not on file    Intimate partner violence     Fear of current or ex partner: Not on file     Emotionally abused: Not on file     Physically abused: Not on file     Forced sexual activity: Not on file   Other Topics Concern    Not on file   Social History Narrative    Not on file         ALLERGIES: Nsaids (non-steroidal anti-inflammatory drug); Mobic [meloxicam]; Penicillins; and Plaquenil [hydroxychloroquine]    Review of Systems   All other systems reviewed and are negative. Vitals:    08/24/20 1658   BP: 126/66   Pulse: 90   Resp: 16   Temp: 98.9 °F (37.2 °C)   SpO2: 95%   Weight: 91.3 kg (201 lb 4.5 oz)   Height: 5' 3.5\" (1.613 m)            Physical Exam  Vitals signs and nursing note reviewed. Constitutional:       Appearance: She is well-developed. HENT:      Head: Normocephalic and atraumatic. Mouth/Throat:      Pharynx: No oropharyngeal exudate. Eyes:      General: No scleral icterus. Conjunctiva/sclera: Conjunctivae normal.   Neck:      Musculoskeletal: Neck supple. Thyroid: No thyromegaly. Cardiovascular:      Rate and Rhythm: Normal rate and regular rhythm. Heart sounds: Normal heart sounds. No murmur. No friction rub. No gallop. Pulmonary:      Effort: Pulmonary effort is normal. No respiratory distress. Breath sounds: No stridor. Examination of the right-lower field reveals rales. Examination of the left-lower field reveals rales. Rales present. No wheezing. Abdominal:      General: Bowel sounds are normal.      Palpations: Abdomen is soft. Tenderness: There is no abdominal tenderness. There is no guarding or rebound. Musculoskeletal: Normal range of motion. Lymphadenopathy:      Cervical: No cervical adenopathy. Skin:     General: Skin is warm and dry. Neurological:      Mental Status: She is alert and oriented to person, place, and time.           MDM  Number of Diagnoses or Management Options     Amount and/or Complexity of Data Reviewed  Clinical lab tests: ordered and reviewed  Tests in the radiology section of CPT®: ordered and reviewed  Tests in the medicine section of CPT®: ordered and reviewed    Risk of Complications, Morbidity, and/or Mortality  Presenting problems: moderate  Diagnostic procedures: moderate  Management options: moderate           Procedures    A/P  PT WITH PLEURITIC RT ANT CP POSTOP FOR NEPHRECTOMY DUE TO RENAL CELL CA NEEDS CTA TO R/O PE  WILL TRANSFER TO [facility]

## 2020-08-24 NOTE — PROGRESS NOTES
Shanita Diaz is a 63 y.o. female who was seen by synchronous (real-time) audio-video technology on 8/24/2020 for No chief complaint on file. Assessment & Plan:   Diagnoses and all orders for this visit:    1. Pleuritic chest pain    2. Shortness of breath    pt advised to go to ER for further evaluation. Need R/O PE. She will go to [place] ER. I spent at least 9 minutes on this visit with this established patient. 712  Subjective:       Prior to Admission medications    Medication Sig Start Date End Date Taking? Authorizing Provider   cyclobenzaprine (FLEXERIL) 10 mg tablet Take  by mouth nightly. Provider, Historical   metFORMIN (GLUCOPHAGE) 500 mg tablet Take 500 mg by mouth two (2) times daily (with meals). Provider, Historical   dilTIAZem ER (CARDIZEM LA) 180 mg tablet Take 360 mg by mouth daily. Provider, Historical   ascorbic acid (C-500 PO) Take  by mouth daily. Provider, Historical   selenium 200 mcg cap Take 1 Cap by mouth daily. Provider, Historical   OTHER 1,000 mg daily. Black elderberry    Provider, Historical   garlic 3,989 mg cap Take  by mouth daily. Provider, Historical   cinnamon bark (Cinnamon) 500 mg cap Take  by mouth daily. Provider, Historical   benzonatate (Tessalon Perles) 100 mg capsule Take 100 mg by mouth three (3) times daily as needed for Cough. Provider, Historical   Biotin 2,500 mcg cap Take  by mouth daily. Provider, Historical   losartan (COZAAR) 100 mg tablet Take 100 mg by mouth nightly. Provider, Historical   Blood-Glucose Meter (Accu-Chek Joyce Plus Meter) misc Test blood sugar daily. E 11.9 7/14/20   Hina Xiao NP   pregabalin (LYRICA) 150 mg capsule Take 1 Cap by mouth two (2) times a day.  Max Daily Amount: 300 mg. 7/14/20   Mike Mckenzie NP   atorvastatin (LIPITOR) 40 mg tablet TAKE ONE TABLET BY MOUTH AT NIGHT 1/28/51   Katherine Cruz MD   tiZANidine (ZANAFLEX) 2 mg tablet TAKE 1 TABLET BY MOUTH TWICE DAILY FOR MUSCLE SPASM 4/15/90   Allie Deras MD   furosemide (LASIX) 20 mg tablet TAKE 1 TABLET EVERY DAY AS NEEDED 6/87/75   Allie Deras MD   traZODone (DESYREL) 50 mg tablet Take 1 Tab by mouth nightly. Indications: insomnia 7/64/74   Allie Deras MD   omeprazole (PRILOSEC) 40 mg capsule Take 1 Cap by mouth Before breakfast and dinner. 8/67/44   Allie Deras MD   levocetirizine (XYZAL) 5 mg tablet Take 1 Tab by mouth daily. 4/42/91   Allie Deras MD   venlafaxine-SR [facility] 150 mg capsule Take 1 Cap by mouth daily. 4/82/88   Allie Deras MD   rOPINIRole (REQUIP) 2 mg tablet TAKE 1 TABLET THREE TIMES DAILY 77/77/27   Allie Deras MD   LORazepam (ATIVAN) 0.5 mg tablet Take 1 Tab by mouth two (2) times daily as needed for Anxiety. Max Daily Amount: 1 mg. Indications: anxious 6/10/50   Allie Deras MD   glucose blood VI test strips (ACCU-CHEK AJAY PLUS TEST STRP) strip TEST BLOOD SUGAR EVERY DAY 3/91/14   Allie Deras MD   fluticasone propionate (FLONASE) 50 mcg/actuation nasal spray 2 sprays per nostril once a day  Indications: inflammation of the nose due to an allergy 5/02/40   Allie Deras MD   tiotropium [facility]) 18 mcg inhalation capsule Take 1 Cap by inhalation daily. 8/67/84   Allie Deras MD   Blood-Glucose Meter monitoring kit E11.9 Accu-Chek Ajay Plus Meter 9/11/89   Allie Deras MD   TURMERIC ROOT EXTRACT PO Take 1 Cap by mouth daily.     Provider, Historical     Patient Active Problem List    Diagnosis Date Noted    Renal mass, right 07/29/2020    Right renal mass 07/28/2020    Colon adenoma 03/18/2019    Chronic superficial gastritis without bleeding 03/18/2019    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 12/03/2018    Spondylosis of lumbar spine 12/03/2018    Abnormal mammogram of left breast 09/26/2018    Iliotibial band tendinitis of right side 08/30/2018    Severe obesity (BMI 35.0-39.9) 06/20/2018    Type 2 diabetes mellitus with diabetic neuropathy ([facility] 02/14/2018    Status post bilateral knee replacements 11/02/2017    Cigarette nicotine dependence without complication 27/39/3336    Dysthymia 12/30/2016    YOLANDA (obstructive sleep apnea) 12/30/2016    Arthritis 09/09/2016    Leukocytosis 09/09/2016    Essential hypertension with goal blood pressure less than 140/90 09/07/2016    Chronic obstructive pulmonary disease ([facility] 09/07/2016    Diabetes mellitus type 2, controlled ([facility] 09/07/2016    Polyneuropathy associated with underlying disease ([facility] 09/07/2016    RLS (restless legs syndrome) 09/07/2016    Primary osteoarthritis of right knee 08/18/2016     Current Outpatient Medications   Medication Sig Dispense Refill    cyclobenzaprine (FLEXERIL) 10 mg tablet Take  by mouth nightly.  metFORMIN (GLUCOPHAGE) 500 mg tablet Take 500 mg by mouth two (2) times daily (with meals).  dilTIAZem ER (CARDIZEM LA) 180 mg tablet Take 360 mg by mouth daily.  ascorbic acid (C-500 PO) Take  by mouth daily.  selenium 200 mcg cap Take 1 Cap by mouth daily.  OTHER 1,000 mg daily. Black elderberry      garlic 9,693 mg cap Take  by mouth daily.  cinnamon bark (Cinnamon) 500 mg cap Take  by mouth daily.  benzonatate (Tessalon Perles) 100 mg capsule Take 100 mg by mouth three (3) times daily as needed for Cough.  Biotin 2,500 mcg cap Take  by mouth daily.  losartan (COZAAR) 100 mg tablet Take 100 mg by mouth nightly.  Blood-Glucose Meter (Accu-Chek Joyce Plus Meter) misc Test blood sugar daily. E 11.9 1 Each 0    pregabalin (LYRICA) 150 mg capsule Take 1 Cap by mouth two (2) times a day.  Max Daily Amount: 300 mg. 180 Cap 0    atorvastatin (LIPITOR) 40 mg tablet TAKE ONE TABLET BY MOUTH AT NIGHT 90 Tab 1    tiZANidine (ZANAFLEX) 2 mg tablet TAKE 1 TABLET BY MOUTH TWICE DAILY FOR MUSCLE SPASM 180 Tab 1    furosemide (LASIX) 20 mg tablet TAKE 1 TABLET EVERY DAY AS NEEDED 90 Tab 1    traZODone (DESYREL) 50 mg tablet Take 1 Tab by mouth nightly. Indications: insomnia 90 Tab 3    omeprazole (PRILOSEC) 40 mg capsule Take 1 Cap by mouth Before breakfast and dinner. 180 Cap 3    levocetirizine (XYZAL) 5 mg tablet Take 1 Tab by mouth daily. 90 Tab 3    venlafaxine-SR (EFFEXOR-XR) 150 mg capsule Take 1 Cap by mouth daily. 90 Cap 3    rOPINIRole (REQUIP) 2 mg tablet TAKE 1 TABLET THREE TIMES DAILY 270 Tab 0    LORazepam (ATIVAN) 0.5 mg tablet Take 1 Tab by mouth two (2) times daily as needed for Anxiety. Max Daily Amount: 1 mg. Indications: anxious 180 Tab 0    glucose blood VI test strips (ACCU-CHEK AJAY PLUS TEST STRP) strip TEST BLOOD SUGAR EVERY  Strip 0    fluticasone propionate (FLONASE) 50 mcg/actuation nasal spray 2 sprays per nostril once a day  Indications: inflammation of the nose due to an allergy 3 Bottle 3    tiotropium (SPIRIVA) 18 mcg inhalation capsule Take 1 Cap by inhalation daily. 90 Cap 1    Blood-Glucose Meter monitoring kit E11.9 Accu-Chek Ajay Plus Meter 1 Kit 0    TURMERIC ROOT EXTRACT PO Take 1 Cap by mouth daily.        Allergies   Allergen Reactions    Nsaids (Non-Steroidal Anti-Inflammatory Drug) Other (comments)     Stomach hurts    Mobic [Meloxicam] Hives    Penicillins Unknown (comments)     DOESN'T REMEMBER  Patient states tolerating PO amoxicillin    Plaquenil [Hydroxychloroquine] Hives     Past Medical History:   Diagnosis Date    Arthritis     Diabetes ([facility]     Elevated WBC count 2002 to present    GERD (gastroesophageal reflux disease)     Hypertension     Kidney stones 2020    Leg swelling 07/2020    Doppler negative    Panic attacks     Sleep apnea     Does not use CPAP- raises the head of bed up    Spondylolisthesis of lumbar region     Tachycardia      Past Surgical History:   Procedure Laterality Date    COLONOSCOPY N/A 3/1/2019    COL[facility]ra [facility]ly [facility] ENDOSCOPY    HX BREAST BIOPSY Bilateral benign    HX CHOLECYSTECTOMY  1998    HX HYSTERECTOMY  2008    HX KNEE ARTHROSCOPY Bilateral 2012    HX KNEE REPLACEMENT Left 2012    HX KNEE REPLACEMENT Right 08/18/2016    HX SEPTOPLASTY  1993    HX SVT ABLATION  2013    HX TUBAL LIGATION  1989    HX UROLOGICAL Right 07/2020    nephrectomy    NEUROLOGICAL PROCEDURE UNLISTED      lumbar nerve block     Family History   Problem Relation Age of Onset    Arthritis-osteo Mother     Psychiatric Disorder Father     Arthritis-osteo Father     Alcohol abuse Father     Heart Disease Father     Heart Surgery Father     Hypertension Father     Diabetes Brother     Arthritis-osteo Brother     Hypertension Brother     Deep Vein Thrombosis Brother         Unknown cause    Anesth Problems Neg Hx      Social History     Tobacco Use    Smoking status: Current Every Day Smoker     Packs/day: 1.00     Years: 40.00     Pack years: 40.00     Types: Cigarettes    Smokeless tobacco: Never Used   Substance Use Topics    Alcohol use: Not Currently       ROS  C/O sharp chest pain over upper R chest and radiates to back x 2 weeks. Worse with taking deep breath. Some dyspnea. There is some pain with moving arm to the side. Pt had R nephrectomy last month and dx with renal cell carcinoma. Objective:     Patient-Reported Vitals 6/22/2020   Patient-Reported Systolic  266   Patient-Reported Diastolic 79      General: alert, cooperative, no distress   Mental  status: normal mood, behavior, speech, dress, motor activity, and thought processes, able to follow commands   HENT: NCAT   Neck: no visualized mass   Resp: no respiratory distress   Neuro: no gross deficits   Skin: no discoloration or lesions of concern on visible areas   Psychiatric: normal affect, consistent with stated mood, no evidence of hallucinations     Additional exam findings: We discussed the expected course, resolution and complications of the diagnosis(es) in detail.   Medication risks, benefits, costs, interactions, and alternatives were discussed as indicated. I advised her to contact the office if her condition worsens, changes or fails to improve as anticipated. She expressed understanding with the diagnosis(es) and plan. Stacie Jones, who was evaluated through a patient-initiated, synchronous (real-time) audio-video encounter, and/or her healthcare decision maker, is aware that it is a billable service, with coverage as determined by her insurance carrier. She provided verbal consent to proceed: Yes, and patient identification was verified. It was conducted pursuant to the emergency declaration under the [address] [place] [address], [address] [place] [address] authority and the [employer] and [employer] General Act. A caregiver was present when appropriate. Ability to conduct physical exam was limited. I was in the office. The patient was at home.       Delfina Lazo MD

## 2020-08-25 ENCOUNTER — PATIENT OUTREACH (OUTPATIENT)
Dept: CASE MANAGEMENT | Age: 55
End: 2020-08-25

## 2020-08-25 LAB
ATRIAL RATE: 89 BPM
CALCULATED P AXIS, ECG09: 46 DEGREES
CALCULATED R AXIS, ECG10: -12 DEGREES
CALCULATED T AXIS, ECG11: 45 DEGREES
DIAGNOSIS, 93000: NORMAL
P-R INTERVAL, ECG05: 138 MS
Q-T INTERVAL, ECG07: 404 MS
QRS DURATION, ECG06: 94 MS
QTC CALCULATION (BEZET), ECG08: 491 MS
VENTRICULAR RATE, ECG03: 89 BPM

## 2020-08-25 NOTE — ED NOTES
Patient was transferred from [facility] for CT scan to rule out a PE.  PE study was negative. Patient states she is been having sharp pain in her right upper chest shoulder back for several weeks. She does not take anything for the pain because it does not typically help. She states today she came to the ER because she was tired of it. She denies a fever cough or shortness of breath to me. Vital signs are stable work-up and short pump looks reassuring. Patient will be discharged to follow-up with her primary care doctor return precautions were provided.

## 2020-08-25 NOTE — DISCHARGE INSTRUCTIONS
Patient Education   Work-up in the emergency room today was reassuring including blood work and CAT scan of your chest.  You can take Tylenol and/or Advil for your pain. Be sure to follow-up with your primary care doctor for further work-up of your symptoms. If you develop worsening symptoms such as fever, cough, increasing shortness of breath, increasing pain, etc. return to the emergency department     Pleurisy: Care Instructions  Your Care Instructions  Pleurisy is inflammation of the tissue that lines the inside of the chest and covers the lungs (pleura). Pleurisy is often caused by an infection, usually a virus. It also can be caused by other health problems, such as pneumonia or lupus. Pleurisy can cause sharp chest pain that gets worse when you cough or take a deep breath. You may need more tests to find out what is causing your pleurisy. Treatment depends on the cause. Pleurisy may come and go for a few days, or it may continue if the cause has not been treated. Home treatment can help ease symptoms. Follow-up care is a key part of your treatment and safety. Be sure to make and go to all appointments, and call your doctor if you are having problems. It's also a good idea to know your test results and keep a list of the medicines you take. How can you care for yourself at home? · Take an over-the-counter pain medicine, such as acetaminophen (Tylenol), ibuprofen (Advil, Motrin), or naproxen (Aleve). Read and follow all instructions on the label. · Do not take two or more pain medicines at the same time unless the doctor told you to. Many pain medicines have acetaminophen, which is Tylenol. Too much acetaminophen (Tylenol) can be harmful. · If your doctor prescribed antibiotics, take them as directed. Do not stop taking them just because you feel better. You need to take the full course of antibiotics. · Take cough medicine as directed if your doctor recommends it.   · Avoid activities that make the pain worse. When should you call for help? [id number] anytime you think you may need emergency care. For example, call if:  · You have severe trouble breathing. · You have severe chest pain. · You passed out (lost consciousness). Call your doctor now or seek immediate medical care if:  · You have a new or higher fever. Watch closely for changes in your health, and be sure to contact your doctor if:  · You begin to cough up yellow or green mucus. · You cough up blood. · Your symptoms are not better in 3 or 4 days. Where can you learn more? Go to http://matti-yinka.info/  Enter F346 in the search box to learn more about \"Pleurisy: Care Instructions. \"  Current as of: February 24, 2020               Content Version: 12.5  © 2006-2020 [employer]. Care instructions adapted under license by [employer] (which disclaims liability or warranty for this information). If you have questions about a medical condition or this instruction, always ask your healthcare professional. [place] [address] any warranty or liability for your use of this information. Patient Education        Chest Pain: Care Instructions  Your Care Instructions     There are many things that can cause chest pain. Some are not serious and will get better on their own in a few days. But some kinds of chest pain need more testing and treatment. Your doctor may have recommended a follow-up visit in the next 8 to 12 hours. If you are not getting better, you may need more tests or treatment. Even though your doctor has released you, you still need to watch for any problems. The doctor carefully checked you, but sometimes problems can develop later. If you have new symptoms or if your symptoms do not get better, get medical care right away.   If you have worse or different chest pain or pressure that lasts more than 5 minutes or you passed out (lost consciousness), [id number] or seek other emergency help right away. A medical visit is only one step in your treatment. Even if you feel better, you still need to do what your doctor recommends, such as going to all suggested follow-up appointments and taking medicines exactly as directed. This will help you recover and help prevent future problems. How can you care for yourself at home? · Rest until you feel better. · Take your medicine exactly as prescribed. Call your doctor if you think you are having a problem with your medicine. · Do not drive after taking a prescription pain medicine. When should you call for help? [id number]:   · You passed out (lost consciousness). · You have severe difficulty breathing. · You have symptoms of a heart attack. These may include:  ? Chest pain or pressure, or a strange feeling in your chest.  ? Sweating. ? Shortness of breath. ? Nausea or vomiting. ? Pain, pressure, or a strange feeling in your back, neck, jaw, or upper belly or in one or both shoulders or arms. ? Lightheadedness or sudden weakness. ? A fast or irregular heartbeat. After you call 911, the  may tell you to chew 1 adult-strength or 2 to 4 low-dose aspirin. Wait for an ambulance. Do not try to drive yourself. Call your doctor today if:   · You have any trouble breathing. · Your chest pain gets worse. · You are dizzy or lightheaded, or you feel like you may faint. · You are not getting better as expected. · You are having new or different chest pain. Where can you learn more? Go to http://matti-yinka.info/  Enter A120 in the search box to learn more about \"Chest Pain: Care Instructions. \"  Current as of: June 26, 2019               Content Version: 12.5  © 1232-7626 [employer]. Care instructions adapted under license by [employer] (which disclaims liability or warranty for this information).  If you have questions about a medical condition or this instruction, always ask your healthcare professional. [address] any warranty or liability for your use of this information.

## 2020-08-25 NOTE — ED NOTES
Pt transferred in stable condition. Pt to drive self to [facility] ER. Pt provided with paperwork for [facility].

## 2020-08-28 ENCOUNTER — TELEPHONE (OUTPATIENT)
Dept: FAMILY MEDICINE CLINIC | Age: 55
End: 2020-08-28

## 2020-08-28 DIAGNOSIS — R91.1 NODULE OF LEFT LUNG: Primary | ICD-10-CM

## 2020-08-28 NOTE — TELEPHONE ENCOUNTER
I was unable to reach pt. Mail box is full and unable to leave message. I will call her back on Monday.

## 2020-08-28 NOTE — TELEPHONE ENCOUNTER
Patient is calling and stated that she would like to talk to Dr. Sherryn Fabry with some questions from her recent ER Visit.   Please call her at [phone]

## 2020-08-31 NOTE — TELEPHONE ENCOUNTER
I spoke with pt. She was advised of need to repeat CT chest in 3 months to FU nodule LLL. She is a smoker and hx renal cell ca. Also pt still has pending fasting labs in chart form 8/21/20. She plans to come in to get these drawn.

## 2020-09-02 DIAGNOSIS — G25.81 RLS (RESTLESS LEGS SYNDROME): ICD-10-CM

## 2020-09-02 DIAGNOSIS — E78.00 HYPERCHOLESTEROLEMIA: ICD-10-CM

## 2020-09-02 DIAGNOSIS — K29.30 CHRONIC SUPERFICIAL GASTRITIS WITHOUT BLEEDING: ICD-10-CM

## 2020-09-02 RX ORDER — OMEPRAZOLE 40 MG/1
40 CAPSULE, DELAYED RELEASE ORAL
Qty: 180 CAP | Refills: 3 | Status: SHIPPED | OUTPATIENT
Start: 2020-09-02 | End: 2021-08-18

## 2020-09-02 RX ORDER — DILTIAZEM HYDROCHLORIDE EXTENDED-RELEASE TABLETS 180 MG/1
360 TABLET, EXTENDED RELEASE ORAL DAILY
Qty: 180 TAB | Refills: 1 | Status: SHIPPED | OUTPATIENT
Start: 2020-09-02 | End: 2020-10-27 | Stop reason: SDUPTHER

## 2020-09-02 RX ORDER — ROPINIROLE 2 MG/1
2 TABLET, FILM COATED ORAL 3 TIMES DAILY
Qty: 270 TAB | Refills: 1 | Status: SHIPPED | OUTPATIENT
Start: 2020-09-02 | End: 2021-03-18

## 2020-09-02 RX ORDER — ATORVASTATIN CALCIUM 40 MG/1
40 TABLET, FILM COATED ORAL DAILY
Qty: 90 TAB | Refills: 1 | Status: SHIPPED | OUTPATIENT
Start: 2020-09-02 | End: 2021-03-18

## 2020-09-10 RX ORDER — TOPIRAMATE 50 MG/1
TABLET, FILM COATED ORAL
Qty: 180 TAB | Refills: 3 | Status: SHIPPED | OUTPATIENT
Start: 2020-09-10 | End: 2020-10-28

## 2020-09-14 ENCOUNTER — TELEPHONE (OUTPATIENT)
Dept: FAMILY MEDICINE CLINIC | Age: 55
End: 2020-09-14

## 2020-09-14 DIAGNOSIS — Z80.3 FAMILY HX-BREAST MALIGNANCY: Primary | ICD-10-CM

## 2020-09-14 DIAGNOSIS — Z12.39 SCREENING FOR BREAST CANCER: ICD-10-CM

## 2020-09-14 NOTE — TELEPHONE ENCOUNTER
She got [employer] straight. She needs a order for mammogram and would you fix her pap since she does not have anything to pap.

## 2020-09-15 ENCOUNTER — OFFICE VISIT (OUTPATIENT)
Dept: NEUROLOGY | Facility: CLINIC | Age: 55
End: 2020-09-15
Payer: MEDICARE

## 2020-09-15 VITALS
DIASTOLIC BLOOD PRESSURE: 82 MMHG | OXYGEN SATURATION: 97 % | SYSTOLIC BLOOD PRESSURE: 136 MMHG | BODY MASS INDEX: 34.49 KG/M2 | WEIGHT: 202 LBS | HEIGHT: 64 IN | HEART RATE: 82 BPM

## 2020-09-15 DIAGNOSIS — M54.16 RIGHT LUMBAR RADICULOPATHY: Primary | ICD-10-CM

## 2020-09-15 DIAGNOSIS — M47.816 LUMBAR SPONDYLOSIS: ICD-10-CM

## 2020-09-15 PROCEDURE — 3017F COLORECTAL CA SCREEN DOC REV: CPT | Performed by: PSYCHIATRY & NEUROLOGY

## 2020-09-15 PROCEDURE — G9899 SCRN MAM PERF RSLTS DOC: HCPCS | Performed by: PSYCHIATRY & NEUROLOGY

## 2020-09-15 PROCEDURE — G8417 CALC BMI ABV UP PARAM F/U: HCPCS | Performed by: PSYCHIATRY & NEUROLOGY

## 2020-09-15 PROCEDURE — G8752 SYS BP LESS 140: HCPCS | Performed by: PSYCHIATRY & NEUROLOGY

## 2020-09-15 PROCEDURE — G9717 DOC PT DX DEP/BP F/U NT REQ: HCPCS | Performed by: PSYCHIATRY & NEUROLOGY

## 2020-09-15 PROCEDURE — 99214 OFFICE O/P EST MOD 30 MIN: CPT | Performed by: PSYCHIATRY & NEUROLOGY

## 2020-09-15 PROCEDURE — G8754 DIAS BP LESS 90: HCPCS | Performed by: PSYCHIATRY & NEUROLOGY

## 2020-09-15 PROCEDURE — G8427 DOCREV CUR MEDS BY ELIG CLIN: HCPCS | Performed by: PSYCHIATRY & NEUROLOGY

## 2020-09-15 RX ORDER — CEFUROXIME AXETIL 500 MG/1
TABLET ORAL
COMMUNITY
End: 2020-10-28

## 2020-09-15 NOTE — PROGRESS NOTES
Chief Complaint   Patient presents with    Follow-up     cervical radiculopathy       HPI    Vicente is a 54-year-old woman following up. I saw her initially for spine disease and neuropathy. Since I saw her last she saw the pain specialist who completed an MRI of the low back showing incidentally a renal mass which ultimately she ended up with a nephrectomy. MRI lumbar spine showing lower level spondylosis. She is here because her low back pain is getting worse now with shooting pain down the right leg close to the foot. No falls. C-spine issues are calm. She is also being worked up for possible new lesion in the lung. Background:  Vicente is a 67-year-old woman with diabetes, hypertension, degenerative disc disease of the spine here for paresthesias. She is had diabetic neuropathy in both feet going on 10 years. She has a history of spine disease requiring radiofrequency ablation. She is here because in the past few months he has noticed shooting tingling in both of her hands painful and also tingling across her whole face. Review of the record shows that she was initiated on Topamax a few months ago for off label use of musculoskeletal pain. She had been on gabapentin without any benefit. No weakness or numbness. She does have left arm weakness long-term. Review of Systems   Eyes: Negative for double vision. Musculoskeletal: Positive for back pain and joint pain. Neurological: Positive for tingling and sensory change. All other systems reviewed and are negative.       Past Medical History:   Diagnosis Date    Arthritis     Diabetes ([address]     Elevated WBC count 2002 to present    GERD (gastroesophageal reflux disease)     Hypertension     Kidney stones 2020    Leg swelling 07/2020    Doppler negative    Panic attacks     Sleep apnea     Does not use CPAP- raises the head of bed up    Spondylolisthesis of lumbar region     Tachycardia      Family History   Problem Relation Age of Onset    Arthritis-osteo Mother     Psychiatric Disorder Father     Arthritis-osteo Father     Alcohol abuse Father     Heart Disease Father     Heart Surgery Father     Hypertension Father     Diabetes Brother     Arthritis-osteo Brother     Hypertension Brother     Deep Vein Thrombosis Brother         Unknown cause    Anesth Problems Neg Hx      Social History     Socioeconomic History    Marital status: SINGLE     Spouse name: Not on file    Number of children: Not on file    Years of education: Not on file    Highest education level: Not on file   Occupational History    Not on file   Social Needs    Financial resource strain: Not on file    Food insecurity     Worry: Not on file     Inability: Not on file   [employer] needs     Medical: Not on file     Non-medical: Not on file   Tobacco Use    Smoking status: Current Every Day Smoker     Packs/day: 1.00     Years: 40.00     Pack years: 40.00     Types: Cigarettes    Smokeless tobacco: Never Used   Substance and Sexual Activity    Alcohol use: Not Currently    Drug use: No    Sexual activity: Never   Lifestyle    Physical activity     Days per week: Not on file     Minutes per session: Not on file    Stress: Not on file   Relationships    Social connections     Talks on phone: Not on file     Gets together: Not on file     Attends Restoration service: Not on file     Active member of club or organization: Not on file     Attends meetings of clubs or organizations: Not on file     Relationship status: Not on file    Intimate partner violence     Fear of current or ex partner: Not on file     Emotionally abused: Not on file     Physically abused: Not on file     Forced sexual activity: Not on file   Other Topics Concern    Not on file   Social History Narrative    Not on file     Allergies   Allergen Reactions    Nsaids (Non-Steroidal Anti-Inflammatory Drug) Other (comments)     Stomach hurts    Mobic [Meloxicam] Hives    Penicillins Unknown (comments)     DOESN'T REMEMBER  Patient states tolerating PO amoxicillin    Plaquenil [Hydroxychloroquine] Hives         Current Outpatient Medications   Medication Sig    zinc 50 mg tab tablet Take 50 mg by mouth daily.  topiramate (TOPAMAX) 50 mg tablet TAKE 1 TABLET TWICE DAILY    rOPINIRole (REQUIP) 2 mg tablet Take 1 Tab by mouth three (3) times daily.  dilTIAZem ER (CARDIZEM LA) 180 mg tablet Take 2 Tabs by mouth daily.  omeprazole (PRILOSEC) 40 mg capsule Take 1 Cap by mouth Before breakfast and dinner.  atorvastatin (LIPITOR) 40 mg tablet Take 1 Tab by mouth daily.  cyclobenzaprine (FLEXERIL) 10 mg tablet Take  by mouth nightly.  metFORMIN (GLUCOPHAGE) 500 mg tablet Take 500 mg by mouth two (2) times daily (with meals).  ascorbic acid (C-500 PO) Take  by mouth daily.  OTHER 1,000 mg daily. Black elderberry    Biotin 2,500 mcg cap Take  by mouth daily.  losartan (COZAAR) 100 mg tablet Take 100 mg by mouth nightly.  Blood-Glucose Meter (Accu-Chek Joyce Plus Meter) misc Test blood sugar daily. E 11.9    pregabalin (LYRICA) 150 mg capsule Take 1 Cap by mouth two (2) times a day. Max Daily Amount: 300 mg.  tiZANidine (ZANAFLEX) 2 mg tablet TAKE 1 TABLET BY MOUTH TWICE DAILY FOR MUSCLE SPASM    furosemide (LASIX) 20 mg tablet TAKE 1 TABLET EVERY DAY AS NEEDED    traZODone (DESYREL) 50 mg tablet Take 1 Tab by mouth nightly. Indications: insomnia    levocetirizine (XYZAL) 5 mg tablet Take 1 Tab by mouth daily.  venlafaxine-SR (EFFEXOR-XR) 150 mg capsule Take 1 Cap by mouth daily.  LORazepam (ATIVAN) 0.5 mg tablet Take 1 Tab by mouth two (2) times daily as needed for Anxiety. Max Daily Amount: 1 mg.  Indications: anxious    glucose blood VI test strips (ACCU-CHEK JOYCE PLUS TEST STRP) strip TEST BLOOD SUGAR EVERY DAY    fluticasone propionate (FLONASE) 50 mcg/actuation nasal spray 2 sprays per nostril once a day  Indications: inflammation of the nose due to an allergy    tiotropium (SPIRIVA) 18 mcg inhalation capsule Take 1 Cap by inhalation daily.  Blood-Glucose Meter monitoring kit E11.9 Accu-Chek Joyce Plus Meter    TURMERIC ROOT EXTRACT PO Take 1 Cap by mouth daily.  cefUROXime (CEFTIN) 500 mg tablet cefuroxime axetil 500 mg tablet    selenium 200 mcg cap Take 1 Cap by mouth daily.  garlic 7,046 mg cap Take  by mouth daily.  cinnamon bark (Cinnamon) 500 mg cap Take  by mouth daily.  benzonatate (Tessalon Perles) 100 mg capsule Take 100 mg by mouth three (3) times daily as needed for Cough. No current facility-administered medications for this visit. Neurologic Exam     Mental Status   WD/WN adult in NAD, normal grooming  VSS  A&O x 3    PERRL, nonicteric  Face is symmetric, tongue midline  Speech is fluent and clear  No limb ataxia. No abnl movements. Right upper extremity 2/4 DTR, left upper extremity 1/4, right patella 0 left patella trace bilateral Achilles 1  Moving all extemities spontaneously and symmetric  A little antalgic but stable gait    CVS RRR  Lungs nonlabored  Skin is warm and dry         Visit Vitals  /82 (BP 1 Location: Right arm, BP Patient Position: Sitting)   Pulse 82   Ht 5' 3.5\" (1.613 m)   Wt 91.6 kg (202 lb)   SpO2 97%   BMI 35.22 kg/m²       Assessment and Plan   Diagnoses and all orders for this visit:    1. Right lumbar radiculopathy  -     MRI LUMB SPINE WO CONT; Future  -     REFERRAL TO ORTHOPEDIC SURGERY    2. Lumbar spondylosis  -     MRI LUMB SPINE WO CONT; Future  -     REFERRAL TO ORTHOPEDIC SURGERY    59-year-old woman who has spine disease cervical and lumbar who is having worsening right lumbar radiculopathy. Absent reflex in the right patella consistent with the process. Need to repeat the lumbosacral spine. I am going to send her to orthospine for an opinion for further management. Continue to see pain if needed. Follow-up with primary care.       I reviewed and decided to continue the current medications. This clinical note was dictated with an electronic dictation software that can make unintentional errors. If there are any questions, please contact me directly for clarification.       2 [facility], [facility] [address]  Diplomate ABPN

## 2020-09-15 NOTE — PROGRESS NOTES
Chief Complaint   Patient presents with    Follow-up     cervical radiculopathy     Visit Vitals  /82 (BP 1 Location: Right arm, BP Patient Position: Sitting)   Pulse 82   Ht 5' 3.5\" (1.613 m)   Wt 91.6 kg (202 lb)   SpO2 97%   BMI 35.22 kg/m²

## 2020-09-21 ENCOUNTER — VIRTUAL VISIT (OUTPATIENT)
Dept: FAMILY MEDICINE CLINIC | Age: 55
End: 2020-09-21
Payer: MEDICARE

## 2020-09-21 DIAGNOSIS — Z00.00 MEDICARE ANNUAL WELLNESS VISIT, SUBSEQUENT: Primary | ICD-10-CM

## 2020-09-21 PROCEDURE — G0439 PPPS, SUBSEQ VISIT: HCPCS | Performed by: FAMILY MEDICINE

## 2020-09-21 PROCEDURE — G8756 NO BP MEASURE DOC: HCPCS | Performed by: FAMILY MEDICINE

## 2020-09-21 PROCEDURE — G8428 CUR MEDS NOT DOCUMENT: HCPCS | Performed by: FAMILY MEDICINE

## 2020-09-21 PROCEDURE — G9717 DOC PT DX DEP/BP F/U NT REQ: HCPCS | Performed by: FAMILY MEDICINE

## 2020-09-21 PROCEDURE — G8417 CALC BMI ABV UP PARAM F/U: HCPCS | Performed by: FAMILY MEDICINE

## 2020-09-21 PROCEDURE — 3017F COLORECTAL CA SCREEN DOC REV: CPT | Performed by: FAMILY MEDICINE

## 2020-09-21 PROCEDURE — G9899 SCRN MAM PERF RSLTS DOC: HCPCS | Performed by: FAMILY MEDICINE

## 2020-09-21 NOTE — PROGRESS NOTES
This is the Subsequent Medicare Annual Wellness Exam, performed 12 months or more after the Initial AWV or the last Subsequent AWV    I have reviewed the patient's medical history in detail and updated the computerized patient record. History     Patient Active Problem List   Diagnosis Code    Primary osteoarthritis of right knee M17.11    Essential hypertension with goal blood pressure less than 140/90 I10    Chronic obstructive pulmonary disease (HCC) J44.9    Diabetes mellitus type 2, controlled ([facility] E11.9    Polyneuropathy associated with underlying disease ([address] G63    RLS (restless legs syndrome) G25.81    Arthritis M19.90    Leukocytosis D72.829    Cigarette nicotine dependence without complication P54.546    Dysthymia F34.1    YOLANDA (obstructive sleep apnea) G47.33    Status post bilateral knee replacements Z96.653    Type 2 diabetes mellitus with diabetic neuropathy ([facility]) E11.40    Severe obesity (BMI 35.0-39. 9) E66.01    Iliotibial band tendinitis of right side M76.31    Abnormal mammogram of left breast R92.8    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 M47.12, M47.22    Spondylosis of lumbar spine M47.816    Colon adenoma D12.6    Chronic superficial gastritis without bleeding K29.30    Right renal mass N28.89    Renal mass, right N28.89    Renal cell carcinoma of right kidney ([facility]) C64.1     Past Medical History:   Diagnosis Date    Arthritis     Diabetes ([facility]     Elevated WBC count 2002 to present    GERD (gastroesophageal reflux disease)     Hypertension     Kidney stones 2020    Leg swelling 07/2020    Doppler negative    Panic attacks     Sleep apnea     Does not use CPAP- raises the head of bed up    Spondylolisthesis of lumbar region     Tachycardia       Past Surgical History:   Procedure Laterality Date    COLONOSCOPY N/A 3/1/2019    COL[facility]al[facility] at [facility] ENDOSCOPY    HX BREAST BIOPSY Bilateral benign    HX CHOLECYSTECTOMY  1998    HX HYSTERECTOMY  2008    HX KNEE ARTHROSCOPY Bilateral 2012    HX KNEE REPLACEMENT Left 2012    HX KNEE REPLACEMENT Right 08/18/2016    HX SEPTOPLASTY  1993    HX SVT ABLATION  2013    HX TUBAL LIGATION  1989    HX UROLOGICAL Right 07/2020    nephrectomy    NEUROLOGICAL PROCEDURE UNLISTED      lumbar nerve block     Current Outpatient Medications   Medication Sig Dispense Refill    cefUROXime (CEFTIN) 500 mg tablet cefuroxime axetil 500 mg tablet      zinc 50 mg tab tablet Take 50 mg by mouth daily.  topiramate (TOPAMAX) 50 mg tablet TAKE 1 TABLET TWICE DAILY 180 Tab 3    rOPINIRole (REQUIP) 2 mg tablet Take 1 Tab by mouth three (3) times daily. 270 Tab 1    dilTIAZem ER (CARDIZEM LA) 180 mg tablet Take 2 Tabs by mouth daily. 180 Tab 1    omeprazole (PRILOSEC) 40 mg capsule Take 1 Cap by mouth Before breakfast and dinner. 180 Cap 3    atorvastatin (LIPITOR) 40 mg tablet Take 1 Tab by mouth daily. 90 Tab 1    cyclobenzaprine (FLEXERIL) 10 mg tablet Take  by mouth nightly.  metFORMIN (GLUCOPHAGE) 500 mg tablet Take 500 mg by mouth two (2) times daily (with meals).  ascorbic acid (C-500 PO) Take  by mouth daily.  selenium 200 mcg cap Take 1 Cap by mouth daily.  OTHER 1,000 mg daily. Black elderberry      garlic 3,458 mg cap Take  by mouth daily.  cinnamon bark (Cinnamon) 500 mg cap Take  by mouth daily.  benzonatate (Tessalon Perles) 100 mg capsule Take 100 mg by mouth three (3) times daily as needed for Cough.  Biotin 2,500 mcg cap Take  by mouth daily.  losartan (COZAAR) 100 mg tablet Take 100 mg by mouth nightly.  Blood-Glucose Meter (Accu-Chek Joyce Plus Meter) misc Test blood sugar daily. E 11.9 1 Each 0    pregabalin (LYRICA) 150 mg capsule Take 1 Cap by mouth two (2) times a day.  Max Daily Amount: 300 mg. 180 Cap 0    tiZANidine (ZANAFLEX) 2 mg tablet TAKE 1 TABLET BY MOUTH TWICE DAILY FOR MUSCLE SPASM 180 Tab 1    furosemide (LASIX) 20 mg tablet TAKE 1 TABLET EVERY DAY AS NEEDED 90 Tab 1    traZODone (DESYREL) 50 mg tablet Take 1 Tab by mouth nightly. Indications: insomnia 90 Tab 3    levocetirizine (XYZAL) 5 mg tablet Take 1 Tab by mouth daily. 90 Tab 3    venlafaxine-SR (EFFEXOR-XR) 150 mg capsule Take 1 Cap by mouth daily. 90 Cap 3    LORazepam (ATIVAN) 0.5 mg tablet Take 1 Tab by mouth two (2) times daily as needed for Anxiety. Max Daily Amount: 1 mg. Indications: anxious 180 Tab 0    glucose blood VI test strips (ACCU-CHEK AJAY PLUS TEST STRP) strip TEST BLOOD SUGAR EVERY  Strip 0    fluticasone propionate (FLONASE) 50 mcg/actuation nasal spray 2 sprays per nostril once a day  Indications: inflammation of the nose due to an allergy 3 Bottle 3    tiotropium (SPIRIVA) 18 mcg inhalation capsule Take 1 Cap by inhalation daily. 90 Cap 1    Blood-Glucose Meter monitoring kit E11.9 Accu-Chek Ajay Plus Meter 1 Kit 0    TURMERIC ROOT EXTRACT PO Take 1 Cap by mouth daily.        Allergies   Allergen Reactions    Nsaids (Non-Steroidal Anti-Inflammatory Drug) Other (comments)     Stomach hurts    Mobic [Meloxicam] Hives    Penicillins Unknown (comments)     DOESN'T REMEMBER  Patient states tolerating PO amoxicillin    Plaquenil [Hydroxychloroquine] Hives       Family History   Problem Relation Age of Onset    Arthritis-osteo Mother     Psychiatric Disorder Father     Arthritis-osteo Father     Alcohol abuse Father     Heart Disease Father     Heart Surgery Father     Hypertension Father     Diabetes Brother     Arthritis-osteo Brother     Hypertension Brother     Deep Vein Thrombosis Brother         Unknown cause    Anesth Problems Neg Hx      Social History     Tobacco Use    Smoking status: Current Every Day Smoker     Packs/day: 1.00     Years: 40.00     Pack years: 40.00     Types: Cigarettes    Smokeless tobacco: Never Used   Substance Use Topics    Alcohol use: Not Currently Depression Risk Factor Screening:     3 most recent PHQ Screens 1/10/2020   Little interest or pleasure in doing things Not at all   Feeling down, depressed, irritable, or hopeless Several days   Total Score PHQ 2 1       Alcohol Risk Screen   Do you average more than 1 drink per night or more than 7 drinks a week:  No    On any one occasion in the past three months have you have had more than 3 drinks containing alcohol:  No        Functional Ability and Level of Safety:   Hearing: Hearing is good. Activities of Daily Living: The home contains: no safety equipment. Patient does total self care     Ambulation: with no difficulty     Fall Risk:  Fall Risk Assessment, last 12 mths 2/24/2020   Able to walk? Yes   Fall in past 12 months? No     Abuse Screen:  Patient is not abused       Cognitive Screening   Has your family/caregiver stated any concerns about your memory: no        Patient Care Team   Patient Care Team:  Kristina Colón MD as PCP - General (Family Medicine)  Kristina Colón MD [facility] P[facility]  Sandy Doss MD as Physician (Sleep Medicine)  Pooja Jacinto RN as Ambulatory Care Manager    Assessment/Plan   Education and counseling provided:  Are appropriate based on today's review and evaluation  End-of-Life planning (with patient's consent)  Influenza Vaccine  Screening Mammography  Colorectal cancer screening tests  Cardiovascular screening blood test  Bone mass measurement (DEXA)  Screening for glaucoma  Diabetes screening test    Diagnoses and all orders for this visit:    1. Medicare annual wellness visit, subsequent        There are no preventive care reminders to display for this patient. Maldonado Padilla, who was evaluated through a synchronous (real-time) audio-video encounter, and/or her healthcare decision maker, is aware that it is a billable service, with coverage as determined by her insurance carrier.  She provided verbal consent to proceed: Yes, and patient identification was verified. It was conducted pursuant to the emergency declaration under the [facility] [address] and the [employer] and [employer] Act. A caregiver was present when appropriate. Ability to conduct physical exam was limited. I was in the office. The patient was at home.     Delfina Lazo MD

## 2020-09-21 NOTE — PATIENT INSTRUCTIONS
Medicare Wellness Visit, Female The best way to live healthy is to have a lifestyle where you eat a well-balanced diet, exercise regularly, limit alcohol use, and quit all forms of tobacco/nicotine, if applicable. Regular preventive services are another way to keep healthy. Preventive services (vaccines, screening tests, monitoring & exams) can help personalize your care plan, which helps you manage your own care. Screening tests can find health problems at the earliest stages, when they are easiest to treat. [facility] follows the current, evidence-based guidelines published by the [facility] Flood ([facility]) when recommending preventive services for our patients. Because we follow these guidelines, sometimes recommendations change over time as research supports it. (For example, mammograms used to be recommended annually. Even though Medicare will still pay for an annual mammogram, the newer guidelines recommend a mammogram every two years for women of average risk). Of course, you and your doctor may decide to screen more often for some diseases, based on your risk and your co-morbidities (chronic disease you are already diagnosed with). Preventive services for you include: - Medicare offers their members a free annual wellness visit, which is time for you and your primary care provider to discuss and plan for your preventive service needs. Take advantage of this benefit every year! 
-All adults over the age of 72 should receive the recommended pneumonia vaccines. Current USPSTF guidelines recommend a series of two vaccines for the best pneumonia protection.  
-All adults should have a flu vaccine yearly and a tetanus vaccine every 10 years.  
-All adults age 48 and older should receive the shingles vaccines (series of two vaccines). -All adults age 38-68 who are overweight should have a diabetes screening test once every three years. -All adults born between [birth year] and 1966 should be screened once for Hepatitis C. 
-Other screening tests and preventive services for persons with diabetes include: an eye exam to screen for diabetic retinopathy, a kidney function test, a foot exam, and stricter control over your cholesterol.  
-Cardiovascular screening for adults with routine risk involves an electrocardiogram (ECG) at intervals determined by your doctor.  
-Colorectal cancer screenings should be done for adults age 54-65 with no increased risk factors for colorectal cancer. There are a number of acceptable methods of screening for this type of cancer. Each test has its own benefits and drawbacks. Discuss with your doctor what is most appropriate for you during your annual wellness visit. The different tests include: colonoscopy (considered the best screening method), a fecal occult blood test, a fecal DNA test, and sigmoidoscopy. 
 
-A bone mass density test is recommended when a woman turns 65 to screen for osteoporosis. This test is only recommended one time, as a screening. Some providers will use this same test as a disease monitoring tool if you already have osteoporosis. -Breast cancer screenings are recommended every other year for women of normal risk, age 54-69. 
-Cervical cancer screenings for women over age 72 are only recommended with certain risk factors. Here is a list of your current Health Maintenance items (your personalized list of preventive services) with a due date: There are no preventive care reminders to display for this patient.

## 2020-09-22 ENCOUNTER — HOSPITAL ENCOUNTER (OUTPATIENT)
Dept: MRI IMAGING | Age: 55
Discharge: HOME | End: 2020-09-22
Attending: PSYCHIATRY & NEUROLOGY
Payer: MEDICARE

## 2020-09-22 DIAGNOSIS — M47.816 LUMBAR SPONDYLOSIS: ICD-10-CM

## 2020-09-22 DIAGNOSIS — M54.16 RIGHT LUMBAR RADICULOPATHY: ICD-10-CM

## 2020-09-22 PROCEDURE — 72148 MRI LUMBAR SPINE W/O DYE: CPT

## 2020-09-28 ENCOUNTER — LAB ONLY (OUTPATIENT)
Dept: FAMILY MEDICINE CLINIC | Age: 55
End: 2020-09-28

## 2020-09-28 ENCOUNTER — TELEPHONE (OUTPATIENT)
Dept: NEUROLOGY | Facility: CLINIC | Age: 55
End: 2020-09-28

## 2020-09-28 DIAGNOSIS — E55.9 VITAMIN D DEFICIENCY: ICD-10-CM

## 2020-09-28 DIAGNOSIS — Z79.899 ENCOUNTER FOR LONG-TERM CURRENT USE OF MEDICATION: ICD-10-CM

## 2020-09-28 DIAGNOSIS — E53.8 VITAMIN B 12 DEFICIENCY: ICD-10-CM

## 2020-09-28 DIAGNOSIS — E11.9 CONTROLLED TYPE 2 DIABETES MELLITUS WITHOUT COMPLICATION, WITHOUT LONG-TERM CURRENT USE OF INSULIN (HCC): ICD-10-CM

## 2020-09-28 DIAGNOSIS — M54.16 RIGHT LUMBAR RADICULOPATHY: Primary | ICD-10-CM

## 2020-09-28 LAB
25(OH)D3 SERPL-MCNC: 41.5 NG/ML (ref 30–100)
ALBUMIN SERPL-MCNC: 3.8 G/DL (ref 3.5–5)
ALBUMIN/GLOB SERPL: 1.2 {RATIO} (ref 1.1–2.2)
ALP SERPL-CCNC: 280 U/L (ref 45–117)
ALT SERPL-CCNC: 53 U/L (ref 12–78)
ANION GAP SERPL CALC-SCNC: 4 MMOL/L (ref 5–15)
AST SERPL-CCNC: 20 U/L (ref 15–37)
BILIRUB SERPL-MCNC: 0.2 MG/DL (ref 0.2–1)
BUN SERPL-MCNC: 16 MG/DL (ref 6–20)
BUN/CREAT SERPL: 13 (ref 12–20)
CALCIUM SERPL-MCNC: 9.9 MG/DL (ref 8.5–10.1)
CHLORIDE SERPL-SCNC: 111 MMOL/L (ref 97–108)
CHOLEST SERPL-MCNC: 139 MG/DL
CO2 SERPL-SCNC: 27 MMOL/L (ref 21–32)
CREAT SERPL-MCNC: 1.26 MG/DL (ref 0.55–1.02)
ERYTHROCYTE [DISTWIDTH] IN BLOOD BY AUTOMATED COUNT: 16.4 % (ref 11.5–14.5)
EST. AVERAGE GLUCOSE BLD GHB EST-MCNC: 126 MG/DL
FOLATE SERPL-MCNC: 4.6 NG/ML (ref 5–21)
GLOBULIN SER CALC-MCNC: 3.2 G/DL (ref 2–4)
GLUCOSE SERPL-MCNC: 113 MG/DL (ref 65–100)
HBA1C MFR BLD: 6 % (ref 4–5.6)
HCT VFR BLD AUTO: 43.7 % (ref 35–47)
HDLC SERPL-MCNC: 55 MG/DL
HDLC SERPL: 2.5 {RATIO} (ref 0–5)
HGB BLD-MCNC: 12.9 G/DL (ref 11.5–16)
LDLC SERPL CALC-MCNC: 60.8 MG/DL (ref 0–100)
LIPID PROFILE,FLP: NORMAL
MCH RBC QN AUTO: 26.8 PG (ref 26–34)
MCHC RBC AUTO-ENTMCNC: 29.5 G/DL (ref 30–36.5)
MCV RBC AUTO: 90.9 FL (ref 80–99)
NRBC # BLD: 0 K/UL (ref 0–0.01)
NRBC BLD-RTO: 0 PER 100 WBC
PLATELET # BLD AUTO: 230 K/UL (ref 150–400)
PMV BLD AUTO: 11.1 FL (ref 8.9–12.9)
POTASSIUM SERPL-SCNC: 4.7 MMOL/L (ref 3.5–5.1)
PROT SERPL-MCNC: 7 G/DL (ref 6.4–8.2)
RBC # BLD AUTO: 4.81 M/UL (ref 3.8–5.2)
SODIUM SERPL-SCNC: 142 MMOL/L (ref 136–145)
TRIGL SERPL-MCNC: 116 MG/DL (ref ?–150)
VIT B12 SERPL-MCNC: >2000 PG/ML (ref 193–986)
VLDLC SERPL CALC-MCNC: 23.2 MG/DL
WBC # BLD AUTO: 17 K/UL (ref 3.6–11)

## 2020-09-28 NOTE — TELEPHONE ENCOUNTER
Some mild abnormalities in the low spine in line with some degeneration. Nothing serious such as a herniated disc. I had already referred her to orthospine for an opinion. She should follow through if she still having pain.

## 2020-09-28 NOTE — TELEPHONE ENCOUNTER
----- Message from Muna Montiel sent at 9/28/2020 10:55 AM EDT -----  Regarding: Dr. Nancy Woodruff Message/Vendor Calls    Caller's first and last name:Jennifer Rodriguez      Reason for call:doctor call back    Callback required yes/no and why:yes      Best contact number(s):[phone]      Details to clarify the request:Pt requested a call from the doctor regarding the report of MRI of the lower back.       Muna Montiel

## 2020-09-28 NOTE — TELEPHONE ENCOUNTER
I reviewed this information with patient. She stated she cannot go to this appointment because she has an outstanding balance from 2017.  Patient has requested a PT referral and would like it sent to [facility], in [place]

## 2020-09-30 DIAGNOSIS — M47.816 SPONDYLOSIS OF LUMBAR SPINE: ICD-10-CM

## 2020-10-01 ENCOUNTER — TELEPHONE (OUTPATIENT)
Dept: FAMILY MEDICINE CLINIC | Age: 55
End: 2020-10-01

## 2020-10-01 DIAGNOSIS — E53.8 LOW FOLATE: ICD-10-CM

## 2020-10-01 DIAGNOSIS — D72.829 LEUKOCYTOSIS, UNSPECIFIED TYPE: Primary | ICD-10-CM

## 2020-10-01 DIAGNOSIS — R74.8 HIGH ALKALINE PHOSPHATASE: ICD-10-CM

## 2020-10-01 RX ORDER — TIZANIDINE 2 MG/1
TABLET ORAL
Qty: 180 TAB | Refills: 1 | Status: SHIPPED | OUTPATIENT
Start: 2020-10-01 | End: 2021-01-13 | Stop reason: ALTCHOICE

## 2020-10-01 NOTE — TELEPHONE ENCOUNTER
Call pt about labs: low folate, high alk phos, high WBC. Need folate supplement. Order GGT. ? HEME consult.

## 2020-10-02 RX ORDER — FOLIC ACID 1 MG/1
1 TABLET ORAL DAILY
Qty: 90 TAB | Refills: 3 | Status: SHIPPED | OUTPATIENT
Start: 2020-10-02 | End: 2021-10-27 | Stop reason: ALTCHOICE

## 2020-10-02 NOTE — TELEPHONE ENCOUNTER
Discussed lab results:  1. Prediabetes. Need to limit sugars and starches. 2. Low folate level. Will start on folic acid 1 mg.  3. High B 12. Cut out energy drinks. 4. Elevated alk phos. Order GGT. 5. High WBC. FU with Dr Maximo Corrales - Heme onc. Pt will call for appt.

## 2020-10-08 ENCOUNTER — APPOINTMENT (OUTPATIENT)
Dept: FAMILY MEDICINE CLINIC | Age: 55
End: 2020-10-08

## 2020-10-08 DIAGNOSIS — R74.8 HIGH ALKALINE PHOSPHATASE: ICD-10-CM

## 2020-10-08 LAB — GGT SERPL-CCNC: 340 U/L (ref 5–55)

## 2020-10-12 ENCOUNTER — TELEPHONE (OUTPATIENT)
Dept: FAMILY MEDICINE CLINIC | Age: 55
End: 2020-10-12

## 2020-10-12 DIAGNOSIS — R74.8 ELEVATED ALKALINE PHOSPHATASE LEVEL: Primary | ICD-10-CM

## 2020-10-12 NOTE — TELEPHONE ENCOUNTER
I spoke with pt about elevated alk phos and GGT. At this point will refer to Sandy Freed @ [facility] for further evaluation. Please set up appt and call her back. She prefers morning appt.

## 2020-10-13 LAB
ALP BONE CFR SERPL: 19 % (ref 14–68)
ALP INTEST CFR SERPL: 3 % (ref 0–18)
ALP LIVER CFR SERPL: 78 % (ref 18–85)
ALP SERPL-CCNC: 247 IU/L (ref 39–117)

## 2020-10-21 ENCOUNTER — VIRTUAL VISIT (OUTPATIENT)
Dept: ONCOLOGY | Age: 55
End: 2020-10-21
Payer: MEDICARE

## 2020-10-21 DIAGNOSIS — E11.40 TYPE 2 DIABETES MELLITUS WITH DIABETIC NEUROPATHY, WITHOUT LONG-TERM CURRENT USE OF INSULIN (HCC): ICD-10-CM

## 2020-10-21 DIAGNOSIS — D72.825 BANDEMIA: ICD-10-CM

## 2020-10-21 DIAGNOSIS — C64.1 RENAL CELL CARCINOMA OF RIGHT KIDNEY (HCC): ICD-10-CM

## 2020-10-21 DIAGNOSIS — D72.825 BANDEMIA: Primary | ICD-10-CM

## 2020-10-21 DIAGNOSIS — E66.01 SEVERE OBESITY WITH BODY MASS INDEX (BMI) OF 35.0 TO 39.9 WITH SERIOUS COMORBIDITY (HCC): ICD-10-CM

## 2020-10-21 PROCEDURE — 99204 OFFICE O/P NEW MOD 45 MIN: CPT | Performed by: INTERNAL MEDICINE

## 2020-10-21 NOTE — PROGRESS NOTES
[facility] [place] [facility] [place] [address] [address], [address]  W: [phone]  F: [phone]    Reason for Visit:   Mare Cruz is a 63 y.o. female who is seen by synchronous (real-time) audio-video technology on 10/21/2020 for consultation at the request of Dr. Navid Simental for leucocytosis    History of Present Illness:   Patient is a 63 y. o.female with PMH as below who is seen for abnormal CBC    She has persistent leucocytosis since 2014, no other CBC abnormalities. She has known R renal cell carcinoma ( 3.3 cm) which she had a R radical nephrectomy on 7/28/2020    She has osteoarthritis and has several flare ups. She has no fevers, chills, or sweats. She has not had blood clots. She has not had much of an appetite, not loosing weight, she has no HA, falls, has fatigue, she has no abdominal pain, bleeding, she has been uptodate with colonoscopies. She had a CT chest 8/2020 that showed a 6 mm lung nodule which is being observed. She has a rising alkaline phosphatase and is to see a hepatologist.     She smokes 1 ppd for 40 years.     Mother has breast cancer- diagnosed at 80    Past Medical History:   Diagnosis Date    Arthritis     Diabetes ([address]     Elevated WBC count 2002 to present    GERD (gastroesophageal reflux disease)     Hypertension     Kidney stones 2020    Leg swelling 07/2020    Doppler negative    Panic attacks     Sleep apnea     Does not use CPAP- raises the head of bed up    Spondylolisthesis of lumbar region     Tachycardia       Past Surgical History:   Procedure Laterality Date    COLONOSCOPY N/A 3/1/2019    COLONOSCOPY performed by Po Wilkes MD at [address] HX BREAST BIOPSY Bilateral     benign    HX CHOLECYSTECTOMY  1998    HX HYSTERECTOMY  2008    HX KNEE ARTHROSCOPY Bilateral 2012    HX KNEE REPLACEMENT Left 2012    HX KNEE REPLACEMENT Right 08/18/2016    HX SEPTOPLASTY  1993    HX SVT ABLATION  2013    HX TUBAL LIGATION  1989    HX UROLOGICAL Right 07/2020    nephrectomy    NEUROLOGICAL PROCEDURE UNLISTED      lumbar nerve block      Social History     Tobacco Use    Smoking status: Current Every Day Smoker     Packs/day: 1.00     Years: 40.00     Pack years: 40.00     Types: Cigarettes    Smokeless tobacco: Never Used   Substance Use Topics    Alcohol use: Not Currently      Family History   Problem Relation Age of Onset    Arthritis-osteo Mother     Psychiatric Disorder Father     Arthritis-osteo Father     Alcohol abuse Father     Heart Disease Father     Heart Surgery Father     Hypertension Father     Diabetes Brother     Arthritis-osteo Brother     Hypertension Brother     Deep Vein Thrombosis Brother         Unknown cause    Anesth Problems Neg Hx      Current Outpatient Medications   Medication Sig    folic acid (FOLVITE) 1 mg tablet Take 1 Tab by mouth daily.  tiZANidine (ZANAFLEX) 2 mg tablet TAKE 1 TABLET TWICE DAILY FOR MUSCLE SPASM(S)    zinc 50 mg tab tablet Take 50 mg by mouth daily.  rOPINIRole (REQUIP) 2 mg tablet Take 1 Tab by mouth three (3) times daily.  dilTIAZem ER (CARDIZEM LA) 180 mg tablet Take 2 Tabs by mouth daily.  omeprazole (PRILOSEC) 40 mg capsule Take 1 Cap by mouth Before breakfast and dinner.  atorvastatin (LIPITOR) 40 mg tablet Take 1 Tab by mouth daily.  metFORMIN (GLUCOPHAGE) 500 mg tablet Take 500 mg by mouth two (2) times daily (with meals).  ascorbic acid (C-500 PO) Take  by mouth daily.  selenium 200 mcg cap Take 1 Cap by mouth daily.  OTHER 1,000 mg daily. Black elderberry    garlic 6,058 mg cap Take  by mouth daily.  cinnamon bark (Cinnamon) 500 mg cap Take  by mouth daily.  benzonatate (Tessalon Perles) 100 mg capsule Take 100 mg by mouth three (3) times daily as needed for Cough.  Biotin 2,500 mcg cap Take  by mouth daily.  losartan (COZAAR) 100 mg tablet Take 100 mg by mouth nightly.     Blood-Glucose Meter (Accu-Chek Joyce Plus Meter) misc Test blood sugar daily. E 11.9    pregabalin (LYRICA) 150 mg capsule Take 1 Cap by mouth two (2) times a day. Max Daily Amount: 300 mg.  furosemide (LASIX) 20 mg tablet TAKE 1 TABLET EVERY DAY AS NEEDED    traZODone (DESYREL) 50 mg tablet Take 1 Tab by mouth nightly. Indications: insomnia    levocetirizine (XYZAL) 5 mg tablet Take 1 Tab by mouth daily.  venlafaxine-SR (EFFEXOR-XR) 150 mg capsule Take 1 Cap by mouth daily.  LORazepam (ATIVAN) 0.5 mg tablet Take 1 Tab by mouth two (2) times daily as needed for Anxiety. Max Daily Amount: 1 mg. Indications: anxious    glucose blood VI test strips (ACCU-CHEK JOYCE PLUS TEST STRP) strip TEST BLOOD SUGAR EVERY DAY    fluticasone propionate (FLONASE) 50 mcg/actuation nasal spray 2 sprays per nostril once a day  Indications: inflammation of the nose due to an allergy    tiotropium (SPIRIVA) 18 mcg inhalation capsule Take 1 Cap by inhalation daily.  TURMERIC ROOT EXTRACT PO Take 1 Cap by mouth daily.  cefUROXime (CEFTIN) 500 mg tablet cefuroxime axetil 500 mg tablet    topiramate (TOPAMAX) 50 mg tablet TAKE 1 TABLET TWICE DAILY    Blood-Glucose Meter monitoring kit E11.9 Accu-Chek Joyce Plus Meter     No current facility-administered medications for this visit. Allergies   Allergen Reactions    Nsaids (Non-Steroidal Anti-Inflammatory Drug) Other (comments)     Stomach hurts    Mobic [Meloxicam] Hives    Penicillins Unknown (comments)     DOESN'T REMEMBER  Patient states tolerating PO amoxicillin    Plaquenil [Hydroxychloroquine] Hives        Review of Systems: A complete review of systems was obtained, negative except as described above. Physical Exam:     Due to this being a TeleHealth evaluation, many elements of the physical examination are unable to be assessed.      Constitutional: Appears well-developed and well-nourished in no apparent distress   Mental status: Alert and awake, Oriented to person/place/time, Able to follow commands  Eyes: EOM normal, Sclera normal, No visible ocular discharge  HENT: Normocephalic, atraumatic; Mouth/Throat: Moist mucous membranes, External Ears normal  Neck: No visualized mass  Pulmonary/Chest: Respiratory effort normal, No visualized signs of difficulty breathing or respiratory distress   Musculoskeletal: Normal gait with no signs of ataxia, Normal range of motion of neck  Neurological: No facial asymmetry (Cranial nerve 7 motor function), No gaze palsy  Skin: No significant exanthematous lesions or discoloration noted on facial skin  Psychiatric: Normal affect, normal judgment/insight. No hallucinations       Results:     Lab Results   Component Value Date/Time    WBC 17.0 (H) 09/28/2020 09:15 AM    HGB 12.9 09/28/2020 09:15 AM    HCT 43.7 09/28/2020 09:15 AM    PLATELET 572 40/86/2228 09:15 AM    MCV 90.9 09/28/2020 09:15 AM    ABS. NEUTROPHILS 7.8 08/24/2020 05:00 PM     Lab Results   Component Value Date/Time    Sodium 142 09/28/2020 09:15 AM    Potassium 4.7 09/28/2020 09:15 AM    Chloride 111 (H) 09/28/2020 09:15 AM    CO2 27 09/28/2020 09:15 AM    Glucose 113 (H) 09/28/2020 09:15 AM    BUN 16 09/28/2020 09:15 AM    Creatinine 1.26 (H) 09/28/2020 09:15 AM    GFR est AA 53 (L) 09/28/2020 09:15 AM    GFR est non-AA 44 (L) 09/28/2020 09:15 AM    Calcium 9.9 09/28/2020 09:15 AM    Glucose (POC) 154 (H) 07/29/2020 05:53 AM    Creatinine (POC) 0.7 05/29/2020 06:13 PM     Lab Results   Component Value Date/Time    Bilirubin, total 0.2 09/28/2020 09:15 AM    ALT (SGPT) 53 09/28/2020 09:15 AM    Alk.  phosphatase 280 (H) 09/28/2020 09:15 AM    Protein, total 7.0 09/28/2020 09:15 AM    Albumin 3.8 09/28/2020 09:15 AM    Globulin 3.2 09/28/2020 09:15 AM       Lab Results   Component Value Date/Time    Iron % saturation 14 (L) 09/05/2018 11:25 AM    TIBC 431 09/05/2018 11:25 AM    Ferritin 17 09/05/2018 11:25 AM    Vitamin B12 >2,000 (H) 09/28/2020 09:15 AM    Folate 4.6 (L) 09/28/2020 09:15 AM    Erythropoietin 16.7 09/05/2018 11:25 AM    Sed rate (ESR) 2 08/10/2018 10:53 AM    Sed rate, automated 9 02/09/2015 10:00 AM    C-Reactive Protein, Qt 5.1 (H) 08/10/2018 10:53 AM    TSH 1.510 09/20/2019 02:10 PM    CALEB, Direct Positive (A) 09/04/2014 11:00 AM    Hep C Virus Ab 0.1 04/22/2014 11:15 AM    HIV 1/2 Interpretation NONREACTIVE 04/22/2014 11:15 AM     Lab Results   Component Value Date/Time    INR 0.9 08/24/2020 05:30 PM    aPTT 24.8 08/24/2020 05:30 PM    D-dimer 0.57 08/24/2020 05:30 PM       Records reviewed and summarized above. Pathology report(s) reviewed above. Radiology report(s) reviewed above. CT abdomen 5/2020- reviewed     CTA 8/2020- reviewed         Assessment:   1) Leucocytosis    Has had it for almost 20 years  Likely reactive to smoking  Do not suspect an MPN but with r/o  She is uptodate with age appropriate cancer screening      2) Elevated Alkaline phosphatase  She sees Dr. Purdy Tresckow- may need an USG abdomen    3) Obesity    4) Osteoarthritis    5) Lung nodule  Per pulmonary    Plan:     · CBC DIFF, SMEAR, LD, BACR ABL PCR, JAK2    RTC 4 weeks     I appreciate the opportunity to participate in Ms. Pal Ferrell's care. Signed By: Michael Antoine MD      I was in the office while conducting this encounter. The patient was at her home. Consent:  She and/or her healthcare decision maker is aware that this patient-initiated Telehealth encounter is a billable service, with coverage as determined by her insurance carrier. She is aware that she may receive a bill and has provided verbal consent to proceed: Yes    Pursuant to the emergency declaration under the [address] and the [facility] waiver authority and the [employer], this Virtual  Visit was conducted, with patient's (and/or legal guardian's) consent, to reduce the patient's risk of exposure to COVID-19 and provide necessary medical care. Services were provided through a video synchronous discussion virtually to substitute for in-person visit.

## 2020-10-21 NOTE — PROGRESS NOTES
Geoffery Rubinstein is a 63 y.o. female  Chief Complaint   Patient presents with    New Patient     Leukocytosis     1. Have you been to the ER, urgent care clinic since your last visit? Hospitalized since your last visit? No    2. Have you seen or consulted any other health care providers outside of the [address] [place] [address] since your last visit? Include any pap smears or colon screening.  No

## 2020-10-27 ENCOUNTER — TELEPHONE (OUTPATIENT)
Dept: FAMILY MEDICINE CLINIC | Age: 55
End: 2020-10-27

## 2020-10-27 ENCOUNTER — HOSPITAL ENCOUNTER (OUTPATIENT)
Age: 55
Setting detail: OBSERVATION
Discharge: HOME | End: 2020-10-28
Attending: EMERGENCY MEDICINE | Admitting: INTERNAL MEDICINE
Payer: MEDICARE

## 2020-10-27 ENCOUNTER — APPOINTMENT (OUTPATIENT)
Dept: GENERAL RADIOLOGY | Age: 55
End: 2020-10-27
Attending: PHYSICIAN ASSISTANT
Payer: MEDICARE

## 2020-10-27 ENCOUNTER — APPOINTMENT (OUTPATIENT)
Dept: CT IMAGING | Age: 55
End: 2020-10-27
Attending: EMERGENCY MEDICINE
Payer: MEDICARE

## 2020-10-27 DIAGNOSIS — E87.6 HYPOKALEMIA: ICD-10-CM

## 2020-10-27 DIAGNOSIS — Z85.528 HISTORY OF RENAL CELL CARCINOMA: ICD-10-CM

## 2020-10-27 DIAGNOSIS — F17.218 CIGARETTE NICOTINE DEPENDENCE WITH OTHER NICOTINE-INDUCED DISORDER: ICD-10-CM

## 2020-10-27 DIAGNOSIS — Z86.39 HISTORY OF DIABETES MELLITUS: ICD-10-CM

## 2020-10-27 DIAGNOSIS — R47.02 DYSPHASIA: Primary | ICD-10-CM

## 2020-10-27 PROBLEM — I63.9 ACUTE CVA (CEREBROVASCULAR ACCIDENT) (HCC): Status: ACTIVE | Noted: 2020-10-27

## 2020-10-27 LAB
ALBUMIN SERPL-MCNC: 3.9 G/DL (ref 3.5–5)
ALBUMIN/GLOB SERPL: 1.1 {RATIO} (ref 1.1–2.2)
ALP SERPL-CCNC: 184 U/L (ref 45–117)
ALT SERPL-CCNC: 32 U/L (ref 12–78)
ANION GAP SERPL CALC-SCNC: 7 MMOL/L (ref 5–15)
APPEARANCE UR: CLEAR
AST SERPL-CCNC: 22 U/L (ref 15–37)
BACTERIA URNS QL MICRO: NEGATIVE /HPF
BASOPHILS # BLD: 0.2 K/UL (ref 0–0.1)
BASOPHILS NFR BLD: 1 % (ref 0–1)
BILIRUB SERPL-MCNC: 0.3 MG/DL (ref 0.2–1)
BILIRUB UR QL: NEGATIVE
BUN SERPL-MCNC: 10 MG/DL (ref 6–20)
BUN/CREAT SERPL: 8 (ref 12–20)
CALCIUM SERPL-MCNC: 9.5 MG/DL (ref 8.5–10.1)
CHLORIDE SERPL-SCNC: 108 MMOL/L (ref 97–108)
CO2 SERPL-SCNC: 26 MMOL/L (ref 21–32)
COLOR UR: NORMAL
COMMENT, HOLDF: NORMAL
CREAT SERPL-MCNC: 1.18 MG/DL (ref 0.55–1.02)
DIFFERENTIAL METHOD BLD: ABNORMAL
EOSINOPHIL # BLD: 0.7 K/UL (ref 0–0.4)
EOSINOPHIL NFR BLD: 5 % (ref 0–7)
EPITH CASTS URNS QL MICRO: NORMAL /LPF
ERYTHROCYTE [DISTWIDTH] IN BLOOD BY AUTOMATED COUNT: 15.9 % (ref 11.5–14.5)
GLOBULIN SER CALC-MCNC: 3.5 G/DL (ref 2–4)
GLUCOSE SERPL-MCNC: 96 MG/DL (ref 65–100)
GLUCOSE UR STRIP.AUTO-MCNC: NEGATIVE MG/DL
HCT VFR BLD AUTO: 40.5 % (ref 35–47)
HGB BLD-MCNC: 13.5 G/DL (ref 11.5–16)
HGB UR QL STRIP: NEGATIVE
HYALINE CASTS URNS QL MICRO: NORMAL /LPF (ref 0–5)
IMM GRANULOCYTES # BLD AUTO: 0.1 K/UL (ref 0–0.04)
IMM GRANULOCYTES NFR BLD AUTO: 0 % (ref 0–0.5)
KETONES UR QL STRIP.AUTO: NEGATIVE MG/DL
LEUKOCYTE ESTERASE UR QL STRIP.AUTO: NEGATIVE
LYMPHOCYTES # BLD: 2.7 K/UL (ref 0.8–3.5)
LYMPHOCYTES NFR BLD: 22 % (ref 12–49)
MAGNESIUM SERPL-MCNC: 2.7 MG/DL (ref 1.6–2.4)
MCH RBC QN AUTO: 27.6 PG (ref 26–34)
MCHC RBC AUTO-ENTMCNC: 33.3 G/DL (ref 30–36.5)
MCV RBC AUTO: 82.8 FL (ref 80–99)
MONOCYTES # BLD: 0.6 K/UL (ref 0–1)
MONOCYTES NFR BLD: 5 % (ref 5–13)
NEUTS SEG # BLD: 8.3 K/UL (ref 1.8–8)
NEUTS SEG NFR BLD: 67 % (ref 32–75)
NITRITE UR QL STRIP.AUTO: NEGATIVE
NRBC # BLD: 0 K/UL (ref 0–0.01)
NRBC BLD-RTO: 0 PER 100 WBC
PH UR STRIP: 8 [PH] (ref 5–8)
PLATELET # BLD AUTO: 226 K/UL (ref 150–400)
PMV BLD AUTO: 10.6 FL (ref 8.9–12.9)
POTASSIUM SERPL-SCNC: 3.1 MMOL/L (ref 3.5–5.1)
PROT SERPL-MCNC: 7.4 G/DL (ref 6.4–8.2)
PROT UR STRIP-MCNC: NEGATIVE MG/DL
RBC # BLD AUTO: 4.89 M/UL (ref 3.8–5.2)
RBC #/AREA URNS HPF: NORMAL /HPF (ref 0–5)
SAMPLES BEING HELD,HOLD: NORMAL
SODIUM SERPL-SCNC: 141 MMOL/L (ref 136–145)
SP GR UR REFRACTOMETRY: 1.01 (ref 1–1.03)
TROPONIN I SERPL-MCNC: <0.05 NG/ML
UR CULT HOLD, URHOLD: NORMAL
UROBILINOGEN UR QL STRIP.AUTO: 0.2 EU/DL (ref 0.2–1)
WBC # BLD AUTO: 12.5 K/UL (ref 3.6–11)
WBC URNS QL MICRO: NORMAL /HPF (ref 0–4)

## 2020-10-27 PROCEDURE — 85025 COMPLETE CBC W/AUTO DIFF WBC: CPT

## 2020-10-27 PROCEDURE — 93005 ELECTROCARDIOGRAM TRACING: CPT

## 2020-10-27 PROCEDURE — 80053 COMPREHEN METABOLIC PANEL: CPT

## 2020-10-27 PROCEDURE — 99284 EMERGENCY DEPT VISIT MOD MDM: CPT

## 2020-10-27 PROCEDURE — 36415 COLL VENOUS BLD VENIPUNCTURE: CPT

## 2020-10-27 PROCEDURE — 84484 ASSAY OF TROPONIN QUANT: CPT

## 2020-10-27 PROCEDURE — 70450 CT HEAD/BRAIN W/O DYE: CPT

## 2020-10-27 PROCEDURE — 71046 X-RAY EXAM CHEST 2 VIEWS: CPT

## 2020-10-27 PROCEDURE — 74011250637 HC RX REV CODE- 250/637: Performed by: PHYSICIAN ASSISTANT

## 2020-10-27 PROCEDURE — 83735 ASSAY OF MAGNESIUM: CPT

## 2020-10-27 PROCEDURE — 65390000012 HC CONDITION CODE 44 OBSERVATION

## 2020-10-27 PROCEDURE — 65270000029 HC RM PRIVATE

## 2020-10-27 PROCEDURE — 81001 URINALYSIS AUTO W/SCOPE: CPT

## 2020-10-27 RX ORDER — DILTIAZEM HYDROCHLORIDE 180 MG/1
CAPSULE, COATED, EXTENDED RELEASE ORAL
Qty: 180 CAP | Refills: 1 | Status: SHIPPED | OUTPATIENT
Start: 2020-10-27 | End: 2021-03-28

## 2020-10-27 RX ORDER — ACETAMINOPHEN 325 MG/1
650 TABLET ORAL
Status: DISCONTINUED | OUTPATIENT
Start: 2020-10-27 | End: 2020-10-28

## 2020-10-27 RX ORDER — POTASSIUM CHLORIDE 750 MG/1
40 TABLET, FILM COATED, EXTENDED RELEASE ORAL
Status: COMPLETED | OUTPATIENT
Start: 2020-10-27 | End: 2020-10-27

## 2020-10-27 RX ORDER — IBUPROFEN 200 MG
1 TABLET ORAL
Status: COMPLETED | OUTPATIENT
Start: 2020-10-27 | End: 2020-10-28

## 2020-10-27 RX ORDER — SODIUM CHLORIDE, SODIUM LACTATE, POTASSIUM CHLORIDE, CALCIUM CHLORIDE 600; 310; 30; 20 MG/100ML; MG/100ML; MG/100ML; MG/100ML
125 INJECTION, SOLUTION INTRAVENOUS CONTINUOUS
Status: DISCONTINUED | OUTPATIENT
Start: 2020-10-27 | End: 2020-10-28 | Stop reason: HOSPADM

## 2020-10-27 RX ADMIN — POTASSIUM CHLORIDE 40 MEQ: 750 TABLET, FILM COATED, EXTENDED RELEASE ORAL at 21:35

## 2020-10-27 NOTE — TELEPHONE ENCOUNTER
Pt called and stated had slurred speech and blurred vision last week and now having vision issues and issues with speech as well  advised pt to go to ER asap to be evaluated for a stroke    Pt also advised going to urology on Friday to dr Gorge Jeronimo for having urine leakage

## 2020-10-27 NOTE — ED TRIAGE NOTES
Triage Note: Patient is coming in with problem getting words together and not able to concentrate for 10 days. Patient states that vision is \"dancing\" to both sides of vision. Headaches to the left side.

## 2020-10-27 NOTE — ED PROVIDER NOTES
Jaz Yost is a 63 y.o. female with PMH of HTN, GERD, diabetes, panic attacks presents to emergency room ambulatory for evaluation of \"trouble finding the right words\" for the past 8-9 days and 1 day of slurred speech on 10/19 or 10/20. Patient is having increased stress of the last 3 weeks, a lot of responsibilities and housework began a pile prior to episode of slurred speech. She states the slurred speech lasted for 1 day, on 10/19 or 10/20, and since then the speech has been normal, the only residual symptom she is feeling is having difficulty finding the right words or thinking of the right word when she is looking at object. She specifically denies recent fever, chills, vomiting, diarrhea, chest pain, shortness of breath, weakness, numbness, tingling, gait abnormality, difficulty swallowing. She states hx of R renal cancer s/p nephrectomy 7/2020, states no chemo or radiation. Having overflow incontinence of urine only in the morning she states \"since my kidney was removed in July\", unchanged recently- no incontinence any other time of day, states \"when I wake up in the mornings when I walk to the bathroom sometimes I wet myself\" but does not occur any other time of the day. She has f/u with urology on Friday to discuss this issue. Chart review: Patient seen by Dr. Rajinder De Leon, hematology/oncology due to chronic bandemia on 10/21/2020. PCP: Ciara Mccollum MD    Surgical hx- see chart  Social hx- + tobacco    The patient has no other complaints at this time.              Past Medical History:   Diagnosis Date    Arthritis     Diabetes ([facility] [address]     Elevated WBC count 2002 to present    GERD (gastroesophageal reflux disease)     Hypertension     Kidney stones 2020    Leg swelling 07/2020    Doppler negative    Panic attacks     Sleep apnea     Does not use CPAP- raises the head of bed up    Spondylolisthesis of lumbar region     Tachycardia        Past Surgical History:   Procedure Laterality Date  COLONOSCOPY N/A 3/1/2019    COLONOSCOPY performed by Katiana Cotto [facility] at [facility] ENDOSCOPY    HX BREAST BIOPSY Bilateral     benign    HX [address]    HX HYSTERECTOMY  2008    HX KNEE ARTHROSCOPY Bilateral 2012    HX KNEE REPLACEMENT Left 2012    HX KNEE REPLACEMENT Right 08/18/2016    HX SEPTOPLASTY  1993    HX SVT ABLATION  2013    HX TUBAL LIGATION  1989    HX UROLOGICAL Right 07/2020    nephrectomy    NEUROLOGICAL PROCEDURE UNLISTED      lumbar nerve block         Family History:   Problem Relation Age of Onset    Arthritis-osteo Mother     Psychiatric Disorder Father     Arthritis-osteo Father     Alcohol abuse Father     Heart Disease Father     Heart Surgery Father     Hypertension Father     Diabetes Brother     Arthritis-osteo Brother     Hypertension Brother     Deep Vein Thrombosis Brother         Unknown cause    Anesth Problems Neg Hx        Social History     Socioeconomic History    Marital status: SINGLE     Spouse name: Not on file    Number of children: Not on file    Years of education: Not on file    Highest education level: Not on file   Occupational History    Not on file   Social Needs    Financial resource strain: Not on file    Food insecurity     Worry: Not on file     Inability: Not on file    Transportation needs     Medical: Not on file     Non-medical: Not on file   Tobacco Use    Smoking status: Current Every Day Smoker     Packs/day: 1.00     Years: 40.00     Pack years: 40.00     Types: Cigarettes    Smokeless tobacco: Never Used   Substance and Sexual Activity    Alcohol use: Not Currently    Drug use: No    Sexual activity: Never   Lifestyle    Physical activity     Days per week: Not on file     Minutes per session: Not on file    Stress: Not on file   Relationships    Social connections     Talks on phone: Not on file     Gets together: Not on file     Attends [employer] service: Not on file     Active member of club or organization: Not on file     Attends meetings of clubs or organizations: Not on file     Relationship status: Not on file    Intimate partner violence     Fear of current or ex partner: Not on file     Emotionally abused: Not on file     Physically abused: Not on file     Forced sexual activity: Not on file   Other Topics Concern    Not on file   Social History Narrative    Not on file         ALLERGIES: Nsaids (non-steroidal anti-inflammatory drug); Mobic [meloxicam]; Penicillins; and Plaquenil [hydroxychloroquine]    Review of Systems   Constitutional: Negative. Negative for activity change, chills, fatigue and unexpected weight change. HENT: Negative for trouble swallowing. Respiratory: Negative for cough, chest tightness, shortness of breath and wheezing. Cardiovascular: Negative. Negative for chest pain and palpitations. Gastrointestinal: Negative. Negative for abdominal pain, diarrhea, nausea and vomiting. Genitourinary: Negative. Negative for dysuria, flank pain, frequency and hematuria. Musculoskeletal: Negative. Negative for arthralgias, back pain, neck pain and neck stiffness. Skin: Negative. Negative for color change and rash. Neurological: Positive for speech difficulty (Slurred speech for 1 day). Negative for dizziness, seizures, syncope, facial asymmetry and numbness. All other systems reviewed and are negative. Vitals:    10/27/20 1845   BP: (!) 151/89   Pulse: 85   Resp: 20   Temp: 97.9 °F (36.6 °C)   SpO2: 96%            Physical Exam  Vitals signs and nursing note reviewed. Constitutional:       General: She is not in acute distress. Appearance: She is well-developed. She is not toxic-appearing or diaphoretic. HENT:      Head: Normocephalic and atraumatic. Eyes:      General:         Right eye: No discharge. Left eye: No discharge. Conjunctiva/sclera: Conjunctivae normal.      Pupils: Pupils are equal, round, and reactive to light.    Neck: Musculoskeletal: Full passive range of motion without pain and normal range of motion. Trachea: No tracheal tenderness. Cardiovascular:      Rate and Rhythm: Normal rate and regular rhythm. Pulses: Normal pulses. Heart sounds: Normal heart sounds. No murmur. No friction rub. No gallop. Pulmonary:      Effort: Pulmonary effort is normal. No respiratory distress. Breath sounds: Normal breath sounds. No wheezing or rales. Chest:      Chest wall: No tenderness. Abdominal:      General: Bowel sounds are normal. There is no distension. Palpations: Abdomen is soft. Tenderness: There is no abdominal tenderness. There is no guarding or rebound. Musculoskeletal: Normal range of motion. General: No tenderness. Skin:     General: Skin is warm and dry. Capillary Refill: Capillary refill takes less than 2 seconds. Findings: No abrasion, erythema or rash. Neurological:      Mental Status: She is alert and oriented to person, place, and time. Cranial Nerves: No cranial nerve deficit. Sensory: No sensory deficit. Coordination: Coordination normal.      Comments: FingertipNosefingertip intact. No facial droop. Speech normal.  Negative pronator drift. Strength 5/5 in upper and lower extremities. States pain with raising her right leg which is chronic for her due to lumbar disc issues.    Psychiatric:         Speech: Speech normal.         Behavior: Behavior normal.          MDM  Number of Diagnoses or Management Options  Cigarette nicotine dependence with other nicotine-induced disorder:   Dysphasia:   History of diabetes mellitus:   History of renal cell carcinoma:   Hypokalemia:   Diagnosis management comments:   Ddx: CVA, TIA, dehydration, electrolyte abnormality, UTI       Amount and/or Complexity of Data Reviewed  Clinical lab tests: reviewed and ordered  Tests in the radiology section of CPT®: ordered and reviewed  Review and summarize past medical records: yes  Discuss the patient with other providers: yes  Independent visualization of images, tracings, or specimens: yes    Patient Progress  Patient progress: stable         Procedures    I discussed patient's PMH, exam findings as well as careplan with the ER attending who agrees with care plan. Discussed with Dr. Mart Krueger who agrees with head CT, labs, EKG and recommends nonemergent consultation with teleneurology for further recommendation once labs result. Mian Devries PA-C    EKG interpretation:   Rhythm: normal sinus rhythm; and regular . Rate (approx.): 72; Axis: normal; P wave: normal; QRS interval: normal ; ST/T wave: normal; Other findings: prolonged QT. QT prolonged on EKG from 8/24/20 as well. Patient is a 54-year-old female who presents with 8-9 days of cognitive \"fuzziness\", and had 1 day of slurred speech that resolved the same day 9-10 days ago. She has no focal neuro deficits on exam.  Head CT unremarkable, labs  and urinalysis unremarkable. Will consult teleneurology for further recommendation. CONSULT NOTE:   8:50pm  Mian Devries PA-C spoke with Dr. Everardo Mistry,   Specialty: tele-neurologist  Discussed pt's hx, disposition, and available diagnostic and imaging results. Reviewed care plans. Consultant agrees with plans as outlined. He will see patient and determine further management. Written by Mian Devries PA-C.    CONSULT NOTE:   9:32 PM  Main Devries PA-C spoke with Dr. Everardo Mistry,   Specialty: tele-neurologist  Discussed pt's hx, disposition, and available diagnostic and imaging results. Reviewed care plans. Consultant agrees with plans as outlined.   He sttes based on patient's risk factors of renal cell cancer, smoker, diabetic and hx of CVA he sees on her CT today- states a subtle old R caudate CVA, he recommends admission to hospital for MR brain w/ and w/o contrast, MRA head and neck without contrast. Dr. Jahaira Bravo states give ASA 81mg at this time.  Written by PATIENCE Wagoner Serve Consult for Admission  9:53 PM    ED Room Number: R32/R32  Patient Name and age:  Sin Ramírez 63 y.o.  female  Working Diagnosis:   1. Dysphasia    2. Hypokalemia    3. History of renal cell carcinoma    4. History of diabetes mellitus    5. Cigarette nicotine dependence with other nicotine-induced disorder        COVID-19 Suspicion:  no  Sepsis present:  no  Reassessment needed: N/A  Code Status:  Full Code  Readmission: no  Isolation Requirements:  no  Recommended Level of Care:  telemetry  Department:[facility] Adult ED - 21   Other:  Slurred speech for 1 day 8-9 days ago, difficulty finding her words ever since. Hx of renal cell carcinoma, diabetes, tobacco use. Tele-neuro consulted, states there is a subtle chronic R caudate CVA and based on her risk factors he recommends admission, MR brain w and w/o contrast, MRA head and neck w/o contrast and other tests as indicated due to her history and said for now 81mg ASA. Dr. Beth Fatima states to see his note for further recommendations.

## 2020-10-28 ENCOUNTER — APPOINTMENT (OUTPATIENT)
Dept: NON INVASIVE DIAGNOSTICS | Age: 55
End: 2020-10-28
Attending: INTERNAL MEDICINE
Payer: MEDICARE

## 2020-10-28 ENCOUNTER — APPOINTMENT (OUTPATIENT)
Dept: VASCULAR SURGERY | Age: 55
End: 2020-10-28
Attending: INTERNAL MEDICINE
Payer: MEDICARE

## 2020-10-28 ENCOUNTER — APPOINTMENT (OUTPATIENT)
Dept: MRI IMAGING | Age: 55
End: 2020-10-28
Attending: INTERNAL MEDICINE
Payer: MEDICARE

## 2020-10-28 VITALS
OXYGEN SATURATION: 96 % | TEMPERATURE: 98.2 F | RESPIRATION RATE: 17 BRPM | SYSTOLIC BLOOD PRESSURE: 125 MMHG | DIASTOLIC BLOOD PRESSURE: 75 MMHG | WEIGHT: 202 LBS | HEIGHT: 63 IN | HEART RATE: 77 BPM | BODY MASS INDEX: 35.79 KG/M2

## 2020-10-28 PROBLEM — R47.01 APHASIA: Status: ACTIVE | Noted: 2020-10-28

## 2020-10-28 LAB
AMPHET UR QL SCN: NEGATIVE
ATRIAL RATE: 72 BPM
BARBITURATES UR QL SCN: NEGATIVE
BENZODIAZ UR QL: NEGATIVE
CALCULATED P AXIS, ECG09: 49 DEGREES
CALCULATED R AXIS, ECG10: -19 DEGREES
CALCULATED T AXIS, ECG11: 36 DEGREES
CANNABINOIDS UR QL SCN: NEGATIVE
CHOLEST SERPL-MCNC: 117 MG/DL
COCAINE UR QL SCN: NEGATIVE
CRP SERPL-MCNC: <0.29 MG/DL (ref 0–0.6)
DIAGNOSIS, 93000: NORMAL
DRUG SCRN COMMENT,DRGCM: NORMAL
FOLATE SERPL-MCNC: 47.9 NG/ML (ref 5–21)
GLUCOSE BLD STRIP.AUTO-MCNC: 114 MG/DL (ref 65–100)
GLUCOSE BLD STRIP.AUTO-MCNC: 116 MG/DL (ref 65–100)
GLUCOSE BLD STRIP.AUTO-MCNC: 99 MG/DL (ref 65–100)
HDLC SERPL-MCNC: 39 MG/DL
HDLC SERPL: 3 {RATIO} (ref 0–5)
LDLC SERPL CALC-MCNC: 45.4 MG/DL (ref 0–100)
LEFT CCA DIST DIAS: 34.4 CM/S
LEFT CCA DIST SYS: 82.8 CM/S
LEFT CCA PROX DIAS: 34.4 CM/S
LEFT CCA PROX SYS: 107.1 CM/S
LEFT ECA DIAS: 6.94 CM/S
LEFT ECA SYS: 69.9 CM/S
LEFT ICA DIST DIAS: 39.2 CM/S
LEFT ICA DIST SYS: 94.1 CM/S
LEFT ICA MID DIAS: 29.4 CM/S
LEFT ICA MID SYS: 82.9 CM/S
LEFT ICA PROX DIAS: 20.3 CM/S
LEFT ICA PROX SYS: 51.7 CM/S
LEFT ICA/CCA SYS: 1.14
LEFT VERTEBRAL DIAS: 22.94 CM/S
LEFT VERTEBRAL SYS: 63.1 CM/S
LIPID PROFILE,FLP: ABNORMAL
MAGNESIUM SERPL-MCNC: 2.7 MG/DL (ref 1.6–2.4)
METHADONE UR QL: NEGATIVE
OPIATES UR QL: NEGATIVE
P-R INTERVAL, ECG05: 152 MS
PCP UR QL: NEGATIVE
PHOSPHATE SERPL-MCNC: 4.3 MG/DL (ref 2.6–4.7)
Q-T INTERVAL, ECG07: 444 MS
QRS DURATION, ECG06: 88 MS
QTC CALCULATION (BEZET), ECG08: 486 MS
RIGHT CCA DIST DIAS: 26.6 CM/S
RIGHT CCA DIST SYS: 90.7 CM/S
RIGHT CCA PROX DIAS: 20.8 CM/S
RIGHT CCA PROX SYS: 87.7 CM/S
RIGHT ECA DIAS: 14.99 CM/S
RIGHT ECA SYS: 71.7 CM/S
RIGHT ICA DIST DIAS: 44.1 CM/S
RIGHT ICA DIST SYS: 90.7 CM/S
RIGHT ICA MID DIAS: 26.6 CM/S
RIGHT ICA MID SYS: 76.1 CM/S
RIGHT ICA PROX DIAS: 23.7 CM/S
RIGHT ICA PROX SYS: 83.4 CM/S
RIGHT ICA/CCA SYS: 1
RIGHT VERTEBRAL DIAS: 30 CM/S
RIGHT VERTEBRAL SYS: 78.4 CM/S
SERVICE CMNT-IMP: ABNORMAL
SERVICE CMNT-IMP: ABNORMAL
SERVICE CMNT-IMP: NORMAL
TRIGL SERPL-MCNC: 163 MG/DL (ref ?–150)
TROPONIN I SERPL-MCNC: <0.05 NG/ML
TSH SERPL DL<=0.05 MIU/L-ACNC: 1.45 UIU/ML (ref 0.36–3.74)
VENTRICULAR RATE, ECG03: 72 BPM
VIT B12 SERPL-MCNC: >2000 PG/ML (ref 193–986)
VLDLC SERPL CALC-MCNC: 32.6 MG/DL

## 2020-10-28 PROCEDURE — 36415 COLL VENOUS BLD VENIPUNCTURE: CPT

## 2020-10-28 PROCEDURE — 84100 ASSAY OF PHOSPHORUS: CPT

## 2020-10-28 PROCEDURE — 84484 ASSAY OF TROPONIN QUANT: CPT

## 2020-10-28 PROCEDURE — 93880 EXTRACRANIAL BILAT STUDY: CPT

## 2020-10-28 PROCEDURE — 82607 VITAMIN B-12: CPT

## 2020-10-28 PROCEDURE — 70551 MRI BRAIN STEM W/O DYE: CPT

## 2020-10-28 PROCEDURE — 99214 OFFICE O/P EST MOD 30 MIN: CPT | Performed by: PSYCHIATRY & NEUROLOGY

## 2020-10-28 PROCEDURE — 82962 GLUCOSE BLOOD TEST: CPT

## 2020-10-28 PROCEDURE — 80061 LIPID PANEL: CPT

## 2020-10-28 PROCEDURE — 80307 DRUG TEST PRSMV CHEM ANLYZR: CPT

## 2020-10-28 PROCEDURE — 70544 MR ANGIOGRAPHY HEAD W/O DYE: CPT

## 2020-10-28 PROCEDURE — 86140 C-REACTIVE PROTEIN: CPT

## 2020-10-28 PROCEDURE — 93306 TTE W/DOPPLER COMPLETE: CPT

## 2020-10-28 PROCEDURE — 83735 ASSAY OF MAGNESIUM: CPT

## 2020-10-28 PROCEDURE — 74011250636 HC RX REV CODE- 250/636: Performed by: INTERNAL MEDICINE

## 2020-10-28 PROCEDURE — 82746 ASSAY OF FOLIC ACID SERUM: CPT

## 2020-10-28 PROCEDURE — 84443 ASSAY THYROID STIM HORMONE: CPT

## 2020-10-28 PROCEDURE — 74011250637 HC RX REV CODE- 250/637: Performed by: INTERNAL MEDICINE

## 2020-10-28 PROCEDURE — 65390000012 HC CONDITION CODE 44 OBSERVATION

## 2020-10-28 PROCEDURE — 99218 HC RM OBSERVATION: CPT

## 2020-10-28 RX ORDER — LORAZEPAM 0.5 MG/1
0.5 TABLET ORAL
Status: DISCONTINUED | OUTPATIENT
Start: 2020-10-28 | End: 2020-10-28 | Stop reason: HOSPADM

## 2020-10-28 RX ORDER — FOLIC ACID 1 MG/1
1 TABLET ORAL DAILY
Status: DISCONTINUED | OUTPATIENT
Start: 2020-10-28 | End: 2020-10-28 | Stop reason: HOSPADM

## 2020-10-28 RX ORDER — LOSARTAN POTASSIUM 50 MG/1
100 TABLET ORAL
Status: DISCONTINUED | OUTPATIENT
Start: 2020-10-28 | End: 2020-10-28 | Stop reason: HOSPADM

## 2020-10-28 RX ORDER — DEXTROSE 50 % IN WATER (D50W) INTRAVENOUS SYRINGE
12.5-25 AS NEEDED
Status: DISCONTINUED | OUTPATIENT
Start: 2020-10-28 | End: 2020-10-28 | Stop reason: CLARIF

## 2020-10-28 RX ORDER — ACETAMINOPHEN 650 MG/1
650 SUPPOSITORY RECTAL
Status: DISCONTINUED | OUTPATIENT
Start: 2020-10-28 | End: 2020-10-28 | Stop reason: HOSPADM

## 2020-10-28 RX ORDER — TIZANIDINE 2 MG/1
2 TABLET ORAL 2 TIMES DAILY
Status: DISCONTINUED | OUTPATIENT
Start: 2020-10-28 | End: 2020-10-28 | Stop reason: HOSPADM

## 2020-10-28 RX ORDER — TOPIRAMATE 25 MG/1
50 TABLET ORAL 2 TIMES DAILY
Status: DISCONTINUED | OUTPATIENT
Start: 2020-10-28 | End: 2020-10-28 | Stop reason: HOSPADM

## 2020-10-28 RX ORDER — DILTIAZEM HYDROCHLORIDE 180 MG/1
180 CAPSULE, COATED, EXTENDED RELEASE ORAL DAILY
Status: DISCONTINUED | OUTPATIENT
Start: 2020-10-28 | End: 2020-10-28

## 2020-10-28 RX ORDER — IBUPROFEN 200 MG
1 TABLET ORAL DAILY
Status: DISCONTINUED | OUTPATIENT
Start: 2020-10-28 | End: 2020-10-28 | Stop reason: HOSPADM

## 2020-10-28 RX ORDER — ONDANSETRON 2 MG/ML
4 INJECTION INTRAMUSCULAR; INTRAVENOUS
Status: DISCONTINUED | OUTPATIENT
Start: 2020-10-28 | End: 2020-10-28 | Stop reason: HOSPADM

## 2020-10-28 RX ORDER — BENZONATATE 100 MG/1
100 CAPSULE ORAL
Status: DISCONTINUED | OUTPATIENT
Start: 2020-10-28 | End: 2020-10-28 | Stop reason: HOSPADM

## 2020-10-28 RX ORDER — VENLAFAXINE HYDROCHLORIDE 150 MG/1
150 CAPSULE, EXTENDED RELEASE ORAL DAILY
Status: DISCONTINUED | OUTPATIENT
Start: 2020-10-28 | End: 2020-10-28 | Stop reason: HOSPADM

## 2020-10-28 RX ORDER — BISACODYL 5 MG
5 TABLET, DELAYED RELEASE (ENTERIC COATED) ORAL DAILY PRN
Status: DISCONTINUED | OUTPATIENT
Start: 2020-10-28 | End: 2020-10-28 | Stop reason: HOSPADM

## 2020-10-28 RX ORDER — ATORVASTATIN CALCIUM 20 MG/1
40 TABLET, FILM COATED ORAL DAILY
Status: DISCONTINUED | OUTPATIENT
Start: 2020-10-28 | End: 2020-10-28 | Stop reason: HOSPADM

## 2020-10-28 RX ORDER — DILTIAZEM HYDROCHLORIDE 180 MG/1
360 CAPSULE, COATED, EXTENDED RELEASE ORAL DAILY
Status: DISCONTINUED | OUTPATIENT
Start: 2020-10-28 | End: 2020-10-28 | Stop reason: HOSPADM

## 2020-10-28 RX ORDER — MAGNESIUM SULFATE 100 %
4 CRYSTALS MISCELLANEOUS AS NEEDED
Status: DISCONTINUED | OUTPATIENT
Start: 2020-10-28 | End: 2020-10-28 | Stop reason: HOSPADM

## 2020-10-28 RX ORDER — ROPINIROLE 1 MG/1
2 TABLET, FILM COATED ORAL 3 TIMES DAILY
Status: DISCONTINUED | OUTPATIENT
Start: 2020-10-28 | End: 2020-10-28 | Stop reason: HOSPADM

## 2020-10-28 RX ORDER — INSULIN LISPRO 100 [IU]/ML
INJECTION, SOLUTION INTRAVENOUS; SUBCUTANEOUS
Status: DISCONTINUED | OUTPATIENT
Start: 2020-10-28 | End: 2020-10-28 | Stop reason: HOSPADM

## 2020-10-28 RX ORDER — TRAZODONE HYDROCHLORIDE 50 MG/1
50 TABLET ORAL
Status: DISCONTINUED | OUTPATIENT
Start: 2020-10-28 | End: 2020-10-28 | Stop reason: HOSPADM

## 2020-10-28 RX ORDER — ACETAMINOPHEN 325 MG/1
650 TABLET ORAL
Status: DISCONTINUED | OUTPATIENT
Start: 2020-10-28 | End: 2020-10-28 | Stop reason: HOSPADM

## 2020-10-28 RX ORDER — PANTOPRAZOLE SODIUM 40 MG/1
40 TABLET, DELAYED RELEASE ORAL
Status: DISCONTINUED | OUTPATIENT
Start: 2020-10-28 | End: 2020-10-28 | Stop reason: HOSPADM

## 2020-10-28 RX ORDER — IPRATROPIUM BROMIDE AND ALBUTEROL SULFATE 2.5; .5 MG/3ML; MG/3ML
3 SOLUTION RESPIRATORY (INHALATION)
Status: DISCONTINUED | OUTPATIENT
Start: 2020-10-28 | End: 2020-10-28 | Stop reason: HOSPADM

## 2020-10-28 RX ORDER — HEPARIN SODIUM 5000 [USP'U]/ML
5000 INJECTION, SOLUTION INTRAVENOUS; SUBCUTANEOUS EVERY 8 HOURS
Status: DISCONTINUED | OUTPATIENT
Start: 2020-10-28 | End: 2020-10-28 | Stop reason: HOSPADM

## 2020-10-28 RX ORDER — PREGABALIN 75 MG/1
150 CAPSULE ORAL 2 TIMES DAILY
Status: DISCONTINUED | OUTPATIENT
Start: 2020-10-28 | End: 2020-10-28 | Stop reason: HOSPADM

## 2020-10-28 RX ADMIN — SODIUM CHLORIDE, SODIUM LACTATE, POTASSIUM CHLORIDE, AND CALCIUM CHLORIDE 125 ML/HR: 600; 310; 30; 20 INJECTION, SOLUTION INTRAVENOUS at 04:54

## 2020-10-28 RX ADMIN — PREGABALIN 150 MG: 75 CAPSULE ORAL at 18:09

## 2020-10-28 RX ADMIN — TIZANIDINE 2 MG: 2 TABLET ORAL at 18:52

## 2020-10-28 RX ADMIN — TOPIRAMATE 50 MG: 25 TABLET, FILM COATED ORAL at 09:57

## 2020-10-28 RX ADMIN — FOLIC ACID 1 MG: 1 TABLET ORAL at 09:56

## 2020-10-28 RX ADMIN — ROPINIROLE HYDROCHLORIDE 2 MG: 1 TABLET, FILM COATED ORAL at 18:52

## 2020-10-28 RX ADMIN — VENLAFAXINE HYDROCHLORIDE 150 MG: 150 CAPSULE, EXTENDED RELEASE ORAL at 09:57

## 2020-10-28 RX ADMIN — ATORVASTATIN CALCIUM 40 MG: 20 TABLET, FILM COATED ORAL at 09:56

## 2020-10-28 RX ADMIN — ACETAMINOPHEN 650 MG: 500 TABLET ORAL at 08:14

## 2020-10-28 RX ADMIN — ROPINIROLE HYDROCHLORIDE 2 MG: 1 TABLET, FILM COATED ORAL at 09:57

## 2020-10-28 RX ADMIN — DILTIAZEM HYDROCHLORIDE 360 MG: 180 CAPSULE, COATED, EXTENDED RELEASE ORAL at 17:03

## 2020-10-28 RX ADMIN — TOPIRAMATE 50 MG: 25 TABLET, FILM COATED ORAL at 18:52

## 2020-10-28 NOTE — PROGRESS NOTES
Hospitalist Progress Note:     Patient seen and examined. Sleeping. Awoke patient and easily falls asleep during encounter/exam. Able to tell me that she has difficulty word finding. Speech appears clear, no localized deficits on exam. Evaluated by tele-neurology earlier and recommended MRI/MRA brain, now pending. Discussed with patient.        Saul Culp, DO

## 2020-10-28 NOTE — H&P
[address]  HISTORY AND PHYSICAL    Name:  Miles Mccall  MR#:  [id number]  :  1965  ACCOUNT #:  [de-identified]  ADMIT DATE:  10/27/2020      The patient was seen, evaluated and admitted by me on 10/27/2020. PRIMARY CARE PHYSICIAN:  Kristin Zhang MD    SOURCE OF INFORMATION:  Patient. CHIEF COMPLAINT:  Difficulty with speech. HISTORY OF PRESENT ILLNESS:  This is a 79-year-old woman with past medical history significant for hypertension; dyslipidemia; type 2 diabetes; renal cell carcinoma, status post right nephrectomy; COPD; and anxiety disorder, who was in her usual state of health until about a week ago when the patient developed difficulty with speech. The patient stated that she is having problems getting her words out. She knows what she wanted to say, but she is having problem saying it or getting it out. The patient came to the emergency room because of her worsening symptoms. When the patient arrived at the emergency room, CT scan of the head without contrast was obtained. The CT scan was negative. The emergency room provider consulted teleneurologist who advised admission to the hospital for further evaluation for stroke. The patient was last admitted to this hospital in 2020 and underwent right nephrectomy for renal cell carcinoma. She also stated that she is going through a lot of stress at home at present. The patient did not go in to the detail of the stressful situation. The patient denies fever, rigors, or chills. No prior history of TIA or CVA. PAST MEDICAL HISTORY:  1. Hypertension. 2.  Dyslipidemia. 3.  Type 2 diabetes. 4.  Anxiety disorder. 5.  COPD. 6.  Renal cell carcinoma, status post right nephrectomy. ALLERGIES:  THE PATIENT IS ALLERGIC TO NONSTEROIDAL ANTI-INFLAMMATORY DRUGS, PENICILLIN, AND PLAQUENIL. MEDICATIONS:  1. Lipitor 40 mg daily. 2.  Cardizem  mg 2 capsules daily. 3.  Folic acid 1 mg daily.   4.  Lasix 20 mg daily as needed. 5.  Ativan 0.5 mg twice daily as needed for anxiety. 6.  Losartan 100 mg daily at bedtime. 7.  Glucophage 500 mg twice daily. 8.  Prilosec 40 mg daily. 9.  Lyrica 150 mg twice daily. 10.  Requip 2 mg 3 times daily. 11.  Spiriva 18 mcg inhalation daily. 12.  Zanaflex 2 mg twice daily as needed for muscle spasm. 13.  Topamax 50 mg daily. 14.  Trazodone 50 mg daily at bedtime. 15.  Effexor  mg daily. FAMILY HISTORY:  This was reviewed. Her mother had osteoarthritis. Her father had osteoarthritis, heart disease, hypertension, and diabetes. PAST SURGICAL HISTORY:  This is significant for:  1. Hysterectomy. 2.  Cholecystectomy. 3.  Bilateral knee replacement. 4.  Tubal ligation. 5.  Right nephrectomy. SOCIAL HISTORY:  The patient smokes about a pack of cigarettes daily. Denies alcohol abuse. REVIEW OF SYSTEMS:  HEAD, EYES, EARS, NOSE, AND THROAT:  This is positive for headache and difficulty with speech. No blurring of vision, no photophobia. RESPIRATORY SYSTEM:  No cough, no shortness of breath, no hemoptysis. CARDIOVASCULAR SYSTEM:  No chest pain, no orthopnea, no palpitations. GASTROINTESTINAL SYSTEM:  No nausea or vomiting. No diarrhea, no constipation. GENITOURINARY SYSTEM:  No dysuria, no urgency, no frequency. All other systems are reviewed and they are negative. PHYSICAL EXAMINATION:  GENERAL APPEARANCE:  The patient appeared ill, in moderate distress. VITAL SIGNS:  On arrival at the emergency room; temperature 97.9, pulse 85, respiratory rate 20, blood pressure 151/89, oxygen saturation 96% on room air. HEENT:  Head:  Normocephalic, atraumatic. Eyes:  Normal eye movement. No redness, no drainage, no discharge. Ears:  Normal external ears. No evidence of drainage. Nose:  No deformity, no drainage. Mouth and Throat:  No visible oral lesion. Dry oral mucosa. NECK:  Neck is supple. No JVD, no thyromegaly. CHEST:  Clear breath sounds.   No wheezing, no crackles. HEART:  Normal S1 and S2, regular. No clinically appreciable murmur. ABDOMEN:  Soft, nontender. Normal bowel sounds. CNS:  Alert and oriented x3. No gross focal neurological deficit. EXTREMITIES:  No edema. Pulses 2+ bilaterally. MUSCULOSKELETAL SYSTEM:  No obvious joint deformity or swelling. SKIN:  No active skin lesions seen in the exposed part of the body. PSYCHIATRY:  Normal mood and affect. LYMPHATIC SYSTEM:  No cervical lymphadenopathy. DIAGNOSTIC DATA:  EKG shows normal sinus rhythm. No significant ST or T-waves abnormalities. CT scan of the head without contrast, negative. Chest x-ray, negative. LABORATORY DATA:  Urinalysis: This is significant for negative nitrite, negative leukocyte esterase, negative bacteria, negative blood. Hematology:  WBC 12.5, hemoglobin 13.5, hematocrit 40.5, platelets 479. Magnesium level 2.7. Cardiac profile:  Troponin less than 0.05. Chemistry:  Sodium 141, potassium 3.1, chloride 108, CO2 of 26, glucose 96, BUN 10, creatinine 1.18, calcium 9.5, total bilirubin 0.3, ALT 32, AST 22, alkaline phosphatase 184, total protein 7.4, albumin level 3.9, globulin 3.5. ASSESSMENT:  1. Suspected acute cerebrovascular accident. 2.  Hypertension. 3.  Dyslipidemia. 4.  Hypokalemia. 5.  Leukocytosis. 6.  Hypermagnesemia. 7.  Tobacco abuse. 8.  Type 2 diabetes. 9.  Right renal cell carcinoma. 10.  Anxiety disorder. 11.  Chronic obstructive pulmonary disease. PLAN:  1. Suspected acute CVA:  We will admit the patient for further evaluation and treatment. We will start the patient on aspirin. We will check hemoglobin A1c level if one has not been checked recently. We will check lipid profile. We will obtain MRI and MRA of the brain, echocardiogram, carotid ultrasound of the neck. Inpatient Neurology consult will be requested to assist in further evaluation and treatment. We will check urine drug screen.   We will check folic acid and B12 level. We will also check C-reactive protein level to evaluate the patient for systemic inflammation. We will await further recommendation from the inpatient neurologist.  2.  Hypertension: We will resume preadmission medication. We will monitor the patient's blood pressure closely. 3.  Dyslipidemia: We will resume home medication. We will check lipid profile as stated above. 4.  Hypokalemia: We will replace potassium and repeat potassium level. 5.  Leukocytosis:  The patient is afebrile, not toxic. Chest x-ray is negative, urinalysis is negative. We will repeat lab. 6.  Hypermagnesemia: We will avoid magnesium supplement and repeat magnesium level. 7.  Tobacco abuse: The patient advised to quit smoking. We will place the patient on Nicoderm patch. 8.  Type 2 diabetes: The patient will be placed on sliding scale with insulin coverage. We will check hemoglobin A1c level. We will hold Glucophage till the time of discharge from the hospital.  9.  Right renal cell carcinoma: The patient is status post right nephrectomy. No chemotherapy, no radiation. We will continue to monitor. The patient will follow up with her urologist as scheduled. 10.  Anxiety disorder: We will continue with preadmission medication. We will check a TSH level. 11.  COPD:  We will place the patient on DuoNeb as needed. OTHER ISSUES:  Code status: The patient is a full code. We will place the patient on heparin for DVT prophylaxis. FUNCTIONAL STATUS PRIOR TO ADMISSION:  The patient came from home. The patient is ambulatory with no assistant or device. COVID PRECAUTION:  The patient was wearing a face mask. I was wearing a face mask and gloves for this patient's encounter. MD MEGAN [email or web address]  D:  10/28/2020 5:20  T:  10/28/2020 6:38  JOB #:  [id number]  CC:   Kerline Sanon MD

## 2020-10-28 NOTE — PROGRESS NOTES
Transition of Care Plan:    OBSERVATION      · RUR:   22% GLOS:  Not Assigned  LOS:  1  · Dx:  CVA Workup  · Admitted to Hospitalist, [address] neurology Consulted, Neurology Consulted  · Downgraded to OBS, Code 40, MOON, and State OBS letter delivered  · Disposition:  Anticipate discharge to home, no localized deficits       Reason for Admission:   \"problem putting words together, not able to concentrate\"                  RUR Score:     22%  Core Measure             PCP: First and Last name:  Dr. Horton Reading   Name of Practice:   [facility]   Are you a current patient: Yes/No: Yes   Approximate date of last visit:  9/21/20   Can you participate in a virtual visit if needed: Yes    Do you (patient/family) have any concerns for transition/discharge? No              Plan for utilizing home health:   TBD, no home health needs identified at this time    Current Advanced Directive/Advance Care Plan:  Full Code, Advance Directive on file            Transition of Care Plan:          Kit Gill is a 63year old female to [facility] [place] [facility] with complaints of problem putting words together and inability to concentrate. ED workup:  Tele Neuro Consult, CT scan - negative. Hospitalist Consulted for Admission. MRI/MRA ordered. Downgraded from Inpatient status to OBS. Verified face sheet/demographics.       Neurologist Dr. Jazzy Cheatham, RN, BSN, [facility]  ED Care Management  [phone]

## 2020-10-28 NOTE — CONSULTS
[address]    Name:  Richard Sun  MR#:  [id number]  :  1965  ACCOUNT #:  [de-identified]  DATE OF SERVICE:  10/28/2020      REQUESTING PHYSICIAN:  Dr. Concha Burkett. REASON FOR CONSULTATION:  Speech disturbance. HISTORY OF PRESENT ILLNESS:  The patient is a 79-year-old female with past medical history of type 2 diabetes, hypertension, dyslipidemia, renal cell carcinoma, status post nephrectomy, COPD, who states that about 7-10 days ago, she started noticing some difficulties with her speech. She was looking for words and sometimes she knew what she wanted to say, but could not say it. She was having trouble with fluent conversations and people around her noticed it. She was also finding herself forgetful. Did not have any associated headache, changes in vision, swallowing difficulties, or focal motor or sensory deficits. She does have chronic low back pain and has tingling and burning sensation on the anterior and lateral aspect of the thigh which has been going on for a long time. She has seen Pain Management for neck pain and lower back pain and has had cortisone shots. Does report moderate amounts of stress in her personal life related to usual household stuff, but nothing unusually abnormal and no stressful life events. No fever, nausea, vomiting, chest pain, shortness of breath, or palpitations. PAST MEDICAL HISTORY:  As mentioned above. ALLERGIES:  NSAIDS, PENICILLIN, PLAQUENIL. HOME MEDICATIONS:  1. Lipitor 40 mg daily. 2.  Lasix. 3.  Folic acid. 4.  Losartan. 5.  Ativan. 6.  Lyrica. 7.  Requip. 8.  Spiriva. 9.  Zanaflex. 9.  Topamax. 10.  Trazodone. 11.  Effexor. FAMILY HISTORY:  Mother has osteoarthritis. Father also had osteoarthritis, heart disease, hypertension, diabetes. SOCIAL HISTORY:  Smokes about a pack of cigarettes per day. No alcohol use. REVIEW OF SYSTEMS:  Negative except as mentioned in the HPI.     PHYSICAL EXAMINATION:  VITAL SIGNS:  Blood pressure 130/98, temperature 98, pulse is 85. HEART:  Regular rate and rhythm. CHEST:  Clear. ABDOMEN:  Soft, nontender. Positive bowel sounds. EXTREMITIES:  No edema. NEUROLOGIC:  The patient is alert, fully oriented. Answers all questions, follows all commands. Speech is clear. Comprehension is normal.  No dysarthria. Face is symmetric. Hearing is baseline. Muscle tone and bulk normal.  Strength normal in both upper and lower extremities. DTRs 2/2 symmetric. Toes downgoing. Sensation intact. Gait exam is deferred. LABORATORY DATA:  CBC is unremarkable. Chemistry:  Sodium 141, potassium 3.1, BUN 10, creatinine 1. 18. Lipid Profile:  Triglycerides 153, HDL 39, LDL 45.4. B12 greater than 2000. MRI scan of the brain did not show any acute infarction. There is an area of old encephalomalacia within the wall of the lateral right ventricle. MRA of the brain was negative. Carotid duplex did not show any hemodynamically significant stenosis. Echocardiogram does not show any cardiac sources of emboli. ASSESSMENT AND PLAN:  A 62-year-old female with multiple vascular risk factors, presenting with 7-10 day history of word-finding difficulty and forgetfulness. She has been worked up from stroke/transient ischemic attack perspective and all of it is negative. She should continue aspirin which was just started and she should continue the statin. Risk factor control in this regard was discussed with the patient. Her symptoms seem to be getting better on their own. She also complains of numbness and burning sensation in the anterior lateral aspect of the thigh and this may be meralgia paresthetica. She has chronic low back pain and neck pain and suspect she has degenerative changes in the lumbar spine.   She can follow up as outpatient in this regard and she has seen Dr. Storm Valle in the past.    She could be discharged home from a neurological standpoint. Thank you for this consultation.       Elbert Guy MD      [email or web address]  D:  10/28/2020 17:40  T:  10/28/2020 18:15  JOB #:  [id number]

## 2020-10-28 NOTE — CONSULTS
Consult dictated. 74-year-old presenting with 7 to 10-day history of word finding difficulty and forgetfulness. Stroke/TIA work-up negative. Continue aspirin and statin. Risk factor control discussed. Also suspect she has meralgia paresthetica and degenerative changes in the lumbar spine causing low back pain. She can follow-up as outpatient in this regard.   Abilio Hopkins MD

## 2020-10-28 NOTE — DISCHARGE SUMMARY
Discharge Summary       PATIENT ID: Yazmin Alanis  MRN: [id number]   YOB: 1965    DATE OF ADMISSION: 10/27/2020  6:55 PM    DATE OF DISCHARGE: 10/28/2020   PRIMARY CARE PROVIDER: Lazaro Burt MD     ATTENDING PHYSICIAN: Carrie Altamirano DO   DISCHARGING PROVIDER: Carrie Altamirano DO    To contact this individual call [phone] and ask the  to page. If unavailable ask to be transferred the Adult Hospitalist Department. CONSULTATIONS: IP CONSULT TO NEUROLOGY    PROCEDURES/SURGERIES: * No surgery found *    ADMITTING DIAGNOSES & HOSPITAL COURSE:     59-year-old female with PMH HTN, HLD, type 2 diabetes, RCC s/p right nephrectomy, COPD, anxiety disorder, presenting to [facility] with difficulty with speech for approximately 1 week. Patient reported having problems getting her words out. She presented to the ED for further evaluation. Tele-neurology consulted in the ED and recommended further MRI/MRA. Inpatient neurology consulted. MRI negative for acute stroke and MRA negative for low limiting stenosis. Patient had fluent speech during exam. She was stable for discharge from neurology perspective with outpatient follow up. She was instructed to take aspirin 81 mg daily but not in conjunction with BC powder. Results of imaging studies discussed with patient. MRI brain:   IMPRESSION:   1. Old chronic small focal encephalomalacia ependymoma right mid lateral  ventricle. 2. No acute intracranial abnormality demonstrated.     MRA brain:   IMPRESSION: Normal MRA of the head. Carotid dopplers:  Right Carotid     The right CCA is patent. There is mild stenosis in the right ICA (<50%) and at the proximal segment. The right ICA has mild and mixed density plaque. The right ECA is patent. The right vertebral is antegrade. Left Carotid     The left CCA is patent. There is minimal stenosis in the left ICA and at the proximal segment . The left ICA has homogeneous plaque.  The left ECA is patent. The left vertebral is antegrade. Echo:   · Saline contrast was given to evaluate for intracardiac shunt. No right to left shunt was noted. · LV: Estimated LVEF is 55 - 60%. Normal cavity size, wall thickness and systolic function (ejection fraction normal). Wall motion: normal. Mild (grade 1) left ventricular diastolic dysfunction. · LA: Mildly dilated left atrium. ·   DISCHARGE DIAGNOSES / PLAN:      Difficulty word finding and forgetfulness:   -MRI negative for acute stroke. -MRA negative for flow limiting abnormality  -carotid dopplers with minimal stenosis  -recommend aspirin 81 mg and continue home statin  -outpatient follow up with known neurologist Dr. Naveen Arzate     Suspected meralgia paresthetica: PCP follow up     HTN  HLD  Hypokalemia  Tobacco abuse  Type II diabetes  RCC s/p nephrectomy  COPD         ADDITIONAL CARE RECOMMENDATIONS:     Take aspirin daily. Do not combine with BC powder. Recommend reducing/discontinuing BC powder use due to significant risk for stomach ulcers. PENDING TEST RESULTS:   At the time of discharge the following test results are still pending: none    FOLLOW UP APPOINTMENTS:    Follow-up Information     Follow up With Specialties Details Why Contact Info    Jose Bain MD Family Medicine   [address] [place] [address], DO Neurology  Please schedule follow up  [address] [address]  [phone]           DISCHARGE MEDICATIONS:  Current Discharge Medication List      CONTINUE these medications which have NOT CHANGED    Details   dilTIAZem ER (CARDIZEM CD) 180 mg capsule TAKE 2 CAPSULES EVERY DAY FOR HIGH BLOOD PRESSURE  Qty: 180 Cap, Refills: 1      folic acid (FOLVITE) 1 mg tablet Take 1 Tab by mouth daily.   Qty: 90 Tab, Refills: 3    Associated Diagnoses: Low folate      tiZANidine (ZANAFLEX) 2 mg tablet TAKE 1 TABLET TWICE DAILY FOR MUSCLE SPASM(S)  Qty: 180 Tab, Refills: 1    Associated Diagnoses: Spondylosis of lumbar spine      zinc 50 mg tab tablet Take 50 mg by mouth daily. rOPINIRole (REQUIP) 2 mg tablet Take 1 Tab by mouth three (3) times daily. Qty: 270 Tab, Refills: 1    Associated Diagnoses: RLS (restless legs syndrome)      omeprazole (PRILOSEC) 40 mg capsule Take 1 Cap by mouth Before breakfast and dinner. Qty: 180 Cap, Refills: 3    Associated Diagnoses: Chronic superficial gastritis without bleeding      atorvastatin (LIPITOR) 40 mg tablet Take 1 Tab by mouth daily. Qty: 90 Tab, Refills: 1    Associated Diagnoses: Hypercholesterolemia      metFORMIN (GLUCOPHAGE) 500 mg tablet Take 500 mg by mouth two (2) times daily (with meals). ascorbic acid (C-500 PO) Take  by mouth daily. selenium 200 mcg cap Take 1 Cap by mouth daily. OTHER 1,000 mg daily. Black elderberry      garlic 8,895 mg cap Take  by mouth daily. cinnamon bark (Cinnamon) 500 mg cap Take  by mouth daily. benzonatate (Tessalon Perles) 100 mg capsule Take 100 mg by mouth three (3) times daily as needed for Cough. Biotin 2,500 mcg cap Take  by mouth daily. losartan (COZAAR) 100 mg tablet Take 100 mg by mouth nightly. pregabalin (LYRICA) 150 mg capsule Take 1 Cap by mouth two (2) times a day. Max Daily Amount: 300 mg. Qty: 180 Cap, Refills: 0    Associated Diagnoses: Type 2 diabetes mellitus with diabetic neuropathy, without long-term current use of insulin ([facility])      furosemide (LASIX) 20 mg tablet TAKE 1 TABLET EVERY DAY AS NEEDED  Qty: 90 Tab, Refills: 1    Associated Diagnoses: Pedal edema      traZODone (DESYREL) 50 mg tablet Take 1 Tab by mouth nightly. Indications: insomnia  Qty: 90 Tab, Refills: 3    Associated Diagnoses: Insomnia, unspecified type      levocetirizine (XYZAL) 5 mg tablet Take 1 Tab by mouth daily.   Qty: 90 Tab, Refills: 3    Associated Diagnoses: Environmental allergies      venlafaxine-SR (EFFEXOR-XR) 150 mg capsule Take 1 Cap by mouth daily. Qty: 90 Cap, Refills: 3    Associated Diagnoses: Dysthymia      LORazepam (ATIVAN) 0.5 mg tablet Take 1 Tab by mouth two (2) times daily as needed for Anxiety. Max Daily Amount: 1 mg. Indications: anxious  Qty: 180 Tab, Refills: 0    Associated Diagnoses: Panic attacks      fluticasone propionate (FLONASE) 50 mcg/actuation nasal spray 2 sprays per nostril once a day  Indications: inflammation of the nose due to an allergy  Qty: 3 Bottle, Refills: 3    Associated Diagnoses: Environmental allergies      tiotropium (SPIRIVA) 18 mcg inhalation capsule Take 1 Cap by inhalation daily. Qty: 90 Cap, Refills: 1    Associated Diagnoses: Chronic obstructive pulmonary disease, unspecified COPD type ([facility])      Blood-Glucose Meter monitoring kit E11.9 Accu-Chek Joyce Plus Meter  Qty: 1 Kit, Refills: 0      TURMERIC ROOT EXTRACT PO Take 1 Cap by mouth daily. STOP taking these medications       cefUROXime (CEFTIN) 500 mg tablet Comments:   Reason for Stopping:         dilTIAZem ER (CARDIZEM LA) 180 mg tablet Comments:   Reason for Stopping:                 NOTIFY YOUR PHYSICIAN FOR ANY OF THE FOLLOWING:   Fever over 101 degrees for 24 hours. Chest pain, shortness of breath, fever, chills, nausea, vomiting, diarrhea, change in mentation, falling, weakness, bleeding. Severe pain or pain not relieved by medications. Or, any other signs or symptoms that you may have questions about.     DISPOSITION:  x  Home With:   OT  PT  HH  RN       Long term SNF/Inpatient Rehab    Independent/assisted living    Hospice    Other:       PATIENT CONDITION AT DISCHARGE:     Functional status    Poor     Deconditioned    x Independent      Cognition    x Lucid     Forgetful     Dementia      Catheters/lines (plus indication)    Haji     PICC     PEG    x None      Code status   x  Full code     DNR      PHYSICAL EXAMINATION AT DISCHARGE:  General:          Alert, cooperative, no distress, appears stated age.     Lungs:             Clear to auscultation bilaterally. No Wheezing or Rhonchi. No rales. Heart:              Regular  rhythm,  No  murmur   No edema  Abdomen:        Soft, non-tender. Not distended. Bowel sounds normal  Extremities:     No cyanosis. No clubbing,    Neurologic:      Alert, moves all extremities, answers questions appropriately and responds to commands       CHRONIC MEDICAL DIAGNOSES:  Problem List as of 10/28/2020 Date Reviewed: 10/28/2020          Codes Class Noted - Resolved    Aphasia ICD-10-CM: R47.01  ICD-9-CM: 784.3  10/28/2020 - Present        * (Principal) Acute CVA (cerebrovascular accident) [facility]) ICD-10-CM: I63.9  ICD-9-CM: 434.91  10/27/2020 - Present        Renal cell carcinoma of right kidney ([facility] [address] ICD-10-CM: C64.1  ICD-9-CM: 189.0  8/24/2020 - Present    Overview Signed 8/24/2020  7:33 PM by MD Dr Amy Ledezma 7/2020. Nephrectomy.              Renal mass, right ICD-10-CM: N28.89  ICD-9-CM: 593.9  7/29/2020 - Present        Right renal mass ICD-10-CM: N28.89  ICD-9-CM: 593.9  7/28/2020 - Present        Colon adenoma ICD-10-CM: D12.6  ICD-9-CM: 211.3  3/18/2019 - Present    Overview Signed 3/18/2019 07:38 AM by Stephanie Tracy MD     [id number]- Dr Kimberly [place]             Chronic superficial gastritis without bleeding ICD-10-CM: K29.30  ICD-9-CM: 535.10  3/18/2019 - Present    Overview Signed 3/18/2019 13:59 AM by Stephanie Tracy MD     EGD             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 ICD-10-CM: M47.12, M47.22  ICD-9-CM: 721.1, 723.4  12/3/2018 - Present        Spondylosis of lumbar spine ICD-10-CM: M47.816  ICD-9-CM: 721.3  12/3/2018 - Present        Abnormal mammogram of left breast ICD-10-CM: R92.8  ICD-9-CM: 793.80  9/26/2018 - Present        Iliotibial band tendinitis of right side ICD-10-CM: M76.31  ICD-9-CM: 728.89  8/30/2018 - Present        Severe obesity with body mass index (BMI) of 35.0 to 39.9 with serious comorbidity ([address] ICD-10-CM: E66.01  ICD-9-CM: 278.01  6/20/2018 - Present        Type 2 diabetes mellitus with diabetic neuropathy ([facility] ICD-10-CM: E11.40  ICD-9-CM: 250.60, 357.2  2/14/2018 - Present        Status post bilateral knee replacements ICD-10-CM: Z96.653  ICD-9-CM: V43.65  11/2/2017 - Present        Cigarette nicotine dependence without complication [id number]-[id number]: N81.168  ICD-9-CM: 305.1  12/30/2016 - Present        Dysthymia ICD-10-CM: F34.1  ICD-9-CM: 300.4  12/30/2016 - Present        YOLANDA (obstructive sleep apnea) ICD-10-CM: G47.33  ICD-9-CM: 327.23  12/30/2016 - Present        Arthritis ICD-10-CM: M19.90  ICD-9-CM: 716.90  9/9/2016 - Present    Overview Signed 9/9/2016  0:34 AM by Jennifer Hernández MD     Rheumatology Dr. Jacques Older             Leukocytosis ICD-10-CM: O46.352  ICD-9-CM: 288.60  9/9/2016 - Present    Overview Signed 9/9/2016  7:33 AM by Jennifer Hernández MD     HEME Dr. Ebony Lakhani             Essential hypertension with goal blood pressure less than 140/90 ICD-10-CM: I10  ICD-9-CM: 401.9  9/7/2016 - Present        Chronic obstructive pulmonary disease ([facility] ICD-10-CM: J44.9  ICD-9-CM: 485  9/7/2016 - Present        Diabetes mellitus type 2, controlled ([facility] ICD-10-CM: E11.9  ICD-9-CM: 250.00  9/7/2016 - Present        Polyneuropathy associated with underlying disease ([facility] ICD-10-CM: Rondal Barrier  ICD-9-CM: 357.4  9/7/2016 - Present        RLS (restless legs syndrome) ICD-10-CM: G25.81  ICD-9-CM: 333.94  9/7/2016 - Present        Primary osteoarthritis of right knee ICD-10-CM: M17.11  ICD-9-CM: 715.16  8/18/2016 - Present        RESOLVED: Paroxysmal tachycardia ([facility] [address] ICD-10-CM: I47.9  ICD-9-CM: 427.2  9/9/2016 - 4/22/2019    Overview Signed 9/9/2016  7:65 AM by Jennifer Hernández MD     [employer] [address]. Cardiology Dr. Misti Mejia.                    Greater than 30 minutes were spent with the patient on counseling and coordination of care    Signed:   Marta Morocho DO  10/28/2020  7:16 PM

## 2020-10-28 NOTE — PROGRESS NOTES
SLP Contact Note    Update 2:29 pm: MRI negative for acute infarct or clear etiology for some word finding deficits. Therefore, no acute SLP needs anticipated. If word finding difficulties continue could consider outpatient SLP. Patient currently off the floor and thus will hold SLP for now. Of note, CT negative and pt passed STAND. Will await MRI results and complete evaluation accordingly.     Thank you,  LINDA RuizEd, [address] [facility]  Speech-Language Pathologist

## 2020-10-28 NOTE — PROGRESS NOTES
Physical Therapy  Order received, chart reviewed in preparation for therapy evaluation, RN cleared for activity. Arrived to patient supine in bed reporting 10/10 back pain, 5/10 headache. Patient requested to defer therapy until pain meds received. Made RN aware. Provided patient with additional blankets, Pepsi (okay per RN), and turned lights down. Will follow up later today or tomorrow for evaluation.

## 2020-10-28 NOTE — PROGRESS NOTES
Occupational Therapy  10/28/20    Order acknowledged, chart reviewed. RN reporting patient in high levels of pain, requesting therapy defer at this time, will follow up with OT evaluation as able & appropriate.      Thank you,   London Rangel, OTD, OTR/L

## 2020-10-28 NOTE — PROGRESS NOTES
0800- assumed care of pt this am. Pt is alert and oriented with complaints of right knee and partial lower leg numbness. 1915- pt to be discharged home this evening. Pt now has a ride on the way and getting dressed for discharge. 1930- pt has been given all discharge instructions with no questions at this time. Pt is now getting ready for discharge.

## 2020-10-28 NOTE — DISCHARGE INSTRUCTIONS
Discharge Instructions       PATIENT ID: Supa Mcgee  MRN: [id number]   YOB: 1965    DATE OF ADMISSION: 10/27/2020  6:55 PM    DATE OF DISCHARGE: 10/28/2020    PRIMARY CARE PROVIDER: Lee Paul MD     ATTENDING PHYSICIAN: Justina Marie DO  DISCHARGING PROVIDER: Etienne Dent DO    To contact this individual call [phone] and ask the  to page. If unavailable ask to be transferred the Adult Hospitalist Department. DISCHARGE DIAGNOSES    Difficulty word finding and forgetfulness     CONSULTATIONS: IP CONSULT TO NEUROLOGY    PROCEDURES/SURGERIES: * No surgery found *    PENDING TEST RESULTS:   At the time of discharge the following test results are still pending: none    FOLLOW UP APPOINTMENTS:   Follow-up Information     Follow up With Specialties Details Why Contact Info    Lee Paul MD Family Medicine   [address] [place] [address]      Connie MontesstDO Neurology  Please schedule follow up  [address] [facility]  [phone]             ADDITIONAL CARE RECOMMENDATIONS:     Take aspirin daily. Do not combine with BC powder. Recommend reducing/discontinuing BC powder use due to significant risk for stomach ulcers. DISCHARGE MEDICATIONS:   See Medication Reconciliation Form    · It is important that you take the medication exactly as they are prescribed. · Keep your medication in the bottles provided by the pharmacist and keep a list of the medication names, dosages, and times to be taken in your wallet. · Do not take other medications without consulting your doctor. NOTIFY YOUR PHYSICIAN FOR ANY OF THE FOLLOWING:   Fever over 101 degrees for 24 hours. Chest pain, shortness of breath, fever, chills, nausea, vomiting, diarrhea, change in mentation, falling, weakness, bleeding. Severe pain or pain not relieved by medications.   Or, any other signs or symptoms that you may have questions about.     Signed:   Genia Tinsley DO  10/28/2020  7:13 PM

## 2020-10-28 NOTE — PROGRESS NOTES
Received call from VUR, patient has been downgraded to OBS. CM will deliver MOON, State OBS, and Code 44. Virtual reviewer communicated change to CM, which reflect outpatient observation order written prior to Written discharge order. Code 44 delivered to patient. CM met with the patient and provided to the patient the printed information about her outpatient observation status. The patient was given the flyer entitled, \"Medicare Outpatient Observation: Information & notification. \" All questions were answered, no additional discharge needs identified at this time and patient expects to return to their home after discharge.     Micky Iyer, RN, BSN, [facility]  ED Care Management  [phone]

## 2020-10-29 ENCOUNTER — PATIENT OUTREACH (OUTPATIENT)
Dept: CASE MANAGEMENT | Age: 55
End: 2020-10-29

## 2020-10-29 NOTE — PROGRESS NOTES
Patient was admitted to [facility] on 10/27/2020 and discharged on 10/28/2020 for CVA. Outreach made within 2 business days of discharge: Yes    Top Discharge Challenges to be reviewed by the provider   ER for CVA - recommended to start ASA 81mg but not on AVS  - needs to f/u with neuro - Dr. Felix Estrada  - current smoker  - unable to reach for MAYER PurveyourS call       Discussed COVID-19 related testing which was not done at this time. Test results were not done. Patient informed of results, if available? n/a   Method of communication with provider : chart routing       Advance Care Planning:   Does patient have an Advance Directive: on file     Inpatient Readmission Risk score: n/a  Was this a readmission? no   Patient stated reason for the admission: n/a  Patients top risk factors for readmission: lack of knowledge about disease, medical condition and utilization of services, unable to reach for MAYER PurveyourS call, per chart review current smoker     Home Health/Outpatient orders at discharge: none    Durable Medical Equipment ordered at discharge: none    [facility] follow up appointment(s):   Future Appointments   Date Time Provider Samantha James   10/30/2020  3:53 PM Smita Jones MD PMA BS AMB   11/10/2020  2:30 PM Bassem Flores MD LIVR BS AMB   12/2/2020  9:15 AM Erick Parra  N Brenda Zapata BS AMB     10/30/20   Unable to reach patient for MAYER PurveyourS call. Vm and my chart message sent. Ctn to attempt to reach patient in 5 business days.  AR

## 2020-10-30 ENCOUNTER — VIRTUAL VISIT (OUTPATIENT)
Dept: FAMILY MEDICINE CLINIC | Age: 55
End: 2020-10-30
Payer: MEDICARE

## 2020-10-30 DIAGNOSIS — E11.40 TYPE 2 DIABETES MELLITUS WITH DIABETIC NEUROPATHY, WITHOUT LONG-TERM CURRENT USE OF INSULIN (HCC): ICD-10-CM

## 2020-10-30 DIAGNOSIS — R47.02 DYSPHASIA: Primary | ICD-10-CM

## 2020-10-30 DIAGNOSIS — M47.816 SPONDYLOSIS OF LUMBAR SPINE: ICD-10-CM

## 2020-10-30 DIAGNOSIS — E78.00 HYPERCHOLESTEROLEMIA: ICD-10-CM

## 2020-10-30 PROCEDURE — 1111F DSCHRG MED/CURRENT MED MERGE: CPT | Performed by: FAMILY MEDICINE

## 2020-10-30 PROCEDURE — G8427 DOCREV CUR MEDS BY ELIG CLIN: HCPCS | Performed by: FAMILY MEDICINE

## 2020-10-30 PROCEDURE — 99214 OFFICE O/P EST MOD 30 MIN: CPT | Performed by: FAMILY MEDICINE

## 2020-10-30 RX ORDER — CELECOXIB 100 MG/1
100 CAPSULE ORAL 2 TIMES DAILY
Qty: 20 CAP | Refills: 0 | Status: SHIPPED | OUTPATIENT
Start: 2020-10-30 | End: 2020-10-30 | Stop reason: SDUPTHER

## 2020-10-30 RX ORDER — ASPIRIN 81 MG/1
81 TABLET ORAL DAILY
COMMUNITY
End: 2021-10-27 | Stop reason: ALTCHOICE

## 2020-10-30 RX ORDER — CELECOXIB 100 MG/1
100 CAPSULE ORAL 2 TIMES DAILY
Qty: 180 CAP | Refills: 0 | Status: SHIPPED | OUTPATIENT
Start: 2020-10-30 | End: 2021-01-13 | Stop reason: ALTCHOICE

## 2020-10-30 NOTE — PROGRESS NOTES
Virtual Video Transitional Care Management Progress Note    Patient: Sherrie Bernal  :   PCP: Smita Jones MD    Date of admission: 10/27/20  Date of discharge: 10/28/20    Patient was contacted by Transitional Care Management services within two days after her discharge: Yes. This encounter and supporting documentation was reviewed if available. Medication reconciliation was performed today (10/30/2020). Assessment/Plan:   Diagnoses and all orders for this visit:    1. Dysphasia  -     OR DISCHARGE MEDS RECONCILED W/ CURRENT OUTPATIENT MED LIST  -     REFERRAL TO NEUROLOGY    2. Hypercholesterolemia    3. Type 2 diabetes mellitus with diabetic neuropathy, without long-term current use of insulin (HCC)    4. Spondylosis of lumbar spine  -     celecoxib (CELEBREX) 100 mg capsule; Take 1 Cap by mouth two (2) times a day for 90 days. FU with Neurology - Dr Kerri Avina. Cont ASA 81 mg daily. Order Celebrex 100 mg PRN for pain to replace BC. Stay well hydrated. Will monitor renal function. She has FU with [facility], Liver clinic, Urology in Nov.       Subjective:   Sherrie Bernal is a 63 y.o. female presenting today for follow-up after being discharged from [facility].  The discharge summary was reviewed. The main problem requiring admission was problem speaking and confusion. Complications during admission: none    Interval history/Current status: feels better    Admitting symptoms have: significantly improved    Neuro WALLS for CVA - negative. Medications marked \"taking\" at this time:  Home Medications    Medication Sig Start Date End Date Taking? Authorizing Provider   dilTIAZem ER (CARDIZEM CD) 180 mg capsule TAKE 2 CAPSULES EVERY DAY FOR HIGH BLOOD PRESSURE 60   Smita Jones MD   folic acid (FOLVITE) 1 mg tablet Take 1 Tab by mouth daily.     Smita Jones MD   tiZANidine (ZANAFLEX) 2 mg tablet TAKE 1 TABLET TWICE DAILY FOR MUSCLE SPASM(S)    Smita Jones MD   zinc 50 mg tab tablet Take 50 mg by mouth daily. Provider, Historical   rOPINIRole (REQUIP) 2 mg tablet Take 1 Tab by mouth three (3) times daily. 1/2/46   Kacy Warner MD   omeprazole (PRILOSEC) 40 mg capsule Take 1 Cap by mouth Before breakfast and dinner. 2/5/33   Kacy Warner MD   atorvastatin (LIPITOR) 40 mg tablet Take 1 Tab by mouth daily. 8/0/30   Kacy Warner MD   metFORMIN (GLUCOPHAGE) 500 mg tablet Take 500 mg by mouth two (2) times daily (with meals). Provider, Historical   ascorbic acid (C-500 PO) Take  by mouth daily. Provider, Historical   selenium 200 mcg cap Take 1 Cap by mouth daily. Provider, Historical   OTHER 1,000 mg daily. Black elderberry    Provider, Historical   garlic 3,442 mg cap Take  by mouth daily. Provider, Historical   cinnamon bark (Cinnamon) 500 mg cap Take  by mouth daily. Provider, Historical   benzonatate (Tessalon Perles) 100 mg capsule Take 100 mg by mouth three (3) times daily as needed for Cough. Provider, Historical   Biotin 2,500 mcg cap Take  by mouth daily. Provider, Historical   losartan (COZAAR) 100 mg tablet Take 100 mg by mouth nightly. Provider, Historical   pregabalin (LYRICA) 150 mg capsule Take 1 Cap by mouth two (2) times a day. Max Daily Amount: 300 mg. 7/14/20   Trino Jackson NP   furosemide (LASIX) 20 mg tablet TAKE 1 TABLET EVERY DAY AS NEEDED 0/25/09   Kacy Warner MD   traZODone (DESYREL) 50 mg tablet Take 1 Tab by mouth nightly. Indications: insomnia 3/85/80   Kacy Warner MD   levocetirizine (XYZAL) 5 mg tablet Take 1 Tab by mouth daily. 9/70/38   Kacy Warner MD   venlafaxine-SR [facility] 150 mg capsule Take 1 Cap by mouth daily. 4/36/70   Kacy Warner MD   LORazepam (ATIVAN) 0.5 mg tablet Take 1 Tab by mouth two (2) times daily as needed for Anxiety. Max Daily Amount: 1 mg.  Indications: anxious 8/13/98   Kacy Warner MD   fluticasone propionate (FLONASE) 50 mcg/actuation nasal spray 2 sprays per nostril once a day  Indications: inflammation of the nose due to an allergy 0/34/42   Willie Clark MD   tiotropium [facility]) 18 mcg inhalation capsule Take 1 Cap by inhalation daily. 3/65/36   Willie Clark MD   Blood-Glucose Meter monitoring kit E11.9 Accu-Chek Joyce Plus Meter 2/14/59   Willie Clark MD   TURMERIC ROOT EXTRACT PO Take 1 Cap by mouth daily. Provider, Historical        Review of Systems:  General ROS: positive for  - fatigue   Interestingly, she had quit taking Topamax cold turkey prior to her sx starting.     Patient Active Problem List    Diagnosis Date Noted    Aphasia 10/28/2020    Acute CVA (cerebrovascular accident) ([address] 10/27/2020    Renal cell carcinoma of right kidney ([address] 08/24/2020    Renal mass, right 07/29/2020    Right renal mass 07/28/2020    Colon adenoma 03/18/2019    Chronic superficial gastritis without bleeding 03/18/2019    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 12/03/2018    Spondylosis of lumbar spine 12/03/2018    Abnormal mammogram of left breast 09/26/2018    Iliotibial band tendinitis of right side 08/30/2018    Severe obesity with body mass index (BMI) of 35.0 to 39.9 with serious comorbidity ([address] 06/20/2018    Type 2 diabetes mellitus with diabetic neuropathy ([address] 02/14/2018    Status post bilateral knee replacements 11/02/2017    Cigarette nicotine dependence without complication 79/77/1159    Dysthymia 12/30/2016    YOLANDA (obstructive sleep apnea) 12/30/2016    Arthritis 09/09/2016    Leukocytosis 09/09/2016    Essential hypertension with goal blood pressure less than 140/90 09/07/2016    Chronic obstructive pulmonary disease ([address] 09/07/2016    Diabetes mellitus type 2, controlled ([address] 09/07/2016    Polyneuropathy associated with underlying disease ([address] 09/07/2016    RLS (restless legs syndrome) 09/07/2016    Primary osteoarthritis of right knee 08/18/2016     Current Outpatient Medications   Medication Sig Dispense Refill    aspirin delayed-release 81 mg tablet Take 81 mg by mouth daily.  celecoxib (CELEBREX) 100 mg capsule Take 1 Cap by mouth two (2) times a day for 90 days. 180 Cap 0    dilTIAZem ER (CARDIZEM CD) 180 mg capsule TAKE 2 CAPSULES EVERY DAY FOR HIGH BLOOD PRESSURE 515 Cap 1    folic acid (FOLVITE) 1 mg tablet Take 1 Tab by mouth daily. 90 Tab 3    tiZANidine (ZANAFLEX) 2 mg tablet TAKE 1 TABLET TWICE DAILY FOR MUSCLE SPASM(S) 180 Tab 1    rOPINIRole (REQUIP) 2 mg tablet Take 1 Tab by mouth three (3) times daily. 270 Tab 1    omeprazole (PRILOSEC) 40 mg capsule Take 1 Cap by mouth Before breakfast and dinner. 180 Cap 3    atorvastatin (LIPITOR) 40 mg tablet Take 1 Tab by mouth daily. 90 Tab 1    metFORMIN (GLUCOPHAGE) 500 mg tablet Take 500 mg by mouth two (2) times daily (with meals).  ascorbic acid (C-500 PO) Take  by mouth daily.  benzonatate (Tessalon Perles) 100 mg capsule Take 100 mg by mouth three (3) times daily as needed for Cough.  Biotin 2,500 mcg cap Take  by mouth daily.  losartan (COZAAR) 100 mg tablet Take 100 mg by mouth nightly.  pregabalin (LYRICA) 150 mg capsule Take 1 Cap by mouth two (2) times a day. Max Daily Amount: 300 mg. 180 Cap 0    furosemide (LASIX) 20 mg tablet TAKE 1 TABLET EVERY DAY AS NEEDED 90 Tab 1    traZODone (DESYREL) 50 mg tablet Take 1 Tab by mouth nightly. Indications: insomnia 90 Tab 3    levocetirizine (XYZAL) 5 mg tablet Take 1 Tab by mouth daily. 90 Tab 3    venlafaxine-SR (EFFEXOR-XR) 150 mg capsule Take 1 Cap by mouth daily. 90 Cap 3    LORazepam (ATIVAN) 0.5 mg tablet Take 1 Tab by mouth two (2) times daily as needed for Anxiety. Max Daily Amount: 1 mg.  Indications: anxious 180 Tab 0    fluticasone propionate (FLONASE) 50 mcg/actuation nasal spray 2 sprays per nostril once a day  Indications: inflammation of the nose due to an allergy 3 Bottle 3    tiotropium (SPIRIVA) 18 mcg inhalation capsule Take 1 Cap by inhalation daily. 90 Cap 1    zinc 50 mg tab tablet Take 50 mg by mouth daily.       Blood-Glucose Meter monitoring kit E11.9 Accu-Chek Joyce Plus Meter 1 Kit 0     Allergies   Allergen Reactions    Nsaids (Non-Steroidal Anti-Inflammatory Drug) Other (comments)     Stomach hurts    Mobic [Meloxicam] Hives    Penicillins Unknown (comments)     DOESN'T REMEMBER  Patient states tolerating PO amoxicillin    Plaquenil [Hydroxychloroquine] Hives     Past Medical History:   Diagnosis Date    Arthritis     Diabetes ([address]     Elevated WBC count 2002 to present    GERD (gastroesophageal reflux disease)     Hypertension     Kidney stones 2020    Leg swelling 07/2020    Doppler negative    Panic attacks     Sleep apnea     Does not use CPAP- raises the head of bed up    Spondylolisthesis of lumbar region     Tachycardia      Past Surgical History:   Procedure Laterality Date    COLONOSCOPY N/A 3/1/2019    COLONOSCOPY performed by [facility] Teran [facility] at [facility] ENDOSCOPY    HX BREAST BIOPSY Bilateral     benign    HX CHOLECYSTECTOMY  1998    HX HYSTERECTOMY  2008    HX KNEE ARTHROSCOPY Bilateral 2012    HX KNEE REPLACEMENT Left 2012    HX KNEE REPLACEMENT Right 08/18/2016    HX SEPTOPLASTY  1993    HX SVT ABLATION  2013    HX TUBAL LIGATION  1989    HX UROLOGICAL Right 07/2020    nephrectomy    NEUROLOGICAL PROCEDURE UNLISTED      lumbar nerve block     Family History   Problem Relation Age of Onset    Arthritis-osteo Mother     Psychiatric Disorder Father     Arthritis-osteo Father     Alcohol abuse Father     Heart Disease Father     Heart Surgery Father     Hypertension Father     Diabetes Brother     Arthritis-osteo Brother     Hypertension Brother     Deep Vein Thrombosis Brother         Unknown cause    Anesth Problems Neg Hx      Social History     Tobacco Use    Smoking status: Current Every Day Smoker     Packs/day: 1.00     Years: 40.00     Pack years: 40.00     Types: Cigarettes    Smokeless tobacco: Never Used   Substance Use Topics    Alcohol use: Not Currently           Objective:     Patient-Reported Vitals 6/22/2020   Patient-Reported Systolic  334   Patient-Reported Diastolic 79      General: alert, cooperative, no distress   Mental  status: normal mood, behavior, speech, dress, motor activity, and thought processes, able to follow commands   HENT: NCAT   Neck: no visualized mass   Resp: no respiratory distress   Neuro: no gross deficits   Skin: no discoloration or lesions of concern on visible areas   Psychiatric: normal affect, consistent with stated mood, no evidence of hallucinations     Additional exam findings: We discussed the expected course, resolution and complications of the diagnosis(es) in detail. Medication risks, benefits, costs, interactions, and alternatives were discussed as indicated. I advised her to contact the office if her condition worsens, changes or fails to improve as anticipated. She expressed understanding with the diagnosis(es) and plan. Demetra Pool, who was evaluated through a synchronous (real-time) audio-video encounter and/or her healthcare decision maker, is aware that it is a billable service, with coverage as determined by her insurance carrier. She provided verbal consent to proceed: Yes, and patient identification was verified. It was conducted pursuant to the emergency declaration under the [address] [place] [address] authority and the [employer] Act. A caregiver was present when appropriate. Ability to conduct physical exam was limited. I was in the office. The patient was at home.       Patrick Retana MD

## 2020-11-04 ENCOUNTER — PATIENT OUTREACH (OUTPATIENT)
Dept: CASE MANAGEMENT | Age: 55
End: 2020-11-04

## 2020-11-09 NOTE — PROGRESS NOTES
11/09/20   left for patient. Patient did return my chart message on 10/31/2020. Patient did see PCP on 10/30/2020. Episode resolved at this time.  AR

## 2020-11-10 ENCOUNTER — OFFICE VISIT (OUTPATIENT)
Dept: HEMATOLOGY | Age: 55
End: 2020-11-10
Payer: MEDICARE

## 2020-11-10 VITALS
OXYGEN SATURATION: 95 % | DIASTOLIC BLOOD PRESSURE: 73 MMHG | RESPIRATION RATE: 18 BRPM | BODY MASS INDEX: 36 KG/M2 | WEIGHT: 203.2 LBS | HEIGHT: 63 IN | HEART RATE: 80 BPM | TEMPERATURE: 98 F | SYSTOLIC BLOOD PRESSURE: 141 MMHG

## 2020-11-10 DIAGNOSIS — R74.8 ELEVATED ALKALINE PHOSPHATASE LEVEL: Primary | ICD-10-CM

## 2020-11-10 PROBLEM — N28.89 RIGHT RENAL MASS: Status: RESOLVED | Noted: 2020-07-28 | Resolved: 2020-11-10

## 2020-11-10 PROBLEM — Z90.5 H/O RIGHT NEPHRECTOMY: Status: ACTIVE | Noted: 2020-11-10

## 2020-11-10 LAB
COMMENT, HOLDF: NORMAL
GGT SERPL-CCNC: 48 U/L (ref 5–55)
HBV SURFACE AB SER QL: REACTIVE
HBV SURFACE AB SER-ACNC: 21.58 MIU/ML
HBV SURFACE AG SER QL: <0.1 INDEX
HBV SURFACE AG SER QL: NEGATIVE
SAMPLES BEING HELD,HOLD: NORMAL

## 2020-11-10 PROCEDURE — 99204 OFFICE O/P NEW MOD 45 MIN: CPT | Performed by: HOSPITALIST

## 2020-11-10 RX ORDER — OXYBUTYNIN CHLORIDE 5 MG/1
TABLET ORAL
COMMUNITY
Start: 2020-11-03 | End: 2021-01-13 | Stop reason: ALTCHOICE

## 2020-11-10 NOTE — PATIENT INSTRUCTIONS
We will perform blood work today We will schedule you for a liver ultrasound We will have you come back in 4 weeks for a fibro scan to assess for liver scarring

## 2020-11-10 NOTE — PROGRESS NOTES
Mynor Hermosillo MD, [address], [place] Rachael Dunn, Colby Morrell MD, MPH      Eliceo Quintero, PA-CJ Miles, [facility]-BC     Iveth Bee, [facility]   Betty Villegas, P-C    Yani Hernandes, [facility]       Ravindra Dimas [address]    at [address], [address] [place] [address], [place]  22.    [phone]    FAX: [address]    at [address], [address], [address]    [phone]    FAX: [phone]     Patient Care Team:  Karyle Sine, MD as PCP - General (Family Medicine)  Karyle Sine, MD as PCP - [address] [place] [address] [facility]  Emmett Yen MD as Physician (Sleep Medicine)    Problem List  Date Reviewed: 10/30/2020          Codes Class Noted    H/O right nephrectomy ICD-10-CM: Z90.5  ICD-9-CM: V45.73  11/10/2020        Aphasia ICD-10-CM: R47.01  ICD-9-CM: 784.3  10/28/2020        Renal cell carcinoma of right kidney [facility]) ICD-10-CM: C64.1  ICD-9-CM: 189.0  8/24/2020    Overview Signed 8/24/2020  2:27 PM by Karyle Sine, MD Dr Flo Boros 7/2020. Nephrectomy.              Renal mass, right ICD-10-CM: N28.89  ICD-9-CM: 593.9  7/29/2020        Colon adenoma ICD-10-CM: D12.6  ICD-9-CM: 211.3  3/18/2019    Overview Signed 3/18/2019 05:30 AM by Karyle Sine, MD     [address]- Dr Idania Magaña             Chronic superficial gastritis without bleeding ICD-10-CM: K29.30  ICD-9-CM: 535.10  3/18/2019    Overview Signed 3/18/2019 16:60 AM by Karyle Sine, MD     EGD             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 ICD-10-CM: M47.12, M47.22  ICD-9-CM: 721.1, 723.4  12/3/2018        Spondylosis of lumbar spine ICD-10-CM: M47.816  ICD-9-CM: 721.3  12/3/2018        Abnormal mammogram of left breast ICD-10-CM: R92.8  ICD-9-CM: 793.80  9/26/2018 Iliotibial band tendinitis of right side ICD-10-CM: M76.31  ICD-9-CM: 728.89  8/30/2018        Severe obesity with body mass index (BMI) of 35.0 to 39.9 with serious comorbidity ([facility] ICD-10-CM: E66.01  ICD-9-CM: 278.01  6/20/2018        Type 2 diabetes mellitus with diabetic neuropathy ([facility] ICD-10-CM: E11.40  ICD-9-CM: 250.60, 357.2  2/14/2018        Status post bilateral knee replacements ICD-10-CM: X88.103  ICD-9-CM: V43.65  11/2/2017        Cigarette nicotine dependence without complication JFI-27-OW: Y33.163  ICD-9-CM: 305.1  12/30/2016        Dysthymia ICD-10-CM: F34.1  ICD-9-CM: 300.4  12/30/2016        YOLANDA (obstructive sleep apnea) ICD-10-CM: G47.33  ICD-9-CM: 327.23  12/30/2016        Arthritis ICD-10-CM: M19.90  ICD-9-CM: 716.90  9/9/2016    Overview Signed 9/9/2016  3:14 AM by Juanis Fraga MD     Rheumatology Dr. Kinjal Crowell hypertension with goal blood pressure less than 140/90 ICD-10-CM: I10  ICD-9-CM: 401.9  9/7/2016        Chronic obstructive pulmonary disease ([facility] ICD-10-CM: J44.9  ICD-9-CM: 400  9/7/2016        Polyneuropathy associated with underlying disease ([facility] ICD-10-CM: G63  ICD-9-CM: 357.4  9/7/2016        RLS (restless legs syndrome) ICD-10-CM: G25.81  ICD-9-CM: 333.94  9/7/2016        Primary osteoarthritis of right knee ICD-10-CM: M17.11  ICD-9-CM: 715.16  8/18/2016              The clinicians listed above have asked me to see Michael Augustine in consultation regarding elevated liver enzymes and its management. All medical records sent by the referring physicians were reviewed     The patient is a 63 y.o.  female who was found to have abnormal liver chemistry in summer of 2019     Liver enzymes performed in 03/2020 revealed AST 54, , , TB < 0.2, Alb 4.3    Serologic evaluation for markers of chronic liver disease has not been performed. Imaging of the liver was not performed.         An assessment of liver fibrosis with biopsy or elasto graphy has not been performed. The patient had not started any new medications within 3 months preceding the elevation in liver chemistries. The patient has no symptoms which can be attributed to the liver disorder. The patient reports fatigue, insomnia. The patient completes all daily activities without any functional limitations. ASSESSMENT AND PLAN:  Elevated liver enzymes  Persistent elevation in liver transaminases and alkaline phosphatase of unclear etiology at this time. Liver transaminases are elevated. ALP is elevated. Liver function is normal. The platelet count is normal.    Serologic testing for causes of chronic liver disease was ordered including hepatitis B, and virus. We will check serologies for AMA. The most likely causes for the liver chemistry abnormalities were discussed with the patient and include fatty liver disease  Will perform imaging of the liver with ultrasound. Screening for Hepatocellular Carcinoma  HCC screening is not necessary if the patient has no evidence of cirrhosis. Treatment of other medical problems in patients with chronic liver disease  There are no contraindications for the patient to take most medications that are necessary for treatment of other medical issues. Counseling for alcohol in patients with chronic liver disease  The patient was counseled regarding alcohol consumption and the effect of alcohol on chronic liver disease    Vaccinations   The need for vaccination against viral hepatitis A and B will be assessed with serologic and instituted as appropriate. Routine vaccinations against other bacterial and viral agents can be performed as indicated. Annual flu vaccination should be administered if indicated.       ALLERGIES  Allergies   Allergen Reactions    Nsaids (Non-Steroidal Anti-Inflammatory Drug) Other (comments)     Stomach hurts    Mobic [Meloxicam] Hives    Penicillins Unknown (comments)     DOESN'T REMEMBER  Patient states tolerating PO amoxicillin    Plaquenil [Hydroxychloroquine] Hives       MEDICATIONS  Current Outpatient Medications   Medication Sig    oxybutynin (DITROPAN) 5 mg tablet     aspirin delayed-release 81 mg tablet Take 81 mg by mouth daily.  celecoxib (CELEBREX) 100 mg capsule Take 1 Cap by mouth two (2) times a day for 90 days.  dilTIAZem ER (CARDIZEM CD) 180 mg capsule TAKE 2 CAPSULES EVERY DAY FOR HIGH BLOOD PRESSURE    folic acid (FOLVITE) 1 mg tablet Take 1 Tab by mouth daily.  tiZANidine (ZANAFLEX) 2 mg tablet TAKE 1 TABLET TWICE DAILY FOR MUSCLE SPASM(S)    zinc 50 mg tab tablet Take 50 mg by mouth daily.  rOPINIRole (REQUIP) 2 mg tablet Take 1 Tab by mouth three (3) times daily.  omeprazole (PRILOSEC) 40 mg capsule Take 1 Cap by mouth Before breakfast and dinner.  atorvastatin (LIPITOR) 40 mg tablet Take 1 Tab by mouth daily.  metFORMIN (GLUCOPHAGE) 500 mg tablet Take 500 mg by mouth two (2) times daily (with meals).  ascorbic acid (C-500 PO) Take  by mouth daily.  benzonatate (Tessalon Perles) 100 mg capsule Take 100 mg by mouth three (3) times daily as needed for Cough.  Biotin 2,500 mcg cap Take  by mouth daily.  losartan (COZAAR) 100 mg tablet Take 100 mg by mouth nightly.  pregabalin (LYRICA) 150 mg capsule Take 1 Cap by mouth two (2) times a day. Max Daily Amount: 300 mg.  furosemide (LASIX) 20 mg tablet TAKE 1 TABLET EVERY DAY AS NEEDED    traZODone (DESYREL) 50 mg tablet Take 1 Tab by mouth nightly. Indications: insomnia    levocetirizine (XYZAL) 5 mg tablet Take 1 Tab by mouth daily.  venlafaxine-SR (EFFEXOR-XR) 150 mg capsule Take 1 Cap by mouth daily.  LORazepam (ATIVAN) 0.5 mg tablet Take 1 Tab by mouth two (2) times daily as needed for Anxiety. Max Daily Amount: 1 mg.  Indications: anxious    fluticasone propionate (FLONASE) 50 mcg/actuation nasal spray 2 sprays per nostril once a day  Indications: inflammation of the nose due to an allergy    tiotropium (SPIRIVA) 18 mcg inhalation capsule Take 1 Cap by inhalation daily.  Blood-Glucose Meter monitoring kit E11.9 Accu-Chek Joyce Plus Meter     No current facility-administered medications for this visit. SYSTEM REVIEW NOT RELATED TO LIVER DISEASE OR REVIEWED ABOVE:  Constitution systems: Negative for fever, chills, weight gain, weight loss. Eyes: Negative for visual changes. ENT: Negative for sore throat, painful swallowing. Respiratory: Negative for cough, hemoptysis, SOB. Cardiology: Negative for chest pain, palpitations. GI:  Negative for constipation or diarrhea. : Negative for urinary frequency, dysuria, hematuria, nocturia. Skin: Negative for rash. Hematology: Negative for easy bruising, blood clots. Musculo-skelatal: Negative for back pain, muscle pain, weakness. Neurologic: Negative for headaches, dizziness, vertigo, memory problems not related to HE. Psychology: Negative for anxiety, depression. FAMILY HISTORY:  The father is . The mother Has/had the following chronic disease(s): HTN, Dyslipidemia, thyrodi dx  Alcohol cirrhosis- Dad  There is no family history of immune disorders. SOCIAL HISTORY:  The patient is   The patient has 1 child. The patient currently smokes 1 pack of tobacco daily. The patient drinks alcohol socially. 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 Eyes: Sclera anicteric. ENT: No oral lesions. Thyroid normal.  Nodes: No adenopathy. Skin: No spider angiomata. No jaundice. No palmar erythema. Respiratory: Lungs clear to auscultation.    Cardiovascular: Regular heart rate. No murmurs. No JVD. Abdomen: Soft non-tender. Liver size normal to percussion/palpation. Spleen not palpable. No obvious ascites. Extremities: No edema. No muscle wasting. No gross arthritic changes. Neurologic: Alert and oriented. Cranial nerves grossly intact. No asterixis. LABORATORY STUDIES:  Recent liver function panel, CBC with platelet count and BMP are not available. These studies will be performed. Liver [place] [address] Ref Rng & Units 10/27/2020   WBC 3.6 - 11.0 K/uL 12.5 (H)   ANC 1.8 - 8.0 K/UL 8.3 (H)   HGB 11.5 - 16.0 g/dL 13.5    - 400 K/uL 226   PLT     INR 0.9 - 1.1      AST 15 - 37 U/L 22   ALT 12 - 78 U/L 32   Alk Phos 45 - 117 U/L 184 (H)   Bili, Total 0.2 - 1.0 MG/DL 0.3   Albumin 3.5 - 5.0 g/dL 3.9   BUN 6 - 20 MG/DL 10   Creat 0.55 - 1.02 MG/DL 1.18 (H)   Creat (iSTAT) 0.6 - 1.3 mg/dL    Na 136 - 145 mmol/L 141   K 3.5 - 5.1 mmol/L 3.1 (L)   Cl 97 - 108 mmol/L 108   CO2 21 - 32 mmol/L 26   Glucose 65 - 100 mg/dL 96   Magnesium 1.6 - 2.4 mg/dL 2.7 (H)       SEROLOGIES:  Not available or performed. Testing was performed today. Serologies Latest Ref Rng & Units 11/10/2020   Hep B Surface Ag Index <0.10   Hep B Surface Ag Interp Negative   Negative   Hep B Core Ab, Total Negative   Negative   Hep B Surface Ab mIU/mL 21.58   Hep B Surface Ab Interp NONREACTIVE   REACTIVE (A)   Hep C Ab 0.0 - 0.9 s/co ratio <0.1   Ferritin 15 - 150 ng/mL    Iron % Saturation 15 - 55 %    M2 Ab 0.0 - 20.0 Units <20.0       LIVER HISTOLOGY:  Not available or performed    ENDOSCOPIC PROCEDURES:  Not available or performed    RADIOLOGY:  Not available or performed    OTHER TESTING:  Not available or performed    FOLLOW-UP:  All of the issues listed above in the Assessment and Plan were discussed with the patient. All questions were answered. The patient expressed a clear understanding of the above.     [address] [address] in 4 weeks for routine monitoring and to review all data and determine the treatment plan.     Praveena Leo MD, MPH  Advanced Hepatology  [facility] [address] [address] [address]faye, [address] [facility].  [phone]  [address]

## 2020-11-10 NOTE — PROGRESS NOTES
Identified pt with two pt identifiers(name and ). Reviewed record in preparation for visit and have obtained necessary documentation. Chief Complaint   Patient presents with    New Patient    Elevated Liver Enzymes      Vitals:    11/10/20 1437   BP: (!) 141/73   Pulse: 80   Resp: 18   Temp: 98 °F (36.7 °C)   TempSrc: Temporal   SpO2: 95%   Weight: 203 lb 3.2 oz (92.2 kg)   Height: 5' 3\" (1.6 m)   PainSc:   0 - No pain       Health Maintenance Review: Patient reminded of \"due or due soon\" health maintenance. I have asked the patient to contact his/her primary care provider (PCP) for follow-up on his/her health maintenance. Coordination of Care Questionnaire:  :   1) Have you been to an emergency room, urgent care, or hospitalized since your last visit? If yes, where when, and reason for visit? yes     [facility], Pt thought she was having a stroke. 2. Have seen or consulted any other health care provider since your last visit? If yes, where when, and reason for visit? NO      Patient is accompanied by self I have received verbal consent from Lizeth Cleveland to discuss any/all medical information while they are present in the room.

## 2020-11-10 NOTE — LETTER
12/10/20 Patient: Kenyatta Rae YOB: 1965 Date of Visit: 11/10/2020 Brooke Bain MD 
[address] [facility] [phone] VIA In Basket Dear Brooke Bain MD, Thank you for [facility]. My notes for this consultation are attached. If you have questions, please do not hesitate to call me. I look forward to following your patient along with you. Sincerely, José Mcconnell MD

## 2020-11-11 LAB
COMMENT, 144067: NORMAL
HBV CORE AB SERPL QL IA: NEGATIVE
HCV AB S/CO SERPL IA: <0.1 S/CO RATIO (ref 0–0.9)
MITOCHONDRIA M2 IGG SER-ACNC: <20 UNITS (ref 0–20)

## 2020-11-12 LAB
ALP BONE CFR SERPL: 22 % (ref 14–68)
ALP INTEST CFR SERPL: 0 % (ref 0–18)
ALP LIVER CFR SERPL: 78 % (ref 18–85)
ALP SERPL-CCNC: 164 IU/L (ref 39–117)

## 2020-11-16 ENCOUNTER — HOSPITAL ENCOUNTER (OUTPATIENT)
Dept: ULTRASOUND IMAGING | Age: 55
Discharge: HOME | End: 2020-11-16
Attending: HOSPITALIST
Payer: MEDICARE

## 2020-11-16 DIAGNOSIS — R74.8 ELEVATED ALKALINE PHOSPHATASE LEVEL: ICD-10-CM

## 2020-11-16 DIAGNOSIS — E11.40 TYPE 2 DIABETES MELLITUS WITH DIABETIC NEUROPATHY, WITHOUT LONG-TERM CURRENT USE OF INSULIN (HCC): ICD-10-CM

## 2020-11-16 PROCEDURE — 76700 US EXAM ABDOM COMPLETE: CPT

## 2020-11-17 RX ORDER — PREGABALIN 150 MG/1
CAPSULE ORAL
Qty: 180 CAP | Refills: 1 | Status: SHIPPED | OUTPATIENT
Start: 2020-11-17 | End: 2021-01-13 | Stop reason: ALTCHOICE

## 2020-11-19 ENCOUNTER — VIRTUAL VISIT (OUTPATIENT)
Dept: FAMILY MEDICINE CLINIC | Age: 55
End: 2020-11-19
Payer: MEDICARE

## 2020-11-19 DIAGNOSIS — J01.00 ACUTE NON-RECURRENT MAXILLARY SINUSITIS: Primary | ICD-10-CM

## 2020-11-19 PROCEDURE — G8428 CUR MEDS NOT DOCUMENT: HCPCS | Performed by: NURSE PRACTITIONER

## 2020-11-19 PROCEDURE — G9899 SCRN MAM PERF RSLTS DOC: HCPCS | Performed by: NURSE PRACTITIONER

## 2020-11-19 PROCEDURE — 3017F COLORECTAL CA SCREEN DOC REV: CPT | Performed by: NURSE PRACTITIONER

## 2020-11-19 PROCEDURE — 99213 OFFICE O/P EST LOW 20 MIN: CPT | Performed by: NURSE PRACTITIONER

## 2020-11-19 PROCEDURE — G8756 NO BP MEASURE DOC: HCPCS | Performed by: NURSE PRACTITIONER

## 2020-11-19 PROCEDURE — G9717 DOC PT DX DEP/BP F/U NT REQ: HCPCS | Performed by: NURSE PRACTITIONER

## 2020-11-19 RX ORDER — AZITHROMYCIN 250 MG/1
TABLET, FILM COATED ORAL
Qty: 6 TAB | Refills: 0 | Status: SHIPPED | OUTPATIENT
Start: 2020-11-19 | End: 2020-11-24

## 2020-11-19 NOTE — PROGRESS NOTES
HISTORY OF PRESENT ILLNESS  Rae Hughes is a 63 y.o. female. HPI  Pt presents with \"nasal congestion\"  Visit was conducted via Dash, [employer]. me  Pt was located at home, provider was located at [facility]  Visit lasted 3 minutes  Rae Hughes, who was evaluated through a synchronous (real-time) audio-video encounter, and/or her healthcare decision maker, is aware that it is a billable service, with coverage as determined by her insurance carrier. She provided verbal consent to proceed: Yes, and patient identification was verified. It was conducted pursuant to the emergency declaration under the [facility], [address] [place] [address] and the [employer] Act. A caregiver was present when appropriate. Ability to conduct physical exam was limited. I was in the office. The patient was at home. Pt states that her nose and sinuses are \"all stopped up\"  She has thick nasal congestion  Pressure around sinuses and gums  Bloody mucous in nose  No fever  Symptoms have been present for about 4 days  OTC: Tylenol and Claritin  Review of Systems   Constitutional: Negative for fever. HENT: Positive for congestion and sinus pain. Physical Exam  Constitutional:       Appearance: Normal appearance. Neurological:      Mental Status: She is alert and oriented to person, place, and time. ASSESSMENT and PLAN    ICD-10-CM ICD-9-CM    1. Acute non-recurrent maxillary sinusitis  J01.00 461.0 azithromycin (ZITHROMAX) 250 mg tablet     Educated about taking medication as prescribed, with food  Should stay well hydrated, and treat fever as needed  Should notify office should symptoms continue and/or worsen    Pt informed to return to office with worsening of symptoms, or PRN with any questions or concerns. Pt verbalizes understanding of plan of care and denies further questions or concerns at this time.

## 2020-11-30 ENCOUNTER — TELEPHONE (OUTPATIENT)
Dept: ONCOLOGY | Age: 55
End: 2020-11-30

## 2020-12-02 ENCOUNTER — VIRTUAL VISIT (OUTPATIENT)
Dept: ONCOLOGY | Age: 55
End: 2020-12-02
Payer: MEDICARE

## 2020-12-02 DIAGNOSIS — C64.1 RENAL CELL CARCINOMA OF RIGHT KIDNEY (HCC): ICD-10-CM

## 2020-12-02 DIAGNOSIS — D72.825 BANDEMIA: Primary | ICD-10-CM

## 2020-12-02 PROCEDURE — 99213 OFFICE O/P EST LOW 20 MIN: CPT | Performed by: INTERNAL MEDICINE

## 2020-12-02 NOTE — PROGRESS NOTES
Sherrie Bernal is a 63 y.o. female  Chief Complaint   Patient presents with    Follow-up     leucocytosis     1. Have you been to the ER, urgent care clinic since your last visit? Hospitalized since your last visit? No    2. Have you seen or consulted any other health care providers outside of the [address] [place] [address] since your last visit? Include any pap smears or colon screening.  No

## 2020-12-02 NOTE — PROGRESS NOTES
[facility] at [address]  [address], [place]: [phone]  F: [phone]    Reason for Visit:   Martín Dwyer is a 63 y.o. female who is seen by synchronous (real-time) audio-video technology on 12/2/2020 for follow up of leucocytosis    History of Present Illness:   Patient is a 63 y. o.female with PMH as below who is seen for abnormal CBC    She has persistent leucocytosis since 2014, no other CBC abnormalities. She has known R renal cell carcinoma ( 3.3 cm) which she had a R radical nephrectomy on 7/28/2020She has osteoarthritis and has several flare ups. She had a CT chest 8/2020 that showed a 6 mm lung nodule which is being observed. Seen with additonal work up today    She has no fevers, chills, or sweats. She has not had blood clots. She has no abdominal pain, bleeding, she has been uptodate with colonoscopies. She smokes 1 ppd for 40 years.     Mother has breast cancer- diagnosed at 80    Past Medical History:   Diagnosis Date    Arthritis     Diabetes ([facility] [address]     Elevated WBC count 2002 to present    GERD (gastroesophageal reflux disease)     Hypertension     Kidney stones 2020    Leg swelling 07/2020    Doppler negative    Panic attacks     Sleep apnea     Does not use CPAP- raises the head of bed up    Spondylolisthesis of lumbar region     Tachycardia       Past Surgical History:   Procedure Laterality Date    COLONOSCOPY N/A 3/1/2019    COLONOSCOPY performed by Bill Frausto MD at [address] HX BREAST BIOPSY Bilateral     benign    HX CHOLECYSTECTOMY  1998    HX HYSTERECTOMY  2008    HX KNEE ARTHROSCOPY Bilateral 2012    HX KNEE REPLACEMENT Left 2012    HX KNEE REPLACEMENT Right 08/18/2016    HX SEPTOPLASTY  1993    HX SVT ABLATION  2013    HX TUBAL LIGATION  1989    HX UROLOGICAL Right 07/2020    nephrectomy    NEUROLOGICAL PROCEDURE UNLISTED      lumbar nerve block      Social History     Tobacco Use    Smoking status: Current Every Day Smoker     Packs/day: 1.00     Years: 40.00     Pack years: 40.00     Types: Cigarettes    Smokeless tobacco: Never Used   Substance Use Topics    Alcohol use: Not Currently      Family History   Problem Relation Age of Onset    Arthritis-osteo Mother     Psychiatric Disorder Father     Arthritis-osteo Father     Alcohol abuse Father     Heart Disease Father     Heart Surgery Father     Hypertension Father     Diabetes Brother     Arthritis-osteo Brother     Hypertension Brother     Deep Vein Thrombosis Brother         Unknown cause    Anesth Problems Neg Hx      Current Outpatient Medications   Medication Sig    pregabalin (LYRICA) 150 mg capsule TAKE 1 CAPSULE TWICE DAILY    oxybutynin (DITROPAN) 5 mg tablet     aspirin delayed-release 81 mg tablet Take 81 mg by mouth daily.  celecoxib (CELEBREX) 100 mg capsule Take 1 Cap by mouth two (2) times a day for 90 days.  dilTIAZem ER (CARDIZEM CD) 180 mg capsule TAKE 2 CAPSULES EVERY DAY FOR HIGH BLOOD PRESSURE    folic acid (FOLVITE) 1 mg tablet Take 1 Tab by mouth daily.  tiZANidine (ZANAFLEX) 2 mg tablet TAKE 1 TABLET TWICE DAILY FOR MUSCLE SPASM(S)    zinc 50 mg tab tablet Take 50 mg by mouth daily.  rOPINIRole (REQUIP) 2 mg tablet Take 1 Tab by mouth three (3) times daily.  omeprazole (PRILOSEC) 40 mg capsule Take 1 Cap by mouth Before breakfast and dinner.  atorvastatin (LIPITOR) 40 mg tablet Take 1 Tab by mouth daily.  metFORMIN (GLUCOPHAGE) 500 mg tablet Take 500 mg by mouth two (2) times daily (with meals).  ascorbic acid (C-500 PO) Take  by mouth daily.  benzonatate (Tessalon Perles) 100 mg capsule Take 100 mg by mouth three (3) times daily as needed for Cough.  Biotin 2,500 mcg cap Take  by mouth daily.  losartan (COZAAR) 100 mg tablet Take 100 mg by mouth nightly.     furosemide (LASIX) 20 mg tablet TAKE 1 TABLET EVERY DAY AS NEEDED    traZODone (DESYREL) 50 mg tablet Take 1 Tab by mouth nightly. Indications: insomnia    levocetirizine (XYZAL) 5 mg tablet Take 1 Tab by mouth daily.  venlafaxine-SR (EFFEXOR-XR) 150 mg capsule Take 1 Cap by mouth daily.  LORazepam (ATIVAN) 0.5 mg tablet Take 1 Tab by mouth two (2) times daily as needed for Anxiety. Max Daily Amount: 1 mg. Indications: anxious    fluticasone propionate (FLONASE) 50 mcg/actuation nasal spray 2 sprays per nostril once a day  Indications: inflammation of the nose due to an allergy    tiotropium (SPIRIVA) 18 mcg inhalation capsule Take 1 Cap by inhalation daily.  Blood-Glucose Meter monitoring kit E11.9 Accu-Chek Joyce Plus Meter     No current facility-administered medications for this visit. Allergies   Allergen Reactions    Nsaids (Non-Steroidal Anti-Inflammatory Drug) Other (comments)     Stomach hurts    Mobic [Meloxicam] Hives    Penicillins Unknown (comments)     DOESN'T REMEMBER  Patient states tolerating PO amoxicillin    Plaquenil [Hydroxychloroquine] Hives        Review of Systems: A complete review of systems was obtained, negative except as described above. Physical Exam:     Due to this being a TeleHealth evaluation, many elements of the physical examination are unable to be assessed. Constitutional: Appears well-developed and well-nourished in no apparent distress   Mental status: Alert and awake, Oriented to person/place/time, Able to follow commands  Eyes: EOM normal, Sclera normal, No visible ocular discharge  HENT: Normocephalic, atraumatic; Mouth/Throat: Moist mucous membranes, External Ears normal  Neck: No visualized mass  Psychiatric: Normal affect, normal judgment/insight. No hallucinations       Results:     Lab Results   Component Value Date/Time    WBC 12.5 (H) 10/27/2020 08:03 PM    HGB 13.5 10/27/2020 08:03 PM    HCT 40.5 10/27/2020 08:03 PM    PLATELET 837 93/66/8400 08:03 PM    MCV 82.8 10/27/2020 08:03 PM    ABS.  NEUTROPHILS 8.3 (H) 10/27/2020 08:03 PM     Lab Results   Component Value Date/Time    Sodium 141 10/27/2020 08:03 PM    Potassium 3.1 (L) 10/27/2020 08:03 PM    Chloride 108 10/27/2020 08:03 PM    CO2 26 10/27/2020 08:03 PM    Glucose 96 10/27/2020 08:03 PM    BUN 10 10/27/2020 08:03 PM    Creatinine 1.18 (H) 10/27/2020 08:03 PM    GFR est AA 57 (L) 10/27/2020 08:03 PM    GFR est non-AA 47 (L) 10/27/2020 08:03 PM    Calcium 9.5 10/27/2020 08:03 PM    Glucose (POC) 99 10/28/2020 04:07 PM    Creatinine (POC) 0.7 05/29/2020 06:13 PM     Lab Results   Component Value Date/Time    Bilirubin, total 0.3 10/27/2020 08:03 PM    ALT (SGPT) 32 10/27/2020 08:03 PM    Alk. phosphatase 184 (H) 10/27/2020 08:03 PM    Protein, total 7.4 10/27/2020 08:03 PM    Albumin 3.9 10/27/2020 08:03 PM    Globulin 3.5 10/27/2020 08:03 PM       Lab Results   Component Value Date/Time    Iron % saturation 14 (L) 09/05/2018 11:25 AM    TIBC 431 09/05/2018 11:25 AM    Ferritin 17 09/05/2018 11:25 AM    Vitamin B12 >2,000 (H) 10/28/2020 07:07 AM    Folate 47.9 (H) 10/28/2020 07:07 AM    Erythropoietin 16.7 09/05/2018 11:25 AM    Sed rate (ESR) 2 08/10/2018 10:53 AM    Sed rate, automated 9 02/09/2015 10:00 AM    C-Reactive protein <0.29 10/28/2020 07:07 AM    C-Reactive Protein, Qt 5.1 (H) 08/10/2018 10:53 AM    TSH 1.45 10/28/2020 07:07 AM    CALEB, Direct Positive (A) 09/04/2014 11:00 AM    Hep C Virus Ab 0.1 04/22/2014 11:15 AM    HIV 1/2 Interpretation NONREACTIVE 04/22/2014 11:15 AM     Lab Results   Component Value Date/Time    INR 0.9 08/24/2020 05:30 PM    aPTT 24.8 08/24/2020 05:30 PM    D-dimer 0.57 08/24/2020 05:30 PM       Records reviewed and summarized above. Pathology report(s) reviewed above. Radiology report(s) reviewed above.   CT abdomen 5/2020- reviewed     CTA 8/2020- reviewed         Assessment:   1) Leucocytosis    Has had it for almost 20 years  Likely reactive to smoking  Do not suspect an MPN   Unfortunately she did not have her JAK2 and BCR ABL PCR drawn  Since her most recent WBC count is improved I think its ok to follow her CBC and obtain these molecular tests next visit  She is uptodate with age appropriate cancer screening      2) Elevated Alkaline phosphatase  She sees Dr. Alba Roldan- [facility] abdomen reviewed and is unremarkable  She has a non obstructing renal calculus and will review this with urology    3) Obesity    4) Osteoarthritis    5) Lung nodule  Per pulmonary    Plan:     · CBC DIFF, BCR ABL PCR, JAK2    RTC 3 MONTHS    I appreciate the opportunity to participate in Ms. Hi Ferrell's care. Signed By: Marla Bustillos MD      I was in the office while conducting this encounter. The patient was at her home. Consent:  She and/or her healthcare decision maker is aware that this patient-initiated Telehealth encounter is a billable service, with coverage as determined by her insurance carrier. She is aware that she may receive a bill and has provided verbal consent to proceed: Yes    Pursuant to the emergency declaration under the [address] and the [facility], [address] waiver authority and the [employer] Act, this Virtual  Visit was conducted, with patient's (and/or legal guardian's) consent, to reduce the patient's risk of exposure to COVID-19 and provide necessary medical care. Services were provided through a video synchronous discussion virtually to substitute for in-person visit.

## 2020-12-03 ENCOUNTER — TELEPHONE (OUTPATIENT)
Dept: ONCOLOGY | Age: 55
End: 2020-12-03

## 2020-12-03 NOTE — TELEPHONE ENCOUNTER
Voicemail left requesting a call back. Called patient to schedule a 3 month follow up with Dr. Melville Cabot. Patient also needs labs mailed.

## 2020-12-08 ENCOUNTER — TELEPHONE (OUTPATIENT)
Dept: FAMILY MEDICINE CLINIC | Age: 55
End: 2020-12-08

## 2020-12-08 NOTE — TELEPHONE ENCOUNTER
----- Message from Kassidy Lozano sent at 12/8/2020 10:45 AM EST -----  Regarding: Dr. Sheng Noriega first and last name: N/A  Reason for call: CT Scan Order  Callback required yes/no and why: yes  Best contact number(s): [phone]  Details to clarify the request: Pt needs her CT Scan for Chest sent over to [facility] so she can be scheduled. [facility] [place] has no record of the order but [employer] does have record of it but they told pt she has to be scheduled through [facility] [place].

## 2020-12-09 NOTE — TELEPHONE ENCOUNTER
I spoke to Delicia Ohtray and she could not get her CT set up. She said [facility] could see it but our service could not they said. I called and hung on for a long time. I spoke to a pleasant lady and we got it set up for tomorrow at 8:30 am at [facility]. No OTC pain meds 4 hours prior to CT or solid food. Can drink water. I called artur and she is good to go.

## 2020-12-10 ENCOUNTER — HOSPITAL ENCOUNTER (OUTPATIENT)
Dept: CT IMAGING | Age: 55
Discharge: HOME | End: 2020-12-10
Attending: FAMILY MEDICINE
Payer: MEDICARE

## 2020-12-10 ENCOUNTER — OFFICE VISIT (OUTPATIENT)
Dept: HEMATOLOGY | Age: 55
End: 2020-12-10
Payer: MEDICARE

## 2020-12-10 VITALS
RESPIRATION RATE: 18 BRPM | DIASTOLIC BLOOD PRESSURE: 85 MMHG | BODY MASS INDEX: 35.58 KG/M2 | SYSTOLIC BLOOD PRESSURE: 145 MMHG | WEIGHT: 200.8 LBS | TEMPERATURE: 96.8 F | HEART RATE: 94 BPM | OXYGEN SATURATION: 98 % | HEIGHT: 63 IN

## 2020-12-10 DIAGNOSIS — R91.1 NODULE OF LEFT LUNG: ICD-10-CM

## 2020-12-10 DIAGNOSIS — R74.8 ABNORMAL LIVER ENZYMES: Primary | ICD-10-CM

## 2020-12-10 LAB
IRON SATN MFR SERPL: 8 % (ref 20–50)
IRON SERPL-MCNC: 41 UG/DL (ref 35–150)
TIBC SERPL-MCNC: 489 UG/DL (ref 250–450)

## 2020-12-10 PROCEDURE — 74011250636 HC RX REV CODE- 250/636: Performed by: FAMILY MEDICINE

## 2020-12-10 PROCEDURE — 71260 CT THORAX DX C+: CPT

## 2020-12-10 PROCEDURE — 99214 OFFICE O/P EST MOD 30 MIN: CPT | Performed by: HOSPITALIST

## 2020-12-10 PROCEDURE — 74011000636 HC RX REV CODE- 636: Performed by: FAMILY MEDICINE

## 2020-12-10 RX ORDER — SODIUM CHLORIDE 0.9 % (FLUSH) 0.9 %
10 SYRINGE (ML) INJECTION
Status: COMPLETED | OUTPATIENT
Start: 2020-12-10 | End: 2020-12-10

## 2020-12-10 RX ORDER — SODIUM CHLORIDE 9 MG/ML
50 INJECTION, SOLUTION INTRAVENOUS
Status: COMPLETED | OUTPATIENT
Start: 2020-12-10 | End: 2020-12-10

## 2020-12-10 RX ADMIN — Medication 10 ML: at 09:07

## 2020-12-10 RX ADMIN — SODIUM CHLORIDE 50 ML/HR: 900 INJECTION, SOLUTION INTRAVENOUS at 09:07

## 2020-12-10 RX ADMIN — IOPAMIDOL 100 ML: 612 INJECTION, SOLUTION INTRAVENOUS at 09:06

## 2020-12-10 NOTE — PROGRESS NOTES
Identified pt with two pt identifiers(name and ). Reviewed record in preparation for visit and have obtained necessary documentation. Chief Complaint   Patient presents with    Follow-up      Vitals:    12/10/20 1051   Temp: 96.8 °F (36 °C)   TempSrc: Temporal   Weight: 200 lb 12.8 oz (91.1 kg)   Height: 5' 3\" (1.6 m)   PainSc:   5   PainLoc: Generalized       Health Maintenance Review: Patient reminded of \"due or due soon\" health maintenance. I have asked the patient to contact his/her primary care provider (PCP) for follow-up on his/her health maintenance. Coordination of Care Questionnaire:  :   1) Have you been to an emergency room, urgent care, or hospitalized since your last visit? If yes, where when, and reason for visit? no       2. Have seen or consulted any other health care provider since your last visit? If yes, where when, and reason for visit? NO      Patient is accompanied by self I have received verbal consent from Yazmin Alanis to discuss any/all medical information while they are present in the room.

## 2020-12-10 NOTE — PATIENT INSTRUCTIONS
Recommend you loose 40 pounds The best diet for fatty liver disease is the [address] We will perform fibro scan on next clinic visit Learning About the [address] Diet What is the Mediterranean diet? The Mediterranean diet is a style of eating rather than a diet plan. It features foods eaten in [place], [place], [place] and [place], and other countries along the [facility]. It emphasizes eating foods like fish, fruits, vegetables, beans, high-fiber breads and whole grains, nuts, and olive oil. This style of eating includes limited red meat, cheese, and sweets. Why choose the Mediterranean diet? A Mediterranean-style diet may improve heart health. It contains more fat than other heart-healthy diets. But the fats are mainly from nuts, unsaturated oils (such as fish oils and olive oil), and certain nut or seed oils (such as canola, soybean, or flaxseed oil). These fats may help protect the heart and blood vessels. How can you get started on the Mediterranean diet? Here are some things you can do to switch to a more Mediterranean way of eating. What to eat · Eat a variety of fruits and vegetables each day, such as grapes, blueberries, tomatoes, broccoli, peppers, figs, olives, spinach, eggplant, beans, lentils, and chickpeas. · Eat a variety of whole-grain foods each day, such as oats, brown rice, and whole wheat bread, pasta, and couscous. · Eat fish at least 2 times a week. Try tuna, salmon, mackerel, lake trout, herring, or sardines. · Eat moderate amounts of low-fat dairy products, such as milk, cheese, or yogurt. · Eat moderate amounts of poultry and eggs. · Choose healthy (unsaturated) fats, such as nuts, olive oil, and certain nut or seed oils like canola, soybean, and flaxseed. · Limit unhealthy (saturated) fats, such as butter, palm oil, and coconut oil.  And limit fats found in animal products, such as meat and dairy products made with whole milk. Try to eat red meat only a few times a month in very small amounts. · Limit sweets and desserts to only a few times a week. This includes sugar-sweetened drinks like soda. The Mediterranean diet may also include red wine with your meal1 glass each day for women and up to 2 glasses a day for men. Tips for eating at home · Use herbs, spices, garlic, lemon zest, and citrus juice instead of salt to add flavor to foods. · Add avocado slices to your sandwich instead of dominique. · Have fish for lunch or dinner instead of red meat. Brush the fish with olive oil, and broil or grill it. · Sprinkle your salad with seeds or nuts instead of cheese. · Cook with olive or canola oil instead of butter or oils that are high in saturated fat. · Switch from 2% milk or whole milk to 1% or fat-free milk. · Dip raw vegetables in a vinaigrette dressing or hummus instead of dips made from mayonnaise or sour cream. 
· Have a piece of fruit for dessert instead of a piece of cake. Try baked apples, or have some dried fruit. Tips for eating out · Try broiled, grilled, baked, or poached fish instead of having it fried or breaded. · Ask your  to have your meals prepared with olive oil instead of butter. · Order dishes made with marinara sauce or sauces made from olive oil. Avoid sauces made from cream or mayonnaise. · Choose whole-grain breads, whole wheat pasta and pizza crust, brown rice, beans, and lentils. · Cut back on butter or margarine on bread. Instead, you can dip your bread in a small amount of olive oil. · Ask for a side salad or grilled vegetables instead of french fries or chips. Where can you learn more? Go to http://www.gray.com/ Enter 686-769-9287 in the search box to learn more about \"Learning About the Mediterranean Diet. \" Current as of: August 22, 2019               Content Version: 12.6 © 8898-8480 [employer]. Care instructions adapted under license by [employer] (which disclaims liability or warranty for this information). If you have questions about a medical condition or this instruction, always ask your healthcare professional. [address] any warranty or liability for your use of this information.

## 2020-12-10 NOTE — PROGRESS NOTES
Addy Pittman MD, [address], [place] Rachael Dunn, Cleaster Kawasaki, MD, MPH      Lisa Putnam, PA-JC Johnson, ACNP-BC     Iveth Bee, AGPCNP-BC   Tariq Jarad, FNP-C    Esther Julien, [facility]       Ravindrabib Dimas [facility] 136    at [address]    [address] [place] [address], [facility] Elder Dykes  22.    [phone]    FAX: [address] [place] [address] Avenue    [facility]    [address], [address]    [place], [address]    [phone]    FAX: [phone]     Patient Care Team:  Kavin Fam MD as PCP - General (Family Medicine)  Kavin Fam MD as PCP - [facility]  Shayy Hills MD as Physician (Sleep Medicine)    Problem List  Date Reviewed: 12/10/2020          Codes Class Noted    Bandemia ICD-10-CM: O22.017  ICD-9-CM: 288.66  12/2/2020        H/O right nephrectomy ICD-10-CM: Z90.5  ICD-9-CM: V45.73  11/10/2020        Aphasia ICD-10-CM: R47.01  ICD-9-CM: 784.3  10/28/2020        Renal cell carcinoma of right kidney [facility]) ICD-10-CM: C64.1  ICD-9-CM: 189.0  8/24/2020    Overview Signed 8/24/2020  7:84 PM by MD Dr Siria Jha 7/2020. Nephrectomy.              Renal mass, right ICD-10-CM: N28.89  ICD-9-CM: 593.9  7/29/2020        Colon adenoma ICD-10-CM: D12.6  ICD-9-CM: 211.3  3/18/2019    Overview Signed 3/18/2019 40:42 AM by Kavin Fam MD     9102- Dr Shyann Sánchez             Chronic superficial gastritis without bleeding ICD-10-CM: K29.30  ICD-9-CM: 535.10  3/18/2019    Overview Signed 3/18/2019 91:48 AM by Kavin Fam MD     EGD             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 ICD-10-CM: M47.12, M47.22  ICD-9-CM: 721.1, 723.4  12/3/2018        Spondylosis of lumbar spine ICD-10-CM: M47.816  ICD-9-CM: 721.3  12/3/2018        Abnormal mammogram of left breast ICD-10-CM: R92.8  ICD-9-CM: 793.80  9/26/2018        Iliotibial band tendinitis of right side ICD-10-CM: M76.31  ICD-9-CM: 728.89  8/30/2018        Severe obesity with body mass index (BMI) of 35.0 to 39.9 with serious comorbidity (HCC) ICD-10-CM: E66.01  ICD-9-CM: 278.01  6/20/2018        Type 2 diabetes mellitus with diabetic neuropathy ([facility] ICD-10-CM: E11.40  ICD-9-CM: 250.60, 357.2  2/14/2018        Status post bilateral knee replacements ICD-10-CM: Z96.653  ICD-9-CM: V43.65  11/2/2017        Cigarette nicotine dependence without complication N-71-ES: Z41.260  ICD-9-CM: 305.1  12/30/2016        Dysthymia ICD-10-CM: F34.1  ICD-9-CM: 300.4  12/30/2016        YOLANDA (obstructive sleep apnea) ICD-10-CM: G47.33  ICD-9-CM: 327.23  12/30/2016        Arthritis ICD-10-CM: M19.90  ICD-9-CM: 716.90  9/9/2016    Overview Signed 9/9/2016  7:05 AM by Bhavin Al MD     Rheumatology Dr. Frederick Templeton hypertension with goal blood pressure less than 140/90 ICD-10-CM: I10  ICD-9-CM: 401.9  9/7/2016        Chronic obstructive pulmonary disease ([facility] ICD-10-CM: J44.9  ICD-9-CM: 124  9/7/2016        Polyneuropathy associated with underlying disease ([facility] ICD-10-CM: G63  ICD-9-CM: 357.4  9/7/2016        RLS (restless legs syndrome) ICD-10-CM: G25.81  ICD-9-CM: 333.94  9/7/2016        Primary osteoarthritis of right knee ICD-10-CM: M17.11  ICD-9-CM: 715.16  8/18/2016             Radha Ocasio returns to the clinic for follow up and management of abnormal liver enzymes  All medical records sent by the referring physicians were reviewed     The patient is a 63 y.o.  female who was found to have abnormal liver chemistry in summer of 2019     Liver enzymes performed in 03/2020 revealed AST 54, , , TB < 0.2, Alb 4.3    Serologic evaluation for markers of chronic liver disease has not been performed. Imaging of the liver was not performed.         An assessment of liver fibrosis with biopsy or elasto graphy has not been performed. The patient had not started any new medications within 3 months preceding the elevation in liver chemistries. The patient has no symptoms which can be attributed to the liver disorder. The patient reports fatigue, insomnia. The patient completes all daily activities without any functional limitations. Interval History  Patient was last seen in the clinic in 11/10. Since last clinic visit, she has not required any hospital admissions. Serologic markers for causes of chronic liver disease was negative for hepatitis Bs Ag,HCV and AMA. Alkaline phosphatase isoenzymes liver fraction was 78, bone fracation 22. GGT was normal.  Liver ultrasound performed also showed normal liver    Patient denies abdominal pain over the liver, no jaundice, or ascites. She complains of joint pains on ambulation. ASSESSMENT AND PLAN:  Elevated liver enzymes  Persistent elevation in liver transaminases and alkaline phosphatase of unclear etiology at this time. Liver transaminases are normal. ALP is elevated. Liver function is normal. The platelet count is normal.    Serologic testing for causes of chronic liver disease was negative for hepatitis B and C virus. AMA was negative  ALP iso enzymes suggest etiology to be from the liver    The most likely causes for the liver chemistry abnormalities were discussed with the patient and include fatty liver disease based on underlying metabolic risk factors such as obesity,  Diabetes, hypertension and dyslipidemia. The [place] for management for fatty liver disease is weight loss    Today we will perform additional serologies to rule out other causes of chronic liver disease  We recommend patient lose about 40 pounds. We will perform fibro scan on next clinic visit    Screening for Hepatocellular Carcinoma  [address] screening is not necessary if the patient has no evidence of cirrhosis.     Treatment of other medical problems in patients with chronic liver disease  There are no contraindications for the patient to take most medications that are necessary for treatment of other medical issues. Counseling for alcohol in patients with chronic liver disease  The patient was counseled regarding alcohol consumption and the effect of alcohol on chronic liver disease    Vaccinations   The need for vaccination against viral hepatitis B is not needed. Patient is immune,  We will check serologies for hepatitis A to determine immunity. Routine vaccinations against other bacterial and viral agents can be performed as indicated. Annual flu vaccination should be administered if indicated. ALLERGIES  Allergies   Allergen Reactions    Nsaids (Non-Steroidal Anti-Inflammatory Drug) Other (comments)     Stomach hurts    Celebrex [Celecoxib] Hives    Ditropan [Oxybutynin Chloride] Hives and Itching    Mobic [Meloxicam] Hives    Penicillins Unknown (comments)     DOESN'T REMEMBER  Patient states tolerating PO amoxicillin    Plaquenil [Hydroxychloroquine] Hives       MEDICATIONS  Current Outpatient Medications   Medication Sig    aspirin delayed-release 81 mg tablet Take 81 mg by mouth daily.  dilTIAZem ER (CARDIZEM CD) 180 mg capsule TAKE 2 CAPSULES EVERY DAY FOR HIGH BLOOD PRESSURE    folic acid (FOLVITE) 1 mg tablet Take 1 Tab by mouth daily.  rOPINIRole (REQUIP) 2 mg tablet Take 1 Tab by mouth three (3) times daily.  omeprazole (PRILOSEC) 40 mg capsule Take 1 Cap by mouth Before breakfast and dinner.  atorvastatin (LIPITOR) 40 mg tablet Take 1 Tab by mouth daily.  metFORMIN (GLUCOPHAGE) 500 mg tablet Take 500 mg by mouth two (2) times daily (with meals).  benzonatate (Tessalon Perles) 100 mg capsule Take 100 mg by mouth three (3) times daily as needed for Cough.  losartan (COZAAR) 100 mg tablet Take 100 mg by mouth nightly.     furosemide (LASIX) 20 mg tablet TAKE 1 TABLET EVERY DAY AS NEEDED    traZODone (DESYREL) 50 mg tablet Take 1 Tab by mouth nightly. Indications: insomnia    levocetirizine (XYZAL) 5 mg tablet Take 1 Tab by mouth daily.  venlafaxine-SR (EFFEXOR-XR) 150 mg capsule Take 1 Cap by mouth daily.  LORazepam (ATIVAN) 0.5 mg tablet Take 1 Tab by mouth two (2) times daily as needed for Anxiety. Max Daily Amount: 1 mg. Indications: anxious    fluticasone propionate (FLONASE) 50 mcg/actuation nasal spray 2 sprays per nostril once a day  Indications: inflammation of the nose due to an allergy    pregabalin (LYRICA) 150 mg capsule TAKE 1 CAPSULE TWICE DAILY    oxybutynin (DITROPAN) 5 mg tablet     celecoxib (CELEBREX) 100 mg capsule Take 1 Cap by mouth two (2) times a day for 90 days.  tiZANidine (ZANAFLEX) 2 mg tablet TAKE 1 TABLET TWICE DAILY FOR MUSCLE SPASM(S)    zinc 50 mg tab tablet Take 50 mg by mouth daily.  ascorbic acid (C-500 PO) Take  by mouth daily.  Biotin 2,500 mcg cap Take  by mouth daily.  tiotropium (SPIRIVA) 18 mcg inhalation capsule Take 1 Cap by inhalation daily.  Blood-Glucose Meter monitoring kit E11.9 Accu-Chek Joyce Plus Meter     No current facility-administered medications for this visit. SYSTEM REVIEW NOT RELATED TO LIVER DISEASE OR REVIEWED ABOVE:  Constitution systems: Negative for fever, chills, weight gain, weight loss. Eyes: Negative for visual changes. ENT: Negative for sore throat, painful swallowing. Respiratory: Negative for cough, hemoptysis, SOB. Cardiology: Negative for chest pain, palpitations. GI:  Negative for constipation or diarrhea. : Negative for urinary frequency, dysuria, hematuria, nocturia. Skin: Negative for rash. Hematology: Negative for easy bruising, blood clots. Musculo-skelatal: Negative for back pain, muscle pain, weakness. Neurologic: Negative for headaches, dizziness, vertigo, memory problems not related to HE. Psychology: Negative for anxiety, depression.        FAMILY HISTORY:  The father is . The mother Has/had the following chronic disease(s): HTN, Dyslipidemia, thyrodi dx  Alcohol cirrhosis- Dad  There is no family history of immune disorders. SOCIAL HISTORY:  The patient is   The patient has 1 child. The patient currently smokes 1 pack of tobacco daily. The patient drinks alcohol socially. 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 Eyes: Sclera anicteric. ENT: No oral lesions. Thyroid normal.  Nodes: No adenopathy. Skin: No spider angiomata. No jaundice. No palmar erythema. Respiratory: Lungs clear to auscultation. Cardiovascular: Regular heart rate. No murmurs. No JVD. Abdomen: Soft non-tender. Liver size normal to percussion/palpation. Spleen not palpable. No obvious ascites. Extremities: No edema. No muscle wasting. No gross arthritic changes. Neurologic: Alert and oriented. Cranial nerves grossly intact. No asterixis. LABORATORY STUDIES:  Recent liver function panel, CBC with platelet count and BMP are not available. These studies will be performed.   Liver [place] of [address] [place] [address] Ref Rng & Units 10/27/2020   WBC 3.6 - 11.0 K/uL 12.5 (H)   ANC 1.8 - 8.0 K/UL 8.3 (H)   HGB 11.5 - 16.0 g/dL 13.5    - 400 K/uL 226   PLT     INR 0.9 - 1.1      AST 15 - 37 U/L 22   ALT 12 - 78 U/L 32   Alk Phos 45 - 117 U/L 184 (H)   Bili, Total 0.2 - 1.0 MG/DL 0.3   Albumin 3.5 - 5.0 g/dL 3.9   BUN 6 - 20 MG/DL 10   Creat 0.55 - 1.02 MG/DL 1.18 (H)   Creat (iSTAT) 0.6 - 1.3 mg/dL    Na 136 - 145 mmol/L 141   K 3.5 - 5.1 mmol/L 3.1 (L)   Cl 97 - 108 mmol/L 108   CO2 21 - 32 mmol/L 26   Glucose 65 - 100 mg/dL 96   Magnesium 1.6 - 2.4 mg/dL 2.7 (H)       SEROLOGIES:  Not available or performed. Testing was performed today. Serologies Latest Ref Rng & Units 11/10/2020   Hep B Surface Ag Index <0.10   Hep B Surface Ag Interp Negative   Negative   Hep B Core Ab, Total Negative   Negative   Hep B Surface Ab mIU/mL 21.58   Hep B Surface Ab Interp NONREACTIVE   REACTIVE (A)   Hep C Ab 0.0 - 0.9 s/co ratio <0.1   Ferritin 15 - 150 ng/mL    Iron % Saturation 15 - 55 %    M2 Ab 0.0 - 20.0 Units <20.0       LIVER HISTOLOGY:  Not available or performed    ENDOSCOPIC PROCEDURES:  Not available or performed    RADIOLOGY:  11/20202: liver US; normal. No focal lesion. Left renal calculus    OTHER TESTING:  Not available or performed    FOLLOW-UP:  All of the issues listed above in the Assessment and Plan were discussed with the patient. All questions were answered. The patient expressed a clear understanding of the above. [address] [place] [address] in 3 months for fibroscan and to review all data and determine the treatment plan.     Parish Abraham MD, MPH  Advanced Hepatology  [facility] of [address] [address] Jaiden Tesfaye, [address] [facility] 22.  [phone]  [address] [place] [address]

## 2020-12-12 ENCOUNTER — TELEPHONE (OUTPATIENT)
Dept: FAMILY MEDICINE CLINIC | Age: 55
End: 2020-12-12

## 2020-12-12 LAB — ACTIN IGG SERPL-ACNC: 4 UNITS (ref 0–19)

## 2020-12-13 NOTE — TELEPHONE ENCOUNTER
Advise pt CT lungs show no change in size of lung nodules. Plan to repeat in 6 months. Has she quit smoking?

## 2020-12-14 LAB
ANA TITR SER IF: NEGATIVE {TITER}
FERRITIN SERPL-MCNC: 16 NG/ML (ref 8–252)

## 2020-12-14 NOTE — TELEPHONE ENCOUNTER
CT lungs show no change in size of lung nodules. Plan to repeat in 6 months. Has she quit smoking? She has cut way back.

## 2020-12-29 DIAGNOSIS — R60.0 PEDAL EDEMA: ICD-10-CM

## 2020-12-29 DIAGNOSIS — F34.1 DYSTHYMIA: ICD-10-CM

## 2020-12-29 RX ORDER — BLOOD SUGAR DIAGNOSTIC
STRIP MISCELLANEOUS
Qty: 100 STRIP | Refills: 4 | Status: SHIPPED | OUTPATIENT
Start: 2020-12-29 | End: 2022-01-08

## 2020-12-29 RX ORDER — METFORMIN HYDROCHLORIDE 500 MG/1
TABLET ORAL
Qty: 360 TAB | Refills: 1 | Status: SHIPPED | OUTPATIENT
Start: 2020-12-29 | End: 2021-06-02

## 2020-12-29 RX ORDER — FUROSEMIDE 20 MG/1
TABLET ORAL
Qty: 90 TAB | Refills: 1 | Status: SHIPPED | OUTPATIENT
Start: 2020-12-29 | End: 2021-06-02

## 2020-12-29 RX ORDER — VENLAFAXINE HYDROCHLORIDE 150 MG/1
CAPSULE, EXTENDED RELEASE ORAL
Qty: 90 CAP | Refills: 3 | Status: SHIPPED | OUTPATIENT
Start: 2020-12-29 | End: 2021-10-27

## 2020-12-30 DIAGNOSIS — R05.9 COUGH: Primary | ICD-10-CM

## 2020-12-30 RX ORDER — BENZONATATE 100 MG/1
CAPSULE ORAL
Qty: 270 CAP | Refills: 0 | Status: SHIPPED | OUTPATIENT
Start: 2020-12-30 | End: 2021-01-11 | Stop reason: SDUPTHER

## 2021-01-11 DIAGNOSIS — R05.9 COUGH: ICD-10-CM

## 2021-01-11 RX ORDER — BENZONATATE 100 MG/1
CAPSULE ORAL
Qty: 270 CAP | Refills: 0 | Status: SHIPPED | OUTPATIENT
Start: 2021-01-11 | End: 2021-03-29

## 2021-01-11 NOTE — TELEPHONE ENCOUNTER
----- Message from Eleni Gage sent at 1/11/2021 10:46 AM EST -----  Regarding: Dr. Obrien Pier Refill  Contact: [phone]  Medication Refill    Caller (if not patient): pt      Relationship of caller (if not patient):pt      Best contact number(s): [phone]      Name of medication and dosage if known: benzonatate      Is patient out of this medication (yes/no):no 2 pills left      Pharmacy name:[facility]    Pharmacy listed in chart? (yes/no):yes  Pharmacy phone number:n/a      Details to clarify the request: Pt would like a call back from tommie to discuss her medication refills thru [facility] along with getting a new rx for her neuropathy.       Eleni Gage

## 2021-01-11 NOTE — TELEPHONE ENCOUNTER
Frances Hoffmann, please call to see what else she needs. I sent refill for Tessalon to [facility].

## 2021-01-12 NOTE — TELEPHONE ENCOUNTER
She has stopped several medications. They were not helping anyway. She has a virtual tomorrow with us. She has virtual tonight with neuro.

## 2021-01-13 ENCOUNTER — VIRTUAL VISIT (OUTPATIENT)
Dept: FAMILY MEDICINE CLINIC | Age: 56
End: 2021-01-13
Payer: MEDICARE

## 2021-01-13 DIAGNOSIS — M47.816 SPONDYLOSIS OF LUMBAR SPINE: ICD-10-CM

## 2021-01-13 DIAGNOSIS — M54.16 LUMBAR RADICULOPATHY: Primary | ICD-10-CM

## 2021-01-13 PROCEDURE — 99213 OFFICE O/P EST LOW 20 MIN: CPT | Performed by: FAMILY MEDICINE

## 2021-01-13 NOTE — PROGRESS NOTES
Ying Amato is a 63 y.o. female    Chief Complaint   Patient presents with    Medication Check       3 most recent PHQ Screens 1/13/2021   Little interest or pleasure in doing things Not at all   Feeling down, depressed, irritable, or hopeless Not at all   Total Score PHQ 2 0       Fall Risk Assessment, last 12 mths 2/24/2020   Able to walk? Yes   Fall in past 12 months? No       Abuse Screening Questionnaire 1/13/2021   Do you ever feel afraid of your partner? N   Are you in a relationship with someone who physically or mentally threatens you? N   Is it safe for you to go home? Y       1. Have you been to the ER, urgent care clinic since your last visit? Hospitalized since your last visit? No    2. Have you seen or consulted any other health care providers outside of the [address] [place] [address] since your last visit? Include any pap smears or colon screening.  No

## 2021-01-14 NOTE — PROGRESS NOTES
HISTORY OF PRESENT ILLNESS  Angela Bui is a 63 y.o. female. HPI  Pt C/O persistent pain R leg front of thigh. Stabbing pain. She was seeing pain management DO for supplements and manipulation prior to her kidney surgery. MRI spine done Sept 2020 showed DDD and spondylosis, synovial cyst on R. She has never tried TERRA. Pt has discontinued some of her medicines that were not helpful or too expensive. Review of Systems   Musculoskeletal: Positive for back pain. Current Outpatient Medications on File Prior to Visit   Medication Sig Dispense Refill    elderberry fruit (ELDERBERRY PO) Take  by mouth daily.  benzonatate (TESSALON) 100 mg capsule TAKE 1 CAPSULE BY MOUTH THREE TIMES DAILY AS NEEDED FOR COUGH 270 Cap 0    Accu-Chek Joyce Plus test strp strip TEST BLOOD SUGAR EVERY  Strip 4    furosemide (LASIX) 20 mg tablet TAKE 1 TABLET EVERY DAY AS NEEDED 90 Tab 1    metFORMIN (GLUCOPHAGE) 500 mg tablet TAKE TWO TABLETS BY MOUTH TWO TIMES A DAY WITH MEALS (Patient taking differently: Take 500 mg by mouth two (2) times daily (with meals). ) 360 Tab 1    venlafaxine-SR (EFFEXOR-XR) 150 mg capsule TAKE 1 CAPSULE EVERY DAY 90 Cap 3    aspirin delayed-release 81 mg tablet Take 81 mg by mouth daily.  dilTIAZem ER (CARDIZEM CD) 180 mg capsule TAKE 2 CAPSULES EVERY DAY FOR HIGH BLOOD PRESSURE 370 Cap 1    folic acid (FOLVITE) 1 mg tablet Take 1 Tab by mouth daily. 90 Tab 3    zinc 50 mg tab tablet Take 50 mg by mouth daily.  rOPINIRole (REQUIP) 2 mg tablet Take 1 Tab by mouth three (3) times daily. 270 Tab 1    omeprazole (PRILOSEC) 40 mg capsule Take 1 Cap by mouth Before breakfast and dinner. 180 Cap 3    atorvastatin (LIPITOR) 40 mg tablet Take 1 Tab by mouth daily. 90 Tab 1    ascorbic acid (C-500 PO) Take  by mouth daily.  Biotin 2,500 mcg cap Take  by mouth daily.  losartan (COZAAR) 100 mg tablet Take 100 mg by mouth nightly.       traZODone (DESYREL) 50 mg tablet Take 1 Tab by mouth nightly. Indications: insomnia 90 Tab 3    levocetirizine (XYZAL) 5 mg tablet Take 1 Tab by mouth daily. 90 Tab 3    LORazepam (ATIVAN) 0.5 mg tablet Take 1 Tab by mouth two (2) times daily as needed for Anxiety. Max Daily Amount: 1 mg. Indications: anxious 180 Tab 0    fluticasone propionate (FLONASE) 50 mcg/actuation nasal spray 2 sprays per nostril once a day  Indications: inflammation of the nose due to an allergy 3 Bottle 3    tiotropium (SPIRIVA) 18 mcg inhalation capsule Take 1 Cap by inhalation daily. (Patient not taking: Reported on 1/13/2021) 90 Cap 1    Blood-Glucose Meter monitoring kit E11.9 Accu-Chek Joyce Plus Meter 1 Kit 0     No current facility-administered medications on file prior to visit.       Patient Active Problem List   Diagnosis Code    Primary osteoarthritis of right knee M17.11    Essential hypertension with goal blood pressure less than 140/90 I10    Chronic obstructive pulmonary disease (HCC) J44.9    Polyneuropathy associated with underlying disease ([facility] G63    RLS (restless legs syndrome) G25.81    Arthritis M19.90    Cigarette nicotine dependence without complication U01.995    Dysthymia F34.1    YOLANDA (obstructive sleep apnea) G47.33    Status post bilateral knee replacements Z96.653    Type 2 diabetes mellitus with diabetic neuropathy ([facility]) E11.40    Severe obesity with body mass index (BMI) of 35.0 to 39.9 with serious comorbidity ([facility]) E66.01    Iliotibial band tendinitis of right side M76.31    Abnormal mammogram of left breast R92.8    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 M47.12, M47.22    Spondylosis of lumbar spine M47.816    Colon adenoma D12.6    Chronic superficial gastritis without bleeding K29.30    Renal mass, right N28.89    Renal cell carcinoma of right kidney ([facility]) C64.1    Aphasia R47.01    H/O right nephrectomy Z90.5    Bandemia D72.825       Physical Exam    ASSESSMENT and PLAN    ICD-10-CM ICD-9-CM    1. Lumbar radiculopathy  M54.16 724.4 REFERRAL TO PAIN MANAGEMENT   2. Spondylosis of lumbar spine  M47.816 721.3 REFERRAL TO PAIN MANAGEMENT       Josue Flores, who was evaluated through a synchronous (real-time) audio-video encounter, and/or her healthcare decision maker, is aware that it is a billable service, with coverage as determined by her insurance carrier. She provided verbal consent to proceed: Yes, and patient identification was verified. It was conducted pursuant to the emergency declaration under the [address] [place] [address], [address] [place] [address] authority and the [employer] General Act. A caregiver was present when appropriate. Ability to conduct physical exam was limited. I was in the office. The patient was at home.

## 2021-01-16 DIAGNOSIS — M47.816 SPONDYLOSIS OF LUMBAR SPINE: ICD-10-CM

## 2021-01-17 RX ORDER — LOSARTAN POTASSIUM 100 MG/1
TABLET ORAL
Qty: 90 TAB | Refills: 1 | Status: SHIPPED | OUTPATIENT
Start: 2021-01-17 | End: 2021-06-03

## 2021-01-17 RX ORDER — CELECOXIB 100 MG/1
CAPSULE ORAL
Qty: 180 CAP | Refills: 0 | OUTPATIENT
Start: 2021-01-17

## 2021-02-03 ENCOUNTER — TELEPHONE (OUTPATIENT)
Dept: FAMILY MEDICINE CLINIC | Age: 56
End: 2021-02-03

## 2021-02-03 DIAGNOSIS — Z79.899 ENCOUNTER FOR LONG-TERM CURRENT USE OF MEDICATION: ICD-10-CM

## 2021-02-03 DIAGNOSIS — G25.81 RLS (RESTLESS LEGS SYNDROME): ICD-10-CM

## 2021-02-03 DIAGNOSIS — I10 ESSENTIAL HYPERTENSION WITH GOAL BLOOD PRESSURE LESS THAN 140/90: ICD-10-CM

## 2021-02-03 DIAGNOSIS — E55.9 VITAMIN D DEFICIENCY: ICD-10-CM

## 2021-02-03 DIAGNOSIS — E11.40 TYPE 2 DIABETES MELLITUS WITH DIABETIC NEUROPATHY, WITHOUT LONG-TERM CURRENT USE OF INSULIN (HCC): Primary | ICD-10-CM

## 2021-02-03 NOTE — TELEPHONE ENCOUNTER
----- Message from Brad Hansen sent at 2/3/2021 10:22 AM EST -----  Regarding: Lab Appointment  Contact: [phone]  Appointment not available    Caller's first and last name and relationship to patient (if not the patient): NA      Best contact number: [phone]        Preferred date and time: On or prior to appointment on 02/08/21. Scheduled appointment date and time: 02/08/21 @ 02:00 PM.      Reason for appointment: Fasting labs      Details to clarify the request: Patient needing labs prior to or on 02/08/21 prior to 02:00 PM appointment, please call to arrange.        Brad Hansen

## 2021-02-05 ENCOUNTER — LAB ONLY (OUTPATIENT)
Dept: FAMILY MEDICINE CLINIC | Age: 56
End: 2021-02-05

## 2021-02-05 DIAGNOSIS — Z79.899 ENCOUNTER FOR LONG-TERM CURRENT USE OF MEDICATION: ICD-10-CM

## 2021-02-05 DIAGNOSIS — E11.40 TYPE 2 DIABETES MELLITUS WITH DIABETIC NEUROPATHY, WITHOUT LONG-TERM CURRENT USE OF INSULIN (HCC): ICD-10-CM

## 2021-02-05 DIAGNOSIS — E55.9 VITAMIN D DEFICIENCY: ICD-10-CM

## 2021-02-05 DIAGNOSIS — I10 ESSENTIAL HYPERTENSION WITH GOAL BLOOD PRESSURE LESS THAN 140/90: ICD-10-CM

## 2021-02-06 LAB
25(OH)D3 SERPL-MCNC: 29.4 NG/ML (ref 30–100)
ALBUMIN SERPL-MCNC: 3.8 G/DL (ref 3.5–5)
ALBUMIN/GLOB SERPL: 1.4 {RATIO} (ref 1.1–2.2)
ALP SERPL-CCNC: 608 U/L (ref 45–117)
ALT SERPL-CCNC: 1057 U/L (ref 12–78)
ANION GAP SERPL CALC-SCNC: 8 MMOL/L (ref 5–15)
AST SERPL-CCNC: 421 U/L (ref 15–37)
BASOPHILS # BLD: 0.2 K/UL (ref 0–0.1)
BASOPHILS NFR BLD: 1 % (ref 0–1)
BILIRUB SERPL-MCNC: 0.2 MG/DL (ref 0.2–1)
BUN SERPL-MCNC: 10 MG/DL (ref 6–20)
BUN/CREAT SERPL: 9 (ref 12–20)
CALCIUM SERPL-MCNC: 9.6 MG/DL (ref 8.5–10.1)
CHLORIDE SERPL-SCNC: 105 MMOL/L (ref 97–108)
CHOLEST SERPL-MCNC: 160 MG/DL
CO2 SERPL-SCNC: 25 MMOL/L (ref 21–32)
CREAT SERPL-MCNC: 1.07 MG/DL (ref 0.55–1.02)
CREAT UR-MCNC: 45.9 MG/DL
DIFFERENTIAL METHOD BLD: ABNORMAL
EOSINOPHIL # BLD: 0.9 K/UL (ref 0–0.4)
EOSINOPHIL NFR BLD: 5 % (ref 0–7)
ERYTHROCYTE [DISTWIDTH] IN BLOOD BY AUTOMATED COUNT: 16.1 % (ref 11.5–14.5)
EST. AVERAGE GLUCOSE BLD GHB EST-MCNC: 128 MG/DL
GLOBULIN SER CALC-MCNC: 2.7 G/DL (ref 2–4)
GLUCOSE SERPL-MCNC: 119 MG/DL (ref 65–100)
HBA1C MFR BLD: 6.1 % (ref 4–5.6)
HCT VFR BLD AUTO: 38.8 % (ref 35–47)
HDLC SERPL-MCNC: 53 MG/DL
HDLC SERPL: 3 {RATIO} (ref 0–5)
HGB BLD-MCNC: 12.3 G/DL (ref 11.5–16)
IMM GRANULOCYTES # BLD AUTO: 0.1 K/UL (ref 0–0.04)
IMM GRANULOCYTES NFR BLD AUTO: 0 % (ref 0–0.5)
LDLC SERPL CALC-MCNC: 85.4 MG/DL (ref 0–100)
LIPID PROFILE,FLP: NORMAL
LYMPHOCYTES # BLD: 2.4 K/UL (ref 0.8–3.5)
LYMPHOCYTES NFR BLD: 16 % (ref 12–49)
MCH RBC QN AUTO: 27.6 PG (ref 26–34)
MCHC RBC AUTO-ENTMCNC: 31.7 G/DL (ref 30–36.5)
MCV RBC AUTO: 87 FL (ref 80–99)
MICROALBUMIN UR-MCNC: 0.58 MG/DL
MICROALBUMIN/CREAT UR-RTO: 13 MG/G (ref 0–30)
MONOCYTES # BLD: 0.6 K/UL (ref 0–1)
MONOCYTES NFR BLD: 4 % (ref 5–13)
NEUTS SEG # BLD: 11.5 K/UL (ref 1.8–8)
NEUTS SEG NFR BLD: 74 % (ref 32–75)
NRBC # BLD: 0 K/UL (ref 0–0.01)
NRBC BLD-RTO: 0 PER 100 WBC
PLATELET # BLD AUTO: 266 K/UL (ref 150–400)
PMV BLD AUTO: 10.8 FL (ref 8.9–12.9)
POTASSIUM SERPL-SCNC: 4.3 MMOL/L (ref 3.5–5.1)
PROT SERPL-MCNC: 6.5 G/DL (ref 6.4–8.2)
RBC # BLD AUTO: 4.46 M/UL (ref 3.8–5.2)
SODIUM SERPL-SCNC: 138 MMOL/L (ref 136–145)
TRIGL SERPL-MCNC: 108 MG/DL (ref ?–150)
TSH SERPL DL<=0.05 MIU/L-ACNC: 1.18 UIU/ML (ref 0.36–3.74)
VLDLC SERPL CALC-MCNC: 21.6 MG/DL
WBC # BLD AUTO: 15.6 K/UL (ref 3.6–11)

## 2021-02-08 ENCOUNTER — OFFICE VISIT (OUTPATIENT)
Dept: FAMILY MEDICINE CLINIC | Age: 56
End: 2021-02-08
Payer: MEDICARE

## 2021-02-08 VITALS
SYSTOLIC BLOOD PRESSURE: 136 MMHG | WEIGHT: 199 LBS | HEIGHT: 63 IN | TEMPERATURE: 98.2 F | BODY MASS INDEX: 35.26 KG/M2 | OXYGEN SATURATION: 95 % | RESPIRATION RATE: 20 BRPM | HEART RATE: 93 BPM | DIASTOLIC BLOOD PRESSURE: 80 MMHG

## 2021-02-08 DIAGNOSIS — F41.0 PANIC ATTACKS: ICD-10-CM

## 2021-02-08 DIAGNOSIS — I10 ESSENTIAL HYPERTENSION WITH GOAL BLOOD PRESSURE LESS THAN 140/90: ICD-10-CM

## 2021-02-08 DIAGNOSIS — E11.40 TYPE 2 DIABETES MELLITUS WITH DIABETIC NEUROPATHY, WITHOUT LONG-TERM CURRENT USE OF INSULIN (HCC): ICD-10-CM

## 2021-02-08 DIAGNOSIS — F34.1 DYSTHYMIA: ICD-10-CM

## 2021-02-08 DIAGNOSIS — E78.00 HYPERCHOLESTEROLEMIA: ICD-10-CM

## 2021-02-08 DIAGNOSIS — R79.89 ABNORMAL LIVER FUNCTION TEST: ICD-10-CM

## 2021-02-08 DIAGNOSIS — M47.816 SPONDYLOSIS OF LUMBAR SPINE: Primary | ICD-10-CM

## 2021-02-08 DIAGNOSIS — M54.16 LUMBAR RADICULOPATHY: ICD-10-CM

## 2021-02-08 PROCEDURE — G8752 SYS BP LESS 140: HCPCS | Performed by: FAMILY MEDICINE

## 2021-02-08 PROCEDURE — 3044F HG A1C LEVEL LT 7.0%: CPT | Performed by: FAMILY MEDICINE

## 2021-02-08 PROCEDURE — 99214 OFFICE O/P EST MOD 30 MIN: CPT | Performed by: FAMILY MEDICINE

## 2021-02-08 PROCEDURE — G9899 SCRN MAM PERF RSLTS DOC: HCPCS | Performed by: FAMILY MEDICINE

## 2021-02-08 PROCEDURE — 2022F DILAT RTA XM EVC RTNOPTHY: CPT | Performed by: FAMILY MEDICINE

## 2021-02-08 PROCEDURE — 3017F COLORECTAL CA SCREEN DOC REV: CPT | Performed by: FAMILY MEDICINE

## 2021-02-08 PROCEDURE — G8427 DOCREV CUR MEDS BY ELIG CLIN: HCPCS | Performed by: FAMILY MEDICINE

## 2021-02-08 PROCEDURE — G9717 DOC PT DX DEP/BP F/U NT REQ: HCPCS | Performed by: FAMILY MEDICINE

## 2021-02-08 PROCEDURE — G8754 DIAS BP LESS 90: HCPCS | Performed by: FAMILY MEDICINE

## 2021-02-08 PROCEDURE — G8417 CALC BMI ABV UP PARAM F/U: HCPCS | Performed by: FAMILY MEDICINE

## 2021-02-08 RX ORDER — LORAZEPAM 0.5 MG/1
0.5 TABLET ORAL
Qty: 180 TAB | Refills: 0 | Status: SHIPPED | OUTPATIENT
Start: 2021-02-08 | End: 2022-10-28 | Stop reason: SDUPTHER

## 2021-02-08 NOTE — PROGRESS NOTES
Identified pt with two pt identifiers(name and ). Chief Complaint   Patient presents with    Physical    Hernia (Non Specific)     she has a hard place at her surgical site    Sad     she has been feeling down since not being able to get out and around        Health Maintenance Due   Topic    COVID-19 Vaccine (1 of 2)    Foot Exam Q1        Wt Readings from Last 3 Encounters:   21 199 lb (90.3 kg)   12/10/20 200 lb 12.8 oz (91.1 kg)   11/10/20 203 lb 3.2 oz (92.2 kg)     Temp Readings from Last 3 Encounters:   21 98.2 °F (36.8 °C) (Oral)   12/10/20 96.8 °F (36 °C) (Temporal)   11/10/20 98 °F (36.7 °C) (Temporal)     BP Readings from Last 3 Encounters:   21 136/80   12/10/20 (!) 145/85   11/10/20 (!) 141/73     Pulse Readings from Last 3 Encounters:   21 93   12/10/20 94   11/10/20 80         Learning Assessment:  :     Learning Assessment 2016   PRIMARY LEARNER Patient Patient Patient   HIGHEST LEVEL OF EDUCATION - PRIMARY LEARNER  - - SOME COLLEGE   BARRIERS PRIMARY LEARNER - - NONE   CO-LEARNER CAREGIVER - - No   PRIMARY LANGUAGE ENGLISH ENGLISH ENGLISH   LEARNER PREFERENCE PRIMARY LISTENING - DEMONSTRATION   ANSWERED BY patient patient self   RELATIONSHIP SELF SELF SELF       Depression Screening:  :     3 most recent PHQ Screens 2021   Little interest or pleasure in doing things Not at all   Feeling down, depressed, irritable, or hopeless Not at all   Total Score PHQ 2 0       Fall Risk Assessment:  :     Fall Risk Assessment, last 12 mths 2020   Able to walk? Yes   Fall in past 12 months? No       Abuse Screening:  :     Abuse Screening Questionnaire 2021 12/10/2020 1/10/2020 3/18/2019 2018 2016   Do you ever feel afraid of your partner? N N N N N N   Are you in a relationship with someone who physically or mentally threatens you? N N N N N N   Is it safe for you to go home?  Joelle Downey       Coordination of Care Questionnaire:  :     1) Have you been to an emergency room, urgent care clinic since your last visit? no   Hospitalized since your last visit? no             2) Have you seen or consulted any other health care providers outside of [address] [place] [address] since your last visit? no  (Include any pap smears or colon screenings in this section.)    3) Do you have an Advance Directive on file? yes  Are you interested in receiving information about Advance Directives? no    Patient is accompanied by friend I have received verbal consent from Julieta Caballero to discuss any/all medical information while they are present in the room. Reviewed record in preparation for visit and have obtained necessary documentation. Medication reconciliation up to date and corrected with patient at this time.

## 2021-02-08 NOTE — Clinical Note
Please review this patient's CMP. Her LFT's are now much higher. No identifiable etiology.  What do you think is going on?

## 2021-02-08 NOTE — PROGRESS NOTES
Very high liver enzymes. Good sugar control. Good cholesterol. Stable kidney function. Normal thyroid.

## 2021-02-09 NOTE — PROGRESS NOTES
HISTORY OF PRESENT ILLNESS  Micky Arizmendi is a 63 y.o. female. HPI  FU chronic med conditions and review lab results. 1. Chronic pain in back. Plan to refer to Pain Clinic. 2. DM under control. Good lipids. 3. Abnormal high LFT's. She denies drinking alcohol. No new meds. Takes Tylenol PM 2 tab at night but not more during the day. She has been to [facility] for consult. 4. High WBC. She has FU with Hematology Dr Viral Wilson. 5. Mood disorder. Feels down because of COVID restrictions limits socialization. 6. Hx renal cancer. CT not due until August 2021. Review of Systems   Musculoskeletal: Positive for back pain and joint pain. Neurological: Positive for tingling. Psychiatric/Behavioral: Positive for depression. Physical Exam  Vitals signs reviewed. Constitutional:       Appearance: She is obese. Neck:      Musculoskeletal: Neck supple. Cardiovascular:      Rate and Rhythm: Normal rate and regular rhythm. Heart sounds: Normal heart sounds. Pulmonary:      Effort: Pulmonary effort is normal.      Breath sounds: Normal breath sounds. Abdominal:      General: There is no distension. Palpations: Abdomen is soft. There is no mass. Tenderness: There is no abdominal tenderness. Hernia: No hernia is present. Neurological:      Mental Status: She is alert. ASSESSMENT and PLAN    ICD-10-CM ICD-9-CM    1. Spondylosis of lumbar spine  M47.816 721.3 REFERRAL TO ORTHOPEDICS   2. Panic attacks  F41.0 300.01 LORazepam (ATIVAN) 0.5 mg tablet   3. Lumbar radiculopathy  M54.16 724.4 REFERRAL TO ORTHOPEDICS   4. Abnormal liver function test  R94.5 790.6    5. Essential hypertension with goal blood pressure less than 140/90  I10 401.9    6. Type 2 diabetes mellitus with diabetic neuropathy, without long-term current use of insulin (HCC)  E11.40 250.60      357.2    7. Dysthymia  F34.1 300.4    8. Hypercholesterolemia  E78.00 272.0      FU with [facility] Clinic. Avoid Tylenol.  Stop taking supplements. Refer to Dr Jack Spears for [facility] back  Cont current meds.

## 2021-03-02 DIAGNOSIS — D72.825 BANDEMIA: ICD-10-CM

## 2021-03-03 ENCOUNTER — TELEPHONE (OUTPATIENT)
Dept: FAMILY MEDICINE CLINIC | Age: 56
End: 2021-03-03

## 2021-03-03 DIAGNOSIS — R79.89 ABNORMAL LIVER FUNCTION TEST: Primary | ICD-10-CM

## 2021-03-03 NOTE — TELEPHONE ENCOUNTER
----- Message from Janene Restrepo LPN sent at 8/02/4940 12:13 PM EST -----  Regarding: FW: Visit Follow-Up Question  Contact: [phone]    ----- Message -----  From: Azam Watts  Sent: 2/26/2021  10:02 AM EST  To: Tanya Henao  Subject: Visit Follow-Up Question                         Hello   Have you heard anything from Dr. Filipe Menendez as of today from your email sent to him about my liver blood results? Am I to come back for anything else with you?

## 2021-03-08 ENCOUNTER — DOCUMENTATION ONLY (OUTPATIENT)
Dept: ONCOLOGY | Age: 56
End: 2021-03-08

## 2021-03-08 ENCOUNTER — TELEPHONE (OUTPATIENT)
Dept: ONCOLOGY | Age: 56
End: 2021-03-08

## 2021-03-08 NOTE — TELEPHONE ENCOUNTER
Called patient to see if lab work had been drawn prior to follow-up   No answer   LVM to call office back     If labs have not been obtained   Appointment to be adjust 2-3 weeks   Lab orders to be faxed to [facility] of choice

## 2021-03-08 NOTE — PROGRESS NOTES
I dont see that we have results on JAK2 or BCR ABL PCR  Please see if these were drawn as the results are needed for appointment this week  If not drawn reschedule 2 weeks later

## 2021-03-11 ENCOUNTER — TELEPHONE (OUTPATIENT)
Dept: ONCOLOGY | Age: 56
End: 2021-03-11

## 2021-03-11 NOTE — TELEPHONE ENCOUNTER
Called patient to r/s today appt per [facility] for the 1st week of April. No answer, left a vm.

## 2021-03-18 DIAGNOSIS — E78.00 HYPERCHOLESTEROLEMIA: ICD-10-CM

## 2021-03-18 DIAGNOSIS — Z91.09 ENVIRONMENTAL ALLERGIES: ICD-10-CM

## 2021-03-18 DIAGNOSIS — G25.81 RLS (RESTLESS LEGS SYNDROME): ICD-10-CM

## 2021-03-18 DIAGNOSIS — G47.00 INSOMNIA, UNSPECIFIED TYPE: ICD-10-CM

## 2021-03-18 RX ORDER — LEVOCETIRIZINE DIHYDROCHLORIDE 5 MG/1
TABLET, FILM COATED ORAL
Qty: 90 TAB | Refills: 3 | Status: SHIPPED | OUTPATIENT
Start: 2021-03-18 | End: 2022-01-08

## 2021-03-18 RX ORDER — ATORVASTATIN CALCIUM 40 MG/1
TABLET, FILM COATED ORAL
Qty: 90 TAB | Refills: 1 | Status: SHIPPED | OUTPATIENT
Start: 2021-03-18 | End: 2021-08-14

## 2021-03-18 RX ORDER — ROPINIROLE 2 MG/1
TABLET, FILM COATED ORAL
Qty: 270 TAB | Refills: 1 | Status: SHIPPED | OUTPATIENT
Start: 2021-03-18 | End: 2021-08-14

## 2021-03-18 RX ORDER — TRAZODONE HYDROCHLORIDE 50 MG/1
TABLET ORAL
Qty: 90 TAB | Refills: 3 | Status: SHIPPED | OUTPATIENT
Start: 2021-03-18 | End: 2022-01-08

## 2021-03-19 ENCOUNTER — TELEPHONE (OUTPATIENT)
Dept: FAMILY MEDICINE CLINIC | Age: 56
End: 2021-03-19

## 2021-03-19 DIAGNOSIS — J44.9 CHRONIC OBSTRUCTIVE PULMONARY DISEASE, UNSPECIFIED COPD TYPE (HCC): ICD-10-CM

## 2021-03-19 NOTE — TELEPHONE ENCOUNTER
Pharmacy asking for spiriva with handihaler 18 mc and inhalation capsules but not on reorder list   Request in doctors box up front
Sent order for Spiriva to [facility]
- onset since Friday (09/26/2018), and with intolerance of nutritional intake  - as sequela of TIP chemotherapy (3rd cycle) and resulting in dehydration, causing syncope  - IVF hydration; s/p 1 liter LR, 1 liter NS and continued on maintenance IVF of NS at 75 mL/Hr x 24 hours  - eval for any sign of fluid overload since patient with heart failure (EF = 40 to 45%; TTE of 02/22/2018)  - f/u AM lab-work and supplement any deficiency/ies  - follow vital signs  - PPI  - continued for any signs of infection in neutropenic state  - antiemetic PRN

## 2021-03-26 LAB
BACKGROUND, 489163: NORMAL
BACKGROUND: 480503: NORMAL
BASOPHILS # BLD AUTO: 0.2 X10E3/UL (ref 0–0.2)
BASOPHILS NFR BLD AUTO: 1 %
EOSINOPHIL # BLD AUTO: 1 X10E3/UL (ref 0–0.4)
EOSINOPHIL NFR BLD AUTO: 6 %
ERYTHROCYTE [DISTWIDTH] IN BLOOD BY AUTOMATED COUNT: 14 % (ref 11.7–15.4)
HCT VFR BLD AUTO: 40.2 % (ref 34–46.6)
HGB BLD-MCNC: 13.5 G/DL (ref 11.1–15.9)
IMM GRANULOCYTES # BLD AUTO: 0 X10E3/UL (ref 0–0.1)
IMM GRANULOCYTES NFR BLD AUTO: 0 %
INTERPRETATION: 480488: NEGATIVE
JAK2 GENE MUT ANL BLD/T: NORMAL
LAB DIRECTOR NAME PROVIDER: NORMAL
LAB DIRECTOR NAME PROVIDER: NORMAL
LYMPHOCYTES # BLD AUTO: 3.8 X10E3/UL (ref 0.7–3.1)
LYMPHOCYTES NFR BLD AUTO: 23 %
MCH RBC QN AUTO: 28.2 PG (ref 26.6–33)
MCHC RBC AUTO-ENTMCNC: 33.6 G/DL (ref 31.5–35.7)
MCV RBC AUTO: 84 FL (ref 79–97)
MONOCYTES # BLD AUTO: 0.7 X10E3/UL (ref 0.1–0.9)
MONOCYTES NFR BLD AUTO: 4 %
NEUTROPHILS # BLD AUTO: 10.9 X10E3/UL (ref 1.4–7)
NEUTROPHILS NFR BLD AUTO: 66 %
PLATELET # BLD AUTO: 336 X10E3/UL (ref 150–450)
RBC # BLD AUTO: 4.79 X10E6/UL (ref 3.77–5.28)
REF LAB TEST METHOD: NORMAL
REF LAB TEST METHOD: NORMAL
REFERENCES: NORMAL
T(ABL1,BCR)B2A2/CONTROL BLD/T: NORMAL %
T(ABL1,BCR)B3A2/CONTROL BLD/T: NORMAL %
T(ABL1,BCR)E1A2/CONTROL BLD/T: NORMAL %
WBC # BLD AUTO: 16.6 X10E3/UL (ref 3.4–10.8)

## 2021-03-28 RX ORDER — DILTIAZEM HYDROCHLORIDE 180 MG/1
CAPSULE, COATED, EXTENDED RELEASE ORAL
Qty: 180 CAP | Refills: 1 | Status: SHIPPED | OUTPATIENT
Start: 2021-03-28 | End: 2021-11-09

## 2021-03-29 DIAGNOSIS — R05.9 COUGH: ICD-10-CM

## 2021-03-29 RX ORDER — BENZONATATE 100 MG/1
CAPSULE ORAL
Qty: 270 CAP | Refills: 0 | Status: SHIPPED | OUTPATIENT
Start: 2021-03-29 | End: 2021-11-10

## 2021-04-05 ENCOUNTER — OFFICE VISIT (OUTPATIENT)
Dept: ONCOLOGY | Age: 56
End: 2021-04-05
Payer: MEDICARE

## 2021-04-05 VITALS
HEART RATE: 87 BPM | RESPIRATION RATE: 18 BRPM | WEIGHT: 196 LBS | OXYGEN SATURATION: 96 % | TEMPERATURE: 98.3 F | BODY MASS INDEX: 34.72 KG/M2 | SYSTOLIC BLOOD PRESSURE: 143 MMHG | DIASTOLIC BLOOD PRESSURE: 86 MMHG

## 2021-04-05 DIAGNOSIS — D72.829 LEUKOCYTOSIS, UNSPECIFIED TYPE: Primary | ICD-10-CM

## 2021-04-05 DIAGNOSIS — C64.1 RENAL CELL CARCINOMA OF RIGHT KIDNEY (HCC): ICD-10-CM

## 2021-04-05 PROCEDURE — G9717 DOC PT DX DEP/BP F/U NT REQ: HCPCS | Performed by: INTERNAL MEDICINE

## 2021-04-05 PROCEDURE — 99213 OFFICE O/P EST LOW 20 MIN: CPT | Performed by: INTERNAL MEDICINE

## 2021-04-05 PROCEDURE — 3017F COLORECTAL CA SCREEN DOC REV: CPT | Performed by: INTERNAL MEDICINE

## 2021-04-05 PROCEDURE — G9899 SCRN MAM PERF RSLTS DOC: HCPCS | Performed by: INTERNAL MEDICINE

## 2021-04-05 PROCEDURE — G8753 SYS BP > OR = 140: HCPCS | Performed by: INTERNAL MEDICINE

## 2021-04-05 PROCEDURE — G8754 DIAS BP LESS 90: HCPCS | Performed by: INTERNAL MEDICINE

## 2021-04-05 PROCEDURE — G8417 CALC BMI ABV UP PARAM F/U: HCPCS | Performed by: INTERNAL MEDICINE

## 2021-04-05 PROCEDURE — G8427 DOCREV CUR MEDS BY ELIG CLIN: HCPCS | Performed by: INTERNAL MEDICINE

## 2021-04-05 NOTE — PROGRESS NOTES
[facility] [place] at [address]  [address] [facility], [address]  W: [phone]  F: [phone]    Reason for Visit:   Paddy Min is a 63 y.o. female who is in follow-up for leucocytosis    History of Present Illness:   Patient is a 63 y. o.female with PMH as below who is seen for abnormal CBC    She has persistent leucocytosis since 2014, no other CBC abnormalities. She has known R renal cell carcinoma ( 3.3 cm) which she had a R radical nephrectomy on 7/28/2020She has osteoarthritis and has several flare ups. She had a CT chest 8/2020 that showed a 6 mm lung nodule which is being observed. She comes today for follow-up and additional workup. She has arthritis and this causes her discomfort. Denies fevers/chills, night sweats. No  abdominal pain, bleeding, she has been uptodate with colonoscopies / mammograms. She smokes 1 ppd for 40 years.     Mother has breast cancer- diagnosed at 80    Past Medical History:   Diagnosis Date    Arthritis     Diabetes ([address]     Elevated WBC count 2002 to present    GERD (gastroesophageal reflux disease)     Hypertension     Kidney stones 2020    Leg swelling 07/2020    Doppler negative    Panic attacks     Sleep apnea     Does not use CPAP- raises the head of bed up    Spondylolisthesis of lumbar region     Tachycardia       Past Surgical History:   Procedure Laterality Date    COLONOSCOPY N/A 3/1/2019    COLONOSCOPY performed by Jina Garcia MD at [address] HX BREAST BIOPSY Bilateral     benign    HX CHOLECYSTECTOMY  1998    HX HYSTERECTOMY  2008    HX KNEE ARTHROSCOPY Bilateral 2012    HX KNEE REPLACEMENT Left 2012    HX KNEE REPLACEMENT Right 08/18/2016    HX SEPTOPLASTY  1993    HX SVT ABLATION  2013    HX TUBAL LIGATION  1989    HX UROLOGICAL Right 07/2020    nephrectomy    NEUROLOGICAL PROCEDURE UNLISTED      lumbar nerve block      Social History     Tobacco Use    Smoking status: Current Every Day Smoker     Packs/day: 1.00     Years: 40.00     Pack years: 40.00     Types: Cigarettes    Smokeless tobacco: Never Used   Substance Use Topics    Alcohol use: Not Currently      Family History   Problem Relation Age of Onset    Arthritis-osteo Mother     Psychiatric Disorder Father     Arthritis-osteo Father     Alcohol abuse Father     Heart Disease Father     Heart Surgery Father     Hypertension Father     Diabetes Brother     Arthritis-osteo Brother     Hypertension Brother     Deep Vein Thrombosis Brother         Unknown cause    Anesth Problems Neg Hx      Current Outpatient Medications   Medication Sig    benzonatate (TESSALON) 100 mg capsule TAKE 1 CAPSULE THREE TIMES DAILY AS NEEDED  FOR  COUGH    dilTIAZem ER (CARDIZEM CD) 180 mg capsule TAKE 2 CAPSULES EVERY DAY FOR HIGH BLOOD PRESSURE    tiotropium (SPIRIVA) 18 mcg inhalation capsule Take 1 Cap by inhalation daily. Indications: bronchospasm prevention with COPD    rOPINIRole (REQUIP) 2 mg tablet TAKE 1 TABLET THREE TIMES DAILY    atorvastatin (LIPITOR) 40 mg tablet TAKE 1 TABLET EVERY DAY    levocetirizine (XYZAL) 5 mg tablet TAKE 1 TABLET EVERY DAY    traZODone (DESYREL) 50 mg tablet TAKE 1 TABLET EVERY NIGHT  FOR  INSOMNIA    LORazepam (ATIVAN) 0.5 mg tablet Take 1 Tab by mouth two (2) times daily as needed for Anxiety. Max Daily Amount: 1 mg. Indications: anxious    losartan (COZAAR) 100 mg tablet TAKE 1 TABLET EVERY DAY    elderberry fruit (ELDERBERRY PO) Take  by mouth daily.  Accu-Chek Joyce Plus test strp strip TEST BLOOD SUGAR EVERY DAY    furosemide (LASIX) 20 mg tablet TAKE 1 TABLET EVERY DAY AS NEEDED    metFORMIN (GLUCOPHAGE) 500 mg tablet TAKE TWO TABLETS BY MOUTH TWO TIMES A DAY WITH MEALS (Patient taking differently: Take 500 mg by mouth two (2) times daily (with meals). )    venlafaxine-SR (EFFEXOR-XR) 150 mg capsule TAKE 1 CAPSULE EVERY DAY    aspirin delayed-release 81 mg tablet Take 81 mg by mouth daily.  folic acid (FOLVITE) 1 mg tablet Take 1 Tab by mouth daily.  zinc 50 mg tab tablet Take 50 mg by mouth daily.  omeprazole (PRILOSEC) 40 mg capsule Take 1 Cap by mouth Before breakfast and dinner.  ascorbic acid (C-500 PO) Take  by mouth daily.  Biotin 2,500 mcg cap Take  by mouth daily.  fluticasone propionate (FLONASE) 50 mcg/actuation nasal spray 2 sprays per nostril once a day  Indications: inflammation of the nose due to an allergy     No current facility-administered medications for this visit. Allergies   Allergen Reactions    Nsaids (Non-Steroidal Anti-Inflammatory Drug) Other (comments)     Stomach hurts    Celebrex [Celecoxib] Hives    Ditropan [Oxybutynin Chloride] Hives and Itching    Mobic [Meloxicam] Hives    Penicillins Unknown (comments)     DOESN'T REMEMBER  Patient states tolerating PO amoxicillin    Plaquenil [Hydroxychloroquine] Hives        Review of Systems: A complete review of systems was obtained, negative except as described above. Physical Exam:       Constitutional: Appears well-developed and well-nourished in no apparent distress   Mental status: Alert and awake, Oriented to person/place/time, Able to follow commands  Eyes: EOM normal, Sclera normal, No visible ocular discharge  HENT: Normocephalic, atraumatic; Mouth/Throat: Moist mucous membranes, External Ears normal  Neck: No visualized mass  Psychiatric: Normal affect, normal judgment/insight. No hallucinations     Results:     Lab Results   Component Value Date/Time    WBC 16.6 (H) 03/22/2021 01:55 PM    HGB 13.5 03/22/2021 01:55 PM    HCT 40.2 03/22/2021 01:55 PM    PLATELET 720 87/78/2624 01:55 PM    MCV 84 03/22/2021 01:55 PM    ABS.  NEUTROPHILS 10.9 (H) 03/22/2021 01:55 PM     Lab Results   Component Value Date/Time    Sodium 138 02/05/2021 11:10 AM    Potassium 4.3 02/05/2021 11:10 AM    Chloride 105 02/05/2021 11:10 AM    CO2 25 02/05/2021 11:10 AM    Glucose 119 (H) 02/05/2021 11:10 AM    BUN 10 02/05/2021 11:10 AM    Creatinine 1.07 (H) 02/05/2021 11:10 AM    GFR est AA >60 02/05/2021 11:10 AM    GFR est non-AA 53 (L) 02/05/2021 11:10 AM    Calcium 9.6 02/05/2021 11:10 AM    Glucose (POC) 99 10/28/2020 04:07 PM    Creatinine (POC) 0.7 05/29/2020 06:13 PM     Lab Results   Component Value Date/Time    Bilirubin, total 0.2 02/05/2021 11:10 AM    ALT (SGPT) 1,057 (H) 02/05/2021 11:10 AM    Alk. phosphatase 608 (H) 02/05/2021 11:10 AM    Protein, total 6.5 02/05/2021 11:10 AM    Albumin 3.8 02/05/2021 11:10 AM    Globulin 2.7 02/05/2021 11:10 AM       Lab Results   Component Value Date/Time    Iron % saturation 8 (L) 12/10/2020 12:15 PM    TIBC 489 (H) 12/10/2020 12:15 PM    Ferritin 16 12/10/2020 12:15 PM    Vitamin B12 >2,000 (H) 10/28/2020 07:07 AM    Folate 47.9 (H) 10/28/2020 07:07 AM    Erythropoietin 16.7 09/05/2018 11:25 AM    Sed rate (ESR) 2 08/10/2018 10:53 AM    Sed rate, automated 9 02/09/2015 10:00 AM    C-Reactive protein <0.29 10/28/2020 07:07 AM    C-Reactive Protein, Qt 5.1 (H) 08/10/2018 10:53 AM    TSH 1.18 02/05/2021 11:10 AM    CALEB, Direct Positive (A) 09/04/2014 11:00 AM    Hep C Virus Ab 0.1 04/22/2014 11:15 AM    HIV 1/2 Interpretation NONREACTIVE 04/22/2014 11:15 AM     Lab Results   Component Value Date/Time    INR 0.9 08/24/2020 05:30 PM    aPTT 24.8 08/24/2020 05:30 PM    D-dimer 0.57 08/24/2020 05:30 PM       Records reviewed and summarized above. Pathology report(s) reviewed above. Radiology report(s) reviewed above.   CT abdomen 5/2020- reviewed     CTA 8/2020- reviewed     Assessment:   1) Leucocytosis    Has had it for almost 20 years  Likely reactive to smoking  JAK2 and BCR ABL PCR negative     She is uptodate with age appropriate cancer screening    Will monitor at this time     2) Elevated liver enzymes   She sees Dr. Nicol Gonzalez- [facility] abdomen reviewed and is unremarkable  Per patient this is secondary to fatty liver     3) Obesity    4) Osteoarthritis    5) Lung nodule  Per pulmonary    Plan:     · Will observe   · Cbc with diff in 1 year     RTC in 1 year     I appreciate the opportunity to participate in Ms. Lizeth Ferrell's care. I performed a history and physical examination of the patient and discussed his management with the NPP.  I reviewed the NPP note and agree with the documented findings and plan of care  Stable reactive leucocytosis, age appropriate Ca screening - uptodate  She has no B symptoms and no splenomegaly  Continue to observe  Signed By: Karina Davis MD

## 2021-04-12 ENCOUNTER — OFFICE VISIT (OUTPATIENT)
Dept: FAMILY MEDICINE CLINIC | Age: 56
End: 2021-04-12
Payer: MEDICARE

## 2021-04-12 VITALS
HEART RATE: 88 BPM | SYSTOLIC BLOOD PRESSURE: 139 MMHG | TEMPERATURE: 97.7 F | OXYGEN SATURATION: 96 % | RESPIRATION RATE: 18 BRPM | DIASTOLIC BLOOD PRESSURE: 77 MMHG | HEIGHT: 63 IN | WEIGHT: 196 LBS | BODY MASS INDEX: 34.73 KG/M2

## 2021-04-12 DIAGNOSIS — R30.0 DYSURIA: ICD-10-CM

## 2021-04-12 DIAGNOSIS — R79.89 ABNORMAL LIVER FUNCTION TEST: ICD-10-CM

## 2021-04-12 DIAGNOSIS — K43.9 ABDOMINAL WALL HERNIA: Primary | ICD-10-CM

## 2021-04-12 DIAGNOSIS — E11.40 TYPE 2 DIABETES MELLITUS WITH DIABETIC NEUROPATHY, WITHOUT LONG-TERM CURRENT USE OF INSULIN (HCC): ICD-10-CM

## 2021-04-12 LAB
BILIRUB UR QL STRIP: NEGATIVE
GLUCOSE UR-MCNC: NEGATIVE MG/DL
KETONES P FAST UR STRIP-MCNC: NEGATIVE MG/DL
PH UR STRIP: 7 [PH] (ref 4.6–8)
PROT UR QL STRIP: NEGATIVE
SP GR UR STRIP: 1.01 (ref 1–1.03)
UA UROBILINOGEN AMB POC: NORMAL (ref 0.2–1)
URINALYSIS CLARITY POC: CLEAR
URINALYSIS COLOR POC: YELLOW
URINE BLOOD POC: NEGATIVE
URINE LEUKOCYTES POC: NEGATIVE
URINE NITRITES POC: NEGATIVE

## 2021-04-12 PROCEDURE — G8754 DIAS BP LESS 90: HCPCS | Performed by: FAMILY MEDICINE

## 2021-04-12 PROCEDURE — G9899 SCRN MAM PERF RSLTS DOC: HCPCS | Performed by: FAMILY MEDICINE

## 2021-04-12 PROCEDURE — 99214 OFFICE O/P EST MOD 30 MIN: CPT | Performed by: FAMILY MEDICINE

## 2021-04-12 PROCEDURE — 3044F HG A1C LEVEL LT 7.0%: CPT | Performed by: FAMILY MEDICINE

## 2021-04-12 PROCEDURE — 81003 URINALYSIS AUTO W/O SCOPE: CPT | Performed by: FAMILY MEDICINE

## 2021-04-12 PROCEDURE — 3017F COLORECTAL CA SCREEN DOC REV: CPT | Performed by: FAMILY MEDICINE

## 2021-04-12 PROCEDURE — G8752 SYS BP LESS 140: HCPCS | Performed by: FAMILY MEDICINE

## 2021-04-12 PROCEDURE — G9717 DOC PT DX DEP/BP F/U NT REQ: HCPCS | Performed by: FAMILY MEDICINE

## 2021-04-12 PROCEDURE — 2022F DILAT RTA XM EVC RTNOPTHY: CPT | Performed by: FAMILY MEDICINE

## 2021-04-12 PROCEDURE — G8417 CALC BMI ABV UP PARAM F/U: HCPCS | Performed by: FAMILY MEDICINE

## 2021-04-12 PROCEDURE — G8427 DOCREV CUR MEDS BY ELIG CLIN: HCPCS | Performed by: FAMILY MEDICINE

## 2021-04-12 RX ORDER — TIZANIDINE 2 MG/1
2 TABLET ORAL 2 TIMES DAILY
COMMUNITY
Start: 2021-01-19 | End: 2021-05-06

## 2021-04-12 NOTE — PROGRESS NOTES
Identified pt with two pt identifiers(name and ). Chief Complaint   Patient presents with    Hernia (Non Specific)     not sure if I am experiencing sx of a hernia mid abdomen that first occurred after my surgery    Bladder Infection    Urinary Burning        Health Maintenance Due   Topic    Eye Exam Retinal or Dilated     COVID-19 Vaccine (1)    Foot Exam Q1        Wt Readings from Last 3 Encounters:   21 196 lb (88.9 kg)   21 196 lb (88.9 kg)   21 199 lb (90.3 kg)     Temp Readings from Last 3 Encounters:   21 97.7 °F (36.5 °C) (Temporal)   21 98.3 °F (36.8 °C)   21 98.2 °F (36.8 °C) (Oral)     BP Readings from Last 3 Encounters:   21 139/77   21 (!) 143/86   21 136/80     Pulse Readings from Last 3 Encounters:   21 88   21 87   21 93         Learning Assessment:  :     Learning Assessment 2016   PRIMARY LEARNER Patient Patient Patient Patient   HIGHEST LEVEL OF EDUCATION - PRIMARY LEARNER  SOME COLLEGE - - SOME COLLEGE   BARRIERS PRIMARY LEARNER NONE - - NONE   CO-LEARNER CAREGIVER No - - No   PRIMARY LANGUAGE ENGLISH ENGLISH ENGLISH ENGLISH   LEARNER PREFERENCE PRIMARY DEMONSTRATION LISTENING - DEMONSTRATION     LISTENING - - -   ANSWERED BY patient patient patient self   RELATIONSHIP SELF SELF SELF SELF       Depression Screening:  :     3 most recent PHQ Screens 2021   Little interest or pleasure in doing things Not at all   Feeling down, depressed, irritable, or hopeless Not at all   Total Score PHQ 2 0       Fall Risk Assessment:  :     Fall Risk Assessment, last 12 mths 2020   Able to walk? Yes   Fall in past 12 months? No       Abuse Screening:  :     Abuse Screening Questionnaire 2021 12/10/2020 1/10/2020 3/18/2019 2018 2016   Do you ever feel afraid of your partner? N N N N N N   Are you in a relationship with someone who physically or mentally threatens you?  Alberto Harris N N N N   Is it safe for you to go home? Y Y Y Y Y Y       Coordination of Care Questionnaire:  :     1) Have you been to an emergency room, urgent care clinic since your last visit? no   Hospitalized since your last visit? no             2) Have you seen or consulted any other health care providers outside of [address] [place] [address] since your last visit? no  (Include any pap smears or colon screenings in this section.)    3) Do you have an Advance Directive on file? no  Are you interested in receiving information about Advance Directives? no    Patient is accompanied by self. Reviewed record in preparation for visit and have obtained necessary documentation. Medication reconciliation up to date and corrected with patient at this time.

## 2021-04-12 NOTE — PROGRESS NOTES
HISTORY OF PRESENT ILLNESS  Hussain Conway is a 63 y.o. female. HPI  C/O swelling in mid abdomen that has come up since her nephrectomy last year. Sometimes tender. Also notes dysuria. No fever. Pt missed appt with [facility]. Pt does not want to go back there. Need recheck LFT's. Review of Systems   Gastrointestinal:        Abdominal mass. Physical Exam  Vitals signs reviewed. Constitutional:       Appearance: She is obese. Abdominal:      Hernia: A hernia is present. Hernia is present in the ventral area. Neurological:      Mental Status: She is alert. Results for orders placed or performed in visit on 04/12/21   AMB POC URINALYSIS DIP STICK AUTO W/O MICRO   Result Value Ref Range    Color (UA POC) Yellow     Clarity (UA POC) Clear     Glucose (UA POC) Negative Negative    Bilirubin (UA POC) Negative Negative    Ketones (UA POC) Negative Negative    Specific gravity (UA POC) 1.015 1.001 - 1.035    Blood (UA POC) Negative Negative    pH (UA POC) 7.0 4.6 - 8.0    Protein (UA POC) Negative Negative    Urobilinogen (UA POC) 0.2 mg/dL 0.2 - 1    Nitrites (UA POC) Negative Negative    Leukocyte esterase (UA POC) Negative Negative       Diabetic foot exam:     Left Foot:   Visual Exam: normal    Pulse DP: 2+ (normal)   Filament test: normal sensation          Right Foot:   Visual Exam: normal    Pulse DP: 2+ (normal)   Filament test: normal sensation          ASSESSMENT and PLAN    ICD-10-CM ICD-9-CM    1. Abdominal wall hernia  K43.9 553.20 REFERRAL TO GENERAL SURGERY   2. Dysuria  R30.0 788.1 AMB POC URINALYSIS DIP STICK AUTO W/O MICRO   3. Type 2 diabetes mellitus with diabetic neuropathy, without long-term current use of insulin (HCC)  E11.40 250.60  DIABETES FOOT EXAM     357.2    4. Abnormal liver function test  R94.5 790.6 REFERRAL TO GASTROENTEROLOGY      HEPATIC FUNCTION PANEL     Recheck LFT's. Refer to GI. Refer to general surgery for abdominal hernia.

## 2021-04-15 ENCOUNTER — TELEPHONE (OUTPATIENT)
Dept: SURGERY | Age: 56
End: 2021-04-15

## 2021-04-15 NOTE — TELEPHONE ENCOUNTER
Called pt to set up appt with Dr Khalif Gtz for Abdominal wall hernia - Referred Alfredo Bender MD    No answer - LVM to return call

## 2021-04-21 ENCOUNTER — OFFICE VISIT (OUTPATIENT)
Dept: SURGERY | Age: 56
End: 2021-04-21
Payer: MEDICARE

## 2021-04-21 VITALS
DIASTOLIC BLOOD PRESSURE: 70 MMHG | HEART RATE: 87 BPM | RESPIRATION RATE: 20 BRPM | OXYGEN SATURATION: 93 % | BODY MASS INDEX: 34.73 KG/M2 | TEMPERATURE: 97.8 F | WEIGHT: 196 LBS | HEIGHT: 63 IN | SYSTOLIC BLOOD PRESSURE: 126 MMHG

## 2021-04-21 DIAGNOSIS — K43.2 INCISIONAL HERNIA, WITHOUT OBSTRUCTION OR GANGRENE: Primary | ICD-10-CM

## 2021-04-21 PROCEDURE — 99204 OFFICE O/P NEW MOD 45 MIN: CPT | Performed by: SURGERY

## 2021-04-21 NOTE — PROGRESS NOTES
General Surgery Office Consultation / H & P    CC: Hernia  History of Present Illness:      Bre Grey is a 63 y.o. female who presents with bulging at her previous incision. 62-year-old female who underwent a robotic nephrectomy last year for renal cancer presents with a bulge at her midline. Patient reports that it is not too painful. Maybe a dull pain that is 3 or 4 out of 10. Lifting provokes the pain. Relaxation and pain medication helps. No changes in her bowel habits. No nausea or vomiting. Appears to be reducible. She reports this developed about 8 months after surgery and has been present for the last few months. She has a history of tobacco abuse and smokes 1 pack/day. No history of heart failure. Has history of COPD. Presents today to discuss surgical correction. Other surgeries include tubal ligation, murali, and hysterectomy.     Past Medical History:   Diagnosis Date    Arthritis     Diabetes ([address]     Elevated WBC count 2002 to present    GERD (gastroesophageal reflux disease)     Hypertension     Kidney stones 2020    Leg swelling 07/2020    Doppler negative    Panic attacks     Sleep apnea     Does not use CPAP- raises the head of bed up    Spondylolisthesis of lumbar region     Tachycardia      Past Surgical History:   Procedure Laterality Date    COLONOSCOPY N/A 3/1/2019    COLONOSCOPY performed by Tee Cuevas MD at [address] HX BREAST BIOPSY Bilateral     benign    HX CHOLECYSTECTOMY  1998    HX HYSTERECTOMY  2008    HX KNEE ARTHROSCOPY Bilateral 2012    HX KNEE REPLACEMENT Left 2012    HX KNEE REPLACEMENT Right 08/18/2016    HX SEPTOPLASTY  1993    HX SVT ABLATION  2013    HX TUBAL LIGATION  1989    HX UROLOGICAL Right 07/2020    nephrectomy    NEUROLOGICAL PROCEDURE UNLISTED      lumbar nerve block      Family History   Problem Relation Age of Onset    Arthritis-osteo Mother     Psychiatric Disorder Father     Arthritis-osteo Father     Alcohol abuse Father     Heart Disease Father     Heart Surgery Father     Hypertension Father     Diabetes Brother     Arthritis-osteo Brother     Hypertension Brother     Deep Vein Thrombosis Brother         Unknown cause    Anesth Problems Neg Hx      Social History     Socioeconomic History    Marital status: SINGLE     Spouse name: Not on file    Number of children: Not on file    Years of education: Not on file    Highest education level: Not on file   Tobacco Use    Smoking status: Current Every Day Smoker     Packs/day: 1.00     Years: 40.00     Pack years: 40.00     Types: Cigarettes    Smokeless tobacco: Never Used   Substance and Sexual Activity    Alcohol use: Not Currently    Drug use: No    Sexual activity: Not Currently      Prior to Admission medications    Medication Sig Start Date End Date Taking? Authorizing Provider   benzonatate (TESSALON) 100 mg capsule TAKE 1 CAPSULE THREE TIMES DAILY AS NEEDED  FOR  COUGH 8/06/57  Yes Ysabel Muller MD   dilTIAZem ER (CARDIZEM CD) 180 mg capsule TAKE 2 CAPSULES EVERY DAY FOR HIGH BLOOD PRESSURE 6/94/40  Yes Ysabel Muller MD   tiotropium [facility]) 18 mcg inhalation capsule Take 1 Cap by inhalation daily. Indications: bronchospasm prevention with COPD 3/58/96  Yes Ysabel Muller MD   rOPINIRole (REQUIP) 2 mg tablet TAKE 1 TABLET THREE TIMES DAILY 2/00/79  Yes Ysabel Muller MD   atorvastatin (LIPITOR) 40 mg tablet TAKE 1 TABLET EVERY DAY 0/00/85  Yes Ysabel Muller MD   levocetirizine (XYZAL) 5 mg tablet TAKE 1 TABLET EVERY DAY 8/91/15  Yes Ysabel Muller MD   traZODone (DESYREL) 50 mg tablet TAKE 1 TABLET EVERY NIGHT  FOR  INSOMNIA 1/57/26  Yes Ysabel Muller MD   LORazepam (ATIVAN) 0.5 mg tablet Take 1 Tab by mouth two (2) times daily as needed for Anxiety. Max Daily Amount: 1 mg.  Indications: anxious 9/3/77  Yes Ysabel Muller MD   losartan (COZAAR) 100 mg tablet TAKE 1 TABLET EVERY DAY 8/27/87  Yes Ysabel Muller MD Accu-Chek Joyce Plus test strp strip TEST BLOOD SUGAR EVERY DAY 12/18/61  Yes James Cervantes MD   furosemide (LASIX) 20 mg tablet TAKE 1 TABLET EVERY DAY AS NEEDED 78/49/35  Yes James Cervantes MD   metFORMIN (GLUCOPHAGE) 500 mg tablet TAKE TWO TABLETS BY MOUTH TWO TIMES A DAY WITH MEALS  Patient taking differently: Take 500 mg by mouth two (2) times daily (with meals). 10/72/91  Yes James Cervantes MD   venlafaxine-SR [facility] 150 mg capsule TAKE 1 CAPSULE EVERY DAY 45/19/01  Yes James Cervantes MD   omeprazole (PRILOSEC) 40 mg capsule Take 1 Cap by mouth Before breakfast and dinner. 4/0/14  Yes James Cervantes MD   fluticasone propionate (FLONASE) 50 mcg/actuation nasal spray 2 sprays per nostril once a day  Indications: inflammation of the nose due to an allergy 2/99/59  Yes James Cervantes MD   tiZANidine (ZANAFLEX) 2 mg tablet Take 2 mg by mouth two (2) times a day. 1/19/21   Provider, Historical   elderberry fruit (ELDERBERRY PO) Take  by mouth daily. Provider, Historical   aspirin delayed-release 81 mg tablet Take 81 mg by mouth daily. Provider, Historical   folic acid (FOLVITE) 1 mg tablet Take 1 Tab by mouth daily. 84/4/36   James Cervantes MD   zinc 50 mg tab tablet Take 50 mg by mouth daily. Provider, Historical   ascorbic acid (C-500 PO) Take  by mouth daily. Provider, Historical   Biotin 2,500 mcg cap Take  by mouth daily.     Provider, Historical     Allergies   Allergen Reactions    Nsaids (Non-Steroidal Anti-Inflammatory Drug) Other (comments)     Stomach hurts    Celebrex [Celecoxib] Hives    Ditropan [Oxybutynin Chloride] Hives and Itching    Mobic [Meloxicam] Hives    Penicillins Unknown (comments)     DOESN'T REMEMBER  Patient states tolerating PO amoxicillin    Plaquenil [Hydroxychloroquine] Hives       Review of Systems:  Constitutional: No fever or chills  Neurologic: No headache  Eyes: No scleral icterus or irritated eyes  Nose: No nasal pain or drainage  Mouth: No oral lesions or sore throat  Cardiac: No palpations or chest pain  Pulmonary: No cough or shortness or breath  Gastrointestinal: Hernia, no nausea, emesis, diarrhea, or constipation  Genitourinary: No dysuria  Musculoskeletal: No muscle or joint tenderness  Skin: No rashes or lesions  Psychiatric: No anxiety or depressed mood    Physical Exam:     Visit Vitals  /70   Pulse 87   Temp 97.8 °F (36.6 °C)   Resp 20   Ht 5' 3\" (1.6 m)   Wt 196 lb (88.9 kg)   SpO2 93%   BMI 34.72 kg/m²     General: No acute distress, conversant  Eyes: PERRLA, no scleral icterus  HENT: Normocephalic without oral lesions  Neck: Trachea midline without LAD  Cardiac: Normal pulse rate and rhythm  Pulmonary: Symmetric chest rise with normal effort  GI: Soft, NT, ND, incisional hernia probably 2 to 3 cm at periumbilical extraction site, no splenomegaly  Skin: Warm without rash  Extremities: No edema or joint stiffness  Psych: Appropriate mood and affect    Assessment:     76-year-old female with incisional hernia after robotic nephrectomy    Plan:     Patient has a symptomatic incisional hernia. This is likely from her nephrectomy. She is also a smoker and overweight. Given the symptomatic nature and BMI less than 35 she is a candidate for a nice robotic repair. Plan for a preperitoneal mesh placement. I discussed with the patient that given her weight and smoking she is at higher risk of wound infection and hernia recurrence. I have encouraged weight loss now going forward. We discussed the risks of surgery which include bleeding, infection, mesh infection, hernia recurrence, bowel injury, and the risks of anesthesia which include MI, stroke, death. The patient understands these risks and elects to proceed to surgery when able. Total time involved with this patient's care was: 45 minutes. This involved reviewing patient record, talking with patient, and charting on patient.     Signed By: Mary Esteves MD  Bariatric and General Surgeon  [facility]    April 21, 2021

## 2021-04-21 NOTE — PROGRESS NOTES
1. Have you been to the ER, urgent care clinic since your last visit? Hospitalized since your last visit? new patient    2. Have you seen or consulted any other health care providers outside of the [address] [place] [address] since your last visit? Include any pap smears or colon screening.  New patient

## 2021-04-21 NOTE — H&P (VIEW-ONLY)
General Surgery Office Consultation / H & P 
 
CC: Hernia History of Present Illness:  
  
Jackie Harada is a 63 y.o. female who presents with bulging at her previous incision. 79-year-old female who underwent a robotic nephrectomy last year for renal cancer presents with a bulge at her midline. Patient reports that it is not too painful. Maybe a dull pain that is 3 or 4 out of 10. Lifting provokes the pain. Relaxation and pain medication helps. No changes in her bowel habits. No nausea or vomiting. Appears to be reducible. She reports this developed about 8 months after surgery and has been present for the last few months. She has a history of tobacco abuse and smokes 1 pack/day. No history of heart failure. Has history of COPD. Presents today to discuss surgical correction. Other surgeries include tubal ligation, murali, and hysterectomy. Past Medical History:  
Diagnosis Date  Arthritis  Diabetes ([address]  Elevated WBC count 2002 to present  GERD (gastroesophageal reflux disease)  Hypertension  Kidney stones 2020  Leg swelling 07/2020 Doppler negative  Panic attacks  Sleep apnea Does not use CPAP- raises the head of bed up  Spondylolisthesis of lumbar region  Tachycardia Past Surgical History:  
Procedure Laterality Date  COLONOSCOPY N/A 3/1/2019 COLONOSCOPY performed by Ann Fernandez MD at [facility] ENDOSCOPY  
 HX BREAST BIOPSY Bilateral   
 benign [address]  HX HYSTERECTOMY  2008  HX KNEE ARTHROSCOPY Bilateral 2012  HX KNEE REPLACEMENT Left 2012  HX KNEE REPLACEMENT Right 08/18/2016 [address]   [address]X SVT ABLATION  [address]  HX UROLOGICAL Right 07/2020  
 nephrectomy  NEUROLOGICAL PROCEDURE UNLISTED    
 lumbar nerve block Family History Problem Relation Age of Onset Nita Alcantara Arthritis-osteo Mother  Psychiatric Disorder Father  Arthritis-osteo Father  Alcohol abuse Father  Heart Disease Father  Heart Surgery Father  Hypertension Father  Diabetes Brother  Arthritis-osteo Brother  Hypertension Brother  Deep Vein Thrombosis Brother Unknown cause  Anesth Problems Neg Hx Social History Socioeconomic History  Marital status: SINGLE Spouse name: Not on file  Number of children: Not on file  Years of education: Not on file  Highest education level: Not on file Tobacco Use  Smoking status: Current Every Day Smoker Packs/day: 1.00 Years: 40.00 Pack years: 40.00 Types: Cigarettes  Smokeless tobacco: Never Used Substance and Sexual Activity  Alcohol use: Not Currently  Drug use: No  
 Sexual activity: Not Currently Prior to Admission medications Medication Sig Start Date End Date Taking? Authorizing Provider  
benzonatate (TESSALON) 100 mg capsule TAKE 1 CAPSULE THREE TIMES DAILY AS NEEDED  FOR  COUGH 6/73/12  Yes Maria Elena Vo MD  
dilTIAZem ER (CARDIZEM CD) 180 mg capsule TAKE 2 CAPSULES EVERY DAY FOR HIGH BLOOD PRESSURE 8/68/31  Yes Maria Elena Vo MD  
tiotropium [facility]) 18 mcg inhalation capsule Take 1 Cap by inhalation daily. Indications: bronchospasm prevention with COPD 8/83/05  Yes Maria Elena Vo MD  
rOPINIRole (REQUIP) 2 mg tablet TAKE 1 TABLET THREE TIMES DAILY 9/97/50  Yes Maria Elena Vo MD  
atorvastatin (LIPITOR) 40 mg tablet TAKE 1 TABLET EVERY DAY 2/39/98  Yes Maria Elena Vo MD  
levocetirizine (XYZAL) 5 mg tablet TAKE 1 TABLET EVERY DAY 8/39/61  Yes Maria Elena Vo MD  
traZODone (DESYREL) 50 mg tablet TAKE 1 TABLET EVERY NIGHT  FOR  INSOMNIA 8/40/37  Yes Maria Elena Vo MD  
LORazepam (ATIVAN) 0.5 mg tablet Take 1 Tab by mouth two (2) times daily as needed for Anxiety. Max Daily Amount: 1 mg.  Indications: anxious 0/9/65  Yes Maria Elena Vo MD  
losartan (COZAAR) 100 mg tablet TAKE 1 TABLET EVERY DAY 4/71/87  Yes Maria Elena Vo MD Accu-Chek Joyce Plus test strp strip TEST BLOOD SUGAR EVERY DAY 48/40/59  Yes Amado Corbin MD  
furosemide (LASIX) 20 mg tablet TAKE 1 TABLET EVERY DAY AS NEEDED 46/46/39  Yes Amado Corbin MD  
metFORMIN (GLUCOPHAGE) 500 mg tablet TAKE TWO TABLETS BY MOUTH TWO TIMES A DAY WITH MEALS Patient taking differently: Take 500 mg by mouth two (2) times daily (with meals). 52/50/78  Yes Amado Corbin MD  
venlafaxine-SR [facility] 150 mg capsule TAKE 1 CAPSULE EVERY DAY 25/09/86  Yes Amado Corbin MD  
omeprazole (PRILOSEC) 40 mg capsule Take 1 Cap by mouth Before breakfast and dinner. 3/0/69  Yes Amado Corbin MD  
fluticasone propionate (FLONASE) 50 mcg/actuation nasal spray 2 sprays per nostril once a day  Indications: inflammation of the nose due to an allergy 8/42/08  Yes Amado Corbin MD  
tiZANidine (ZANAFLEX) 2 mg tablet Take 2 mg by mouth two (2) times a day. 1/19/21   Provider, Historical  
elderberry fruit (ELDERBERRY PO) Take  by mouth daily. Provider, Historical  
aspirin delayed-release 81 mg tablet Take 81 mg by mouth daily. Provider, Historical  
folic acid (FOLVITE) 1 mg tablet Take 1 Tab by mouth daily. 02/6/40   Amado Corbin MD  
zinc 50 mg tab tablet Take 50 mg by mouth daily. Provider, Historical  
ascorbic acid (C-500 PO) Take  by mouth daily. Provider, Historical  
Biotin 2,500 mcg cap Take  by mouth daily. Provider, Historical  
 
Allergies Allergen Reactions  Nsaids (Non-Steroidal Anti-Inflammatory Drug) Other (comments) Stomach hurts  Celebrex [Celecoxib] Hives  Ditropan [Oxybutynin Chloride] Hives and Itching  Mobic [Meloxicam] Hives  Penicillins Unknown (comments) DOESN'T REMEMBER Patient states tolerating PO amoxicillin  Plaquenil [Hydroxychloroquine] Hives Review of Systems: 
Constitutional: No fever or chills Neurologic: No headache Eyes: No scleral icterus or irritated eyes Nose: No nasal pain or drainage Mouth: No oral lesions or sore throat Cardiac: No palpations or chest pain Pulmonary: No cough or shortness or breath Gastrointestinal: Hernia, no nausea, emesis, diarrhea, or constipation Genitourinary: No dysuria Musculoskeletal: No muscle or joint tenderness Skin: No rashes or lesions Psychiatric: No anxiety or depressed mood Physical Exam:  
 
Visit Vitals /70 Pulse 87 Temp 97.8 °F (36.6 °C) Resp 20 Ht 5' 3\" (1.6 m) Wt 196 lb (88.9 kg) SpO2 93% BMI 34.72 kg/m² General: No acute distress, conversant Eyes: PERRLA, no scleral icterus HENT: Normocephalic without oral lesions Neck: Trachea midline without LAD Cardiac: Normal pulse rate and rhythm Pulmonary: Symmetric chest rise with normal effort GI: Soft, NT, ND, incisional hernia probably 2 to 3 cm at periumbilical extraction site, no splenomegaly Skin: Warm without rash Extremities: No edema or joint stiffness Psych: Appropriate mood and affect Assessment:  
 
13-year-old female with incisional hernia after robotic nephrectomy Plan:  
 
Patient has a symptomatic incisional hernia. This is likely from her nephrectomy. She is also a smoker and overweight. Given the symptomatic nature and BMI less than 35 she is a candidate for a nice robotic repair. Plan for a preperitoneal mesh placement. I discussed with the patient that given her weight and smoking she is at higher risk of wound infection and hernia recurrence. I have encouraged weight loss now going forward. We discussed the risks of surgery which include bleeding, infection, mesh infection, hernia recurrence, bowel injury, and the risks of anesthesia which include MI, stroke, death. The patient understands these risks and elects to proceed to surgery when able. Total time involved with this patient's care was: 45 minutes. This involved reviewing patient record, talking with patient, and charting on patient.  
 
Signed By: Ignacio Sandoval MD 
Bariatric and General Surgeon [facility] April 21, 2021

## 2021-05-04 ENCOUNTER — TELEPHONE (OUTPATIENT)
Dept: SURGERY | Age: 56
End: 2021-05-04

## 2021-05-04 NOTE — TELEPHONE ENCOUNTER
Patient called this morning. Is scheduled for her 2nd COVID vaccine (5-12 )the day before surgery (5-13) She asked if she should or should Not take it the day before surgery.

## 2021-05-04 NOTE — TELEPHONE ENCOUNTER
Spoke with patient regarding taking COVID 2nd vaccine the day before surgery. .  Per Denny Odom NP patient needs to wait until after surgery to get 2nd COVID vaccine. Patient also wanted to know when she will get COVID testing done prior to surgery. Informed patient 5/9/21. Someone will be calling her from [facility] regarding time. Patient voiced understanding.

## 2021-05-05 ENCOUNTER — TRANSCRIBE ORDER (OUTPATIENT)
Dept: REGISTRATION | Age: 56
End: 2021-05-05

## 2021-05-05 DIAGNOSIS — Z01.812 PRE-PROCEDURE LAB EXAM: Primary | ICD-10-CM

## 2021-05-06 ENCOUNTER — HOSPITAL ENCOUNTER (OUTPATIENT)
Dept: PREADMISSION TESTING | Age: 56
Discharge: HOME | End: 2021-05-06
Payer: MEDICARE

## 2021-05-06 VITALS
HEART RATE: 91 BPM | HEIGHT: 63 IN | RESPIRATION RATE: 18 BRPM | BODY MASS INDEX: 34.22 KG/M2 | WEIGHT: 193.12 LBS | TEMPERATURE: 98 F | DIASTOLIC BLOOD PRESSURE: 84 MMHG | SYSTOLIC BLOOD PRESSURE: 144 MMHG

## 2021-05-06 LAB
ALBUMIN SERPL-MCNC: 3.8 G/DL (ref 3.5–5)
ALBUMIN/GLOB SERPL: 1.4 {RATIO} (ref 1.1–2.2)
ALP SERPL-CCNC: 154 U/L (ref 45–117)
ALT SERPL-CCNC: 30 U/L (ref 12–78)
ANION GAP SERPL CALC-SCNC: 5 MMOL/L (ref 5–15)
AST SERPL-CCNC: 13 U/L (ref 15–37)
BILIRUB SERPL-MCNC: 0.2 MG/DL (ref 0.2–1)
BUN SERPL-MCNC: 11 MG/DL (ref 6–20)
BUN/CREAT SERPL: 11 (ref 12–20)
CALCIUM SERPL-MCNC: 8.9 MG/DL (ref 8.5–10.1)
CHLORIDE SERPL-SCNC: 107 MMOL/L (ref 97–108)
CO2 SERPL-SCNC: 28 MMOL/L (ref 21–32)
CREAT SERPL-MCNC: 1.03 MG/DL (ref 0.55–1.02)
ERYTHROCYTE [DISTWIDTH] IN BLOOD BY AUTOMATED COUNT: 14.9 % (ref 11.5–14.5)
GLOBULIN SER CALC-MCNC: 2.8 G/DL (ref 2–4)
GLUCOSE SERPL-MCNC: 92 MG/DL (ref 65–100)
HCT VFR BLD AUTO: 39.9 % (ref 35–47)
HGB BLD-MCNC: 12.3 G/DL (ref 11.5–16)
MCH RBC QN AUTO: 26.8 PG (ref 26–34)
MCHC RBC AUTO-ENTMCNC: 30.8 G/DL (ref 30–36.5)
MCV RBC AUTO: 86.9 FL (ref 80–99)
NRBC # BLD: 0 K/UL (ref 0–0.01)
NRBC BLD-RTO: 0 PER 100 WBC
PLATELET # BLD AUTO: 274 K/UL (ref 150–400)
PMV BLD AUTO: 10.6 FL (ref 8.9–12.9)
POTASSIUM SERPL-SCNC: 4.3 MMOL/L (ref 3.5–5.1)
PROT SERPL-MCNC: 6.6 G/DL (ref 6.4–8.2)
RBC # BLD AUTO: 4.59 M/UL (ref 3.8–5.2)
SODIUM SERPL-SCNC: 140 MMOL/L (ref 136–145)
WBC # BLD AUTO: 13.1 K/UL (ref 3.6–11)

## 2021-05-06 PROCEDURE — 36415 COLL VENOUS BLD VENIPUNCTURE: CPT

## 2021-05-06 PROCEDURE — 85027 COMPLETE CBC AUTOMATED: CPT

## 2021-05-06 PROCEDURE — 80053 COMPREHEN METABOLIC PANEL: CPT

## 2021-05-06 PROCEDURE — 93005 ELECTROCARDIOGRAM TRACING: CPT

## 2021-05-07 LAB
ATRIAL RATE: 87 BPM
CALCULATED P AXIS, ECG09: 52 DEGREES
CALCULATED R AXIS, ECG10: -18 DEGREES
CALCULATED T AXIS, ECG11: 12 DEGREES
DIAGNOSIS, 93000: NORMAL
P-R INTERVAL, ECG05: 138 MS
Q-T INTERVAL, ECG07: 406 MS
QRS DURATION, ECG06: 84 MS
QTC CALCULATION (BEZET), ECG08: 488 MS
VENTRICULAR RATE, ECG03: 87 BPM

## 2021-05-07 NOTE — PERIOP NOTES
CC MESSAGE SENT TO BECCA CARRILLO LPN REGARDING ABNORMAL LABS:    PLEASE NOTE ABNORMAL LABS FROM PAT 5/6/21:    WBC: 13.1

## 2021-05-09 ENCOUNTER — HOSPITAL ENCOUNTER (OUTPATIENT)
Dept: PREADMISSION TESTING | Age: 56
Discharge: HOME | End: 2021-05-09
Attending: SURGERY
Payer: MEDICARE

## 2021-05-09 DIAGNOSIS — Z01.812 PRE-PROCEDURE LAB EXAM: ICD-10-CM

## 2021-05-09 PROCEDURE — U0003 INFECTIOUS AGENT DETECTION BY NUCLEIC ACID (DNA OR RNA); SEVERE ACUTE RESPIRATORY SYNDROME CORONAVIRUS 2 (SARS-COV-2) (CORONAVIRUS DISEASE [COVID-19]), AMPLIFIED PROBE TECHNIQUE, MAKING USE OF HIGH THROUGHPUT TECHNOLOGIES AS DESCRIBED BY CMS-2020-01-R: HCPCS

## 2021-05-10 LAB — SARS-COV-2, COV2NT: NOT DETECTED

## 2021-05-12 ENCOUNTER — ANESTHESIA EVENT (OUTPATIENT)
Dept: SURGERY | Age: 56
DRG: 353 | End: 2021-05-12
Payer: MEDICARE

## 2021-05-13 ENCOUNTER — APPOINTMENT (OUTPATIENT)
Dept: GENERAL RADIOLOGY | Age: 56
DRG: 353 | End: 2021-05-13
Attending: SURGERY
Payer: MEDICARE

## 2021-05-13 ENCOUNTER — ANESTHESIA (OUTPATIENT)
Dept: SURGERY | Age: 56
DRG: 353 | End: 2021-05-13
Payer: MEDICARE

## 2021-05-13 ENCOUNTER — HOSPITAL ENCOUNTER (INPATIENT)
Age: 56
LOS: 2 days | Discharge: HOME | DRG: 353 | End: 2021-05-15
Attending: SURGERY | Admitting: SURGERY
Payer: MEDICARE

## 2021-05-13 DIAGNOSIS — K43.2 INCISIONAL HERNIA, WITHOUT OBSTRUCTION OR GANGRENE: ICD-10-CM

## 2021-05-13 PROBLEM — K29.30 CHRONIC SUPERFICIAL GASTRITIS WITHOUT BLEEDING: Status: RESOLVED | Noted: 2019-03-18 | Resolved: 2021-05-13

## 2021-05-13 PROBLEM — R47.01 APHASIA: Status: RESOLVED | Noted: 2020-10-28 | Resolved: 2021-05-13

## 2021-05-13 PROBLEM — Z79.899 ENCOUNTER FOR LONG-TERM CURRENT USE OF MEDICATION: Status: RESOLVED | Noted: 2021-02-03 | Resolved: 2021-05-13

## 2021-05-13 PROBLEM — E66.01 SEVERE OBESITY WITH BODY MASS INDEX (BMI) OF 35.0 TO 39.9 WITH SERIOUS COMORBIDITY (HCC): Status: RESOLVED | Noted: 2018-06-20 | Resolved: 2021-05-13

## 2021-05-13 PROBLEM — D72.825 BANDEMIA: Status: RESOLVED | Noted: 2020-12-02 | Resolved: 2021-05-13

## 2021-05-13 PROBLEM — R09.02 HYPOXIA: Status: ACTIVE | Noted: 2021-05-13

## 2021-05-13 LAB
ARTERIAL PATENCY WRIST A: YES
BASE DEFICIT BLD-SCNC: 1 MMOL/L
BASE EXCESS BLD CALC-SCNC: 0 MMOL/L
BASE EXCESS BLD CALC-SCNC: 0 MMOL/L
BDY SITE: ABNORMAL
CA-I BLD-SCNC: 1.2 MMOL/L (ref 1.12–1.32)
CA-I BLD-SCNC: 1.24 MMOL/L (ref 1.12–1.32)
CA-I BLD-SCNC: 1.24 MMOL/L (ref 1.12–1.32)
GAS FLOW.O2 O2 DELIVERY SYS: ABNORMAL L/MIN
GAS FLOW.O2 SETTING OXYMISER: 15 L/M
GLUCOSE BLD STRIP.AUTO-MCNC: 114 MG/DL (ref 65–117)
GLUCOSE BLD STRIP.AUTO-MCNC: 169 MG/DL (ref 65–117)
GLUCOSE BLD STRIP.AUTO-MCNC: 182 MG/DL (ref 65–117)
GLUCOSE BLD STRIP.AUTO-MCNC: 185 MG/DL (ref 65–117)
HCO3 BLD-SCNC: 24.2 MMOL/L (ref 22–26)
HCO3 BLD-SCNC: 26.5 MMOL/L (ref 22–26)
HCO3 BLD-SCNC: 26.5 MMOL/L (ref 22–26)
O2/TOTAL GAS SETTING VFR VENT: 100 %
O2/TOTAL GAS SETTING VFR VENT: 21 %
O2/TOTAL GAS SETTING VFR VENT: 60 %
PCO2 BLD: 41.2 MMHG (ref 35–45)
PCO2 BLD: 53.8 MMHG (ref 35–45)
PCO2 BLD: 57.7 MMHG (ref 35–45)
PEEP RESPIRATORY: 5 CMH2O
PH BLD: 7.27 [PH] (ref 7.35–7.45)
PH BLD: 7.3 [PH] (ref 7.35–7.45)
PH BLD: 7.38 [PH] (ref 7.35–7.45)
PIP ISTAT,IPIP: 10
PO2 BLD: 113 MMHG (ref 80–100)
PO2 BLD: 50 MMHG (ref 80–100)
PO2 BLD: 69 MMHG (ref 80–100)
SAO2 % BLD: 84 % (ref 92–97)
SAO2 % BLD: 91 % (ref 92–97)
SAO2 % BLD: 98 % (ref 92–97)
SERVICE CMNT-IMP: ABNORMAL
SERVICE CMNT-IMP: NORMAL
SPECIMEN TYPE: ABNORMAL
SPONTANEOUS TIMED, IST: YES

## 2021-05-13 PROCEDURE — 77010033678 HC OXYGEN DAILY

## 2021-05-13 PROCEDURE — 77030002933 HC SUT MCRYL J&J -A: Performed by: SURGERY

## 2021-05-13 PROCEDURE — 74011250636 HC RX REV CODE- 250/636: Performed by: STUDENT IN AN ORGANIZED HEALTH CARE EDUCATION/TRAINING PROGRAM

## 2021-05-13 PROCEDURE — 77030022704 HC SUT VLOC COVD -B: Performed by: SURGERY

## 2021-05-13 PROCEDURE — 65660000001 HC RM ICU INTERMED STEPDOWN

## 2021-05-13 PROCEDURE — 77030040361 HC SLV COMPR DVT MDII -B: Performed by: SURGERY

## 2021-05-13 PROCEDURE — C1781 MESH (IMPLANTABLE): HCPCS | Performed by: SURGERY

## 2021-05-13 PROCEDURE — 36600 WITHDRAWAL OF ARTERIAL BLOOD: CPT

## 2021-05-13 PROCEDURE — 77030008771 HC TU NG SALEM SUMP -A: Performed by: ANESTHESIOLOGY

## 2021-05-13 PROCEDURE — 5A09357 ASSISTANCE WITH RESPIRATORY VENTILATION, LESS THAN 24 CONSECUTIVE HOURS, CONTINUOUS POSITIVE AIRWAY PRESSURE: ICD-10-PCS | Performed by: SURGERY

## 2021-05-13 PROCEDURE — 74011250636 HC RX REV CODE- 250/636: Performed by: INTERNAL MEDICINE

## 2021-05-13 PROCEDURE — 74011000250 HC RX REV CODE- 250: Performed by: SURGERY

## 2021-05-13 PROCEDURE — 77030020703 HC SEAL CANN DISP INTU -B: Performed by: SURGERY

## 2021-05-13 PROCEDURE — 74011250637 HC RX REV CODE- 250/637: Performed by: STUDENT IN AN ORGANIZED HEALTH CARE EDUCATION/TRAINING PROGRAM

## 2021-05-13 PROCEDURE — 77030003029 HC SUT VCRL J&J -B: Performed by: SURGERY

## 2021-05-13 PROCEDURE — 76060000036 HC ANESTHESIA 2.5 TO 3 HR: Performed by: SURGERY

## 2021-05-13 PROCEDURE — 77030038613 HC SUT PDS STRATA SPIRL J&J -B: Performed by: SURGERY

## 2021-05-13 PROCEDURE — 74011250637 HC RX REV CODE- 250/637: Performed by: SURGERY

## 2021-05-13 PROCEDURE — 74011250636 HC RX REV CODE- 250/636: Performed by: SURGERY

## 2021-05-13 PROCEDURE — 49568 PR IMPLANTATION OF MESH OR OTHER PROSTHESIS FOR OPEN INCISIONAL OR VENTRAL HERNIA REPAIR OR MESH FOR CLOSURE OF DEBRIDEMENT FOR NECROTIZING SOFT TISSUE INFECTION: CPT | Performed by: SURGERY

## 2021-05-13 PROCEDURE — 94660 CPAP INITIATION&MGMT: CPT

## 2021-05-13 PROCEDURE — 77030002895 HC DEV VASC CLOSR COVD -B: Performed by: SURGERY

## 2021-05-13 PROCEDURE — 77030035277 HC OBTRTR BLDELSS DISP INTU -B: Performed by: SURGERY

## 2021-05-13 PROCEDURE — 82962 GLUCOSE BLOOD TEST: CPT

## 2021-05-13 PROCEDURE — 77030035464 HC SUT VLOC NON ABS COVD -B: Performed by: SURGERY

## 2021-05-13 PROCEDURE — 77030008684 HC TU ET CUF COVD -B: Performed by: ANESTHESIOLOGY

## 2021-05-13 PROCEDURE — 94640 AIRWAY INHALATION TREATMENT: CPT

## 2021-05-13 PROCEDURE — 49566 PR REPAIR RECURR INCIS HERNIA,STRANG: CPT | Performed by: SURGERY

## 2021-05-13 PROCEDURE — 82803 BLOOD GASES ANY COMBINATION: CPT

## 2021-05-13 PROCEDURE — 74011250636 HC RX REV CODE- 250/636: Performed by: ANESTHESIOLOGY

## 2021-05-13 PROCEDURE — 74011250637 HC RX REV CODE- 250/637: Performed by: ANESTHESIOLOGY

## 2021-05-13 PROCEDURE — 71045 X-RAY EXAM CHEST 1 VIEW: CPT

## 2021-05-13 PROCEDURE — 76210000002 HC OR PH I REC 3 TO 3.5 HR: Performed by: SURGERY

## 2021-05-13 PROCEDURE — 99218 HC RM OBSERVATION: CPT

## 2021-05-13 PROCEDURE — 77030002966 HC SUT PDS J&J -A: Performed by: SURGERY

## 2021-05-13 PROCEDURE — 77030010507 HC ADH SKN DERMBND J&J -B: Performed by: SURGERY

## 2021-05-13 PROCEDURE — 2709999900 HC NON-CHARGEABLE SUPPLY: Performed by: SURGERY

## 2021-05-13 PROCEDURE — 94664 DEMO&/EVAL PT USE INHALER: CPT

## 2021-05-13 PROCEDURE — 77030026438 HC STYL ET INTUB CARD -A: Performed by: ANESTHESIOLOGY

## 2021-05-13 PROCEDURE — 0WUF0JZ SUPPLEMENT ABDOMINAL WALL WITH SYNTHETIC SUBSTITUTE, OPEN APPROACH: ICD-10-PCS | Performed by: SURGERY

## 2021-05-13 PROCEDURE — 77030035236 HC SUT PDS STRATFX BARB J&J -B: Performed by: SURGERY

## 2021-05-13 PROCEDURE — 8E0W4CZ ROBOTIC ASSISTED PROCEDURE OF TRUNK REGION, PERCUTANEOUS ENDOSCOPIC APPROACH: ICD-10-PCS | Performed by: SURGERY

## 2021-05-13 PROCEDURE — 77030012770 HC TRCR OPT FX AMR -B: Performed by: SURGERY

## 2021-05-13 PROCEDURE — 76010000876 HC OR TIME 2 TO 2.5HR INTENSV - TIER 2: Performed by: SURGERY

## 2021-05-13 PROCEDURE — 76010000131 HC OR TIME 2 TO 2.5 HR: Performed by: SURGERY

## 2021-05-13 PROCEDURE — 74011000250 HC RX REV CODE- 250: Performed by: INTERNAL MEDICINE

## 2021-05-13 PROCEDURE — 74011000250 HC RX REV CODE- 250: Performed by: STUDENT IN AN ORGANIZED HEALTH CARE EDUCATION/TRAINING PROGRAM

## 2021-05-13 DEVICE — BARD SOFT MESH, 12" X 12" (30. 5 CM X 30.5 CM)
Type: IMPLANTABLE DEVICE | Site: ABDOMEN | Status: FUNCTIONAL
Brand: BARD

## 2021-05-13 RX ORDER — SODIUM CHLORIDE 0.9 % (FLUSH) 0.9 %
5-40 SYRINGE (ML) INJECTION AS NEEDED
Status: DISCONTINUED | OUTPATIENT
Start: 2021-05-13 | End: 2021-05-13 | Stop reason: HOSPADM

## 2021-05-13 RX ORDER — SODIUM CHLORIDE 0.9 % (FLUSH) 0.9 %
5-40 SYRINGE (ML) INJECTION EVERY 8 HOURS
Status: DISCONTINUED | OUTPATIENT
Start: 2021-05-13 | End: 2021-05-13 | Stop reason: HOSPADM

## 2021-05-13 RX ORDER — DEXTROSE 50 % IN WATER (D50W) INTRAVENOUS SYRINGE
25-50 AS NEEDED
Status: DISCONTINUED | OUTPATIENT
Start: 2021-05-13 | End: 2021-05-15 | Stop reason: HOSPADM

## 2021-05-13 RX ORDER — TRAZODONE HYDROCHLORIDE 50 MG/1
50 TABLET ORAL DAILY
Status: DISCONTINUED | OUTPATIENT
Start: 2021-05-14 | End: 2021-05-14

## 2021-05-13 RX ORDER — ACETAMINOPHEN 325 MG/1
650 TABLET ORAL
Status: DISCONTINUED | OUTPATIENT
Start: 2021-05-13 | End: 2021-05-15 | Stop reason: HOSPADM

## 2021-05-13 RX ORDER — BUPIVACAINE HYDROCHLORIDE AND EPINEPHRINE 5; 5 MG/ML; UG/ML
INJECTION, SOLUTION EPIDURAL; INTRACAUDAL; PERINEURAL AS NEEDED
Status: DISCONTINUED | OUTPATIENT
Start: 2021-05-13 | End: 2021-05-13 | Stop reason: HOSPADM

## 2021-05-13 RX ORDER — DEXTROSE, SODIUM CHLORIDE, AND POTASSIUM CHLORIDE 5; .45; .15 G/100ML; G/100ML; G/100ML
10 INJECTION INTRAVENOUS CONTINUOUS
Status: DISCONTINUED | OUTPATIENT
Start: 2021-05-13 | End: 2021-05-14

## 2021-05-13 RX ORDER — ROCURONIUM BROMIDE 10 MG/ML
INJECTION, SOLUTION INTRAVENOUS AS NEEDED
Status: DISCONTINUED | OUTPATIENT
Start: 2021-05-13 | End: 2021-05-13 | Stop reason: HOSPADM

## 2021-05-13 RX ORDER — OXYCODONE HYDROCHLORIDE 5 MG/1
5 TABLET ORAL AS NEEDED
Status: DISCONTINUED | OUTPATIENT
Start: 2021-05-13 | End: 2021-05-13 | Stop reason: HOSPADM

## 2021-05-13 RX ORDER — ACETAMINOPHEN 325 MG/1
650 TABLET ORAL ONCE
Status: COMPLETED | OUTPATIENT
Start: 2021-05-13 | End: 2021-05-13

## 2021-05-13 RX ORDER — PANTOPRAZOLE SODIUM 40 MG/1
40 TABLET, DELAYED RELEASE ORAL DAILY
Status: DISCONTINUED | OUTPATIENT
Start: 2021-05-14 | End: 2021-05-15 | Stop reason: HOSPADM

## 2021-05-13 RX ORDER — SODIUM CHLORIDE 0.9 % (FLUSH) 0.9 %
5-40 SYRINGE (ML) INJECTION EVERY 8 HOURS
Status: DISCONTINUED | OUTPATIENT
Start: 2021-05-13 | End: 2021-05-15 | Stop reason: HOSPADM

## 2021-05-13 RX ORDER — PROPOFOL 10 MG/ML
INJECTION, EMULSION INTRAVENOUS AS NEEDED
Status: DISCONTINUED | OUTPATIENT
Start: 2021-05-13 | End: 2021-05-13 | Stop reason: HOSPADM

## 2021-05-13 RX ORDER — HYDRALAZINE HYDROCHLORIDE 20 MG/ML
20 INJECTION INTRAMUSCULAR; INTRAVENOUS
Status: DISCONTINUED | OUTPATIENT
Start: 2021-05-13 | End: 2021-05-15 | Stop reason: HOSPADM

## 2021-05-13 RX ORDER — SUCCINYLCHOLINE CHLORIDE 20 MG/ML
INJECTION INTRAMUSCULAR; INTRAVENOUS AS NEEDED
Status: DISCONTINUED | OUTPATIENT
Start: 2021-05-13 | End: 2021-05-13 | Stop reason: HOSPADM

## 2021-05-13 RX ORDER — MIDAZOLAM HYDROCHLORIDE 1 MG/ML
1 INJECTION, SOLUTION INTRAMUSCULAR; INTRAVENOUS
Status: DISCONTINUED | OUTPATIENT
Start: 2021-05-13 | End: 2021-05-13 | Stop reason: HOSPADM

## 2021-05-13 RX ORDER — ONDANSETRON 2 MG/ML
INJECTION INTRAMUSCULAR; INTRAVENOUS AS NEEDED
Status: DISCONTINUED | OUTPATIENT
Start: 2021-05-13 | End: 2021-05-13 | Stop reason: HOSPADM

## 2021-05-13 RX ORDER — FENTANYL CITRATE 50 UG/ML
50 INJECTION, SOLUTION INTRAMUSCULAR; INTRAVENOUS AS NEEDED
Status: DISCONTINUED | OUTPATIENT
Start: 2021-05-13 | End: 2021-05-13 | Stop reason: HOSPADM

## 2021-05-13 RX ORDER — ROPINIROLE 1 MG/1
2 TABLET, FILM COATED ORAL 3 TIMES DAILY
Status: DISCONTINUED | OUTPATIENT
Start: 2021-05-13 | End: 2021-05-15 | Stop reason: HOSPADM

## 2021-05-13 RX ORDER — ATORVASTATIN CALCIUM 40 MG/1
40 TABLET, FILM COATED ORAL
Status: DISCONTINUED | OUTPATIENT
Start: 2021-05-13 | End: 2021-05-15 | Stop reason: HOSPADM

## 2021-05-13 RX ORDER — IPRATROPIUM BROMIDE 0.5 MG/2.5ML
0.5 SOLUTION RESPIRATORY (INHALATION)
Status: DISCONTINUED | OUTPATIENT
Start: 2021-05-13 | End: 2021-05-13

## 2021-05-13 RX ORDER — EPHEDRINE SULFATE/0.9% NACL/PF 50 MG/5 ML
SYRINGE (ML) INTRAVENOUS AS NEEDED
Status: DISCONTINUED | OUTPATIENT
Start: 2021-05-13 | End: 2021-05-13 | Stop reason: HOSPADM

## 2021-05-13 RX ORDER — DILTIAZEM HYDROCHLORIDE 300 MG/1
300 CAPSULE, COATED, EXTENDED RELEASE ORAL DAILY
Status: DISCONTINUED | OUTPATIENT
Start: 2021-05-14 | End: 2021-05-15 | Stop reason: HOSPADM

## 2021-05-13 RX ORDER — ARFORMOTEROL TARTRATE 15 UG/2ML
15 SOLUTION RESPIRATORY (INHALATION)
Status: DISCONTINUED | OUTPATIENT
Start: 2021-05-13 | End: 2021-05-15 | Stop reason: HOSPADM

## 2021-05-13 RX ORDER — IPRATROPIUM BROMIDE AND ALBUTEROL SULFATE 2.5; .5 MG/3ML; MG/3ML
3 SOLUTION RESPIRATORY (INHALATION)
Status: DISCONTINUED | OUTPATIENT
Start: 2021-05-13 | End: 2021-05-14 | Stop reason: CLARIF

## 2021-05-13 RX ORDER — VENLAFAXINE HYDROCHLORIDE 150 MG/1
150 CAPSULE, EXTENDED RELEASE ORAL
Status: DISCONTINUED | OUTPATIENT
Start: 2021-05-14 | End: 2021-05-15 | Stop reason: HOSPADM

## 2021-05-13 RX ORDER — PHENYLEPHRINE HCL IN 0.9% NACL 0.4MG/10ML
SYRINGE (ML) INTRAVENOUS AS NEEDED
Status: DISCONTINUED | OUTPATIENT
Start: 2021-05-13 | End: 2021-05-13 | Stop reason: HOSPADM

## 2021-05-13 RX ORDER — LIDOCAINE HYDROCHLORIDE 20 MG/ML
INJECTION, SOLUTION EPIDURAL; INFILTRATION; INTRACAUDAL; PERINEURAL AS NEEDED
Status: DISCONTINUED | OUTPATIENT
Start: 2021-05-13 | End: 2021-05-13 | Stop reason: HOSPADM

## 2021-05-13 RX ORDER — LIDOCAINE HYDROCHLORIDE 10 MG/ML
0.5 INJECTION, SOLUTION EPIDURAL; INFILTRATION; INTRACAUDAL; PERINEURAL AS NEEDED
Status: DISCONTINUED | OUTPATIENT
Start: 2021-05-13 | End: 2021-05-13 | Stop reason: HOSPADM

## 2021-05-13 RX ORDER — DEXAMETHASONE SODIUM PHOSPHATE 4 MG/ML
INJECTION, SOLUTION INTRA-ARTICULAR; INTRALESIONAL; INTRAMUSCULAR; INTRAVENOUS; SOFT TISSUE AS NEEDED
Status: DISCONTINUED | OUTPATIENT
Start: 2021-05-13 | End: 2021-05-13 | Stop reason: HOSPADM

## 2021-05-13 RX ORDER — MORPHINE SULFATE 2 MG/ML
2 INJECTION, SOLUTION INTRAMUSCULAR; INTRAVENOUS
Status: DISCONTINUED | OUTPATIENT
Start: 2021-05-13 | End: 2021-05-13 | Stop reason: HOSPADM

## 2021-05-13 RX ORDER — PROMETHAZINE HYDROCHLORIDE 25 MG/1
12.5 TABLET ORAL
Status: DISCONTINUED | OUTPATIENT
Start: 2021-05-13 | End: 2021-05-15 | Stop reason: HOSPADM

## 2021-05-13 RX ORDER — SODIUM CHLORIDE, SODIUM LACTATE, POTASSIUM CHLORIDE, CALCIUM CHLORIDE 600; 310; 30; 20 MG/100ML; MG/100ML; MG/100ML; MG/100ML
125 INJECTION, SOLUTION INTRAVENOUS CONTINUOUS
Status: DISCONTINUED | OUTPATIENT
Start: 2021-05-13 | End: 2021-05-13 | Stop reason: HOSPADM

## 2021-05-13 RX ORDER — ENOXAPARIN SODIUM 100 MG/ML
40 INJECTION SUBCUTANEOUS DAILY
Status: DISCONTINUED | OUTPATIENT
Start: 2021-05-14 | End: 2021-05-15 | Stop reason: HOSPADM

## 2021-05-13 RX ORDER — MAGNESIUM SULFATE 100 %
4 CRYSTALS MISCELLANEOUS AS NEEDED
Status: DISCONTINUED | OUTPATIENT
Start: 2021-05-13 | End: 2021-05-15 | Stop reason: HOSPADM

## 2021-05-13 RX ORDER — BENZONATATE 100 MG/1
100 CAPSULE ORAL
Status: DISCONTINUED | OUTPATIENT
Start: 2021-05-13 | End: 2021-05-15 | Stop reason: HOSPADM

## 2021-05-13 RX ORDER — ONDANSETRON 2 MG/ML
4 INJECTION INTRAMUSCULAR; INTRAVENOUS
Status: DISCONTINUED | OUTPATIENT
Start: 2021-05-13 | End: 2021-05-15 | Stop reason: HOSPADM

## 2021-05-13 RX ORDER — SODIUM CHLORIDE 0.9 % (FLUSH) 0.9 %
5-40 SYRINGE (ML) INJECTION AS NEEDED
Status: DISCONTINUED | OUTPATIENT
Start: 2021-05-13 | End: 2021-05-15 | Stop reason: HOSPADM

## 2021-05-13 RX ORDER — ROPINIROLE 1 MG/1
2 TABLET, FILM COATED ORAL DAILY
Status: DISCONTINUED | OUTPATIENT
Start: 2021-05-14 | End: 2021-05-13

## 2021-05-13 RX ORDER — OXYCODONE AND ACETAMINOPHEN 5; 325 MG/1; MG/1
1 TABLET ORAL
Status: DISCONTINUED | OUTPATIENT
Start: 2021-05-13 | End: 2021-05-15 | Stop reason: HOSPADM

## 2021-05-13 RX ORDER — ONDANSETRON 2 MG/ML
4 INJECTION INTRAMUSCULAR; INTRAVENOUS AS NEEDED
Status: DISCONTINUED | OUTPATIENT
Start: 2021-05-13 | End: 2021-05-13 | Stop reason: HOSPADM

## 2021-05-13 RX ORDER — ASPIRIN 81 MG/1
81 TABLET ORAL DAILY
Status: DISCONTINUED | OUTPATIENT
Start: 2021-05-14 | End: 2021-05-15 | Stop reason: HOSPADM

## 2021-05-13 RX ORDER — DEXMEDETOMIDINE HYDROCHLORIDE 100 UG/ML
INJECTION, SOLUTION INTRAVENOUS AS NEEDED
Status: DISCONTINUED | OUTPATIENT
Start: 2021-05-13 | End: 2021-05-13 | Stop reason: HOSPADM

## 2021-05-13 RX ORDER — FUROSEMIDE 10 MG/ML
40 INJECTION INTRAMUSCULAR; INTRAVENOUS ONCE
Status: COMPLETED | OUTPATIENT
Start: 2021-05-13 | End: 2021-05-13

## 2021-05-13 RX ORDER — HYDROMORPHONE HYDROCHLORIDE 2 MG/ML
INJECTION, SOLUTION INTRAMUSCULAR; INTRAVENOUS; SUBCUTANEOUS AS NEEDED
Status: DISCONTINUED | OUTPATIENT
Start: 2021-05-13 | End: 2021-05-13 | Stop reason: HOSPADM

## 2021-05-13 RX ORDER — OXYCODONE AND ACETAMINOPHEN 10; 325 MG/1; MG/1
1 TABLET ORAL
Status: DISCONTINUED | OUTPATIENT
Start: 2021-05-13 | End: 2021-05-15 | Stop reason: HOSPADM

## 2021-05-13 RX ORDER — FENTANYL CITRATE 50 UG/ML
25 INJECTION, SOLUTION INTRAMUSCULAR; INTRAVENOUS
Status: COMPLETED | OUTPATIENT
Start: 2021-05-13 | End: 2021-05-13

## 2021-05-13 RX ORDER — DEXTROSE, SODIUM CHLORIDE, SODIUM LACTATE, POTASSIUM CHLORIDE, AND CALCIUM CHLORIDE 5; .6; .31; .03; .02 G/100ML; G/100ML; G/100ML; G/100ML; G/100ML
125 INJECTION, SOLUTION INTRAVENOUS CONTINUOUS
Status: DISCONTINUED | OUTPATIENT
Start: 2021-05-13 | End: 2021-05-13 | Stop reason: HOSPADM

## 2021-05-13 RX ORDER — MIDAZOLAM HYDROCHLORIDE 1 MG/ML
1 INJECTION, SOLUTION INTRAMUSCULAR; INTRAVENOUS AS NEEDED
Status: DISCONTINUED | OUTPATIENT
Start: 2021-05-13 | End: 2021-05-13 | Stop reason: HOSPADM

## 2021-05-13 RX ORDER — FENTANYL CITRATE 50 UG/ML
INJECTION, SOLUTION INTRAMUSCULAR; INTRAVENOUS AS NEEDED
Status: DISCONTINUED | OUTPATIENT
Start: 2021-05-13 | End: 2021-05-13 | Stop reason: HOSPADM

## 2021-05-13 RX ORDER — POLYETHYLENE GLYCOL 3350 17 G/17G
17 POWDER, FOR SOLUTION ORAL DAILY PRN
Status: DISCONTINUED | OUTPATIENT
Start: 2021-05-13 | End: 2021-05-15 | Stop reason: HOSPADM

## 2021-05-13 RX ORDER — HYDROMORPHONE HYDROCHLORIDE 1 MG/ML
1 INJECTION, SOLUTION INTRAMUSCULAR; INTRAVENOUS; SUBCUTANEOUS
Status: DISCONTINUED | OUTPATIENT
Start: 2021-05-13 | End: 2021-05-15 | Stop reason: HOSPADM

## 2021-05-13 RX ORDER — ALBUTEROL SULFATE 0.83 MG/ML
2.5 SOLUTION RESPIRATORY (INHALATION)
Status: DISCONTINUED | OUTPATIENT
Start: 2021-05-13 | End: 2021-05-15 | Stop reason: HOSPADM

## 2021-05-13 RX ORDER — KETAMINE HYDROCHLORIDE 10 MG/ML
INJECTION, SOLUTION INTRAMUSCULAR; INTRAVENOUS AS NEEDED
Status: DISCONTINUED | OUTPATIENT
Start: 2021-05-13 | End: 2021-05-13 | Stop reason: HOSPADM

## 2021-05-13 RX ORDER — SODIUM CHLORIDE, SODIUM LACTATE, POTASSIUM CHLORIDE, CALCIUM CHLORIDE 600; 310; 30; 20 MG/100ML; MG/100ML; MG/100ML; MG/100ML
INJECTION, SOLUTION INTRAVENOUS
Status: DISCONTINUED | OUTPATIENT
Start: 2021-05-13 | End: 2021-05-13 | Stop reason: HOSPADM

## 2021-05-13 RX ORDER — MIDAZOLAM HYDROCHLORIDE 1 MG/ML
INJECTION, SOLUTION INTRAMUSCULAR; INTRAVENOUS AS NEEDED
Status: DISCONTINUED | OUTPATIENT
Start: 2021-05-13 | End: 2021-05-13 | Stop reason: HOSPADM

## 2021-05-13 RX ORDER — DIPHENHYDRAMINE HYDROCHLORIDE 50 MG/ML
12.5 INJECTION, SOLUTION INTRAMUSCULAR; INTRAVENOUS AS NEEDED
Status: DISCONTINUED | OUTPATIENT
Start: 2021-05-13 | End: 2021-05-13 | Stop reason: HOSPADM

## 2021-05-13 RX ORDER — FUROSEMIDE 20 MG/1
20 TABLET ORAL DAILY
Status: DISCONTINUED | OUTPATIENT
Start: 2021-05-14 | End: 2021-05-15 | Stop reason: HOSPADM

## 2021-05-13 RX ORDER — CYCLOBENZAPRINE HCL 10 MG
10 TABLET ORAL
Status: DISCONTINUED | OUTPATIENT
Start: 2021-05-13 | End: 2021-05-15 | Stop reason: HOSPADM

## 2021-05-13 RX ORDER — ACETAMINOPHEN 650 MG/1
650 SUPPOSITORY RECTAL
Status: DISCONTINUED | OUTPATIENT
Start: 2021-05-13 | End: 2021-05-15 | Stop reason: HOSPADM

## 2021-05-13 RX ORDER — ALBUTEROL SULFATE 90 UG/1
AEROSOL, METERED RESPIRATORY (INHALATION) AS NEEDED
Status: DISCONTINUED | OUTPATIENT
Start: 2021-05-13 | End: 2021-05-13 | Stop reason: HOSPADM

## 2021-05-13 RX ORDER — INSULIN LISPRO 100 [IU]/ML
INJECTION, SOLUTION INTRAVENOUS; SUBCUTANEOUS
Status: DISCONTINUED | OUTPATIENT
Start: 2021-05-13 | End: 2021-05-15 | Stop reason: HOSPADM

## 2021-05-13 RX ADMIN — FENTANYL CITRATE 50 MCG: 50 INJECTION, SOLUTION INTRAMUSCULAR; INTRAVENOUS at 10:20

## 2021-05-13 RX ADMIN — DEXMEDETOMIDINE HYDROCHLORIDE 4 MCG: 100 INJECTION, SOLUTION, CONCENTRATE INTRAVENOUS at 10:41

## 2021-05-13 RX ADMIN — KETAMINE HYDROCHLORIDE 40 MG: 10 INJECTION, SOLUTION INTRAMUSCULAR; INTRAVENOUS at 10:28

## 2021-05-13 RX ADMIN — ROCURONIUM BROMIDE 25 MG: 10 SOLUTION INTRAVENOUS at 10:26

## 2021-05-13 RX ADMIN — ROCURONIUM BROMIDE 10 MG: 10 SOLUTION INTRAVENOUS at 10:39

## 2021-05-13 RX ADMIN — POTASSIUM CHLORIDE, DEXTROSE MONOHYDRATE AND SODIUM CHLORIDE 50 ML/HR: 150; 5; 450 INJECTION, SOLUTION INTRAVENOUS at 14:13

## 2021-05-13 RX ADMIN — ARFORMOTEROL TARTRATE 15 MCG: 15 SOLUTION RESPIRATORY (INHALATION) at 22:24

## 2021-05-13 RX ADMIN — HYDROMORPHONE HYDROCHLORIDE 1 MG: 1 INJECTION, SOLUTION INTRAMUSCULAR; INTRAVENOUS; SUBCUTANEOUS at 17:32

## 2021-05-13 RX ADMIN — SODIUM CHLORIDE, POTASSIUM CHLORIDE, SODIUM LACTATE AND CALCIUM CHLORIDE: 600; 310; 30; 20 INJECTION, SOLUTION INTRAVENOUS at 10:02

## 2021-05-13 RX ADMIN — Medication 10 ML: at 21:13

## 2021-05-13 RX ADMIN — SUGAMMADEX 200 MG: 100 INJECTION, SOLUTION INTRAVENOUS at 12:00

## 2021-05-13 RX ADMIN — Medication 120 MCG: at 10:58

## 2021-05-13 RX ADMIN — SUCCINYLCHOLINE CHLORIDE 180 MG: 20 INJECTION, SOLUTION INTRAMUSCULAR; INTRAVENOUS at 10:10

## 2021-05-13 RX ADMIN — SODIUM CHLORIDE, POTASSIUM CHLORIDE, SODIUM LACTATE AND CALCIUM CHLORIDE 125 ML/HR: 600; 310; 30; 20 INJECTION, SOLUTION INTRAVENOUS at 09:08

## 2021-05-13 RX ADMIN — FENTANYL CITRATE 25 MCG: 50 INJECTION, SOLUTION INTRAMUSCULAR; INTRAVENOUS at 13:26

## 2021-05-13 RX ADMIN — ALBUTEROL SULFATE 3 PUFF: 90 AEROSOL, METERED RESPIRATORY (INHALATION) at 12:11

## 2021-05-13 RX ADMIN — PHENYLEPHRINE HYDROCHLORIDE 40 MCG/MIN: 10 INJECTION INTRAVENOUS at 10:58

## 2021-05-13 RX ADMIN — HYDROMORPHONE HYDROCHLORIDE 1 MG: 1 INJECTION, SOLUTION INTRAMUSCULAR; INTRAVENOUS; SUBCUTANEOUS at 21:12

## 2021-05-13 RX ADMIN — MORPHINE SULFATE 2 MG: 2 INJECTION, SOLUTION INTRAMUSCULAR; INTRAVENOUS at 15:02

## 2021-05-13 RX ADMIN — ROCURONIUM BROMIDE 5 MG: 10 SOLUTION INTRAVENOUS at 10:10

## 2021-05-13 RX ADMIN — IPRATROPIUM BROMIDE AND ALBUTEROL SULFATE 3 ML: .5; 3 SOLUTION RESPIRATORY (INHALATION) at 22:09

## 2021-05-13 RX ADMIN — FUROSEMIDE 40 MG: 10 INJECTION, SOLUTION INTRAMUSCULAR; INTRAVENOUS at 18:00

## 2021-05-13 RX ADMIN — MEPERIDINE HYDROCHLORIDE 12.5 MG: 25 INJECTION INTRAMUSCULAR; INTRAVENOUS; SUBCUTANEOUS at 14:20

## 2021-05-13 RX ADMIN — DEXMEDETOMIDINE HYDROCHLORIDE 4 MCG: 100 INJECTION, SOLUTION, CONCENTRATE INTRAVENOUS at 10:32

## 2021-05-13 RX ADMIN — FENTANYL CITRATE 50 MCG: 50 INJECTION, SOLUTION INTRAMUSCULAR; INTRAVENOUS at 10:10

## 2021-05-13 RX ADMIN — ROCURONIUM BROMIDE 10 MG: 10 SOLUTION INTRAVENOUS at 10:58

## 2021-05-13 RX ADMIN — WATER 2 G: 1 INJECTION INTRAMUSCULAR; INTRAVENOUS; SUBCUTANEOUS at 10:18

## 2021-05-13 RX ADMIN — HYDROMORPHONE HYDROCHLORIDE 0.4 MG: 2 INJECTION, SOLUTION INTRAMUSCULAR; INTRAVENOUS; SUBCUTANEOUS at 12:04

## 2021-05-13 RX ADMIN — LIDOCAINE HYDROCHLORIDE 100 MG: 20 INJECTION, SOLUTION EPIDURAL; INFILTRATION; INTRACAUDAL; PERINEURAL at 10:10

## 2021-05-13 RX ADMIN — Medication 10 MG: at 11:17

## 2021-05-13 RX ADMIN — LIDOCAINE HYDROCHLORIDE 40 MG: 20 INJECTION, SOLUTION EPIDURAL; INFILTRATION; INTRACAUDAL; PERINEURAL at 12:11

## 2021-05-13 RX ADMIN — ROPINIROLE HYDROCHLORIDE 2 MG: 1 TABLET, FILM COATED ORAL at 21:12

## 2021-05-13 RX ADMIN — FENTANYL CITRATE 25 MCG: 50 INJECTION, SOLUTION INTRAMUSCULAR; INTRAVENOUS at 13:06

## 2021-05-13 RX ADMIN — Medication 120 MCG: at 10:52

## 2021-05-13 RX ADMIN — ATORVASTATIN CALCIUM 40 MG: 40 TABLET, FILM COATED ORAL at 21:12

## 2021-05-13 RX ADMIN — DEXMEDETOMIDINE HYDROCHLORIDE 4 MCG: 100 INJECTION, SOLUTION, CONCENTRATE INTRAVENOUS at 10:28

## 2021-05-13 RX ADMIN — ACETAMINOPHEN 650 MG: 325 TABLET ORAL at 08:54

## 2021-05-13 RX ADMIN — FENTANYL CITRATE 25 MCG: 50 INJECTION, SOLUTION INTRAMUSCULAR; INTRAVENOUS at 13:58

## 2021-05-13 RX ADMIN — METHYLPREDNISOLONE SODIUM SUCCINATE 60 MG: 40 INJECTION, POWDER, FOR SOLUTION INTRAMUSCULAR; INTRAVENOUS at 17:41

## 2021-05-13 RX ADMIN — FENTANYL CITRATE 25 MCG: 50 INJECTION, SOLUTION INTRAMUSCULAR; INTRAVENOUS at 13:16

## 2021-05-13 RX ADMIN — PROPOFOL 140 MG: 10 INJECTION, EMULSION INTRAVENOUS at 10:10

## 2021-05-13 RX ADMIN — HYDROMORPHONE HYDROCHLORIDE 0.6 MG: 2 INJECTION, SOLUTION INTRAMUSCULAR; INTRAVENOUS; SUBCUTANEOUS at 12:27

## 2021-05-13 RX ADMIN — DEXAMETHASONE SODIUM PHOSPHATE 4 MG: 4 INJECTION, SOLUTION INTRAMUSCULAR; INTRAVENOUS at 10:13

## 2021-05-13 RX ADMIN — ONDANSETRON HYDROCHLORIDE 4 MG: 2 INJECTION, SOLUTION INTRAMUSCULAR; INTRAVENOUS at 11:22

## 2021-05-13 RX ADMIN — DEXMEDETOMIDINE HYDROCHLORIDE 4 MCG: 100 INJECTION, SOLUTION, CONCENTRATE INTRAVENOUS at 10:37

## 2021-05-13 RX ADMIN — Medication 10 MG: at 10:50

## 2021-05-13 RX ADMIN — MIDAZOLAM HYDROCHLORIDE 5 MG: 1 INJECTION, SOLUTION INTRAMUSCULAR; INTRAVENOUS at 10:00

## 2021-05-13 RX ADMIN — METHYLPREDNISOLONE SODIUM SUCCINATE 60 MG: 40 INJECTION, POWDER, FOR SOLUTION INTRAMUSCULAR; INTRAVENOUS at 23:14

## 2021-05-13 NOTE — PERIOP NOTES
Patient: Jennifer Bradford MRN: [id number]  SSN: [id number]   YOB: 1965  Age: 63 y.o. Sex: female     Patient is status post Procedure(s):  XI ROBOTIC REPAIR OF INCISIONAL HERNIA WITH MESH, ROBOTIC REPAIR OF ADHESIONS, CONVERT TO OPEN INCISIONAL HERNIA REPAIR WITH MESH.     Surgeon(s) and Role:     * Cecil Al MD - Primary    Local/Dose/Irrigation:  0.5% BUPIVACAINE W EPI                  Peripheral IV 05/13/21 Left Wrist (Active)   Site Assessment Clean, dry, & intact 05/13/21 0907   Phlebitis Assessment 0 05/13/21 0907   Infiltration Assessment 0 05/13/21 0907   Dressing Status Clean, dry, & intact 05/13/21 0907   Dressing Type Transparent 05/13/21 0907   Hub Color/Line Status Pink 05/13/21 0907            Airway - Endotracheal Tube 05/13/21 Oral (Active)                   Dressing/Packing:  Incision 05/13/21 Abdomen-Dressing/Treatment: Surgical glue (05/13/21 1100)    Splint/Cast:  ]    Other:

## 2021-05-13 NOTE — PROGRESS NOTES
COPD. Hypoxic around 80 pre op and on assisted ventilation post op. Will consult pulmonary for management and possibly need for home O2. ABG shows moderately compensated hypercarbia likely acute on chronic with hypoxemia. Upgrade to step down.     Kristy Chadwick MD  Bariatric and General Surgeon  [facility]

## 2021-05-13 NOTE — ANESTHESIA POSTPROCEDURE EVALUATION
Post-Anesthesia Evaluation and Assessment    Patient: Katina Gill MRN: [id number]  SSN: [id number]    YOB: 1965  Age: 63 y.o. Sex: female       Cardiovascular Function/Vital Signs  Visit Vitals  BP (!) 93/55   Pulse 82   Temp 36.5 °C (97.7 °F)   Resp 14   Wt 87.5 kg (193 lb)   SpO2 98%   BMI 34.19 kg/m²       Patient is status post General anesthesia for Procedure(s):  XI ROBOTIC REPAIR OF INCISIONAL HERNIA WITH MESH, ROBOTIC REPAIR OF ADHESIONS, CONVERT TO OPEN INCISIONAL HERNIA REPAIR WITH MESH. Nausea/Vomiting: None    Postoperative hydration reviewed and adequate. Pain:  Pain Scale 1: Numeric (0 - 10) (05/13/21 1504)  Pain Intensity 1: 6 (05/13/21 1504)   Managed    Neurological Status:   Neuro (WDL): Within Defined Limits (05/13/21 1445)  Neuro  Neurologic State: Sleeping (05/13/21 1445)  Orientation Level: Oriented to person;Oriented to place;Oriented to situation;Disoriented to time (05/13/21 1445)  Cognition: Appropriate for age attention/concentration; Appropriate safety awareness; Follows commands (05/13/21 1445)  Speech: Appropriate for age;Clear (05/13/21 1445)  LUE Motor Response: Purposeful (05/13/21 1445)  LLE Motor Response: Purposeful (05/13/21 1445)  RUE Motor Response: Purposeful (05/13/21 1445)  RLE Motor Response: Purposeful (05/13/21 1445)   At baseline    Mental Status and Level of Consciousness: Alert and oriented to person, place, and time    Pulmonary Status:   O2 Device: BIPAP (05/13/21 1446)   Adequate oxygenation and airway patent    Complications related to anesthesia: None    Post-anesthesia assessment completed. No concerns    Signed By: Maria T Carter MD     May 13, 2021              Procedure(s):  XI ROBOTIC REPAIR OF INCISIONAL HERNIA WITH MESH, ROBOTIC REPAIR OF ADHESIONS, CONVERT TO OPEN INCISIONAL HERNIA REPAIR WITH MESH.     general    <BSHSIANPOST>    INITIAL Post-op Vital signs:   Vitals Value Taken Time   BP 93/55 05/13/21 1515   Temp 36.5 °C (97.7 °F) 05/13/21 1445   Pulse 82 05/13/21 1527   Resp 15 05/13/21 1527   SpO2 100 % 05/13/21 1527   Vitals shown include unvalidated device data.

## 2021-05-13 NOTE — PERIOP NOTES
TRANSFER - OUT REPORT:    Verbal report given to Racquel Pierce RN on Marylou Forward  being transferred to [address] [place] [address] for routine post - op       Report consisted of patients Situation, Background, Assessment and   Recommendations(SBAR). Time Pre op antibiotic given:1018  Anesthesia Stop time: 4661  Haji Present on Transfer to floor:no  Order for Haji on Chart:n/a  Discharge Prescriptions with Chart:n/a    Information from the following report(s) SBAR, Kardex, OR Summary, Procedure Summary, Intake/Output and MAR was reviewed with the receiving nurse. Opportunity for questions and clarification was provided. Is the patient on 02? YES       L/Min        Other BiPAP    Is the patient on a monitor? YES    Is the nurse transporting with the patient? YES    Surgical Waiting Area notified of patient's transfer from PACU? YES      The following personal items collected during your admission accompanied patient upon transfer:   Dental Appliance: Dental Appliances: None  Vision:    Hearing Aid:    Jewelry: Jewelry: None  Clothing: Clothing: At bedside(bag of clothing returned to pt at bedside)  Other Valuables:  Other Valuables: None  Valuables sent to safe:

## 2021-05-13 NOTE — OP NOTES
OPERATIVE NOTE    Date of Procedure: 5/13/2021     Preoperative Diagnosis: INCISIONAL HERNIA  Postoperative Diagnosis: INCISIONAL HERNIA      Procedure: Procedure(s):  DIAGNOSTIC LAPAROSCOPY, ROBOTIC lYSIS OF ADHESIONS, CONVERT TO OPEN INCISIONAL HERNIA REPAIR WITH MESH    Surgeon: Korin De La O MD  Assistant(s): Ben Granados - They were critically important in the exposure and key portions of the case  Anesthesia: General   Indications: 64 y/o F with symptomatic incisional hernia after nephrectomy  Findings: 4cm fascia defect, cephalad and caudad fascia weakening, adhesions to incision    Description of Operation: Kane Leo was identified in the pre-operative holding area. Informed consent was obtained after a complete discussion of risks, benefits and alternatives to surgery were had with the patient. The patient was brought back to the operating room and placed under general endotracheal anesthesia in the supine position on the operating room table with arms tucked. The patient was then prepped and draped in the usual sterile fashion. A timeout was performed. I began by injected local in the LUQ under the costal margin. I used a 5 mm optiview trocar to enter the abdomen safely. We insufflated. I then placed 2 8 mm trocar along the anterior axillary line. I then up-sized the 5 mm trocar. We docked the robot and used the scissors with energy to take down the adhesions. The incisional hernia was incarcerated with omentum that I reduced with energy and bluntly. I noted the hernia to be 4 cm in size but with significant thinning of the linea alba cephalad and caudad looking like early \"swiss cheese\". I then consulted with Dr. Cole Colindres and elected to perform an open repair and un-dock the robot and remove trocars. A midline incision was made over the hernia defect. We dissected using the Bovie until we reached the fascia cephalad to the hernia. We then incised the fascia and entered the abdomen.  We opened the fascia exposing the hernia defect. We extended our fascia incision past the hernia defect. We then elected to perform a retro-rectus repair. Using the bovie I incised the posterior rectus sheath off the midline and released the rectus muscle off the posterior sheath careful to preserve the inferior epigastric vessels. Dissection was continued lateral out to the semilunar lines. Along the cephalad and caudad portions I released the posterior sheath off the linea alba to allow for mesh over lap. The dissection was done the same way on both the left and right sides. Satisfied, I then closed the posterior rectus sheath with 0 PDS suture. I measured the remaining space and selected a 15 X 20 cm Bard Soft Mesh cut from a larger piece. I placed a cephalad and caudad 0 PDS suture though the mesh and the linea alba as stay sutures for the mesh. The hernia sac was removed from the subcutaneous tissues. I then confirmed hemostasis and closed the anterior rectus sheath with number 0 PDS. I closed Hui's fascia with 2-0 Vicryl followed by 3-0 vicryl for the deep dermal layer. The dermis and trocar sites were closed with 4-0 Monocryl followed by Dermabond for the skin. We injected local with epi along the wound. At the end of the procedure all instrument, needle, and sponge counts were correct. I was present and scrubbed throughout the entirety of the case. The patient awoke from anesthesia and was extubated on to CPAP secondary to hypoxia and sent to PACU in stable condition. Estimated Blood Loss: 10 mL    Specimens: * No specimens in log *     Complications: None    Implants:   Implant Name Type Inv.  Item Serial No.  Lot No. LRB No. Used Action   MESH RADHA [id number] INGUINAL POLYPR SQ L PORE MFIL SFT KNIT - SNA Mesh [address] SQ L PORE MFIL SFT KNIT NA BARD DAVOL_WD [id number] N/A 1 Implanted         Lia Sparks MD  Bariatric and General Surgeon  [facility]  5/13/2021

## 2021-05-13 NOTE — PERIOP NOTES
Dr. Jenna Cao at bedside. Discussed with patient surgical procedure and need for patient to be admitted to hospital.  Pt verbalized understanding. Awaiting ABG and CxR results. No further orders received.

## 2021-05-13 NOTE — PERIOP NOTES
Dr. Michel Menendez called and aware of repeat ABG results and that respiratory therapy has made adjustments to BiPAP settings. Dr. Michel Menendez states pt may be discharged to the floor on bipAP.

## 2021-05-13 NOTE — PERIOP NOTES
PATIENT INTERVIEWED IN PREOP. NAME AND ALLERGY  BAND VISIBLE AND CORRECT PER PATIENT. PATIENT HAS UNDERSTANDING OF PROCEDURE AND SURGICAL SITE. EDUCATIONAL NEEDS MET. PATIENT STATES BACK PAIN AT 7 AND LEFT JAW PAIN AT 10.

## 2021-05-13 NOTE — CONSULTS
Pulmonary, Critical Care, and Sleep Medicine    Initial Patient Consult    Name: Judith Walker MRN: [id number]   : 1965 Hospital: [place] [address]   Date: 2021        IMPRESSION:   · Acute post operative hypercapnic resp failure- Suspect AECOPD ( suspect severe COPD given heavy/current smoking hx ) component HFpEF  · DM2  · S/p incisional hernia repair 21  · HTN  · GERD      RECOMMENDATIONS:   · Diuresis  · Nebs  · IV steroids  · Keep bipap on for next 24 hours and may remove for < 10 min breaks ice chips oral care  · Pt is critically ill CCT EOP 30 min. At risk for decline due to resp failure     Subjective: This patient has been seen and evaluated at the request of Dr. Myron Francis for SOB. Patient is a 63 y.o. female heavy smoker with COPD on spiriva no O2 who was admitted for and had done an incisional hernia repair. In PACU with sats 87% on NC  And WOB. Placed on bipap. Presently more comfortable on bipap but still mild tachypnea.        Past Medical History:   Diagnosis Date    Arthritis     Cancer ([address]     IN KIDNEY REMOVED RIGHT KIDNEY     Chronic obstructive pulmonary disease ([address]     BEGINNING STAGES     Diabetes ([address]     Elevated WBC count  to present    GERD (gastroesophageal reflux disease)     Hypertension     Kidney stones     Leg swelling 2020    Doppler negative    Nausea & vomiting     Panic attacks     Sleep apnea     Does not use CPAP- raises the head of bed up    Spondylolisthesis of lumbar region     Stroke [facility])     TIA     Tachycardia       Past Surgical History:   Procedure Laterality Date    COLONOSCOPY N/A 3/1/2019    COLONOSCOPY performed by Wei Tillman MD at [address] HX BREAST BIOPSY Bilateral     benign    HX CHOLECYSTECTOMY  1998    HX HEENT      HX HYSTERECTOMY  2008    HX KNEE ARTHROSCOPY Bilateral 2012    HX KNEE REPLACEMENT Left 2012    HX KNEE REPLACEMENT Right 2016    HX NEPHROSTOMY Right     HX SEPTOPLASTY  1993    HX SVT ABLATION  2013    HX TUBAL LIGATION  1989    HX UROLOGICAL Right 07/2020    nephrectomy    HX WISDOM TEETH EXTRACTION      X2    NEUROLOGICAL PROCEDURE UNLISTED      lumbar nerve block      Prior to Admission medications    Medication Sig Start Date End Date Taking? Authorizing Provider   benzonatate (TESSALON) 100 mg capsule TAKE 1 CAPSULE THREE TIMES DAILY AS NEEDED  FOR  COUGH 8/17/38  Yes James Cervantes MD   dilTIAZem ER (CARDIZEM CD) 180 mg capsule TAKE 2 CAPSULES EVERY DAY FOR HIGH BLOOD PRESSURE 0/31/42  Yes James Cervantes MD   tiotropium [facility]) 18 mcg inhalation capsule Take 1 Cap by inhalation daily. Indications: bronchospasm prevention with COPD 5/41/85  Yes James Cervantes MD   rOPINIRole (REQUIP) 2 mg tablet TAKE 1 TABLET THREE TIMES DAILY 7/36/82  Yes James Cervantes MD   atorvastatin (LIPITOR) 40 mg tablet TAKE 1 TABLET EVERY DAY  Patient taking differently: Take 40 mg by mouth nightly. 3/87/48  Yes James Cervantes MD   levocetirizine (XYZAL) 5 mg tablet TAKE 1 TABLET EVERY DAY 4/79/58  Yes James Cervantes MD   traZODone (DESYREL) 50 mg tablet TAKE 1 TABLET EVERY NIGHT  FOR  INSOMNIA 1/62/72  Yes James Cervantes MD   LORazepam (ATIVAN) 0.5 mg tablet Take 1 Tab by mouth two (2) times daily as needed for Anxiety. Max Daily Amount: 1 mg. Indications: anxious 8/3/03  Yes James Cervantes MD   losartan (COZAAR) 100 mg tablet TAKE 1 TABLET EVERY DAY  Patient taking differently: Take 100 mg by mouth nightly. 7/93/22  Yes James Cervantes MD   venlafaxine-SR [facility] 150 mg capsule TAKE 1 CAPSULE EVERY DAY 32/70/53  Yes James Cervantes MD   aspirin delayed-release 81 mg tablet Take 81 mg by mouth daily. Yes Provider, Historical   omeprazole (PRILOSEC) 40 mg capsule Take 1 Cap by mouth Before breakfast and dinner.  1/7/99  Yes James Cervantes MD   Accu-Chek Joyce Plus test strp strip TEST BLOOD SUGAR EVERY DAY 33/30/62   James Cervantes MD   furosemide (LASIX) 20 mg tablet TAKE 1 TABLET EVERY DAY AS NEEDED 56/78/84   Sharyn Arvizu MD   metFORMIN (GLUCOPHAGE) 500 mg tablet TAKE TWO TABLETS BY MOUTH TWO TIMES A DAY WITH MEALS  Patient taking differently: Take 500 mg by mouth two (2) times daily (with meals). 62/52/21   Sharyn Arvizu MD   folic acid (FOLVITE) 1 mg tablet Take 1 Tab by mouth daily.  71/6/46   Sharyn Arvizu MD     Allergies   Allergen Reactions    Nsaids (Non-Steroidal Anti-Inflammatory Drug) Other (comments)     Stomach hurts    Celebrex [Celecoxib] Hives    Ditropan [Oxybutynin Chloride] Hives and Itching    Mobic [Meloxicam] Hives    Penicillins Unknown (comments)     DOESN'T REMEMBER  Patient states tolerating PO amoxicillin    Plaquenil [Hydroxychloroquine] Hives      Social History     Tobacco Use    Smoking status: Current Every Day Smoker     Packs/day: 1.00     Years: 40.00     Pack years: 40.00     Types: Cigarettes    Smokeless tobacco: Never Used    Tobacco comment: STATES THINKS ABOUT IT OFTEN, IS THINKING ABOUT IT    Substance Use Topics    Alcohol use: Not Currently      Family History   Problem Relation Age of Onset    Arthritis-osteo Mother     Cancer Mother         BREAST CA     Hypertension Mother     Thyroid Disease Mother     Glaucoma Mother     High Cholesterol Mother     Psychiatric Disorder Father         BIPOLAR    Arthritis-osteo Father     Alcohol abuse Father     Heart Disease Father     Heart Surgery Father     Hypertension Father     Diabetes Brother     Arthritis-osteo Brother     Hypertension Brother     Deep Vein Thrombosis Brother         Unknown cause    Anesth Problems Neg Hx         Current Facility-Administered Medications   Medication Dose Route Frequency    [START ON 5/14/2021] aspirin delayed-release tablet 81 mg  81 mg Oral DAILY    atorvastatin (LIPITOR) tablet 40 mg  40 mg Oral QHS    [START ON 5/14/2021] dilTIAZem ER (CARDIZEM CD) capsule 300 mg  300 mg Oral DAILY    [START ON 2021] furosemide (LASIX) tablet 20 mg  20 mg Oral DAILY    [START ON 2021] pantoprazole (PROTONIX) tablet 40 mg  40 mg Oral DAILY    [START ON 2021] traZODone (DESYREL) tablet 50 mg  50 mg Oral DAILY    [START ON 2021] venlafaxine-SR (EFFEXOR-XR) capsule 150 mg  150 mg Oral DAILY WITH BREAKFAST    ipratropium (ATROVENT) 0.02 % nebulizer solution 0.5 mg  0.5 mg Nebulization Q6H RT    insulin lispro (HUMALOG) injection   SubCUTAneous AC&HS    dextrose 5% - 0.45% NaCl with KCl 20 mEq/L infusion  50 mL/hr IntraVENous CONTINUOUS    sodium chloride (NS) flush 5-40 mL  5-40 mL IntraVENous Q8H    [START ON 2021] enoxaparin (LOVENOX) injection 40 mg  40 mg SubCUTAneous DAILY    rOPINIRole (REQUIP) tablet 2 mg  2 mg Oral TID       Review of Systems:  Review of systems not obtained due to patient factors. Objective:   Vital Signs:    Visit Vitals  BP (!) 104/57 (BP 1 Location: Right upper arm, BP Patient Position: At rest)   Pulse 89   Temp 97.7 °F (36.5 °C)   Resp 22   Wt 87.5 kg (193 lb)   SpO2 97%   BMI 34.19 kg/m²       O2 Device: BIPAP   O2 Flow Rate (L/min): 15 l/min   Temp (24hrs), Av.2 °F (36.8 °C), Min:97.7 °F (36.5 °C), Max:98.7 °F (37.1 °C)       Intake/Output:   Last shift:       0701 -  1900  In: 800 [I.V.:800]  Out: 100   Last 3 shifts: No intake/output data recorded. Intake/Output Summary (Last 24 hours) at 2021 1725  Last data filed at 2021 1413  Gross per 24 hour   Intake 800 ml   Output 100 ml   Net 700 ml      Physical Exam:   General:  Alert, cooperative,mild WOB on bipap    Head:  Normocephalic, without obvious abnormality, atraumatic. Eyes:  Conjunctivae/corneas clear. Nose: Nares normal. Septum midline. Neck: Supple, symmetrical, trachea midline,       Lungs:   Clear to auscultation bilaterally. Heart:  Regular rate and rhythm, S1, S2 normal, no murmur, click, rub or gallop. Abdomen:   Soft, non-tender.  Bowel sounds normal. No masses,  No organomegaly. Extremities: Extremities normal, atraumatic, no cyanosis or edema. Pulses: 2+ and symmetric all extremities.    Skin: Skin color, texture, turgor normal. No rashes or lesions             Data review:     Recent Results (from the past 24 hour(s))   GLUCOSE, POC    Collection Time: 05/13/21  9:06 AM   Result Value Ref Range    Glucose (POC) 114 65 - 117 mg/dL    Performed by Shirley Garcia    POC EG7    Collection Time: 05/13/21  1:01 PM   Result Value Ref Range    Calcium, ionized (POC) 1.24 1.12 - 1.32 mmol/L    FIO2 (POC) 100 %    pH (POC) 7.30 (L) 7.35 - 7.45      pCO2 (POC) 53.8 (H) 35.0 - 45.0 MMHG    pO2 (POC) 69 (L) 80 - 100 MMHG    HCO3 (POC) 26.5 (H) 22 - 26 MMOL/L    Base excess (POC) 0 mmol/L    sO2 (POC) 91 (L) 92 - 97 %    Site LEFT RADIAL      Device: Non rebreather      Flow rate (POC) 15 L/M    Allens test (POC) YES      Specimen type (POC) ARTERIAL     GLUCOSE, POC    Collection Time: 05/13/21  2:06 PM   Result Value Ref Range    Glucose (POC) 169 (H) 65 - 117 mg/dL    Performed by Merrill Cantu    POC EG7    Collection Time: 05/13/21  2:41 PM   Result Value Ref Range    Calcium, ionized (POC) 1.24 1.12 - 1.32 mmol/L    FIO2 (POC) 60 %    pH (POC) 7.27 (L) 7.35 - 7.45      pCO2 (POC) 57.7 (H) 35.0 - 45.0 MMHG    pO2 (POC) 113 (H) 80 - 100 MMHG    HCO3 (POC) 26.5 (H) 22 - 26 MMOL/L    Base excess (POC) 0 mmol/L    sO2 (POC) 98 (H) 92 - 97 %    Site RIGHT BRACHIAL      Device: BIPAP      PEEP/CPAP (POC) 5 cmH2O    PIP (POC) 10      Allens test (POC) YES      Specimen type (POC) ARTERIAL      Spontaneous timed YES     GLUCOSE, POC    Collection Time: 05/13/21  4:37 PM   Result Value Ref Range    Glucose (POC) 185 (H) 65 - 117 mg/dL    Performed by FARZANA Smith        Imaging:  I have personally reviewed the patients radiographs and have reviewed the reports:  PCXR with PVC and no ASD        Sherry Rowe MD

## 2021-05-13 NOTE — ROUTINE PROCESS
TRANSFER - IN REPORT: 
 
Verbal report received from Peng Livingston (name) on Adeline Snyder  being received from PACU (unit) for routine post - op Report consisted of patients Situation, Background, Assessment and  
Recommendations(SBAR). Information from the following report(s) SBAR, Kardex, [facility] and Cardiac Rhythm NSR was reviewed with the receiving nurse. Opportunity for questions and clarification was provided. Assessment completed upon patients arrival to unit and care assumed.

## 2021-05-13 NOTE — INTERVAL H&P NOTE
Update History & Physical 
 
The Patient's History and Physical of 4/21/21 was reviewed with the patient and I examined the patient. There was no change. The surgical site was confirmed by the patient and me. Plan:  The risk, benefits, expected outcome, and alternative to the recommended procedure have been discussed with the patient. Patient understands and wants to proceed with the procedure.  
 
Electronically signed by Trenton Burns MD on 5/13/2021 at 9:29 AM

## 2021-05-14 ENCOUNTER — TELEPHONE (OUTPATIENT)
Dept: SURGERY | Age: 56
End: 2021-05-14

## 2021-05-14 LAB
ANION GAP SERPL CALC-SCNC: 8 MMOL/L (ref 5–15)
ARTERIAL PATENCY WRIST A: YES
BASE EXCESS BLD CALC-SCNC: 1 MMOL/L
BASOPHILS # BLD: 0 K/UL (ref 0–0.1)
BASOPHILS NFR BLD: 0 % (ref 0–1)
BDY SITE: ABNORMAL
BUN SERPL-MCNC: 14 MG/DL (ref 6–20)
BUN/CREAT SERPL: 16 (ref 12–20)
CA-I BLD-SCNC: 1.2 MMOL/L (ref 1.12–1.32)
CALCIUM SERPL-MCNC: 9 MG/DL (ref 8.5–10.1)
CHLORIDE SERPL-SCNC: 109 MMOL/L (ref 97–108)
CO2 SERPL-SCNC: 21 MMOL/L (ref 21–32)
CREAT SERPL-MCNC: 0.86 MG/DL (ref 0.55–1.02)
DIFFERENTIAL METHOD BLD: ABNORMAL
EOSINOPHIL # BLD: 0 K/UL (ref 0–0.4)
EOSINOPHIL NFR BLD: 0 % (ref 0–7)
ERYTHROCYTE [DISTWIDTH] IN BLOOD BY AUTOMATED COUNT: 15 % (ref 11.5–14.5)
GAS FLOW.O2 O2 DELIVERY SYS: ABNORMAL L/MIN
GLUCOSE BLD STRIP.AUTO-MCNC: 162 MG/DL (ref 65–117)
GLUCOSE BLD STRIP.AUTO-MCNC: 180 MG/DL (ref 65–117)
GLUCOSE BLD STRIP.AUTO-MCNC: 251 MG/DL (ref 65–117)
GLUCOSE BLD STRIP.AUTO-MCNC: 258 MG/DL (ref 65–117)
GLUCOSE BLD STRIP.AUTO-MCNC: 270 MG/DL (ref 65–117)
GLUCOSE SERPL-MCNC: 151 MG/DL (ref 65–100)
HCO3 BLD-SCNC: 24.9 MMOL/L (ref 22–26)
HCT VFR BLD AUTO: 35.2 % (ref 35–47)
HGB BLD-MCNC: 11 G/DL (ref 11.5–16)
IMM GRANULOCYTES # BLD AUTO: 0 K/UL (ref 0–0.04)
IMM GRANULOCYTES NFR BLD AUTO: 0 % (ref 0–0.5)
LYMPHOCYTES # BLD: 0.6 K/UL (ref 0.8–3.5)
LYMPHOCYTES NFR BLD: 4 % (ref 12–49)
MCH RBC QN AUTO: 26.9 PG (ref 26–34)
MCHC RBC AUTO-ENTMCNC: 31.3 G/DL (ref 30–36.5)
MCV RBC AUTO: 86.1 FL (ref 80–99)
MONOCYTES # BLD: 0.3 K/UL (ref 0–1)
MONOCYTES NFR BLD: 2 % (ref 5–13)
NEUTS SEG # BLD: 13.1 K/UL (ref 1.8–8)
NEUTS SEG NFR BLD: 94 % (ref 32–75)
NRBC # BLD: 0 K/UL (ref 0–0.01)
NRBC BLD-RTO: 0 PER 100 WBC
O2/TOTAL GAS SETTING VFR VENT: 35 %
PCO2 BLD: 36.6 MMHG (ref 35–45)
PEEP RESPIRATORY: 5 CMH2O
PH BLD: 7.44 [PH] (ref 7.35–7.45)
PIP ISTAT,IPIP: 14
PLATELET # BLD AUTO: 226 K/UL (ref 150–400)
PMV BLD AUTO: 10.9 FL (ref 8.9–12.9)
PO2 BLD: 68 MMHG (ref 80–100)
POTASSIUM SERPL-SCNC: 4.2 MMOL/L (ref 3.5–5.1)
RBC # BLD AUTO: 4.09 M/UL (ref 3.8–5.2)
RBC MORPH BLD: ABNORMAL
SAO2 % BLD: 94 % (ref 92–97)
SERVICE CMNT-IMP: ABNORMAL
SODIUM SERPL-SCNC: 138 MMOL/L (ref 136–145)
SPECIMEN TYPE: ABNORMAL
WBC # BLD AUTO: 14 K/UL (ref 3.6–11)

## 2021-05-14 PROCEDURE — 36600 WITHDRAWAL OF ARTERIAL BLOOD: CPT

## 2021-05-14 PROCEDURE — 99024 POSTOP FOLLOW-UP VISIT: CPT | Performed by: SURGERY

## 2021-05-14 PROCEDURE — 94660 CPAP INITIATION&MGMT: CPT

## 2021-05-14 PROCEDURE — 74011250637 HC RX REV CODE- 250/637: Performed by: NURSE PRACTITIONER

## 2021-05-14 PROCEDURE — 80048 BASIC METABOLIC PNL TOTAL CA: CPT

## 2021-05-14 PROCEDURE — 74011250637 HC RX REV CODE- 250/637: Performed by: SURGERY

## 2021-05-14 PROCEDURE — 74011250636 HC RX REV CODE- 250/636: Performed by: INTERNAL MEDICINE

## 2021-05-14 PROCEDURE — 94760 N-INVAS EAR/PLS OXIMETRY 1: CPT

## 2021-05-14 PROCEDURE — 36415 COLL VENOUS BLD VENIPUNCTURE: CPT

## 2021-05-14 PROCEDURE — 82962 GLUCOSE BLOOD TEST: CPT

## 2021-05-14 PROCEDURE — 74011000250 HC RX REV CODE- 250: Performed by: INTERNAL MEDICINE

## 2021-05-14 PROCEDURE — 74011250636 HC RX REV CODE- 250/636: Performed by: SURGERY

## 2021-05-14 PROCEDURE — 77010033711 HC HIGH FLOW OXYGEN

## 2021-05-14 PROCEDURE — 94640 AIRWAY INHALATION TREATMENT: CPT

## 2021-05-14 PROCEDURE — 82803 BLOOD GASES ANY COMBINATION: CPT

## 2021-05-14 PROCEDURE — 74011000250 HC RX REV CODE- 250: Performed by: SURGERY

## 2021-05-14 PROCEDURE — 65270000029 HC RM PRIVATE

## 2021-05-14 PROCEDURE — 85025 COMPLETE CBC W/AUTO DIFF WBC: CPT

## 2021-05-14 RX ORDER — OXYCODONE AND ACETAMINOPHEN 5; 325 MG/1; MG/1
1 TABLET ORAL
Qty: 22 TAB | Refills: 0 | Status: SHIPPED | OUTPATIENT
Start: 2021-05-14 | End: 2021-05-17

## 2021-05-14 RX ORDER — POLYETHYLENE GLYCOL 3350 17 G/17G
17 POWDER, FOR SOLUTION ORAL DAILY
Qty: 14 PACKET | Refills: 1 | Status: SHIPPED | OUTPATIENT
Start: 2021-05-14

## 2021-05-14 RX ORDER — LORAZEPAM 0.5 MG/1
0.5 TABLET ORAL
Status: DISCONTINUED | OUTPATIENT
Start: 2021-05-14 | End: 2021-05-15 | Stop reason: HOSPADM

## 2021-05-14 RX ORDER — ALBUTEROL SULFATE 0.83 MG/ML
2.5 SOLUTION RESPIRATORY (INHALATION)
Status: DISCONTINUED | OUTPATIENT
Start: 2021-05-14 | End: 2021-05-15

## 2021-05-14 RX ORDER — TRAZODONE HYDROCHLORIDE 50 MG/1
50 TABLET ORAL
Status: DISCONTINUED | OUTPATIENT
Start: 2021-05-14 | End: 2021-05-15 | Stop reason: HOSPADM

## 2021-05-14 RX ADMIN — METHYLPREDNISOLONE SODIUM SUCCINATE 60 MG: 40 INJECTION, POWDER, FOR SOLUTION INTRAMUSCULAR; INTRAVENOUS at 23:29

## 2021-05-14 RX ADMIN — ROPINIROLE HYDROCHLORIDE 2 MG: 1 TABLET, FILM COATED ORAL at 22:35

## 2021-05-14 RX ADMIN — ALBUTEROL SULFATE 2.5 MG: 2.5 SOLUTION RESPIRATORY (INHALATION) at 21:00

## 2021-05-14 RX ADMIN — TRAZODONE HYDROCHLORIDE 50 MG: 50 TABLET ORAL at 22:35

## 2021-05-14 RX ADMIN — IPRATROPIUM BROMIDE AND ALBUTEROL SULFATE 3 ML: .5; 3 SOLUTION RESPIRATORY (INHALATION) at 08:03

## 2021-05-14 RX ADMIN — OXYCODONE HYDROCHLORIDE AND ACETAMINOPHEN 1 TABLET: 10; 325 TABLET ORAL at 22:34

## 2021-05-14 RX ADMIN — HYDROMORPHONE HYDROCHLORIDE 1 MG: 1 INJECTION, SOLUTION INTRAMUSCULAR; INTRAVENOUS; SUBCUTANEOUS at 02:00

## 2021-05-14 RX ADMIN — ARFORMOTEROL TARTRATE 15 MCG: 15 SOLUTION RESPIRATORY (INHALATION) at 08:03

## 2021-05-14 RX ADMIN — DILTIAZEM HYDROCHLORIDE 300 MG: 300 CAPSULE, COATED, EXTENDED RELEASE ORAL at 09:40

## 2021-05-14 RX ADMIN — LORAZEPAM 0.5 MG: 0.5 TABLET ORAL at 10:18

## 2021-05-14 RX ADMIN — HYDROMORPHONE HYDROCHLORIDE 1 MG: 1 INJECTION, SOLUTION INTRAMUSCULAR; INTRAVENOUS; SUBCUTANEOUS at 09:52

## 2021-05-14 RX ADMIN — HYDROMORPHONE HYDROCHLORIDE 1 MG: 1 INJECTION, SOLUTION INTRAMUSCULAR; INTRAVENOUS; SUBCUTANEOUS at 16:24

## 2021-05-14 RX ADMIN — IPRATROPIUM BROMIDE AND ALBUTEROL SULFATE 3 ML: .5; 3 SOLUTION RESPIRATORY (INHALATION) at 14:22

## 2021-05-14 RX ADMIN — OXYCODONE HYDROCHLORIDE AND ACETAMINOPHEN 1 TABLET: 10; 325 TABLET ORAL at 18:22

## 2021-05-14 RX ADMIN — METHYLPREDNISOLONE SODIUM SUCCINATE 60 MG: 40 INJECTION, POWDER, FOR SOLUTION INTRAMUSCULAR; INTRAVENOUS at 13:28

## 2021-05-14 RX ADMIN — METHYLPREDNISOLONE SODIUM SUCCINATE 60 MG: 40 INJECTION, POWDER, FOR SOLUTION INTRAMUSCULAR; INTRAVENOUS at 18:22

## 2021-05-14 RX ADMIN — VENLAFAXINE HYDROCHLORIDE 150 MG: 150 CAPSULE, EXTENDED RELEASE ORAL at 09:40

## 2021-05-14 RX ADMIN — ENOXAPARIN SODIUM 40 MG: 40 INJECTION SUBCUTANEOUS at 09:41

## 2021-05-14 RX ADMIN — Medication 10 ML: at 06:44

## 2021-05-14 RX ADMIN — ROPINIROLE HYDROCHLORIDE 2 MG: 1 TABLET, FILM COATED ORAL at 09:40

## 2021-05-14 RX ADMIN — OXYCODONE HYDROCHLORIDE AND ACETAMINOPHEN 1 TABLET: 5; 325 TABLET ORAL at 07:22

## 2021-05-14 RX ADMIN — ROPINIROLE HYDROCHLORIDE 2 MG: 1 TABLET, FILM COATED ORAL at 16:24

## 2021-05-14 RX ADMIN — BENZONATATE 100 MG: 100 CAPSULE ORAL at 23:36

## 2021-05-14 RX ADMIN — IPRATROPIUM BROMIDE AND ALBUTEROL SULFATE 3 ML: .5; 3 SOLUTION RESPIRATORY (INHALATION) at 04:10

## 2021-05-14 RX ADMIN — METHYLPREDNISOLONE SODIUM SUCCINATE 60 MG: 40 INJECTION, POWDER, FOR SOLUTION INTRAMUSCULAR; INTRAVENOUS at 06:43

## 2021-05-14 RX ADMIN — ATORVASTATIN CALCIUM 40 MG: 40 TABLET, FILM COATED ORAL at 22:35

## 2021-05-14 RX ADMIN — FUROSEMIDE 20 MG: 20 TABLET ORAL at 09:40

## 2021-05-14 RX ADMIN — PANTOPRAZOLE SODIUM 40 MG: 40 TABLET, DELAYED RELEASE ORAL at 09:41

## 2021-05-14 RX ADMIN — ASPIRIN 81 MG: 81 TABLET, COATED ORAL at 09:40

## 2021-05-14 RX ADMIN — LORAZEPAM 0.5 MG: 0.5 TABLET ORAL at 23:36

## 2021-05-14 RX ADMIN — ARFORMOTEROL TARTRATE 15 MCG: 15 SOLUTION RESPIRATORY (INHALATION) at 21:00

## 2021-05-14 NOTE — PROGRESS NOTES
Bedside and Verbal shift change report given to April Rn (oncoming nurse) by Anurag Klein (offgoing nurse). Report included the following information SBAR, Kardex, ED Summary, Procedure Summary, Intake/Output, MAR, Recent Results and Cardiac Rhythm NSR.

## 2021-05-14 NOTE — PROGRESS NOTES
Physician Progress Note      Salvador Loaiza  CSN #:                  [id number]  :                       1965  ADMIT DATE:       2021 7:18 AM  DISCH DATE:  RESPONDING  PROVIDER #:        Alyssa Cormier MD          QUERY TEXT:    Dear Attending,    Pt admitted with incisional hernia and is s/p laparoscopic converted to open hernia repair. Pt noted by General Surgery to have respiratory failure after incisional hernia repair. Pt also noted to have acute exacerbation of COPD, HFpEF, YOLANDA and preoperative non-compliance with CPAP. Please document in progress notes and discharge summary the conditions related to the respiratory failure occurring after surgery: The medical record reflects the following:  Risk Factors: COPD, CHF, HTN, obesity, YOLANDA  Clinical Indicators:  - General Surgery PN  = COPD. Hypoxic around 85 pre op and on assisted ventilation post op. - General Surgery PN  = 64 y/o F with hypercapnic / hypoxic resp failure after inc hernia repair that has been rescued with BIPAP overnight  - Pulmonology CN  = Acute post operative hypercapnic resp failure- Suspect AECOPD ( suspect severe COPD given heavy/current smoking hx ) component HFpEF  - ABG results: pH = 7.30, pCO2 = 53.8, pO2 = 69, FIO2 = 100 on  @ 1301, pH = 7.27, pCO2 = 57.7, pO2 = 113, FIO2 = 60 on  @ 1441 & pH = 7.38, pCO2 = 41.2, pO2 = 50, FIO2 = 21 on  @ 2237  - BMI = 36.85  Treatment: BiPAP, supplemental O2, ABG testing, Pulmonology consult, monitoring    Thank-you,  Keyur Keita RN, [place]  Clinical   [phone]  Options provided:  -- Acute hypercapnic and hypoxic respiratory failure following surgery due to conditions/events unrelated to the surgical operation, please specify, Please specify conditions/events.   -- Acute hypercapnic and hypoxic respiratory failure following surgery due to COPD, obesity, HFpEF, YOLANDA and preoperative noncompliance with CPAP, which were unrelated to the surgical operation  -- Other - I will add my own diagnosis  -- Disagree - Not applicable / Not valid  -- Disagree - Clinically unable to determine / Unknown  -- Refer to Clinical Documentation Reviewer    PROVIDER RESPONSE TEXT:    Acute hypercapnic and hypoxic respiratory failure following surgery due to COPD, obesity, HFpEF, YOLANDA and preoperative noncompliance with CPAP, which were unrelated to the surgical operation.     Query created by: Toan Zafar on 5/14/2021 1:20 PM      Electronically signed by:  Isadora Umana MD 5/14/2021 2:47 PM

## 2021-05-14 NOTE — PROGRESS NOTES
Surgery Progress Note    5/14/2021    Admit Date: 5/13/2021    CC: [place] alone last night    POD: 1 robotic to open incisional hernia repair with mesh    Subjective:     Felt like she was not given much attention last night. Wore BIPAP all night. Reports being non compliant with cpap pre op. Abd pain reasonable. No nausea. Anxious to go home. In tears asking for bipap off. Constitutional: No fever or chills  Neurologic: No headache  Eyes: No scleral icterus or irritated eyes  Nose: No nasal pain or drainage  Mouth: No oral lesions or sore throat  Cardiac: No palpations or chest pain  Pulmonary: Wants bipap off  Gastrointestinal: abd pain, No nausea, emesis, diarrhea, or constipation  Genitourinary: No dysuria  Musculoskeletal: No muscle or joint tenderness  Skin: No rashes or lesions  Psychiatric: No anxiety or depressed mood    Objective:     Visit Vitals  /75 (BP 1 Location: Right upper arm, BP Patient Position: At rest)   Pulse 100   Temp 98.5 °F (36.9 °C)   Resp 20   Wt 208 lb (94.3 kg)   SpO2 96%   BMI 36.85 kg/m²       General: No acute distress, conversant  Eyes: PERRLA, no scleral icterus  HENT: Normocephalic without oral lesions  Neck: Trachea midline without LAD  Cardiac: Normal pulse rate and rhythm  Pulmonary: Symmetric chest rise with normal effort  GI: Soft, ATTP, wounds cdi. Skin: Warm without rash  Extremities: No edema or joint stiffness  Psych: Appropriate mood and affect    Labs, vital signs, and I/O reviewed. Assessment:     64 y/o F with hypercapnic / hypoxic resp failure after inc hernia repair that has been rescued with BIPAP overnight    Plan:     PRN pain control  BIPAP removed this Am 2/2 to patients anxiety. Sats observed for 5 minutes and over 95% off oxygen. Completely oriented and last two gases good. Acute on chronic COPD. No prior home oxygen needs. Appreciate help from pulmonology. Steroids and breathing treatments per them.  Home oxygen needs will be determined during day. HDS. Cont home meds and diuresis  Diabetic diet. Cont PPI  No abx. Reactive and baseline leukocytosis  Hgb stable. Lovenox okay  Okay for ambulation  I suspect will be here another night for pulmonary clearance but I am okay with floor later today if pulmonary gives blessing. Home once pulmonary clears.       Shannon Rangel MD  Bariatric and General Surgeon  [facility]

## 2021-05-14 NOTE — PROGRESS NOTES
Problem: Falls - Risk of  Goal: *Absence of Falls  Description: Document Lear Shaker Fall Risk and appropriate interventions in the flowsheet.   Outcome: Progressing Towards Goal  Note: Fall Risk Interventions:            Medication Interventions: Patient to call before getting OOB, Teach patient to arise slowly                   Problem: Breathing Pattern - Ineffective  Goal: *Absence of hypoxia  Outcome: Progressing Towards Goal  Goal: *Use of effective breathing techniques  Outcome: Progressing Towards Goal  Goal: *PALLIATIVE CARE:  Alleviation of Dyspnea  Outcome: Progressing Towards Goal     Problem: Patient Education: Go to Patient Education Activity  Goal: Patient/Family Education  Outcome: Progressing Towards Goal     Problem: Chronic Obstructive Pulmonary Disease (COPD)  Goal: *Oxygen saturation during activity within specified parameters  Outcome: Progressing Towards Goal  Goal: *Able to remain out of bed as prescribed  Outcome: Progressing Towards Goal  Goal: *Absence of hypoxia  Outcome: Progressing Towards Goal  Goal: *Optimize nutritional status  Outcome: Progressing Towards Goal     Problem: Patient Education: Go to Patient Education Activity  Goal: Patient/Family Education  Outcome: Progressing Towards Goal

## 2021-05-14 NOTE — PROGRESS NOTES
Reason for Admission: Robotic to open incisional repair with mesh                     RUR Score:      16%               Plan for utilizing home health:      None needed    PCP: First and Last name:  Deidre Davis MD     Name of Practice:    Are you a current patient: Yes/No: yes   Approximate date of last visit: 4/12/2021 in person   Can you participate in a virtual visit with your PCP: yes                    Current Advanced Directive/Advance Care Plan: Prior        Healthcare Decision Maker:   Click here to complete [address] including selection of the Healthcare Decision Maker Relationship (ie \"Primary\")         Corona Cortes daughter [address]  Patient agreeable to staff to also talk with Mai                     Transition of Care Plan:       Home with surgery follow-up    CM met with patient - confirmed address and phone number on file- lives in two story home with her mother- patient is independent with ADL's and driving- uses [address] for short term meds- patient is currently on RA - patients friend will transport to home. JESSICA Cole     Medicare pt has received, reviewed, and signed 1st IM letter informing them of their right to appeal the discharge. Signed copied has been placed on pt bedside chart. Care Management Interventions  PCP Verified by CM:  Yes  Last Visit to PCP: 04/12/21  MyChart Signup: No  Discharge Durable Medical Equipment: No  Physical Therapy Consult: No  Occupational Therapy Consult: No  Speech Therapy Consult: No  Confirm Follow Up Transport: Friends  Discharge Location  Discharge Placement: Home

## 2021-05-15 VITALS
HEART RATE: 105 BPM | WEIGHT: 209.88 LBS | SYSTOLIC BLOOD PRESSURE: 154 MMHG | TEMPERATURE: 97.9 F | RESPIRATION RATE: 16 BRPM | BODY MASS INDEX: 37.18 KG/M2 | OXYGEN SATURATION: 92 % | DIASTOLIC BLOOD PRESSURE: 82 MMHG

## 2021-05-15 LAB
GLUCOSE BLD STRIP.AUTO-MCNC: 204 MG/DL (ref 65–117)
GLUCOSE BLD STRIP.AUTO-MCNC: 242 MG/DL (ref 65–117)
SERVICE CMNT-IMP: ABNORMAL
SERVICE CMNT-IMP: ABNORMAL

## 2021-05-15 PROCEDURE — 77030027138 HC INCENT SPIROMETER -A

## 2021-05-15 PROCEDURE — 74011000250 HC RX REV CODE- 250: Performed by: SURGERY

## 2021-05-15 PROCEDURE — 74011250636 HC RX REV CODE- 250/636: Performed by: SURGERY

## 2021-05-15 PROCEDURE — 82962 GLUCOSE BLOOD TEST: CPT

## 2021-05-15 PROCEDURE — 99024 POSTOP FOLLOW-UP VISIT: CPT | Performed by: SURGERY

## 2021-05-15 PROCEDURE — 94640 AIRWAY INHALATION TREATMENT: CPT

## 2021-05-15 PROCEDURE — 94760 N-INVAS EAR/PLS OXIMETRY 1: CPT

## 2021-05-15 PROCEDURE — 74011250636 HC RX REV CODE- 250/636: Performed by: INTERNAL MEDICINE

## 2021-05-15 PROCEDURE — 74011250637 HC RX REV CODE- 250/637: Performed by: SURGERY

## 2021-05-15 RX ORDER — ALBUTEROL SULFATE 0.83 MG/ML
2.5 SOLUTION RESPIRATORY (INHALATION)
Status: DISCONTINUED | OUTPATIENT
Start: 2021-05-15 | End: 2021-05-15 | Stop reason: HOSPADM

## 2021-05-15 RX ADMIN — METHYLPREDNISOLONE SODIUM SUCCINATE 60 MG: 40 INJECTION, POWDER, FOR SOLUTION INTRAMUSCULAR; INTRAVENOUS at 06:44

## 2021-05-15 RX ADMIN — ENOXAPARIN SODIUM 40 MG: 40 INJECTION SUBCUTANEOUS at 09:59

## 2021-05-15 RX ADMIN — PANTOPRAZOLE SODIUM 40 MG: 40 TABLET, DELAYED RELEASE ORAL at 09:59

## 2021-05-15 RX ADMIN — FUROSEMIDE 20 MG: 20 TABLET ORAL at 09:59

## 2021-05-15 RX ADMIN — ALBUTEROL SULFATE 2.5 MG: 2.5 SOLUTION RESPIRATORY (INHALATION) at 01:08

## 2021-05-15 RX ADMIN — ARFORMOTEROL TARTRATE 15 MCG: 15 SOLUTION RESPIRATORY (INHALATION) at 07:30

## 2021-05-15 RX ADMIN — METHYLPREDNISOLONE SODIUM SUCCINATE 60 MG: 40 INJECTION, POWDER, FOR SOLUTION INTRAMUSCULAR; INTRAVENOUS at 12:36

## 2021-05-15 RX ADMIN — DILTIAZEM HYDROCHLORIDE 300 MG: 300 CAPSULE, COATED, EXTENDED RELEASE ORAL at 09:59

## 2021-05-15 RX ADMIN — VENLAFAXINE HYDROCHLORIDE 150 MG: 150 CAPSULE, EXTENDED RELEASE ORAL at 09:59

## 2021-05-15 RX ADMIN — ALBUTEROL SULFATE 2.5 MG: 2.5 SOLUTION RESPIRATORY (INHALATION) at 07:30

## 2021-05-15 RX ADMIN — ROPINIROLE HYDROCHLORIDE 2 MG: 1 TABLET, FILM COATED ORAL at 09:59

## 2021-05-15 RX ADMIN — OXYCODONE HYDROCHLORIDE AND ACETAMINOPHEN 1 TABLET: 10; 325 TABLET ORAL at 05:42

## 2021-05-15 RX ADMIN — Medication 10 ML: at 06:45

## 2021-05-15 RX ADMIN — ASPIRIN 81 MG: 81 TABLET, COATED ORAL at 09:59

## 2021-05-15 NOTE — CONSULTS
Pulmonary, Critical Care, and Sleep Medicine    Initial Patient Consult    Name: Lawanda Cardoso MRN: [id number]   : 1965 Hospital: [place] [address]   Date: 5/15/2021        IMPRESSION:   · Acute post operative hypercapnic resp failure- Suspect AECOPD ( suspect severe COPD given heavy/current smoking hx ) component HFpEF  · DM2  · S/p incisional hernia repair 21  · HTN  · GERD      RECOMMENDATIONS:   · 90-92% ra while at rest  · Ambulate patient, if sats > 89% can d/c with outpatient followup     Subjective: This patient has been seen and evaluated at the request of Dr. Friddie Lesches for SOB. Patient is a 63 y.o. female heavy smoker with COPD on spiriva no O2 who was admitted for and had done an incisional hernia repair. In PACU with sats 87% on NC  And WOB. Placed on bipap. Presently more comfortable on bipap but still mild tachypnea.  wants to go home.   Called to see to allow patient for discharge      Past Medical History:   Diagnosis Date    Arthritis     Cancer ([address]     IN KIDNEY REMOVED RIGHT KIDNEY     Chronic obstructive pulmonary disease ([address]     BEGINNING STAGES     Diabetes ([address]     Elevated WBC count  to present    GERD (gastroesophageal reflux disease)     Hypertension     Kidney stones     Leg swelling 2020    Doppler negative    Nausea & vomiting     Panic attacks     Sleep apnea     Does not use CPAP- raises the head of bed up    Spondylolisthesis of lumbar region     Stroke [facility])     TIA     Tachycardia       Past Surgical History:   Procedure Laterality Date    COLONOSCOPY N/A 3/1/2019    COLONOSCOPY performed by Connor Rubinstein, MD at [address] HX BREAST BIOPSY Bilateral     benign    HX CHOLECYSTECTOMY  1998    HX HEENT      HX HYSTERECTOMY  2008    HX KNEE ARTHROSCOPY Bilateral 2012    HX KNEE REPLACEMENT Left 2012    HX KNEE REPLACEMENT Right 2016    HX NEPHROSTOMY Right     HX SEPTOPLASTY      HX SVT ABLATION 2013    HX TUBAL LIGATION  1989    HX UROLOGICAL Right 07/2020    nephrectomy    HX WISDOM TEETH EXTRACTION      X2    NEUROLOGICAL PROCEDURE UNLISTED      lumbar nerve block      Prior to Admission medications    Medication Sig Start Date End Date Taking? Authorizing Provider   oxyCODONE-acetaminophen (PERCOCET) 5-325 mg per tablet Take 1 Tab by mouth every four (4) hours as needed for Pain (Can take 2 tablets if needed for breakthrough pain if needed) for up to 3 days. Max Daily Amount: 6 Tabs. 5/14/21 5/17/21 Yes Henny Cerrato MD   polyethylene glycol [facility]) 17 gram packet Take 1 Packet by mouth daily. May take 1-2 packets as needed daily for constipation. 5/14/21  Yes Henny Cerrato MD   benzonatate (TESSALON) 100 mg capsule TAKE 1 CAPSULE THREE TIMES DAILY AS NEEDED  FOR  COUGH 0/32/44  Yes Demi Rayo MD   dilTIAZem ER (CARDIZEM CD) 180 mg capsule TAKE 2 CAPSULES EVERY DAY FOR HIGH BLOOD PRESSURE 0/64/64  Yes Demi Rayo MD   tiotropium [facility]) 18 mcg inhalation capsule Take 1 Cap by inhalation daily. Indications: bronchospasm prevention with COPD 3/94/93  Yes Demi Rayo MD   rOPINIRole (REQUIP) 2 mg tablet TAKE 1 TABLET THREE TIMES DAILY 5/57/32  Yes Demi Rayo MD   atorvastatin (LIPITOR) 40 mg tablet TAKE 1 TABLET EVERY DAY  Patient taking differently: Take 40 mg by mouth nightly. 9/04/35  Yes Demi Rayo MD   levocetirizine (XYZAL) 5 mg tablet TAKE 1 TABLET EVERY DAY 0/99/44  Yes Demi Rayo MD   traZODone (DESYREL) 50 mg tablet TAKE 1 TABLET EVERY NIGHT  FOR  INSOMNIA 0/24/66  Yes Demi Rayo MD   LORazepam (ATIVAN) 0.5 mg tablet Take 1 Tab by mouth two (2) times daily as needed for Anxiety. Max Daily Amount: 1 mg. Indications: anxious 5/1/77  Yes Demi Rayo MD   losartan (COZAAR) 100 mg tablet TAKE 1 TABLET EVERY DAY  Patient taking differently: Take 100 mg by mouth nightly.  5/97/99  Yes Demi Rayo MD   venlafaxine-SR [facility] 150 mg capsule TAKE 1 CAPSULE EVERY DAY 92/13/13  Yes Ilya Yost MD   aspirin delayed-release 81 mg tablet Take 81 mg by mouth daily. Yes Provider, Historical   omeprazole (PRILOSEC) 40 mg capsule Take 1 Cap by mouth Before breakfast and dinner. 5/9/34  Yes Ilya Yost MD   Accu-Chek Joyce Plus test strp strip TEST BLOOD SUGAR EVERY DAY 24/45/39   Ilya Yost MD   furosemide (LASIX) 20 mg tablet TAKE 1 TABLET EVERY DAY AS NEEDED 48/41/16   Ilya Yost MD   metFORMIN (GLUCOPHAGE) 500 mg tablet TAKE TWO TABLETS BY MOUTH TWO TIMES A DAY WITH MEALS  Patient taking differently: Take 500 mg by mouth two (2) times daily (with meals). 77/02/09   Ilya Yost MD   folic acid (FOLVITE) 1 mg tablet Take 1 Tab by mouth daily.  14/2/55   Ilya Yost MD     Allergies   Allergen Reactions    Nsaids (Non-Steroidal Anti-Inflammatory Drug) Other (comments)     Stomach hurts    Celebrex [Celecoxib] Hives    Ditropan [Oxybutynin Chloride] Hives and Itching    Mobic [Meloxicam] Hives    Penicillins Unknown (comments)     DOESN'T REMEMBER  Patient states tolerating PO amoxicillin    Plaquenil [Hydroxychloroquine] Hives      Social History     Tobacco Use    Smoking status: Current Every Day Smoker     Packs/day: 1.00     Years: 40.00     Pack years: 40.00     Types: Cigarettes    Smokeless tobacco: Never Used    Tobacco comment: STATES THINKS ABOUT IT OFTEN, IS THINKING ABOUT IT    Substance Use Topics    Alcohol use: Not Currently      Family History   Problem Relation Age of Onset    Arthritis-osteo Mother     Cancer Mother         BREAST CA     Hypertension Mother     Thyroid Disease Mother     Glaucoma Mother     High Cholesterol Mother     Psychiatric Disorder Father         BIPOLAR    Arthritis-osteo Father     Alcohol abuse Father     Heart Disease Father     Heart Surgery Father     Hypertension Father     Diabetes Brother     Arthritis-osteo Brother     Hypertension Brother     Deep Vein Thrombosis Brother         Unknown cause    Anesth Problems Neg Hx         Current Facility-Administered Medications   Medication Dose Route Frequency    albuterol (PROVENTIL VENTOLIN) nebulizer solution 2.5 mg  2.5 mg Nebulization Q8H RT    traZODone (DESYREL) tablet 50 mg  50 mg Oral QHS    aspirin delayed-release tablet 81 mg  81 mg Oral DAILY    atorvastatin (LIPITOR) tablet 40 mg  40 mg Oral QHS    dilTIAZem ER (CARDIZEM CD) capsule 300 mg  300 mg Oral DAILY    furosemide (LASIX) tablet 20 mg  20 mg Oral DAILY    pantoprazole (PROTONIX) tablet 40 mg  40 mg Oral DAILY    venlafaxine-SR (EFFEXOR-XR) capsule 150 mg  150 mg Oral DAILY WITH BREAKFAST    insulin lispro (HUMALOG) injection   SubCUTAneous AC&HS    sodium chloride (NS) flush 5-40 mL  5-40 mL IntraVENous Q8H    enoxaparin (LOVENOX) injection 40 mg  40 mg SubCUTAneous DAILY    rOPINIRole (REQUIP) tablet 2 mg  2 mg Oral TID    methylPREDNISolone (PF) (SOLU-MEDROL) injection 60 mg  60 mg IntraVENous Q6H    arformoteroL (BROVANA) neb solution 15 mcg  15 mcg Nebulization BID RT         Objective:   Vital Signs:    Visit Vitals  BP (!) 154/82 (BP 1 Location: Right arm, BP Patient Position: Sitting)   Pulse (!) 105   Temp 97.9 °F (36.6 °C)   Resp 16   Wt 95.2 kg (209 lb 14.1 oz)   SpO2 92%   BMI 37.18 kg/m²       O2 Device: None (Room air)   O2 Flow Rate (L/min): 15 l/min   Temp (24hrs), Av.4 °F (36.9 °C), Min:97.9 °F (36.6 °C), Max:98.8 °F (37.1 °C)       Intake/Output:   Last shift:      No intake/output data recorded. Last 3 shifts: No intake/output data recorded. No intake or output data in the 24 hours ending 05/15/21 1314   Physical Exam:   General:  Alert, cooperativ   Head:  Normocephalic, without obvious abnormality, atraumatic. Eyes:  Conjunctivae/corneas clear. Nose: Nares normal. Septum midline. Neck: Supple, symmetrical, trachea midline,       Lungs:   Clear to auscultation bilaterally.        Heart:  Regular rate and rhythm, S1, S2 normal, no murmur, click, rub or gallop. Abdomen:   Soft, non-tender. Bowel sounds normal. No masses,  No organomegaly. Extremities: Extremities normal, atraumatic, no cyanosis or edema. Pulses: 2+ and symmetric all extremities.    Skin: Skin color, texture, turgor normal. No rashes or lesions             Data review:     Recent Results (from the past 24 hour(s))   GLUCOSE, POC    Collection Time: 05/14/21  1:25 PM   Result Value Ref Range    Glucose (POC) 258 (H) 65 - 117 mg/dL    Performed by Holmen April  travel RN    GLUCOSE, POC    Collection Time: 05/14/21  2:40 PM   Result Value Ref Range    Glucose (POC) 180 (H) 65 - 117 mg/dL    Performed by [facility] RN    GLUCOSE, POC    Collection Time: 05/14/21  6:14 PM   Result Value Ref Range    Glucose (POC) 251 (H) 65 - 117 mg/dL    Performed by [facility] RN    GLUCOSE, POC    Collection Time: 05/14/21 10:29 PM   Result Value Ref Range    Glucose (POC) 270 (H) 65 - 117 mg/dL    Performed by Zuhair Anthony (MARIO)    GLUCOSE, POC    Collection Time: 05/15/21  6:44 AM   Result Value Ref Range    Glucose (POC) 204 (H) 65 - 117 mg/dL    Performed by Sarah Connelly, POC    Collection Time: 05/15/21 11:29 AM   Result Value Ref Range    Glucose (POC) 242 (H) 65 - 117 mg/dL    Performed by Lynda Rhoaeds        Imaging:  I have personally reviewed the patients radiographs and have reviewed the reports:  PCXR with PVC and no ASD        Linda Nice MD

## 2021-05-15 NOTE — PROGRESS NOTES
Patient refusing to go to another room. Patient stating that she is suppose to be discharge. This RN read notes from yesterday for Dr. Mesfin Mcclure which says, \" home once pulmonology clears patient. \"   This RN paged Pulmonology to clear patient if possible. Waiting on return call from Pulmonology. Made Charge RN aware of this situation    [phone] with Dr. Ana Cristina Silva on call for pulmonology and made him aware of situation. 1133 in with patient. Instructions given by Dr. Ana Cristina Silva to ambulate patient. If patient is above 89% oxygen saturation she is ok to go home. If patient is 89% oxygen saturation then she has to remain and we will consult case management concerning portable oxygen upon discharge.

## 2021-05-15 NOTE — PROGRESS NOTES
05/14/21 2101   CPAP/BIPAP   CPAP/BIPAP Start/Stop Off  (VS stable, NARD noted, not indicated at this time)

## 2021-05-15 NOTE — DISCHARGE INSTRUCTIONS
Patient Education        Abdominal Hernia Repair: What to Expect at Home  Your Recovery  After surgery to repair your hernia, you are likely to have pain for a few days. You may also feel tired and have less energy than normal. This is common. You should feel better after a few days and will probably feel much better in 7 days. For several weeks you may feel discomfort or pulling in the hernia repair when you move. You may have some bruising around the area of the repair. This is normal.  This care sheet gives you a general idea about how long it will take for you to recover. But each person recovers at a different pace. Follow the steps below to get better as quickly as possible. How can you care for yourself at home? Activity    · Rest when you feel tired. Getting enough sleep will help you recover.     · Try to walk each day. Start by walking a little more than you did the day before. Bit by bit, increase the amount you walk. Walking boosts blood flow and helps prevent pneumonia and constipation.     · If your doctor gives you an abdominal binder to wear, use it as directed. This is an elastic bandage that wraps around your belly and upper hips. It helps support your belly muscles after surgery.     · Avoid strenuous activities, such as biking, jogging, weight lifting, or aerobic exercise, until your doctor says it is okay.     · Avoid lifting anything that would make you strain. This may include heavy grocery bags and milk containers, a heavy briefcase or backpack, cat litter or dog food bags, a vacuum , or a child.     · Ask your doctor when you can drive again.     · Most people are able to return to work within 1 to 2 weeks after surgery. But if your job requires that you do heavy lifting or strenuous activity, you may need to take 4 to 6 weeks off from work.     · You may shower 24 to 48 hours after surgery, if your doctor okays it. Pat the cut (incision) dry.  Do not take a bath for the first 2 weeks, or until your doctor tells you it is okay.     · Ask your doctor when it is okay for you to have sex. Diet    · You can eat your normal diet. If your stomach is upset, try bland, low-fat foods like plain rice, broiled chicken, toast, and yogurt.     · Drink plenty of fluids (unless your doctor tells you not to).     · You may notice that your bowel movements are not regular right after your surgery. This is common. Avoid constipation and straining with bowel movements. You may want to take a fiber supplement every day. If you have not had a bowel movement after a couple of days, ask your doctor about taking a mild laxative. Medicines    · Your doctor will tell you if and when you can restart your medicines. You will also be given instructions about taking any new medicines.     · If you take aspirin or some other blood thinner, ask your doctor if and when to start taking it again. Make sure that you understand exactly what your doctor wants you to do.     · Be safe with medicines. Take pain medicines exactly as directed. ? If the doctor gave you a prescription medicine for pain, take it as prescribed. ? If you are not taking a prescription pain medicine, ask your doctor if you can take an over-the-counter medicine.     · If your doctor prescribed antibiotics, take them as directed. Do not stop taking them just because you feel better. You need to take the full course of antibiotics.     · If you think your pain medicine is making you sick to your stomach:  ? Take your medicine after meals (unless your doctor has told you not to). ? Ask your doctor for a different pain medicine. Incision care    · If you have strips of tape on the cut (incision) the doctor made, leave the tape on for a week or until it falls off.  Or follow your doctor's instructions for removing the tape.     · If you have staples closing the cut, you will need to visit your doctor in 1 to 2 weeks to have them removed.     · Wash the area daily with warm, soapy water, and pat it dry. Don't use hydrogen peroxide or alcohol, which can slow healing. You may cover the area with a gauze bandage if it weeps or rubs against clothing. Change the bandage every day. Other instructions    · Hold a pillow over your incision when you cough or take deep breaths. This will support your belly and decrease your pain.     · Do breathing exercises at home as instructed by your doctor. This will help prevent pneumonia.     · If you had laparoscopic surgery, you may also have pain in your shoulder. The pain usually lasts about a day or two. Follow-up care is a key part of your treatment and safety. Be sure to make and go to all appointments, and call your doctor if you are having problems. It's also a good idea to know your test results and keep a list of the medicines you take. When should you call for help? Call 911 anytime you think you may need emergency care. For example, call if:    · You passed out (lost consciousness).     · You are short of breath. Call your doctor now or seek immediate medical care if:    · You are sick to your stomach and cannot drink fluids.     · You have signs of a blood clot in your leg (called a deep vein thrombosis), such as:  ? Pain in your calf, back of the knee, thigh, or groin. ? Redness and swelling in your leg or groin.     · You have signs of infection, such as:  ? Increased pain, swelling, warmth, or redness. ? Red streaks leading from the incision. ? Pus draining from the incision. ? A fever.     · You cannot pass stools or gas.     · You have pain that does not get better after you take pain medicine.     · You have loose stitches, or your incision comes open.     · Bright red blood has soaked through the bandage over your incision. Watch closely for changes in your health, and be sure to contact your doctor if you have any problems. Where can you learn more?   Go to http://www.gray.com/  Enter B577 in the search box to learn more about \"Abdominal Hernia Repair: What to Expect at Home. \"  Current as of: April 15, 2020               Content Version: 12.8  © 2006-2021 [employer]. Care instructions adapted under license by [employer] (which disclaims liability or warranty for this information). If you have questions about a medical condition or this instruction, always ask your healthcare professional. [address] any warranty or liability for your use of this information. Patient Education        Breathing Techniques for COPD: Care Instructions  Your Care Instructions     Breathing is hard when you have chronic obstructive pulmonary disease (COPD). You may take quick, short breaths. Breathing this way makes it harder to get air into your lungs. Learning new ways to control your breathing may help. You may feel better and be able to do more. You can try three basic ways to control your breathing. They are pursed-lip breathing, diaphragmatic breathing, and breathing while bending. Use these methods when you are more short of breath than normal. Practice them often so you can do them well. Follow-up care is a key part of your treatment and safety. Be sure to make and go to all appointments, and call your doctor if you are having problems. It's also a good idea to know your test results and keep a list of the medicines you take. How can you care for yourself at home? · Pursed-lip breathing helps you breathe more air out so that your next breath can be deeper. For this type of breathing, you breathe in through your nose and out through your mouth while almost closing your lips. Breathe in for about 2 seconds, and breathe out for 4 to 6 seconds. Pursed-lip breathing decreases shortness of breath and improves your ability to exercise.   · Diaphragmatic breathing helps your lungs expand so that they take in more air. ? Lie on your back, or prop yourself up on several pillows. ? Put one hand on your belly and the other on your chest. When you breathe in, push your belly out as far as possible. You should feel the hand on your belly move out, while the hand on your chest does not move. ? When you breathe out, you should feel the hand on your belly move in. When you can do this type of breathing well while lying down, learn to do it while sitting or standing. Many people with COPD find this breathing method helpful. ? Practice diaphragmatic breathing for 20 minutes, 2 or 3 times a day. · Breathing while bending forward at the waist may make breathing easier. It can reduce shortness of breath while you exercise or rest. It helps the diaphragm move more easily. The diaphragm is a large muscle that separates your lungs from your belly. It helps draw air into your lungs as you breathe. When should you call for help? Call your doctor now or seek immediate medical care if:    · Your breathing methods do not help.     · Your shortness of breath gets worse.     · You cough up blood.     · You have swelling in your belly and legs.     · You have severe chest pain. Watch closely for changes in your health, and be sure to contact your doctor if you have any problems. Where can you learn more? Go to http://www.gray.com/  Enter [id number] in the search box to learn more about \"Breathing Techniques for COPD: Care Instructions. \"  Current as of: October 26, 2020               Content Version: 12.8  © 2006-2021 [employer]. Care instructions adapted under license by [employer] (which disclaims liability or warranty for this information). If you have questions about a medical condition or this instruction, always ask your healthcare professional. [place] [address] any warranty or liability for your use of this information.          Patient Education Chronic Obstructive Pulmonary Disease (COPD) Flare-Ups: Care Instructions  Your Care Instructions     Chronic obstructive pulmonary disease (COPD) is a lung disease that makes it hard to breathe. It is caused by damage to the lungs over many years, usually from smoking. COPD is often a mix of two diseases:  · Chronic bronchitis: The airways that carry air to the lungs (bronchial tubes) get inflamed and make a lot of mucus. This can narrow or block the airways. · Emphysema: In a healthy person, the tiny air sacs in the lungs are like balloons. As you breathe in and out, they get bigger and smaller to move air through your lungs. But with emphysema, these air sacs are damaged and lose their stretch. Less air gets in and out of the lungs. Many people with COPD have attacks called flare-ups or exacerbations. This is when your usual symptoms quickly get worse and stay worse. The doctor has checked you carefully. But problems can develop later. If you notice any problems or new symptoms, get medical treatment right away. Follow-up care is a key part of your treatment and safety. Be sure to make and go to all appointments, and call your doctor if you are having problems. It's also a good idea to know your test results and keep a list of the medicines you take. How can you care for yourself at home? · Be safe with medicines. Take your medicines exactly as prescribed. Call your doctor if you think you are having a problem with your medicine. You may be taking medicines such as:  ? Bronchodilators. These help open your airways and make breathing easier. ? Corticosteroids. These reduce airway inflammation. They may be given as pills, in a vein, or in an inhaled form. You may go home with pills in addition to an inhaler that you already use. · A spacer may help you get more inhaled medicine to your lungs. Ask your doctor or pharmacist if a spacer is right for you. If it is, ask how to use it properly.   · If your doctor prescribed antibiotics, take them as directed. Do not stop taking them just because you feel better. You need to take the full course of antibiotics. · If your doctor prescribed oxygen, use the flow rate your doctor has recommended. Do not change it without talking to your doctor first.  · Do not smoke. Smoking makes COPD worse. If you need help quitting, talk to your doctor about stop-smoking programs and medicines. These can increase your chances of quitting for good. When should you call for help? Call 911 anytime you think you may need emergency care. For example, call if:    · You have severe trouble breathing. Call your doctor now or seek immediate medical care if:    · You have new or worse trouble breathing.     · Your coughing or wheezing gets worse.     · You cough up dark brown or bloody mucus (sputum).     · You have a new or higher fever. Watch closely for changes in your health, and be sure to contact your doctor if:    · You notice more mucus or a change in the color of your mucus.     · You need to use your antibiotic or steroid pills.     · You do not get better as expected. Where can you learn more? Go to http://www.gray.com/  Enter D989 in the search box to learn more about \"Chronic Obstructive Pulmonary Disease (COPD) Flare-Ups: Care Instructions. \"  Current as of: October 26, 2020               Content Version: 12.8  © 2006-2021 [employer]. Care instructions adapted under license by [employer] (which disclaims liability or warranty for this information). If you have questions about a medical condition or this instruction, always ask your healthcare professional. [place] [address] any warranty or liability for your use of this information. Discharge Instructions for General Surgery Patients       1. Do not lift any objects weighing more than 15 pounds for 2 weeks.     2. Do not do any housework including vacuuming, scrubbing or yardwork for 4 weeks. 3. Do not drive or operate machinery while taking sedating or narcotic medications. 4. Post operative pain is expected. Try to wean off narcotics as soon as able and take tylenol or NSAIDS. Do not take tylenol with Norco or Percocet as this may harm your liver. No NSAIDS if you are bariatric surgery patient. 5. You may walk as desired and go up and down stairs as needed. Walking is encouraged. 6. You may shower the day after surgery. Do not take tub baths, swim or use hot tubs for 2 weeks. Pat dry wounds after with a towel. 7. Leave glue on wounds. It will fall off with time. Do not scrub around incisions. If redness develops around the glue okay to peel off in hot shower. 8. Regular diet. Take Miralax once or twice a day as needed for constipation. 9. Follow up with provider as scheduled. 10. If you experience fever (greater than 101.5), chills, vomiting or redness or drainage at surgical site, please contact your surgeons office. If you have further questions or concerns, please call your surgeons office at [phone].

## 2021-05-15 NOTE — PROGRESS NOTES
ADULT PROTOCOL: JET AEROSOL ASSESSMENT    Patient  Asha Sprague     63 y.o.   female     5/15/2021  1:09 AM    Breath Sounds Pre Procedure: Right Breath Sounds: Diminished                               Left Breath Sounds: Diminished    Breath Sounds Post Procedure:                                        Breathing pattern: Pre procedure Breathing Pattern: Regular          Post procedure Breathing Pattern: Regular    Heart Rate: Pre procedure Pulse: 103           Post procedure Pulse: 107    Resp Rate: Pre procedure Respirations: 16           Post procedure Respirations: 16       Cough: Pre procedure Cough: Strong, Dry, Non-productive               Post procedure      Oxygen: O2 Device: None (Room air)       Changed: NO    SpO2: Pre procedure SpO2: 94 %   without oxygen              Post procedure SpO2: 92 %  without oxygen    Nebulizer Therapy: Current medications Aerosolized Medications: Albuterol      Changed: YES    Changed Albuterol to Q8H      Problem List:   Patient Active Problem List   Diagnosis Code    Primary osteoarthritis of right knee M17.11    Essential hypertension with goal blood pressure less than 140/90 I10    Chronic obstructive pulmonary disease (HCC) J44.9    Polyneuropathy associated with underlying disease ([facility]) G63    RLS (restless legs syndrome) G25.81    Arthritis M19.90    Cigarette nicotine dependence without complication Z50.593    Dysthymia F34.1    YOLANDA (obstructive sleep apnea) G47.33    Status post bilateral knee replacements Z96.653    Type 2 diabetes mellitus with diabetic neuropathy ([facility]) E11.40    Iliotibial band tendinitis of right side M76.31    Abnormal mammogram of left breast R92.8    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 M47.12, M47.22    Spondylosis of lumbar spine M47.816    Colon adenoma D12.6    Renal mass, right N28.89    Renal cell carcinoma of right kidney (HCC) C64.1    H/O right nephrectomy Z90.5    Vitamin D deficiency E55.9    Incisional hernia, without obstruction or gangrene K43.2    Hypoxia R09.02    Incisional hernia K43.2       Respiratory Therapist: Gokul Casillas RT

## 2021-05-15 NOTE — PROGRESS NOTES
1930: Bedside shift change report given to Ashutosh Cox (oncoming nurse) by April (offgoing nurse). Report included the following information SBAR, Kardex, Intake/Output, MAR, Recent Results, and Cardiac Rhythm NSR . Problem: Falls - Risk of  Goal: *Absence of Falls  Description: Document Caridad Newman Fall Risk and appropriate interventions in the flowsheet. Outcome: Progressing Towards Goal  Note: Fall Risk Interventions:            Medication Interventions: Teach patient to arise slowly                   Problem: Breathing Pattern - Ineffective  Goal: *Absence of hypoxia  Outcome: Progressing Towards Goal  Note: On RA with O2 sats in the 90s.

## 2021-05-15 NOTE — PROGRESS NOTES
Progress Note    Patient: Lawanda Cardoso MRN: [id number]  SSN: [id number]    YOB: 1965  Age: 63 y.o. Sex: female      Admit Date: 2021    2 Days Post-Op    Procedure:  Procedure(s):  XI ROBOTIC REPAIR OF INCISIONAL HERNIA WITH MESH, ROBOTIC REPAIR OF ADHESIONS, CONVERT TO OPEN INCISIONAL HERNIA REPAIR WITH MESH    Subjective:     No acute surgical issues. Pt is doing well and anxious to go home. Tolerating diet without nausea or vomiting. Pain is well controlled. No fever or chills. She is saturating well on room air with slight dip in oxygenation when ambulating but reported this was due to pain with ambulation.   She is able to perform 1250 ml on incentive spirometry    Objective:     Visit Vitals  BP (!) 154/82 (BP 1 Location: Right arm, BP Patient Position: Sitting)   Pulse (!) 105   Temp 97.9 °F (36.6 °C)   Resp 16   Wt 209 lb 14.1 oz (95.2 kg)   SpO2 92%   BMI 37.18 kg/m²       Temp (24hrs), Av.4 °F (36.9 °C), Min:97.9 °F (36.6 °C), Max:98.8 °F (37.1 °C)        Physical Exam:    Gen:  NAD  Pulm:  Unlabored  Abd:  S/mildly distended/appropriate TTP  Wound:  C/D/I    Recent Results (from the past 24 hour(s))   GLUCOSE, POC    Collection Time: 21  1:25 PM   Result Value Ref Range    Glucose (POC) 258 (H) 65 - 117 mg/dL    Performed by Cuate cuba RN    GLUCOSE, POC    Collection Time: 21  2:40 PM   Result Value Ref Range    Glucose (POC) 180 (H) 65 - 117 mg/dL    Performed by Cuate cuba RN    GLUCOSE, POC    Collection Time: 21  6:14 PM   Result Value Ref Range    Glucose (POC) 251 (H) 65 - 117 mg/dL    Performed by Cuate cuba RN    GLUCOSE, POC    Collection Time: 21 10:29 PM   Result Value Ref Range    Glucose (POC) 270 (H) 65 - 117 mg/dL    Performed by Lisa Brody (MARIO)    GLUCOSE, POC    Collection Time: 05/15/21  6:44 AM   Result Value Ref Range    Glucose (POC) 204 (H) 65 - 117 mg/dL    Performed by Imagene Scheuermann GLUCOSE, POC    Collection Time: 05/15/21 11:29 AM   Result Value Ref Range    Glucose (POC) 242 (H) 65 - 117 mg/dL    Performed by Pk Beckman          Assessment:     Hospital Problems  Date Reviewed: 5/13/2021          Codes Class Noted POA    Hypoxia ICD-10-CM: R09.02  ICD-9-CM: 799.02  5/13/2021 Yes        Incisional hernia ICD-10-CM: K43.2  ICD-9-CM: 553.21  5/13/2021 Yes        * (Principal) Incisional hernia, without obstruction or gangrene ICD-10-CM: K43.2  ICD-9-CM: 553.21  4/21/2021 Yes        YOLANDA (obstructive sleep apnea) ICD-10-CM: G47.33  ICD-9-CM: 327.23  12/30/2016 Yes        [facility] ICD-10-CM: J44.9  ICD-9-CM: 980  9/7/2016 Yes              Plan/Recommendations/Medical Decision Making:     - Diet as tolerated  - Pain control  - Hypoxemia:  Much improved and with dyspnea on room air  - Plan DC home today with abdominal binder and incentive spirometry

## 2021-05-15 NOTE — PROGRESS NOTES
Patient ambulating with saturation from 90%-95% on room air. Patient educated on incentive spirometer.

## 2021-05-17 ENCOUNTER — TELEPHONE (OUTPATIENT)
Dept: FAMILY MEDICINE CLINIC | Age: 56
End: 2021-05-17

## 2021-05-17 ENCOUNTER — PATIENT OUTREACH (OUTPATIENT)
Dept: CASE MANAGEMENT | Age: 56
End: 2021-05-17

## 2021-05-17 NOTE — PROGRESS NOTES
Care Transitions Initial Call    Call within 2 business days of discharge: Yes     Patient: Jackie Harada Patient : 1965 MRN: [id number]    Last Discharge [facility] [facility]       Complaint Diagnosis Description Type Department Provider    21  Incisional hernia, without obstruction or gangrene Admission (Discharged) Holly Lantigua MD          Was this an external facility discharge? No     Challenges to be reviewed by the provider   Additional needs identified to be addressed with provider:  Yes  Elective Robotic turned open inc hernia repair w/ mesh - with post op acute hypercapnic Respiratory Failure requiring Bi-PAP and O2. Pulmonary consulted- felt to be multi-factorial:  COPD-current smoker, HTN and body habitus. YOLANDA- has not used CPAP nor seen provider in years- CTN secured follow up appointments with Primary and Sleep providers at [facility]-  and . HTN-  s/p SVT ablation- needs to establish with new cardiology provider for follow up. Secured appointment with [facility]- she would like to stay in [employer] provider [facility] and in [facility] providers. Fatty Liver- has seen Hepatology- patient did not attend scheduled follow up with their office including fibro scan. PCP will determine plan for follow up. Chronic leukocytosis- Hem-Oncology OV on -  smoking- monitor CBC.    - CT of Chest- notes 6 mm lung nodule- monitor. Method of communication with provider : chart routing    Discussed COVID-19 related testing which was available at this time. Test results were negative. Patient informed of results, if available? Pre-op     Advance Care Planning:   Does patient have an Advance Directive: On file- reviewed. Inpatient Readmission Risk score: Unplanned Readmit Risk Score: 16    Was this a readmission?  no     Patients top risk factors for readmission: lack of knowledge about disease, level of motivation, medical condition-COPD-current everyday smoker, HTN and YOLANDA, DM2. Fatty Liver disease and utilization of services   Interventions to address risk factors: Education of patient/family/caregiver/guardian to support self-management-HTN, COPD and YOLANDA, DM2, Assessment and support for treatment adherence and medication management-daily monitoring of BP and HR, BG values and monitor O2 sats and communication with PCP about hospital stay and clarify need for cardiology follow up    Care Transition Nurse (CTN) contacted the patient  by telephone to perform post hospital discharge assessment. Verified name and  with patient as identifiers. Provided introduction to self, and explanation of the CTN role. CTN reviewed discharge instructions, medical action plan and red flags with patient who verbalized understanding. Were discharge instructions available to patient? yes. Reviewed appropriate site of care based on symptoms and resources available to patient including: PCP, Specialist and [address] [place] [address] Road. Blank Single Select Template: Patient given an opportunity to ask questions and does not have any further questions or concerns at this time. The patient agrees to contact the PCP office for questions related to their healthcare. Medication reconciliation was performed with patient. Advised obtaining a 90-day supply of all daily and as-needed medications. Referral to Pharm D needed: no     Home Health/Outpatient orders at discharge: none    Durable Medical Equipment ordered at discharge: None  IS- prior to d/c reaching level 1250. Covid Risk Education    Educated patient about risk for severe COVID-19 due to risk factors according to CDC guidelines. CTN reviewed discharge instructions, medical action plan and red flag symptoms patient who verbalized understanding. Discussed COVID vaccination status yes. Education provided on COVID-19 vaccination as appropriate.  Discussed exposure protocols and quarantine with CDC Guidelines. The patient was given an opportunity to verbalize any questions and concerns and agrees to contact CTN or health care provider for questions related to their healthcare. Was patient discharged with a pulse oximeter? No- has and uses at home. Discussed and confirmed pulse oximeter discharge instructions and when to notify provider or seek emergency care. Discussed follow-up appointments. If no appointment was previously scheduled, appointment scheduling offered: Yes- CTN reached out to PCP to get follow up appointment-MARCEL in place. Is follow up appointment scheduled within 7 days of discharge? Yes  [facility] follow up appointment(s):   Future Appointments   Date Time Provider Samantha James   5/28/2021  9:45 AM Ghazal Jones MD [facility] BS AMB   4/5/2022  9:30 AM Kristen Post  [facility] BS AMB     Non-[facility] follow up appointment(s):   Pulmonary- does not currently use CPAP- has not seen provider in many years- she agrees to CTN to reach PAR to establish care. Plan for follow up in the next 7-10 days based on severity of symptoms and risk factors. Plan for next call:   · PCP MARCEL appointment- plan for cardiology and pulmonary follow up  · Assess COPD symptoms; BP and HR values and pain management for post surgery and OA. · Follow up with surgeon. CTN provided contact information for future needs. Goals        Chronic Disease     Initiate and reinforce education of the patient/family about management of disease and lifestyle changes. 5/17/21- discussion with Ms. Danielle Rivers about post surgery- acute hypercapnic respiratory failure episode. Explained what pulmonary had said about possible sources:  current smoking-COPD, possible component of HFpEF. She relays many years ago had SVT ablation with Dr. Orion Howe. Has not had much follow up- she is aware he is retired.   Discussion about establishing with new provider- she states she would like to stay in [address] will follow up with PCP- about options for [facility] office location. She prefers close to [facility]. She does not check BP values regularly at home- has BP cuff- CTN asked her to check using good routine:  before taking am cardiology meds- write down values and review with PCP and cardiology when appointment is scheduled. She states she is familiar with HF- her father  with HF. Discussion about low NA eating- relationship between high NA intake and fluid retention. She uses lasix PRN- when she feels SOB- she takes and it relieves symptom. She has chronic LE edema- has had two TKR, and known DDD in hips. Received return message from PCP about plan for abnl LFT's-Fatty Liver follow up- she wanted her to see Dr. Óscar Randle- CTN contacted GI office- first available with her is going to be  at 1:15- [facility] office. Office will mail her paperwork- she has been seen in their office before. CTN updated PCP. [facility]           General     Reduce Risk of Hospitalization      21- spoke with Ms. Marlee Escalante- she has had okay pain control- using ice and has binder. Explained splinting and exhaling with movement. Encouraged her to take schedule doses of acetaminophen and use percocet for sleeping and higher activity like outings to MD office, etc.    She is using IS- can still do 1250- is coughing with use. She had been given albuterol HI- at discharge- explained to use one puff prior to IS- try to do IS  TID- early am cough is moist- dry remainder of day. Monitor. She has pulse ox and sats are 94% at rest.  Does not report SOB-GAMEZ. Has not been out of home-just outside of home. She has CPAP but has not used nor seen provider in many years. Discussion about providers who saw her in hospital- secured primary and sleep appointments with PAR:     Primary pulmonary with Dr. William Alba- he saw her in hospital-  in OV at 11:45 (arrive 11:15).      Sleep- Dr. Verito Duarte- VV on  at 10:30.     She had her first BM this morning- using [employer] OTC. Has received first [address] vaccine- due for second. Asked her to get scheduled for after visit with surgeon.  LLC

## 2021-05-17 NOTE — TELEPHONE ENCOUNTER
Please set up a virtual MARCEL. She had hernia repair and discharged 5/15/21. john SINCLAIR wanted me to address a number of issues.

## 2021-05-18 NOTE — DISCHARGE SUMMARY
Admit date: 5/13/2021   Admitting Provider: Lazaro Malik MD    Discharge date: 5/18/2021  Discharging Provider: Lazaro Malik MD      * Admission Diagnoses: Incisional hernia [K43.2]    * Discharge Diagnoses:    Hospital Problems as of 5/15/2021 Date Reviewed: 5/13/2021          Codes Class Noted - Resolved POA    Hypoxia ICD-10-CM: R09.02  ICD-9-CM: 799.02  5/13/2021 - Present Yes        Incisional hernia ICD-10-CM: K43.2  ICD-9-CM: 553.21  5/13/2021 - Present Yes        * (Principal) Incisional hernia, without obstruction or gangrene ICD-10-CM: K43.2  ICD-9-CM: 553.21  4/21/2021 - Present Yes        YOLANDA (obstructive sleep apnea) ICD-10-CM: G47.33  ICD-9-CM: 327.23  12/30/2016 - Present Yes        Chronic obstructive pulmonary disease ([facility] [address] ICD-10-CM: J44.9  ICD-9-CM: 861  9/7/2016 - Present Yes              * Hospital Course: 58-year-old female presents with an incisional hernia after nephrectomy. We attempted a robotic repair with mesh, however, she was noted to have a Swiss cheese defect and a 4 cm discrete incisional hernia. In light of this, I elected to go open perform a retrorectus repair. This was done with a large piece of mesh. Postoperatively she displayed hypoxia and hypercarbia from her COPD. She was placed on BiPAP and sent to stepdown overnight. She was seen by pulmonary. By postoperative day 1 she was off the BiPAP and her gases have normalized. Her pain was controlled. She was sent to the floor. On postoperative day 2 with the blessing from pulmonary she was sent home in stable condition.     * Procedures:   Procedure(s):  XI ROBOTIC REPAIR OF INCISIONAL HERNIA WITH MESH, ROBOTIC REPAIR OF ADHESIONS, CONVERT TO OPEN INCISIONAL HERNIA REPAIR WITH MESH      Consults: Pulm    * Discharge Condition: good  * Disposition: Home    Discharge Medications:  Discharge Medication List as of 5/15/2021 12:43 PM      START taking these medications    Details   oxyCODONE-acetaminophen (PERCOCET) 5-325 mg per tablet Take 1 Tab by mouth every four (4) hours as needed for Pain (Can take 2 tablets if needed for breakthrough pain if needed) for up to 3 days. Max Daily Amount: 6 Tabs., Normal, Disp-22 Tab, R-0      polyethylene glycol (MIRALAX) 17 gram packet Take 1 Packet by mouth daily. May take 1-2 packets as needed daily for constipation. , Normal, Disp-14 Packet, R-1         CONTINUE these medications which have NOT CHANGED    Details   benzonatate (TESSALON) 100 mg capsule TAKE 1 CAPSULE THREE TIMES DAILY AS NEEDED  FOR  COUGH, Normal, Disp-270 Cap, R-0      dilTIAZem ER (CARDIZEM CD) 180 mg capsule TAKE 2 CAPSULES EVERY DAY FOR HIGH BLOOD PRESSURE, Normal, Disp-180 Cap, R-1      tiotropium (SPIRIVA) 18 mcg inhalation capsule Take 1 Cap by inhalation daily. Indications: bronchospasm prevention with COPD, Normal, Disp-90 Cap, R-3      rOPINIRole (REQUIP) 2 mg tablet TAKE 1 TABLET THREE TIMES DAILY, Normal, Disp-270 Tab, R-1      atorvastatin (LIPITOR) 40 mg tablet TAKE 1 TABLET EVERY DAY, Normal, Disp-90 Tab, R-1      levocetirizine (XYZAL) 5 mg tablet TAKE 1 TABLET EVERY DAY, Normal, Disp-90 Tab, R-3      traZODone (DESYREL) 50 mg tablet TAKE 1 TABLET EVERY NIGHT  FOR  INSOMNIA, Normal, Disp-90 Tab, R-3      LORazepam (ATIVAN) 0.5 mg tablet Take 1 Tab by mouth two (2) times daily as needed for Anxiety. Max Daily Amount: 1 mg. Indications: anxious, Normal, Disp-180 Tab, R-0      losartan (COZAAR) 100 mg tablet TAKE 1 TABLET EVERY DAY, Normal, Disp-90 Tab, R-1      venlafaxine-SR (EFFEXOR-XR) 150 mg capsule TAKE 1 CAPSULE EVERY DAY, Normal, Disp-90 Cap, R-3      aspirin delayed-release 81 mg tablet Take 81 mg by mouth daily. , Historical Med      omeprazole (PRILOSEC) 40 mg capsule Take 1 Cap by mouth Before breakfast and dinner., Normal, Disp-180 Cap,R-3      Accu-Chek Joyce Plus test strp strip TEST BLOOD SUGAR EVERY DAY, Normal, Disp-100 Strip, R-4      furosemide (LASIX) 20 mg tablet TAKE 1 TABLET EVERY DAY AS NEEDED, Normal, Disp-90 Tab, R-1      metFORMIN (GLUCOPHAGE) 500 mg tablet TAKE TWO TABLETS BY MOUTH TWO TIMES A DAY WITH MEALS, Normal, Disp-360 Tab, R-1      folic acid (FOLVITE) 1 mg tablet Take 1 Tab by mouth daily. , Normal, Disp-90 Tab,R-3         STOP taking these medications       elderberry fruit (ELDERBERRY PO) Comments:   Reason for Stopping:         zinc 50 mg tab tablet Comments:   Reason for Stopping:         ascorbic acid (C-500 PO) Comments:   Reason for Stopping:         Biotin 2,500 mcg cap Comments:   Reason for Stopping:         fluticasone propionate (FLONASE) 50 mcg/actuation nasal spray Comments:   Reason for Stopping:               * Follow-up Care/Patient Instructions:   Activity: Activity as tolerated  Diet: Regular Diet  Wound Care: Keep wound clean and dry    Follow-up Information     Follow up With Specialties Details Why Contact Info    Gerardo Taveras MD Family Medicine   [address] [place] [address] [phone]            Signed:  Keith Romero MD  5/18/2021  7:42 AM

## 2021-05-21 ENCOUNTER — VIRTUAL VISIT (OUTPATIENT)
Dept: FAMILY MEDICINE CLINIC | Age: 56
End: 2021-05-21
Payer: MEDICARE

## 2021-05-21 DIAGNOSIS — G63 POLYNEUROPATHY ASSOCIATED WITH UNDERLYING DISEASE (HCC): ICD-10-CM

## 2021-05-21 DIAGNOSIS — G47.33 OSA (OBSTRUCTIVE SLEEP APNEA): ICD-10-CM

## 2021-05-21 DIAGNOSIS — R09.02 HYPOXIA: ICD-10-CM

## 2021-05-21 DIAGNOSIS — I10 ESSENTIAL HYPERTENSION WITH GOAL BLOOD PRESSURE LESS THAN 140/90: ICD-10-CM

## 2021-05-21 DIAGNOSIS — Z09 HOSPITAL DISCHARGE FOLLOW-UP: Primary | ICD-10-CM

## 2021-05-21 DIAGNOSIS — K43.2 INCISIONAL HERNIA, WITHOUT OBSTRUCTION OR GANGRENE: ICD-10-CM

## 2021-05-21 DIAGNOSIS — J44.9 CHRONIC OBSTRUCTIVE PULMONARY DISEASE, UNSPECIFIED COPD TYPE (HCC): ICD-10-CM

## 2021-05-21 PROCEDURE — 1111F DSCHRG MED/CURRENT MED MERGE: CPT | Performed by: FAMILY MEDICINE

## 2021-05-21 PROCEDURE — 99496 TRANSJ CARE MGMT HIGH F2F 7D: CPT | Performed by: FAMILY MEDICINE

## 2021-05-21 PROCEDURE — G8427 DOCREV CUR MEDS BY ELIG CLIN: HCPCS | Performed by: FAMILY MEDICINE

## 2021-05-21 NOTE — PROGRESS NOTES
Transitional Care Management Progress Note    Patient: Sameera Gomez  :   PCP: Tal Bedolla MD    Date of office visit: 2021   Date of admission: 21  Date of discharge: 5/15/21  Hospital: [facility]    Call initiated w/i 2 business dates of discharge: Yes   Date of the most recent call to the patient: 2021  2:25 PM      Assessment/Plan:   Diagnoses and all orders for this visit:    1. Hospital discharge follow-up  -     DC DISCHARGE MEDS RECONCILED W/ CURRENT OUTPATIENT MED LIST    2. Incisional hernia, without obstruction or gangrene    3. Chronic obstructive pulmonary disease, unspecified COPD type ([facility] [address]    4. Essential hypertension with goal blood pressure less than 140/90    5. Polyneuropathy associated with underlying disease ([facility] [address]    6. YOLANDA (obstructive sleep apnea)    7. Hypoxia    she has FU with Dr Cora Bermudez post op next week. FU with Pulmonary Dr Bibi Sagastume for COPD in  and Dr Gopi Ellison for YOLANDA. She requested a Cardiology referral since her MD Dr Hussain Lakhani retired. Liver function tests 21 are back to baseline/ normal.  No rush to FU with GI.  FU with me in 3 months for fasting labs. Subjective:   Sameera Gomez is a 63 y.o. female presenting today for follow-up after hospital discharge. This encounter and supporting documentation was reviewed if available. Medication reconciliation was performed today. The main problem requiring admission was incisional hernia repair. Complications during admission: hypoxia, hypercarbia due to COPD      Interval history/Current status: stable    Admitting symptoms have: improved      Medications marked \"taking\" at this time:  Prior to Admission medications    Medication Sig Start Date End Date Taking? Authorizing Provider   polyethylene glycol (MIRALAX) 17 gram packet Take 1 Packet by mouth daily. May take 1-2 packets as needed daily for constipation.  21   Isabel Douglas MD   benzonatate [facility]) 100 mg capsule TAKE 1 CAPSULE THREE TIMES DAILY AS NEEDED  FOR  COUGH 4/75/96   Lisa Ayala MD   dilTIAZem ER (CARDIZEM CD) 180 mg capsule TAKE 2 CAPSULES EVERY DAY FOR HIGH BLOOD PRESSURE 7/07/00   Lisa Ayala MD   tiotropium [facility]) 18 mcg inhalation capsule Take 1 Cap by inhalation daily. Indications: bronchospasm prevention with COPD 3/15/93   Lisa Ayala MD   rOPINIRole (REQUIP) 2 mg tablet TAKE 1 TABLET THREE TIMES DAILY 2/15/55   Lisa Ayala MD   atorvastatin (LIPITOR) 40 mg tablet TAKE 1 TABLET EVERY DAY  Patient taking differently: Take 40 mg by mouth nightly. 4/80/49   Lisa Ayala MD   levocetirizine (XYZAL) 5 mg tablet TAKE 1 TABLET EVERY DAY 0/23/02   Lisa Ayala MD   traZODone (DESYREL) 50 mg tablet TAKE 1 TABLET EVERY NIGHT  FOR  INSOMNIA 9/02/77   Lisa Ayala MD   LORazepam (ATIVAN) 0.5 mg tablet Take 1 Tab by mouth two (2) times daily as needed for Anxiety. Max Daily Amount: 1 mg. Indications: anxious 2/7/13   Lisa Ayala MD   losartan (COZAAR) 100 mg tablet TAKE 1 TABLET EVERY DAY  Patient taking differently: Take 100 mg by mouth nightly. 2/31/61   Lisa Ayala MD   Accu-Chek Joyce Plus test strp strip TEST BLOOD SUGAR EVERY DAY 40/17/72   Lisa Ayala MD   furosemide (LASIX) 20 mg tablet TAKE 1 TABLET EVERY DAY AS NEEDED 41/50/98   Lisa Ayala MD   metFORMIN (GLUCOPHAGE) 500 mg tablet TAKE TWO TABLETS BY MOUTH TWO TIMES A DAY WITH MEALS  Patient taking differently: Take 500 mg by mouth two (2) times daily (with meals). 74/42/41   Lisa Ayala MD   venlafaxine-SR [facility] 150 mg capsule TAKE 1 CAPSULE EVERY DAY 50/70/02   Lisa Ayala MD   aspirin delayed-release 81 mg tablet Take 81 mg by mouth daily. Provider, Historical   folic acid (FOLVITE) 1 mg tablet Take 1 Tab by mouth daily. 05/0/65   Lisa Ayala MD   omeprazole (PRILOSEC) 40 mg capsule Take 1 Cap by mouth Before breakfast and dinner.  7/4/94   Lisa Ayala MD ROS:  Post op pain       Patient Active Problem List    Diagnosis Date Noted    Hypoxia 05/13/2021    Incisional hernia 05/13/2021    Incisional hernia, without obstruction or gangrene 04/21/2021    Vitamin D deficiency 02/03/2021    H/O right nephrectomy 11/10/2020    Renal cell carcinoma of right kidney ([facility] [address] 08/24/2020    Renal mass, right 07/29/2020    Colon adenoma 03/18/2019    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 12/03/2018    Spondylosis of lumbar spine 12/03/2018    Abnormal mammogram of left breast 09/26/2018    Iliotibial band tendinitis of right side 08/30/2018    Type 2 diabetes mellitus with diabetic neuropathy ([facility] [address] 02/14/2018    Status post bilateral knee replacements 11/02/2017    Cigarette nicotine dependence without complication 15/20/9403    Dysthymia 12/30/2016    YOLANDA (obstructive sleep apnea) 12/30/2016    Arthritis 09/09/2016    Essential hypertension with goal blood pressure less than 140/90 09/07/2016    Chronic obstructive pulmonary disease ([facility] [address] 09/07/2016    Polyneuropathy associated with underlying disease ([facility] [address] 09/07/2016    RLS (restless legs syndrome) 09/07/2016    Primary osteoarthritis of right knee 08/18/2016     Current Outpatient Medications   Medication Sig Dispense Refill    polyethylene glycol (MIRALAX) 17 gram packet Take 1 Packet by mouth daily. May take 1-2 packets as needed daily for constipation. 14 Packet 1    benzonatate (TESSALON) 100 mg capsule TAKE 1 CAPSULE THREE TIMES DAILY AS NEEDED  FOR  COUGH 270 Cap 0    dilTIAZem ER (CARDIZEM CD) 180 mg capsule TAKE 2 CAPSULES EVERY DAY FOR HIGH BLOOD PRESSURE 180 Cap 1    tiotropium (SPIRIVA) 18 mcg inhalation capsule Take 1 Cap by inhalation daily.  Indications: bronchospasm prevention with COPD 90 Cap 3    rOPINIRole (REQUIP) 2 mg tablet TAKE 1 TABLET THREE TIMES DAILY 270 Tab 1    atorvastatin (LIPITOR) 40 mg tablet TAKE 1 TABLET EVERY DAY (Patient taking differently: Take 40 mg by mouth nightly.) 90 Tab 1    levocetirizine (XYZAL) 5 mg tablet TAKE 1 TABLET EVERY DAY 90 Tab 3    traZODone (DESYREL) 50 mg tablet TAKE 1 TABLET EVERY NIGHT  FOR  INSOMNIA 90 Tab 3    LORazepam (ATIVAN) 0.5 mg tablet Take 1 Tab by mouth two (2) times daily as needed for Anxiety. Max Daily Amount: 1 mg. Indications: anxious 180 Tab 0    losartan (COZAAR) 100 mg tablet TAKE 1 TABLET EVERY DAY (Patient taking differently: Take 100 mg by mouth nightly.) 90 Tab 1    Accu-Chek Joyce Plus test strp strip TEST BLOOD SUGAR EVERY  Strip 4    furosemide (LASIX) 20 mg tablet TAKE 1 TABLET EVERY DAY AS NEEDED 90 Tab 1    metFORMIN (GLUCOPHAGE) 500 mg tablet TAKE TWO TABLETS BY MOUTH TWO TIMES A DAY WITH MEALS (Patient taking differently: Take 500 mg by mouth two (2) times daily (with meals). ) 360 Tab 1    venlafaxine-SR (EFFEXOR-XR) 150 mg capsule TAKE 1 CAPSULE EVERY DAY 90 Cap 3    aspirin delayed-release 81 mg tablet Take 81 mg by mouth daily.  folic acid (FOLVITE) 1 mg tablet Take 1 Tab by mouth daily. 90 Tab 3    omeprazole (PRILOSEC) 40 mg capsule Take 1 Cap by mouth Before breakfast and dinner.  180 Cap 3     Allergies   Allergen Reactions    Nsaids (Non-Steroidal Anti-Inflammatory Drug) Other (comments)     Stomach hurts    Celebrex [Celecoxib] Hives    Ditropan [Oxybutynin Chloride] Hives and Itching    Mobic [Meloxicam] Hives    Penicillins Unknown (comments)     DOESN'T REMEMBER  Patient states tolerating PO amoxicillin    Plaquenil [Hydroxychloroquine] Hives     Past Medical History:   Diagnosis Date    Arthritis     Cancer ([facility] [address]     IN KIDNEY REMOVED RIGHT KIDNEY     Chronic obstructive pulmonary disease (HCC)     BEGINNING STAGES     Diabetes ([facility] [address]     Elevated WBC count 2002 to present    GERD (gastroesophageal reflux disease)     Hypertension     Kidney stones 2020    Leg swelling 07/2020    Doppler negative    Nausea & vomiting     Panic attacks  Sleep apnea     Does not use CPAP- raises the head of bed up    Spondylolisthesis of lumbar region     Stroke [facility]) 2020    TIA     Tachycardia      Past Surgical History:   Procedure Laterality Date    COLONOSCOPY N/A 3/1/2019    COLONOSCOPY performed [facility] at [facility]OSCOPY    HX BREAST BIOPSY Bilateral     benign    HX [address]X HEENT      HX HYSTERECTOMY  2008    HX KNEE ARTHROSCOPY Bilateral 2012    HX KNEE REPLACEMENT Left 2012    HX KNEE REPLACEMENT Right 08/18/2016    HX NEPHROSTOMY Right     HX SEPTOPLASTY  1993    HX SVT ABLATION  2013    HX TUBAL LIGATION  1989    HX UROLOGICAL Right 07/2020    nephrectomy    HX WISDOM TEETH EXTRACTION      X2    NEUROLOGICAL PROCEDURE UNLISTED      lumbar nerve block     Family History   Problem Relation Age of Onset    Arthritis-osteo Mother     Cancer Mother         BREAST CA     Hypertension Mother     Thyroid Disease Mother     Glaucoma Mother     High Cholesterol Mother     Psychiatric Disorder Father         BIPOLAR    Arthritis-osteo Father     Alcohol abuse Father     Heart Disease Father     Heart Surgery Father     Hypertension Father     Diabetes Brother     Arthritis-osteo Brother     Hypertension Brother     Deep Vein Thrombosis Brother         Unknown cause    Anesth Problems Neg Hx      Social History     Tobacco Use    Smoking status: Current Every Day Smoker     Packs/day: 1.00     Years: 40.00     Pack years: 40.00     Types: Cigarettes    Smokeless tobacco: Never Used    Tobacco comment: STATES THINKS ABOUT IT OFTEN, IS THINKING ABOUT IT    Substance Use Topics    Alcohol use: Not Currently          Objective: There were no vitals taken for this visit. Physical Examination: General appearance - alert, well appearing, and in no distress and overweight       We discussed the expected course, resolution and complications of the diagnosis(es) in detail.   Medication risks, benefits, costs, interactions, and alternatives were discussed as indicated. I advised her to contact the office if her condition worsens, changes or fails to improve as anticipated. She expressed understanding with the diagnosis(es) and plan.      Daniel Cohen MD

## 2021-05-28 ENCOUNTER — OFFICE VISIT (OUTPATIENT)
Dept: SURGERY | Age: 56
End: 2021-05-28
Payer: MEDICARE

## 2021-05-28 VITALS
OXYGEN SATURATION: 95 % | WEIGHT: 192.6 LBS | BODY MASS INDEX: 34.12 KG/M2 | HEART RATE: 107 BPM | TEMPERATURE: 98.5 F | HEIGHT: 63 IN | SYSTOLIC BLOOD PRESSURE: 116 MMHG | DIASTOLIC BLOOD PRESSURE: 81 MMHG | RESPIRATION RATE: 18 BRPM

## 2021-05-28 DIAGNOSIS — Z09 POSTOPERATIVE EXAMINATION: Primary | ICD-10-CM

## 2021-05-28 PROCEDURE — 99024 POSTOP FOLLOW-UP VISIT: CPT | Performed by: SURGERY

## 2021-05-28 RX ORDER — ASPIRIN 300 MG
SUPPOSITORY, RECTAL RECTAL
COMMUNITY

## 2021-05-28 NOTE — PROGRESS NOTES
1. Have you been to the ER, urgent care clinic since your last visit? Hospitalized since your last visit? no    2. Have you seen or consulted any other health care providers outside of the [address] [place] [address] since your last visit? Include any pap smears or colon screening.  no

## 2021-05-28 NOTE — PROGRESS NOTES
Surgery Progress Note    5/28/2021    CC: Postoperative state    Subjective:     She is 2 weeks out from robotic to open incisional hernia repair with mesh. She is been doing well from the pulmonary point of view since discharge. She is supposed to see pulmonology next week. Pain has been minimal.  Eating some. Constitutional: No fever or chills  Neurologic: No headache  Eyes: No scleral icterus or irritated eyes  Nose: No nasal pain or drainage  Mouth: No oral lesions or sore throat  Cardiac: No palpations or chest pain  Pulmonary: No cough or shortness or breath  Gastrointestinal: Mild abdominal pain, no nausea, emesis, diarrhea, or constipation  Genitourinary: No dysuria  Musculoskeletal: No muscle or joint tenderness  Skin: No rashes or lesions  Psychiatric: No anxiety or depressed mood    Objective:     Visit Vitals  /81   Pulse (!) 107   Temp 98.5 °F (36.9 °C) (Oral)   Resp 18   Ht 5' 3\" (1.6 m)   Wt 192 lb 9.6 oz (87.4 kg)   SpO2 95%   BMI 34.12 kg/m²       General: No acute distress, conversant  Eyes: PERRLA, no scleral icterus  HENT: Normocephalic without oral lesions  Neck: Trachea midline without LAD  Cardiac: Normal pulse rate and rhythm  Pulmonary: Symmetric chest rise with normal effort  GI: Soft, wounds clean dry and intact, healing well  Skin: Warm without rash  Extremities: No edema or joint stiffness  Psych: Appropriate mood and affect    Assessment:     62-year-old female status post incisional hernia repair with mesh    Plan:     Encouraged her to quit smoking. Pulmonary follow-up as planned. Can resume normal activities  No weight lifting for 2 more weeks  Can follow-up as needed    Total time involved with this patient's care was: 25 minutes. This involved reviewing patient record, talking with patient, and charting on patient.     Governor MD Ana  Bariatric and General Surgeon  [facility]

## 2021-06-02 DIAGNOSIS — R60.0 PEDAL EDEMA: ICD-10-CM

## 2021-06-02 RX ORDER — FUROSEMIDE 20 MG/1
TABLET ORAL
Qty: 90 TABLET | Refills: 1 | Status: SHIPPED | OUTPATIENT
Start: 2021-06-02 | End: 2021-10-27

## 2021-06-02 RX ORDER — METFORMIN HYDROCHLORIDE 500 MG/1
TABLET ORAL
Qty: 360 TABLET | Refills: 1 | Status: SHIPPED | OUTPATIENT
Start: 2021-06-02 | End: 2021-10-27

## 2021-06-03 RX ORDER — LOSARTAN POTASSIUM 100 MG/1
100 TABLET ORAL
Qty: 90 TABLET | Refills: 3 | Status: SHIPPED | OUTPATIENT
Start: 2021-06-03 | End: 2022-03-27

## 2021-08-14 DIAGNOSIS — E78.00 HYPERCHOLESTEROLEMIA: ICD-10-CM

## 2021-08-14 DIAGNOSIS — G25.81 RLS (RESTLESS LEGS SYNDROME): ICD-10-CM

## 2021-08-14 RX ORDER — ROPINIROLE 2 MG/1
TABLET, FILM COATED ORAL
Qty: 270 TABLET | Refills: 1 | Status: SHIPPED | OUTPATIENT
Start: 2021-08-14 | End: 2022-01-09

## 2021-08-14 RX ORDER — ATORVASTATIN CALCIUM 40 MG/1
TABLET, FILM COATED ORAL
Qty: 90 TABLET | Refills: 1 | Status: SHIPPED | OUTPATIENT
Start: 2021-08-14 | End: 2022-01-09

## 2021-08-18 DIAGNOSIS — K29.30 CHRONIC SUPERFICIAL GASTRITIS WITHOUT BLEEDING: ICD-10-CM

## 2021-08-18 RX ORDER — OMEPRAZOLE 40 MG/1
CAPSULE, DELAYED RELEASE ORAL
Qty: 180 CAPSULE | Refills: 3 | Status: SHIPPED | OUTPATIENT
Start: 2021-08-18 | End: 2022-06-08

## 2021-09-03 ENCOUNTER — TELEPHONE (OUTPATIENT)
Dept: FAMILY MEDICINE CLINIC | Age: 56
End: 2021-09-03

## 2021-09-03 DIAGNOSIS — J44.9 CHRONIC OBSTRUCTIVE PULMONARY DISEASE, UNSPECIFIED COPD TYPE (HCC): ICD-10-CM

## 2021-09-03 DIAGNOSIS — E78.00 HYPERCHOLESTEROLEMIA: ICD-10-CM

## 2021-09-03 DIAGNOSIS — I10 ESSENTIAL HYPERTENSION WITH GOAL BLOOD PRESSURE LESS THAN 140/90: Primary | ICD-10-CM

## 2021-09-03 DIAGNOSIS — G63 POLYNEUROPATHY ASSOCIATED WITH UNDERLYING DISEASE (HCC): ICD-10-CM

## 2021-09-03 DIAGNOSIS — E55.9 VITAMIN D DEFICIENCY: ICD-10-CM

## 2021-09-03 DIAGNOSIS — E11.40 TYPE 2 DIABETES MELLITUS WITH DIABETIC NEUROPATHY, WITHOUT LONG-TERM CURRENT USE OF INSULIN (HCC): ICD-10-CM

## 2021-09-03 DIAGNOSIS — Z79.899 ENCOUNTER FOR LONG-TERM CURRENT USE OF MEDICATION: ICD-10-CM

## 2021-09-03 DIAGNOSIS — G25.81 RLS (RESTLESS LEGS SYNDROME): ICD-10-CM

## 2021-09-03 NOTE — TELEPHONE ENCOUNTER
----- Message from [facility] sent at 9/3/2021  8:19 AM EDT -----  Regarding: FW: Prescription Question  Contact: [phone]    ----- Message -----  From: Janey Smith  Sent: 9/2/2021   9:07 PM EDT  To: [facility] Pool  Subject: RE: Prescription Question                        If I  remember correctly, I was having a harder time breathing. But I will schedule appointment. Can you put in order so I can get labs done before I make a appointment please? I  can come Tuesday for labs if ok.   Thank you for responding so quickly

## 2021-09-09 ENCOUNTER — LAB ONLY (OUTPATIENT)
Dept: FAMILY MEDICINE CLINIC | Age: 56
End: 2021-09-09

## 2021-09-09 DIAGNOSIS — E78.00 HYPERCHOLESTEROLEMIA: ICD-10-CM

## 2021-09-09 DIAGNOSIS — Z79.899 ENCOUNTER FOR LONG-TERM CURRENT USE OF MEDICATION: ICD-10-CM

## 2021-09-09 DIAGNOSIS — E11.40 TYPE 2 DIABETES MELLITUS WITH DIABETIC NEUROPATHY, WITHOUT LONG-TERM CURRENT USE OF INSULIN (HCC): ICD-10-CM

## 2021-09-09 DIAGNOSIS — E55.9 VITAMIN D DEFICIENCY: ICD-10-CM

## 2021-09-09 DIAGNOSIS — I10 ESSENTIAL HYPERTENSION WITH GOAL BLOOD PRESSURE LESS THAN 140/90: ICD-10-CM

## 2021-09-10 LAB
25(OH)D3 SERPL-MCNC: 35.1 NG/ML (ref 30–100)
ALBUMIN SERPL-MCNC: 3.5 G/DL (ref 3.5–5)
ALBUMIN/GLOB SERPL: 1.2 {RATIO} (ref 1.1–2.2)
ALP SERPL-CCNC: 216 U/L (ref 45–117)
ALT SERPL-CCNC: 97 U/L (ref 12–78)
ANION GAP SERPL CALC-SCNC: 3 MMOL/L (ref 5–15)
AST SERPL-CCNC: 19 U/L (ref 15–37)
BASOPHILS # BLD: 0.2 K/UL (ref 0–0.1)
BASOPHILS NFR BLD: 1 % (ref 0–1)
BILIRUB SERPL-MCNC: 0.3 MG/DL (ref 0.2–1)
BUN SERPL-MCNC: 12 MG/DL (ref 6–20)
BUN/CREAT SERPL: 11 (ref 12–20)
CALCIUM SERPL-MCNC: 8.7 MG/DL (ref 8.5–10.1)
CHLORIDE SERPL-SCNC: 110 MMOL/L (ref 97–108)
CHOLEST SERPL-MCNC: 126 MG/DL
CO2 SERPL-SCNC: 27 MMOL/L (ref 21–32)
CREAT SERPL-MCNC: 1.07 MG/DL (ref 0.55–1.02)
CREAT UR-MCNC: 31.5 MG/DL
DIFFERENTIAL METHOD BLD: ABNORMAL
EOSINOPHIL # BLD: 0.7 K/UL (ref 0–0.4)
EOSINOPHIL NFR BLD: 5 % (ref 0–7)
ERYTHROCYTE [DISTWIDTH] IN BLOOD BY AUTOMATED COUNT: 15.9 % (ref 11.5–14.5)
EST. AVERAGE GLUCOSE BLD GHB EST-MCNC: 140 MG/DL
GLOBULIN SER CALC-MCNC: 3 G/DL (ref 2–4)
GLUCOSE SERPL-MCNC: 109 MG/DL (ref 65–100)
HBA1C MFR BLD: 6.5 % (ref 4–5.6)
HCT VFR BLD AUTO: 38.9 % (ref 35–47)
HDLC SERPL-MCNC: 43 MG/DL
HDLC SERPL: 2.9 {RATIO} (ref 0–5)
HGB BLD-MCNC: 12 G/DL (ref 11.5–16)
IMM GRANULOCYTES # BLD AUTO: 0.1 K/UL (ref 0–0.04)
IMM GRANULOCYTES NFR BLD AUTO: 0 % (ref 0–0.5)
LDLC SERPL CALC-MCNC: 58.4 MG/DL (ref 0–100)
LYMPHOCYTES # BLD: 1.5 K/UL (ref 0.8–3.5)
LYMPHOCYTES NFR BLD: 11 % (ref 12–49)
MCH RBC QN AUTO: 25.9 PG (ref 26–34)
MCHC RBC AUTO-ENTMCNC: 30.8 G/DL (ref 30–36.5)
MCV RBC AUTO: 83.8 FL (ref 80–99)
MICROALBUMIN UR-MCNC: <0.5 MG/DL
MICROALBUMIN/CREAT UR-RTO: NORMAL MG/G (ref 0–30)
MONOCYTES # BLD: 0.6 K/UL (ref 0–1)
MONOCYTES NFR BLD: 5 % (ref 5–13)
NEUTS SEG # BLD: 10.9 K/UL (ref 1.8–8)
NEUTS SEG NFR BLD: 78 % (ref 32–75)
NRBC # BLD: 0 K/UL (ref 0–0.01)
NRBC BLD-RTO: 0 PER 100 WBC
PLATELET # BLD AUTO: 267 K/UL (ref 150–400)
PMV BLD AUTO: 10.8 FL (ref 8.9–12.9)
POTASSIUM SERPL-SCNC: 4.3 MMOL/L (ref 3.5–5.1)
PROT SERPL-MCNC: 6.5 G/DL (ref 6.4–8.2)
RBC # BLD AUTO: 4.64 M/UL (ref 3.8–5.2)
SODIUM SERPL-SCNC: 140 MMOL/L (ref 136–145)
TRIGL SERPL-MCNC: 123 MG/DL (ref ?–150)
TSH SERPL DL<=0.05 MIU/L-ACNC: 1.51 UIU/ML (ref 0.36–3.74)
VLDLC SERPL CALC-MCNC: 24.6 MG/DL
WBC # BLD AUTO: 14 K/UL (ref 3.6–11)

## 2021-09-18 NOTE — PROGRESS NOTES
Good sugar control. Cholesterol numbers fine. Vit D is at goal. Normal thyroid test. White blood count remains elevated.

## 2021-10-25 ENCOUNTER — TELEPHONE (OUTPATIENT)
Dept: FAMILY MEDICINE CLINIC | Age: 56
End: 2021-10-25

## 2021-10-27 ENCOUNTER — VIRTUAL VISIT (OUTPATIENT)
Dept: FAMILY MEDICINE CLINIC | Age: 56
End: 2021-10-27
Payer: MEDICARE

## 2021-10-27 DIAGNOSIS — J40 BRONCHITIS: ICD-10-CM

## 2021-10-27 DIAGNOSIS — R05.9 COUGH: Primary | ICD-10-CM

## 2021-10-27 DIAGNOSIS — N76.0 ACUTE VAGINITIS: ICD-10-CM

## 2021-10-27 DIAGNOSIS — F34.1 DYSTHYMIA: ICD-10-CM

## 2021-10-27 DIAGNOSIS — R60.0 PEDAL EDEMA: ICD-10-CM

## 2021-10-27 PROCEDURE — G8427 DOCREV CUR MEDS BY ELIG CLIN: HCPCS | Performed by: FAMILY MEDICINE

## 2021-10-27 PROCEDURE — 3017F COLORECTAL CA SCREEN DOC REV: CPT | Performed by: FAMILY MEDICINE

## 2021-10-27 PROCEDURE — G9899 SCRN MAM PERF RSLTS DOC: HCPCS | Performed by: FAMILY MEDICINE

## 2021-10-27 PROCEDURE — G8756 NO BP MEASURE DOC: HCPCS | Performed by: FAMILY MEDICINE

## 2021-10-27 PROCEDURE — 99213 OFFICE O/P EST LOW 20 MIN: CPT | Performed by: FAMILY MEDICINE

## 2021-10-27 PROCEDURE — G9717 DOC PT DX DEP/BP F/U NT REQ: HCPCS | Performed by: FAMILY MEDICINE

## 2021-10-27 RX ORDER — FUROSEMIDE 20 MG/1
TABLET ORAL
Qty: 90 TABLET | Refills: 1 | Status: SHIPPED | OUTPATIENT
Start: 2021-10-27 | End: 2022-03-23

## 2021-10-27 RX ORDER — METFORMIN HYDROCHLORIDE 500 MG/1
TABLET ORAL
Qty: 360 TABLET | Refills: 1 | Status: SHIPPED | OUTPATIENT
Start: 2021-10-27 | End: 2022-03-23

## 2021-10-27 RX ORDER — VENLAFAXINE HYDROCHLORIDE 150 MG/1
CAPSULE, EXTENDED RELEASE ORAL
Qty: 90 CAPSULE | Refills: 3 | Status: SHIPPED | OUTPATIENT
Start: 2021-10-27 | End: 2022-08-18

## 2021-10-27 RX ORDER — AZITHROMYCIN 250 MG/1
TABLET, FILM COATED ORAL
Qty: 6 TABLET | Refills: 0 | Status: SHIPPED | OUTPATIENT
Start: 2021-10-27 | End: 2021-11-01

## 2021-10-27 RX ORDER — FLUCONAZOLE 150 MG/1
150 TABLET ORAL DAILY
Qty: 1 TABLET | Refills: 0 | Status: SHIPPED | OUTPATIENT
Start: 2021-10-27 | End: 2021-10-28

## 2021-10-27 RX ORDER — ALBUTEROL SULFATE 90 UG/1
2 AEROSOL, METERED RESPIRATORY (INHALATION)
Qty: 18 G | Refills: 1 | Status: SHIPPED | OUTPATIENT
Start: 2021-10-27 | End: 2022-02-04 | Stop reason: SDUPTHER

## 2021-10-27 NOTE — PROGRESS NOTES
VV-Pt is aware of billable appt depending on insurance plan. Preferred number [phone]     Pt verbally agrees for labs, orders, and others to be mailed to confirmed home address. Identified pt with two pt identifiers(name and ). Reviewed record in preparation for visit and have obtained necessary documentation. All patient medications has been reviewed. Chief Complaint   Patient presents with    Cough     Severe cough for 18 hours, hurts when taking deep breathe or physical activity on left side. Started last week. Health Maintenance Review: Patient reminded of \"due or due soon\" health maintenance. I have asked the patient to contact his/her primary care provider (PCP) for follow-up on his/her health maintenance. Patient-Reported Vitals 10/27/2021   Patient-Reported Temperature 98.0   Patient-Reported Systolic  951   Patient-Reported Diastolic 78          Coordination of Care Questionnaire:   1) Have you been to an emergency room, urgent care, or hospitalized since your last visit?   no       2. Have seen or consulted any other health care provider since your last visit?  NO

## 2021-10-27 NOTE — PROGRESS NOTES
Genevieve Kemp is a 61 y.o. female who was seen by synchronous (real-time) audio-video technology on 10/27/2021 for Cough (Severe cough for 18 hours, hurts when taking deep breathe or physical activity on left side. Started last week.  )        Assessment & Plan:   Diagnoses and all orders for this visit:    1. Cough    2. Bronchitis  -     azithromycin (ZITHROMAX) 250 mg tablet; Take 2 tablets today, then take 1 tablet daily  -     albuterol (PROVENTIL HFA, VENTOLIN HFA, PROAIR HFA) 90 mcg/actuation inhaler; Take 2 Puffs by inhalation every four (4) hours as needed for Wheezing. 3. Acute vaginitis  -     fluconazole (DIFLUCAN) 150 mg tablet; Take 1 Tablet by mouth daily for 1 day. FDA advises cautious prescribing of oral fluconazole in pregnancy. pt declined order for CXR. She will be due to get LD CT lung scan in December for lung nodules. I spent at least 8 minutes on this visit with this established patient. 712  Subjective:       Prior to Admission medications    Medication Sig Start Date End Date Taking? Authorizing Provider   furosemide (LASIX) 20 mg tablet TAKE 1 TABLET EVERY DAY AS NEEDED 17/57/89  Yes Kurt Paget, MD   venlafaxine-SR [facility] 150 mg capsule TAKE 1 CAPSULE EVERY DAY 60/67/39  Yes Kurt Paget, MD   metFORMIN (GLUCOPHAGE) 500 mg tablet TAKE TWO TABLETS BY MOUTH TWO TIMES A DAY WITH MEALS 35/28/02  Yes Kurt Paget, MD   azithromycin [facility]) 250 mg tablet Take 2 tablets today, then take 1 tablet daily 10/27/21 91/4/22 Yes Kurt Paget, MD   fluconazole (DIFLUCAN) 150 mg tablet Take 1 Tablet by mouth daily for 1 day. FDA advises cautious prescribing of oral fluconazole in pregnancy.  10/27/21 74/31/96 Yes Kurt Paget, MD   omeprazole (PRILOSEC) 40 mg capsule TAKE 1 CAPSULE BY MOUTH BEFORE BREAKFAST AND DINNER. 8/83/69  Yes Kurt Paget, MD   rOPINIRole (REQUIP) 2 mg tablet TAKE 1 TABLET THREE TIMES DAILY 4/81/48  Yes Kurt Paget, MD   atorvastatin (LIPITOR) 40 mg tablet TAKE 1 TABLET EVERY DAY 6/62/87  Yes Ada Espinoza MD   losartan (COZAAR) 100 mg tablet Take 1 Tablet by mouth nightly. 4/3/18  Yes Ada Espinoza MD   Aspirin-Caffeine [facility] Arthritis) 1,000-65 mg pwpk Take  by mouth. Yes Provider, Historical   polyethylene glycol (MIRALAX) 17 gram packet Take 1 Packet by mouth daily. May take 1-2 packets as needed daily for constipation. 5/14/21  Yes Bin Goldman MD   benzonatate (TESSALON) 100 mg capsule TAKE 1 CAPSULE THREE TIMES DAILY AS NEEDED  FOR  COUGH 6/88/82  Yes Ada Espinoza MD   dilTIAZem ER (CARDIZEM CD) 180 mg capsule TAKE 2 CAPSULES EVERY DAY FOR HIGH BLOOD PRESSURE 0/39/94  Yes Ada Espinoza MD   tiotropium [facility]) 18 mcg inhalation capsule Take 1 Cap by inhalation daily. Indications: bronchospasm prevention with COPD 2/11/51  Yes Ada Espinoza MD   levocetirizine (XYZAL) 5 mg tablet TAKE 1 TABLET EVERY DAY 5/10/11  Yes Ada Espinoza MD   traZODone (DESYREL) 50 mg tablet TAKE 1 TABLET EVERY NIGHT  FOR  INSOMNIA 8/78/84  Yes Ada Espinoza MD   LORazepam (ATIVAN) 0.5 mg tablet Take 1 Tab by mouth two (2) times daily as needed for Anxiety. Max Daily Amount: 1 mg. Indications: anxious 4/5/11  Yes Ada Espinoza MD   Accu-Chek Joyce Plus test strp strip TEST BLOOD SUGAR EVERY DAY 33/49/41  Yes Ada Espinoza MD   albuterol (PROVENTIL HFA, VENTOLIN HFA, PROAIR HFA) 90 mcg/actuation inhaler Take 2 Puffs by inhalation every four (4) hours as needed for Wheezing.  54/10/67   Ada Espinoza MD     Patient Active Problem List    Diagnosis Date Noted    Hypoxia 05/13/2021    Incisional hernia 05/13/2021    Incisional hernia, without obstruction or gangrene 04/21/2021    Vitamin D deficiency 02/03/2021    H/O right nephrectomy 11/10/2020    Renal cell carcinoma of right kidney ([facility] 08/24/2020    Renal mass, right 07/29/2020    Colon adenoma 03/18/2019    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 12/03/2018    Spondylosis of lumbar spine 12/03/2018    Abnormal mammogram of left breast 09/26/2018    Iliotibial band tendinitis of right side 08/30/2018    Type 2 diabetes mellitus with diabetic neuropathy ([facility] [address] 02/14/2018    Status post bilateral knee replacements 11/02/2017    Cigarette nicotine dependence without complication 37/99/4612    Dysthymia 12/30/2016    YOLANDA (obstructive sleep apnea) 12/30/2016    Arthritis 09/09/2016    Essential hypertension with goal blood pressure less than 140/90 09/07/2016    Chronic obstructive pulmonary disease ([facility] [address] [address] 09/07/2016    Polyneuropathy associated with underlying disease ([facility] 09/07/2016    RLS (restless legs syndrome) 09/07/2016    Primary osteoarthritis of right knee 08/18/2016     Current Outpatient Medications   Medication Sig Dispense Refill    furosemide (LASIX) 20 mg tablet TAKE 1 TABLET EVERY DAY AS NEEDED 90 Tablet 1    venlafaxine-SR (EFFEXOR-XR) 150 mg capsule TAKE 1 CAPSULE EVERY DAY 90 Capsule 3    metFORMIN (GLUCOPHAGE) 500 mg tablet TAKE TWO TABLETS BY MOUTH TWO TIMES A DAY WITH MEALS 360 Tablet 1    azithromycin (ZITHROMAX) 250 mg tablet Take 2 tablets today, then take 1 tablet daily 6 Tablet 0    fluconazole (DIFLUCAN) 150 mg tablet Take 1 Tablet by mouth daily for 1 day. FDA advises cautious prescribing of oral fluconazole in pregnancy. 1 Tablet 0    omeprazole (PRILOSEC) 40 mg capsule TAKE 1 CAPSULE BY MOUTH BEFORE BREAKFAST AND DINNER. 180 Capsule 3    rOPINIRole (REQUIP) 2 mg tablet TAKE 1 TABLET THREE TIMES DAILY 270 Tablet 1    atorvastatin (LIPITOR) 40 mg tablet TAKE 1 TABLET EVERY DAY 90 Tablet 1    losartan (COZAAR) 100 mg tablet Take 1 Tablet by mouth nightly. 90 Tablet 3    Aspirin-Caffeine (BC Arthritis) 1,000-65 mg pwpk Take  by mouth.  polyethylene glycol (MIRALAX) 17 gram packet Take 1 Packet by mouth daily. May take 1-2 packets as needed daily for constipation.  14 Packet 1    benzonatate (TESSALON) 100 mg capsule TAKE 1 CAPSULE THREE TIMES DAILY AS NEEDED  FOR  COUGH 270 Cap 0    dilTIAZem ER (CARDIZEM CD) 180 mg capsule TAKE 2 CAPSULES EVERY DAY FOR HIGH BLOOD PRESSURE 180 Cap 1    tiotropium (SPIRIVA) 18 mcg inhalation capsule Take 1 Cap by inhalation daily. Indications: bronchospasm prevention with COPD 90 Cap 3    levocetirizine (XYZAL) 5 mg tablet TAKE 1 TABLET EVERY DAY 90 Tab 3    traZODone (DESYREL) 50 mg tablet TAKE 1 TABLET EVERY NIGHT  FOR  INSOMNIA 90 Tab 3    LORazepam (ATIVAN) 0.5 mg tablet Take 1 Tab by mouth two (2) times daily as needed for Anxiety. Max Daily Amount: 1 mg. Indications: anxious 180 Tab 0    Accu-Chek Joyce Plus test strp strip TEST BLOOD SUGAR EVERY  Strip 4    albuterol (PROVENTIL HFA, VENTOLIN HFA, PROAIR HFA) 90 mcg/actuation inhaler Take 2 Puffs by inhalation every four (4) hours as needed for Wheezing. 18 g 1     Allergies   Allergen Reactions    Nsaids (Non-Steroidal Anti-Inflammatory Drug) Other (comments)     Stomach hurts    Celebrex [Celecoxib] Hives    Ditropan [Oxybutynin Chloride] Hives and Itching    Mobic [Meloxicam] Hives    Penicillins Unknown (comments)     DOESN'T REMEMBER  Patient states tolerating PO amoxicillin    Plaquenil [Hydroxychloroquine] Hives       ROS   C/O bad cough since last Thursday. Dry, worse at night. Pain under L breast. Denies fever, SOB. Slight HA. No body aches. Has taken 1 Moderna vaccine dose. Does not plan to get second one.     Objective:     Patient-Reported Vitals 10/27/2021   Patient-Reported Temperature 98.0   Patient-Reported Systolic  510   Patient-Reported Diastolic 78      General: alert, cooperative, no distress   Mental  status: normal mood, behavior, speech, dress, motor activity, and thought processes, able to follow commands   HENT: NCAT   Neck: no visualized mass   Resp: no respiratory distress   Neuro: no gross deficits   Skin: no discoloration or lesions of concern on visible areas Psychiatric: normal affect, consistent with stated mood, no evidence of hallucinations     Additional exam findings: We discussed the expected course, resolution and complications of the diagnosis(es) in detail. Medication risks, benefits, costs, interactions, and alternatives were discussed as indicated. I advised her to contact the office if her condition worsens, changes or fails to improve as anticipated. She expressed understanding with the diagnosis(es) and plan. Argenis Villagomez, was evaluated through a synchronous (real-time) audio-video encounter. The patient (or guardian if applicable) is aware that this is a billable service. Verbal consent to proceed has been obtained within the past 12 months. The visit was conducted pursuant to the emergency declaration under the [address] [place] [address], [address] [place] [address] authority and the [employer] General Act. Patient identification was verified, and a caregiver was present when appropriate. The patient was located in a state where the provider was credentialed to provide care.     Daniel Franklin MD

## 2021-11-09 DIAGNOSIS — R05.9 COUGH: ICD-10-CM

## 2021-11-09 RX ORDER — TIZANIDINE 2 MG/1
TABLET ORAL
Qty: 180 TABLET | Refills: 1 | Status: SHIPPED | OUTPATIENT
Start: 2021-11-09 | End: 2022-06-05

## 2021-11-09 RX ORDER — DILTIAZEM HYDROCHLORIDE 180 MG/1
CAPSULE, COATED, EXTENDED RELEASE ORAL
Qty: 180 CAPSULE | Refills: 1 | Status: SHIPPED | OUTPATIENT
Start: 2021-11-09 | End: 2021-11-16 | Stop reason: SDUPTHER

## 2021-11-10 RX ORDER — BENZONATATE 100 MG/1
CAPSULE ORAL
Qty: 60 CAPSULE | Refills: 3 | Status: SHIPPED | OUTPATIENT
Start: 2021-11-10 | End: 2022-10-28 | Stop reason: ALTCHOICE

## 2021-11-16 ENCOUNTER — TELEPHONE (OUTPATIENT)
Dept: FAMILY MEDICINE CLINIC | Age: 56
End: 2021-11-16

## 2021-11-16 RX ORDER — DILTIAZEM HYDROCHLORIDE 180 MG/1
CAPSULE, COATED, EXTENDED RELEASE ORAL
Qty: 10 CAPSULE | Refills: 0 | Status: SHIPPED | OUTPATIENT
Start: 2021-11-16 | End: 2022-05-23

## 2021-11-16 NOTE — TELEPHONE ENCOUNTER
----- Message from [employer] sent at 11/16/2021  7:45 AM EST -----  Regarding: FW: Refill for diltazem     ----- Message -----  From: Hao Best  Sent: 11/16/2021   7:05 AM EST  To: [facility] Nurse Pool  Subject: Refill for diltazem                              Good morning   I have been waiting for a prescription of this diltazem and have been w/o for 2 days. If [employer] hasn't requested it yet, I am. Also can u send a script to [facility] enough for 3 or 4 days?  Please

## 2021-11-16 NOTE — TELEPHONE ENCOUNTER
Sent refill to [facility] 11/9/21. Will send small bridge supply to "[facility] [employer].

## 2021-12-09 ENCOUNTER — APPOINTMENT (OUTPATIENT)
Dept: CT IMAGING | Age: 56
End: 2021-12-09
Attending: EMERGENCY MEDICINE
Payer: MEDICARE

## 2021-12-09 ENCOUNTER — TELEPHONE (OUTPATIENT)
Dept: FAMILY MEDICINE CLINIC | Age: 56
End: 2021-12-09

## 2021-12-09 ENCOUNTER — HOSPITAL ENCOUNTER (EMERGENCY)
Age: 56
Discharge: HOME | End: 2021-12-09
Attending: EMERGENCY MEDICINE
Payer: MEDICARE

## 2021-12-09 VITALS
OXYGEN SATURATION: 95 % | WEIGHT: 200 LBS | HEIGHT: 63 IN | RESPIRATION RATE: 22 BRPM | BODY MASS INDEX: 35.44 KG/M2 | DIASTOLIC BLOOD PRESSURE: 76 MMHG | TEMPERATURE: 97.8 F | SYSTOLIC BLOOD PRESSURE: 137 MMHG | HEART RATE: 97 BPM

## 2021-12-09 DIAGNOSIS — R07.81 PLEURITIC CHEST PAIN: Primary | ICD-10-CM

## 2021-12-09 LAB
ALBUMIN SERPL-MCNC: 4.1 G/DL (ref 3.5–5)
ALBUMIN/GLOB SERPL: 1.1 {RATIO} (ref 1.1–2.2)
ALP SERPL-CCNC: 225 U/L (ref 45–117)
ALT SERPL-CCNC: 45 U/L (ref 12–78)
ANION GAP SERPL CALC-SCNC: 11 MMOL/L (ref 5–15)
AST SERPL-CCNC: 34 U/L (ref 15–37)
BASOPHILS # BLD: 0.3 K/UL (ref 0–0.1)
BASOPHILS NFR BLD: 2 % (ref 0–1)
BILIRUB SERPL-MCNC: 0.3 MG/DL (ref 0.2–1)
BUN SERPL-MCNC: 10 MG/DL (ref 6–20)
BUN/CREAT SERPL: 9 (ref 12–20)
CALCIUM SERPL-MCNC: 9.6 MG/DL (ref 8.5–10.1)
CHLORIDE SERPL-SCNC: 102 MMOL/L (ref 97–108)
CO2 SERPL-SCNC: 28 MMOL/L (ref 21–32)
CREAT SERPL-MCNC: 1.06 MG/DL (ref 0.55–1.02)
DIFFERENTIAL METHOD BLD: ABNORMAL
EOSINOPHIL # BLD: 0.5 K/UL (ref 0–0.4)
EOSINOPHIL NFR BLD: 3 % (ref 0–7)
ERYTHROCYTE [DISTWIDTH] IN BLOOD BY AUTOMATED COUNT: 15.6 % (ref 11.5–14.5)
GLOBULIN SER CALC-MCNC: 3.7 G/DL (ref 2–4)
GLUCOSE SERPL-MCNC: 82 MG/DL (ref 65–100)
HCT VFR BLD AUTO: 42 % (ref 35–47)
HGB BLD-MCNC: 12.8 G/DL (ref 11.5–16)
IMM GRANULOCYTES # BLD AUTO: 0 K/UL (ref 0–0.04)
IMM GRANULOCYTES NFR BLD AUTO: 0 % (ref 0–0.5)
LYMPHOCYTES # BLD: 2.9 K/UL (ref 0.8–3.5)
LYMPHOCYTES NFR BLD: 17 % (ref 12–49)
MCH RBC QN AUTO: 24.4 PG (ref 26–34)
MCHC RBC AUTO-ENTMCNC: 30.5 G/DL (ref 30–36.5)
MCV RBC AUTO: 80 FL (ref 80–99)
MONOCYTES # BLD: 0.7 K/UL (ref 0–1)
MONOCYTES NFR BLD: 4 % (ref 5–13)
NEUTS SEG # BLD: 12.8 K/UL (ref 1.8–8)
NEUTS SEG NFR BLD: 74 % (ref 32–75)
NRBC # BLD: 0 K/UL (ref 0–0.01)
NRBC BLD-RTO: 0 PER 100 WBC
PLATELET # BLD AUTO: 358 K/UL (ref 150–400)
PMV BLD AUTO: 10.3 FL (ref 8.9–12.9)
POTASSIUM SERPL-SCNC: 3.7 MMOL/L (ref 3.5–5.1)
PROT SERPL-MCNC: 7.8 G/DL (ref 6.4–8.2)
RBC # BLD AUTO: 5.25 M/UL (ref 3.8–5.2)
RBC MORPH BLD: ABNORMAL
SODIUM SERPL-SCNC: 141 MMOL/L (ref 136–145)
TROPONIN-HIGH SENSITIVITY: 20 NG/L (ref 0–51)
TROPONIN-HIGH SENSITIVITY: 24 NG/L (ref 0–51)
WBC # BLD AUTO: 17.2 K/UL (ref 3.6–11)

## 2021-12-09 PROCEDURE — 71275 CT ANGIOGRAPHY CHEST: CPT

## 2021-12-09 PROCEDURE — 99284 EMERGENCY DEPT VISIT MOD MDM: CPT

## 2021-12-09 PROCEDURE — 84484 ASSAY OF TROPONIN QUANT: CPT

## 2021-12-09 PROCEDURE — 80053 COMPREHEN METABOLIC PANEL: CPT

## 2021-12-09 PROCEDURE — 85025 COMPLETE CBC W/AUTO DIFF WBC: CPT

## 2021-12-09 PROCEDURE — 93005 ELECTROCARDIOGRAM TRACING: CPT

## 2021-12-09 PROCEDURE — 74011000636 HC RX REV CODE- 636: Performed by: EMERGENCY MEDICINE

## 2021-12-09 PROCEDURE — 74011250636 HC RX REV CODE- 250/636: Performed by: EMERGENCY MEDICINE

## 2021-12-09 RX ORDER — ALBUTEROL SULFATE 90 UG/1
2 AEROSOL, METERED RESPIRATORY (INHALATION)
Qty: 18 G | Refills: 0 | Status: SHIPPED | OUTPATIENT
Start: 2021-12-09

## 2021-12-09 RX ORDER — HYDROCODONE BITARTRATE AND ACETAMINOPHEN 5; 325 MG/1; MG/1
1 TABLET ORAL
Qty: 8 TABLET | Refills: 0 | Status: SHIPPED | OUTPATIENT
Start: 2021-12-09 | End: 2021-12-12

## 2021-12-09 RX ADMIN — IOPAMIDOL 80 ML: 755 INJECTION, SOLUTION INTRAVENOUS at 11:00

## 2021-12-09 RX ADMIN — SODIUM CHLORIDE 500 ML: 9 INJECTION, SOLUTION INTRAVENOUS at 12:10

## 2021-12-09 NOTE — ED TRIAGE NOTES
Pt c/io left sided chest pain for several months. Seen by her PMD and given zpack 3 weeks ago. Pt had US this am for swollen lymph nodes.   Pt has had 1 covid vaccine

## 2021-12-09 NOTE — ED PROVIDER NOTES
Patient is a 58-year-old female with past medical history significant for diabetes, GERD, hypertension, leg swelling treated with as needed Lasix who presents emergency department for evaluation of left-sided chest pain. She states a month ago she had an intense coughing spell after vaping and coughed violently. She states she was then evaluated by her primary care doctor for persistent cough and chest discomfort. She states she was prescribed steroids and antibiotic however denies ever having any purulent sputum. She states the symptoms did improve for a while but then recently his had pleuritic chest pain and discomfort whenever she tries to lay back. Denies any prior history of pericarditis or fracture. Denies fever. She states further that they had noted some pulmonary nodules about a year ago and she is due for a repeat CT scan to verify stability. Denies prior history of PE or DVT. States that she does have remote history of renal cancer however.            Past Medical History:   Diagnosis Date    Arthritis     Cancer ([address]     IN KIDNEY REMOVED RIGHT KIDNEY     Chronic obstructive pulmonary disease ([address]     BEGINNING STAGES     Diabetes ([address]     Elevated WBC count 2002 to present    GERD (gastroesophageal reflux disease)     Hypertension     Kidney stones 2020    Leg swelling 07/2020    Doppler negative    Nausea & vomiting     Panic attacks     Sleep apnea     Does not use CPAP- raises the head of bed up    Spondylolisthesis of lumbar region     Stroke [facility] 2020    TIA     Tachycardia        Past Surgical History:   Procedure Laterality Date    COLONOSCOPY N/A 3/1/2019    COLONOSCOPY performed by [facility]id [facility] at [facility] ENDOSCOPY    HX BREAST BIOPSY Bilateral     benign    HX CHOLECYSTECTOMY  1998    HX HEENT      HX HERNIA REPAIR  05/13/2021    Robotic repair of incisional hernia with mesh,Robotic repair of adhesions convert to open incisioal hernia repair with mesh by Dr. Sadia Kennedy.     HX HYSTERECTOMY  2008    HX KNEE ARTHROSCOPY Bilateral 2012    HX KNEE REPLACEMENT Left 2012    HX KNEE REPLACEMENT Right 08/18/2016    HX NEPHROSTOMY Right     HX SEPTOPLASTY  1993    HX SVT ABLATION  2013    HX TUBAL LIGATION  1989    HX UROLOGICAL Right 07/2020    nephrectomy    HX WISDOM TEETH EXTRACTION      X2    NEUROLOGICAL PROCEDURE UNLISTED      lumbar nerve block         Family History:   Problem Relation Age of Onset    OSTEOARTHRITIS Mother     Cancer Mother         BREAST CA     Hypertension Mother     Thyroid Disease Mother     Glaucoma Mother     High Cholesterol Mother     Psychiatric Disorder Father         BIPOLAR    OSTEOARTHRITIS Father     Alcohol abuse Father     Heart Disease Father     Heart Surgery Father     Hypertension Father     Diabetes Brother     OSTEOARTHRITIS Brother     Hypertension Brother     Deep Vein Thrombosis Brother         Unknown cause    Anesth Problems Neg Hx        Social History     Socioeconomic History    Marital status: SINGLE     Spouse name: Not on file    Number of children: Not on file    Years of education: Not on file    Highest education level: Not on file   Occupational History    Not on file   Tobacco Use    Smoking status: Current Every Day Smoker     Packs/day: 1.00     Years: 40.00     Pack years: 40.00     Types: Cigarettes    Smokeless tobacco: Never Used    Tobacco comment: STATES THINKS ABOUT IT OFTEN, IS THINKING ABOUT IT    Vaping Use    Vaping Use: Never used   Substance and Sexual Activity    Alcohol use: Not Currently    Drug use: No    Sexual activity: Not Currently   Other Topics Concern    Not on file   Social History Narrative    Not on file     Social Determinants of Health     Financial Resource Strain:     Difficulty of Paying Living Expenses: Not on file   Food Insecurity:     Worried About Running Out of Food in the Last Year: Not on file    Raphael of Food in the Last Year: Not on file   Transportation Needs:     Lack of Transportation (Medical): Not on file    Lack of Transportation (Non-Medical): Not on file   Physical Activity:     Days of Exercise per Week: Not on file    Minutes of Exercise per Session: Not on file   Stress:     Feeling of Stress : Not on file   Social Connections:     Frequency of Communication with Friends and Family: Not on file    Frequency of Social Gatherings with Friends and Family: Not on file    Attends Caoda[employer] Services: Not on file    Active Member of [address] [place] [address] [employer] or Organizations: Not on file    Attends Club or Organization Meetings: Not on file    Marital Status: Not on file   Intimate Partner Violence:     Fear of Current or Ex-Partner: Not on file    Emotionally Abused: Not on file    Physically Abused: Not on file    Sexually Abused: Not on file   Housing Stability:     Unable to Pay for Housing in the Last Year: Not on file    Number of [place] in the Last Year: Not on file    Unstable Housing in the Last Year: Not on file         ALLERGIES: Nsaids (non-steroidal anti-inflammatory drug), Celebrex [celecoxib], Ditropan [oxybutynin chloride], Mobic [meloxicam], Penicillins, and Plaquenil [hydroxychloroquine]    Review of Systems   Constitutional: Negative for diaphoresis and fever. HENT: Negative for drooling. Respiratory: Positive for shortness of breath. Negative for stridor. Cardiovascular: Positive for chest pain. Gastrointestinal: Negative for abdominal pain and nausea. Genitourinary: Negative for dysuria. Musculoskeletal: Negative for neck pain. Skin: Negative for rash and wound. Neurological: Negative for seizures and syncope. Hematological: Does not bruise/bleed easily. Psychiatric/Behavioral: Negative.         Vitals:    12/09/21 0917   BP: (!) 140/76   Pulse: 89   Resp: 16   Temp: 97.8 °F (36.6 °C)   SpO2: 95%   Weight: 90.7 kg (200 lb)   Height: 5' 3\" (1.6 m)            Physical Exam  Vitals and nursing note reviewed. Constitutional:       Appearance: She is obese. She is not toxic-appearing or diaphoretic. HENT:      Head: Normocephalic and atraumatic. Eyes:      Extraocular Movements: Extraocular movements intact. Pupils: Pupils are equal, round, and reactive to light. Neck:      Trachea: No tracheal deviation. Cardiovascular:      Rate and Rhythm: Normal rate. Heart sounds: No murmur heard. Pulmonary:      Effort: Pulmonary effort is normal.      Breath sounds: No decreased breath sounds, wheezing, rhonchi or rales. Comments: Mild splinting noted  Chest:      Chest wall: No deformity, tenderness or crepitus. Musculoskeletal:      Right lower leg: No tenderness. Left lower leg: No tenderness. Neurological:      Mental Status: She is alert and oriented to person, place, and time. Psychiatric:         Mood and Affect: Mood normal.         Behavior: Behavior normal.          MDM  Number of Diagnoses or Management Options  Pleuritic chest pain: established and worsening     Amount and/or Complexity of Data Reviewed  Clinical lab tests: reviewed and ordered  Tests in the radiology section of CPT®: ordered and reviewed  Decide to obtain previous medical records or to obtain history from someone other than the patient: yes    Patient Progress  Patient progress: stable    ED Course as of 12/09/21 1037   Thu Dec 09, 2021   1025 EKG obtained at 1014 showing sinus rhythm, rate 85, LVH, prolonged QT, no significant changes compared to prior EKG.  [JE]      ED Course User Index  [JE] Yonas Bacon MD       Procedures

## 2021-12-09 NOTE — DISCHARGE INSTRUCTIONS
Please follow closely with your primary care doctor and cardiology as discussed. Return to the emergency department for any new or worsening symptoms.

## 2021-12-12 LAB
ATRIAL RATE: 85 BPM
CALCULATED P AXIS, ECG09: 44 DEGREES
CALCULATED R AXIS, ECG10: -16 DEGREES
CALCULATED T AXIS, ECG11: 19 DEGREES
DIAGNOSIS, 93000: NORMAL
P-R INTERVAL, ECG05: 126 MS
Q-T INTERVAL, ECG07: 434 MS
QRS DURATION, ECG06: 92 MS
QTC CALCULATION (BEZET), ECG08: 516 MS
VENTRICULAR RATE, ECG03: 85 BPM

## 2022-01-08 DIAGNOSIS — Z91.09 ENVIRONMENTAL ALLERGIES: ICD-10-CM

## 2022-01-08 DIAGNOSIS — G47.00 INSOMNIA, UNSPECIFIED TYPE: ICD-10-CM

## 2022-01-08 RX ORDER — LEVOCETIRIZINE DIHYDROCHLORIDE 5 MG/1
TABLET, FILM COATED ORAL
Qty: 90 TABLET | Refills: 3 | Status: SHIPPED | OUTPATIENT
Start: 2022-01-08 | End: 2022-10-28

## 2022-01-08 RX ORDER — TRAZODONE HYDROCHLORIDE 50 MG/1
TABLET ORAL
Qty: 90 TABLET | Refills: 3 | Status: SHIPPED | OUTPATIENT
Start: 2022-01-08 | End: 2022-03-07

## 2022-01-08 RX ORDER — BLOOD SUGAR DIAGNOSTIC
STRIP MISCELLANEOUS
Qty: 100 STRIP | Refills: 4 | Status: SHIPPED | OUTPATIENT
Start: 2022-01-08

## 2022-01-09 DIAGNOSIS — G25.81 RLS (RESTLESS LEGS SYNDROME): ICD-10-CM

## 2022-01-09 DIAGNOSIS — E78.00 HYPERCHOLESTEROLEMIA: ICD-10-CM

## 2022-01-09 RX ORDER — ROPINIROLE 2 MG/1
TABLET, FILM COATED ORAL
Qty: 270 TABLET | Refills: 1 | Status: SHIPPED | OUTPATIENT
Start: 2022-01-09 | End: 2022-06-06

## 2022-01-09 RX ORDER — ATORVASTATIN CALCIUM 40 MG/1
TABLET, FILM COATED ORAL
Qty: 90 TABLET | Refills: 1 | Status: SHIPPED | OUTPATIENT
Start: 2022-01-09 | End: 2022-06-06

## 2022-02-04 ENCOUNTER — TELEPHONE (OUTPATIENT)
Dept: FAMILY MEDICINE CLINIC | Age: 57
End: 2022-02-04

## 2022-02-04 DIAGNOSIS — J44.9 CHRONIC OBSTRUCTIVE PULMONARY DISEASE, UNSPECIFIED COPD TYPE (HCC): Primary | ICD-10-CM

## 2022-02-04 RX ORDER — NEBULIZER AND COMPRESSOR
EACH MISCELLANEOUS
Qty: 1 EACH | Refills: 0 | Status: SHIPPED | OUTPATIENT
Start: 2022-02-04 | End: 2022-02-16 | Stop reason: SDUPTHER

## 2022-02-04 RX ORDER — IPRATROPIUM BROMIDE AND ALBUTEROL SULFATE 2.5; .5 MG/3ML; MG/3ML
3 SOLUTION RESPIRATORY (INHALATION)
Qty: 1080 ML | Refills: 1 | Status: SHIPPED | OUTPATIENT
Start: 2022-02-04

## 2022-02-04 NOTE — TELEPHONE ENCOUNTER
----- Message from Kaleb Rodas LPN sent at 3/0/4578  8:22 AM EST -----  Regarding: FW: COPD  Can this be ordered electronically?  ----- Message -----  From: Capo Delgado  Sent: 2/4/2022   7:56 AM EST  To: [facility] Nurse Pool  Subject: COPD                                             Good morning   I  haven't received nebulizer and med yet. Do I call [employer]?

## 2022-02-11 ENCOUNTER — TELEPHONE (OUTPATIENT)
Dept: FAMILY MEDICINE CLINIC | Age: 57
End: 2022-02-11

## 2022-02-11 NOTE — TELEPHONE ENCOUNTER
Spoke to patient she received automated call that she needs appointment for b/p check. Transferred to  to make an appointment.

## 2022-02-11 NOTE — TELEPHONE ENCOUNTER
----- Message from Alejandra Matson sent at 2/10/2022  4:08 PM EST -----  Subject: Message to Provider    QUESTIONS  Information for Provider? Patient missed a call from the office and would   like a call back. ---------------------------------------------------------------------------  --------------  Omelia Counter INFO  What is the best way for the office to contact you? OK to leave message on   voicemail  Preferred Call Back Phone Number?  [phone]  ---------------------------------------------------------------------------  --------------  SCRIPT ANSWERS  undefined

## 2022-02-16 ENCOUNTER — TELEPHONE (OUTPATIENT)
Dept: FAMILY MEDICINE CLINIC | Age: 57
End: 2022-02-16

## 2022-02-16 DIAGNOSIS — J44.9 CHRONIC OBSTRUCTIVE PULMONARY DISEASE, UNSPECIFIED COPD TYPE (HCC): ICD-10-CM

## 2022-02-16 RX ORDER — NEBULIZER AND COMPRESSOR
EACH MISCELLANEOUS
Qty: 1 EACH | Refills: 0 | Status: SHIPPED | OUTPATIENT
Start: 2022-02-16 | End: 2022-03-07 | Stop reason: SDUPTHER

## 2022-02-16 NOTE — TELEPHONE ENCOUNTER
----- Message from Khoa Carranza LPN sent at 6/78/5423 12:03 PM EST -----  Regarding: FW: Nebulizer   Patient states [employer] never recd RX for Nebulizer. Please resend.  ----- Message -----  From: Kylah Fleming  Sent: 2/16/2022  11:53 AM EST  To: [facility] Nurse Pool  Subject: Nebulizer                                        They said they don't have prescription

## 2022-03-03 ENCOUNTER — PATIENT OUTREACH (OUTPATIENT)
Dept: CASE MANAGEMENT | Age: 57
End: 2022-03-03

## 2022-03-06 PROBLEM — C64.1 RENAL CELL CARCINOMA OF RIGHT KIDNEY (HCC): Status: RESOLVED | Noted: 2020-08-24 | Resolved: 2022-03-06

## 2022-03-06 PROBLEM — R91.1 NODULE OF RIGHT LUNG: Status: ACTIVE | Noted: 2022-03-06

## 2022-03-07 ENCOUNTER — OFFICE VISIT (OUTPATIENT)
Dept: FAMILY MEDICINE CLINIC | Age: 57
End: 2022-03-07
Payer: MEDICARE

## 2022-03-07 VITALS
TEMPERATURE: 97.8 F | RESPIRATION RATE: 18 BRPM | WEIGHT: 200.8 LBS | BODY MASS INDEX: 35.58 KG/M2 | HEIGHT: 63 IN | OXYGEN SATURATION: 96 % | SYSTOLIC BLOOD PRESSURE: 134 MMHG | HEART RATE: 86 BPM | DIASTOLIC BLOOD PRESSURE: 80 MMHG

## 2022-03-07 DIAGNOSIS — J44.9 CHRONIC OBSTRUCTIVE PULMONARY DISEASE, UNSPECIFIED COPD TYPE (HCC): ICD-10-CM

## 2022-03-07 DIAGNOSIS — E55.9 VITAMIN D DEFICIENCY: ICD-10-CM

## 2022-03-07 DIAGNOSIS — Z79.899 ENCOUNTER FOR LONG-TERM CURRENT USE OF MEDICATION: ICD-10-CM

## 2022-03-07 DIAGNOSIS — E66.01 SEVERE OBESITY (BMI 35.0-39.9) WITH COMORBIDITY (HCC): ICD-10-CM

## 2022-03-07 DIAGNOSIS — R91.1 NODULE OF RIGHT LUNG: ICD-10-CM

## 2022-03-07 DIAGNOSIS — G63 POLYNEUROPATHY ASSOCIATED WITH UNDERLYING DISEASE (HCC): ICD-10-CM

## 2022-03-07 DIAGNOSIS — G47.00 INSOMNIA, UNSPECIFIED TYPE: ICD-10-CM

## 2022-03-07 DIAGNOSIS — E11.40 TYPE 2 DIABETES MELLITUS WITH DIABETIC NEUROPATHY, WITHOUT LONG-TERM CURRENT USE OF INSULIN (HCC): ICD-10-CM

## 2022-03-07 DIAGNOSIS — I10 ESSENTIAL HYPERTENSION WITH GOAL BLOOD PRESSURE LESS THAN 140/90: ICD-10-CM

## 2022-03-07 DIAGNOSIS — Z00.00 MEDICARE ANNUAL WELLNESS VISIT, SUBSEQUENT: Primary | ICD-10-CM

## 2022-03-07 PROCEDURE — G0439 PPPS, SUBSEQ VISIT: HCPCS | Performed by: FAMILY MEDICINE

## 2022-03-07 PROCEDURE — 3046F HEMOGLOBIN A1C LEVEL >9.0%: CPT | Performed by: FAMILY MEDICINE

## 2022-03-07 PROCEDURE — G8754 DIAS BP LESS 90: HCPCS | Performed by: FAMILY MEDICINE

## 2022-03-07 PROCEDURE — G8427 DOCREV CUR MEDS BY ELIG CLIN: HCPCS | Performed by: FAMILY MEDICINE

## 2022-03-07 PROCEDURE — G8752 SYS BP LESS 140: HCPCS | Performed by: FAMILY MEDICINE

## 2022-03-07 PROCEDURE — 99214 OFFICE O/P EST MOD 30 MIN: CPT | Performed by: FAMILY MEDICINE

## 2022-03-07 PROCEDURE — 3017F COLORECTAL CA SCREEN DOC REV: CPT | Performed by: FAMILY MEDICINE

## 2022-03-07 PROCEDURE — G8417 CALC BMI ABV UP PARAM F/U: HCPCS | Performed by: FAMILY MEDICINE

## 2022-03-07 PROCEDURE — G9717 DOC PT DX DEP/BP F/U NT REQ: HCPCS | Performed by: FAMILY MEDICINE

## 2022-03-07 PROCEDURE — G9899 SCRN MAM PERF RSLTS DOC: HCPCS | Performed by: FAMILY MEDICINE

## 2022-03-07 PROCEDURE — 2022F DILAT RTA XM EVC RTNOPTHY: CPT | Performed by: FAMILY MEDICINE

## 2022-03-07 RX ORDER — NEBULIZER AND COMPRESSOR
EACH MISCELLANEOUS
Qty: 1 EACH | Refills: 0 | Status: SHIPPED | OUTPATIENT
Start: 2022-03-07

## 2022-03-07 RX ORDER — TRAZODONE HYDROCHLORIDE 50 MG/1
100 TABLET ORAL
Qty: 180 TABLET | Refills: 3 | Status: SHIPPED | OUTPATIENT
Start: 2022-03-07

## 2022-03-07 NOTE — PATIENT INSTRUCTIONS
Medicare Wellness Visit, Female     The best way to live healthy is to have a lifestyle where you eat a well-balanced diet, exercise regularly, limit alcohol use, and quit all forms of tobacco/nicotine, if applicable. Regular preventive services are another way to keep healthy. Preventive services (vaccines, screening tests, monitoring & exams) can help personalize your care plan, which helps you manage your own care. Screening tests can find health problems at the earliest stages, when they are easiest to treat. [facility] follows the current, evidence-based guidelines published by the [facility] Flood ([facility]) when recommending preventive services for our patients. Because we follow these guidelines, sometimes recommendations change over time as research supports it. (For example, mammograms used to be recommended annually. Even though Medicare will still pay for an annual mammogram, the newer guidelines recommend a mammogram every two years for women of average risk). Of course, you and your doctor may decide to screen more often for some diseases, based on your risk and your co-morbidities (chronic disease you are already diagnosed with). Preventive services for you include:  - Medicare offers their members a free annual wellness visit, which is time for you and your primary care provider to discuss and plan for your preventive service needs. Take advantage of this benefit every year!  -All adults over the age of 72 should receive the recommended pneumonia vaccines. Current USPSTF guidelines recommend a series of two vaccines for the best pneumonia protection.   -All adults should have a flu vaccine yearly and a tetanus vaccine every 10 years.   -All adults age 48 and older should receive the shingles vaccines (series of two vaccines).       -All adults age 38-68 who are overweight should have a diabetes screening test once every three years.   -All adults born between [birth year] and 1966 should be screened once for Hepatitis C.  -Other screening tests and preventive services for persons with diabetes include: an eye exam to screen for diabetic retinopathy, a kidney function test, a foot exam, and stricter control over your cholesterol.   -Cardiovascular screening for adults with routine risk involves an electrocardiogram (ECG) at intervals determined by your doctor.   -Colorectal cancer screenings should be done for adults age 54-65 with no increased risk factors for colorectal cancer. There are a number of acceptable methods of screening for this type of cancer. Each test has its own benefits and drawbacks. Discuss with your doctor what is most appropriate for you during your annual wellness visit. The different tests include: colonoscopy (considered the best screening method), a fecal occult blood test, a fecal DNA test, and sigmoidoscopy.    -A bone mass density test is recommended when a woman turns 65 to screen for osteoporosis. This test is only recommended one time, as a screening. Some providers will use this same test as a disease monitoring tool if you already have osteoporosis. -Breast cancer screenings are recommended every other year for women of normal risk, age 54-69.  -Cervical cancer screenings for women over age 72 are only recommended with certain risk factors.      Here is a list of your current Health Maintenance items (your personalized list of preventive services) with a due date:  Health Maintenance Due   Topic Date Due    Eye Exam  Never done    Smoker or Former Smoker - Annual Lung Cancer Screen  Never done    COVID-19 Vaccine (2 - Debbrah Neighbor 3-dose series) 05/12/2021    Yearly Flu Vaccine (1) 09/01/2021

## 2022-03-07 NOTE — PROGRESS NOTES
Chief Complaint   Patient presents with   Nithin Thao Annual Wellness Visit     3 most recent PHQ Screens 3/7/2022   Little interest or pleasure in doing things Not at all   Feeling down, depressed, irritable, or hopeless Not at all   Total Score PHQ 2 0     Abuse Screening Questionnaire 3/7/2022   Do you ever feel afraid of your partner? N   Are you in a relationship with someone who physically or mentally threatens you? N   Is it safe for you to go home? Y     Abuse Screening Questionnaire 3/7/2022   Do you ever feel afraid of your partner? N   Are you in a relationship with someone who physically or mentally threatens you? N   Is it safe for you to go home? Y     1. \"Have you been to the ER, urgent care clinic since your last visit? Hospitalized since your last visit? \" 12/09/21 breast pain    2. \"Have you seen or consulted any other health care providers outside of the [address] [place] [address] since your last visit? \" no    3. For patients aged 39-70: Has the patient had a colonoscopy / FIT/ Cologuard? no      If the patient is female:    4. For patients aged 41-77: Has the patient had a mammogram within the past 2 years? [facility]      5. For patients aged 21-65: Has the patient had a pap smear?  no

## 2022-03-07 NOTE — PROGRESS NOTES
This is the Subsequent Medicare Annual Wellness Exam, performed 12 months or more after the Initial AWV or the last Subsequent AWV    I have reviewed the patient's medical history in detail and updated the computerized patient record. Assessment/Plan   Education and counseling provided:  Are appropriate based on today's review and evaluation  End-of-Life planning (with patient's consent)  Pneumococcal Vaccine  Influenza Vaccine  Screening Mammography  Colorectal cancer screening tests  Cardiovascular screening blood test  Screening for glaucoma  Diabetes screening test    1. Medicare annual wellness visit, subsequent  2. Severe obesity (BMI 35.0-39. 9) with comorbidity ([facility] [address]  3. Essential hypertension with goal blood pressure less than 140/90  -     TSH 3RD GENERATION; Future  4. Polyneuropathy associated with underlying disease ([facility] [address]  5. Chronic obstructive pulmonary disease, unspecified COPD type (HCC)  -     Nebulizer & Compressor machine; Use nebulizer every 6 hours as needed. DX J44.9, Print, Disp-1 Each, R-0  6. Type 2 diabetes mellitus with diabetic neuropathy, without long-term current use of insulin ([facility])  -     LIPID PANEL; Future  -     MICROALBUMIN, UR, RAND W/ MICROALB/CREAT RATIO; Future  -     HEMOGLOBIN A1C WITH EAG; Future  7. Encounter for long-term current use of medication  -     CBC WITH AUTOMATED DIFF; Future  -     METABOLIC PANEL, COMPREHENSIVE; Future  8. Vitamin D deficiency  -     VITAMIN D, 25 HYDROXY; Future  9. Nodule of right lung  -     CT CHEST W WO CONT; Future  10. Insomnia, unspecified type  -     traZODone (DESYREL) 50 mg tablet;  Take 2 Tablets by mouth nightly., Normal, Disp-180 Tablet, R-3       Depression Risk Factor Screening     3 most recent PHQ Screens 3/7/2022   Little interest or pleasure in doing things Not at all   Feeling down, depressed, irritable, or hopeless Not at all   Total Score PHQ 2 0       Alcohol & Drug Abuse Risk Screen    Do you average more than 1 drink per night or more than 7 drinks a week:  No    On any one occasion in the past three months have you have had more than 3 drinks containing alcohol:  No          Functional Ability and Level of Safety    Hearing: Hearing is good. Activities of Daily Living: The home contains: no safety equipment. Patient does total self care      Ambulation: with no difficulty     Fall Risk:  Fall Risk Assessment, last 12 mths 3/7/2022   Able to walk? Yes   Fall in past 12 months?  0      Abuse Screen:  Patient is not abused       Cognitive Screening    Has your family/caregiver stated any concerns about your memory: no         Health Maintenance Due     Health Maintenance Due   Topic Date Due    Eye Exam Retinal or Dilated  Never done    Low dose CT lung screening  Never done    COVID-19 Vaccine (2 - Moderna 3-dose series) 05/12/2021    Flu Vaccine (1) 09/01/2021       Patient Care Team   Patient Care Team:  Jahaira Javier MD as PCP - General (Family Medicine)  Jahaira Javier MD as PCP - [facility] Empaneled Provider  Donita Kelly MD as Physician (Sleep Medicine)  Leisa Simon MD (General Surgery)    History     Patient Active Problem List   Diagnosis Code    Primary osteoarthritis of right knee M17.11    Essential hypertension with goal blood pressure less than 140/90 I10    Chronic obstructive pulmonary disease (HCC) J44.9    Polyneuropathy associated with underlying disease ([facility] [address] G63    RLS (restless legs syndrome) G25.81    Arthritis M19.90    Cigarette nicotine dependence without complication U96.533    Dysthymia F34.1    YOLANDA (obstructive sleep apnea) G47.33    Status post bilateral knee replacements Z96.653    Type 2 diabetes mellitus with diabetic neuropathy (HCC) E11.40    Iliotibial band tendinitis of right side M76.31    Abnormal mammogram of left breast R92.8    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 M47.12, M47.22    Spondylosis of lumbar spine M47.816    Colon adenoma D12.6    Renal mass, right N28.89    H/O right nephrectomy Z90.5    Vitamin D deficiency E55.9    Incisional hernia, without obstruction or gangrene K43.2    Hypoxia R09.02    Incisional hernia K43.2    Nodule of right lung R91.1     Past Medical History:   Diagnosis Date    Arthritis     Cancer ([facility] [address]     IN KIDNEY REMOVED RIGHT KIDNEY     Chronic obstructive pulmonary disease (HCC)     BEGINNING STAGES     Diabetes ([facility] [address]     Elevated WBC count 2002 to present    GERD (gastroesophageal reflux disease)     Hypertension     Kidney stones 2020    Leg swelling 07/2020    Doppler negative    Nausea & vomiting     Panic attacks     Sleep apnea     Does not use CPAP- raises the head of bed up    Spondylolisthesis of lumbar region     Stroke [facility]) 2020    TIA     Tachycardia       Past Surgical History:   Procedure Laterality Date    COLONOSCOPY N/A 3/1/2019    [facility] Car[facility] Walton [facility] at [facility] ENDOSCOPY    HX BREAST BIOPSY Bilateral     benign    HX CHOLECYSTECTOMY  1998    HX HEENT      HX HERNIA REPAIR  05/13/2021    Robotic repair of incisional hernia with mesh,Robotic repair of adhesions convert to open incisioal hernia repair with mesh by Dr. Emily Bartlett.  HX HYSTERECTOMY  2008    HX KNEE ARTHROSCOPY Bilateral 2012    HX KNEE REPLACEMENT Left 2012    HX KNEE REPLACEMENT Right 08/18/2016    HX NEPHROSTOMY Right     HX SEPTOPLASTY  1993    HX SVT ABLATION  2013    HX TUBAL LIGATION  1989    HX UROLOGICAL Right 07/2020    nephrectomy    HX WISDOM TEETH EXTRACTION      X2    NEUROLOGICAL PROCEDURE UNLISTED      lumbar nerve block     Current Outpatient Medications   Medication Sig Dispense Refill    OTHER Beets      zinc sulfate (ZINC-220 PO) Take  by mouth.  Nebulizer & Compressor machine Use nebulizer every 6 hours as needed. DX J44.9 1 Each 0    traZODone (DESYREL) 50 mg tablet Take 2 Tablets by mouth nightly.  180 Tablet 3    OTHER,NON-FORMULARY, Take 3 Caplet by mouth. CBD Gummies   Indications: for pain      atorvastatin (LIPITOR) 40 mg tablet TAKE 1 TABLET EVERY DAY 90 Tablet 1    rOPINIRole (REQUIP) 2 mg tablet TAKE 1 TABLET THREE TIMES DAILY 270 Tablet 1    Accu-Chek Joyce Plus test strp strip TEST BLOOD SUGAR EVERY  Strip 4    levocetirizine (XYZAL) 5 mg tablet TAKE 1 TABLET EVERY DAY 90 Tablet 3    albuterol (PROVENTIL HFA, VENTOLIN HFA, PROAIR HFA) 90 mcg/actuation inhaler Take 2 Puffs by inhalation every four (4) hours as needed for Wheezing, Shortness of Breath or Cough. 18 g 0    inhalational spacing device 1 Each by Does Not Apply route as needed for Wheezing. 1 Each 0    dilTIAZem ER (CARDIZEM CD) 180 mg capsule TAKE 2 CAPSULES EVERY DAY FOR HIGH BLOOD PRESSURE 10 Capsule 0    benzonatate (TESSALON) 100 mg capsule TAKE 1 CAPSULE THREE TIMES DAILY AS NEEDED FOR COUGH 60 Capsule 3    tiZANidine (ZANAFLEX) 2 mg tablet TAKE 1 TABLET TWICE DAILY FOR MUSCLE SPASM(S) 180 Tablet 1    furosemide (LASIX) 20 mg tablet TAKE 1 TABLET EVERY DAY AS NEEDED 90 Tablet 1    venlafaxine-SR (EFFEXOR-XR) 150 mg capsule TAKE 1 CAPSULE EVERY DAY 90 Capsule 3    metFORMIN (GLUCOPHAGE) 500 mg tablet TAKE TWO TABLETS BY MOUTH TWO TIMES A DAY WITH MEALS 360 Tablet 1    omeprazole (PRILOSEC) 40 mg capsule TAKE 1 CAPSULE BY MOUTH BEFORE BREAKFAST AND DINNER. 180 Capsule 3    losartan (COZAAR) 100 mg tablet Take 1 Tablet by mouth nightly. 90 Tablet 3    Aspirin-Caffeine (BC Arthritis) 1,000-65 mg pwpk Take  by mouth.  LORazepam (ATIVAN) 0.5 mg tablet Take 1 Tab by mouth two (2) times daily as needed for Anxiety. Max Daily Amount: 1 mg. Indications: anxious 180 Tab 0    albuterol-ipratropium (DUO-NEB) 2.5 mg-0.5 mg/3 ml nebu 3 mL by Nebulization route every six (6) hours as needed for Wheezing.  Indications: bronchi muscle spasm resulting from COPD (Patient not taking: Reported on 3/7/2022) 1080 mL 1    polyethylene glycol (MIRALAX) 17 gram packet Take 1 Packet by mouth daily. May take 1-2 packets as needed daily for constipation. (Patient not taking: Reported on 3/7/2022) 14 Packet 1     Allergies   Allergen Reactions    Nsaids (Non-Steroidal Anti-Inflammatory Drug) Other (comments)     Stomach hurts    Celebrex [Celecoxib] Hives    Ditropan [Oxybutynin Chloride] Hives and Itching    Mobic [Meloxicam] Hives    Penicillins Unknown (comments)     DOESN'T REMEMBER  Patient states tolerating PO amoxicillin    Plaquenil [Hydroxychloroquine] Hives       Family History   Problem Relation Age of Onset    OSTEOARTHRITIS Mother     Cancer Mother         BREAST CA     Hypertension Mother     Thyroid Disease Mother     Glaucoma Mother     High Cholesterol Mother     Psychiatric Disorder Father         BIPOLAR    OSTEOARTHRITIS Father     Alcohol abuse Father     Heart Disease Father     Heart Surgery Father     Hypertension Father     Diabetes Brother     OSTEOARTHRITIS Brother     Hypertension Brother     Deep Vein Thrombosis Brother         Unknown cause    Anesth Problems Neg Hx      Social History     Tobacco Use    Smoking status: Current Every Day Smoker     Packs/day: 1.00     Years: 40.00     Pack years: 40.00     Types: Cigarettes    Smokeless tobacco: Never Used    Tobacco comment: STATES THINKS ABOUT IT OFTEN, IS THINKING ABOUT IT    Substance Use Topics    Alcohol use: Not Currently     Subjective:     Liya Benitez is a 61 y.o. female who presents for follow up of hypertension, hyperlipidemia and obesity. Diet and Lifestyle: not attempting to follow a low fat, low cholesterol diet, not attempting to follow a low sodium diet, exercises sporadically, smoker 1.5 PPD  Home BP Monitoring: is not measured at home    Cardiovascular ROS: taking medications as instructed, no medication side effects noted, no TIA's, no chest pain on exertion, no dyspnea on exertion, no swelling of ankles.      New concerns: C/O pain in upper abdomen, like squeezing. No SOB. Lasting a few minutes. Relief with Lorazepam and removing any binding clothing. Very gasy. Pt had CTA in ER Dec 2021 showed R lung nodule. Will need FU CT in 6 months. Patient Active Problem List    Diagnosis Date Noted    Nodule of right lung 03/06/2022    Hypoxia 05/13/2021    Incisional hernia 05/13/2021    Incisional hernia, without obstruction or gangrene 04/21/2021    Vitamin D deficiency 02/03/2021    H/O right nephrectomy 11/10/2020    Renal mass, right 07/29/2020    Colon adenoma 03/18/2019    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 12/03/2018    Spondylosis of lumbar spine 12/03/2018    Abnormal mammogram of left breast 09/26/2018    Iliotibial band tendinitis of right side 08/30/2018    Type 2 diabetes mellitus with diabetic neuropathy ([facility] [address] 02/14/2018    Status post bilateral knee replacements 11/02/2017    Cigarette nicotine dependence without complication 90/03/6424    Dysthymia 12/30/2016    YOLANDA (obstructive sleep apnea) 12/30/2016    Arthritis 09/09/2016    Essential hypertension with goal blood pressure less than 140/90 09/07/2016    Chronic obstructive pulmonary disease (HCC) 09/07/2016    Polyneuropathy associated with underlying disease ([facility] [address] 09/07/2016    RLS (restless legs syndrome) 09/07/2016    Primary osteoarthritis of right knee 08/18/2016     Current Outpatient Medications   Medication Sig Dispense Refill    OTHER Beets      zinc sulfate (ZINC-220 PO) Take  by mouth.  Nebulizer & Compressor machine Use nebulizer every 6 hours as needed. DX J44.9 1 Each 0    traZODone (DESYREL) 50 mg tablet Take 2 Tablets by mouth nightly. 180 Tablet 3    OTHER,NON-FORMULARY, Take 3 Caplet by mouth.  CBD Gummies   Indications: for pain      atorvastatin (LIPITOR) 40 mg tablet TAKE 1 TABLET EVERY DAY 90 Tablet 1    rOPINIRole (REQUIP) 2 mg tablet TAKE 1 TABLET THREE TIMES DAILY 270 Tablet 1    Accu-Chek Joyce Plus test strp strip TEST BLOOD SUGAR EVERY  Strip 4    levocetirizine (XYZAL) 5 mg tablet TAKE 1 TABLET EVERY DAY 90 Tablet 3    albuterol (PROVENTIL HFA, VENTOLIN HFA, PROAIR HFA) 90 mcg/actuation inhaler Take 2 Puffs by inhalation every four (4) hours as needed for Wheezing, Shortness of Breath or Cough. 18 g 0    inhalational spacing device 1 Each by Does Not Apply route as needed for Wheezing. 1 Each 0    dilTIAZem ER (CARDIZEM CD) 180 mg capsule TAKE 2 CAPSULES EVERY DAY FOR HIGH BLOOD PRESSURE 10 Capsule 0    benzonatate (TESSALON) 100 mg capsule TAKE 1 CAPSULE THREE TIMES DAILY AS NEEDED FOR COUGH 60 Capsule 3    tiZANidine (ZANAFLEX) 2 mg tablet TAKE 1 TABLET TWICE DAILY FOR MUSCLE SPASM(S) 180 Tablet 1    furosemide (LASIX) 20 mg tablet TAKE 1 TABLET EVERY DAY AS NEEDED 90 Tablet 1    venlafaxine-SR (EFFEXOR-XR) 150 mg capsule TAKE 1 CAPSULE EVERY DAY 90 Capsule 3    metFORMIN (GLUCOPHAGE) 500 mg tablet TAKE TWO TABLETS BY MOUTH TWO TIMES A DAY WITH MEALS 360 Tablet 1    omeprazole (PRILOSEC) 40 mg capsule TAKE 1 CAPSULE BY MOUTH BEFORE BREAKFAST AND DINNER. 180 Capsule 3    losartan (COZAAR) 100 mg tablet Take 1 Tablet by mouth nightly. 90 Tablet 3    Aspirin-Caffeine (BC Arthritis) 1,000-65 mg pwpk Take  by mouth.  LORazepam (ATIVAN) 0.5 mg tablet Take 1 Tab by mouth two (2) times daily as needed for Anxiety. Max Daily Amount: 1 mg. Indications: anxious 180 Tab 0    albuterol-ipratropium (DUO-NEB) 2.5 mg-0.5 mg/3 ml nebu 3 mL by Nebulization route every six (6) hours as needed for Wheezing. Indications: bronchi muscle spasm resulting from COPD (Patient not taking: Reported on 3/7/2022) 1080 mL 1    polyethylene glycol (MIRALAX) 17 gram packet Take 1 Packet by mouth daily. May take 1-2 packets as needed daily for constipation.  (Patient not taking: Reported on 3/7/2022) 14 Packet 1     Allergies   Allergen Reactions    Nsaids (Non-Steroidal Anti-Inflammatory Drug) Other (comments)     Stomach hurts    Celebrex [Celecoxib] Hives    Ditropan [Oxybutynin Chloride] Hives and Itching    Mobic [Meloxicam] Hives    Penicillins Unknown (comments)     DOESN'T REMEMBER  Patient states tolerating PO amoxicillin    Plaquenil [Hydroxychloroquine] Hives     Past Medical History:   Diagnosis Date    Arthritis     Cancer ([facility] [address]     IN KIDNEY REMOVED RIGHT KIDNEY     Chronic obstructive pulmonary disease (HCC)     BEGINNING STAGES     Diabetes ([facility] [address]     Elevated WBC count 2002 to present    GERD (gastroesophageal reflux disease)     Hypertension     Kidney stones 2020    Leg swelling 07/2020    Doppler negative    Nausea & vomiting     Panic attacks     Sleep apnea     Does not use CPAP- raises the head of bed up    Spondylolisthesis of lumbar region     [facility]roke [facility]) 2020    TIA     Tachycardia      Past Surgical History:   Procedure Laterality Date    COLONOSCOPY N/A 3/1/2019    COLON[facility] performed by [facility]li Corona MD at [facility] ENDOSCOPY    HX BREAST BIOPSY Bilateral     benign    HX CHOLECYSTECTOMY  1998    HX HEENT      HX HERNIA REPAIR  05/13/2021    Robotic repair of incisional hernia with mesh,Robotic repair of adhesions convert to open incisioal hernia repair with mesh by Dr. Kendall Turner.     HX HYSTERECTOMY  2008    HX KNEE ARTHROSCOPY Bilateral 2012    HX KNEE REPLACEMENT Left 2012    HX KNEE REPLACEMENT Right 08/18/2016    HX NEPHROSTOMY Right     HX SEPTOPLASTY  1993    HX SVT ABLATION  2013    HX TUBAL LIGATION  1989    HX UROLOGICAL Right 07/2020    nephrectomy    HX WISDOM TEETH EXTRACTION      X2    NEUROLOGICAL PROCEDURE UNLISTED      lumbar nerve block     Family History   Problem Relation Age of Onset    OSTEOARTHRITIS Mother     Cancer Mother         BREAST CA     Hypertension Mother     Thyroid Disease Mother     Glaucoma Mother     High Cholesterol Mother     Psychiatric Disorder Father         BIPOLAR    OSTEOARTHRITIS Father  Alcohol abuse Father     Heart Disease Father     Heart Surgery Father     Hypertension Father     Diabetes Brother     OSTEOARTHRITIS Brother     Hypertension Brother     Deep Vein Thrombosis Brother         Unknown cause    Anesth Problems Neg Hx      Social History     Tobacco Use    Smoking status: Current Every Day Smoker     Packs/day: 1.00     Years: 40.00     Pack years: 40.00     Types: Cigarettes    Smokeless tobacco: Never Used    Tobacco comment: STATES THINKS ABOUT IT OFTEN, IS THINKING ABOUT IT    Substance Use Topics    Alcohol use: Not Currently             Review of Systems, additional:  Pertinent items are noted in HPI. Objective:     Visit Vitals  /80   Pulse 86   Temp 97.8 °F (36.6 °C) (Oral)   Resp 18   Ht 5' 3\" (1.6 m)   Wt 200 lb 12.8 oz (91.1 kg)   SpO2 96%   BMI 35.57 kg/m²     Appearance: alert, well appearing, and in no distress and overweight. General exam: CVS exam BP noted to be well controlled today in office, S1, S2 normal, no gallop, no murmur, chest clear, no JVD, no HSM, no edema. Lab review: orders written for new lab studies as appropriate; see orders. Assessment/Plan:     hypertension stable, hyperlipidemia control uncertain. orders and follow up as documented in patient record  reviewed diet, exercise and weight control  recommended sodium restriction. ICD-10-CM ICD-9-CM    1. Medicare annual wellness visit, subsequent  Z00.00 V70.0    2. Severe obesity (BMI 35.0-39. 9) with comorbidity [facility]  E66.01 278.01    3. Essential hypertension with goal blood pressure less than 140/90  I10 401.9 TSH 3RD GENERATION   4. Polyneuropathy associated with underlying disease ([facility]  G63 357.4    5. Chronic obstructive pulmonary disease, unspecified COPD type ([facility])  J44.9 496 Nebulizer & Compressor machine   6.  Type 2 diabetes mellitus with diabetic neuropathy, without long-term current use of insulin ([facility])  E11.40 250.60 LIPID PANEL     357.2 MICROALBUMIN, UR, RAND W/ MICROALB/CREAT RATIO      HEMOGLOBIN A1C WITH EAG   7. Encounter for long-term current use of medication  Z79.899 V58.69 CBC WITH AUTOMATED DIFF      METABOLIC PANEL, COMPREHENSIVE   8. Vitamin D deficiency  E55.9 268.9 VITAMIN D, 25 HYDROXY   9. Nodule of right lung  R91.1 793.11 CT CHEST W WO CONT   10.  Insomnia, unspecified type  G47.00 780.52 traZODone (DESYREL) 50 mg tablet       Add Gaviscon BID  Work on cut back starches  She will send info: mammogram, FLU vaccine, eye exam done at [facility]  Russel Mcdowell MD

## 2022-03-08 LAB
25(OH)D3 SERPL-MCNC: 28.7 NG/ML (ref 30–100)
ALBUMIN SERPL-MCNC: 3.8 G/DL (ref 3.5–5)
ALBUMIN/GLOB SERPL: 1.3 {RATIO} (ref 1.1–2.2)
ALP SERPL-CCNC: 203 U/L (ref 45–117)
ALT SERPL-CCNC: 42 U/L (ref 12–78)
ANION GAP SERPL CALC-SCNC: 3 MMOL/L (ref 5–15)
AST SERPL-CCNC: 31 U/L (ref 15–37)
BASOPHILS # BLD: 0.2 K/UL (ref 0–0.1)
BASOPHILS NFR BLD: 1 % (ref 0–1)
BILIRUB SERPL-MCNC: 0.2 MG/DL (ref 0.2–1)
BUN SERPL-MCNC: 8 MG/DL (ref 6–20)
BUN/CREAT SERPL: 9 (ref 12–20)
CALCIUM SERPL-MCNC: 9.6 MG/DL (ref 8.5–10.1)
CHLORIDE SERPL-SCNC: 107 MMOL/L (ref 97–108)
CHOLEST SERPL-MCNC: 117 MG/DL
CO2 SERPL-SCNC: 29 MMOL/L (ref 21–32)
CREAT SERPL-MCNC: 0.94 MG/DL (ref 0.55–1.02)
CREAT UR-MCNC: 52.5 MG/DL
DIFFERENTIAL METHOD BLD: ABNORMAL
EOSINOPHIL # BLD: 0.7 K/UL (ref 0–0.4)
EOSINOPHIL NFR BLD: 5 % (ref 0–7)
ERYTHROCYTE [DISTWIDTH] IN BLOOD BY AUTOMATED COUNT: 17.9 % (ref 11.5–14.5)
EST. AVERAGE GLUCOSE BLD GHB EST-MCNC: 143 MG/DL
GLOBULIN SER CALC-MCNC: 2.9 G/DL (ref 2–4)
GLUCOSE SERPL-MCNC: 61 MG/DL (ref 65–100)
HBA1C MFR BLD: 6.6 % (ref 4–5.6)
HCT VFR BLD AUTO: 39.7 % (ref 35–47)
HDLC SERPL-MCNC: 50 MG/DL
HDLC SERPL: 2.3 {RATIO} (ref 0–5)
HGB BLD-MCNC: 11.8 G/DL (ref 11.5–16)
IMM GRANULOCYTES # BLD AUTO: 0.1 K/UL (ref 0–0.04)
IMM GRANULOCYTES NFR BLD AUTO: 0 % (ref 0–0.5)
LDLC SERPL CALC-MCNC: 34.4 MG/DL (ref 0–100)
LYMPHOCYTES # BLD: 3.1 K/UL (ref 0.8–3.5)
LYMPHOCYTES NFR BLD: 19 % (ref 12–49)
MCH RBC QN AUTO: 24.5 PG (ref 26–34)
MCHC RBC AUTO-ENTMCNC: 29.7 G/DL (ref 30–36.5)
MCV RBC AUTO: 82.5 FL (ref 80–99)
MICROALBUMIN UR-MCNC: <0.5 MG/DL
MICROALBUMIN/CREAT UR-RTO: <10 MG/G (ref 0–30)
MONOCYTES # BLD: 0.8 K/UL (ref 0–1)
MONOCYTES NFR BLD: 5 % (ref 5–13)
NEUTS SEG # BLD: 11.5 K/UL (ref 1.8–8)
NEUTS SEG NFR BLD: 70 % (ref 32–75)
NRBC # BLD: 0 K/UL (ref 0–0.01)
NRBC BLD-RTO: 0 PER 100 WBC
PLATELET # BLD AUTO: 312 K/UL (ref 150–400)
PMV BLD AUTO: 10.8 FL (ref 8.9–12.9)
POTASSIUM SERPL-SCNC: 4.4 MMOL/L (ref 3.5–5.1)
PROT SERPL-MCNC: 6.7 G/DL (ref 6.4–8.2)
RBC # BLD AUTO: 4.81 M/UL (ref 3.8–5.2)
SODIUM SERPL-SCNC: 139 MMOL/L (ref 136–145)
TRIGL SERPL-MCNC: 163 MG/DL (ref ?–150)
TSH SERPL DL<=0.05 MIU/L-ACNC: 0.89 UIU/ML (ref 0.36–3.74)
VLDLC SERPL CALC-MCNC: 32.6 MG/DL
WBC # BLD AUTO: 16.3 K/UL (ref 3.6–11)

## 2022-03-11 ENCOUNTER — PATIENT OUTREACH (OUTPATIENT)
Dept: CASE MANAGEMENT | Age: 57
End: 2022-03-11

## 2022-03-11 NOTE — PROGRESS NOTES
Ambulatory Care Management Note    Date/Time:  3/11/2022 1:17 PM    This patient was received as a referral from Provider. Ambulatory Care Manager outreached to patient today to offer care management services. Introduction to self and role of care manager provided. Patient accepted care management services at this time. Follow up call scheduled at this time. Patient has Ambulatory Care Manager's contact number for for any questions or concerns.

## 2022-03-13 NOTE — PROGRESS NOTES
Vit D is below goal.  Are you taking a Vit D3 supplement? If not, take 1,000 units daily. Diabetes under control. Good thyroid test.  Good cholesterol numbers. Normal electrolytes, kidney, and liver tests. White blood count remains elevated. Follow up with Hematology.

## 2022-03-18 PROBLEM — Z90.5 H/O RIGHT NEPHRECTOMY: Status: ACTIVE | Noted: 2020-11-10

## 2022-03-19 PROBLEM — R09.02 HYPOXIA: Status: ACTIVE | Noted: 2021-05-13

## 2022-03-19 PROBLEM — E55.9 VITAMIN D DEFICIENCY: Status: ACTIVE | Noted: 2021-02-03

## 2022-03-19 PROBLEM — M47.816 SPONDYLOSIS OF LUMBAR SPINE: Status: ACTIVE | Noted: 2018-12-03

## 2022-03-19 PROBLEM — R92.8 ABNORMAL MAMMOGRAM OF LEFT BREAST: Status: ACTIVE | Noted: 2018-09-26

## 2022-03-19 PROBLEM — M76.31 ILIOTIBIAL BAND TENDINITIS OF RIGHT SIDE: Status: ACTIVE | Noted: 2018-08-30

## 2022-03-19 PROBLEM — E11.40 TYPE 2 DIABETES MELLITUS WITH DIABETIC NEUROPATHY (HCC): Status: ACTIVE | Noted: 2018-02-14

## 2022-03-19 PROBLEM — R91.1 NODULE OF RIGHT LUNG: Status: ACTIVE | Noted: 2022-03-06

## 2022-03-19 PROBLEM — M47.22 SPONDYLOSIS OF CERVICAL SPINE WITH MYELOPATHY AND RADICULOPATHY: Status: ACTIVE | Noted: 2018-12-03

## 2022-03-19 PROBLEM — M47.12 SPONDYLOSIS OF CERVICAL SPINE WITH MYELOPATHY AND RADICULOPATHY: Status: ACTIVE | Noted: 2018-12-03

## 2022-03-19 PROBLEM — K43.2 INCISIONAL HERNIA: Status: ACTIVE | Noted: 2021-05-13

## 2022-03-20 PROBLEM — D12.6 COLON ADENOMA: Status: ACTIVE | Noted: 2019-03-18

## 2022-03-20 PROBLEM — Z96.653 STATUS POST BILATERAL KNEE REPLACEMENTS: Status: ACTIVE | Noted: 2017-11-02

## 2022-03-20 PROBLEM — N28.89 RENAL MASS, RIGHT: Status: ACTIVE | Noted: 2020-07-29

## 2022-03-20 PROBLEM — K43.2 INCISIONAL HERNIA, WITHOUT OBSTRUCTION OR GANGRENE: Status: ACTIVE | Noted: 2021-04-21

## 2022-03-22 ENCOUNTER — PATIENT OUTREACH (OUTPATIENT)
Dept: CASE MANAGEMENT | Age: 57
End: 2022-03-22

## 2022-03-23 DIAGNOSIS — R60.0 PEDAL EDEMA: ICD-10-CM

## 2022-03-23 RX ORDER — FUROSEMIDE 20 MG/1
TABLET ORAL
Qty: 90 TABLET | Refills: 1 | Status: SHIPPED | OUTPATIENT
Start: 2022-03-23 | End: 2022-08-12

## 2022-03-23 RX ORDER — METFORMIN HYDROCHLORIDE 500 MG/1
TABLET ORAL
Qty: 360 TABLET | Refills: 1 | Status: SHIPPED | OUTPATIENT
Start: 2022-03-23 | End: 2022-08-18

## 2022-03-27 RX ORDER — LOSARTAN POTASSIUM 100 MG/1
TABLET ORAL
Qty: 90 TABLET | Refills: 3 | Status: SHIPPED | OUTPATIENT
Start: 2022-03-27

## 2022-03-28 ENCOUNTER — TELEPHONE (OUTPATIENT)
Dept: FAMILY MEDICINE CLINIC | Age: 57
End: 2022-03-28

## 2022-03-28 NOTE — TELEPHONE ENCOUNTER
----- Message from [facility] sent at 3/28/2022 10:11 AM EDT -----  Regarding: FW: Sciatica     ----- Message -----  From: Wilma Campo  Sent: 3/28/2022   9:32 AM EDT  To: Tanya Nurse Pool  Subject: Sciatica                                         Good morning   Is there anyway someone from ur office could help get me in to see Dr. Mona Nuñez. I am getting to a point that after I bend to do something I am in so much pain getting back to my upright position.  TIA

## 2022-03-30 ENCOUNTER — PATIENT OUTREACH (OUTPATIENT)
Dept: CASE MANAGEMENT | Age: 57
End: 2022-03-30

## 2022-04-01 ENCOUNTER — OFFICE VISIT (OUTPATIENT)
Dept: FAMILY MEDICINE CLINIC | Age: 57
End: 2022-04-01
Payer: MEDICARE

## 2022-04-01 VITALS
BODY MASS INDEX: 35.26 KG/M2 | WEIGHT: 199 LBS | DIASTOLIC BLOOD PRESSURE: 78 MMHG | RESPIRATION RATE: 20 BRPM | SYSTOLIC BLOOD PRESSURE: 134 MMHG | HEART RATE: 88 BPM | HEIGHT: 63 IN | OXYGEN SATURATION: 97 % | TEMPERATURE: 97.2 F

## 2022-04-01 DIAGNOSIS — M47.816 SPONDYLOSIS OF LUMBAR JOINT: ICD-10-CM

## 2022-04-01 DIAGNOSIS — M54.16 CHRONIC LEFT-SIDED LUMBAR RADICULOPATHY: Primary | ICD-10-CM

## 2022-04-01 PROCEDURE — 3017F COLORECTAL CA SCREEN DOC REV: CPT | Performed by: FAMILY MEDICINE

## 2022-04-01 PROCEDURE — G8427 DOCREV CUR MEDS BY ELIG CLIN: HCPCS | Performed by: FAMILY MEDICINE

## 2022-04-01 PROCEDURE — G9717 DOC PT DX DEP/BP F/U NT REQ: HCPCS | Performed by: FAMILY MEDICINE

## 2022-04-01 PROCEDURE — G8417 CALC BMI ABV UP PARAM F/U: HCPCS | Performed by: FAMILY MEDICINE

## 2022-04-01 PROCEDURE — 99213 OFFICE O/P EST LOW 20 MIN: CPT | Performed by: FAMILY MEDICINE

## 2022-04-01 PROCEDURE — G9899 SCRN MAM PERF RSLTS DOC: HCPCS | Performed by: FAMILY MEDICINE

## 2022-04-01 PROCEDURE — G8754 DIAS BP LESS 90: HCPCS | Performed by: FAMILY MEDICINE

## 2022-04-01 PROCEDURE — G8752 SYS BP LESS 140: HCPCS | Performed by: FAMILY MEDICINE

## 2022-04-01 RX ORDER — PREDNISONE 10 MG/1
TABLET ORAL
Qty: 21 TABLET | Refills: 0 | Status: SHIPPED | OUTPATIENT
Start: 2022-04-01 | End: 2022-10-28 | Stop reason: ALTCHOICE

## 2022-04-01 RX ORDER — GABAPENTIN 300 MG/1
300 CAPSULE ORAL 3 TIMES DAILY
Qty: 90 CAPSULE | Refills: 0 | Status: SHIPPED | OUTPATIENT
Start: 2022-04-01 | End: 2022-10-28

## 2022-04-01 NOTE — PROGRESS NOTES
Identified pt with two pt identifiers(name and ).     Chief Complaint   Patient presents with    Back Pain    Depression        Health Maintenance Due   Topic    Low dose CT lung screening     COVID-19 Vaccine (2 - Moderna 3-dose series)    Foot Exam Q1        Wt Readings from Last 3 Encounters:   22 199 lb (90.3 kg)   22 200 lb 12.8 oz (91.1 kg)   21 200 lb (90.7 kg)     Temp Readings from Last 3 Encounters:   22 97.2 °F (36.2 °C) (Temporal)   22 97.8 °F (36.6 °C) (Oral)   21 97.8 °F (36.6 °C)     BP Readings from Last 3 Encounters:   22 134/78   22 134/80   21 137/76     Pulse Readings from Last 3 Encounters:   22 88   22 86   21 97         Learning Assessment:  :     Learning Assessment 3/7/2022 2021 2018 2017 2016   PRIMARY LEARNER Patient Patient Patient Patient Patient   HIGHEST LEVEL OF EDUCATION - PRIMARY LEARNER  - [address]   BARRIERS PRIMARY LEARNER - NONE - - [address] CAREGIVER No No - - No   PRIMARY LANGUAGE ENGLISH ENGLISH ENGLISH ENGLISH ENGLISH   LEARNER PREFERENCE PRIMARY DEMONSTRATION DEMONSTRATION LISTENING - DEMONSTRATION     - LISTENING - - -   ANSWERED BY patient patient patient patient self   RELATIONSHIP SELF SELF SELF SELF SELF       Depression Screening:  :     3 most recent PHQ Screens 2022   Little interest or pleasure in doing things Nearly every day   Feeling down, depressed, irritable, or hopeless Nearly every day   Total Score PHQ 2 6   Trouble falling or staying asleep, or sleeping too much Nearly every day   Feeling tired or having little energy Nearly every day   Poor appetite, weight loss, or overeating Nearly every day   Feeling bad about yourself - or that you are a failure or have let yourself or your family down Not at all   Trouble concentrating on things such as school, work, reading, or watching TV Nearly every day   Moving or speaking so slowly that other people could have noticed; or the opposite being so fidgety that others notice Nearly every day   Thoughts of being better off dead, or hurting yourself in some way Not at all   PHQ 9 Score 21   How difficult have these problems made it for you to do your work, take care of your home and get along with others Very difficult       Fall Risk Assessment:  :     Fall Risk Assessment, last 12 mths 3/7/2022   Able to walk? Yes   Fall in past 12 months? 0       Abuse Screening:  :     Abuse Screening Questionnaire 4/1/2022 3/7/2022 1/13/2021 12/10/2020 1/10/2020 3/18/2019 2/14/2018   Do you ever feel afraid of your partner? N N N N N N N   Are you in a relationship with someone who physically or mentally threatens you? N N N N N N N   Is it safe for you to go home? Y Y Y Y Y Y Y       Coordination of Care Questionnaire:  :     1) Have you been to an emergency room, urgent care clinic since your last visit? no   Hospitalized since your last visit? no             2) Have you seen or consulted any other health care providers outside of [address] [place] [address] since your last visit? no  (Include any pap smears or colon screenings in this section.)    3) Do you have an Advance Directive on file? yes  Are you interested in receiving information about Advance Directives? no    Patient is accompanied by N/A I have received verbal consent from Yosef Hansen to discuss any/all medical information while they are present in the room. 4.  For patients aged 39-70: Has the patient had a colonoscopy / FIT/ Cologuard? No      If the patient is female:    5. For patients aged 41-77: Has the patient had a mammogram within the past 2 years? Yes - no Care Gap present      6. For patients aged 21-65: Has the patient had a pap smear?  Yes - no Care Gap present

## 2022-04-01 NOTE — PROGRESS NOTES
HISTORY OF PRESENT ILLNESS  Gabriel Rizzo is a 61 y.o. female. HPI  C/O pain L low back area x few months . Getting worse. Problem walking. Unable to sleep well. Affecting depression. She is on Tizanidine as muscle relaxer daily. Last MRI L spine 9/2020 showed:     Status post nephrectomy on the right. No fracture or dislocation. Discogenic  degenerative changes at L4.     The conus medullaris terminates at L1. Signal and caliber of the distal spinal  cord are within normal limits.      The paraspinal soft tissues are within normal limits.     Lower thoracic spine: No herniation or stenosis.     L1-L2: No herniation or stenosis.     L2-L3: No herniation or stenosis.     L3-L4: No herniation or stenosis. Mild facet arthropathy.     L4-L5: Mild facet arthropathy. Ligamentum flavum hypertrophy. Disc bulge. Mild  left paracentral and left lateral recess protrusion. Mild canal stenosis. Mild  subarticular stenosis bilaterally. Posteriorly oriented synovial cyst on the  right.     L5-S1: No herniation or stenosis. Minimal disc desiccation and loss of disc  height. Facet arthropathy. No change.     In past she has taken Gabapentin but has weaned off it. ROS  Visit Vitals  /78 (BP 1 Location: Left arm, BP Patient Position: Sitting, BP Cuff Size: Adult)   Pulse 88   Temp 97.2 °F (36.2 °C) (Temporal)   Resp 20   Ht 5' 3\" (1.6 m)   Wt 199 lb (90.3 kg)   SpO2 97%   BMI 35.25 kg/m²       Physical Exam  Vitals reviewed. Constitutional:       Appearance: She is obese. Musculoskeletal:      Lumbar back: Tenderness present. No swelling or deformity. Decreased range of motion. Back:    Neurological:      Mental Status: She is alert. ASSESSMENT and PLAN    ICD-10-CM ICD-9-CM    1. Chronic left-sided lumbar radiculopathy  M54.16 724.4 REFERRAL TO PAIN MANAGEMENT      gabapentin (NEURONTIN) 300 mg capsule      predniSONE (STERAPRED DS) 10 mg dose pack   2.  Spondylosis of lumbar joint  M47.816 721.3 gabapentin (NEURONTIN) 300 mg capsule     Order steroid pack.   Restart Gabapentin 300 mg TID  Refer to Pain Management for possible TERRA

## 2022-04-04 ENCOUNTER — TELEPHONE (OUTPATIENT)
Dept: ONCOLOGY | Age: 57
End: 2022-04-04

## 2022-04-04 NOTE — TELEPHONE ENCOUNTER
Called PT and could not leave a VM due to it being full- called PT's brother and was able to speak with him, he said he'd try and get in contact with her to call us about giving her a Virtual appt tomorrow (4/5//22) instead of coming in- please advise

## 2022-04-05 ENCOUNTER — TELEPHONE (OUTPATIENT)
Dept: FAMILY MEDICINE CLINIC | Age: 57
End: 2022-04-05

## 2022-04-05 ENCOUNTER — VIRTUAL VISIT (OUTPATIENT)
Dept: ONCOLOGY | Age: 57
End: 2022-04-05
Payer: MEDICARE

## 2022-04-05 ENCOUNTER — PATIENT OUTREACH (OUTPATIENT)
Dept: CASE MANAGEMENT | Age: 57
End: 2022-04-05

## 2022-04-05 DIAGNOSIS — R60.0 PEDAL EDEMA: ICD-10-CM

## 2022-04-05 DIAGNOSIS — E66.01 SEVERE OBESITY WITH BODY MASS INDEX (BMI) OF 35.0 TO 39.9 WITH SERIOUS COMORBIDITY (HCC): ICD-10-CM

## 2022-04-05 DIAGNOSIS — D72.829 LEUKOCYTOSIS, UNSPECIFIED TYPE: Primary | ICD-10-CM

## 2022-04-05 DIAGNOSIS — G47.33 OSA (OBSTRUCTIVE SLEEP APNEA): Primary | ICD-10-CM

## 2022-04-05 DIAGNOSIS — I10 ESSENTIAL HYPERTENSION WITH GOAL BLOOD PRESSURE LESS THAN 140/90: ICD-10-CM

## 2022-04-05 PROCEDURE — 99213 OFFICE O/P EST LOW 20 MIN: CPT | Performed by: INTERNAL MEDICINE

## 2022-04-05 PROCEDURE — G8427 DOCREV CUR MEDS BY ELIG CLIN: HCPCS | Performed by: INTERNAL MEDICINE

## 2022-04-05 PROCEDURE — 3017F COLORECTAL CA SCREEN DOC REV: CPT | Performed by: INTERNAL MEDICINE

## 2022-04-05 PROCEDURE — G9717 DOC PT DX DEP/BP F/U NT REQ: HCPCS | Performed by: INTERNAL MEDICINE

## 2022-04-05 PROCEDURE — G9899 SCRN MAM PERF RSLTS DOC: HCPCS | Performed by: INTERNAL MEDICINE

## 2022-04-05 PROCEDURE — G8756 NO BP MEASURE DOC: HCPCS | Performed by: INTERNAL MEDICINE

## 2022-04-05 PROCEDURE — G8417 CALC BMI ABV UP PARAM F/U: HCPCS | Performed by: INTERNAL MEDICINE

## 2022-04-05 NOTE — PROGRESS NOTES
[facility] [place] [address] [address]  W: [phone]  F: [phone]    Reason for Visit:   Ihsan Montejo is a 61 y.o. female who is in follow-up for leucocytosis    Seen by synchronous (real-time) audio-video technology on 4/5/2022     History of Present Illness:   Patient is a 61 y. o.female with PMH as below who is seen for abnormal CBC    She has persistent leucocytosis since 2014, no other CBC abnormalities. She has known R renal cell carcinoma ( 3.3 cm) which she had a R radical nephrectomy on 7/28/2020She has osteoarthritis and has several flare ups. She had a CT chest 8/2020 that showed a 6 mm lung nodule which is being observed. She had a negative work up and is on observation. Seen with cbc. She has been uptodate with colonoscopies / mammograms. No new sweats, weight loss. Infections     She smokes 1 ppd for 40 years. Mother has breast cancer- diagnosed at 80    Past Medical History:   Diagnosis Date    Arthritis     Cancer ([address]     IN KIDNEY REMOVED RIGHT KIDNEY     Chronic obstructive pulmonary disease ([address]     BEGINNING STAGES     Diabetes ([address]     Elevated WBC count 2002 to present    GERD (gastroesophageal reflux disease)     Hypertension     Kidney stones 2020    Leg swelling 07/2020    Doppler negative    Nausea & vomiting     Panic attacks     Sleep apnea     Does not use CPAP- raises the head of bed up    Spondylolisthesis of lumbar region     Stroke [facility] 2020    TIA     Tachycardia       Past Surgical History:   Procedure Laterality Date    COLONOSCOPY N/A 3/1/2019    [facility] ENDOSCOPY    HX BREAST BIOPSY Bilateral     benign    HX CHOLECYSTECTOMY  1998    HX HEENT      HX HERNIA REPAIR  05/13/2021    Robotic repair of incisional hernia with mesh,Robotic repair of adhesions convert to open incisioal hernia repair with mesh by Dr. Oma Klein.     HX HYSTERECTOMY  2008    HX KNEE ARTHROSCOPY Bilateral 2012    HX KNEE REPLACEMENT Left 2012    HX KNEE REPLACEMENT Right 08/18/2016    HX NEPHROSTOMY Right     HX SEPTOPLASTY  1993    HX SVT ABLATION  2013    HX TUBAL LIGATION  1989    HX UROLOGICAL Right 07/2020    nephrectomy    HX WISDOM TEETH EXTRACTION      X2    NEUROLOGICAL PROCEDURE UNLISTED      lumbar nerve block      Social History     Tobacco Use    Smoking status: Current Every Day Smoker     Packs/day: 1.00     Years: 40.00     Pack years: 40.00     Types: Cigarettes    Smokeless tobacco: Never Used    Tobacco comment: STATES THINKS ABOUT IT OFTEN, IS THINKING ABOUT IT    Substance Use Topics    Alcohol use: Not Currently      Family History   Problem Relation Age of Onset    OSTEOARTHRITIS Mother     Cancer Mother         BREAST CA     Hypertension Mother     Thyroid Disease Mother     Glaucoma Mother     High Cholesterol Mother     Psychiatric Disorder Father         BIPOLAR    OSTEOARTHRITIS Father     Alcohol abuse Father     Heart Disease Father     Heart Surgery Father     Hypertension Father     Diabetes Brother     OSTEOARTHRITIS Brother     Hypertension Brother     Deep Vein Thrombosis Brother         Unknown cause    Anesth Problems Neg Hx      Current Outpatient Medications   Medication Sig    gabapentin (NEURONTIN) 300 mg capsule Take 1 Capsule by mouth three (3) times daily. Max Daily Amount: 900 mg. Indications: neuropathic pain    predniSONE (STERAPRED DS) 10 mg dose pack See administration instruction per 10mg dose pack    losartan (COZAAR) 100 mg tablet TAKE 1 TABLET EVERY NIGHT    metFORMIN (GLUCOPHAGE) 500 mg tablet TAKE TWO TABLETS TWO TIMES A DAY WITH MEALS    furosemide (LASIX) 20 mg tablet TAKE 1 TABLET EVERY DAY AS NEEDED    OTHER Beets    zinc sulfate (ZINC-220 PO) Take  by mouth.  Nebulizer & Compressor machine Use nebulizer every 6 hours as needed.   DX J44.9    traZODone (DESYREL) 50 mg tablet Take 2 Tablets by mouth nightly.  OTHER,NON-FORMULARY, Take 3 Caplet by mouth. CBD Gummies   Indications: for pain    albuterol-ipratropium (DUO-NEB) 2.5 mg-0.5 mg/3 ml nebu 3 mL by Nebulization route every six (6) hours as needed for Wheezing. Indications: bronchi muscle spasm resulting from COPD    atorvastatin (LIPITOR) 40 mg tablet TAKE 1 TABLET EVERY DAY    rOPINIRole (REQUIP) 2 mg tablet TAKE 1 TABLET THREE TIMES DAILY    Accu-Chek Joyce Plus test strp strip TEST BLOOD SUGAR EVERY DAY    levocetirizine (XYZAL) 5 mg tablet TAKE 1 TABLET EVERY DAY    albuterol (PROVENTIL HFA, VENTOLIN HFA, PROAIR HFA) 90 mcg/actuation inhaler Take 2 Puffs by inhalation every four (4) hours as needed for Wheezing, Shortness of Breath or Cough.  inhalational spacing device 1 Each by Does Not Apply route as needed for Wheezing.  dilTIAZem ER (CARDIZEM CD) 180 mg capsule TAKE 2 CAPSULES EVERY DAY FOR HIGH BLOOD PRESSURE    benzonatate (TESSALON) 100 mg capsule TAKE 1 CAPSULE THREE TIMES DAILY AS NEEDED FOR COUGH    tiZANidine (ZANAFLEX) 2 mg tablet TAKE 1 TABLET TWICE DAILY FOR MUSCLE SPASM(S)    venlafaxine-SR (EFFEXOR-XR) 150 mg capsule TAKE 1 CAPSULE EVERY DAY    omeprazole (PRILOSEC) 40 mg capsule TAKE 1 CAPSULE BY MOUTH BEFORE BREAKFAST AND DINNER.  Aspirin-Caffeine (BC Arthritis) 1,000-65 mg pwpk Take  by mouth.  polyethylene glycol (MIRALAX) 17 gram packet Take 1 Packet by mouth daily. May take 1-2 packets as needed daily for constipation.  LORazepam (ATIVAN) 0.5 mg tablet Take 1 Tab by mouth two (2) times daily as needed for Anxiety. Max Daily Amount: 1 mg. Indications: anxious     No current facility-administered medications for this visit.       Allergies   Allergen Reactions    Nsaids (Non-Steroidal Anti-Inflammatory Drug) Other (comments)     Stomach hurts    Celebrex [Celecoxib] Hives    Ditropan [Oxybutynin Chloride] Hives and Itching    Mobic [Meloxicam] Hives    Penicillins Unknown (comments)     DOESN'T REMEMBER  Patient states tolerating PO amoxicillin    Plaquenil [Hydroxychloroquine] Hives        Review of Systems: A complete review of systems was obtained, negative except as described above. Physical Exam:       Due to this being a TeleHealth evaluation, many elements of the physical examination are unable to be assessed. Constitutional: Appears well-developed and well-nourished in no apparent distress   Mental status: Alert and awake, Oriented to person/place/time, Able to follow commands  Eyes: EOM normal, Sclera normal, No visible ocular discharge  HENT: Normocephalic, atraumatic; Mouth/Throat: Moist mucous membranes, External Ears normal  Neck: No visualized mass  Psychiatric: Normal affect, normal judgment/insight. No hallucinations     Results:     Lab Results   Component Value Date/Time    WBC 16.3 (H) 03/07/2022 01:13 PM    HGB 11.8 03/07/2022 01:13 PM    HCT 39.7 03/07/2022 01:13 PM    PLATELET 981 24/09/4875 01:13 PM    MCV 82.5 03/07/2022 01:13 PM    ABS. NEUTROPHILS 11.5 (H) 03/07/2022 01:13 PM     Lab Results   Component Value Date/Time    Sodium 139 03/07/2022 01:13 PM    Potassium 4.4 03/07/2022 01:13 PM    Chloride 107 03/07/2022 01:13 PM    CO2 29 03/07/2022 01:13 PM    Glucose 61 (L) 03/07/2022 01:13 PM    BUN 8 03/07/2022 01:13 PM    Creatinine 0.94 03/07/2022 01:13 PM    GFR est AA >60 03/07/2022 01:13 PM    GFR est non-AA >60 03/07/2022 01:13 PM    Calcium 9.6 03/07/2022 01:13 PM    Glucose (POC) 242 (H) 05/15/2021 11:29 AM    Creatinine (POC) 0.7 05/29/2020 06:13 PM     Lab Results   Component Value Date/Time    Bilirubin, total 0.2 03/07/2022 01:13 PM    ALT (SGPT) 42 03/07/2022 01:13 PM    Alk.  phosphatase 203 (H) 03/07/2022 01:13 PM    Protein, total 6.7 03/07/2022 01:13 PM    Albumin 3.8 03/07/2022 01:13 PM    Globulin 2.9 03/07/2022 01:13 PM       Lab Results   Component Value Date/Time    Iron % saturation 8 (L) 12/10/2020 12:15 PM    TIBC 489 (H) 12/10/2020 12:15 PM    Ferritin 16 12/10/2020 12:15 PM    Vitamin B12 >2,000 (H) 10/28/2020 07:07 AM    Folate 47.9 (H) 10/28/2020 07:07 AM    Erythropoietin 16.7 09/05/2018 11:25 AM    Sed rate (ESR) 2 08/10/2018 10:53 AM    Sed rate, automated 9 02/09/2015 10:00 AM    C-Reactive protein <0.29 10/28/2020 07:07 AM    C-Reactive Protein, Qt 5.1 (H) 08/10/2018 10:53 AM    TSH 0.89 03/07/2022 01:13 PM    CALEB, Direct Positive (A) 09/04/2014 11:00 AM    Hep C Virus Ab 0.1 04/22/2014 11:15 AM    HIV 1/2 Interpretation NONREACTIVE 04/22/2014 11:15 AM     Lab Results   Component Value Date/Time    INR 0.9 08/24/2020 05:30 PM    aPTT 24.8 08/24/2020 05:30 PM    D-dimer 0.57 08/24/2020 05:30 PM       Records reviewed and summarized above. Pathology report(s) reviewed above. Radiology report(s) reviewed above. CT abdomen 5/2020- reviewed     CTA 8/2020- reviewed     Assessment:   1) Leucocytosis    Has had it for almost 20 years  Likely reactive to smoking  JAK2 and BCR ABL PCR negative     She is uptodate with age appropriate cancer screening    Stable counts     2) Elevated liver enzymes   She sees Dr. Celeste Ham- [facility] abdomen reviewed and is unremarkable  Per patient this is secondary to fatty liver     3) Obesity    4) Osteoarthritis    5) Lung nodule  Per pulmonary- follow up     Plan:     Follow with PCP with annual CBC diff  Should there be a persistent rise in WBC count of > 20k or has new anemia or thrombocytopenia she should come back to see us     I appreciate the opportunity to participate in Ms. Kristin Ferrell's care. Signed By: Reji Decker MD    The patient was evaluated through a synchronous (real-time) audio-video encounter. The patient (or guardian if applicable) is aware that this is a billable service, which includes applicable co-pays. This Virtual Visit was conducted with patient's (and/or legal guardian's) consent.  The visit was conducted pursuant to the emergency declaration under the [facility] Act and the [address] waiver authority and the [employer] and [employer] Act. Patient identification was verified, and a caregiver was present when appropriate. The patient was located in a state where the provider was licensed to provide care.

## 2022-04-05 NOTE — PROGRESS NOTES
Isreal Novoa is a 61 y.o. female  Chief Complaint   Patient presents with    Follow-up      leucocytosis     1. Have you been to the ER, urgent care clinic since your last visit? Hospitalized since your last visit? No    2. Have you seen or consulted any other health care providers outside of the [address] [place] [address] since your last visit? Include any pap smears or colon screening.  No

## 2022-04-06 NOTE — TELEPHONE ENCOUNTER
----- Message from [facility] sent at 4/5/2022 11:49 AM EDT -----  Regarding: FW: Cardiologist     ----- Message -----  From: Vivian Escobar  Sent: 4/5/2022  11:48 AM EDT  To: Theodore Clark Pool  Subject: Cardiologist                                     Can I get a referral for see attached   Can get referral for sleep disorder see attached

## 2022-04-19 ENCOUNTER — TRANSCRIBE ORDER (OUTPATIENT)
Dept: SCHEDULING | Age: 57
End: 2022-04-19

## 2022-04-19 ENCOUNTER — TELEPHONE (OUTPATIENT)
Dept: FAMILY MEDICINE CLINIC | Age: 57
End: 2022-04-19

## 2022-04-19 DIAGNOSIS — M54.17 LUMBOSACRAL RADICULOPATHY: ICD-10-CM

## 2022-04-19 DIAGNOSIS — M54.16 LUMBAR RADICULOPATHY: Primary | ICD-10-CM

## 2022-04-19 DIAGNOSIS — M51.37 DDD (DEGENERATIVE DISC DISEASE), LUMBOSACRAL: ICD-10-CM

## 2022-04-19 DIAGNOSIS — M51.36 DDD (DEGENERATIVE DISC DISEASE), LUMBAR: ICD-10-CM

## 2022-04-21 ENCOUNTER — OFFICE VISIT (OUTPATIENT)
Dept: SLEEP MEDICINE | Age: 57
End: 2022-04-21
Payer: MEDICARE

## 2022-04-21 VITALS
OXYGEN SATURATION: 97 % | SYSTOLIC BLOOD PRESSURE: 138 MMHG | WEIGHT: 203 LBS | BODY MASS INDEX: 35.97 KG/M2 | HEIGHT: 63 IN | HEART RATE: 94 BPM | DIASTOLIC BLOOD PRESSURE: 76 MMHG

## 2022-04-21 DIAGNOSIS — Z78.9 INTOLERANCE OF CONTINUOUS POSITIVE AIRWAY PRESSURE (CPAP) VENTILATION: ICD-10-CM

## 2022-04-21 DIAGNOSIS — G47.33 OSA (OBSTRUCTIVE SLEEP APNEA): Primary | ICD-10-CM

## 2022-04-21 PROCEDURE — 3017F COLORECTAL CA SCREEN DOC REV: CPT | Performed by: SPECIALIST

## 2022-04-21 PROCEDURE — G8752 SYS BP LESS 140: HCPCS | Performed by: SPECIALIST

## 2022-04-21 PROCEDURE — G9717 DOC PT DX DEP/BP F/U NT REQ: HCPCS | Performed by: SPECIALIST

## 2022-04-21 PROCEDURE — G8427 DOCREV CUR MEDS BY ELIG CLIN: HCPCS | Performed by: SPECIALIST

## 2022-04-21 PROCEDURE — G8754 DIAS BP LESS 90: HCPCS | Performed by: SPECIALIST

## 2022-04-21 PROCEDURE — G9899 SCRN MAM PERF RSLTS DOC: HCPCS | Performed by: SPECIALIST

## 2022-04-21 PROCEDURE — G8417 CALC BMI ABV UP PARAM F/U: HCPCS | Performed by: SPECIALIST

## 2022-04-21 PROCEDURE — 99204 OFFICE O/P NEW MOD 45 MIN: CPT | Performed by: SPECIALIST

## 2022-04-21 RX ORDER — MELATONIN
DAILY
COMMUNITY

## 2022-04-21 NOTE — PROGRESS NOTES
[address] [place]. [place], [address]  Tel.  [phone]  Fax. [address] [facility] [address]  [place], [address] [facility]  Tel.  [phone]  Fax. [phone] [address] [facility] [place]   Tel.  [phone]  Fax. [phone]       Chief Complaint       Chief Complaint   Patient presents with    Sleep Problem     NP; ref Dr Salima Cortez; snore, witnessed apnea       HPI      Vicki Grandchild is 61 y.o. female seen for evaluation of a sleep disorder. She notes having had an initial evaluation for sleep apnea over 25 years ago. She was started on PAP which she had difficulty using and discontinued. Continues experiencing sleep difficulties. Currently retires at 8: 30 PM and will get a bed at 4 AM.  She will often awaken 3 times during the night. Has been told of loud snoring, heard through closed doors and in separate rooms. Associate with apparent apnea. Notes bruxism. Tired on awakening and during the day. May doze if seated and active such as when reading or watching TV. [place] Sleepiness Score: 8       Allergies   Allergen Reactions    Nsaids (Non-Steroidal Anti-Inflammatory Drug) Other (comments)     Stomach hurts    Celebrex [Celecoxib] Hives    Ditropan [Oxybutynin Chloride] Hives and Itching    Mobic [Meloxicam] Hives    Penicillins Unknown (comments)     DOESN'T REMEMBER  Patient states tolerating PO amoxicillin    Plaquenil [Hydroxychloroquine] Hives       Current Outpatient Medications   Medication Sig Dispense Refill    cholecalciferol (Vitamin D3) (1000 Units /25 mcg) tablet Take  by mouth daily.  gabapentin (NEURONTIN) 300 mg capsule Take 1 Capsule by mouth three (3) times daily. Max Daily Amount: 900 mg.  Indications: neuropathic pain 90 Capsule 0    losartan (COZAAR) 100 mg tablet TAKE 1 TABLET EVERY NIGHT 90 Tablet 3    metFORMIN (GLUCOPHAGE) 500 mg tablet TAKE TWO TABLETS TWO TIMES A DAY WITH MEALS 360 Tablet 1    furosemide (LASIX) 20 mg tablet TAKE 1 TABLET EVERY DAY AS NEEDED 90 Tablet 1    OTHER Beets      zinc sulfate (ZINC-220 PO) Take  by mouth.  Nebulizer & Compressor machine Use nebulizer every 6 hours as needed. DX J44.9 1 Each 0    traZODone (DESYREL) 50 mg tablet Take 2 Tablets by mouth nightly. 180 Tablet 3    OTHER,NON-FORMULARY, Take 3 Caplet by mouth. CBD Gummies   Indications: for pain      albuterol-ipratropium (DUO-NEB) 2.5 mg-0.5 mg/3 ml nebu 3 mL by Nebulization route every six (6) hours as needed for Wheezing. Indications: bronchi muscle spasm resulting from COPD 1080 mL 1    atorvastatin (LIPITOR) 40 mg tablet TAKE 1 TABLET EVERY DAY 90 Tablet 1    rOPINIRole (REQUIP) 2 mg tablet TAKE 1 TABLET THREE TIMES DAILY 270 Tablet 1    Accu-Chek Joyce Plus test strp strip TEST BLOOD SUGAR EVERY  Strip 4    levocetirizine (XYZAL) 5 mg tablet TAKE 1 TABLET EVERY DAY 90 Tablet 3    albuterol (PROVENTIL HFA, VENTOLIN HFA, PROAIR HFA) 90 mcg/actuation inhaler Take 2 Puffs by inhalation every four (4) hours as needed for Wheezing, Shortness of Breath or Cough. 18 g 0    inhalational spacing device 1 Each by Does Not Apply route as needed for Wheezing. 1 Each 0    dilTIAZem ER (CARDIZEM CD) 180 mg capsule TAKE 2 CAPSULES EVERY DAY FOR HIGH BLOOD PRESSURE 10 Capsule 0    benzonatate (TESSALON) 100 mg capsule TAKE 1 CAPSULE THREE TIMES DAILY AS NEEDED FOR COUGH 60 Capsule 3    tiZANidine (ZANAFLEX) 2 mg tablet TAKE 1 TABLET TWICE DAILY FOR MUSCLE SPASM(S) 180 Tablet 1    venlafaxine-SR (EFFEXOR-XR) 150 mg capsule TAKE 1 CAPSULE EVERY DAY 90 Capsule 3    omeprazole (PRILOSEC) 40 mg capsule TAKE 1 CAPSULE BY MOUTH BEFORE BREAKFAST AND DINNER. 180 Capsule 3    Aspirin-Caffeine (BC Arthritis) 1,000-65 mg pwpk Take  by mouth.  polyethylene glycol (MIRALAX) 17 gram packet Take 1 Packet by mouth daily. May take 1-2 packets as needed daily for constipation.  14 Packet 1    LORazepam (ATIVAN) 0.5 mg tablet Take 1 Tab by mouth two (2) times daily as needed for Anxiety. Max Daily Amount: 1 mg. Indications: anxious 180 Tab 0    predniSONE (STERAPRED DS) 10 mg dose pack See administration instruction per 10mg dose pack (Patient not taking: Reported on 4/21/2022) 21 Tablet 0        She  has a past medical history of Arthritis, Cancer ([address] Chronic obstructive pulmonary disease ([address] Diabetes ([address] Elevated WBC count (2002 to present), GERD (gastroesophageal reflux disease), Hypertension, Kidney stones (2020), Leg swelling (07/2020), Nausea & vomiting, Panic attacks, Sleep apnea, Spondylolisthesis of lumbar region, Stroke ([address] (2020), and Tachycardia. She  has a past surgical history that includes hx cholecystectomy (1998); hx tubal ligation (1989); hx hysterectomy (2008); hx breast biopsy (Bilateral); neurological procedure unlisted; colonoscopy (N/A, 3/1/2019); hx septoplasty (1993); hx svt ablation (2013); hx urological (Right, 07/2020); hx knee arthroscopy (Bilateral, 2012); hx knee replacement (Left, 2012); hx knee replacement (Right, 08/18/2016); hx nephrostomy (Right); hx heent; hx wisdom teeth extraction; and hx hernia repair (05/13/2021). She family history includes Alcohol abuse in her father; Cancer in her mother; Deep Vein Thrombosis in her brother; Diabetes in her brother; Glaucoma in her mother; Heart Disease in her father; Heart Surgery in her father; High Cholesterol in her mother; Hypertension in her brother, father, and mother; OSTEOARTHRITIS in her brother, father, and mother; Psychiatric Disorder in her father; Thyroid Disease in her mother. She  reports that she has been smoking cigarettes. She has a 40.00 pack-year smoking history. She has never used smokeless tobacco. She reports previous alcohol use. She reports that she does not use drugs.      Review of Systems:  ROS      Objective:     Visit Vitals  /76   Pulse 94   Ht 5' 3\" (1.6 m)   Wt 203 lb (92.1 kg)   SpO2 97% BMI 35.96 kg/m²     Body mass index is 35.96 kg/m². General:   Conversant, cooperative   Eyes:  Pupils equal and reactive, no nystagmus   Oropharynx:   Mallampati score I, tongue normal       Neck:   No carotid bruits; Neck circ. in \"inches\": 15   Chest/Lungs:  Clear on auscultation    CVS:  Normal rate, regular rhythm   Skin:  Warm to touch; no obvious rashes   Neuro:  Speech fluent, face symmetrical, tongue movement normal   Psych:  Normal affect,  normal countenance        Assessment:       ICD-10-CM ICD-9-CM    1. YOLANDA (obstructive sleep apnea)  G47.33 327.23 SPLIT CPAP/PSG   2. Intolerance of continuous positive airway pressure (CPAP) ventilation  Z78.9 V49.89        History of sleep disordered breathing with poor CPAP tolerance. Will be reevaluated with a sleep study, split to BiPAP. Plan:     Orders Placed This Encounter    SPLIT CPAP/PSG     History of poor CPAP tolerance. Please split to BIPAP     Standing Status:   Future     Standing Expiration Date:   10/22/2022     Order Specific Question:   Reason for Exam     Answer:   sleep apnea       * Patient has a history and examination consistent with the diagnosis of sleep apnea. * Sleep testing was ordered for reevaluation. * She was provided information on sleep apnea including corresponding risk factors and the importance of proper treatment. * Treatment options if indicated were reviewed today. Instructions:  o Do not engage in activities requiring a normal degree of alertness if fatigue is present. o The patient understands that untreated or undertreated sleep apnea could impair judgement and the ability to function normally during the day.  o Call or return if symptoms worsen or persist.          Maicol Mejia MD, FAASM  Electronically signed 04/21/22       This note was created using voice recognition software. Despite editing, there may be syntax errors. This note will not be viewable in [address]

## 2022-04-21 NOTE — PATIENT INSTRUCTIONS
Learning About Sleep Apnea  What is it? Sleep apnea means that breathing stops for short periods during sleep. When you stop breathing or have reduced airflow into your lungs during sleep, you don't sleep well and you can be very tired during the day. The oxygen levels in your blood may go down, and carbon dioxide levels go up. It may lead to other problems, such as high blood pressure and heart disease. Sleep apnea can range from mild to severe, based on how often breathing stops during sleep. For adults, breathing may stop as few as 5 times an hour (mild apnea) to 30 or more times an hour (severe apnea). Obstructive sleep apnea is the most common type. This most often occurs because your airways are blocked or partly blocked. Central sleep apnea is less common. It happens when the brain has trouble controlling breathing. Some people have both types. That's called complex sleep apnea. What are the symptoms? There are symptoms of sleep apnea that you may notice and symptoms that others may notice when you're asleep. Symptoms you may notice include:  · Feeling extremely sleepy during the day. · Feeling unrefreshed or tired after a night's sleep. · Problems with memory and concentration, or mood changes. · Morning headaches. · Getting up often during the night to urinate. · A dry mouth or sore throat in the morning. If you have a bed partner, they may notice that you:  · Have episodes of not breathing. · Snore loudly. Almost all people who have sleep apnea snore. But not all people who snore have sleep apnea. · Toss and turn during sleep. · Have nighttime choking or gasping spells. How is it diagnosed? Your doctor will probably do a physical exam and ask about your past health. The doctor may also ask you or your bed partner about your snoring and sleep behavior and how tired you feel during the day. Your doctor may suggest a sleep study.  Sleep studies are a series of tests that look at what happens to the body during sleep. They check for how often you stop breathing or have too little air flowing into your lungs during sleep. They also find out how much oxygen you have in your blood during sleep. A sleep study may take place in your home. Or it might take place at a sleep center, where you will spend the night. If your sleep apnea doesn't improve with treatment, you may have more tests to find out what's causing it. How is it treated? You may be able to help treat sleep apnea by making some lifestyle changes. You could try to lose weight, sleep on your side, and avoid alcohol and medicines like sedatives before bed. Sleep apnea is often treated with machines that deliver air through a mask to help keep your airways open. These include:  · Continuous positive airway pressure (CPAP). This increases air pressure in your throat. It keeps your airway open when you breathe in. It's the most common device. · Bilevel positive airway pressure (BPAP). You may also hear this called BiPAP. This uses different air pressures when you breathe in and out. · Adaptive servo ventilation (ASV). It senses pauses in breathing and adjusts air pressure. It's mostly used for central sleep apnea. You can also try oral breathing devices or nasal devices. If your tonsils or other tissues are blocking your airway, your doctor may suggest surgery to open the airway. How can you care for yourself at home? · Lose weight, if needed. · Sleep on your side. It may help mild apnea. · Avoid alcohol and medicines such as sleeping pills, opioids, or sedatives before bed. · Don't smoke. If you need help quitting, talk to your doctor. · Prop up the head of your bed. · Treat breathing problems, such as a stuffy nose, that are caused by a cold or allergies. · Try a continuous positive airway pressure (CPAP) breathing machine if your doctor recommends it.   · If CPAP doesn't work for you, ask your doctor if you can try other masks, settings, or breathing machines. · Try oral breathing devices or other nasal devices. · Talk to your doctor if your nose feels dry or bleeds, or if it gets runny or stuffy when you use a breathing machine. · Tell your doctor if you're sleepy during the day and it affects your daily life. Don't drive or operate machinery when you're drowsy. Where can you learn more? Go to http://www.gray.com/  Enter S121 in the search box to learn more about \"Learning About Sleep Apnea. \"  Current as of: July 6, 2021               Content Version: 13.2  © 3973-9508 [employer]. Care instructions adapted under license by [employer] (which disclaims liability or warranty for this information). If you have questions about a medical condition or this instruction, always ask your healthcare professional. [place] [address] any warranty or liability for your use of this information.

## 2022-05-02 ENCOUNTER — PATIENT OUTREACH (OUTPATIENT)
Dept: CASE MANAGEMENT | Age: 57
End: 2022-05-02

## 2022-05-02 NOTE — PROGRESS NOTES
Patient has ended services prior to gradtiation from the Complex Case Management  program on 05/02/22  . Patient was not able to be contacted via telephone, mychart and mail. No further Ambulatory Care Manager follow up scheduled. Goals Addressed    None         Patient has Ambulatory Care Manager's contact information for any further questions, concerns, or needs.   Patients upcoming visits:    Future Appointments   Date Time Provider Samantha James   5/29/2022  8:30 PM BEDRM 2 [address] SLEEP LAB [place]   5/31/2022  9:30 AM SPT CT 1 SPTRCT SPT

## 2022-05-18 ENCOUNTER — TELEPHONE (OUTPATIENT)
Dept: SLEEP MEDICINE | Age: 57
End: 2022-05-18

## 2022-05-18 NOTE — TELEPHONE ENCOUNTER
Plan authorized Titration Study versus Split as patient had a PSG already done in 2017. Forwarded to Dr. Pinky Silva to put in an order.

## 2022-05-23 RX ORDER — DILTIAZEM HYDROCHLORIDE 180 MG/1
CAPSULE, COATED, EXTENDED RELEASE ORAL
Qty: 180 CAPSULE | Refills: 3 | Status: SHIPPED | OUTPATIENT
Start: 2022-05-23

## 2022-05-29 ENCOUNTER — HOSPITAL ENCOUNTER (OUTPATIENT)
Dept: SLEEP MEDICINE | Age: 57
Discharge: HOME | End: 2022-05-29
Attending: SPECIALIST
Payer: MEDICARE

## 2022-05-29 VITALS
SYSTOLIC BLOOD PRESSURE: 162 MMHG | HEIGHT: 63 IN | DIASTOLIC BLOOD PRESSURE: 88 MMHG | WEIGHT: 203 LBS | TEMPERATURE: 100.7 F | OXYGEN SATURATION: 96 % | BODY MASS INDEX: 35.97 KG/M2 | HEART RATE: 99 BPM

## 2022-05-29 DIAGNOSIS — G47.33 OSA (OBSTRUCTIVE SLEEP APNEA): ICD-10-CM

## 2022-05-29 PROCEDURE — 95811 POLYSOM 6/>YRS CPAP 4/> PARM: CPT | Performed by: SPECIALIST

## 2022-05-30 NOTE — PROGRESS NOTES
[address] [facility] [facility]. [place], [address]  Tel.  [phone]  Fax. 100 [facility] 60  [facility], [address]  Tel.  [phone]  Fax. [phone] [address] [facility] [place]  Tel.  [phone]  Fax. [phone]     Sleep Study Technical Notes        PRE-Test:  Yaw Sanchez (: 1965) arrived in the lobby. Patient was greeted and screening questions asked. The patient was taken  To her room.  Vitals:  o Temperature (100.7)   o BP (162/88)   o SaO2 (96)   o Weight per patient (203)   Procedure explained to the patient and questions were answered. The patient expressed understanding of the procedure. Electrodes were applied without incident. The patient was placed in bed and the study was started.  PAP mask acclimation for 3 min. Patient didt tolerate mask.  Sleep aid taken:  No      Acquisition Notes:   Lights off: 9:18pm     Respiratory events: Hypop, OA   ECG:  NSR   Snoring:  Severe   PAP titration: BiPAP  o Eliminated events:  o Reduced events:  o Events at  final pressure 12/6cm H2O - none   Desensitization Mask(s) Used: Resmed N30i sm; F30i sm   Positional therapy with: Other comments: NA  o Patient had caregiver in attendance:  No  o Patient watched TV or on phone after lights out for 20 minutes  o Patient slept with positional therapy:  No  o Patient slept with head of bed elevated:  Yes  o Patient wore an oral appliance:  No  o Patient to bathroom 2 times    POST Test:   Patient was awakened. Electrodes were removed. The patient was discharged after answering the Post Questionnaire. Patient stated that she was alert and ok to drive.  Equipment and room cleaned per infection control policy.

## 2022-05-31 ENCOUNTER — HOSPITAL ENCOUNTER (OUTPATIENT)
Dept: CT IMAGING | Age: 57
Discharge: HOME | End: 2022-05-31
Attending: FAMILY MEDICINE
Payer: MEDICARE

## 2022-05-31 ENCOUNTER — HOSPITAL ENCOUNTER (OUTPATIENT)
Dept: CT IMAGING | Age: 57
End: 2022-05-31
Attending: FAMILY MEDICINE

## 2022-05-31 ENCOUNTER — TELEPHONE (OUTPATIENT)
Dept: FAMILY MEDICINE CLINIC | Age: 57
End: 2022-05-31

## 2022-05-31 DIAGNOSIS — R91.1 NODULE OF RIGHT LUNG: ICD-10-CM

## 2022-05-31 DIAGNOSIS — R91.1 NODULE OF RIGHT LUNG: Primary | ICD-10-CM

## 2022-05-31 PROCEDURE — 71250 CT THORAX DX C-: CPT

## 2022-05-31 NOTE — PROGRESS NOTES
R lung nodule needs evaluation by Pulmonary specialist.  I want to refer you to Dr Kerrie Burgess of [facility]. Please call for appt.

## 2022-06-01 ENCOUNTER — TELEPHONE (OUTPATIENT)
Dept: FAMILY MEDICINE CLINIC | Age: 57
End: 2022-06-01

## 2022-06-02 ENCOUNTER — TELEPHONE (OUTPATIENT)
Dept: SLEEP MEDICINE | Age: 57
End: 2022-06-02

## 2022-06-02 DIAGNOSIS — G47.33 OSA (OBSTRUCTIVE SLEEP APNEA): Primary | ICD-10-CM

## 2022-06-05 RX ORDER — TIZANIDINE 2 MG/1
TABLET ORAL
Qty: 180 TABLET | Refills: 1 | Status: SHIPPED | OUTPATIENT
Start: 2022-06-05 | End: 2022-10-28 | Stop reason: SDUPTHER

## 2022-06-06 ENCOUNTER — DOCUMENTATION ONLY (OUTPATIENT)
Dept: SLEEP MEDICINE | Age: 57
End: 2022-06-06

## 2022-06-06 DIAGNOSIS — E78.00 HYPERCHOLESTEROLEMIA: ICD-10-CM

## 2022-06-06 DIAGNOSIS — G25.81 RLS (RESTLESS LEGS SYNDROME): ICD-10-CM

## 2022-06-06 RX ORDER — ROPINIROLE 2 MG/1
TABLET, FILM COATED ORAL
Qty: 270 TABLET | Refills: 1 | Status: SHIPPED | OUTPATIENT
Start: 2022-06-06 | End: 2022-10-28 | Stop reason: SDUPTHER

## 2022-06-06 RX ORDER — ATORVASTATIN CALCIUM 40 MG/1
TABLET, FILM COATED ORAL
Qty: 90 TABLET | Refills: 1 | Status: SHIPPED | OUTPATIENT
Start: 2022-06-06 | End: 2022-10-28 | Stop reason: SDUPTHER

## 2022-06-06 NOTE — TELEPHONE ENCOUNTER
Sleep study performed for sleep disordered breathing. Patient has history of poor CPAP tolerance. During initial portion of the study: 284.4 minutes recorded of which 246.5 minutes spent asleep with a sleep efficiency of 86.7%. Sleep onset at 9.3 minutes; REM sleep not observed. 108 respiratory events occurred of which 95 hypopnea and 13 apnea (5 central, 1 mixed, 7 obstructive). AHI 26.3/h. Minimal SaO2 86%. During second portion of the study BiPAP employed. 247.4 minutes recorded of which 216.5 minutes spent asleep with a sleep efficiency of 87.5%. Sleep onset at 23.9 minutes; REM onset at 63.5 minutes with total REM representing 35.8% of sleep time (REM-rebound). 11 respiratory events occurred of which 10 hypopnea and 1 central apnea. AHI 3/h. REM related AHI 7/h. Minimal SaO2 87%. BiPAP initiated 8/4 cm increased to 13/6 cm. At BiPAP 13/6 cm: 146 minutes recorded of which 141 minutes been asleep and 64 minutes in rem. AHI 0.9/h. ResMed F 30 i (SM) mask employed. Impression: Sleep disordered breathing responding to BiPAP 13/6 cm. Supine sleep not observed. Sleep technologist: Please review study results with the patient. Order has been entered for BiPAP 13/6 cm.

## 2022-06-08 DIAGNOSIS — K29.30 CHRONIC SUPERFICIAL GASTRITIS WITHOUT BLEEDING: ICD-10-CM

## 2022-06-08 RX ORDER — OMEPRAZOLE 40 MG/1
CAPSULE, DELAYED RELEASE ORAL
Qty: 180 CAPSULE | Refills: 3 | Status: SHIPPED | OUTPATIENT
Start: 2022-06-08

## 2022-06-09 ENCOUNTER — DOCUMENTATION ONLY (OUTPATIENT)
Dept: SLEEP MEDICINE | Age: 57
End: 2022-06-09

## 2022-06-16 ENCOUNTER — TRANSCRIBE ORDER (OUTPATIENT)
Dept: SCHEDULING | Age: 57
End: 2022-06-16

## 2022-06-16 DIAGNOSIS — R93.89 ABNORMAL CHEST CT: Primary | ICD-10-CM

## 2022-07-01 ENCOUNTER — HOSPITAL ENCOUNTER (OUTPATIENT)
Dept: PET IMAGING | Age: 57
Discharge: HOME | End: 2022-07-01
Attending: INTERNAL MEDICINE
Payer: MEDICARE

## 2022-07-01 VITALS — WEIGHT: 200 LBS | BODY MASS INDEX: 35.44 KG/M2 | HEIGHT: 63 IN

## 2022-07-01 DIAGNOSIS — R93.89 ABNORMAL CHEST CT: ICD-10-CM

## 2022-07-01 LAB
GLUCOSE BLD STRIP.AUTO-MCNC: 134 MG/DL (ref 65–117)
SERVICE CMNT-IMP: ABNORMAL

## 2022-07-01 PROCEDURE — A9552 F18 FDG: HCPCS

## 2022-07-01 RX ORDER — FLUDEOXYGLUCOSE F-18 200 MCI/ML
10 INJECTION INTRAVENOUS ONCE
Status: COMPLETED | OUTPATIENT
Start: 2022-07-01 | End: 2022-07-01

## 2022-07-01 RX ADMIN — FLUDEOXYGLUCOSE F-18 10.01 MILLICURIE: 200 INJECTION INTRAVENOUS at 09:04

## 2022-07-18 ENCOUNTER — TRANSCRIBE ORDER (OUTPATIENT)
Dept: SCHEDULING | Age: 57
End: 2022-07-18

## 2022-07-18 DIAGNOSIS — R91.1 LUNG NODULE: Primary | ICD-10-CM

## 2022-08-12 DIAGNOSIS — R60.0 PEDAL EDEMA: ICD-10-CM

## 2022-08-12 RX ORDER — FUROSEMIDE 20 MG/1
TABLET ORAL
Qty: 90 TABLET | Refills: 1 | Status: SHIPPED | OUTPATIENT
Start: 2022-08-12

## 2022-08-18 DIAGNOSIS — F34.1 DYSTHYMIA: ICD-10-CM

## 2022-08-18 RX ORDER — VENLAFAXINE HYDROCHLORIDE 150 MG/1
CAPSULE, EXTENDED RELEASE ORAL
Qty: 90 CAPSULE | Refills: 3 | Status: SHIPPED | OUTPATIENT
Start: 2022-08-18

## 2022-08-18 RX ORDER — METFORMIN HYDROCHLORIDE 500 MG/1
TABLET ORAL
Qty: 360 TABLET | Refills: 1 | Status: SHIPPED | OUTPATIENT
Start: 2022-08-18

## 2022-10-05 ENCOUNTER — TELEPHONE (OUTPATIENT)
Dept: FAMILY MEDICINE CLINIC | Age: 57
End: 2022-10-05

## 2022-10-05 NOTE — TELEPHONE ENCOUNTER
Pt wants to know when she needs to have another appt - pt last seen on 4/1/2022    Call pt at [phone] or send a Fleck message to advise

## 2022-10-07 ENCOUNTER — TELEPHONE (OUTPATIENT)
Dept: FAMILY MEDICINE CLINIC | Age: 57
End: 2022-10-07

## 2022-10-07 DIAGNOSIS — L98.9 SKIN LESION ON EXAMINATION: Primary | ICD-10-CM

## 2022-10-07 NOTE — TELEPHONE ENCOUNTER
----- Message from Agueda Herrera sent at 10/7/2022  4:38 PM EDT -----  Regarding: FW: Referral   Patient needs referral for next weeks doctors appt.   ----- Message -----  From: Bella Ribeiro  Sent: 10/7/2022   3:34 PM EDT  To: Neo Clark Pool  Subject: Referral                                         For Dr. Mikie Klein, [facility]  For October 11, 2022 at 12 noon.   Please  Thank you

## 2022-10-27 PROBLEM — N28.89 RENAL MASS, RIGHT: Status: RESOLVED | Noted: 2020-07-29 | Resolved: 2022-10-27

## 2022-10-27 PROBLEM — R09.02 HYPOXIA: Status: RESOLVED | Noted: 2021-05-13 | Resolved: 2022-10-27

## 2022-10-27 NOTE — PROGRESS NOTES
Subjective:     Lainey Salazar is a 61 y.o. female who presents for follow up of diabetes, hypertension, hyperlipidemia, and obesity. Diet and Lifestyle: generally follows a low fat low cholesterol diet, generally follows a low sodium diet, sedentary, smoker . Home BP Monitoring: is well controlled at home. Cardiovascular ROS: taking medications as instructed, no medication side effects noted, no TIA's, no chest pain on exertion, no dyspnea on exertion, no swelling of ankles. Good BS at home. Sometimes too low and feels bad. She does not eat regularly. Drops when she is active. New concerns: BP elevated today. Has not taken med this AM.   She did see [facility] NP over the summer for R lung nodule. Will schedule repeat CT chest. Need to schedule. C/O L hip pain. Unable to get appt at [facility]. Feels popping noise and pain.      Patient Active Problem List    Diagnosis Date Noted    Cancer of the skin, basal cell 10/28/2022    Nodule of right lung 03/06/2022    Incisional hernia 05/13/2021    Incisional hernia, without obstruction or gangrene 04/21/2021    Vitamin D deficiency 02/03/2021    H/O right nephrectomy 11/10/2020    Colon adenoma 03/18/2019    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 12/03/2018    Spondylosis of lumbar spine 12/03/2018    Abnormal mammogram of left breast 09/26/2018    Iliotibial band tendinitis of right side 08/30/2018    Type 2 diabetes mellitus with diabetic neuropathy ([facility] [address] 02/14/2018    Status post bilateral knee replacements 11/02/2017    Cigarette nicotine dependence without complication 76/00/9258    Dysthymia 12/30/2016    YOLANDA (obstructive sleep apnea) 12/30/2016    Arthritis 09/09/2016    Essential hypertension with goal blood pressure less than 140/90 09/07/2016    Chronic obstructive pulmonary disease ([address] 09/07/2016    Polyneuropathy associated with underlying disease ([address] 09/07/2016    RLS (restless legs syndrome) 09/07/2016    Primary osteoarthritis of right knee 08/18/2016     Current Outpatient Medications   Medication Sig Dispense Refill    gentamicin (GARAMYCIN) 0.1 % topical cream       atorvastatin (LIPITOR) 40 mg tablet TAKE 1 TABLET EVERY DAY 90 Tablet 1    rOPINIRole (REQUIP) 2 mg tablet TAKE 1 TABLET THREE TIMES DAILY  Indications: restless legs syndrome, an extreme discomfort in the calf muscles when sitting or lying down 270 Tablet 3    tiZANidine (ZANAFLEX) 2 mg tablet TAKE 1 TABLET TWICE DAILY FOR MUSCLE SPASM(S) 180 Tablet 3    LORazepam (ATIVAN) 0.5 mg tablet Take 1 Tablet by mouth two (2) times daily as needed for Anxiety. Max Daily Amount: 1 mg. Indications: anxious 180 Tablet 0    metFORMIN (GLUCOPHAGE) 500 mg tablet TAKE TWO TABLETS TWO TIMES A DAY WITH MEALS 360 Tablet 1    venlafaxine-SR (EFFEXOR-XR) 150 mg capsule TAKE 1 CAPSULE EVERY DAY 90 Capsule 3    furosemide (LASIX) 20 mg tablet TAKE 1 TABLET EVERY DAY AS NEEDED 90 Tablet 1    omeprazole (PRILOSEC) 40 mg capsule TAKE 1 CAPSULE BY MOUTH BEFORE BREAKFAST AND DINNER. 180 Capsule 3    dilTIAZem ER (CARDIZEM CD) 180 mg capsule TAKE 2 CAPSULES EVERY DAY FOR HIGH BLOOD PRESSURE 180 Capsule 3    cholecalciferol (VITAMIN D3) (1000 Units /25 mcg) tablet Take  by mouth daily. losartan (COZAAR) 100 mg tablet TAKE 1 TABLET EVERY NIGHT 90 Tablet 3    OTHER Beets      zinc sulfate (ZINC-220 PO) Take  by mouth. Nebulizer & Compressor machine Use nebulizer every 6 hours as needed. DX J44.9 1 Each 0    traZODone (DESYREL) 50 mg tablet Take 2 Tablets by mouth nightly. 180 Tablet 3    OTHER,NON-FORMULARY, Take 3 Caplet by mouth. CBD Gummies   Indications: for pain      albuterol-ipratropium (DUO-NEB) 2.5 mg-0.5 mg/3 ml nebu 3 mL by Nebulization route every six (6) hours as needed for Wheezing.  Indications: bronchi muscle spasm resulting from COPD 1080 mL 1    Accu-Chek Joyce Plus test strp strip TEST BLOOD SUGAR EVERY  Strip 4    albuterol (PROVENTIL HFA, VENTOLIN HFA, PROAIR HFA) 90 mcg/actuation inhaler Take 2 Puffs by inhalation every four (4) hours as needed for Wheezing, Shortness of Breath or Cough. 18 g 0    inhalational spacing device 1 Each by Does Not Apply route as needed for Wheezing. 1 Each 0    Aspirin-Caffeine (BC Arthritis) 1,000-65 mg pwpk Take  by mouth.      polyethylene glycol (MIRALAX) 17 gram packet Take 1 Packet by mouth daily. May take 1-2 packets as needed daily for constipation. 14 Packet 1    levocetirizine (XYZAL) 5 mg tablet TAKE 1 TABLET EVERY DAY 90 Tablet 3     Allergies   Allergen Reactions    Nsaids (Non-Steroidal Anti-Inflammatory Drug) Other (comments)     Stomach hurts    Celebrex [Celecoxib] Hives    Ditropan [Oxybutynin Chloride] Hives and Itching    Mobic [Meloxicam] Hives    Penicillins Unknown (comments)     DOESN'T REMEMBER  Patient states tolerating PO amoxicillin    Plaquenil [Hydroxychloroquine] Hives     Past Medical History:   Diagnosis Date    Arthritis     Cancer ([facility] [address]     IN KIDNEY REMOVED RIGHT KIDNEY     Chronic obstructive pulmonary disease (HCC)     BEGINNING STAGES     Diabetes (HCC)     Elevated WBC count 2002 to present    GERD (gastroesophageal reflux disease)     Hypertension     Kidney stones 2020    Leg swelling 07/2020    Doppler negative    Nausea & vomiting     Panic attacks     Sleep apnea     Does not use CPAP- raises the head of bed up    Spondylolisthesis of lumbar region     Stroke [facility]) 2020    TIA     Tachycardia              Review of Systems, additional:  Pertinent items are noted in HPI. Objective:     Visit Vitals  BP (!) 164/88 (BP 1 Location: Right arm, BP Patient Position: Sitting, BP Cuff Size: Adult) Comment: no med this AM   Pulse 94   Temp 98.3 °F (36.8 °C) (Temporal)   Resp 18   Ht 5' 3.5\" (1.613 m)   Wt 201 lb (91.2 kg)   SpO2 96%   BMI 35.05 kg/m²     Appearance: alert, well appearing, and in no distress and overweight.   General exam: CVS exam BP noted to be moderately elevated today in office, S1, S2 normal, no gallop, no murmur, chest clear, no JVD, no HSM, no edema. Lab review: orders written for new lab studies as appropriate; see orders. Assessment/Plan:     diabetes control uncertain, hypertension control uncertain, hyperlipidemia control uncertain. orders and follow up as documented in patient record  reviewed diet, exercise and weight control  recommended sodium restriction. ICD-10-CM ICD-9-CM    1. Type 2 diabetes mellitus with diabetic neuropathy, without long-term current use of insulin (HCC)  E11.40 250.60 CANCELED:  DIABETES FOOT EXAM     357.2 CANCELED: MICROALBUMIN, UR, RAND W/ MICROALB/CREAT RATIO      CANCELED: HEMOGLOBIN A1C WITH EAG      2. Hypercholesterolemia  E78.00 272.0 atorvastatin (LIPITOR) 40 mg tablet      CANCELED: LIPID PANEL      3. Essential hypertension with goal blood pressure less than 140/90  I10 401.9 CANCELED: TSH 3RD GENERATION      4. Encounter for long-term current use of medication  Z79.899 V58.69 CANCELED: CBC WITH AUTOMATED DIFF      CANCELED: METABOLIC PANEL, COMPREHENSIVE      5. Vitamin D deficiency  E55.9 268.9 CANCELED: VITAMIN D, 25 HYDROXY      6. Nodule of right lung  R91.1 793.11       7. H/O right nephrectomy  Z90.5 V45.73       8. Cancer of the skin, basal cell  C44.91 173.91       9. Needs flu shot  Z23 V04.81 INFLUENZA, FLUARIX, FLULAVAL, FLUZONE (AGE 6 MO+), AFLURIA(AGE 3Y+) IM, PF, 0.5 ML      10. Hip pain, chronic, left  M25.552 719.45 REFERRAL TO ORTHOPEDICS    G89.29 338.29       11. RLS (restless legs syndrome)  G25.81 333.94 rOPINIRole (REQUIP) 2 mg tablet      12. Panic attacks  F41.0 300.01 LORazepam (ATIVAN) 0.5 mg tablet      13.  Muscle spasm  M62.838 728.85 tiZANidine (ZANAFLEX) 2 mg tablet        Order labs  Med refills sent to [facility]  FLU vaccine given  Refer to [facility]  Schedule CT chest

## 2022-10-28 ENCOUNTER — OFFICE VISIT (OUTPATIENT)
Dept: FAMILY MEDICINE CLINIC | Age: 57
End: 2022-10-28
Payer: MEDICARE

## 2022-10-28 ENCOUNTER — APPOINTMENT (OUTPATIENT)
Dept: FAMILY MEDICINE CLINIC | Age: 57
End: 2022-10-28

## 2022-10-28 VITALS
TEMPERATURE: 98.3 F | SYSTOLIC BLOOD PRESSURE: 164 MMHG | RESPIRATION RATE: 18 BRPM | HEIGHT: 64 IN | HEART RATE: 94 BPM | BODY MASS INDEX: 34.31 KG/M2 | WEIGHT: 201 LBS | DIASTOLIC BLOOD PRESSURE: 88 MMHG | OXYGEN SATURATION: 96 %

## 2022-10-28 DIAGNOSIS — I10 ESSENTIAL HYPERTENSION WITH GOAL BLOOD PRESSURE LESS THAN 140/90: ICD-10-CM

## 2022-10-28 DIAGNOSIS — E55.9 VITAMIN D DEFICIENCY: ICD-10-CM

## 2022-10-28 DIAGNOSIS — Z79.899 ENCOUNTER FOR LONG-TERM CURRENT USE OF MEDICATION: ICD-10-CM

## 2022-10-28 DIAGNOSIS — G89.29 HIP PAIN, CHRONIC, LEFT: ICD-10-CM

## 2022-10-28 DIAGNOSIS — Z23 NEEDS FLU SHOT: ICD-10-CM

## 2022-10-28 DIAGNOSIS — R91.1 NODULE OF RIGHT LUNG: ICD-10-CM

## 2022-10-28 DIAGNOSIS — C44.91 CANCER OF THE SKIN, BASAL CELL: ICD-10-CM

## 2022-10-28 DIAGNOSIS — M62.838 MUSCLE SPASM: ICD-10-CM

## 2022-10-28 DIAGNOSIS — G25.81 RLS (RESTLESS LEGS SYNDROME): ICD-10-CM

## 2022-10-28 DIAGNOSIS — E78.00 HYPERCHOLESTEROLEMIA: ICD-10-CM

## 2022-10-28 DIAGNOSIS — Z90.5 H/O RIGHT NEPHRECTOMY: ICD-10-CM

## 2022-10-28 DIAGNOSIS — F41.0 PANIC ATTACKS: ICD-10-CM

## 2022-10-28 DIAGNOSIS — M25.552 HIP PAIN, CHRONIC, LEFT: ICD-10-CM

## 2022-10-28 DIAGNOSIS — Z91.09 ENVIRONMENTAL ALLERGIES: ICD-10-CM

## 2022-10-28 DIAGNOSIS — E11.40 TYPE 2 DIABETES MELLITUS WITH DIABETIC NEUROPATHY, WITHOUT LONG-TERM CURRENT USE OF INSULIN (HCC): ICD-10-CM

## 2022-10-28 DIAGNOSIS — E11.40 TYPE 2 DIABETES MELLITUS WITH DIABETIC NEUROPATHY, WITHOUT LONG-TERM CURRENT USE OF INSULIN (HCC): Primary | ICD-10-CM

## 2022-10-28 PROCEDURE — G8417 CALC BMI ABV UP PARAM F/U: HCPCS | Performed by: FAMILY MEDICINE

## 2022-10-28 PROCEDURE — G9899 SCRN MAM PERF RSLTS DOC: HCPCS | Performed by: FAMILY MEDICINE

## 2022-10-28 PROCEDURE — 3017F COLORECTAL CA SCREEN DOC REV: CPT | Performed by: FAMILY MEDICINE

## 2022-10-28 PROCEDURE — G8754 DIAS BP LESS 90: HCPCS | Performed by: FAMILY MEDICINE

## 2022-10-28 PROCEDURE — G0008 ADMIN INFLUENZA VIRUS VAC: HCPCS | Performed by: FAMILY MEDICINE

## 2022-10-28 PROCEDURE — 99214 OFFICE O/P EST MOD 30 MIN: CPT | Performed by: FAMILY MEDICINE

## 2022-10-28 PROCEDURE — 3044F HG A1C LEVEL LT 7.0%: CPT | Performed by: FAMILY MEDICINE

## 2022-10-28 PROCEDURE — G9717 DOC PT DX DEP/BP F/U NT REQ: HCPCS | Performed by: FAMILY MEDICINE

## 2022-10-28 PROCEDURE — 3078F DIAST BP <80 MM HG: CPT | Performed by: FAMILY MEDICINE

## 2022-10-28 PROCEDURE — 3074F SYST BP LT 130 MM HG: CPT | Performed by: FAMILY MEDICINE

## 2022-10-28 PROCEDURE — 2022F DILAT RTA XM EVC RTNOPTHY: CPT | Performed by: FAMILY MEDICINE

## 2022-10-28 PROCEDURE — 90686 IIV4 VACC NO PRSV 0.5 ML IM: CPT | Performed by: FAMILY MEDICINE

## 2022-10-28 PROCEDURE — G8753 SYS BP > OR = 140: HCPCS | Performed by: FAMILY MEDICINE

## 2022-10-28 PROCEDURE — G8427 DOCREV CUR MEDS BY ELIG CLIN: HCPCS | Performed by: FAMILY MEDICINE

## 2022-10-28 RX ORDER — TIZANIDINE 2 MG/1
TABLET ORAL
Qty: 180 TABLET | Refills: 3 | Status: SHIPPED | OUTPATIENT
Start: 2022-10-28

## 2022-10-28 RX ORDER — GENTAMICIN SULFATE 1 MG/G
CREAM TOPICAL
COMMUNITY
Start: 2022-10-11

## 2022-10-28 RX ORDER — ATORVASTATIN CALCIUM 40 MG/1
TABLET, FILM COATED ORAL
Qty: 90 TABLET | Refills: 1 | Status: SHIPPED | OUTPATIENT
Start: 2022-10-28

## 2022-10-28 RX ORDER — LEVOCETIRIZINE DIHYDROCHLORIDE 5 MG/1
TABLET, FILM COATED ORAL
Qty: 90 TABLET | Refills: 3 | Status: SHIPPED | OUTPATIENT
Start: 2022-10-28

## 2022-10-28 RX ORDER — ROPINIROLE 2 MG/1
TABLET, FILM COATED ORAL
Qty: 270 TABLET | Refills: 3 | Status: SHIPPED | OUTPATIENT
Start: 2022-10-28

## 2022-10-28 RX ORDER — LORAZEPAM 0.5 MG/1
0.5 TABLET ORAL
Qty: 180 TABLET | Refills: 0 | Status: SHIPPED | OUTPATIENT
Start: 2022-10-28

## 2022-10-28 NOTE — PROGRESS NOTES
Identified pt with two pt identifiers(name and ).     Chief Complaint   Patient presents with    Follow-up     6 month follow up    Labs     Not fasting        Health Maintenance Due   Topic    Hepatitis B Vaccine (2 of 3 - Risk 3-dose series)    Low dose CT lung screening     COVID-19 Vaccine (2 - Moderna series)    Foot Exam Q1     Flu Vaccine (1)       Wt Readings from Last 3 Encounters:   10/28/22 201 lb (91.2 kg)   22 200 lb (90.7 kg)   22 203 lb (92.1 kg)     Temp Readings from Last 3 Encounters:   10/28/22 98.3 °F (36.8 °C) (Temporal)   22 (!) 100.7 °F (38.2 °C)   22 97.2 °F (36.2 °C) (Temporal)     BP Readings from Last 3 Encounters:   10/28/22 (!) 164/88   22 (!) 162/88   22 138/76     Pulse Readings from Last 3 Encounters:   10/28/22 94   22 99   22 94         Learning Assessment:  :     Learning Assessment 3/7/2022 2021 2018 2017 2016   PRIMARY LEARNER Patient Patient Patient Patient Patient   HIGHEST LEVEL OF EDUCATION - PRIMARY LEARNER  - [address]   BARRIERS PRIMARY LEARNER - NONE - - [address] CAREGIVER No No - - No   PRIMARY LANGUAGE ENGLISH ENGLISH ENGLISH ENGLISH ENGLISH   LEARNER PREFERENCE PRIMARY DEMONSTRATION DEMONSTRATION LISTENING - DEMONSTRATION     - LISTENING - - -   ANSWERED BY patient patient patient patient self   RELATIONSHIP SELF SELF SELF SELF SELF       Depression Screening:  :     3 most recent PHQ Screens 10/28/2022   Little interest or pleasure in doing things Not at all   Feeling down, depressed, irritable, or hopeless Not at all   Total Score PHQ 2 0   Trouble falling or staying asleep, or sleeping too much -   Feeling tired or having little energy -   Poor appetite, weight loss, or overeating -   Feeling bad about yourself - or that you are a failure or have let yourself or your family down -   Trouble concentrating on things such as school, work, reading, or watching TV - Moving or speaking so slowly that other people could have noticed; or the opposite being so fidgety that others notice -   Thoughts of being better off dead, or hurting yourself in some way -   PHQ 9 Score -   How difficult have these problems made it for you to do your work, take care of your home and get along with others -       Fall Risk Assessment:  :     Fall Risk Assessment, last 12 mths 3/7/2022   Able to walk? Yes   Fall in past 12 months? 0       Abuse Screening:  :     Abuse Screening Questionnaire 10/28/2022 4/1/2022 3/7/2022 1/13/2021 12/10/2020 1/10/2020 3/18/2019   Do you ever feel afraid of your partner? N N N N N N N   Are you in a relationship with someone who physically or mentally threatens you? N N N N N N N   Is it safe for you to go home? Y Y Y Y Y Y Y       Coordination of Care Questionnaire:  :     1) Have you been to an emergency room, urgent care clinic since your last visit? no   Hospitalized since your last visit? no             2) Have you seen or consulted any other health care providers outside of [address] [place] [address] since your last visit? no  (Include any pap smears or colon screenings in this section.)    3) Do you have an Advance Directive on file? yes  Are you interested in receiving information about Advance Directives? no    Patient is accompanied by N/A I have received verbal consent from Jim Stewart to discuss any/all medical information while they are present in the room. 4.  For patients aged 39-70: Has the patient had a colonoscopy / FIT/ Cologuard? Yes - no Care Gap present      If the patient is female:    5. For patients aged 41-77: Has the patient had a mammogram within the past 2 years? Yes - no Care Gap present      6. For patients aged 21-65: Has the patient had a pap smear?  No

## 2022-10-28 NOTE — PATIENT INSTRUCTIONS
Vaccine Information Statement    Influenza (Flu) Vaccine (Inactivated or Recombinant): What You Need to Know    Many vaccine information statements are available in [place] and other languages. See www.immunize.org/vis. Hojas de información sobre vacunas están disponibles en español y en muchos otros idiomas. Visite www.immunize.org/vis. 1. Why get vaccinated? Influenza vaccine can prevent influenza (flu). Flu is a contagious disease that spreads around the [facility] every year, usually between October and May. Anyone can get the flu, but it is more dangerous for some people. Infants and young children, people 72 years and older, pregnant people, and people with certain health conditions or a weakened immune system are at greatest risk of flu complications. Pneumonia, bronchitis, sinus infections, and ear infections are examples of flu-related complications. If you have a medical condition, such as heart disease, cancer, or diabetes, flu can make it worse. Flu can cause fever and chills, sore throat, muscle aches, fatigue, cough, headache, and runny or stuffy nose. Some people may have vomiting and diarrhea, though this is more common in children than adults. In an average year, thousands of people in the [facility] die from flu, and many more are hospitalized. Flu vaccine prevents millions of illnesses and flu-related visits to the doctor each year. 2. Influenza vaccines     CDC recommends everyone 6 months and older get vaccinated every flu season. Children 6 months through 6years of age may need 2 doses during a single flu season. Everyone else needs only 1 dose each flu season. It takes about 2 weeks for protection to develop after vaccination. There are many flu viruses, and they are always changing. Each year a new flu vaccine is made to protect against the influenza viruses believed to be likely to cause disease in the upcoming flu season.  Even when the vaccine doesnt exactly match these viruses, it may still provide some protection. Influenza vaccine does not cause flu. Influenza vaccine may be given at the same time as other vaccines. 3. Talk with your health care provider    Tell your vaccination provider if the person getting the vaccine:  Has had an allergic reaction after a previous dose of influenza vaccine, or has any severe, life-threatening allergies   Has ever had Guillain-Barré Syndrome (also called GBS)    In some cases, your health care provider may decide to postpone influenza vaccination until a future visit. Influenza vaccine can be administered at any time during pregnancy. People who are or will be pregnant during influenza season should receive inactivated influenza vaccine. People with minor illnesses, such as a cold, may be vaccinated. People who are moderately or severely ill should usually wait until they recover before getting influenza vaccine. Your health care provider can give you more information. 4. Risks of a vaccine reaction    Soreness, redness, and swelling where the shot is given, fever, muscle aches, and headache can happen after influenza vaccination. There may be a very small increased risk of Guillain-Barré Syndrome (GBS) after inactivated influenza vaccine (the flu shot). Steve Samson children who get the flu shot along with pneumococcal vaccine (PCV13) and/or DTaP vaccine at the same time might be slightly more likely to have a seizure caused by fever. Tell your health care provider if a child who is getting flu vaccine has ever had a seizure. People sometimes faint after medical procedures, including vaccination. Tell your provider if you feel dizzy or have vision changes or ringing in the ears. As with any medicine, there is a very remote chance of a vaccine causing a severe allergic reaction, other serious injury, or death. 5. What if there is a serious problem?     An allergic reaction could occur after the vaccinated person leaves the clinic. If you see signs of a severe allergic reaction (hives, swelling of the face and throat, difficulty breathing, a fast heartbeat, dizziness, or weakness), call 9-1-1 and get the person to the nearest hospital.    For other signs that concern you, call your health care provider. Adverse reactions should be reported to the Vaccine Adverse Event Reporting System (VAERS). Your health care provider will usually file this report, or you can do it yourself. Visit the VAERS website at www.vaers. [facility].gov or call 1-993.402.4299. VAERS is only for reporting reactions, and VAERS staff members do not give medical advice. 6. The National Vaccine Injury Compensation Program    The [facility] Vaccine Injury Compensation Program (VICP) is a federal program that was created to compensate people who may have been injured by certain vaccines. Claims regarding alleged injury or death due to vaccination have a time limit for filing, which may be as short as two years. Visit the VICP website at www.[facility].gov/vaccinecompensation or call 3-472.200.6372 to learn about the program and about filing a claim. 7. How can I learn more? Ask your health care provider. Call your local or state health department. Visit the website of the Food and Drug Administration (FDA) for vaccine package inserts and additional information at www.fda.gov/vaccines-blood-biologics/vaccines. Contact the Centers for Disease Control and Prevention (CDC): Call 1-626.889.1078 (4-546-UKL-INFO) or  Visit CDCs influenza website at www.cdc.gov/flu. Vaccine Information Statement   Inactivated Influenza Vaccine   8/6/2021  42 [address] Ammy Fei [id number]-[id number]   Department of Health and Human Services  Centers for Disease Control and Prevention    Office Use Only

## 2022-11-11 ENCOUNTER — LAB ONLY (OUTPATIENT)
Dept: FAMILY MEDICINE CLINIC | Age: 57
End: 2022-11-11

## 2022-11-11 ENCOUNTER — OFFICE VISIT (OUTPATIENT)
Dept: FAMILY MEDICINE CLINIC | Age: 57
End: 2022-11-11
Payer: MEDICARE

## 2022-11-11 VITALS
WEIGHT: 202 LBS | HEART RATE: 90 BPM | OXYGEN SATURATION: 95 % | RESPIRATION RATE: 20 BRPM | DIASTOLIC BLOOD PRESSURE: 88 MMHG | SYSTOLIC BLOOD PRESSURE: 148 MMHG | HEIGHT: 63 IN | TEMPERATURE: 97.6 F | BODY MASS INDEX: 35.79 KG/M2

## 2022-11-11 DIAGNOSIS — E78.00 HYPERCHOLESTEROLEMIA: ICD-10-CM

## 2022-11-11 DIAGNOSIS — I10 ESSENTIAL HYPERTENSION WITH GOAL BLOOD PRESSURE LESS THAN 140/90: ICD-10-CM

## 2022-11-11 DIAGNOSIS — E11.40 TYPE 2 DIABETES MELLITUS WITH DIABETIC NEUROPATHY, WITHOUT LONG-TERM CURRENT USE OF INSULIN (HCC): Primary | ICD-10-CM

## 2022-11-11 DIAGNOSIS — E55.9 VITAMIN D DEFICIENCY: ICD-10-CM

## 2022-11-11 DIAGNOSIS — Z79.899 ENCOUNTER FOR LONG-TERM CURRENT USE OF MEDICATION: ICD-10-CM

## 2022-11-11 DIAGNOSIS — E11.40 TYPE 2 DIABETES MELLITUS WITH DIABETIC NEUROPATHY, WITHOUT LONG-TERM CURRENT USE OF INSULIN (HCC): ICD-10-CM

## 2022-11-11 DIAGNOSIS — B96.89 BACTERIAL SKIN INFECTION: ICD-10-CM

## 2022-11-11 DIAGNOSIS — L08.9 BACTERIAL SKIN INFECTION: ICD-10-CM

## 2022-11-11 PROCEDURE — 3044F HG A1C LEVEL LT 7.0%: CPT | Performed by: FAMILY MEDICINE

## 2022-11-11 PROCEDURE — 3074F SYST BP LT 130 MM HG: CPT | Performed by: FAMILY MEDICINE

## 2022-11-11 PROCEDURE — 99213 OFFICE O/P EST LOW 20 MIN: CPT | Performed by: FAMILY MEDICINE

## 2022-11-11 PROCEDURE — G9899 SCRN MAM PERF RSLTS DOC: HCPCS | Performed by: FAMILY MEDICINE

## 2022-11-11 PROCEDURE — 3017F COLORECTAL CA SCREEN DOC REV: CPT | Performed by: FAMILY MEDICINE

## 2022-11-11 PROCEDURE — G8427 DOCREV CUR MEDS BY ELIG CLIN: HCPCS | Performed by: FAMILY MEDICINE

## 2022-11-11 PROCEDURE — G8417 CALC BMI ABV UP PARAM F/U: HCPCS | Performed by: FAMILY MEDICINE

## 2022-11-11 PROCEDURE — G8753 SYS BP > OR = 140: HCPCS | Performed by: FAMILY MEDICINE

## 2022-11-11 PROCEDURE — G8754 DIAS BP LESS 90: HCPCS | Performed by: FAMILY MEDICINE

## 2022-11-11 PROCEDURE — G9717 DOC PT DX DEP/BP F/U NT REQ: HCPCS | Performed by: FAMILY MEDICINE

## 2022-11-11 PROCEDURE — 2022F DILAT RTA XM EVC RTNOPTHY: CPT | Performed by: FAMILY MEDICINE

## 2022-11-11 PROCEDURE — 3078F DIAST BP <80 MM HG: CPT | Performed by: FAMILY MEDICINE

## 2022-11-11 RX ORDER — CEPHALEXIN 500 MG/1
500 CAPSULE ORAL 2 TIMES DAILY
Qty: 14 CAPSULE | Refills: 0 | Status: SHIPPED | OUTPATIENT
Start: 2022-11-11 | End: 2022-11-18

## 2022-11-11 NOTE — PROGRESS NOTES
HISTORY OF PRESENT ILLNESS  Arnol  is a 61 y.o. female. HPI  Pt needs labs drawn. Also skin lesion on R forearm treated last week by DERM is red and tender. Check ears. No med taken this AM.    ROS  Visit Vitals  BP (!) 148/88 (BP 1 Location: Left arm, BP Patient Position: Sitting, BP Cuff Size: Adult)   Pulse 90   Temp 97.6 °F (36.4 °C) (Temporal)   Resp 20   Ht 5' 3\" (1.6 m)   Wt 202 lb (91.6 kg)   SpO2 95%   BMI 35.78 kg/m²       Physical Exam  Vitals reviewed. Skin:           Diabetic foot exam:     Left Foot:   Visual Exam: normal    Pulse DP: 2+ (normal)   Filament test: normal sensation          Right Foot:   Visual Exam: normal    Pulse DP: 2+ (normal)   Filament test: normal sensation        ASSESSMENT and PLAN    ICD-10-CM ICD-9-CM    1. Type 2 diabetes mellitus with diabetic neuropathy, without long-term current use of insulin ([facility])  E11.40 250.60 HEMOGLOBIN A1C WITH EAG     357.2 MICROALBUMIN, UR, RAND W/ MICROALB/CREAT RATIO       DIABETES FOOT EXAM      2. Hypercholesterolemia  E78.00 272.0 LIPID PANEL      3. Essential hypertension with goal blood pressure less than 140/90  I10 401.9 TSH 3RD GENERATION      4. Encounter for long-term current use of medication  Z79.899 V58.69 CBC WITH AUTOMATED DIFF      METABOLIC PANEL, COMPREHENSIVE      5.  Vitamin D deficiency  E55.9 268.9 VITAMIN D, 25 HYDROXY      6. Bacterial skin infection  L08.9 686.9 cephALEXin (KEFLEX) 500 mg capsule    B96.89 041.9

## 2022-11-11 NOTE — PROGRESS NOTES
Identified pt with two pt identifiers(name and ).     Chief Complaint   Patient presents with    Other     Would like feet and urine checked as they were not checked at last visit    Arm Problem     Place on right arm is painful        Health Maintenance Due   Topic    Hepatitis B Vaccine (2 of 3 - Risk 3-dose series)    Low dose CT lung screening     COVID-19 Vaccine (2 - Moderna series)    Foot Exam Q1        Wt Readings from Last 3 Encounters:   22 202 lb (91.6 kg)   10/28/22 201 lb (91.2 kg)   22 200 lb (90.7 kg)     Temp Readings from Last 3 Encounters:   22 97.6 °F (36.4 °C) (Temporal)   10/28/22 98.3 °F (36.8 °C) (Temporal)   22 (!) 100.7 °F (38.2 °C)     BP Readings from Last 3 Encounters:   22 (!) 148/88   10/28/22 (!) 164/88   22 (!) 162/88     Pulse Readings from Last 3 Encounters:   22 90   10/28/22 94   22 99         Learning Assessment:  :     Learning Assessment 3/7/2022 2021 2018 2017 2016   PRIMARY LEARNER Patient Patient Patient Patient Patient   HIGHEST LEVEL OF EDUCATION - PRIMARY LEARNER  - [address]   BARRIERS PRIMARY LEARNER - NONE - - [address] CAREGIVER No No - - No   PRIMARY LANGUAGE ENGLISH ENGLISH ENGLISH ENGLISH ENGLISH   LEARNER PREFERENCE PRIMARY DEMONSTRATION DEMONSTRATION LISTENING - DEMONSTRATION     - LISTENING - - -   ANSWERED BY patient patient patient patient self   RELATIONSHIP SELF SELF SELF SELF SELF       Depression Screening:  :     3 most recent PHQ Screens 2022   Little interest or pleasure in doing things Not at all   Feeling down, depressed, irritable, or hopeless Not at all   Total Score PHQ 2 0   Trouble falling or staying asleep, or sleeping too much -   Feeling tired or having little energy -   Poor appetite, weight loss, or overeating -   Feeling bad about yourself - or that you are a failure or have let yourself or your family down -   Trouble concentrating on things such as school, work, reading, or watching TV -   Moving or speaking so slowly that other people could have noticed; or the opposite being so fidgety that others notice -   Thoughts of being better off dead, or hurting yourself in some way -   PHQ 9 Score -   How difficult have these problems made it for you to do your work, take care of your home and get along with others -       Fall Risk Assessment:  :     Fall Risk Assessment, last 12 mths 3/7/2022   Able to walk? Yes   Fall in past 12 months? 0       Abuse Screening:  :     Abuse Screening Questionnaire 11/11/2022 10/28/2022 4/1/2022 3/7/2022 1/13/2021 12/10/2020 1/10/2020   Do you ever feel afraid of your partner? N N N N N N N   Are you in a relationship with someone who physically or mentally threatens you? N N N N N N N   Is it safe for you to go home? Y Y Y Y Y Y Y       Coordination of Care Questionnaire:  :     1) Have you been to an emergency room, urgent care clinic since your last visit? no   Hospitalized since your last visit? no             2) Have you seen or consulted any other health care providers outside of [address] [place] [address] since your last visit? no  (Include any pap smears or colon screenings in this section.)    3) Do you have an Advance Directive on file? yes  Are you interested in receiving information about Advance Directives? no    Patient is accompanied by N/A I have received verbal consent from Presley Gardner to discuss any/all medical information while they are present in the room. 4.  For patients aged 39-70: Has the patient had a colonoscopy / FIT/ Cologuard? Yes - no Care Gap present      If the patient is female:    5. For patients aged 41-77: Has the patient had a mammogram within the past 2 years? Yes - no Care Gap present      6. For patients aged 21-65: Has the patient had a pap smear?  Yes - no Care Gap present

## 2022-11-12 ENCOUNTER — TELEPHONE (OUTPATIENT)
Dept: FAMILY MEDICINE CLINIC | Age: 57
End: 2022-11-12

## 2022-11-12 LAB
25(OH)D3 SERPL-MCNC: 40.4 NG/ML (ref 30–100)
ALBUMIN SERPL-MCNC: 3.4 G/DL (ref 3.5–5)
ALBUMIN/GLOB SERPL: 1 {RATIO} (ref 1.1–2.2)
ALP SERPL-CCNC: 172 U/L (ref 45–117)
ALT SERPL-CCNC: 28 U/L (ref 12–78)
ANION GAP SERPL CALC-SCNC: 4 MMOL/L (ref 5–15)
AST SERPL-CCNC: 45 U/L (ref 15–37)
BILIRUB SERPL-MCNC: 0.2 MG/DL (ref 0.2–1)
BUN SERPL-MCNC: 10 MG/DL (ref 6–20)
BUN/CREAT SERPL: 9 (ref 12–20)
CALCIUM SERPL-MCNC: 9 MG/DL (ref 8.5–10.1)
CHLORIDE SERPL-SCNC: 110 MMOL/L (ref 97–108)
CHOLEST SERPL-MCNC: 128 MG/DL
CO2 SERPL-SCNC: 27 MMOL/L (ref 21–32)
CREAT SERPL-MCNC: 1.07 MG/DL (ref 0.55–1.02)
CREAT UR-MCNC: <13 MG/DL
EST. AVERAGE GLUCOSE BLD GHB EST-MCNC: NORMAL MG/DL
GLOBULIN SER CALC-MCNC: 3.3 G/DL (ref 2–4)
GLUCOSE SERPL-MCNC: 101 MG/DL (ref 65–100)
HBA1C MFR BLD: NORMAL % (ref 4–5.6)
HDLC SERPL-MCNC: 51 MG/DL
HDLC SERPL: 2.5 {RATIO} (ref 0–5)
LDLC SERPL CALC-MCNC: 61.4 MG/DL (ref 0–100)
MICROALBUMIN UR-MCNC: <0.5 MG/DL
MICROALBUMIN/CREAT UR-RTO: <38 MG/G (ref 0–30)
POTASSIUM SERPL-SCNC: 5.6 MMOL/L (ref 3.5–5.1)
PROT SERPL-MCNC: 6.7 G/DL (ref 6.4–8.2)
SODIUM SERPL-SCNC: 141 MMOL/L (ref 136–145)
TRIGL SERPL-MCNC: 78 MG/DL (ref ?–150)
TSH SERPL DL<=0.05 MIU/L-ACNC: 2.67 UIU/ML (ref 0.36–3.74)
VLDLC SERPL CALC-MCNC: 15.6 MG/DL

## 2022-11-13 ENCOUNTER — TELEPHONE (OUTPATIENT)
Dept: FAMILY MEDICINE CLINIC | Age: 57
End: 2022-11-13

## 2022-11-13 DIAGNOSIS — E87.5 HYPERKALEMIA: Primary | ICD-10-CM

## 2022-11-13 NOTE — PROGRESS NOTES
A1C level shows good diabetes control. Vit D is at goal.  Potassium level is too high. Stop taking any supplement with potassium. Drink more water. Good cholesterol numbers. Normal thyroid test. Plan to repeat potassium only in 1 month.

## 2022-11-15 ENCOUNTER — TELEPHONE (OUTPATIENT)
Dept: FAMILY MEDICINE CLINIC | Age: 57
End: 2022-11-15

## 2022-11-15 DIAGNOSIS — E11.40 TYPE 2 DIABETES MELLITUS WITH DIABETIC NEUROPATHY, WITHOUT LONG-TERM CURRENT USE OF INSULIN (HCC): ICD-10-CM

## 2022-11-15 DIAGNOSIS — Z79.899 ENCOUNTER FOR LONG-TERM CURRENT USE OF MEDICATION: Primary | ICD-10-CM

## 2022-11-15 NOTE — TELEPHONE ENCOUNTER
Let pt know that CBC and A1C will need to get drawn again. Due to blood clotting and not able to run tests. I will reorder tests and she can come here just for lab draw or go to [facility] at [address] [place] [address].

## 2022-11-15 NOTE — TELEPHONE ENCOUNTER
----- Message from [employer] sent at 11/15/2022  8:11 AM EST -----  Regarding: FW: Lab Specimen Problem    ----- Message -----  From: Dhiraj Gould: 2022   5:08 PM EST  To: [facility], [facility] Nurse Pool  Subject: Lab Specimen Problem                             Hello,    Please see information below for details regarding a problem with samples received at [facility]:    Patient Name: Angelito Samuel  Patient : 1965  Test(s) affected: CBC w/Diff and A1c  Description of Problem: Specimen clotted    Please place a new order and Client Services will contact the patient for recollection. If you have any questions, please call [phone] and a representative will assist you.     Thank you,    Vivian [facility] Client Services

## 2022-11-18 ENCOUNTER — TELEPHONE (OUTPATIENT)
Dept: FAMILY MEDICINE CLINIC | Age: 57
End: 2022-11-18

## 2022-11-18 DIAGNOSIS — B37.9 YEAST INFECTION: Primary | ICD-10-CM

## 2022-11-18 RX ORDER — FLUCONAZOLE 150 MG/1
150 TABLET ORAL DAILY
Qty: 1 TABLET | Refills: 0 | Status: SHIPPED | OUTPATIENT
Start: 2022-11-18 | End: 2022-11-19

## 2022-11-18 NOTE — TELEPHONE ENCOUNTER
----- Message from [employer] sent at 11/18/2022  2:09 PM EST -----  Regarding: FW: Reaction to antibiotic    ----- Message -----  From: Korin Cee  Sent: 11/18/2022   1:51 PM EST  To: [facility] Nurse Pool  Subject: Reaction to antibiotic                           Yeast infection   Can u call me in a prescription for it  [id number]   Please

## 2022-11-26 NOTE — PROGRESS NOTES
Progress Note - Nephrology   Jai Ek 70 y o  female MRN: [id number]  Unit/Bed#: -01 Encounter: [id number]    [facility] )   • Interstitial lung disease ([facility] )   • History of diabetes mellitus   • Mixed hyperlipidemia   • Chronic kidney disease   • Osteopenia   • History of tobacco abuse   • Stage 3a chronic kidney disease ([facility] )   • Prediabetes   • Hyperuricemia   • Moderate aortic insufficiency   • Severe mitral regurgitation   • Subjective:     Birdie Velásquez is a 47 y.o. female who presents for follow up of diabetes, hypertension, hyperlipidemia and obesity. Diet and Lifestyle: generally follows a low fat low cholesterol diet, generally follows a low sodium diet, sedentary, smoker . Home BP Monitoring: is not measured at home. Interested to quit smoking. Cardiovascular ROS: taking medications as instructed, no medication side effects noted, no TIA's, no chest pain on exertion, no dyspnea on exertion, no swelling of ankles. New concerns: due for labs. Now has HUMANA. Not cover Chantix. She also has COPD. Not using Advair daily. No rescue inhaler. FU with Rheum Dr. Manjeet Yu in March. He put her on Cymbalta but she is also taking Effexor XR for mood. Patient Active Problem List    Diagnosis Date Noted    Type 2 diabetes mellitus with diabetic neuropathy ([facility] 02/14/2018    Status post bilateral knee replacements 11/02/2017    Cigarette nicotine dependence without complication 20/56/6433    Dysthymia 12/30/2016    YOLANDA (obstructive sleep apnea) 12/30/2016    Paroxysmal tachycardia ([facility] 09/09/2016    Arthritis 09/09/2016    Elevated WBC count 09/09/2016    Essential hypertension with goal blood pressure less than 140/90 09/07/2016    Chronic obstructive pulmonary disease ([facility] 09/07/2016    Diabetes mellitus type 2, controlled ([facility] 09/07/2016    Polyneuropathy associated with underlying disease ([facility] 09/07/2016    RLS (restless legs syndrome) 09/07/2016    Primary osteoarthritis of right knee 08/18/2016    Primary localized osteoarthritis of right knee 08/17/2016     Current Outpatient Prescriptions   Medication Sig Dispense Refill    doxycycline (MONODOX) 100 mg capsule Take 100 mg by mouth daily.  coenzyme q10 (CO Q-10) 10 mg cap Take  by mouth.  benzonatate (TESSALON) 100 mg capsule Take 1 Cap by mouth three (3) times daily as needed for Cough.  60 Cap 3    LORazepam (ATIVAN) 0.5 mg tablet Take 1 Tab by mouth two (2) times daily as needed for Anxiety. Max Daily Amount: 1 mg. 60 Tab 2    ciprofloxacin HCl (CIPRO) 250 mg tablet Take 1 Tab by mouth every twelve (12) hours for 7 days. 14 Tab 0    fluticasone-salmeterol (ADVAIR) 250-50 mcg/dose diskus inhaler Take 1 Puff by inhalation two (2) times a day. Indications: BRONCHOSPASM PREVENTION WITH COPD 60 Inhaler 5    buPROPion XL (WELLBUTRIN XL) 150 mg tablet Take 1 Tab by mouth every morning. Indications: Smoking Cessation 30 Tab 3    albuterol (PROAIR HFA) 90 mcg/actuation inhaler Take 2 Puffs by inhalation every four (4) hours as needed for Wheezing or Shortness of Breath. 1 Inhaler 3    furosemide (LASIX) 20 mg tablet TAKE ONE TABLET BY MOUTH EVERY DAY AS NEEDED 30 Tab 0    gabapentin (NEURONTIN) 600 mg tablet TAKE ONE TABLET BY MOUTH FOUR TIMES A  Tab 5    atorvastatin (LIPITOR) 40 mg tablet TAKE ONE TABLET BY MOUTH AT NIGHT 30 Tab 5    metFORMIN (GLUCOPHAGE) 500 mg tablet TAKE TWO TABLETS BY MOUTH TWO TIMES A DAY WITH MEALS 120 Tab 5    venlafaxine-SR (EFFEXOR-XR) 150 mg capsule TAKE ONE CAPSULE BY MOUTH EVERY DAY 30 Cap 5    rOPINIRole (REQUIP) 2 mg tablet TAKE ONE TABLET BY MOUTH TWO TIMES A DAY 60 Tab 5    losartan (COZAAR) 100 mg tablet TAKE ONE TABLET BY MOUTH EVERY DAY 30 Tab 5    dilTIAZem CD (CARDIZEM CD) 180 mg ER capsule TAKE ONE CAPSULE BY MOUTH EVERY DAY 30 Cap 5    glucose blood VI test strips (BLOOD GLUCOSE TEST) strip Check Daily E11.9 100 Strip 3    Blood-Glucose Meter monitoring kit E11.9 1 Kit 0    TURMERIC ROOT EXTRACT PO Take 1 Cap by mouth daily.  L GASSERI/B BIFIDUM/B LONGUM ([employer] HEALTH PO) Take  by mouth daily.  esomeprazole (NEXIUM) 20 mg capsule Take 40 mg by mouth daily.  ASPIRIN/SALICYLAMIDE/CAFFEINE (BC HEADACHE POWDER PO) Take  by mouth.  ketotifen (ALAWAY) 0.025 % (0.035 %) ophthalmic solution Administer 1 Drop to both eyes two (2) times a day.  5 mL 3    Iron-FA-Y65-P-Vydgdzh Sodium 53-0--50 mg-mg-mcg-mg-mg tab Take  by mouth.  cinnamon bark 500 mg cap Take 1,000 mg by mouth two (2) times a day. Allergies   Allergen Reactions    Mobic [Meloxicam] Hives    Penicillins Unknown (comments)     DOESN'T REMEMBER    Plaquenil [Hydroxychloroquine] Hives     Past Medical History:   Diagnosis Date    Arrhythmia     TACHYCARDIA    Arthritis     Autoimmune disease ([address]     LUPUS    Diabetes ([address]     GERD (gastroesophageal reflux disease)     Hypertension     Psychiatric disorder     PANIC ATTACKS    Sleep apnea      Past Surgical History:   Procedure Laterality Date    CARDIAC SURG PROCEDURE UNLIST  2013    ABLATION    HX BREAST BIOPSY Bilateral     benign    HX CHOLECYSTECTOMY  1998    HX HEENT  1993    SEPTUM SCRAPPED    HX HYSTERECTOMY  2008    HX KNEE ARTHROSCOPY Bilateral 2012    HX KNEE ARTHROSCOPY Left 2012    HX KNEE ARTHROSCOPY Right 2014    HX KNEE REPLACEMENT Left 2012    HX KNEE REPLACEMENT Right 08/18/2016    HX TUBAL LIGATION  1989     Family History   Problem Relation Age of Onset    Arthritis-osteo Mother     Psychiatric Disorder Father     Arthritis-osteo Father     Alcohol abuse Father     Heart Disease Father     Heart Surgery Father     Hypertension Father     Diabetes Brother     Arthritis-osteo Brother     Hypertension Brother     Anesth Problems Neg Hx      Social History   Substance Use Topics    Smoking status: Current Every Day Smoker     Packs/day: 0.50     Years: 40.00    Smokeless tobacco: Never Used    Alcohol use No        Lab Results  Component Value Date/Time   Hemoglobin A1c 6.1 (H) 07/07/2017 03:49 PM   Hemoglobin A1c 6.2 (H) 12/30/2016 11:54 AM   Hemoglobin A1c 6.6 (H) 08/08/2016 08:27 AM   Glucose 101 (H) 04/25/2017 11:23 AM   Glucose (POC) 106 (H) 08/22/2016 11:20 AM   Microalbumin/Creat ratio (mg/g creat) Cannot calculate ratio due to micro albumin result outside reportable range. 04/22/2014 11:15 AM   Microalbumin,urine random <0.50 04/22/2014 11:15 AM   LDL, calculated 44 12/30/2016 11:54 AM   Creatinine 0.58 04/25/2017 11:23 AM      Lab Results  Component Value Date/Time   Cholesterol, total 104 12/30/2016 11:54 AM   HDL Cholesterol 39 (L) 12/30/2016 11:54 AM   LDL, calculated 44 12/30/2016 11:54 AM   Triglyceride 106 12/30/2016 11:54 AM   CHOL/HDL Ratio 2.8 01/28/2015 09:50 AM     Lab Results  Component Value Date/Time   ALT (SGPT) 27 04/25/2017 11:23 AM   AST (SGOT) 15 04/25/2017 11:23 AM   Alk. phosphatase 134 (H) 04/25/2017 11:23 AM   Bilirubin, direct 0.1 10/27/2014 09:38 AM   Bilirubin, total <0.2 04/25/2017 11:23 AM   Albumin 4.3 04/25/2017 11:23 AM   Protein, total 6.5 04/25/2017 11:23 AM   INR 1.4 (H) 08/22/2016 02:49 AM   Prothrombin time 14.4 (H) 08/22/2016 02:49 AM   PLATELET 976 63/34/9269 11:23 AM       Lab Results  Component Value Date/Time   GFR est non- 04/25/2017 11:23 AM   GFR est  04/25/2017 11:23 AM   Creatinine 0.58 04/25/2017 11:23 AM   BUN 14 04/25/2017 11:23 AM   Sodium 145 (H) 04/25/2017 11:23 AM   Potassium 4.1 04/25/2017 11:23 AM   Chloride 104 04/25/2017 11:23 AM   CO2 22 04/25/2017 11:23 AM   Magnesium 1.9 08/18/2014 09:50 AM     Lab Results  Component Value Date/Time   TSH 1.910 07/07/2017 03:49 PM         Review of Systems, additional:  Musculoskeletal:positive for arthralgias    Objective:     Visit Vitals    Pulse 98    Temp 98 °F (36.7 °C) (Oral)    Resp 20    Ht 5' 3\" (1.6 m)    Wt 199 lb 9.6 oz (90.5 kg)    SpO2 95%    BMI 35.36 kg/m2     Appearance: alert, well appearing, and in no distress and overweight. General exam: CVS exam BP noted to be well controlled today in office, S1, S2 normal, no gallop, no murmur, chest clear, no JVD, no HSM, no edema. Lab review: orders written for new lab studies as appropriate; see orders. Assessment/Plan:     diabetes , hypertension , hyperlipidemia .   reviewed diet, exercise and weight cardiopulmonary disease  Workstation performed: [id number]     control. ICD-10-CM ICD-9-CM    1. Controlled type 2 diabetes mellitus without complication, without long-term current use of insulin ([facility]) E11.9 250.00 LIPID PANEL      HEMOGLOBIN A1C WITH EAG   2. Urinary tract infection without hematuria, site unspecified N39.0 599.0 AMB POC URINALYSIS DIP STICK AUTO W/O MICRO      CULTURE, URINE      ciprofloxacin HCl (CIPRO) 250 mg tablet   3. Panic attacks F41.0 300.01 LORazepam (ATIVAN) 0.5 mg tablet   4. Essential hypertension with goal blood pressure less than 140/90 I10 401.9 TSH 3RD GENERATION   5. Chronic obstructive pulmonary disease, unspecified COPD type ([facility]) J44.9 496 benzonatate (TESSALON) 100 mg capsule      fluticasone-salmeterol (ADVAIR) 250-50 mcg/dose diskus inhaler      albuterol (PROAIR HFA) 90 mcg/actuation inhaler   6. Encounter for long-term current use of medication Z79.899 V58.69 CBC W/O DIFF      METABOLIC PANEL, COMPREHENSIVE   7. Cigarette nicotine dependence without complication E72.305 079.6 buPROPion XL (WELLBUTRIN XL) 150 mg tablet      Refer to [facility] for Diabetes program and weight reduction. Try Wellbutrin XL for smoking cessation. Order Advair. Discussed difference between maintenance inhaler and rescue inhalers. Labs drawn. FU with Rheumatology. Patient Instructions   Recommend discontinue Cymbalta due to similarity to Effexor XR taking for mood disorder. Let Dr. Jennifer Carrero know. Deciding About Using Medicines To Quit Smoking  Deciding About Using Medicines To Quit Smoking  What are the medicines you can use? Your doctor may prescribe varenicline (Chantix) or bupropion (Zyban). These medicines can help you cope with cravings for tobacco. They are pills that don't contain nicotine. You also can use nicotine replacement products. These do contain nicotine. There are many types. · Gum and lozenges slowly release nicotine into your mouth. · Patches stick to your skin.  They slowly release nicotine into your bloodstream.  · An inhaler has a duff that contains nicotine. You breathe in a puff of nicotine vapor through your mouth and throat. · Nasal spray releases a mist that contains nicotine. What are key points about this decision? · Using medicines can double your chances of quitting smoking. They can ease cravings and withdrawal symptoms. · Getting counseling along with using medicine can raise your chances of quitting even more. · If you smoke fewer than 5 cigarettes a day, you may not need medicines to help you quit smoking. · These medicines have less nicotine than cigarettes. And by itself, nicotine is not nearly as harmful as smoking. The tars, carbon monoxide, and other toxic chemicals in tobacco cause the harmful effects. · The side effects of nicotine replacement products depend on the type of product. For example, a patch can make your skin red and itchy. Medicines in pill form can make you sick to your stomach. They can also cause dry mouth and trouble sleeping. For most people, the side effects are not bad enough to make them stop using the products. Why might you choose to use medicines to quit smoking? · You have tried on your own to stop smoking, but you were not able to stop. · You smoke more than 5 cigarettes a day. · You want to increase your chances of quitting smoking. · You want to reduce your cravings and withdrawal symptoms. · You feel the benefits of medicine outweigh the side effects. Why might you choose not to use medicine? · You want to try quitting on your own by stopping all at once (\"cold turkey\"). · You want to cut back slowly on the number of cigarettes you smoke. · You smoke fewer than 5 cigarettes a day. · You do not like using medicine. · You feel the side effects of medicines outweigh the benefits. · You are worried about the cost of medicines. Your decision  Thinking about the facts and your feelings can help you make a decision that is right for you.  Be sure you understand the benefits and risks of your options, and think about what else you need to do before you make the decision. Where can you learn more? Go to http://matti-yinka.info/. Enter K140 in the search box to learn more about \"Deciding About Using Medicines To Quit Smoking. \"  Current as of: March 20, 2017  Content Version: 11.4  © 7057-8568 [employer], [employer]. Care instructions adapted under license by [employer] (which disclaims liability or warranty for this information). If you have questions about a medical condition or this instruction, always ask your healthcare professional. [place] [address] any warranty or liability for your use of this information.

## 2022-12-02 ENCOUNTER — OFFICE VISIT (OUTPATIENT)
Dept: ORTHOPEDIC SURGERY | Age: 57
End: 2022-12-02
Payer: MEDICARE

## 2022-12-02 ENCOUNTER — TELEPHONE (OUTPATIENT)
Dept: FAMILY MEDICINE CLINIC | Age: 57
End: 2022-12-02

## 2022-12-02 VITALS — WEIGHT: 200 LBS | BODY MASS INDEX: 34.15 KG/M2 | HEIGHT: 64 IN

## 2022-12-02 DIAGNOSIS — M16.12 PRIMARY OSTEOARTHRITIS OF LEFT HIP: Primary | ICD-10-CM

## 2022-12-02 DIAGNOSIS — Z96.652 STATUS POST TOTAL KNEE REPLACEMENT, LEFT: ICD-10-CM

## 2022-12-02 NOTE — TELEPHONE ENCOUNTER
----- Message from Pamela Underwood sent at 12/2/2022  9:16 AM EST -----  Office visit?     ----- Message -----  From: Lainey Salazar  Sent: 12/1/2022   6:17 PM EST  To: [facility] Nurse Pool  Subject: Referral for [place] [facility]                 With Dr. Nori Wilson Friday 12/2 @ 9:45 please     I am having a constant pain on right side, under breast that goes towards my back. And God help me if I sneeze or cough. I  believe my liver is inflamed (?) Or swollen.  What do you suggest? Please help

## 2022-12-02 NOTE — LETTER
12/9/2022    Patient: Luma Cueva   YOB: 1965   Date of Visit: 38/6/7024     Yobani Rivera MD  [address] [place] [address]    Dear Yobani Rivera MD,      Thank you for referring Ms. Mike Casillas to [facility] for evaluation. My notes for this consultation are attached. If you have questions, please do not hesitate to call me. I look forward to following your patient along with you.       Sincerely,    Eden Elizondo MD

## 2022-12-02 NOTE — TELEPHONE ENCOUNTER
Call pt. I don't know what is causing pain. She will need OV. If get worse over weekend, go to ER for labs and imaging.

## 2022-12-06 DIAGNOSIS — M16.12 PRIMARY OSTEOARTHRITIS OF LEFT HIP: Primary | ICD-10-CM

## 2022-12-09 NOTE — PROGRESS NOTES
Arnol Durand (: 1965) is a 61 y.o. female, patient, here for evaluation of the following chief complaint(s):  Hip Pain (Left ) and Knee Pain (Left /)       SUBJECTIVE/OBJECTIVE:  Arnol Durand presents today for evaluation of left hip and knee pain. She has had progressive left groin pain for about 3 years. Aching at rest, awakening night pain daily if she lies on her side. Significant start up pain and gelling symptoms. She can only stand or walk for 10 to 15 minutes at a time. Significant difficulty with simple ADLs such as putting on shoes and socks, getting dressed, getting in and out of a car and in and out of bed. Stairs 2 feet on 1 step using handrail. Cannot take anti-inflammatories due to significant GI side effects. Physician directed exercise program has not helped. Left knee was replaced in  by an outside surgeon. In general has done well with the knee. He has some low-grade aching symptoms especially with weather change. Intermittent sharp anteromedial pain with activity. Occasional giving way. She is not sure if the giving way is from the knee or the hip. She is very unhappy with progressive pain and dysfunction, declining quality of life. PHYSICAL EXAM:  Vitals: Ht 5' 3.5\" (1.613 m)   Wt 200 lb (90.7 kg)   BMI 34.87 kg/m²   Body mass index is 34.87 kg/m². 61y.o. year old F, obese, no distress. Trendelenburg gait pattern on the left side. Pain-free motion lumbar spine. No nerve tension signs. No trochanteric tenderness over the left hip. Positive Stinchfield. Difficulty overcoming gravity. Hip range of motion is quite limited, with significant pain at limit in all planes. Left knee neutral alignment. Healed midline anterior scar. Full extension with flexion well past 90 degrees. Patella tracks centrally. Stable in extension, mild coronal plane laxity in flexion. Symmetrical palpable distal pulses.   No gross motor or sensory deficits in lower extremities. No distal edema. IMAGING:  Radiographs: XR Results (maximum last 2): Results from Appointment encounter on 12/02/22    XR KNEE LT 3 V    Narrative  4 x-ray views of left knee including AP and PA flexion, lateral, sunrise demonstrate satisfactory position and alignment of cemented cruciate retaining total knee components. Lucency under the anterior flange of femoral component on lateral image. No gross evidence of loosening. No lucencies around the tibial component. Patella tracks centrally. XR HIP LT W OR WO PELV 2-3 VWS    Narrative  3 x-ray views of left hip including AP pelvis, AP and frog-lateral images demonstrate severe osteoarthritis with complete loss of superior joint space. Circumferential osteophyte formation on both sides of the joint. Moderate osteoarthritis present in the right hip. ASSESSMENT/PLAN:  1. Primary osteoarthritis of left hip  -     XR HIP LT W OR WO PELV 2-3 VWS; Future  2. Status post total knee replacement, left  -     XR KNEE LT 3 V; Future    Severe left hip osteoarthritis; pain adjacent to left total knee replacement. Symptoms in the left knee could be somewhat referred from the hip, and the hip affects her daily activities and overall quality of life much more than the knee. We discussed continued conservative treatment measures versus left total hip replacement. Symptoms have progressed despite comprehensive conservative treatment measures outlined above and she desires to proceed with left total hip replacement. We discussed risks, benefits, and alternatives in detail, as well as anticipated hospital stay and course of rehabilitation. She understands its possible at knee symptoms will improve or completely resolve, or could persist.  Knee can be further evaluated after recovery from hip replacement if needed. She will see her primary care physician prior to surgery. All questions answered.     Plan to use IV tranexamic acid intraoperatively, aspirin for DVT prophylaxis. Plan posterior approach. No follow-ups on file. Review Of Systems  ROS    Positive for: Musculoskeletal  Last edited by Governor Members on 12/2/2022  9:58 AM.         Patient denies any recent fever, chills, nausea, vomiting, chest pain, or shortness of breath. Allergies   Allergen Reactions    Nsaids (Non-Steroidal Anti-Inflammatory Drug) Other (comments)     Stomach hurts    Celebrex [Celecoxib] Hives    Ditropan [Oxybutynin Chloride] Hives and Itching    Mobic [Meloxicam] Hives    Penicillins Unknown (comments)     DOESN'T REMEMBER  Patient states tolerating PO amoxicillin    Plaquenil [Hydroxychloroquine] Hives       Current Outpatient Medications   Medication Sig    levocetirizine (XYZAL) 5 mg tablet TAKE 1 TABLET EVERY DAY    atorvastatin (LIPITOR) 40 mg tablet TAKE 1 TABLET EVERY DAY    rOPINIRole (REQUIP) 2 mg tablet TAKE 1 TABLET THREE TIMES DAILY  Indications: restless legs syndrome, an extreme discomfort in the calf muscles when sitting or lying down    tiZANidine (ZANAFLEX) 2 mg tablet TAKE 1 TABLET TWICE DAILY FOR MUSCLE SPASM(S)    LORazepam (ATIVAN) 0.5 mg tablet Take 1 Tablet by mouth two (2) times daily as needed for Anxiety. Max Daily Amount: 1 mg. Indications: anxious    metFORMIN (GLUCOPHAGE) 500 mg tablet TAKE TWO TABLETS TWO TIMES A DAY WITH MEALS    venlafaxine-SR (EFFEXOR-XR) 150 mg capsule TAKE 1 CAPSULE EVERY DAY    furosemide (LASIX) 20 mg tablet TAKE 1 TABLET EVERY DAY AS NEEDED    omeprazole (PRILOSEC) 40 mg capsule TAKE 1 CAPSULE BY MOUTH BEFORE BREAKFAST AND DINNER.    dilTIAZem ER (CARDIZEM CD) 180 mg capsule TAKE 2 CAPSULES EVERY DAY FOR HIGH BLOOD PRESSURE    cholecalciferol (VITAMIN D3) (1000 Units /25 mcg) tablet Take  by mouth daily. losartan (COZAAR) 100 mg tablet TAKE 1 TABLET EVERY NIGHT    OTHER Beets    zinc sulfate (ZINC-220 PO) Take  by mouth.     Nebulizer & Compressor machine Use nebulizer every 6 hours as needed. DX J44.9    traZODone (DESYREL) 50 mg tablet Take 2 Tablets by mouth nightly. OTHER,NON-FORMULARY, Take 3 Caplet by mouth. CBD Gummies   Indications: for pain    albuterol-ipratropium (DUO-NEB) 2.5 mg-0.5 mg/3 ml nebu 3 mL by Nebulization route every six (6) hours as needed for Wheezing. Indications: bronchi muscle spasm resulting from COPD    Accu-Chek Joyce Plus test strp strip TEST BLOOD SUGAR EVERY DAY    albuterol (PROVENTIL HFA, VENTOLIN HFA, PROAIR HFA) 90 mcg/actuation inhaler Take 2 Puffs by inhalation every four (4) hours as needed for Wheezing, Shortness of Breath or Cough. inhalational spacing device 1 Each by Does Not Apply route as needed for Wheezing. Aspirin-Caffeine (BC Arthritis) 1,000-65 mg pwpk Take  by mouth.    polyethylene glycol (MIRALAX) 17 gram packet Take 1 Packet by mouth daily. May take 1-2 packets as needed daily for constipation. gentamicin (GARAMYCIN) 0.1 % topical cream      No current facility-administered medications for this visit.        Past Medical History:   Diagnosis Date    Arthritis     Cancer ([address]     IN KIDNEY REMOVED RIGHT KIDNEY     Chronic obstructive pulmonary disease ([address]     BEGINNING STAGES     Diabetes ([address]     Elevated WBC count 2002 to present    GERD (gastroesophageal reflux disease)     Hypertension     Kidney stones 2020    Leg swelling 07/2020    Doppler negative    Nausea & vomiting     Panic attacks     Sleep apnea     Does not use CPAP- raises the head of bed up    Spondylolisthesis of lumbar region     Stroke [facility]) 2020    TIA     Tachycardia         Past Surgical History:   Procedure Laterality Date    COLONOSCOPY N/A 3/1/2019    COLONOSCOPY performed by Jonathon Salas MD at [facility] ENDOSCOPY    HX BREAST BIOPSY Bilateral     benign    C/Casia 10    HX HEENT      HX HERNIA REPAIR  05/13/2021    Robotic repair of incisional hernia with mesh,Robotic repair of adhesions convert to open incisioal hernia repair with mesh by Dr. Toyin Allen.     HX HYSTERECTOMY  2008    HX KNEE ARTHROSCOPY Bilateral 2012    HX KNEE REPLACEMENT Left 2012    HX KNEE REPLACEMENT Right 08/18/2016    HX NEPHROSTOMY Right     HX SEPTOPLASTY  1993    HX SVT ABLATION  2013    HX TUBAL LIGATION  1989    HX UROLOGICAL Right 07/2020    nephrectomy    HX WISDOM TEETH EXTRACTION      X2    NEUROLOGICAL PROCEDURE UNLISTED      lumbar nerve block       Family History   Problem Relation Age of Onset    OSTEOARTHRITIS Mother     Cancer Mother         BREAST CA     Hypertension Mother     Thyroid Disease Mother     Glaucoma Mother     High Cholesterol Mother     Psychiatric Disorder Father         BIPOLAR    OSTEOARTHRITIS Father     Alcohol abuse Father     Heart Disease Father     Heart Surgery Father     Hypertension Father     Diabetes Brother     OSTEOARTHRITIS Brother     Hypertension Brother     Deep Vein Thrombosis Brother         Unknown cause    Anesth Problems Neg Hx         Social History     Socioeconomic History    Marital status: SINGLE     Spouse name: Not on file    Number of children: Not on file    Years of education: Not on file    Highest education level: Not on file   Occupational History    Not on file   Tobacco Use    Smoking status: Every Day     Packs/day: 1.00     Years: 40.00     Pack years: 40.00     Types: Cigarettes    Smokeless tobacco: Never    Tobacco comments:     STATES THINKS ABOUT IT OFTEN, IS THINKING ABOUT IT    Vaping Use    Vaping Use: Never used   Substance and Sexual Activity    Alcohol use: Not Currently    Drug use: No    Sexual activity: Not Currently   Other Topics Concern    Not on file   Social History Narrative    Not on file     Social Determinants of Health     Financial Resource Strain: Low Risk     Difficulty of Paying Living Expenses: Not hard at all   Food Insecurity: No Food Insecurity    Worried About Running Out of Food in the Last Year: Never true    Ran Out of Food in the Last Year: Never true   Transportation Needs: Not on file   Physical Activity: Not on file   Stress: Not on file   Social Connections: Not on file   Intimate Partner Violence: Not on file   Housing Stability: Not on file       Orders Placed This Encounter    XR HIP LT W OR WO PELV 2-3 VWS     Standing Status:   Future     Number of Occurrences:   1     Standing Expiration Date:   12/3/2023    XR KNEE LT 3 V     Standing Status:   Future     Number of Occurrences:   1     Standing Expiration Date:   12/3/2023        An electronic signature was used to authenticate this note.   -- Denise Choudhary MD

## 2022-12-12 ENCOUNTER — OFFICE VISIT (OUTPATIENT)
Dept: FAMILY MEDICINE CLINIC | Age: 57
End: 2022-12-12
Payer: MEDICARE

## 2022-12-12 ENCOUNTER — LAB ONLY (OUTPATIENT)
Dept: FAMILY MEDICINE CLINIC | Age: 57
End: 2022-12-12

## 2022-12-12 VITALS
OXYGEN SATURATION: 97 % | TEMPERATURE: 97 F | DIASTOLIC BLOOD PRESSURE: 78 MMHG | BODY MASS INDEX: 34.83 KG/M2 | RESPIRATION RATE: 18 BRPM | WEIGHT: 204 LBS | HEART RATE: 97 BPM | HEIGHT: 64 IN | SYSTOLIC BLOOD PRESSURE: 142 MMHG

## 2022-12-12 DIAGNOSIS — E11.40 TYPE 2 DIABETES MELLITUS WITH DIABETIC NEUROPATHY, WITHOUT LONG-TERM CURRENT USE OF INSULIN (HCC): ICD-10-CM

## 2022-12-12 DIAGNOSIS — Z90.5 H/O RIGHT NEPHRECTOMY: ICD-10-CM

## 2022-12-12 DIAGNOSIS — R10.11 RUQ ABDOMINAL PAIN: Primary | ICD-10-CM

## 2022-12-12 DIAGNOSIS — Z79.899 ENCOUNTER FOR LONG-TERM CURRENT USE OF MEDICATION: ICD-10-CM

## 2022-12-12 DIAGNOSIS — I10 ESSENTIAL HYPERTENSION WITH GOAL BLOOD PRESSURE LESS THAN 140/90: ICD-10-CM

## 2022-12-12 DIAGNOSIS — E87.5 HYPERKALEMIA: ICD-10-CM

## 2022-12-12 LAB
BILIRUB UR QL STRIP: NEGATIVE
GLUCOSE UR-MCNC: NEGATIVE MG/DL
KETONES P FAST UR STRIP-MCNC: NEGATIVE MG/DL
PH UR STRIP: 6 [PH] (ref 4.6–8)
PROT UR QL STRIP: NEGATIVE
SP GR UR STRIP: 1.01 (ref 1–1.03)
UA UROBILINOGEN AMB POC: NORMAL (ref 0.2–1)
URINALYSIS CLARITY POC: CLEAR
URINALYSIS COLOR POC: YELLOW
URINE BLOOD POC: NEGATIVE
URINE LEUKOCYTES POC: NEGATIVE
URINE NITRITES POC: NEGATIVE

## 2022-12-12 PROCEDURE — G8753 SYS BP > OR = 140: HCPCS | Performed by: FAMILY MEDICINE

## 2022-12-12 PROCEDURE — 3078F DIAST BP <80 MM HG: CPT | Performed by: FAMILY MEDICINE

## 2022-12-12 PROCEDURE — 99214 OFFICE O/P EST MOD 30 MIN: CPT | Performed by: FAMILY MEDICINE

## 2022-12-12 PROCEDURE — 3074F SYST BP LT 130 MM HG: CPT | Performed by: FAMILY MEDICINE

## 2022-12-12 PROCEDURE — G8427 DOCREV CUR MEDS BY ELIG CLIN: HCPCS | Performed by: FAMILY MEDICINE

## 2022-12-12 PROCEDURE — G9899 SCRN MAM PERF RSLTS DOC: HCPCS | Performed by: FAMILY MEDICINE

## 2022-12-12 PROCEDURE — 3017F COLORECTAL CA SCREEN DOC REV: CPT | Performed by: FAMILY MEDICINE

## 2022-12-12 PROCEDURE — G8754 DIAS BP LESS 90: HCPCS | Performed by: FAMILY MEDICINE

## 2022-12-12 PROCEDURE — 81003 URINALYSIS AUTO W/O SCOPE: CPT | Performed by: FAMILY MEDICINE

## 2022-12-12 PROCEDURE — G8417 CALC BMI ABV UP PARAM F/U: HCPCS | Performed by: FAMILY MEDICINE

## 2022-12-12 PROCEDURE — G9717 DOC PT DX DEP/BP F/U NT REQ: HCPCS | Performed by: FAMILY MEDICINE

## 2022-12-12 NOTE — PROGRESS NOTES
Identified pt with two pt identifiers(name and ).     Chief Complaint   Patient presents with    Abdominal Pain     Right abdominal pain into right lower back   Started about a week ago        Health Maintenance Due   Topic    Hepatitis B Vaccine (2 of 3 - Risk 3-dose series)    Low dose CT lung screening     COVID-19 Vaccine (2 - Moderna series)       Wt Readings from Last 3 Encounters:   22 204 lb (92.5 kg)   22 200 lb (90.7 kg)   22 202 lb (91.6 kg)     Temp Readings from Last 3 Encounters:   22 97 °F (36.1 °C) (Temporal)   22 97.6 °F (36.4 °C) (Temporal)   10/28/22 98.3 °F (36.8 °C) (Temporal)     BP Readings from Last 3 Encounters:   22 (!) 142/78   22 (!) 148/88   10/28/22 (!) 164/88     Pulse Readings from Last 3 Encounters:   22 97   22 90   10/28/22 94         Learning Assessment:  :     Learning Assessment 3/7/2022 2021 2018 2017 2016   PRIMARY LEARNER Patient Patient Patient Patient Patient   HIGHEST LEVEL OF EDUCATION - PRIMARY LEARNER  - SOME COLLEGE - - SOME COLLEGE   BARRIERS PRIMARY LEARNER - NONE - - [address] CAREGIVER No No - - No   PRIMARY LANGUAGE ENGLISH ENGLISH ENGLISH ENGLISH ENGLISH   LEARNER PREFERENCE PRIMARY DEMONSTRATION DEMONSTRATION LISTENING - DEMONSTRATION     - LISTENING - - -   ANSWERED BY patient patient patient patient self   RELATIONSHIP SELF SELF SELF SELF SELF       Depression Screening:  :     3 most recent PHQ Screens 2022   Little interest or pleasure in doing things Not at all   Feeling down, depressed, irritable, or hopeless Not at all   Total Score PHQ 2 0   Trouble falling or staying asleep, or sleeping too much -   Feeling tired or having little energy -   Poor appetite, weight loss, or overeating -   Feeling bad about yourself - or that you are a failure or have let yourself or your family down -   Trouble concentrating on things such as school, work, reading, or watching TV - Moving or speaking so slowly that other people could have noticed; or the opposite being so fidgety that others notice -   Thoughts of being better off dead, or hurting yourself in some way -   PHQ 9 Score -   How difficult have these problems made it for you to do your work, take care of your home and get along with others -       Fall Risk Assessment:  :     Fall Risk Assessment, last 12 mths 3/7/2022   Able to walk? Yes   Fall in past 12 months? 0       Abuse Screening:  :     Abuse Screening Questionnaire 12/12/2022 11/11/2022 10/28/2022 4/1/2022 3/7/2022 1/13/2021 12/10/2020   Do you ever feel afraid of your partner? N N N N N N N   Are you in a relationship with someone who physically or mentally threatens you? N N N N N N N   Is it safe for you to go home? Y Y Y Y Y Y Y       Coordination of Care Questionnaire:  :     1) Have you been to an emergency room, urgent care clinic since your last visit? no   Hospitalized since your last visit? no             2) Have you seen or consulted any other health care providers outside of [address] [place] [address] since your last visit? no  (Include any pap smears or colon screenings in this section.)    3) Do you have an Advance Directive on file? no  Are you interested in receiving information about Advance Directives? no    Patient is accompanied by self I have received verbal consent from Sofie Guthrie to discuss any/all medical information while they are present in the room. 4.  For patients aged 39-70: Has the patient had a colonoscopy / FIT/ Cologuard? Yes - no Care Gap present      If the patient is female:    5. For patients aged 41-77: Has the patient had a mammogram within the past 2 years? Yes - no Care Gap present      6. For patients aged 21-65: Has the patient had a pap smear?  NA - based on age or sex

## 2022-12-12 NOTE — PROGRESS NOTES
HISTORY OF PRESENT ILLNESS  Sofie Guthrie is a 61 y.o. female. HPI  C/O RUQ abdo pain that radiates to back x 8 days. Intermittent. Some pleuritic nature. No fever. Worse with bending, cough , or hiccup. No worse with meals. Hx R nephrectomy in 2020. Had FU with [facility] this past summer. Normal BM's. Review of Systems   Gastrointestinal:  Positive for abdominal pain. Visit Vitals  BP (!) 142/78 (BP 1 Location: Right arm, BP Patient Position: Sitting, BP Cuff Size: Adult)   Pulse 97   Temp 97 °F (36.1 °C) (Temporal)   Resp 18   Ht 5' 3.5\" (1.613 m)   Wt 204 lb (92.5 kg)   SpO2 97%   BMI 35.57 kg/m²       Physical Exam  Vitals reviewed. Constitutional:       Appearance: She is obese. Cardiovascular:      Rate and Rhythm: Normal rate and regular rhythm. Heart sounds: Normal heart sounds. Pulmonary:      Effort: Pulmonary effort is normal.      Breath sounds: Normal breath sounds. Abdominal:      Palpations: There is no mass. Tenderness: There is abdominal tenderness. There is no right CVA tenderness, left CVA tenderness or guarding. Neurological:      Mental Status: She is alert. Recent Results (from the past 1008 hour(s))   VITAMIN D, 25 HYDROXY    Collection Time: 11/11/22  9:15 AM   Result Value Ref Range    Vitamin D 25-Hydroxy 40.4 30 - 557 ng/mL   METABOLIC PANEL, COMPREHENSIVE    Collection Time: 11/11/22  9:15 AM   Result Value Ref Range    Sodium 141 136 - 145 mmol/L    Potassium 5.6 (H) 3.5 - 5.1 mmol/L    Chloride 110 (H) 97 - 108 mmol/L    CO2 27 21 - 32 mmol/L    Anion gap 4 (L) 5 - 15 mmol/L    Glucose 101 (H) 65 - 100 mg/dL    BUN 10 6 - 20 MG/DL    Creatinine 1.07 (H) 0.55 - 1.02 MG/DL    BUN/Creatinine ratio 9 (L) 12 - 20      eGFR >60 >60 ml/min/1.73m2    Calcium 9.0 8.5 - 10.1 MG/DL    Bilirubin, total 0.2 0.2 - 1.0 MG/DL    ALT (SGPT) 28 12 - 78 U/L    AST (SGOT) 45 (H) 15 - 37 U/L    Alk.  phosphatase 172 (H) 45 - 117 U/L    Protein, total 6.7 6.4 - 8.2 g/dL Albumin 3.4 (L) 3.5 - 5.0 g/dL    Globulin 3.3 2.0 - 4.0 g/dL    A-G Ratio 1.0 (L) 1.1 - 2.2     LIPID PANEL    Collection Time: 11/11/22  9:15 AM   Result Value Ref Range    Cholesterol, total 128 <200 MG/DL    Triglyceride 78 <150 MG/DL    HDL Cholesterol 51 MG/DL    LDL, calculated 61.4 0 - 100 MG/DL    VLDL, calculated 15.6 MG/DL    CHOL/HDL Ratio 2.5 0.0 - 5.0     HEMOGLOBIN A1C WITH EAG    Collection Time: 11/11/22  9:15 AM   Result Value Ref Range    Hemoglobin A1c PLEASE DISREGARD RESULTS 4.0 - 5.6 %    Est. average glucose PLEASE DISREGARD RESULTS mg/dL   MICROALBUMIN, UR, RAND W/ MICROALB/CREAT RATIO    Collection Time: 11/11/22  9:15 AM   Result Value Ref Range    Microalbumin,urine random <0.50 MG/DL    Creatinine, urine random <13.00 mg/dL    Microalbumin/Creat ratio (mg/g creat) <38 (H) 0 - 30 mg/g   TSH 3RD GENERATION    Collection Time: 11/11/22  9:15 AM   Result Value Ref Range    TSH 2.67 0.36 - 3.74 uIU/mL   AMB POC URINALYSIS DIP STICK AUTO W/O MICRO    Collection Time: 12/12/22  3:26 PM   Result Value Ref Range    Color (UA POC) Yellow     Clarity (UA POC) Clear     Glucose (UA POC) Negative Negative    Bilirubin (UA POC) Negative Negative    Ketones (UA POC) Negative Negative    Specific gravity (UA POC) 1.010 1.001 - 1.035    Blood (UA POC) Negative Negative    pH (UA POC) 6.0 4.6 - 8.0    Protein (UA POC) Negative Negative    Urobilinogen (UA POC) 0.2 mg/dL 0.2 - 1    Nitrites (UA POC) Negative Negative    Leukocyte esterase (UA POC) Negative Negative         ASSESSMENT and PLAN    ICD-10-CM ICD-9-CM    1. RUQ abdominal pain  R10.11 789.01 CT ABD W WO CONT      AMB POC URINALYSIS DIP STICK AUTO W/O MICRO      2. Essential hypertension with goal blood pressure less than 140/90  I10 401.9       3. H/O right nephrectomy  Z90.5 V45.73       Obtain notes from [facility]  Order CT abdomen  Check urine. Draw labs ordered.

## 2022-12-13 LAB
BASOPHILS # BLD: 0.3 K/UL (ref 0–0.1)
BASOPHILS NFR BLD: 2 % (ref 0–1)
DIFFERENTIAL METHOD BLD: ABNORMAL
EOSINOPHIL # BLD: 0.6 K/UL (ref 0–0.4)
EOSINOPHIL NFR BLD: 4 % (ref 0–7)
ERYTHROCYTE [DISTWIDTH] IN BLOOD BY AUTOMATED COUNT: 16.7 % (ref 11.5–14.5)
EST. AVERAGE GLUCOSE BLD GHB EST-MCNC: 128 MG/DL
HBA1C MFR BLD: 6.1 % (ref 4–5.6)
HCT VFR BLD AUTO: 35.4 % (ref 35–47)
HGB BLD-MCNC: 10.5 G/DL (ref 11.5–16)
IMM GRANULOCYTES # BLD AUTO: 0 K/UL (ref 0–0.04)
IMM GRANULOCYTES NFR BLD AUTO: 0 % (ref 0–0.5)
LYMPHOCYTES # BLD: 2.9 K/UL (ref 0.8–3.5)
LYMPHOCYTES NFR BLD: 19 % (ref 12–49)
MCH RBC QN AUTO: 22.3 PG (ref 26–34)
MCHC RBC AUTO-ENTMCNC: 29.7 G/DL (ref 30–36.5)
MCV RBC AUTO: 75.3 FL (ref 80–99)
MONOCYTES # BLD: 0.8 K/UL (ref 0–1)
MONOCYTES NFR BLD: 5 % (ref 5–13)
NEUTS SEG # BLD: 10.4 K/UL (ref 1.8–8)
NEUTS SEG NFR BLD: 70 % (ref 32–75)
NRBC # BLD: 0 K/UL (ref 0–0.01)
NRBC BLD-RTO: 0 PER 100 WBC
PLATELET # BLD AUTO: 317 K/UL (ref 150–400)
PLATELET COMMENTS,PCOM: ABNORMAL
PMV BLD AUTO: 10.7 FL (ref 8.9–12.9)
POTASSIUM SERPL-SCNC: 3.8 MMOL/L (ref 3.5–5.1)
RBC # BLD AUTO: 4.7 M/UL (ref 3.8–5.2)
RBC MORPH BLD: ABNORMAL
WBC # BLD AUTO: 15 K/UL (ref 3.6–11)

## 2022-12-16 ENCOUNTER — HOSPITAL ENCOUNTER (OUTPATIENT)
Dept: CT IMAGING | Age: 57
End: 2022-12-16
Attending: FAMILY MEDICINE
Payer: MEDICARE

## 2022-12-16 ENCOUNTER — APPOINTMENT (OUTPATIENT)
Dept: CT IMAGING | Age: 57
End: 2022-12-16
Attending: FAMILY MEDICINE
Payer: MEDICARE

## 2022-12-16 ENCOUNTER — HOSPITAL ENCOUNTER (OUTPATIENT)
Dept: CT IMAGING | Age: 57
End: 2022-12-16
Attending: PHYSICIAN ASSISTANT
Payer: MEDICARE

## 2022-12-16 ENCOUNTER — TELEPHONE (OUTPATIENT)
Dept: FAMILY MEDICINE CLINIC | Age: 57
End: 2022-12-16

## 2022-12-16 DIAGNOSIS — R91.1 LUNG NODULE: ICD-10-CM

## 2022-12-16 DIAGNOSIS — D64.9 ANEMIA, UNSPECIFIED TYPE: ICD-10-CM

## 2022-12-16 DIAGNOSIS — R10.11 RUQ ABDOMINAL PAIN: Primary | ICD-10-CM

## 2022-12-16 PROCEDURE — 71250 CT THORAX DX C-: CPT

## 2022-12-17 NOTE — TELEPHONE ENCOUNTER
Refer to GI. Last EGD done 2019 by Dr Gallito Miner. She is having RUQ abdo pain also and anemia. Please give pt office # to call for appt. Fax last OV note and labs.

## 2022-12-17 NOTE — PROGRESS NOTES
Good sugar control. Normal potassium. Blood counts show new mild anemia. Persistent high white blood count. She had EGD in 2019 showed gastritis. She is having some RUQ abdo pain. I want to refer her to GI.

## 2022-12-19 ENCOUNTER — PATIENT MESSAGE (OUTPATIENT)
Dept: FAMILY MEDICINE CLINIC | Age: 57
End: 2022-12-19

## 2022-12-19 ENCOUNTER — TELEPHONE (OUTPATIENT)
Dept: FAMILY MEDICINE CLINIC | Age: 57
End: 2022-12-19

## 2022-12-19 DIAGNOSIS — Z85.528 HX OF RENAL CELL CANCER: ICD-10-CM

## 2022-12-19 NOTE — TELEPHONE ENCOUNTER
Called patient,  if full so was unable to leave .  Sent patient a iSSimple message and faxed all documents over

## 2022-12-22 PROBLEM — Z85.528 HX OF RENAL CELL CANCER: Status: ACTIVE | Noted: 2022-12-22

## 2022-12-22 NOTE — TELEPHONE ENCOUNTER
I called Dr Janie Kearney and he said pt is no longer under his cue for approval of this case.   He recommends that our office to call [employer] to see if CT scan was approved or who I need to speak to for [id number] approval.

## 2022-12-22 NOTE — TELEPHONE ENCOUNTER
Called [employer] and patients CT scan that was requested on 12/14/2022 was approved. Approval number is [id number].

## 2022-12-25 DIAGNOSIS — G47.00 INSOMNIA, UNSPECIFIED TYPE: ICD-10-CM

## 2022-12-26 RX ORDER — TRAZODONE HYDROCHLORIDE 50 MG/1
100 TABLET ORAL
Qty: 180 TABLET | Refills: 3 | Status: SHIPPED | OUTPATIENT
Start: 2022-12-26

## 2022-12-30 ENCOUNTER — HOSPITAL ENCOUNTER (OUTPATIENT)
Dept: CT IMAGING | Age: 57
Discharge: HOME | End: 2022-12-30
Attending: FAMILY MEDICINE
Payer: MEDICARE

## 2022-12-30 DIAGNOSIS — R10.11 RUQ ABDOMINAL PAIN: ICD-10-CM

## 2022-12-30 PROCEDURE — 74160 CT ABDOMEN W/CONTRAST: CPT

## 2022-12-30 PROCEDURE — 74011000636 HC RX REV CODE- 636: Performed by: FAMILY MEDICINE

## 2022-12-30 RX ADMIN — IOPAMIDOL 100 ML: 755 INJECTION, SOLUTION INTRAVENOUS at 10:11

## 2023-01-03 ENCOUNTER — TELEPHONE (OUTPATIENT)
Dept: FAMILY MEDICINE CLINIC | Age: 58
End: 2023-01-03

## 2023-01-03 NOTE — TELEPHONE ENCOUNTER
----- Message from Annetta Ramires MD sent at 12/30/2022  5:40 PM EST -----  CT of abdomen looks clear. No identified reason for right flank pain. It could be muscular.

## 2023-01-09 RX ORDER — BLOOD SUGAR DIAGNOSTIC
STRIP MISCELLANEOUS
Qty: 100 STRIP | Refills: 4 | Status: SHIPPED | OUTPATIENT
Start: 2023-01-09

## 2023-01-09 RX ORDER — LOSARTAN POTASSIUM 100 MG/1
TABLET ORAL
Qty: 90 TABLET | Refills: 3 | Status: SHIPPED | OUTPATIENT
Start: 2023-01-09

## 2023-01-24 ENCOUNTER — HOSPITAL ENCOUNTER (OUTPATIENT)
Dept: PREADMISSION TESTING | Age: 58
Discharge: HOME | End: 2023-01-24
Payer: MEDICARE

## 2023-01-24 VITALS
WEIGHT: 208 LBS | HEIGHT: 64 IN | BODY MASS INDEX: 35.51 KG/M2 | TEMPERATURE: 98.1 F | HEART RATE: 98 BPM | DIASTOLIC BLOOD PRESSURE: 81 MMHG | SYSTOLIC BLOOD PRESSURE: 142 MMHG

## 2023-01-24 LAB
ABO + RH BLD: NORMAL
ANION GAP SERPL CALC-SCNC: 6 MMOL/L (ref 5–15)
APPEARANCE UR: CLEAR
ATRIAL RATE: 84 BPM
BACTERIA URNS QL MICRO: NEGATIVE /HPF
BILIRUB UR QL: NEGATIVE
BLOOD GROUP ANTIBODIES SERPL: NORMAL
BUN SERPL-MCNC: 14 MG/DL (ref 6–20)
BUN/CREAT SERPL: 13 (ref 12–20)
CALCIUM SERPL-MCNC: 9.2 MG/DL (ref 8.5–10.1)
CALCULATED P AXIS, ECG09: 52 DEGREES
CALCULATED R AXIS, ECG10: -8 DEGREES
CALCULATED T AXIS, ECG11: 39 DEGREES
CHLORIDE SERPL-SCNC: 105 MMOL/L (ref 97–108)
CO2 SERPL-SCNC: 25 MMOL/L (ref 21–32)
COLOR UR: ABNORMAL
CREAT SERPL-MCNC: 1.12 MG/DL (ref 0.55–1.02)
DIAGNOSIS, 93000: NORMAL
EPITH CASTS URNS QL MICRO: ABNORMAL /LPF
ERYTHROCYTE [DISTWIDTH] IN BLOOD BY AUTOMATED COUNT: 17.2 % (ref 11.5–14.5)
GLUCOSE SERPL-MCNC: 175 MG/DL (ref 65–100)
GLUCOSE UR STRIP.AUTO-MCNC: 100 MG/DL
HCT VFR BLD AUTO: 36 % (ref 35–47)
HGB BLD-MCNC: 10.2 G/DL (ref 11.5–16)
HGB UR QL STRIP: NEGATIVE
INR PPP: 0.9 (ref 0.9–1.1)
KETONES UR QL STRIP.AUTO: NEGATIVE MG/DL
LEUKOCYTE ESTERASE UR QL STRIP.AUTO: NEGATIVE
MCH RBC QN AUTO: 21 PG (ref 26–34)
MCHC RBC AUTO-ENTMCNC: 28.3 G/DL (ref 30–36.5)
MCV RBC AUTO: 74.1 FL (ref 80–99)
NITRITE UR QL STRIP.AUTO: NEGATIVE
NRBC # BLD: 0 K/UL (ref 0–0.01)
NRBC BLD-RTO: 0 PER 100 WBC
P-R INTERVAL, ECG05: 132 MS
PH UR STRIP: 6.5 (ref 5–8)
PLATELET # BLD AUTO: 315 K/UL (ref 150–400)
PMV BLD AUTO: 10.5 FL (ref 8.9–12.9)
POTASSIUM SERPL-SCNC: 4.4 MMOL/L (ref 3.5–5.1)
PROT UR STRIP-MCNC: NEGATIVE MG/DL
PROTHROMBIN TIME: 9.4 SEC (ref 9–11.1)
Q-T INTERVAL, ECG07: 402 MS
QRS DURATION, ECG06: 82 MS
QTC CALCULATION (BEZET), ECG08: 475 MS
RBC # BLD AUTO: 4.86 M/UL (ref 3.8–5.2)
RBC #/AREA URNS HPF: ABNORMAL /HPF (ref 0–5)
SODIUM SERPL-SCNC: 136 MMOL/L (ref 136–145)
SP GR UR REFRACTOMETRY: 1 (ref 1–1.03)
SPECIMEN EXP DATE BLD: NORMAL
UA: UC IF INDICATED,UAUC: ABNORMAL
UROBILINOGEN UR QL STRIP.AUTO: 0.2 EU/DL (ref 0.2–1)
VENTRICULAR RATE, ECG03: 84 BPM
WBC # BLD AUTO: 15.1 K/UL (ref 3.6–11)
WBC URNS QL MICRO: ABNORMAL /HPF (ref 0–4)

## 2023-01-24 PROCEDURE — 86900 BLOOD TYPING SEROLOGIC ABO: CPT

## 2023-01-24 PROCEDURE — 85027 COMPLETE CBC AUTOMATED: CPT

## 2023-01-24 PROCEDURE — 81001 URINALYSIS AUTO W/SCOPE: CPT

## 2023-01-24 PROCEDURE — 93005 ELECTROCARDIOGRAM TRACING: CPT

## 2023-01-24 PROCEDURE — 80048 BASIC METABOLIC PNL TOTAL CA: CPT

## 2023-01-24 PROCEDURE — 36415 COLL VENOUS BLD VENIPUNCTURE: CPT

## 2023-01-24 PROCEDURE — 85610 PROTHROMBIN TIME: CPT

## 2023-01-24 RX ORDER — FLUTICASONE PROPIONATE 50 MCG
2 SPRAY, SUSPENSION (ML) NASAL DAILY
COMMUNITY

## 2023-01-24 RX ORDER — SCOLOPAMINE TRANSDERMAL SYSTEM 1 MG/1
1 PATCH, EXTENDED RELEASE TRANSDERMAL ONCE
Qty: 1 PATCH | Refills: 0 | Status: SHIPPED | OUTPATIENT
Start: 2023-01-24 | End: 2023-01-24

## 2023-01-24 NOTE — PERIOP NOTES
[address] INSTRUCTIONS  ORTHOPAEDIC    Surgery Date:   2/2/23    Your surgeon's office or [facility] staff will call you between 4 PM- 8 PM the day before surgery with your arrival time. If your surgery is on a Monday, you will receive a call the preceding Friday. Please report to [facility] Patient Access/Admitting on the 1st floor. Bring your insurance card, photo identification, and any copayment (if applicable). If you are going home the same day of your surgery, you must have a responsible adult to drive you home. You need to have a responsible adult to stay with you the first 24 hours after surgery and you should not drive a car for 24 hours following your surgery. Do NOT eat any solid foods after midnight the night before surgery including candy, mints or gum. You may drink clear liquids from midnight until 1 hour prior to arrival time. You may drink up to 12 ounces at one time every 4 hours. Do NOT drink alcohol or smoke 24 hours before surgery. STOP smoking for 14 days prior as it helps with breathing and healing after surgery. If your arrival time is 3pm or later, you may eat a light breakfast before 8am (toast, bagel-no butter, black coffee, plain tea, fruit juice-no pulp) Please note special instructions, if applicable, below for medications. If you are being admitted to the hospital,please leave personal belongings/luggage in your car until you have an assigned hospital room number. Please wear comfortable clothes. Wear your glasses instead of contacts. We ask that all money, jewelry and valuables be left at home. Wear no make up, particularly mascara, the day of surgery. All body piercings, rings, and jewelry need to be removed and left at home. Please remove any nail polish or artificial nails from your fingernails. Please wear your hair loose or down. Please no pony-tails, buns, or any metal hair accessories.  If you shower the morning of surgery, please do not apply any lotions or powders afterwards. You may wear deodorant. Do not shave any body area within 24 hours of your surgery. Please follow all instructions to avoid any potential surgical cancellation. Should your physical condition change, (i.e. fever, cold, flu, etc.) please notify your surgeon as soon as possible. It is important to be on time. If a situation occurs where you may be delayed, please call:  [phone] / [phone] on the day of surgery. The Preadmission Testing staff can be reached at [phone]. Special instructions: NONE    Current Outpatient Medications   Medication Sig    fluticasone propionate (FLONASE) 50 mcg/actuation nasal spray 2 Sprays by Both Nostrils route daily. Accu-Chek Joyce Plus test strp strip TEST BLOOD SUGAR EVERY DAY    losartan (COZAAR) 100 mg tablet TAKE 1 TABLET EVERY NIGHT    traZODone (DESYREL) 50 mg tablet TAKE 2 TABLETS BY MOUTH NIGHTLY.    levocetirizine (XYZAL) 5 mg tablet TAKE 1 TABLET EVERY DAY    atorvastatin (LIPITOR) 40 mg tablet TAKE 1 TABLET EVERY DAY    rOPINIRole (REQUIP) 2 mg tablet TAKE 1 TABLET THREE TIMES DAILY  Indications: restless legs syndrome, an extreme discomfort in the calf muscles when sitting or lying down    tiZANidine (ZANAFLEX) 2 mg tablet TAKE 1 TABLET TWICE DAILY FOR MUSCLE SPASM(S)    LORazepam (ATIVAN) 0.5 mg tablet Take 1 Tablet by mouth two (2) times daily as needed for Anxiety. Max Daily Amount: 1 mg. Indications: anxious    metFORMIN (GLUCOPHAGE) 500 mg tablet TAKE TWO TABLETS TWO TIMES A DAY WITH MEALS    venlafaxine-SR (EFFEXOR-XR) 150 mg capsule TAKE 1 CAPSULE EVERY DAY    omeprazole (PRILOSEC) 40 mg capsule TAKE 1 CAPSULE BY MOUTH BEFORE BREAKFAST AND DINNER.    dilTIAZem ER (CARDIZEM CD) 180 mg capsule TAKE 2 CAPSULES EVERY DAY FOR HIGH BLOOD PRESSURE (Patient taking differently: 180 mg daily.)    cholecalciferol (VITAMIN D3) (1000 Units /25 mcg) tablet Take  by mouth daily.     OTHER Beets    zinc sulfate (ZINC-220 PO) Take  by mouth. Nebulizer & Compressor machine Use nebulizer every 6 hours as needed. DX J44.9    OTHER,NON-FORMULARY, Take 3 Caplet by mouth. CBD Gummies   Indications: for pain    albuterol-ipratropium (DUO-NEB) 2.5 mg-0.5 mg/3 ml nebu 3 mL by Nebulization route every six (6) hours as needed for Wheezing. Indications: bronchi muscle spasm resulting from COPD    albuterol (PROVENTIL HFA, VENTOLIN HFA, PROAIR HFA) 90 mcg/actuation inhaler Take 2 Puffs by inhalation every four (4) hours as needed for Wheezing, Shortness of Breath or Cough. inhalational spacing device 1 Each by Does Not Apply route as needed for Wheezing. Aspirin-Caffeine (BC Arthritis) 1,000-65 mg pwpk Take  by mouth.    polyethylene glycol (MIRALAX) 17 gram packet Take 1 Packet by mouth daily. May take 1-2 packets as needed daily for constipation. scopolamine (TRANSDERM-SCOP) 1 mg over 3 days pt3d 1 Patch by TransDERmal route once for 1 dose. Indications: prevent nausea and vomiting after surgery, Place patch behind either ear one hour prior to arrival time to hospital for surgery. furosemide (LASIX) 20 mg tablet TAKE 1 TABLET EVERY DAY AS NEEDED     No current facility-administered medications for this encounter. YOU MUST ONLY TAKE THESE MEDICATIONS THE MORNING OF SURGERY WITH A SIP OF WATER: FLONASE, XYZAL, ATORVASTATIN, REQUIP, EFFEXOR, OMEPRAZOLE, DILTIAZEN, SCOPOLAMINE PATCH    MEDICATIONS TO TAKE THE MORNING OF SURGERY ONLY IF NEEDED: TIZANIDINE, ATIVAN, INHALERS, EYE DROPS    HOLD these prescription medications BEFORE Surgery: STOP METFORMIN ON 2/1/23; STOP GUMMIES ON 1/30/23 (PER [employer]), STOP ALL OTHER VITAMINS AND SUPPLEMENTS ON 1/26/23    Ask your surgeon/prescribing physician about when/if to STOP taking these medications: NONE    Stop any non-steroidal anti-inflammatory drugs (i.e. Ibuprofen, Naproxen, Advil, Aleve) 3 days before surgery. You may take Tylenol.   STOP all vitamins and herbal supplements 1 week prior to  surgery. If you are currently taking Plavix, Coumadin, or any other blood-thinning/anticoagulant medication contact your prescribing physician for instructions. Preventing Infections Before and After - Your Surgery    IMPORTANT INSTRUCTIONS    You play an important role in your health and preparation for surgery. To reduce the germs on your skin you will need to shower with CHG soap (Chorhexidine gluconate 4%) two times before surgery. CHG soap (Hibiclens, Hex-A-Clens or store brand)  CHG soap will be provided at your Preadmission Testing (PAT) appointment. If you do not have a PAT appointment before surgery, you may arrange to  CHG soap from our office or purchase CHG soap at a pharmacy, grocery or department store. You need to purchase TWO 4 ounce bottles to use for your 2 showers. Steps to follow:  Fabiola Oddi your hair with your normal shampoo and your body with regular soap and rinse well to remove shampoo and soap from your skin. Wet a clean washcloth and turn off the shower. Put CHG soap on washcloth and apply to your entire body from the neck down. Do not use on your head, face or private parts(genitals). Do not use CHG soap on open sores, wounds or areas of skin irritation. Wash you body gently for 5 minutes. Do not wash your skin too hard. This soap does not create lather. Pay special attention to your underarms and from your belly button to your feet. Turn the shower back on and rinse well to get CHG soap off your body. Pat your skin dry with a clean, dry towel. Do not apply lotions or moisturizer. Put on clean clothes and sleep on fresh bed sheets and do not allow pets to sleep with you.     Shower with CHG soap 2 times before your surgery  The evening before your surgery  The morning of your surgery      Tips to help prevent infections after your surgery:  Protect your surgical wound from germs:  Hand washing is the most important thing you and your caregivers can do to prevent infections. Keep your bandage clean and dry! Do not touch your surgical wound. Use clean, freshly washed towels and washcloths every time you shower; do not share bath linens with others. Until your surgical wound is healed, wear clothing and sleep on bed linens each day that are clean and freshly washed. Do not allow pets to sleep in your bed with you or touch your surgical wound. Do not smoke - smoking delays wound healing. This may be a good time to stop smoking. If you have diabetes, it is important for you to manage your blood sugar levels properly before your surgery as well as after your surgery. Poorly managed blood sugar levels slow down wound healing and prevent you from healing completely. Prevention of Infection  Testing for Staphylococcus aureus on your skin before surgery    Staphylococcus aureus (staph) is a common bacteria that is found on the body. It normally does not cause infection on healthy skin. Before surgery, you will be tested to see if you have staph by swabbing the inside of your nose. When you have an incision with surgery, the goal is to protect that incision from infection. Removal of the staph bacteria before surgery can decrease the risk of a surgical site infection. If your nose swab is positive for staph you will be called. Your treatment will include 2 steps:  Prescription for Mupirocin ointment to be used in each nostril twice a day for 5 days. Showering with Chlorhexidine (CHG) liquid soap for 5 days prior to surgery. How to use Mupirocin ointment in your nose   the prescription from your pharmacy. You will receive a large tube of ointment which will be big enough for all of your treatments. You will apply this ointment to each nostril 2 times a day for 5 days. Wash your hands with  gel or soap and water for 20 seconds before using ointment. Place a pea-sized amount of ointment on a cotton Q-tip.   Apply ointment just inside of each nostril with the Q-tip. Do not push Q-tip or ointment deep inside you nose. Press your nostrils together and massage for a few seconds. Wash your hands with  gel or soap and water after you are finished. Do not get ointment near your eyes. If it gets into your eyes, rinse them with cool water. If you need to use nasal spray, clean the tip of the bottle with alcohol before use and do not use both at the same time. If you are scheduled for COVID testing during the 5 days, do NOT apply morning dose until after the COVID test has been performed. How to use Chlorhexidine (CHG) 4% liquid soap  Purchase an 8 ounce bottle of CHG liquid soap (Chlorhexidine 4%, Hibiclens, Hex-A-Clens or store brand) at a pharmacy or grocery store. Wash your hair with your normal shampoo and your body with regular soap and rinse well to remove shampoo and soap from your skin. Wet a clean washcloth and turn off the shower. Put CHG soap on washcloth and apply to your entire body from the neck down. Do not use on your head, face or private parts(genitals). Do not use CHG soap on open sores, wounds or areas of skin irritation. Wash your body gently for 5 minutes. Do not wash your skin too hard. This soap does not create lather. Pay special attention to your underarms and from your belly button to your feet. Turn the shower back on and rinse well to get CHG soap off your body. Pat your skin dry with a clean, dry towel. Do not apply lotions or moisturizer. Put on clean clothes and sleep on fresh bed sheets the night before surgery. Do not allow pets to sleep with you. Eating and Drinking Before Surgery    You may eat a regular dinner at the usual time on the day before your surgery. Do NOT eat any solid foods after midnight unless your arrival time at the hospital is 3pm or later.   You may drink clear liquids only from 12 midnight until 1 hours prior to your arrival time at the hospital on the day of your surgery. Do NOT drink alcohol. Clear liquids include:  Water  Fruit juices without pulp( i.e. apple juice)  Carbonated beverages  Black coffee (no cream/milk)  Tea (no cream/milk)  Gatorade  You may drink up to 12-16 ounces at one time every 4 hours between the hours of midnight and 1 hour before your arrival time at the hospital. Example- if your arrival time at the hospital is 6am, you may drink 12-16 ounces of clear liquids no later than 5am.  If your arrival time at the hospital is 3pm or later, you may eat a light breakfast before 8am.  A light breakfast includes: Toast or bagel (no butter)  Black coffee (no cream/milk)  Tea (no cream/milk)  Fruit juices without pulp ( i.e. apple juice)  Do NOT eat meat, eggs, vegetables or fruit  If you have any questions, please contact your surgeon's office. Patient Information Regarding COVID Restrictions    Day of Procedure    Please park in the parking deck or any designated visitor parking lot. Enter the facility through the Main Entrance of the hospital.  On the day of surgery, please provide the cell phone number of the person who will be waiting for you to the Patient Access representative at the time of registration. Masks are highly recommended in the hospital, but not required. Once your procedure and the immediate recovery period is completed, a nurse in the recovery area will contact your designated visitor to inform them of your room number or to otherwise review other pertinent information regarding your care. Social distancing practices are strongly encouraged in waiting areas and the cafeteria. The patient was contacted in person. She verbalized understanding of all instructions does not  need reinforcement.

## 2023-01-25 LAB
BACTERIA SPEC CULT: NORMAL
BACTERIA SPEC CULT: NORMAL
SERVICE CMNT-IMP: NORMAL

## 2023-01-25 NOTE — PERIOP NOTES
PAT Nurse Practitioner   Pre-Operative Chart Review/Assessment:-ORTHOPEDIC                Patient Name:  Tasia Villanueva                                                           Age:   61 y.o.    :  1965     Today's Date:  2023     Date of PAT:   23      Date of Surgery:    23      Procedure(s):  Left Total Hip Arthroplasty     Surgeon:   Dr. Fam Profit:      1)  Medical Clearance/PCP:  Gayathri Sotelo MD (Appt ***)      2)  Cardiac Clearance:  EKG and METs reviewed. No further cardiac evaluation requested. PAT EKG showed normal sinus rhythm. 3)  Diabetic Treatment Consult:  Not indicated. A1c-6.1      4)  Sleep Apnea evaluation:   +YOLANDA dx. Pt does not use PAP therapy.        5) Treatment for MRSA/Staph Aureus:  Neg      6) Additional Concerns:  1.5 PPD smoker, PONV(scope patch ordered for PTA), HTN, COPD(followed by Dr. Agusto Sierra), GERD, T2DM, hx of TIA, hx of SVT s/p ablation, anxiety, chronic leukocytosis(WBC-15.1 on PAT labs)                Vital Signs:         Vitals:    23 0929   BP: (!) 142/81   Pulse: 98   Temp: 98.1 °F (36.7 °C)   Weight: 94.3 kg (208 lb)   Height: 5' 3.5\" (1.613 m)          Body mass index is 36.27 kg/m².         ____________________________________________  PAST MEDICAL HISTORY  Past Medical History:   Diagnosis Date    Arthritis     Cancer ([facility]     IN KIDNEY REMOVED RIGHT KIDNEY     Chronic obstructive pulmonary disease (HCC)     BEGINNING STAGES     Chronic pain     Diabetes ([facility]     Elevated WBC count  to present    GERD (gastroesophageal reflux disease)     Hypertension     Kidney stones     Leg swelling 2020    Doppler negative    Liver disease     Nausea & vomiting     Panic attacks     Skin cancer     BASAL CELL ON CHEST    Sleep apnea     Does not use CPAP- raises the head of bed up    Spondylolisthesis of lumbar region     Stroke [facility])     TIA     Tachycardia ____________________________________________  PAST SURGICAL HISTORY  Past Surgical History:   Procedure Laterality Date    COLONOSCOPY N/A 03/01/2019    COLONOSCOPY performed by Arnaud Pabon MD at [facility] ENDOSCOPY    HX BREAST BIOPSY Bilateral     benign    HX CHOLECYSTECTOMY  1998    HX HEENT      HX HERNIA REPAIR  05/13/2021    Robotic repair of incisional hernia with mesh,Robotic repair of adhesions convert to open incisioal hernia repair with mesh by Dr. Maldonado Valdovinos. HX HYSTERECTOMY  2008    HX KNEE ARTHROSCOPY Bilateral 2012    HX KNEE REPLACEMENT Left 2012    HX KNEE REPLACEMENT Right 08/18/2016    HX NEPHROSTOMY Right     HX SEPTOPLASTY  1993    HX SVT ABLATION  2013    HX TUBAL LIGATION  1989    HX UROLOGICAL Right 07/2020    nephrectomy    HX WISDOM TEETH EXTRACTION      X2    ME UNLISTED NEUROLOGICAL/NEUROMUSCULAR DX PX      lumbar nerve block    ME UNLISTED PROCEDURE CARDIAC SURGERY  2013    Ablation      ____________________________________________  HOME MEDICATIONS  Current Outpatient Medications   Medication Sig    fluticasone propionate (FLONASE) 50 mcg/actuation nasal spray 2 Sprays by Both Nostrils route daily. Accu-Chek Joyce Plus test strp strip TEST BLOOD SUGAR EVERY DAY    losartan (COZAAR) 100 mg tablet TAKE 1 TABLET EVERY NIGHT    traZODone (DESYREL) 50 mg tablet TAKE 2 TABLETS BY MOUTH NIGHTLY.    levocetirizine (XYZAL) 5 mg tablet TAKE 1 TABLET EVERY DAY    atorvastatin (LIPITOR) 40 mg tablet TAKE 1 TABLET EVERY DAY    rOPINIRole (REQUIP) 2 mg tablet TAKE 1 TABLET THREE TIMES DAILY  Indications: restless legs syndrome, an extreme discomfort in the calf muscles when sitting or lying down    tiZANidine (ZANAFLEX) 2 mg tablet TAKE 1 TABLET TWICE DAILY FOR MUSCLE SPASM(S)    LORazepam (ATIVAN) 0.5 mg tablet Take 1 Tablet by mouth two (2) times daily as needed for Anxiety. Max Daily Amount: 1 mg.  Indications: anxious    metFORMIN (GLUCOPHAGE) 500 mg tablet TAKE TWO TABLETS TWO TIMES A DAY WITH MEALS    venlafaxine-SR (EFFEXOR-XR) 150 mg capsule TAKE 1 CAPSULE EVERY DAY    omeprazole (PRILOSEC) 40 mg capsule TAKE 1 CAPSULE BY MOUTH BEFORE BREAKFAST AND DINNER.    dilTIAZem ER (CARDIZEM CD) 180 mg capsule TAKE 2 CAPSULES EVERY DAY FOR HIGH BLOOD PRESSURE (Patient taking differently: 180 mg daily.)    cholecalciferol (VITAMIN D3) (1000 Units /25 mcg) tablet Take  by mouth daily. OTHER Beets    zinc sulfate (ZINC-220 PO) Take  by mouth. Nebulizer & Compressor machine Use nebulizer every 6 hours as needed. DX J44.9    OTHER,NON-FORMULARY, Take 3 Caplet by mouth. CBD Gummies   Indications: for pain    albuterol-ipratropium (DUO-NEB) 2.5 mg-0.5 mg/3 ml nebu 3 mL by Nebulization route every six (6) hours as needed for Wheezing. Indications: bronchi muscle spasm resulting from COPD    albuterol (PROVENTIL HFA, VENTOLIN HFA, PROAIR HFA) 90 mcg/actuation inhaler Take 2 Puffs by inhalation every four (4) hours as needed for Wheezing, Shortness of Breath or Cough. inhalational spacing device 1 Each by Does Not Apply route as needed for Wheezing. Aspirin-Caffeine (BC Arthritis) 1,000-65 mg pwpk Take  by mouth.    polyethylene glycol (MIRALAX) 17 gram packet Take 1 Packet by mouth daily. May take 1-2 packets as needed daily for constipation. furosemide (LASIX) 20 mg tablet TAKE 1 TABLET EVERY DAY AS NEEDED     No current facility-administered medications for this encounter.      ____________________________________________  ALLERGIES  Allergies   Allergen Reactions    Nsaids (Non-Steroidal Anti-Inflammatory Drug) Other (comments)     Stomach hurts    Celebrex [Celecoxib] Hives    Ditropan [Oxybutynin Chloride] Hives and Itching    Mobic [Meloxicam] Hives    Penicillins Unknown (comments)     AS A CHILD-HALLUCINATIONS    Patient screened for any delayed non-IgE-mediated reaction to PCN.         Patient notes the following:    No delayed non-IgE-mediated reaction to PCN               Plaquenil [Hydroxychloroquine] Hives      ____________________________________________  SOCIAL HISTORY  Social History     Tobacco Use    Smoking status: Every Day     Packs/day: 1.50     Years: 40.00     Pack years: 60.00     Types: Cigarettes    Smokeless tobacco: Never    Tobacco comments:     STATES THINKS ABOUT IT OFTEN, IS THINKING ABOUT IT    Substance Use Topics    Alcohol use: Not Currently      ____________________________________________   Internal Administration   First Dose COVID-19, MODERNA BLUE border, Primary or Immunocompromised, (age 18y+), IM, 100 mcg/0.5mL  04/14/2021   Second Dose         Last COVID Lab SARS-CoV-2 ( )   Date Value   05/09/2021 Not Detected                    Labs:     Hospital Outpatient Visit on 01/24/2023   Component Date Value Ref Range Status    Sodium 01/24/2023 136  136 - 145 mmol/L Final    Potassium 01/24/2023 4.4  3.5 - 5.1 mmol/L Final    Chloride 01/24/2023 105  97 - 108 mmol/L Final    CO2 01/24/2023 25  21 - 32 mmol/L Final    Anion gap 01/24/2023 6  5 - 15 mmol/L Final    Glucose 01/24/2023 175 (A)  65 - 100 mg/dL Final    BUN 01/24/2023 14  6 - 20 MG/DL Final    Creatinine 01/24/2023 1.12 (A)  0.55 - 1.02 MG/DL Final    BUN/Creatinine ratio 01/24/2023 13  12 - 20   Final    eGFR 01/24/2023 57 (A)  >60 ml/min/1.73m2 Final    Comment:      Pediatric calculator link: [employer] ELIXIRSShow.at. org/professionals/kdoqi/gfr_calculatorped       These results are not intended for use in patients <25years of age. eGFR results are calculated without a race factor using  the 2021 CKD-EPI equation. Careful clinical correlation is recommended, particularly when comparing to results calculated using previous equations. The CKD-EPI equation is less accurate in patients with extremes of muscle mass, extra-renal metabolism of creatinine, excessive creatine ingestion, or following therapy that affects renal tubular secretion.       Calcium 01/24/2023 9.2  8.5 - 10.1 MG/DL Final    WBC 01/24/2023 15.1 (A)  3.6 - 11.0 K/uL Final    RBC 01/24/2023 4.86  3.80 - 5.20 M/uL Final    HGB 01/24/2023 10.2 (A)  11.5 - 16.0 g/dL Final    HCT 01/24/2023 36.0  35.0 - 47.0 % Final    MCV 01/24/2023 74.1 (A)  80.0 - 99.0 FL Final    MCH 01/24/2023 21.0 (A)  26.0 - 34.0 PG Final    MCHC 01/24/2023 28.3 (A)  30.0 - 36.5 g/dL Final    RDW 01/24/2023 17.2 (A)  11.5 - 14.5 % Final    PLATELET 49/17/4252 276  150 - 400 K/uL Final    MPV 01/24/2023 10.5  8.9 - 12.9 FL Final    NRBC 01/24/2023 0.0  0  WBC Final    ABSOLUTE NRBC 01/24/2023 0.00  0.00 - 0.01 K/uL Final    Crossmatch Expiration 01/24/2023 02/05/2023,2359   Final    ABO/Rh(D) 01/24/2023 O POSITIVE   Final    Antibody screen 01/24/2023 NEG   Final    INR 01/24/2023 0.9  0.9 - 1.1   Final    A single therapeutic range for Vit K antagonists may not be optimal for all indications - see June, 2008 issue of Chest, American College of Chest Physicians Evidence-Based Clinical Practice Guidelines, 8th Edition. Prothrombin time 01/24/2023 9.4  9.0 - 11.1 sec Final    Color 01/24/2023 YELLOW/STRAW    Final    Color Reference Range: Straw, Yellow or Dark Yellow    Appearance 01/24/2023 CLEAR  CLEAR   Final    Specific gravity 01/24/2023 1.005  1.003 - 1.030   Final    pH (UA) 01/24/2023 6.5  5.0 - 8.0   Final    Protein 01/24/2023 Negative  NEG mg/dL Final    Glucose 01/24/2023 100 (A)  NEG mg/dL Final    Ketone 01/24/2023 Negative  NEG mg/dL Final    Bilirubin 01/24/2023 Negative  NEG   Final    Blood 01/24/2023 Negative  NEG   Final    Urobilinogen 01/24/2023 0.2  0.2 - 1.0 EU/dL Final    Nitrites 01/24/2023 Negative  NEG   Final    Leukocyte Esterase 01/24/2023 Negative  NEG   Final    WBC 01/24/2023 0-4  0 - 4 /hpf Final    RBC 01/24/2023 0-5  0 - 5 /hpf Final    Epithelial cells 01/24/2023 FEW  FEW /lpf Final    Epithelial cell category consists of squamous cells and /or transitional urothelial cells.  Renal tubular cells, if present, are separately identified as such. Bacteria 01/24/2023 Negative  NEG /hpf Final    UA:UC IF INDICATED 01/24/2023 CULTURE NOT INDICATED BY UA RESULT  CNI   Final    Ventricular Rate 01/24/2023 84  BPM Final    Atrial Rate 01/24/2023 84  BPM Final    P-R Interval 01/24/2023 132  ms Final    QRS Duration 01/24/2023 82  ms Final    Q-T Interval 01/24/2023 402  ms Final    QTC Calculation (Bezet) 01/24/2023 475  ms Final    Calculated P Axis 01/24/2023 52  degrees Final    Calculated R Axis 01/24/2023 -8  degrees Final    Calculated T Axis 01/24/2023 39  degrees Final    Diagnosis 01/24/2023    Final                    Value:Normal sinus rhythm  Minimal voltage criteria for LVH, may be normal variant ( R in aVL )  Borderline ECG  When compared with ECG of 09-DEC-2021 10:14,  No significant change was found  Confirmed by Domenic Najera ([id number]) on 1/24/2023 4:20:54 PM      Special Requests: 01/24/2023 NO SPECIAL REQUESTS    Final    Culture result: 01/24/2023 MRSA NOT PRESENT    Final    Culture result: 01/24/2023     Final                    Value:Screening of patient nares for MRSA is for surveillance purposes and, if positive, to facilitate isolation considerations in high risk settings. It is not intended for automatic decolonization interventions per se as regimens are not sufficiently effective to warrant routine use.      Lab Only on 12/12/2022   Component Date Value Ref Range Status    Hemoglobin A1c 12/12/2022 6.1 (A)  4.0 - 5.6 % Final    Comment: NEW METHOD  PLEASE NOTE NEW REFERENCE RANGE  (NOTE)  HbA1C Interpretive Ranges  <5.7              Normal  5.7 - 6.4         Consider Prediabetes  >6.5              Consider Diabetes      Est. average glucose 12/12/2022 128  mg/dL Final    Potassium 12/12/2022 3.8  3.5 - 5.1 mmol/L Final    WBC 12/12/2022 15.0 (A)  3.6 - 11.0 K/uL Final    RBC 12/12/2022 4.70  3.80 - 5.20 M/uL Final    HGB 12/12/2022 10.5 (A)  11.5 - 16.0 g/dL Final    HCT 12/12/2022 35.4  35.0 - 47.0 % Final    MCV 12/12/2022 75.3 (A)  80.0 - 99.0 FL Final    MCH 12/12/2022 22.3 (A)  26.0 - 34.0 PG Final    MCHC 12/12/2022 29.7 (A)  30.0 - 36.5 g/dL Final    RDW 12/12/2022 16.7 (A)  11.5 - 14.5 % Final    PLATELET 30/87/0523 168  150 - 400 K/uL Final    MPV 12/12/2022 10.7  8.9 - 12.9 FL Final    NRBC 12/12/2022 0.0  0  WBC Final    ABSOLUTE NRBC 12/12/2022 0.00  0.00 - 0.01 K/uL Final    NEUTROPHILS 12/12/2022 70  32 - 75 % Final    LYMPHOCYTES 12/12/2022 19  12 - 49 % Final    MONOCYTES 12/12/2022 5  5 - 13 % Final    EOSINOPHILS 12/12/2022 4  0 - 7 % Final    BASOPHILS 12/12/2022 2 (A)  0 - 1 % Final    IMMATURE GRANULOCYTES 12/12/2022 0  0.0 - 0.5 % Final    ABS. NEUTROPHILS 12/12/2022 10.4 (A)  1.8 - 8.0 K/UL Final    ABS. LYMPHOCYTES 12/12/2022 2.9  0.8 - 3.5 K/UL Final    ABS. MONOCYTES 12/12/2022 0.8  0.0 - 1.0 K/UL Final    ABS. EOSINOPHILS 12/12/2022 0.6 (A)  0.0 - 0.4 K/UL Final    ABS. BASOPHILS 12/12/2022 0.3 (A)  0.0 - 0.1 K/UL Final    ABS. IMM. GRANS.  12/12/2022 0.0  0.00 - 0.04 K/UL Final    DF 12/12/2022 SMEAR SCANNED    Final    PLATELET COMMENTS 34/34[birth year]1939 Large Platelets    Final    PRESENT    RBC COMMENTS 12/12/2022     Final                    Value:ANISOCYTOSIS  1+      RBC COMMENTS 12/12/2022     Final                    Value:MICROCYTOSIS  1+      RBC COMMENTS 12/12/2022     Final                    Value:HYPOCHROMIA  1+     Office Visit on 12/12/2022   Component Date Value Ref Range Status    Color (UA POC) 12/12/2022 Yellow   Final    Clarity (UA POC) 12/12/2022 Clear   Final    Glucose (UA POC) 12/12/2022 Negative  Negative Final    Bilirubin (UA POC) 12/12/2022 Negative  Negative Final    Ketones (UA POC) 12/12/2022 Negative  Negative Final    Specific gravity (UA POC) 12/12/2022 1.010  1.001 - 1.035 Final    Blood (UA POC) 12/12/2022 Negative  Negative Final    pH (UA POC) 12/12/2022 6.0  4.6 - 8.0 Final    Protein (UA POC) 12/12/2022 Negative  Negative Final    Urobilinogen (UA POC) 12/12/2022 0.2 mg/dL  0.2 - 1 Final    Nitrites (UA POC) 12/12/2022 Negative  Negative Final    Leukocyte esterase (UA POC) 12/12/2022 Negative  Negative Final       Skin:     Denies open wounds, cuts, sores, rashes or other areas of concern in PAT assessment.           Mikki Khan NP  Available via [address] [place] [address]

## 2023-01-26 ENCOUNTER — DOCUMENTATION ONLY (OUTPATIENT)
Dept: ORTHOPEDIC SURGERY | Age: 58
End: 2023-01-26

## 2023-01-26 NOTE — PROGRESS NOTES
Patient is scheduled for left total hip replacement next week. Preadmission testing labs a few days ago demonstrate hemoglobin is low at 10.2. This is down from 11.8 in early 2022, and 12.8 in late 2021. Discussed with patient by phone. We will need to postpone upcoming elective hip replacement surgery for evaluation and treatment of anemia. She has an appointment with Dr. Shirin Rajput tomorrow for what was to be routine preoperative evaluation, but now that appointment may serve to initiate evaluation of this anemia. Once this has been diagnosed and treated, we will work her back into the schedule for left total hip replacement as soon as possible.

## 2023-01-27 ENCOUNTER — LAB ONLY (OUTPATIENT)
Dept: FAMILY MEDICINE CLINIC | Age: 58
End: 2023-01-27

## 2023-01-27 ENCOUNTER — OFFICE VISIT (OUTPATIENT)
Dept: FAMILY MEDICINE CLINIC | Age: 58
End: 2023-01-27
Payer: MEDICARE

## 2023-01-27 VITALS
OXYGEN SATURATION: 98 % | HEART RATE: 98 BPM | HEIGHT: 64 IN | RESPIRATION RATE: 20 BRPM | BODY MASS INDEX: 36.02 KG/M2 | TEMPERATURE: 97.7 F | WEIGHT: 211 LBS | SYSTOLIC BLOOD PRESSURE: 140 MMHG | DIASTOLIC BLOOD PRESSURE: 80 MMHG

## 2023-01-27 DIAGNOSIS — G63 POLYNEUROPATHY ASSOCIATED WITH UNDERLYING DISEASE (HCC): ICD-10-CM

## 2023-01-27 DIAGNOSIS — J44.9 CHRONIC OBSTRUCTIVE PULMONARY DISEASE, UNSPECIFIED COPD TYPE (HCC): ICD-10-CM

## 2023-01-27 DIAGNOSIS — H10.13 ALLERGIC CONJUNCTIVITIS OF BOTH EYES: ICD-10-CM

## 2023-01-27 DIAGNOSIS — D64.9 ANEMIA, UNSPECIFIED TYPE: Primary | ICD-10-CM

## 2023-01-27 DIAGNOSIS — M16.12 PRIMARY OSTEOARTHRITIS OF LEFT HIP: ICD-10-CM

## 2023-01-27 DIAGNOSIS — E66.01 SEVERE OBESITY (BMI 35.0-39.9) WITH COMORBIDITY (HCC): ICD-10-CM

## 2023-01-27 DIAGNOSIS — E11.40 TYPE 2 DIABETES MELLITUS WITH DIABETIC NEUROPATHY, WITHOUT LONG-TERM CURRENT USE OF INSULIN (HCC): ICD-10-CM

## 2023-01-27 DIAGNOSIS — D64.9 ANEMIA, UNSPECIFIED TYPE: ICD-10-CM

## 2023-01-27 RX ORDER — SCOLOPAMINE TRANSDERMAL SYSTEM 1 MG/1
PATCH, EXTENDED RELEASE TRANSDERMAL
COMMUNITY
Start: 2023-01-24

## 2023-01-27 RX ORDER — KETOTIFEN FUMARATE 0.35 MG/ML
1 SOLUTION/ DROPS OPHTHALMIC 2 TIMES DAILY
Qty: 5 ML | Refills: 2 | Status: SHIPPED | OUTPATIENT
Start: 2023-01-27

## 2023-01-27 NOTE — PROGRESS NOTES
Identified pt with two pt identifiers(name and ).     Chief Complaint   Patient presents with    Follow-up     Concerned about red blood count        Health Maintenance Due   Topic    Hepatitis B Vaccine (2 of 3 - Risk 3-dose series)    Low dose CT lung screening     COVID-19 Vaccine (2 - Moderna series)       Wt Readings from Last 3 Encounters:   23 211 lb (95.7 kg)   23 208 lb (94.3 kg)   22 204 lb (92.5 kg)     Temp Readings from Last 3 Encounters:   23 97.7 °F (36.5 °C) (Temporal)   23 98.1 °F (36.7 °C)   22 97 °F (36.1 °C) (Temporal)     BP Readings from Last 3 Encounters:   23 (!) 140/80   23 (!) 142/81   22 (!) 142/78     Pulse Readings from Last 3 Encounters:   23 98   23 98   22 97         Learning Assessment:  :     Learning Assessment 3/7/2022 2021 2018 2017 2016   PRIMARY LEARNER Patient Patient Patient Patient Patient   HIGHEST LEVEL OF EDUCATION - PRIMARY LEARNER  - [address]   BARRIERS PRIMARY LEARNER - NONE - - [address] CAREGIVER No No - - No   PRIMARY LANGUAGE ENGLISH ENGLISH ENGLISH ENGLISH ENGLISH   LEARNER PREFERENCE PRIMARY DEMONSTRATION DEMONSTRATION LISTENING - DEMONSTRATION     - LISTENING - - -   ANSWERED BY patient patient patient patient self   RELATIONSHIP SELF SELF SELF SELF SELF       Depression Screening:  :     3 most recent PHQ Screens 2023   Little interest or pleasure in doing things Not at all   Feeling down, depressed, irritable, or hopeless Not at all   Total Score PHQ 2 0   Trouble falling or staying asleep, or sleeping too much -   Feeling tired or having little energy -   Poor appetite, weight loss, or overeating -   Feeling bad about yourself - or that you are a failure or have let yourself or your family down -   Trouble concentrating on things such as school, work, reading, or watching TV -   Moving or speaking so slowly that other people could have noticed; or the opposite being so fidgety that others notice -   Thoughts of being better off dead, or hurting yourself in some way -   PHQ 9 Score -   How difficult have these problems made it for you to do your work, take care of your home and get along with others -       Fall Risk Assessment:  :     Fall Risk Assessment, last 12 mths 3/7/2022   Able to walk? Yes   Fall in past 12 months? 0       Abuse Screening:  :     Abuse Screening Questionnaire 1/27/2023 12/12/2022 11/11/2022 10/28/2022 4/1/2022 3/7/2022 1/13/2021   Do you ever feel afraid of your partner? N N N N N N N   Are you in a relationship with someone who physically or mentally threatens you? N N N N N N N   Is it safe for you to go home? Y Y Y Y Y Y Y       Coordination of Care Questionnaire:  :     1) Have you been to an emergency room, urgent care clinic since your last visit? no   Hospitalized since your last visit? no             2) Have you seen or consulted any other health care providers outside of [address] [place] [address] since your last visit? no  (Include any pap smears or colon screenings in this section.)    3) Do you have an Advance Directive on file? yes  Are you interested in receiving information about Advance Directives? no    Patient is accompanied by self I have received verbal consent from Genevieve Kemp to discuss any/all medical information while they are present in the room. 4.  For patients aged 39-70: Has the patient had a colonoscopy / FIT/ Cologuard? Yes - no Care Gap present      If the patient is female:    5. For patients aged 41-77: Has the patient had a mammogram within the past 2 years? Yes - no Care Gap present      6. For patients aged 21-65: Has the patient had a pap smear?  Yes - no Care Gap present

## 2023-01-28 LAB
FERRITIN SERPL-MCNC: 8 NG/ML (ref 26–388)
IRON SATN MFR SERPL: 4 % (ref 20–50)
IRON SERPL-MCNC: 19 UG/DL (ref 35–150)
TIBC SERPL-MCNC: 515 UG/DL (ref 250–450)

## 2023-01-29 NOTE — PROGRESS NOTES
HISTORY OF PRESENT ILLNESS  Perez Lopez is a 61 y.o. female. HPI  Pt was supposed to have pre-op evaluation for left hip replacement per Dr Luis Miranda but has new problem of anemia. Hx of elevated WBC previously eval by Dr Mavis Kaba. Also she was referred to GI Dr Aarti Mcgraw in December for eval of anemia but she did not set appt. Previous hx gastritis. She is on Omeprazole 40 mg daily but admits to taking BC powder on regular basis. In past she cannot tolerate oral iron supplement. No sign of GI bleeding in stools. Also C/O watery eyes bilateral.    ROS  Visit Vitals  BP (!) 140/80 (BP 1 Location: Right arm, BP Patient Position: Sitting, BP Cuff Size: Adult)   Pulse 98   Temp 97.7 °F (36.5 °C) (Temporal)   Resp 20   Ht 5' 3.5\" (1.613 m)   Wt 211 lb (95.7 kg)   SpO2 98%   BMI 36.79 kg/m²       Physical Exam  Vitals reviewed. Constitutional:       Appearance: She is obese. Cardiovascular:      Rate and Rhythm: Normal rate and regular rhythm. Heart sounds: Normal heart sounds. Pulmonary:      Effort: Pulmonary effort is normal.      Breath sounds: Normal breath sounds. Neurological:      Mental Status: She is alert. ASSESSMENT and PLAN    ICD-10-CM ICD-9-CM    1. Anemia, unspecified type  D64.9 285.9 FERRITIN      IRON PROFILE      REFERRAL TO HEMATOLOGY      2. Severe obesity (BMI 35.0-39. 9) with comorbidity ([facility]  E66.01 278.01       3. Polyneuropathy associated with underlying disease ([facility]  G63 357.4       4. Chronic obstructive pulmonary disease, unspecified COPD type ([facility]  J44.9 496       5. Type 2 diabetes mellitus with diabetic neuropathy, without long-term current use of insulin (HCC)  E11.40 250.60      357.2       6. Allergic conjunctivitis of both eyes  H10.13 372.14 ketotifen (ZADITOR) 0.025 % (0.035 %) ophthalmic solution      7. Primary osteoarthritis of left hip  M16.12 715.15       Check iron levels. Refer to HEME and GI  Avoid use of ASA, NSAID's  ORTHO postponed hip replacement.

## 2023-02-01 ENCOUNTER — TELEPHONE (OUTPATIENT)
Dept: ONCOLOGY | Age: 58
End: 2023-02-01

## 2023-02-01 NOTE — TELEPHONE ENCOUNTER
Follow up per PCP David Plummer)/Anemia. REFERRAL #[id number]    Called Patient regarding referral for Anemia. Patient has confirmed follow up appt for 2/2/23 @ 8am [facility]) w/Dr. Bernardino Thomas. Thank you for considering us for your referral needs.

## 2023-02-02 ENCOUNTER — OFFICE VISIT (OUTPATIENT)
Dept: ONCOLOGY | Age: 58
End: 2023-02-02
Payer: MEDICARE

## 2023-02-02 VITALS
WEIGHT: 200 LBS | BODY MASS INDEX: 34.15 KG/M2 | HEART RATE: 65 BPM | SYSTOLIC BLOOD PRESSURE: 136 MMHG | TEMPERATURE: 98 F | DIASTOLIC BLOOD PRESSURE: 78 MMHG | HEIGHT: 64 IN | OXYGEN SATURATION: 95 % | RESPIRATION RATE: 17 BRPM

## 2023-02-02 DIAGNOSIS — D72.829 LEUKOCYTOSIS, UNSPECIFIED TYPE: ICD-10-CM

## 2023-02-02 DIAGNOSIS — D50.9 IRON DEFICIENCY ANEMIA, UNSPECIFIED IRON DEFICIENCY ANEMIA TYPE: Primary | ICD-10-CM

## 2023-02-02 PROCEDURE — G9717 DOC PT DX DEP/BP F/U NT REQ: HCPCS | Performed by: INTERNAL MEDICINE

## 2023-02-02 PROCEDURE — 3078F DIAST BP <80 MM HG: CPT | Performed by: INTERNAL MEDICINE

## 2023-02-02 PROCEDURE — G8427 DOCREV CUR MEDS BY ELIG CLIN: HCPCS | Performed by: INTERNAL MEDICINE

## 2023-02-02 PROCEDURE — 3017F COLORECTAL CA SCREEN DOC REV: CPT | Performed by: INTERNAL MEDICINE

## 2023-02-02 PROCEDURE — 3075F SYST BP GE 130 - 139MM HG: CPT | Performed by: INTERNAL MEDICINE

## 2023-02-02 PROCEDURE — G9899 SCRN MAM PERF RSLTS DOC: HCPCS | Performed by: INTERNAL MEDICINE

## 2023-02-02 PROCEDURE — 99214 OFFICE O/P EST MOD 30 MIN: CPT | Performed by: INTERNAL MEDICINE

## 2023-02-02 PROCEDURE — G8417 CALC BMI ABV UP PARAM F/U: HCPCS | Performed by: INTERNAL MEDICINE

## 2023-02-02 RX ORDER — DIPHENHYDRAMINE HYDROCHLORIDE 50 MG/ML
50 INJECTION, SOLUTION INTRAMUSCULAR; INTRAVENOUS AS NEEDED
Status: CANCELLED
Start: 2023-02-16

## 2023-02-02 RX ORDER — SODIUM CHLORIDE 0.9 % (FLUSH) 0.9 %
5-40 SYRINGE (ML) INJECTION AS NEEDED
Status: CANCELLED | OUTPATIENT
Start: 2023-03-09

## 2023-02-02 RX ORDER — EPINEPHRINE 1 MG/ML
0.3 INJECTION, SOLUTION, CONCENTRATE INTRAVENOUS AS NEEDED
Status: CANCELLED | OUTPATIENT
Start: 2023-02-16

## 2023-02-02 RX ORDER — SODIUM CHLORIDE 0.9 % (FLUSH) 0.9 %
5-40 SYRINGE (ML) INJECTION AS NEEDED
Status: CANCELLED | OUTPATIENT
Start: 2023-02-16

## 2023-02-02 RX ORDER — EPINEPHRINE 1 MG/ML
0.3 INJECTION, SOLUTION, CONCENTRATE INTRAVENOUS AS NEEDED
Status: CANCELLED | OUTPATIENT
Start: 2023-03-09

## 2023-02-02 RX ORDER — ONDANSETRON 2 MG/ML
8 INJECTION INTRAMUSCULAR; INTRAVENOUS AS NEEDED
Status: CANCELLED | OUTPATIENT
Start: 2023-02-09

## 2023-02-02 RX ORDER — ACETAMINOPHEN 325 MG/1
650 TABLET ORAL AS NEEDED
Status: CANCELLED
Start: 2023-02-16

## 2023-02-02 RX ORDER — SODIUM CHLORIDE 9 MG/ML
5-250 INJECTION, SOLUTION INTRAVENOUS AS NEEDED
Status: CANCELLED | OUTPATIENT
Start: 2023-02-16

## 2023-02-02 RX ORDER — DIPHENHYDRAMINE HYDROCHLORIDE 50 MG/ML
25 INJECTION, SOLUTION INTRAMUSCULAR; INTRAVENOUS AS NEEDED
Status: CANCELLED
Start: 2023-02-09

## 2023-02-02 RX ORDER — ALBUTEROL SULFATE 0.83 MG/ML
2.5 SOLUTION RESPIRATORY (INHALATION) AS NEEDED
Status: CANCELLED
Start: 2023-02-09

## 2023-02-02 RX ORDER — ONDANSETRON 2 MG/ML
8 INJECTION INTRAMUSCULAR; INTRAVENOUS AS NEEDED
Status: CANCELLED | OUTPATIENT
Start: 2023-03-09

## 2023-02-02 RX ORDER — ACETAMINOPHEN 325 MG/1
650 TABLET ORAL AS NEEDED
Status: CANCELLED
Start: 2023-02-23

## 2023-02-02 RX ORDER — SODIUM CHLORIDE 0.9 % (FLUSH) 0.9 %
5-40 SYRINGE (ML) INJECTION AS NEEDED
Status: CANCELLED | OUTPATIENT
Start: 2023-03-02

## 2023-02-02 RX ORDER — ONDANSETRON 2 MG/ML
8 INJECTION INTRAMUSCULAR; INTRAVENOUS AS NEEDED
Status: CANCELLED | OUTPATIENT
Start: 2023-02-16

## 2023-02-02 RX ORDER — SODIUM CHLORIDE 0.9 % (FLUSH) 0.9 %
5-40 SYRINGE (ML) INJECTION AS NEEDED
Status: CANCELLED | OUTPATIENT
Start: 2023-02-09

## 2023-02-02 RX ORDER — HEPARIN 100 UNIT/ML
500 SYRINGE INTRAVENOUS AS NEEDED
Status: CANCELLED
Start: 2023-02-09

## 2023-02-02 RX ORDER — DIPHENHYDRAMINE HYDROCHLORIDE 50 MG/ML
50 INJECTION, SOLUTION INTRAMUSCULAR; INTRAVENOUS AS NEEDED
Status: CANCELLED
Start: 2023-02-09

## 2023-02-02 RX ORDER — SODIUM CHLORIDE 9 MG/ML
5-40 INJECTION INTRAVENOUS AS NEEDED
Status: CANCELLED | OUTPATIENT
Start: 2023-02-09

## 2023-02-02 RX ORDER — HEPARIN 100 UNIT/ML
500 SYRINGE INTRAVENOUS AS NEEDED
Status: CANCELLED
Start: 2023-02-16

## 2023-02-02 RX ORDER — ACETAMINOPHEN 325 MG/1
650 TABLET ORAL AS NEEDED
Status: CANCELLED
Start: 2023-02-09

## 2023-02-02 RX ORDER — SODIUM CHLORIDE 9 MG/ML
5-250 INJECTION, SOLUTION INTRAVENOUS AS NEEDED
Status: CANCELLED | OUTPATIENT
Start: 2023-03-02

## 2023-02-02 RX ORDER — ACETAMINOPHEN 325 MG/1
650 TABLET ORAL AS NEEDED
Status: CANCELLED
Start: 2023-03-09

## 2023-02-02 RX ORDER — DIPHENHYDRAMINE HYDROCHLORIDE 50 MG/ML
50 INJECTION, SOLUTION INTRAMUSCULAR; INTRAVENOUS AS NEEDED
Status: CANCELLED
Start: 2023-03-09

## 2023-02-02 RX ORDER — HYDROCORTISONE SODIUM SUCCINATE 100 MG/2ML
100 INJECTION, POWDER, FOR SOLUTION INTRAMUSCULAR; INTRAVENOUS AS NEEDED
Status: CANCELLED | OUTPATIENT
Start: 2023-02-09

## 2023-02-02 RX ORDER — ONDANSETRON 2 MG/ML
8 INJECTION INTRAMUSCULAR; INTRAVENOUS AS NEEDED
Status: CANCELLED | OUTPATIENT
Start: 2023-03-02

## 2023-02-02 RX ORDER — SODIUM CHLORIDE 9 MG/ML
5-250 INJECTION, SOLUTION INTRAVENOUS AS NEEDED
Status: CANCELLED | OUTPATIENT
Start: 2023-02-23

## 2023-02-02 RX ORDER — SODIUM CHLORIDE 9 MG/ML
5-250 INJECTION, SOLUTION INTRAVENOUS AS NEEDED
Status: CANCELLED | OUTPATIENT
Start: 2023-02-09

## 2023-02-02 RX ORDER — ACETAMINOPHEN 325 MG/1
650 TABLET ORAL AS NEEDED
Status: CANCELLED
Start: 2023-03-02

## 2023-02-02 RX ORDER — DIPHENHYDRAMINE HYDROCHLORIDE 50 MG/ML
25 INJECTION, SOLUTION INTRAMUSCULAR; INTRAVENOUS AS NEEDED
Status: CANCELLED
Start: 2023-02-23

## 2023-02-02 RX ORDER — SODIUM CHLORIDE 0.9 % (FLUSH) 0.9 %
5-40 SYRINGE (ML) INJECTION AS NEEDED
Status: CANCELLED | OUTPATIENT
Start: 2023-02-23

## 2023-02-02 RX ORDER — EPINEPHRINE 1 MG/ML
0.3 INJECTION, SOLUTION, CONCENTRATE INTRAVENOUS AS NEEDED
Status: CANCELLED | OUTPATIENT
Start: 2023-02-09

## 2023-02-02 RX ORDER — SODIUM CHLORIDE 9 MG/ML
5-40 INJECTION INTRAVENOUS AS NEEDED
Status: CANCELLED | OUTPATIENT
Start: 2023-02-16

## 2023-02-02 RX ORDER — ALBUTEROL SULFATE 0.83 MG/ML
2.5 SOLUTION RESPIRATORY (INHALATION) AS NEEDED
Status: CANCELLED
Start: 2023-02-16

## 2023-02-02 RX ORDER — DIPHENHYDRAMINE HYDROCHLORIDE 50 MG/ML
50 INJECTION, SOLUTION INTRAMUSCULAR; INTRAVENOUS AS NEEDED
Status: CANCELLED
Start: 2023-02-23

## 2023-02-02 RX ORDER — SODIUM CHLORIDE 9 MG/ML
5-250 INJECTION, SOLUTION INTRAVENOUS AS NEEDED
Status: CANCELLED | OUTPATIENT
Start: 2023-03-09

## 2023-02-02 RX ORDER — ALBUTEROL SULFATE 0.83 MG/ML
2.5 SOLUTION RESPIRATORY (INHALATION) AS NEEDED
Status: CANCELLED
Start: 2023-03-02

## 2023-02-02 RX ORDER — ONDANSETRON 2 MG/ML
8 INJECTION INTRAMUSCULAR; INTRAVENOUS AS NEEDED
Status: CANCELLED | OUTPATIENT
Start: 2023-02-23

## 2023-02-02 RX ORDER — HEPARIN 100 UNIT/ML
500 SYRINGE INTRAVENOUS AS NEEDED
Status: CANCELLED
Start: 2023-02-23

## 2023-02-02 RX ORDER — DIPHENHYDRAMINE HYDROCHLORIDE 50 MG/ML
50 INJECTION, SOLUTION INTRAMUSCULAR; INTRAVENOUS AS NEEDED
Status: CANCELLED
Start: 2023-03-02

## 2023-02-02 RX ORDER — HYDROCORTISONE SODIUM SUCCINATE 100 MG/2ML
100 INJECTION, POWDER, FOR SOLUTION INTRAMUSCULAR; INTRAVENOUS AS NEEDED
Status: CANCELLED | OUTPATIENT
Start: 2023-02-16

## 2023-02-02 RX ORDER — HYDROCORTISONE SODIUM SUCCINATE 100 MG/2ML
100 INJECTION, POWDER, FOR SOLUTION INTRAMUSCULAR; INTRAVENOUS AS NEEDED
Status: CANCELLED | OUTPATIENT
Start: 2023-03-09

## 2023-02-02 RX ORDER — DIPHENHYDRAMINE HYDROCHLORIDE 50 MG/ML
25 INJECTION, SOLUTION INTRAMUSCULAR; INTRAVENOUS AS NEEDED
Status: CANCELLED
Start: 2023-03-09

## 2023-02-02 RX ORDER — EPINEPHRINE 1 MG/ML
0.3 INJECTION, SOLUTION, CONCENTRATE INTRAVENOUS AS NEEDED
Status: CANCELLED | OUTPATIENT
Start: 2023-02-23

## 2023-02-02 RX ORDER — SODIUM CHLORIDE 9 MG/ML
5-40 INJECTION INTRAVENOUS AS NEEDED
Status: CANCELLED | OUTPATIENT
Start: 2023-03-09

## 2023-02-02 RX ORDER — HYDROCORTISONE SODIUM SUCCINATE 100 MG/2ML
100 INJECTION, POWDER, FOR SOLUTION INTRAMUSCULAR; INTRAVENOUS AS NEEDED
Status: CANCELLED | OUTPATIENT
Start: 2023-02-23

## 2023-02-02 RX ORDER — HEPARIN 100 UNIT/ML
500 SYRINGE INTRAVENOUS AS NEEDED
Status: CANCELLED
Start: 2023-03-02

## 2023-02-02 RX ORDER — EPINEPHRINE 1 MG/ML
0.3 INJECTION, SOLUTION, CONCENTRATE INTRAVENOUS AS NEEDED
Status: CANCELLED | OUTPATIENT
Start: 2023-03-02

## 2023-02-02 RX ORDER — SODIUM CHLORIDE 9 MG/ML
5-40 INJECTION INTRAVENOUS AS NEEDED
Status: CANCELLED | OUTPATIENT
Start: 2023-03-02

## 2023-02-02 RX ORDER — DIPHENHYDRAMINE HYDROCHLORIDE 50 MG/ML
25 INJECTION, SOLUTION INTRAMUSCULAR; INTRAVENOUS AS NEEDED
Status: CANCELLED
Start: 2023-03-02

## 2023-02-02 RX ORDER — HYDROCORTISONE SODIUM SUCCINATE 100 MG/2ML
100 INJECTION, POWDER, FOR SOLUTION INTRAMUSCULAR; INTRAVENOUS AS NEEDED
Status: CANCELLED | OUTPATIENT
Start: 2023-03-02

## 2023-02-02 RX ORDER — SODIUM CHLORIDE 9 MG/ML
5-40 INJECTION INTRAVENOUS AS NEEDED
Status: CANCELLED | OUTPATIENT
Start: 2023-02-23

## 2023-02-02 RX ORDER — DIPHENHYDRAMINE HYDROCHLORIDE 50 MG/ML
25 INJECTION, SOLUTION INTRAMUSCULAR; INTRAVENOUS AS NEEDED
Status: CANCELLED
Start: 2023-02-16

## 2023-02-02 RX ORDER — HEPARIN 100 UNIT/ML
500 SYRINGE INTRAVENOUS AS NEEDED
Status: CANCELLED
Start: 2023-03-09

## 2023-02-02 RX ORDER — ALBUTEROL SULFATE 0.83 MG/ML
2.5 SOLUTION RESPIRATORY (INHALATION) AS NEEDED
Status: CANCELLED
Start: 2023-03-09

## 2023-02-02 RX ORDER — ALBUTEROL SULFATE 0.83 MG/ML
2.5 SOLUTION RESPIRATORY (INHALATION) AS NEEDED
Status: CANCELLED
Start: 2023-02-23

## 2023-02-02 NOTE — PROGRESS NOTES
Lidia Redmond is a 61 y.o. female  Chief Complaint   Patient presents with    Follow-up     leucocytosis     1. Have you been to the ER, urgent care clinic since your last visit? Hospitalized since your last visit? No    2. Have you seen or consulted any other health care providers outside of the [address] [place] [address] since your last visit? Include any pap smears or colon screening.  No

## 2023-02-02 NOTE — PROGRESS NOTES
[facility] [place] [address] [address], [address]  W: [phone]  F: [phone]    Reason for Visit:   Vega Serna is a 61 y.o. female who is in follow-up for leucocytosis and anemia    History of Present Illness:   Patient is a 61 y. o.female with PMH as below who is seen for abnormal CBC    She has persistent leucocytosis since 2014, no other CBC abnormalities. She has known R renal cell carcinoma ( 3.3 cm) which she had a R radical nephrectomy on 7/28/2020. She has osteoarthritis and has several flare ups. She had a CT chest 8/2020 that showed a 6 mm lung nodule which is being observed. She had a negative work up for leucocytosis. She comes for follow-up for new iron deficiency anemia found on pre-op workup for a hip replacement. She denies bleeding. Endorses fatigue, craves ice for 1.5 years. Normal BMs. Saw GI and has colonoscopy / endoscopy in May 2023. Intermittent sweats. She does NOT tolerate oral iron. Has a family hx of iron deficiency anemia. Denies SOB, CP, dizziness. No weight loss. No other complaints. She has been uptodate with colonoscopies / mammograms. Getting repeat colonoscopy / endoscopy as above. She smokes 1 ppd for 40 years.     Mother has breast cancer- diagnosed at 80    Past Medical History:   Diagnosis Date    Arthritis     Cancer ([address]     IN KIDNEY REMOVED RIGHT KIDNEY     Chronic obstructive pulmonary disease ([address]     BEGINNING STAGES     Chronic pain 1993    Diabetes ([address]     Elevated WBC count 2002 to present    GERD (gastroesophageal reflux disease)     Hypertension     Kidney stones 2020    Leg swelling 07/2020    Doppler negative    Liver disease 2021    Nausea & vomiting     Panic attacks     Skin cancer     BASAL CELL ON CHEST    Sleep apnea     Does not use CPAP- raises the head of bed up    Spondylolisthesis of lumbar region     Stroke [facility]) 2020    TIA     Tachycardia       Past Surgical History: Procedure Laterality Date    COLONOSCOPY N/A 03/01/2019    COLONOSCOPY performed by Genet Cabello MD at [facility] ENDOSCOPY    HX BREAST BIOPSY Bilateral     benign    HX CHOLECYSTECTOMY  1998    HX HEENT      HX HERNIA REPAIR  05/13/2021    Robotic repair of incisional hernia with mesh,Robotic repair of adhesions convert to open incisioal hernia repair with mesh by Dr. Reinaldo Caputo. HX HYSTERECTOMY  2008    HX KNEE ARTHROSCOPY Bilateral 2012    HX KNEE REPLACEMENT Left 2012    HX KNEE REPLACEMENT Right 08/18/2016    HX NEPHROSTOMY Right     HX SEPTOPLASTY  1993    HX SVT ABLATION  2013    HX TUBAL LIGATION  1989    HX UROLOGICAL Right 07/2020    nephrectomy    HX WISDOM TEETH EXTRACTION      X2    HI UNLISTED NEUROLOGICAL/NEUROMUSCULAR DX PX      lumbar nerve block    HI UNLISTED PROCEDURE CARDIAC SURGERY  2013    Ablation      Social History     Tobacco Use    Smoking status: Every Day     Packs/day: 1.50     Years: 40.00     Pack years: 60.00     Types: Cigarettes    Smokeless tobacco: Never    Tobacco comments:     STATES THINKS ABOUT IT OFTEN, IS THINKING ABOUT IT    Substance Use Topics    Alcohol use: Not Currently      Family History   Problem Relation Age of Onset    OSTEOARTHRITIS Mother     Cancer Mother         2019 patient here.     Hypertension Mother     Thyroid Disease Mother     Glaucoma Mother     High Cholesterol Mother     Psychiatric Disorder Father         BIPOLAR    OSTEOARTHRITIS Father     Alcohol abuse Father     Heart Disease Father         He passed at [facility]    Heart Surgery Father     Hypertension Father     No Known Problems Sister     Diabetes Brother     OSTEOARTHRITIS Brother     Hypertension Brother     Deep Vein Thrombosis Brother         Unknown cause    Anesth Problems Neg Hx      Current Outpatient Medications   Medication Sig    scopolamine (TRANSDERM-SCOP) 1 mg over 3 days pt3d  (Patient not taking: Reported on 1/27/2023)    ketotifen (ZADITOR) 0.025 % (0.035 %) ophthalmic solution Administer 1 Drop to both eyes two (2) times a day. Indications: allergic conjunctivitis    fluticasone propionate (FLONASE) 50 mcg/actuation nasal spray 2 Sprays by Both Nostrils route daily. Accu-Chek Joyce Plus test strp strip TEST BLOOD SUGAR EVERY DAY    losartan (COZAAR) 100 mg tablet TAKE 1 TABLET EVERY NIGHT    traZODone (DESYREL) 50 mg tablet TAKE 2 TABLETS BY MOUTH NIGHTLY.    levocetirizine (XYZAL) 5 mg tablet TAKE 1 TABLET EVERY DAY    atorvastatin (LIPITOR) 40 mg tablet TAKE 1 TABLET EVERY DAY    rOPINIRole (REQUIP) 2 mg tablet TAKE 1 TABLET THREE TIMES DAILY  Indications: restless legs syndrome, an extreme discomfort in the calf muscles when sitting or lying down    tiZANidine (ZANAFLEX) 2 mg tablet TAKE 1 TABLET TWICE DAILY FOR MUSCLE SPASM(S)    LORazepam (ATIVAN) 0.5 mg tablet Take 1 Tablet by mouth two (2) times daily as needed for Anxiety. Max Daily Amount: 1 mg. Indications: anxious    metFORMIN (GLUCOPHAGE) 500 mg tablet TAKE TWO TABLETS TWO TIMES A DAY WITH MEALS    venlafaxine-SR (EFFEXOR-XR) 150 mg capsule TAKE 1 CAPSULE EVERY DAY    furosemide (LASIX) 20 mg tablet TAKE 1 TABLET EVERY DAY AS NEEDED    omeprazole (PRILOSEC) 40 mg capsule TAKE 1 CAPSULE BY MOUTH BEFORE BREAKFAST AND DINNER.    dilTIAZem ER (CARDIZEM CD) 180 mg capsule TAKE 2 CAPSULES EVERY DAY FOR HIGH BLOOD PRESSURE (Patient taking differently: 180 mg daily.)    cholecalciferol (VITAMIN D3) (1000 Units /25 mcg) tablet Take  by mouth daily. OTHER Beets    zinc sulfate (ZINC-220 PO) Take  by mouth. Nebulizer & Compressor machine Use nebulizer every 6 hours as needed. DX J44.9    OTHER,NON-FORMULARY, Take 3 Caplet by mouth. CBD Gummies   Indications: for pain    albuterol-ipratropium (DUO-NEB) 2.5 mg-0.5 mg/3 ml nebu 3 mL by Nebulization route every six (6) hours as needed for Wheezing.  Indications: bronchi muscle spasm resulting from COPD    albuterol (PROVENTIL HFA, VENTOLIN HFA, PROAIR HFA) 90 mcg/actuation inhaler Take 2 Puffs by inhalation every four (4) hours as needed for Wheezing, Shortness of Breath or Cough. inhalational spacing device 1 Each by Does Not Apply route as needed for Wheezing. Aspirin-Caffeine (BC Arthritis) 1,000-65 mg pwpk Take  by mouth.    polyethylene glycol (MIRALAX) 17 gram packet Take 1 Packet by mouth daily. May take 1-2 packets as needed daily for constipation. No current facility-administered medications for this visit. Allergies   Allergen Reactions    Nsaids (Non-Steroidal Anti-Inflammatory Drug) Other (comments)     Stomach hurts    Celebrex [Celecoxib] Hives    Ditropan [Oxybutynin Chloride] Hives and Itching    Mobic [Meloxicam] Hives    Penicillins Unknown (comments)     AS A CHILD-HALLUCINATIONS    Patient screened for any delayed non-IgE-mediated reaction to PCN. Patient notes the following:    No delayed non-IgE-mediated reaction to PCN               Plaquenil [Hydroxychloroquine] Hives        Review of Systems: A complete review of systems was obtained, negative except as described above. Physical Exam:     Visit Vitals  /78   Pulse 65   Temp 98 °F (36.7 °C)   Resp 17   Ht 5' 3.5\" (1.613 m)   Wt 200 lb (90.7 kg)   SpO2 95%   BMI 34.87 kg/m²       General: Appears well-developed and well-nourished in no apparent distress   Mental status: Alert and awake, Oriented to person/place/time, Able to follow commands  Resp: normal respiratory effort   CV: no LE swelling  Psychiatric: Normal affect, normal judgment/insight. Results:     Lab Results   Component Value Date/Time    WBC 15.1 (H) 01/24/2023 09:25 AM    HGB 10.2 (L) 01/24/2023 09:25 AM    HCT 36.0 01/24/2023 09:25 AM    PLATELET 542 49/46/3765 09:25 AM    MCV 74.1 (L) 01/24/2023 09:25 AM    ABS.  NEUTROPHILS 10.4 (H) 12/12/2022 03:25 PM     Lab Results   Component Value Date/Time    Sodium 136 01/24/2023 09:25 AM    Potassium 4.4 01/24/2023 09:25 AM    Chloride 105 01/24/2023 09:25 AM    CO2 25 01/24/2023 09:25 AM    Glucose 175 (H) 01/24/2023 09:25 AM    BUN 14 01/24/2023 09:25 AM    Creatinine 1.12 (H) 01/24/2023 09:25 AM    GFR est AA >60 03/07/2022 01:13 PM    GFR est non-AA >60 03/07/2022 01:13 PM    Calcium 9.2 01/24/2023 09:25 AM    Glucose (POC) 134 (H) 07/01/2022 09:01 AM    Creatinine (POC) 0.7 05/29/2020 06:13 PM     Lab Results   Component Value Date/Time    Bilirubin, total 0.2 11/11/2022 09:15 AM    ALT (SGPT) 28 11/11/2022 09:15 AM    Alk. phosphatase 172 (H) 11/11/2022 09:15 AM    Protein, total 6.7 11/11/2022 09:15 AM    Albumin 3.4 (L) 11/11/2022 09:15 AM    Globulin 3.3 11/11/2022 09:15 AM       Lab Results   Component Value Date/Time    Iron % saturation 4 (L) 01/27/2023 11:25 AM    TIBC 515 (H) 01/27/2023 11:25 AM    Ferritin 8 (L) 01/27/2023 11:25 AM    Vitamin B12 >2,000 (H) 10/28/2020 07:07 AM    Folate 47.9 (H) 10/28/2020 07:07 AM    Erythropoietin 16.7 09/05/2018 11:25 AM    Sed rate (ESR) 2 08/10/2018 10:53 AM    Sed rate, automated 9 02/09/2015 10:00 AM    C-Reactive protein <0.29 10/28/2020 07:07 AM    C-Reactive Protein, Qt 5.1 (H) 08/10/2018 10:53 AM    TSH 2.67 11/11/2022 09:15 AM    CALEB, Direct Positive (A) 09/04/2014 11:00 AM    Hep C Virus Ab 0.1 04/22/2014 11:15 AM    HIV 1/2 Interpretation NONREACTIVE 04/22/2014 11:15 AM     Lab Results   Component Value Date/Time    INR 0.9 01/24/2023 09:25 AM    aPTT 24.8 08/24/2020 05:30 PM    D-dimer 0.57 08/24/2020 05:30 PM       Records reviewed and summarized above. Pathology report(s) reviewed above. Radiology report(s) reviewed above. CT abdomen 5/2020- reviewed     CTA 8/2020- reviewed     Assessment:   1) Leucocytosis    Has had it for almost 20 years  Likely reactive to smoking  JAK2 and BCR ABL PCR negative     She is uptodate with age appropriate cancer screening    This is stable   He does follow with pulmonary and has had lung nodules that have been kept a close eye on.     2) Elevated liver enzymes   She sees Dr. Elaine Nice- [facility] abdomen reviewed and is unremarkable  Per patient this is secondary to fatty liver     3) Obesity    4) Osteoarthritis    5) Lung nodule  Per pulmonary- has follow up     6) Iron deficiency anemia  This is new   Likely has chronic GI bleeding     Has colonoscopy / endoscopy scheduled in May 2023  Will replete as below   Cannot tolerate oral iron   Will give it IV     Plan:     Injectafer x 2 if approved, otherwise Venofer 200 mg  daily x 6  Colonoscopy / endoscopy in May 2023 per GI   See pulmonary / PCP for low dose CT for hx of smoking / pulmonary nodule follow-up   Cbc with diff, iron profile, ferritin in 3-4 months     RTC in 3-4 months with labs     I appreciate the opportunity to participate in Ms. Radha Ferrell's care. I personally saw and evaluated the patient and performed the key components of medical decision making. The history, physical exam, and documentation were performed by Cathy Cool NP. I reviewed and verified the above documentation and modified it as needed. Federico Kenyon presents with new onset iron deficiency anemia. She is pale and has restless leg from this. Her hip replacement was put on hold because of this. Her labs were reviewed and show severe iron deficiency. GI bleed needs to be ruled out. We noted that she cannot tolerate oral iron. We will replete this intravenously. Discussed the side effects of intravenous iron. Reassess labs in 3 months.     Signed By: Zuleyma Carrington MD

## 2023-02-03 RX ORDER — SODIUM CHLORIDE 0.9 % (FLUSH) 0.9 %
5-40 SYRINGE (ML) INJECTION AS NEEDED
OUTPATIENT
Start: 2023-03-17

## 2023-02-03 RX ORDER — HYDROCORTISONE SODIUM SUCCINATE 100 MG/2ML
100 INJECTION, POWDER, FOR SOLUTION INTRAMUSCULAR; INTRAVENOUS AS NEEDED
OUTPATIENT
Start: 2023-03-10

## 2023-02-03 RX ORDER — DIPHENHYDRAMINE HYDROCHLORIDE 50 MG/ML
50 INJECTION, SOLUTION INTRAMUSCULAR; INTRAVENOUS AS NEEDED
Start: 2023-02-24

## 2023-02-03 RX ORDER — ONDANSETRON 2 MG/ML
8 INJECTION INTRAMUSCULAR; INTRAVENOUS AS NEEDED
OUTPATIENT
Start: 2023-02-24

## 2023-02-03 RX ORDER — ONDANSETRON 2 MG/ML
8 INJECTION INTRAMUSCULAR; INTRAVENOUS AS NEEDED
OUTPATIENT
Start: 2023-02-10

## 2023-02-03 RX ORDER — EPINEPHRINE 1 MG/ML
0.3 INJECTION, SOLUTION, CONCENTRATE INTRAVENOUS AS NEEDED
OUTPATIENT
Start: 2023-02-17

## 2023-02-03 RX ORDER — SODIUM CHLORIDE 9 MG/ML
5-250 INJECTION, SOLUTION INTRAVENOUS AS NEEDED
OUTPATIENT
Start: 2023-03-10

## 2023-02-03 RX ORDER — SODIUM CHLORIDE 9 MG/ML
5-40 INJECTION INTRAVENOUS AS NEEDED
OUTPATIENT
Start: 2023-02-17

## 2023-02-03 RX ORDER — DIPHENHYDRAMINE HYDROCHLORIDE 50 MG/ML
50 INJECTION, SOLUTION INTRAMUSCULAR; INTRAVENOUS AS NEEDED
Start: 2023-03-17

## 2023-02-03 RX ORDER — SODIUM CHLORIDE 9 MG/ML
5-250 INJECTION, SOLUTION INTRAVENOUS AS NEEDED
OUTPATIENT
Start: 2023-02-10

## 2023-02-03 RX ORDER — DIPHENHYDRAMINE HYDROCHLORIDE 50 MG/ML
50 INJECTION, SOLUTION INTRAMUSCULAR; INTRAVENOUS AS NEEDED
Start: 2023-03-03

## 2023-02-03 RX ORDER — SODIUM CHLORIDE 0.9 % (FLUSH) 0.9 %
5-40 SYRINGE (ML) INJECTION AS NEEDED
OUTPATIENT
Start: 2023-02-10

## 2023-02-03 RX ORDER — SODIUM CHLORIDE 9 MG/ML
5-250 INJECTION, SOLUTION INTRAVENOUS AS NEEDED
OUTPATIENT
Start: 2023-03-17

## 2023-02-03 RX ORDER — EPINEPHRINE 1 MG/ML
0.3 INJECTION, SOLUTION, CONCENTRATE INTRAVENOUS AS NEEDED
OUTPATIENT
Start: 2023-02-24

## 2023-02-03 RX ORDER — DIPHENHYDRAMINE HYDROCHLORIDE 50 MG/ML
25 INJECTION, SOLUTION INTRAMUSCULAR; INTRAVENOUS AS NEEDED
Start: 2023-03-17

## 2023-02-03 RX ORDER — DIPHENHYDRAMINE HYDROCHLORIDE 50 MG/ML
25 INJECTION, SOLUTION INTRAMUSCULAR; INTRAVENOUS AS NEEDED
Start: 2023-03-03

## 2023-02-03 RX ORDER — EPINEPHRINE 1 MG/ML
0.3 INJECTION, SOLUTION, CONCENTRATE INTRAVENOUS AS NEEDED
OUTPATIENT
Start: 2023-02-10

## 2023-02-03 RX ORDER — SODIUM CHLORIDE 9 MG/ML
5-250 INJECTION, SOLUTION INTRAVENOUS AS NEEDED
OUTPATIENT
Start: 2023-02-24

## 2023-02-03 RX ORDER — SODIUM CHLORIDE 0.9 % (FLUSH) 0.9 %
5-40 SYRINGE (ML) INJECTION AS NEEDED
OUTPATIENT
Start: 2023-03-10

## 2023-02-03 RX ORDER — DIPHENHYDRAMINE HYDROCHLORIDE 50 MG/ML
25 INJECTION, SOLUTION INTRAMUSCULAR; INTRAVENOUS AS NEEDED
Start: 2023-03-10

## 2023-02-03 RX ORDER — ACETAMINOPHEN 325 MG/1
650 TABLET ORAL AS NEEDED
Start: 2023-03-03

## 2023-02-03 RX ORDER — SODIUM CHLORIDE 9 MG/ML
5-250 INJECTION, SOLUTION INTRAVENOUS AS NEEDED
OUTPATIENT
Start: 2023-02-17

## 2023-02-03 RX ORDER — HYDROCORTISONE SODIUM SUCCINATE 100 MG/2ML
100 INJECTION, POWDER, FOR SOLUTION INTRAMUSCULAR; INTRAVENOUS AS NEEDED
OUTPATIENT
Start: 2023-02-10

## 2023-02-03 RX ORDER — ALBUTEROL SULFATE 0.83 MG/ML
2.5 SOLUTION RESPIRATORY (INHALATION) AS NEEDED
Start: 2023-02-10

## 2023-02-03 RX ORDER — EPINEPHRINE 1 MG/ML
0.3 INJECTION, SOLUTION, CONCENTRATE INTRAVENOUS AS NEEDED
OUTPATIENT
Start: 2023-03-17

## 2023-02-03 RX ORDER — SODIUM CHLORIDE 9 MG/ML
5-40 INJECTION INTRAVENOUS AS NEEDED
OUTPATIENT
Start: 2023-03-10

## 2023-02-03 RX ORDER — DIPHENHYDRAMINE HYDROCHLORIDE 50 MG/ML
25 INJECTION, SOLUTION INTRAMUSCULAR; INTRAVENOUS AS NEEDED
Start: 2023-02-10

## 2023-02-03 RX ORDER — EPINEPHRINE 1 MG/ML
0.3 INJECTION, SOLUTION, CONCENTRATE INTRAVENOUS AS NEEDED
OUTPATIENT
Start: 2023-03-10

## 2023-02-03 RX ORDER — ALBUTEROL SULFATE 0.83 MG/ML
2.5 SOLUTION RESPIRATORY (INHALATION) AS NEEDED
Start: 2023-02-24

## 2023-02-03 RX ORDER — ALBUTEROL SULFATE 0.83 MG/ML
2.5 SOLUTION RESPIRATORY (INHALATION) AS NEEDED
Start: 2023-03-03

## 2023-02-03 RX ORDER — SODIUM CHLORIDE 9 MG/ML
5-250 INJECTION, SOLUTION INTRAVENOUS AS NEEDED
OUTPATIENT
Start: 2023-03-03

## 2023-02-03 RX ORDER — HYDROCORTISONE SODIUM SUCCINATE 100 MG/2ML
100 INJECTION, POWDER, FOR SOLUTION INTRAMUSCULAR; INTRAVENOUS AS NEEDED
OUTPATIENT
Start: 2023-03-17

## 2023-02-03 RX ORDER — HYDROCORTISONE SODIUM SUCCINATE 100 MG/2ML
100 INJECTION, POWDER, FOR SOLUTION INTRAMUSCULAR; INTRAVENOUS AS NEEDED
OUTPATIENT
Start: 2023-02-24

## 2023-02-03 RX ORDER — DIPHENHYDRAMINE HYDROCHLORIDE 50 MG/ML
25 INJECTION, SOLUTION INTRAMUSCULAR; INTRAVENOUS AS NEEDED
Start: 2023-02-24

## 2023-02-03 RX ORDER — DIPHENHYDRAMINE HYDROCHLORIDE 50 MG/ML
25 INJECTION, SOLUTION INTRAMUSCULAR; INTRAVENOUS AS NEEDED
Start: 2023-02-17

## 2023-02-03 RX ORDER — HYDROCORTISONE SODIUM SUCCINATE 100 MG/2ML
100 INJECTION, POWDER, FOR SOLUTION INTRAMUSCULAR; INTRAVENOUS AS NEEDED
OUTPATIENT
Start: 2023-03-03

## 2023-02-03 RX ORDER — ALBUTEROL SULFATE 0.83 MG/ML
2.5 SOLUTION RESPIRATORY (INHALATION) AS NEEDED
Start: 2023-02-17

## 2023-02-03 RX ORDER — ACETAMINOPHEN 325 MG/1
650 TABLET ORAL AS NEEDED
Start: 2023-03-10

## 2023-02-03 RX ORDER — HEPARIN 100 UNIT/ML
500 SYRINGE INTRAVENOUS AS NEEDED
Start: 2023-02-10

## 2023-02-03 RX ORDER — ACETAMINOPHEN 325 MG/1
650 TABLET ORAL AS NEEDED
Start: 2023-02-17

## 2023-02-03 RX ORDER — SODIUM CHLORIDE 9 MG/ML
5-40 INJECTION INTRAVENOUS AS NEEDED
OUTPATIENT
Start: 2023-02-24

## 2023-02-03 RX ORDER — ACETAMINOPHEN 325 MG/1
650 TABLET ORAL AS NEEDED
Start: 2023-02-24

## 2023-02-03 RX ORDER — DIPHENHYDRAMINE HYDROCHLORIDE 50 MG/ML
50 INJECTION, SOLUTION INTRAMUSCULAR; INTRAVENOUS AS NEEDED
Start: 2023-03-10

## 2023-02-03 RX ORDER — SODIUM CHLORIDE 9 MG/ML
5-40 INJECTION INTRAVENOUS AS NEEDED
OUTPATIENT
Start: 2023-02-10

## 2023-02-03 RX ORDER — DIPHENHYDRAMINE HYDROCHLORIDE 50 MG/ML
50 INJECTION, SOLUTION INTRAMUSCULAR; INTRAVENOUS AS NEEDED
Start: 2023-02-10

## 2023-02-03 RX ORDER — HEPARIN 100 UNIT/ML
500 SYRINGE INTRAVENOUS AS NEEDED
Start: 2023-03-10

## 2023-02-03 RX ORDER — ALBUTEROL SULFATE 0.83 MG/ML
2.5 SOLUTION RESPIRATORY (INHALATION) AS NEEDED
Start: 2023-03-17

## 2023-02-03 RX ORDER — SODIUM CHLORIDE 9 MG/ML
5-40 INJECTION INTRAVENOUS AS NEEDED
OUTPATIENT
Start: 2023-03-17

## 2023-02-03 RX ORDER — ONDANSETRON 2 MG/ML
8 INJECTION INTRAMUSCULAR; INTRAVENOUS AS NEEDED
OUTPATIENT
Start: 2023-03-03

## 2023-02-03 RX ORDER — HEPARIN 100 UNIT/ML
500 SYRINGE INTRAVENOUS AS NEEDED
Start: 2023-02-24

## 2023-02-03 RX ORDER — ACETAMINOPHEN 325 MG/1
650 TABLET ORAL AS NEEDED
Start: 2023-02-10

## 2023-02-03 RX ORDER — SODIUM CHLORIDE 0.9 % (FLUSH) 0.9 %
5-40 SYRINGE (ML) INJECTION AS NEEDED
OUTPATIENT
Start: 2023-02-24

## 2023-02-03 RX ORDER — ONDANSETRON 2 MG/ML
8 INJECTION INTRAMUSCULAR; INTRAVENOUS AS NEEDED
OUTPATIENT
Start: 2023-02-17

## 2023-02-03 RX ORDER — SODIUM CHLORIDE 0.9 % (FLUSH) 0.9 %
5-40 SYRINGE (ML) INJECTION AS NEEDED
OUTPATIENT
Start: 2023-02-17

## 2023-02-03 RX ORDER — HYDROCORTISONE SODIUM SUCCINATE 100 MG/2ML
100 INJECTION, POWDER, FOR SOLUTION INTRAMUSCULAR; INTRAVENOUS AS NEEDED
OUTPATIENT
Start: 2023-02-17

## 2023-02-03 RX ORDER — EPINEPHRINE 1 MG/ML
0.3 INJECTION, SOLUTION, CONCENTRATE INTRAVENOUS AS NEEDED
OUTPATIENT
Start: 2023-03-03

## 2023-02-03 RX ORDER — ALBUTEROL SULFATE 0.83 MG/ML
2.5 SOLUTION RESPIRATORY (INHALATION) AS NEEDED
Start: 2023-03-10

## 2023-02-03 RX ORDER — SODIUM CHLORIDE 9 MG/ML
5-40 INJECTION INTRAVENOUS AS NEEDED
OUTPATIENT
Start: 2023-03-03

## 2023-02-03 RX ORDER — HEPARIN 100 UNIT/ML
500 SYRINGE INTRAVENOUS AS NEEDED
Start: 2023-03-17

## 2023-02-03 RX ORDER — ONDANSETRON 2 MG/ML
8 INJECTION INTRAMUSCULAR; INTRAVENOUS AS NEEDED
OUTPATIENT
Start: 2023-03-17

## 2023-02-03 RX ORDER — DIPHENHYDRAMINE HYDROCHLORIDE 50 MG/ML
50 INJECTION, SOLUTION INTRAMUSCULAR; INTRAVENOUS AS NEEDED
Start: 2023-02-17

## 2023-02-03 RX ORDER — ONDANSETRON 2 MG/ML
8 INJECTION INTRAMUSCULAR; INTRAVENOUS AS NEEDED
OUTPATIENT
Start: 2023-03-10

## 2023-02-03 RX ORDER — SODIUM CHLORIDE 0.9 % (FLUSH) 0.9 %
5-40 SYRINGE (ML) INJECTION AS NEEDED
OUTPATIENT
Start: 2023-03-03

## 2023-02-03 RX ORDER — HEPARIN 100 UNIT/ML
500 SYRINGE INTRAVENOUS AS NEEDED
Start: 2023-02-17

## 2023-02-03 RX ORDER — ACETAMINOPHEN 325 MG/1
650 TABLET ORAL AS NEEDED
Start: 2023-03-17

## 2023-02-03 RX ORDER — HEPARIN 100 UNIT/ML
500 SYRINGE INTRAVENOUS AS NEEDED
Start: 2023-03-03

## 2023-02-09 ENCOUNTER — HOSPITAL ENCOUNTER (OUTPATIENT)
Dept: INFUSION THERAPY | Age: 58
End: 2023-02-09

## 2023-02-09 ENCOUNTER — APPOINTMENT (OUTPATIENT)
Dept: INFUSION THERAPY | Age: 58
End: 2023-02-09

## 2023-02-10 ENCOUNTER — TRANSCRIBE ORDER (OUTPATIENT)
Dept: SCHEDULING | Age: 58
End: 2023-02-10

## 2023-02-10 DIAGNOSIS — Z79.1 NSAID LONG-TERM USE: ICD-10-CM

## 2023-02-10 DIAGNOSIS — D50.9 IRON DEFICIENCY ANEMIA: ICD-10-CM

## 2023-02-10 DIAGNOSIS — K21.9 GASTROESOPHAGEAL REFLUX DISEASE: Primary | ICD-10-CM

## 2023-02-10 DIAGNOSIS — R10.9 ABDOMINAL WALL PAIN: ICD-10-CM

## 2023-02-10 DIAGNOSIS — R13.12 DYSPHAGIA, OROPHARYNGEAL PHASE: ICD-10-CM

## 2023-02-10 DIAGNOSIS — D50.9 MICROCYTIC ANEMIA: ICD-10-CM

## 2023-02-16 ENCOUNTER — APPOINTMENT (OUTPATIENT)
Dept: INFUSION THERAPY | Age: 58
End: 2023-02-16

## 2023-02-17 ENCOUNTER — HOSPITAL ENCOUNTER (OUTPATIENT)
Dept: INFUSION THERAPY | Age: 58
Discharge: HOME | End: 2023-02-17
Payer: MEDICARE

## 2023-02-17 VITALS
SYSTOLIC BLOOD PRESSURE: 132 MMHG | RESPIRATION RATE: 18 BRPM | TEMPERATURE: 97.7 F | DIASTOLIC BLOOD PRESSURE: 68 MMHG | HEART RATE: 91 BPM

## 2023-02-17 DIAGNOSIS — D50.9 IRON DEFICIENCY ANEMIA, UNSPECIFIED IRON DEFICIENCY ANEMIA TYPE: Primary | ICD-10-CM

## 2023-02-17 PROCEDURE — 74011000258 HC RX REV CODE- 258: Performed by: REGISTERED NURSE

## 2023-02-17 PROCEDURE — 74011250636 HC RX REV CODE- 250/636: Performed by: REGISTERED NURSE

## 2023-02-17 PROCEDURE — 96365 THER/PROPH/DIAG IV INF INIT: CPT

## 2023-02-17 RX ORDER — ACETAMINOPHEN 325 MG/1
650 TABLET ORAL AS NEEDED
Status: ACTIVE | OUTPATIENT
Start: 2023-02-17 | End: 2023-02-17

## 2023-02-17 RX ORDER — ONDANSETRON 2 MG/ML
8 INJECTION INTRAMUSCULAR; INTRAVENOUS AS NEEDED
Status: ACTIVE | OUTPATIENT
Start: 2023-02-17 | End: 2023-02-17

## 2023-02-17 RX ORDER — SODIUM CHLORIDE 9 MG/ML
5-250 INJECTION, SOLUTION INTRAVENOUS AS NEEDED
Status: DISPENSED | OUTPATIENT
Start: 2023-02-17 | End: 2023-02-17

## 2023-02-17 RX ORDER — SODIUM CHLORIDE 9 MG/ML
5-40 INJECTION INTRAVENOUS AS NEEDED
Status: ACTIVE | OUTPATIENT
Start: 2023-02-17 | End: 2023-02-17

## 2023-02-17 RX ORDER — DIPHENHYDRAMINE HYDROCHLORIDE 50 MG/ML
25 INJECTION, SOLUTION INTRAMUSCULAR; INTRAVENOUS AS NEEDED
Status: ACTIVE | OUTPATIENT
Start: 2023-02-17 | End: 2023-02-17

## 2023-02-17 RX ORDER — SODIUM CHLORIDE 0.9 % (FLUSH) 0.9 %
5-40 SYRINGE (ML) INJECTION AS NEEDED
Status: DISPENSED | OUTPATIENT
Start: 2023-02-17 | End: 2023-02-17

## 2023-02-17 RX ORDER — HEPARIN 100 UNIT/ML
500 SYRINGE INTRAVENOUS AS NEEDED
Status: ACTIVE | OUTPATIENT
Start: 2023-02-17 | End: 2023-02-17

## 2023-02-17 RX ADMIN — IRON SUCROSE 200 MG: 20 INJECTION, SOLUTION INTRAVENOUS at 10:53

## 2023-02-17 RX ADMIN — SODIUM CHLORIDE 25 ML/HR: 9 INJECTION, SOLUTION INTRAVENOUS at 10:51

## 2023-02-17 NOTE — PROGRESS NOTES
Outpatient Infusion Center Progress Note    1020 Pt admit to [facility] for Dose 1 of 6 scheduled Venofer ambulatory in stable condition. Assessment completed. No new concerns voiced. PIV established in left fa with good blood return (vein finder helped)    Visit Vitals  /68   Pulse 91   Temp 97.7 °F (36.5 °C)   Resp 18   Breastfeeding No       Medications:  Medications Administered       0.9% sodium chloride infusion       Admin Date  02/17/2023 Action  New Bag Dose  25 mL/hr Rate  25 mL/hr Route  IntraVENous Administered By  Aaliyah Eddy RN              iron sucrose (VENOFER) 200 mg in 0.9% sodium chloride 100 mL, overfill volume 10 mL IVPB       Admin Date  02/17/2023 Action  New Bag Dose  200 mg Rate  120 mL/hr Route  IntraVENous Administered By  Aaliyah Eddy, BAY                     3932 Pt tolerated treatment well. PIV removed per protocol at end of infusion, she also waited 30 minutes post infusion with no reaction noted D/c home ambulatory in no distress. Pt aware of next appointment scheduled for 2/23/23.

## 2023-02-23 ENCOUNTER — HOSPITAL ENCOUNTER (OUTPATIENT)
Dept: INFUSION THERAPY | Age: 58
Discharge: HOME | End: 2023-02-23
Payer: MEDICARE

## 2023-02-23 VITALS
TEMPERATURE: 98 F | SYSTOLIC BLOOD PRESSURE: 159 MMHG | DIASTOLIC BLOOD PRESSURE: 67 MMHG | RESPIRATION RATE: 18 BRPM | HEART RATE: 83 BPM

## 2023-02-23 DIAGNOSIS — D50.9 IRON DEFICIENCY ANEMIA, UNSPECIFIED IRON DEFICIENCY ANEMIA TYPE: Primary | ICD-10-CM

## 2023-02-23 PROCEDURE — 74011250636 HC RX REV CODE- 250/636: Performed by: REGISTERED NURSE

## 2023-02-23 PROCEDURE — 96365 THER/PROPH/DIAG IV INF INIT: CPT

## 2023-02-23 PROCEDURE — 74011000258 HC RX REV CODE- 258: Performed by: REGISTERED NURSE

## 2023-02-23 RX ORDER — ALBUTEROL SULFATE 0.83 MG/ML
2.5 SOLUTION RESPIRATORY (INHALATION) AS NEEDED
Status: ACTIVE | OUTPATIENT
Start: 2023-02-23 | End: 2023-02-23

## 2023-02-23 RX ORDER — ONDANSETRON 2 MG/ML
8 INJECTION INTRAMUSCULAR; INTRAVENOUS AS NEEDED
Status: ACTIVE | OUTPATIENT
Start: 2023-02-23 | End: 2023-02-23

## 2023-02-23 RX ORDER — SODIUM CHLORIDE 0.9 % (FLUSH) 0.9 %
5-40 SYRINGE (ML) INJECTION AS NEEDED
Status: DISPENSED | OUTPATIENT
Start: 2023-02-23 | End: 2023-02-23

## 2023-02-23 RX ORDER — DIPHENHYDRAMINE HYDROCHLORIDE 50 MG/ML
50 INJECTION, SOLUTION INTRAMUSCULAR; INTRAVENOUS AS NEEDED
Status: ACTIVE | OUTPATIENT
Start: 2023-02-23 | End: 2023-02-23

## 2023-02-23 RX ORDER — ACETAMINOPHEN 325 MG/1
650 TABLET ORAL AS NEEDED
Status: ACTIVE | OUTPATIENT
Start: 2023-02-23 | End: 2023-02-23

## 2023-02-23 RX ORDER — DIPHENHYDRAMINE HYDROCHLORIDE 50 MG/ML
25 INJECTION, SOLUTION INTRAMUSCULAR; INTRAVENOUS AS NEEDED
Status: ACTIVE | OUTPATIENT
Start: 2023-02-23 | End: 2023-02-23

## 2023-02-23 RX ORDER — EPINEPHRINE 1 MG/ML
0.3 INJECTION, SOLUTION, CONCENTRATE INTRAVENOUS AS NEEDED
Status: ACTIVE | OUTPATIENT
Start: 2023-02-23 | End: 2023-02-23

## 2023-02-23 RX ORDER — SODIUM CHLORIDE 9 MG/ML
5-40 INJECTION INTRAVENOUS AS NEEDED
Status: ACTIVE | OUTPATIENT
Start: 2023-02-23 | End: 2023-02-23

## 2023-02-23 RX ORDER — SODIUM CHLORIDE 9 MG/ML
5-250 INJECTION, SOLUTION INTRAVENOUS AS NEEDED
Status: DISPENSED | OUTPATIENT
Start: 2023-02-23 | End: 2023-02-23

## 2023-02-23 RX ORDER — HEPARIN 100 UNIT/ML
500 SYRINGE INTRAVENOUS AS NEEDED
Status: ACTIVE | OUTPATIENT
Start: 2023-02-23 | End: 2023-02-23

## 2023-02-23 RX ORDER — HYDROCORTISONE SODIUM SUCCINATE 100 MG/2ML
100 INJECTION, POWDER, FOR SOLUTION INTRAMUSCULAR; INTRAVENOUS AS NEEDED
Status: ACTIVE | OUTPATIENT
Start: 2023-02-23 | End: 2023-02-23

## 2023-02-23 RX ADMIN — IRON SUCROSE 200 MG: 20 INJECTION, SOLUTION INTRAVENOUS at 08:12

## 2023-02-23 RX ADMIN — SODIUM CHLORIDE 25 ML/HR: 9 INJECTION, SOLUTION INTRAVENOUS at 08:02

## 2023-02-23 NOTE — PROGRESS NOTES
[facility]                       Date: [facility]23    Name: John Zavala    MRN: [id number]         : 1965      Pt admit to [facility] for Venofer ambulatory in stable condition. Assessment completed and documented in flowsheets. No acute concerns at this time. No labs ordered/due. Ms. Geoff Mendoza vitals were reviewed prior to and after treatment. Patient Vitals for the past 12 hrs:   Temp Pulse Resp BP   23 0919 -- 83 18 (!) 159/67   23 0744 98 °F (36.7 °C) 84 18 (!) 153/69     Medications given: via PIV in R wrist  Medications Administered       0.9% sodium chloride infusion       Admin Date  2023 Action  New Bag Dose  25 mL/hr Rate  25 mL/hr Route  IntraVENous Administered By  Bhupinder Armendariz              iron sucrose (VENOFER) 200 mg in 0.9% sodium chloride 100 mL, overfill volume 10 mL IVPB       Admin Date  2023 Action  New Bag Dose  200 mg Rate  120 mL/hr Route  IntraVENous Administered By  Bhupinder Armendariz             PIV positive blood return noted, flushed and removed prior to discharge. Ms. Levon Schultz tolerated the infusion, and had no complaints. Ms. Levon Schultz was discharged from [place] [address] in stable condition and is aware of future appointments.      Future Appointments   Date Time Provider Samantha James   3/3/2023  2:30 PM G1 MARINO FASTRACK [facility]   3/9/2023  8:30  [facility] XR OP 2 [facility]   3/10/2023 10:30 AM H2 MARINO FASTRACK [facility]   3/17/2023 11:00 AM G1 MARINO FASTRACK [facility]   3/24/2023 10:30 AM G1 [facility]INO [facility]STRACK [facility]   2023  9:00 AM Sara Holbrook  [address] BS AMB       Ehsan Ivory RN  2023  9:20 AM

## 2023-03-03 ENCOUNTER — HOSPITAL ENCOUNTER (OUTPATIENT)
Dept: INFUSION THERAPY | Age: 58
Discharge: HOME | End: 2023-03-03
Payer: MEDICARE

## 2023-03-03 VITALS
RESPIRATION RATE: 18 BRPM | OXYGEN SATURATION: 99 % | TEMPERATURE: 97.2 F | SYSTOLIC BLOOD PRESSURE: 140 MMHG | DIASTOLIC BLOOD PRESSURE: 82 MMHG | HEART RATE: 85 BPM

## 2023-03-03 DIAGNOSIS — D50.9 IRON DEFICIENCY ANEMIA, UNSPECIFIED IRON DEFICIENCY ANEMIA TYPE: Primary | ICD-10-CM

## 2023-03-03 PROCEDURE — 74011250636 HC RX REV CODE- 250/636: Performed by: REGISTERED NURSE

## 2023-03-03 PROCEDURE — 74011000258 HC RX REV CODE- 258: Performed by: REGISTERED NURSE

## 2023-03-03 PROCEDURE — 96365 THER/PROPH/DIAG IV INF INIT: CPT

## 2023-03-03 RX ORDER — SODIUM CHLORIDE 0.9 % (FLUSH) 0.9 %
5-40 SYRINGE (ML) INJECTION AS NEEDED
Status: DISCONTINUED | OUTPATIENT
Start: 2023-03-03 | End: 2023-03-04 | Stop reason: HOSPADM

## 2023-03-03 RX ORDER — SODIUM CHLORIDE 9 MG/ML
5-250 INJECTION, SOLUTION INTRAVENOUS AS NEEDED
Status: DISCONTINUED | OUTPATIENT
Start: 2023-03-03 | End: 2023-03-04 | Stop reason: HOSPADM

## 2023-03-03 RX ADMIN — SODIUM CHLORIDE 25 ML/HR: 9 INJECTION, SOLUTION INTRAVENOUS at 14:32

## 2023-03-03 RX ADMIN — IRON SUCROSE 200 MG: 20 INJECTION, SOLUTION INTRAVENOUS at 14:56

## 2023-03-03 NOTE — PROGRESS NOTES
Outpatient Infusion Center Progress Note    5936 Pt admit to [facility] for Venofer 3 of 6 ambulatory in stable condition. Assessment completed. No new concerns voiced. PIV to left arm with good blood return; NS started at [facility]. Visit Vitals  BP (!) 140/82   Pulse 85   Temp 97.2 °F (36.2 °C)   Resp 18   SpO2 99%       Medications:  Medications Administered       0.9% sodium chloride infusion       Admin Date  03/03/2023 Action  New Bag Dose  25 mL/hr Rate  25 mL/hr Route  IntraVENous Administered By  Kenisha Mullen RN              iron sucrose (VENOFER) 200 mg in 0.9% sodium chloride 100 mL, overfill volume 10 mL IVPB       Admin Date  03/03/2023 Action  New Bag Dose  200 mg Rate  120 mL/hr Route  IntraVENous Administered By  Kenisha Mullen, BAY             Pt declined 30 min observation. 1620 Pt tolerated treatment well. D/c home ambulatory in no distress. PIV removed per protocol.  Pt aware of next appointment scheduled for   Future Appointments   Date Time Provider Samantha James   3/9/2023  8:30 AM 1 [facility] XR OP 2 [facility]   3/10/2023 10:30 AM H2 MARINO FASTRACK [facility]   3/17/2023 11:00 AM G1 [facility]INO [facility]STRACK [facility]   3/24/2023 10:30 AM G1 [facility]INO [facility]ACK [facility]   5/2/2023  9:00 AM Hesham Osborne  [address] BS AMB

## 2023-03-09 ENCOUNTER — HOSPITAL ENCOUNTER (OUTPATIENT)
Dept: GENERAL RADIOLOGY | Age: 58
Discharge: HOME | End: 2023-03-09
Attending: HOSPITALIST
Payer: MEDICARE

## 2023-03-09 DIAGNOSIS — R13.12 DYSPHAGIA, OROPHARYNGEAL PHASE: ICD-10-CM

## 2023-03-09 DIAGNOSIS — Z79.1 NSAID LONG-TERM USE: ICD-10-CM

## 2023-03-09 DIAGNOSIS — R10.9 ABDOMINAL WALL PAIN: ICD-10-CM

## 2023-03-09 DIAGNOSIS — D50.9 IRON DEFICIENCY ANEMIA: ICD-10-CM

## 2023-03-09 DIAGNOSIS — D50.9 MICROCYTIC ANEMIA: ICD-10-CM

## 2023-03-09 DIAGNOSIS — K21.9 GASTROESOPHAGEAL REFLUX DISEASE: ICD-10-CM

## 2023-03-09 PROCEDURE — 74230 X-RAY XM SWLNG FUNCJ C+: CPT

## 2023-03-09 PROCEDURE — 92611 MOTION FLUOROSCOPY/SWALLOW: CPT | Performed by: SPEECH-LANGUAGE PATHOLOGIST

## 2023-03-09 NOTE — PROGRESS NOTES
[address] STUDY  Patient: Quoc Fraga (21 y.o. female)  Date: 3/9/2023  Referring Provider:  Dr. Charles Pretty:   Patient reports choking on her own saliva. Reports sometimes feeling like she gets choked up when she eats also. Is not specific to liquids vs solids. Reports she drinks more than she eats. Patient endorses acid reflux. OBJECTIVE:   Past Medical History:   Past Medical History:   Diagnosis Date    Arthritis     Can[facility]     IN KIDNEY REMOVED RIGHT KIDNEY     Chronic obstructive pulmonary disease ([address]     BEGINNING STAGES     Chronic pain 1993    Diabetes ([facility]     Elevated WBC count 2002 to present    GERD (gastroesophageal reflux disease)     Hypertension     Kidney stones 2020    Leg swelling 07/2020    Doppler negative    Liver disease 2021    Nausea & vomiting     Panic attacks     Skin cancer     BASAL CELL ON CHEST    Sleep apnea     Does not use CPAP- raises the head of bed up    Spondylolisthesis of lumbar region     Stroke [facility]) 2020    TIA     Tachycardia      Past Surgical History:   Procedure Laterality Date    COLONOSCOPY N/A 03/01/2019    COLONOSCOPY performed by Lesa Marinelli MD at [facility] ENDOSCOPY    HX BREAST BIOPSY Bilateral     benign    C/Casia 10    HX HEENT      HX HERNIA REPAIR  05/13/2021    Robotic repair of incisional hernia with mesh,Robotic repair of adhesions convert to open incisioal hernia repair with mesh by Dr. Inderjit Lynch.     HX HYSTERECTOMY  2008    HX KNEE ARTHROSCOPY Bilateral 2012    HX KNEE REPLACEMENT Left 2012    HX KNEE REPLACEMENT Right 08/18/2016    HX NEPHROSTOMY Right     HX SEPTOPLASTY  1993    HX SVT ABLATION  2013    HX TUBAL LIGATION  1989    HX UROLOGICAL Right 07/2020    nephrectomy    HX WISDOM TEETH EXTRACTION      X2    SC UNLISTED NEUROLOGICAL/NEUROMUSCULAR DX PX      lumbar nerve block    SC UNLISTED PROCEDURE CARDIAC SURGERY  2013    Ablation     Current Dietary Status:  regular  Radiologist: Dr. Cronin Hang: Lateral;Fluoro  Patient Position: standing lateral    Trial 1:   Consistency Presented: Thin liquid; Solid;Puree   How Presented: Self-fed/presented;Spoon;Straw;Successive swallows   Consistency Amount:  (90cc thin Ba, 1 tbsp Ba paste)   Bolus Acceptance: No impairment   Bolus Formation/Control: No impairment:     Propulsion: No impairment   Oral Residue: None   Initiation of Swallow: Triggered at vallecula   Timing: No impairment   Penetration: None   Aspiration/Timing: No evidence of aspiration   Pharyngeal Clearance: Vallecular residue;10-50% (with solid only, cleared wtih additional swallow)           Cues for Modifications: None   Comments: no residue with purees or liquids       Decreased Tongue Base Retraction?: No  Laryngeal Elevation: WFL (within functional limits)  Aspiration/Penetration Score: 1 (No penetration or aspiration-Contrast does not enter the airway)  Pharyngeal Symmetry: Not assessed  Pharyngeal-Esophageal Segment: No impairment  Pharyngeal Dysfunction: None    Oral Phase Severity: No impairment  Pharyngeal Phase Severity: N/A    ASSESSMENT :  Based on the objective data described above, the patient presents with no oral or pharyngeal dysphagia. Timely and complete mastication, timely swallow initiation and complete hyolaryngeal elevation/excursion and laryngeal closure. No penetration or aspiration. There was vallecular residue with the cracker only and this cleared with an additional automatic swallow. No residue with purees or solids. PLAN/RECOMMENDATIONS :  Recommend continue regular diet. No further SLP needs. Follow up with GI as indicated regarding reports of reflux. COMMUNICATION/EDUCATION:   The above findings and recommendations were discussed with:  patient  who verbalized understanding.     Thank you for this referral.  Ciro Dinero LINDA SCHWAB  CCC-SLP   Time Calculation: 18 mins

## 2023-03-10 ENCOUNTER — HOSPITAL ENCOUNTER (OUTPATIENT)
Dept: INFUSION THERAPY | Age: 58
Discharge: HOME | End: 2023-03-10
Payer: MEDICARE

## 2023-03-10 VITALS
SYSTOLIC BLOOD PRESSURE: 142 MMHG | DIASTOLIC BLOOD PRESSURE: 85 MMHG | TEMPERATURE: 98 F | RESPIRATION RATE: 18 BRPM | HEART RATE: 99 BPM

## 2023-03-10 DIAGNOSIS — D50.9 IRON DEFICIENCY ANEMIA, UNSPECIFIED IRON DEFICIENCY ANEMIA TYPE: Primary | ICD-10-CM

## 2023-03-10 PROCEDURE — 96365 THER/PROPH/DIAG IV INF INIT: CPT

## 2023-03-10 PROCEDURE — 74011250636 HC RX REV CODE- 250/636: Performed by: REGISTERED NURSE

## 2023-03-10 PROCEDURE — 74011000258 HC RX REV CODE- 258: Performed by: REGISTERED NURSE

## 2023-03-10 RX ORDER — DIPHENHYDRAMINE HYDROCHLORIDE 50 MG/ML
25 INJECTION, SOLUTION INTRAMUSCULAR; INTRAVENOUS AS NEEDED
Status: ACTIVE | OUTPATIENT
Start: 2023-03-10 | End: 2023-03-10

## 2023-03-10 RX ORDER — DIPHENHYDRAMINE HYDROCHLORIDE 50 MG/ML
50 INJECTION, SOLUTION INTRAMUSCULAR; INTRAVENOUS AS NEEDED
Status: ACTIVE | OUTPATIENT
Start: 2023-03-10 | End: 2023-03-10

## 2023-03-10 RX ORDER — EPINEPHRINE 1 MG/ML
0.3 INJECTION, SOLUTION, CONCENTRATE INTRAVENOUS AS NEEDED
Status: ACTIVE | OUTPATIENT
Start: 2023-03-10 | End: 2023-03-10

## 2023-03-10 RX ORDER — HYDROCORTISONE SODIUM SUCCINATE 100 MG/2ML
100 INJECTION, POWDER, FOR SOLUTION INTRAMUSCULAR; INTRAVENOUS AS NEEDED
Status: ACTIVE | OUTPATIENT
Start: 2023-03-10 | End: 2023-03-10

## 2023-03-10 RX ORDER — SODIUM CHLORIDE 9 MG/ML
5-250 INJECTION, SOLUTION INTRAVENOUS AS NEEDED
Status: DISPENSED | OUTPATIENT
Start: 2023-03-10 | End: 2023-03-10

## 2023-03-10 RX ORDER — ACETAMINOPHEN 325 MG/1
650 TABLET ORAL AS NEEDED
Status: ACTIVE | OUTPATIENT
Start: 2023-03-10 | End: 2023-03-10

## 2023-03-10 RX ORDER — ONDANSETRON 2 MG/ML
8 INJECTION INTRAMUSCULAR; INTRAVENOUS AS NEEDED
Status: ACTIVE | OUTPATIENT
Start: 2023-03-10 | End: 2023-03-10

## 2023-03-10 RX ORDER — ALBUTEROL SULFATE 0.83 MG/ML
2.5 SOLUTION RESPIRATORY (INHALATION) AS NEEDED
Status: ACTIVE | OUTPATIENT
Start: 2023-03-10 | End: 2023-03-10

## 2023-03-10 RX ADMIN — IRON SUCROSE 200 MG: 20 INJECTION, SOLUTION INTRAVENOUS at 11:00

## 2023-03-10 NOTE — PROGRESS NOTES
[facility] Progress Note    Date: March 10, 2023    Name: Fifi Flynn    MRN: [id number]         : 1965    Ms. Vasquez Mueller Arrived ambulatory and in no distress for Venofer. Assessment was completed and documented in flowsheets. No acute concerns at this time. 22G IV placed without difficulty. Ms. Mallory Garzairmer vital signs for this visit. Visit Vitals  BP (!) 142/85   Pulse 99   Temp 98 °F (36.7 °C)   Resp 18   Breastfeeding No        Medications given:   Medications Administered       iron sucrose (VENOFER) 200 mg in 0.9% sodium chloride 100 mL, overfill volume 10 mL IVPB       Admin Date  03/10/2023 Action  New Bag Dose  200 mg Rate  120 mL/hr Route  IntraVENous Administered By  Lissett Kothari RN                      Ms. Vasquez Mueller tolerated the infusion, and had no complaints. PIV flushed and removed after completion of infusion. 2x2 and coban placed. Ms. Vasquez Mueller was discharged from [place] [address] in stable condition.      Future Appointments   Date Time Provider Samantha James   3/17/2023 11:00 AM G1 MARINO FASTRACK [facility]   3/24/2023 10:30 AM G1 [facility]INO FA[facility]ACK [facility]   2023  9:00 AM Cesar Osborne  [address]  DELFINA Cohen RN  March 10, 2023  12:21 PM

## 2023-03-17 ENCOUNTER — HOSPITAL ENCOUNTER (OUTPATIENT)
Dept: INFUSION THERAPY | Age: 58
Discharge: HOME | End: 2023-03-17
Payer: MEDICARE

## 2023-03-17 VITALS
RESPIRATION RATE: 18 BRPM | HEART RATE: 91 BPM | SYSTOLIC BLOOD PRESSURE: 158 MMHG | DIASTOLIC BLOOD PRESSURE: 83 MMHG | TEMPERATURE: 98.1 F

## 2023-03-17 DIAGNOSIS — D50.9 IRON DEFICIENCY ANEMIA, UNSPECIFIED IRON DEFICIENCY ANEMIA TYPE: Primary | ICD-10-CM

## 2023-03-17 PROCEDURE — 74011000258 HC RX REV CODE- 258: Performed by: REGISTERED NURSE

## 2023-03-17 PROCEDURE — 74011250636 HC RX REV CODE- 250/636: Performed by: REGISTERED NURSE

## 2023-03-17 PROCEDURE — 74011000250 HC RX REV CODE- 250: Performed by: REGISTERED NURSE

## 2023-03-17 PROCEDURE — 96365 THER/PROPH/DIAG IV INF INIT: CPT

## 2023-03-17 RX ORDER — SODIUM CHLORIDE 0.9 % (FLUSH) 0.9 %
5-40 SYRINGE (ML) INJECTION AS NEEDED
Status: DISPENSED | OUTPATIENT
Start: 2023-03-17 | End: 2023-03-17

## 2023-03-17 RX ORDER — HEPARIN 100 UNIT/ML
500 SYRINGE INTRAVENOUS AS NEEDED
Status: ACTIVE | OUTPATIENT
Start: 2023-03-17 | End: 2023-03-17

## 2023-03-17 RX ORDER — SODIUM CHLORIDE 9 MG/ML
5-40 INJECTION INTRAVENOUS AS NEEDED
Status: ACTIVE | OUTPATIENT
Start: 2023-03-17 | End: 2023-03-17

## 2023-03-17 RX ORDER — SODIUM CHLORIDE 9 MG/ML
5-250 INJECTION, SOLUTION INTRAVENOUS AS NEEDED
Status: DISPENSED | OUTPATIENT
Start: 2023-03-17 | End: 2023-03-17

## 2023-03-17 RX ADMIN — IRON SUCROSE 200 MG: 20 INJECTION, SOLUTION INTRAVENOUS at 11:32

## 2023-03-17 RX ADMIN — SODIUM CHLORIDE, PRESERVATIVE FREE 10 ML: 5 INJECTION INTRAVENOUS at 12:37

## 2023-03-17 RX ADMIN — SODIUM CHLORIDE 25 ML/HR: 9 INJECTION, SOLUTION INTRAVENOUS at 11:30

## 2023-03-17 RX ADMIN — SODIUM CHLORIDE, PRESERVATIVE FREE 10 ML: 5 INJECTION INTRAVENOUS at 11:05

## 2023-03-17 NOTE — PROGRESS NOTES
[facility] Short Note                   Date: 2023  Name: Justice Carney  MRN: [id number]       : 1965    1045: Pt admit to [facility] for Iron Sucrose #5 of 6   Denies pain, fever, cough, N/V, recent falls. PIV 24g Left forearm. Positive blood return. No labs ordered/drawn    Tolerated infusion well without issue. Patient declined post infusion monitoring time. Education provided. PIV removed post infusion  Bandaid and Gauze Applied    Patient aware of upcoming appointment. Ms. Violet Calvillo vitals were reviewed prior to treatment.    Patient Vitals for the past 12 hrs:   Temp Pulse Resp BP   23 1232 98.1 °F (36.7 °C) 91 18 (!) 158/83   23 1050 98.1 °F (36.7 °C) 89 18 (!) 160/83     Medications Administered       0.9% sodium chloride infusion       Admin Date  2023 Action  New Bag Dose  25 mL/hr Rate  25 mL/hr Route  IntraVENous Administered By  Juanis Tristan RN         0.9% sodium chloride injection 5-40 mL       Admin Date  2023 Action  Given Dose  10 mL Route  IntraVENous Administered By  Juanis Tristan RN      Admin Date  2023 Action  Given Dose  10 mL Route  IntraVENous Administered By  Juanis Tristan RN         iron sucrose (VENOFER) 200 mg in 0.9% sodium chloride 100 mL, overfill volume 10 mL IVPB       Admin Date  2023 Action  New Bag Dose  200 mg Rate  120 mL/hr Route  IntraVENous Administered By  Juanis Tristan RN        Future Appointments   Date Time Provider Samantha James   3/24/2023 10:30 [facility]    2023  9:00 AM Royce Cisneros  [address] BS AMB     Jen Carpenter RN  2023

## 2023-03-18 DIAGNOSIS — R60.0 PEDAL EDEMA: ICD-10-CM

## 2023-03-18 RX ORDER — METFORMIN HYDROCHLORIDE 500 MG/1
TABLET ORAL
Qty: 360 TABLET | Refills: 1 | Status: SHIPPED | OUTPATIENT
Start: 2023-03-18

## 2023-03-18 RX ORDER — FUROSEMIDE 20 MG/1
TABLET ORAL
Qty: 90 TABLET | Refills: 1 | Status: SHIPPED | OUTPATIENT
Start: 2023-03-18

## 2023-03-19 ENCOUNTER — PATIENT MESSAGE (OUTPATIENT)
Dept: FAMILY MEDICINE CLINIC | Age: 58
End: 2023-03-19

## 2023-03-24 ENCOUNTER — HOSPITAL ENCOUNTER (OUTPATIENT)
Dept: INFUSION THERAPY | Age: 58
Discharge: HOME | End: 2023-03-24
Payer: MEDICARE

## 2023-03-24 VITALS
HEART RATE: 90 BPM | TEMPERATURE: 97.6 F | SYSTOLIC BLOOD PRESSURE: 130 MMHG | BODY MASS INDEX: 34.15 KG/M2 | OXYGEN SATURATION: 95 % | HEIGHT: 64 IN | WEIGHT: 200 LBS | DIASTOLIC BLOOD PRESSURE: 70 MMHG | RESPIRATION RATE: 18 BRPM

## 2023-03-24 DIAGNOSIS — D50.9 IRON DEFICIENCY ANEMIA, UNSPECIFIED IRON DEFICIENCY ANEMIA TYPE: Primary | ICD-10-CM

## 2023-03-24 PROCEDURE — 74011250636 HC RX REV CODE- 250/636: Performed by: REGISTERED NURSE

## 2023-03-24 PROCEDURE — 96365 THER/PROPH/DIAG IV INF INIT: CPT

## 2023-03-24 PROCEDURE — 74011000250 HC RX REV CODE- 250: Performed by: REGISTERED NURSE

## 2023-03-24 PROCEDURE — 74011000258 HC RX REV CODE- 258: Performed by: REGISTERED NURSE

## 2023-03-24 RX ORDER — SODIUM CHLORIDE 9 MG/ML
5-40 INJECTION INTRAVENOUS AS NEEDED
Status: ACTIVE | OUTPATIENT
Start: 2023-03-24 | End: 2023-03-24

## 2023-03-24 RX ORDER — SODIUM CHLORIDE 9 MG/ML
5-250 INJECTION, SOLUTION INTRAVENOUS AS NEEDED
Status: DISPENSED | OUTPATIENT
Start: 2023-03-24 | End: 2023-03-24

## 2023-03-24 RX ORDER — SODIUM CHLORIDE 0.9 % (FLUSH) 0.9 %
5-40 SYRINGE (ML) INJECTION AS NEEDED
Status: DISPENSED | OUTPATIENT
Start: 2023-03-24 | End: 2023-03-24

## 2023-03-24 RX ORDER — HEPARIN 100 UNIT/ML
500 SYRINGE INTRAVENOUS AS NEEDED
Status: ACTIVE | OUTPATIENT
Start: 2023-03-24 | End: 2023-03-24

## 2023-03-24 RX ADMIN — SODIUM CHLORIDE, PRESERVATIVE FREE 10 ML: 5 INJECTION INTRAVENOUS at 11:45

## 2023-03-24 RX ADMIN — IRON SUCROSE 200 MG: 20 INJECTION, SOLUTION INTRAVENOUS at 10:45

## 2023-03-24 RX ADMIN — SODIUM CHLORIDE 25 ML/HR: 9 INJECTION, SOLUTION INTRAVENOUS at 10:45

## 2023-03-24 RX ADMIN — SODIUM CHLORIDE, PRESERVATIVE FREE 10 ML: 5 INJECTION INTRAVENOUS at 10:40

## 2023-03-24 NOTE — PROGRESS NOTES
[facility] Short Note                   Date: 2023  Name: Abdirahman Kaye  MRN: [id number]       : 1965    1010Pt admit to [facility] for Iron Sucrose 6 of 6  Denies pain, fever, cough, N/V, recent falls. PIV Left forearm, 22g. Positive blood return. Tolerated infusion well without issue. Patient declined post infusion monitoring time. Education provided. PIV removed post infusion. Bandaid and Gauze Applied    Patient aware of upcoming appointment. Ms. Annita Carver vitals were reviewed prior to treatment.    Patient Vitals for the past 12 hrs:   Temp Pulse Resp BP SpO2   23 1045 97.6 °F (36.4 °C) 90 18 130/70 95 %   23 1010 97.6 °F (36.4 °C) 98 18 135/81 97 %     Medications Administered       0.9% sodium chloride infusion       Admin Date  2023 Action  New Bag Dose  25 mL/hr Rate  25 mL/hr Route  IntraVENous Administered By  Colleen Youngblood RN         0.9% sodium chloride injection 5-40 mL       Admin Date  2023 Action  Given Dose  10 mL Route  IntraVENous Administered By  Colleen Youngblood RN      Admin Date  2023 Action  Given Dose  10 mL Route  IntraVENous Administered By  Colleen Youngblood RN         iron sucrose (VENOFER) 200 mg in 0.9% sodium chloride 100 mL, overfill volume 10 mL IVPB       Admin Date  2023 Action  New Bag Dose  200 mg Rate  120 mL/hr Route  IntraVENous Administered By  Colleen Youngblood RN        Future Appointments   Date Time Provider Samantha James   2023  9:00 AM Javon Ricci  [address] BS AMB     Annabelle Blackwood RN  2023

## 2023-04-22 DIAGNOSIS — D50.9 IRON DEFICIENCY ANEMIA, UNSPECIFIED IRON DEFICIENCY ANEMIA TYPE: Primary | ICD-10-CM

## 2023-04-27 DIAGNOSIS — M16.12 PRIMARY OSTEOARTHRITIS OF LEFT HIP: Primary | ICD-10-CM

## 2023-04-27 RX ORDER — METHYLPREDNISOLONE 4 MG/1
TABLET ORAL
Qty: 1 DOSE PACK | Refills: 0 | Status: SHIPPED | OUTPATIENT
Start: 2023-04-27

## 2023-05-01 ENCOUNTER — TELEPHONE (OUTPATIENT)
Age: 58
End: 2023-05-01

## 2023-05-01 ENCOUNTER — TELEPHONE (OUTPATIENT)
Dept: ONCOLOGY | Age: 58
End: 2023-05-01

## 2023-05-13 LAB
BASOPHILS # BLD: 0.1 K/UL (ref 0–0.1)
BASOPHILS NFR BLD: 1 % (ref 0–1)
DIFFERENTIAL METHOD BLD: ABNORMAL
EOSINOPHIL # BLD: 0.6 K/UL (ref 0–0.4)
EOSINOPHIL NFR BLD: 4 % (ref 0–7)
ERYTHROCYTE [DISTWIDTH] IN BLOOD BY AUTOMATED COUNT: 22.2 % (ref 11.5–14.5)
FERRITIN SERPL-MCNC: 56 NG/ML (ref 26–388)
HCT VFR BLD AUTO: 46.5 % (ref 35–47)
HGB BLD-MCNC: 13.9 G/DL (ref 11.5–16)
IMM GRANULOCYTES # BLD AUTO: 0 K/UL (ref 0–0.04)
IMM GRANULOCYTES NFR BLD AUTO: 0 % (ref 0–0.5)
IRON SATN MFR SERPL: 17 % (ref 20–50)
IRON SERPL-MCNC: 62 UG/DL (ref 35–150)
LYMPHOCYTES # BLD: 2.2 K/UL (ref 0.8–3.5)
LYMPHOCYTES NFR BLD: 15 % (ref 12–49)
MCH RBC QN AUTO: 25.8 PG (ref 26–34)
MCHC RBC AUTO-ENTMCNC: 29.9 G/DL (ref 30–36.5)
MCV RBC AUTO: 86.4 FL (ref 80–99)
MONOCYTES # BLD: 0.6 K/UL (ref 0–1)
MONOCYTES NFR BLD: 4 % (ref 5–13)
NEUTS SEG # BLD: 10.9 K/UL (ref 1.8–8)
NEUTS SEG NFR BLD: 76 % (ref 32–75)
NRBC # BLD: 0 K/UL (ref 0–0.01)
NRBC BLD-RTO: 0 PER 100 WBC
PLATELET # BLD AUTO: 245 K/UL (ref 150–400)
PMV BLD AUTO: 9.9 FL (ref 8.9–12.9)
RBC # BLD AUTO: 5.38 M/UL (ref 3.8–5.2)
RBC MORPH BLD: ABNORMAL
TIBC SERPL-MCNC: 356 UG/DL (ref 250–450)
WBC # BLD AUTO: 14.4 K/UL (ref 3.6–11)

## 2023-05-19 ENCOUNTER — TELEPHONE (OUTPATIENT)
Age: 58
End: 2023-05-19

## 2023-05-19 ENCOUNTER — CLINICAL DOCUMENTATION (OUTPATIENT)
Age: 58
End: 2023-05-19

## 2023-05-19 NOTE — TELEPHONE ENCOUNTER
1210  Called pt HIPAA verified x2. Patient did not show for her appointment today in our office. Dr. America Jose review her labs   Anemia has resolved    Dr. America Jose is suggesting she have repeat labs done in 6 months- cbc, iron profile and ferritin and see us in office to review. Pt voiced understanding, labs will be mailed to pt and should be done 1 week prior to upcoming appt. Pt has no questions or concerns at this time.

## 2023-05-19 NOTE — PROGRESS NOTES
Patient did not show for her appointment  Please let her know that anemia has resolved   I would suggest she gets repeat labs done in 6 months- cbc, iron profile and ferritin and see me then    Reginold Button can order this Monday

## 2023-05-22 DIAGNOSIS — D50.9 IRON DEFICIENCY ANEMIA, UNSPECIFIED IRON DEFICIENCY ANEMIA TYPE: Primary | ICD-10-CM

## 2023-05-22 RX ORDER — DILTIAZEM HYDROCHLORIDE 180 MG/1
CAPSULE, COATED, EXTENDED RELEASE ORAL
Qty: 180 CAPSULE | Refills: 1 | Status: SHIPPED | OUTPATIENT
Start: 2023-05-22

## 2023-06-01 RX ORDER — ATORVASTATIN CALCIUM 40 MG/1
TABLET, FILM COATED ORAL
Qty: 90 TABLET | Refills: 0 | Status: SHIPPED | OUTPATIENT
Start: 2023-06-01

## 2023-06-01 RX ORDER — OMEPRAZOLE 40 MG/1
CAPSULE, DELAYED RELEASE ORAL
Qty: 180 CAPSULE | Refills: 0 | Status: SHIPPED | OUTPATIENT
Start: 2023-06-01

## 2023-06-19 ENCOUNTER — OFFICE VISIT (OUTPATIENT)
Age: 58
End: 2023-06-19
Payer: MEDICARE

## 2023-06-19 ENCOUNTER — NURSE ONLY (OUTPATIENT)
Age: 58
End: 2023-06-19
Payer: MEDICARE

## 2023-06-19 VITALS
WEIGHT: 198 LBS | HEIGHT: 64 IN | TEMPERATURE: 97.7 F | OXYGEN SATURATION: 96 % | BODY MASS INDEX: 33.8 KG/M2 | HEART RATE: 90 BPM | RESPIRATION RATE: 20 BRPM | DIASTOLIC BLOOD PRESSURE: 100 MMHG | SYSTOLIC BLOOD PRESSURE: 134 MMHG

## 2023-06-19 DIAGNOSIS — Z79.899 ENCOUNTER FOR LONG-TERM CURRENT USE OF MEDICATION: ICD-10-CM

## 2023-06-19 DIAGNOSIS — J44.9 CHRONIC OBSTRUCTIVE PULMONARY DISEASE, UNSPECIFIED COPD TYPE (HCC): ICD-10-CM

## 2023-06-19 DIAGNOSIS — E55.9 VITAMIN D DEFICIENCY: ICD-10-CM

## 2023-06-19 DIAGNOSIS — I10 ESSENTIAL HYPERTENSION WITH GOAL BLOOD PRESSURE LESS THAN 140/90: Primary | ICD-10-CM

## 2023-06-19 DIAGNOSIS — R10.11 RIGHT UPPER QUADRANT PAIN: ICD-10-CM

## 2023-06-19 DIAGNOSIS — E11.40 TYPE 2 DIABETES MELLITUS WITH DIABETIC NEUROPATHY, WITHOUT LONG-TERM CURRENT USE OF INSULIN (HCC): ICD-10-CM

## 2023-06-19 DIAGNOSIS — I10 ESSENTIAL HYPERTENSION WITH GOAL BLOOD PRESSURE LESS THAN 140/90: ICD-10-CM

## 2023-06-19 LAB
ALBUMIN SERPL-MCNC: 3.9 G/DL (ref 3.5–5)
ALBUMIN/GLOB SERPL: 1.4 (ref 1.1–2.2)
ALP SERPL-CCNC: 191 U/L (ref 45–117)
ALT SERPL-CCNC: 36 U/L (ref 12–78)
ANION GAP SERPL CALC-SCNC: 4 MMOL/L (ref 5–15)
AST SERPL-CCNC: 17 U/L (ref 15–37)
BILIRUB SERPL-MCNC: 0.2 MG/DL (ref 0.2–1)
BUN SERPL-MCNC: 13 MG/DL (ref 6–20)
BUN/CREAT SERPL: 13 (ref 12–20)
CALCIUM SERPL-MCNC: 9.7 MG/DL (ref 8.5–10.1)
CHLORIDE SERPL-SCNC: 108 MMOL/L (ref 97–108)
CHOLEST SERPL-MCNC: 129 MG/DL
CO2 SERPL-SCNC: 29 MMOL/L (ref 21–32)
CREAT SERPL-MCNC: 1.01 MG/DL (ref 0.55–1.02)
EST. AVERAGE GLUCOSE BLD GHB EST-MCNC: 126 MG/DL
GLOBULIN SER CALC-MCNC: 2.8 G/DL (ref 2–4)
GLUCOSE SERPL-MCNC: 131 MG/DL (ref 65–100)
HBA1C MFR BLD: 6 % (ref 4–5.6)
HDLC SERPL-MCNC: 43 MG/DL
HDLC SERPL: 3 (ref 0–5)
LDLC SERPL CALC-MCNC: 69 MG/DL (ref 0–100)
POTASSIUM SERPL-SCNC: 4.8 MMOL/L (ref 3.5–5.1)
PROT SERPL-MCNC: 6.7 G/DL (ref 6.4–8.2)
SODIUM SERPL-SCNC: 141 MMOL/L (ref 136–145)
TRIGL SERPL-MCNC: 85 MG/DL
VLDLC SERPL CALC-MCNC: 17 MG/DL

## 2023-06-19 PROCEDURE — 3017F COLORECTAL CA SCREEN DOC REV: CPT | Performed by: FAMILY MEDICINE

## 2023-06-19 PROCEDURE — G8417 CALC BMI ABV UP PARAM F/U: HCPCS | Performed by: FAMILY MEDICINE

## 2023-06-19 PROCEDURE — G8427 DOCREV CUR MEDS BY ELIG CLIN: HCPCS | Performed by: FAMILY MEDICINE

## 2023-06-19 PROCEDURE — 4004F PT TOBACCO SCREEN RCVD TLK: CPT | Performed by: FAMILY MEDICINE

## 2023-06-19 PROCEDURE — 3080F DIAST BP >= 90 MM HG: CPT | Performed by: FAMILY MEDICINE

## 2023-06-19 PROCEDURE — 3046F HEMOGLOBIN A1C LEVEL >9.0%: CPT | Performed by: FAMILY MEDICINE

## 2023-06-19 PROCEDURE — 2022F DILAT RTA XM EVC RTNOPTHY: CPT | Performed by: FAMILY MEDICINE

## 2023-06-19 PROCEDURE — 99214 OFFICE O/P EST MOD 30 MIN: CPT | Performed by: FAMILY MEDICINE

## 2023-06-19 PROCEDURE — 3023F SPIROM DOC REV: CPT | Performed by: FAMILY MEDICINE

## 2023-06-19 PROCEDURE — 3075F SYST BP GE 130 - 139MM HG: CPT | Performed by: FAMILY MEDICINE

## 2023-06-19 RX ORDER — BENZONATATE 200 MG/1
200 CAPSULE ORAL 3 TIMES DAILY PRN
Qty: 90 CAPSULE | Refills: 0 | Status: SHIPPED | OUTPATIENT
Start: 2023-06-19

## 2023-06-19 RX ORDER — TIOTROPIUM BROMIDE 18 UG/1
18 CAPSULE ORAL; RESPIRATORY (INHALATION) DAILY
Qty: 90 CAPSULE | Refills: 3 | Status: SHIPPED | OUTPATIENT
Start: 2023-06-19

## 2023-06-19 RX ORDER — DULAGLUTIDE 0.75 MG/.5ML
0.75 INJECTION, SOLUTION SUBCUTANEOUS WEEKLY
Qty: 6 ML | Refills: 1 | Status: SHIPPED | OUTPATIENT
Start: 2023-06-19

## 2023-06-19 SDOH — ECONOMIC STABILITY: FOOD INSECURITY: WITHIN THE PAST 12 MONTHS, YOU WORRIED THAT YOUR FOOD WOULD RUN OUT BEFORE YOU GOT MONEY TO BUY MORE.: NEVER TRUE

## 2023-06-19 SDOH — ECONOMIC STABILITY: HOUSING INSECURITY
IN THE LAST 12 MONTHS, WAS THERE A TIME WHEN YOU DID NOT HAVE A STEADY PLACE TO SLEEP OR SLEPT IN A SHELTER (INCLUDING NOW)?: NO

## 2023-06-19 SDOH — ECONOMIC STABILITY: FOOD INSECURITY: WITHIN THE PAST 12 MONTHS, THE FOOD YOU BOUGHT JUST DIDN'T LAST AND YOU DIDN'T HAVE MONEY TO GET MORE.: NEVER TRUE

## 2023-06-19 SDOH — ECONOMIC STABILITY: INCOME INSECURITY: HOW HARD IS IT FOR YOU TO PAY FOR THE VERY BASICS LIKE FOOD, HOUSING, MEDICAL CARE, AND HEATING?: SOMEWHAT HARD

## 2023-06-19 ASSESSMENT — ENCOUNTER SYMPTOMS
ABDOMINAL PAIN: 1
BACK PAIN: 1

## 2023-06-19 NOTE — PROGRESS NOTES
Identified pt with two pt identifiers(name and ). Chief Complaint   Patient presents with    6 Month Follow-Up    Diabetes    Hypertension    Discuss Medications     Metformin    Lab Collection     Fasting        Health Maintenance Due   Topic    Hepatitis B vaccine (2 of 3 - Risk 3-dose series)    DTaP/Tdap/Td vaccine (1 - Tdap)    Low dose CT lung screening &/or counseling     COVID-19 Vaccine (2 - Booster for [employer] series)    Annual Wellness Visit (AWV)        Wt Readings from Last 3 Encounters:   23 198 lb (89.8 kg)   23 200 lb (90.7 kg)   23 200 lb (90.7 kg)     Temp Readings from Last 3 Encounters:   23 97.7 °F (36.5 °C) (Temporal)     BP Readings from Last 3 Encounters:   23 (!) 152/90   23 130/70   23 132/68     Pulse Readings from Last 3 Encounters:   23 90   23 90   23 91           Depression Screening:  :     PHQ-9 Questionaire 2023 2023 2022 2022 10/28/2022 2022 3/7/2022   Little interest or pleasure in doing things 0 0 0 0 0 3 0   Feeling down, depressed, or hopeless 0 0 0 0 0 3 0   Trouble falling or staying asleep, or sleeping too much - - - - - 3 -   Feeling tired or having little energy - - - - - 3 -   Poor appetite or overeating - - - - - 3 -   Feeling bad about yourself - or that you are a failure or have let yourself or your family down - - - - - 0 -   Trouble concentrating on things, such as reading the newspaper or watching television - - - - - 3 -   Moving or speaking so slowly that other people could have noticed. Or the opposite - being so fidgety or restless that you have been moving around a lot more than usual - - - - - 3 -   PHQ-9 Total Score 0 0 0 0 0 21 0        Fall Risk Assessment:  :   No flowsheet data found. Abuse Screening:  :   No flowsheet data found. Coordination of Care Questionnaire:  :     1. \"Have you been to the ER, urgent care clinic since your last visit?   Hospitalized

## 2023-06-21 ENCOUNTER — TELEPHONE (OUTPATIENT)
Age: 58
End: 2023-06-21

## 2023-06-21 NOTE — TELEPHONE ENCOUNTER
----- Message from DANIELLE Dinero NP sent at 6/21/2023  8:50 AM EDT -----  Please call patient and let her know that her labs returned stable.   Should return in 6 months for office visit and fasting labs  Thanks

## 2023-07-20 ENCOUNTER — HOSPITAL ENCOUNTER (OUTPATIENT)
Facility: HOSPITAL | Age: 58
End: 2023-07-20
Attending: FAMILY MEDICINE
Payer: MEDICARE

## 2023-07-20 ENCOUNTER — HOSPITAL ENCOUNTER (OUTPATIENT)
Facility: HOSPITAL | Age: 58
Discharge: HOME | End: 2023-07-20
Attending: INTERNAL MEDICINE
Payer: MEDICARE

## 2023-07-20 DIAGNOSIS — R91.1 LUNG NODULE: ICD-10-CM

## 2023-07-20 DIAGNOSIS — R10.11 RIGHT UPPER QUADRANT PAIN: ICD-10-CM

## 2023-07-20 PROCEDURE — 71250 CT THORAX DX C-: CPT

## 2023-07-20 PROCEDURE — 76700 US EXAM ABDOM COMPLETE: CPT

## 2023-07-21 ENCOUNTER — TELEPHONE (OUTPATIENT)
Age: 58
End: 2023-07-21

## 2023-07-21 NOTE — TELEPHONE ENCOUNTER
----- Message from Meg Erazo MD sent at 7/20/2023  8:18 PM EDT -----  Stable fatty liver. No acute abnormality in right upper abdomen.

## 2023-08-09 ENCOUNTER — HOSPITAL ENCOUNTER (OUTPATIENT)
Facility: HOSPITAL | Age: 58
Discharge: HOME | End: 2023-08-12
Attending: INTERNAL MEDICINE
Payer: MEDICARE

## 2023-08-09 DIAGNOSIS — R91.1 LUNG NODULE: ICD-10-CM

## 2023-08-09 LAB
GLUCOSE BLD STRIP.AUTO-MCNC: 115 MG/DL (ref 65–117)
SERVICE CMNT-IMP: NORMAL

## 2023-08-09 PROCEDURE — A9552 F18 FDG: HCPCS | Performed by: INTERNAL MEDICINE

## 2023-08-09 PROCEDURE — 3430000000 HC RX DIAGNOSTIC RADIOPHARMACEUTICAL: Performed by: INTERNAL MEDICINE

## 2023-08-09 PROCEDURE — 78815 PET IMAGE W/CT SKULL-THIGH: CPT

## 2023-08-09 PROCEDURE — 82962 GLUCOSE BLOOD TEST: CPT

## 2023-08-09 RX ORDER — FLUDEOXYGLUCOSE F-18 500 MCI/ML
10 INJECTION INTRAVENOUS
Status: COMPLETED | OUTPATIENT
Start: 2023-08-09 | End: 2023-08-09

## 2023-08-09 RX ADMIN — FLUDEOXYGLUCOSE F-18 10 MILLICURIE: 500 INJECTION INTRAVENOUS at 07:03

## 2023-08-14 RX ORDER — VENLAFAXINE HYDROCHLORIDE 150 MG/1
CAPSULE, EXTENDED RELEASE ORAL
Qty: 90 CAPSULE | Refills: 0 | Status: SHIPPED | OUTPATIENT
Start: 2023-08-14

## 2023-08-15 ENCOUNTER — TELEPHONE (OUTPATIENT)
Age: 58
End: 2023-08-15

## 2023-08-16 DIAGNOSIS — J44.9 CHRONIC OBSTRUCTIVE PULMONARY DISEASE, UNSPECIFIED COPD TYPE (HCC): Primary | ICD-10-CM

## 2023-08-16 NOTE — TELEPHONE ENCOUNTER
Pt is is having a lung surgery coming up in september. Her last OV was in June with Dr. Stefanie Pettit She states she is having bad anxiety and she is wondering if she can increase her ativan or maybe switch to something else just until her surgery. Also I advised her to make a VV with Dr Stefanie Pettit when she returns which she plans to do. Please advise.  Ty

## 2023-08-17 RX ORDER — LORAZEPAM 0.5 MG/1
TABLET ORAL
Qty: 60 TABLET | Refills: 0 | Status: SHIPPED | OUTPATIENT
Start: 2023-08-17 | End: 2023-09-16

## 2023-08-17 RX ORDER — BENZONATATE 200 MG/1
CAPSULE ORAL
Qty: 90 CAPSULE | Refills: 0 | Status: SHIPPED | OUTPATIENT
Start: 2023-08-17

## 2023-09-11 ENCOUNTER — TELEPHONE (OUTPATIENT)
Age: 58
End: 2023-09-11

## 2023-09-11 DIAGNOSIS — B37.9 YEAST INFECTION: Primary | ICD-10-CM

## 2023-09-11 RX ORDER — FLUCONAZOLE 150 MG/1
150 TABLET ORAL ONCE
Qty: 1 TABLET | Refills: 0 | Status: SHIPPED | OUTPATIENT
Start: 2023-09-11 | End: 2023-09-11

## 2023-09-11 NOTE — TELEPHONE ENCOUNTER
----- Message from Fouzia Preciado sent at 9/11/2023 10:28 AM EDT -----  Regarding: FW: Prescription   Contact: [phone]    ----- Message -----  From: Tati Ortiz  Sent: 9/11/2023   9:54 AM EDT  To: [facility] Clinical Staff  Subject: Prescription                                     Good morning   Will you please send [facility] a prescription for yeast infection pill? I had a catheter in during surgery and it left a present.  I hope you can help  Thank you

## 2023-09-15 ENCOUNTER — TELEPHONE (OUTPATIENT)
Age: 58
End: 2023-09-15

## 2023-09-16 NOTE — TELEPHONE ENCOUNTER
ON CALL NOTE:    Pt had lung wedge bx done 10 days ago at [facility] and she had received a message about path results. She had questions and was hoping that we had access to report. Thi sis not visible on Care Everywhere. I advised pt to call her Thoracic surgeon on Monday to discuss pathology results.

## 2023-09-21 ENCOUNTER — OFFICE VISIT (OUTPATIENT)
Age: 58
End: 2023-09-21
Payer: MEDICARE

## 2023-09-21 VITALS
HEART RATE: 94 BPM | SYSTOLIC BLOOD PRESSURE: 142 MMHG | WEIGHT: 198 LBS | OXYGEN SATURATION: 96 % | HEIGHT: 64 IN | BODY MASS INDEX: 33.8 KG/M2 | DIASTOLIC BLOOD PRESSURE: 84 MMHG

## 2023-09-21 DIAGNOSIS — I10 ESSENTIAL HYPERTENSION WITH GOAL BLOOD PRESSURE LESS THAN 140/90: Primary | ICD-10-CM

## 2023-09-21 PROCEDURE — G8417 CALC BMI ABV UP PARAM F/U: HCPCS | Performed by: INTERNAL MEDICINE

## 2023-09-21 PROCEDURE — G8427 DOCREV CUR MEDS BY ELIG CLIN: HCPCS | Performed by: INTERNAL MEDICINE

## 2023-09-21 PROCEDURE — 3077F SYST BP >= 140 MM HG: CPT | Performed by: INTERNAL MEDICINE

## 2023-09-21 PROCEDURE — 3079F DIAST BP 80-89 MM HG: CPT | Performed by: INTERNAL MEDICINE

## 2023-09-21 PROCEDURE — 4004F PT TOBACCO SCREEN RCVD TLK: CPT | Performed by: INTERNAL MEDICINE

## 2023-09-21 PROCEDURE — 99204 OFFICE O/P NEW MOD 45 MIN: CPT | Performed by: INTERNAL MEDICINE

## 2023-09-21 PROCEDURE — 3017F COLORECTAL CA SCREEN DOC REV: CPT | Performed by: INTERNAL MEDICINE

## 2023-09-21 PROCEDURE — 93000 ELECTROCARDIOGRAM COMPLETE: CPT | Performed by: INTERNAL MEDICINE

## 2023-09-21 RX ORDER — VALSARTAN 320 MG/1
320 TABLET ORAL DAILY
Qty: 30 TABLET | Refills: 3 | Status: SHIPPED | OUTPATIENT
Start: 2023-09-21

## 2023-09-21 NOTE — PROGRESS NOTES
Patient: Luci Galvez  : 1965    Primary Cardiologist: Chris Santiago. Romulo Dudley MD, [facility]  EP Cardiologist:  PCP: Stephanie Pineda MD    Today's Date: 2023      ASSESSMENT AND PLAN:     Assessment and Plan:   Tobacco  45 + years  Down less than a pack a day  cessation    2. HTN  Stop losartan  Start valsartan 320    3. HLD  atorva    4. DM2  -A1C 6.0    5.  AFIB  Sp afib ablation  Around     6. CVA    7. Carotid stenosis   US  - <50%    8. Lung CA  -R lower, left  Sp R thoracotomy and resection    Follow up after testing done. ICD-10-CM    1. Essential hypertension with goal blood pressure less than 140/90  I10 EKG 12 Lead          HISTORY OF PRESENT ILLNESS:     History of Present Illness:  Luci Galvez is a 60 y.o. female who presents for cardiac evaluation. HTN HLD DM2. Prior afib sp ablation [address]. Cannot sleep. Wakes up 3 am.  Trazodone, melatonin. ++ SOB    No energy. Chest tightness with exertion sometimes.       EKG NSR      PAST MEDICAL HISTORY:     Past Medical History:   Diagnosis Date    Arthritis     Cancer ([address])     IN KIDNEY REMOVED RIGHT KIDNEY     Chronic obstructive pulmonary disease ([address])     BEGINNING STAGES     Chronic pain     Diabetes ([address])     Elevated WBC count  to present    GERD (gastroesophageal reflux disease)     Hypertension     Kidney stones     Leg swelling 2020    Doppler negative    Liver disease     Nausea & vomiting     Panic attacks     Skin cancer     BASAL CELL ON CHEST    Sleep apnea     Does not use CPAP- raises the head of bed up    Spondylolisthesis of lumbar region     Stroke [facility])     TIA     Tachycardia        Past Surgical History:   Procedure Laterality Date    ABLATION OF DYSRHYTHMIC FOCUS  2013    BREAST BIOPSY Bilateral     benign    CHOLECYSTECTOMY  1998    COLONOSCOPY N/A 2019    COLONOSCOPY performed by Srinivas Das MD at [facility]

## 2023-09-21 NOTE — PROGRESS NOTES
Chief Complaint   Patient presents with    Follow-up    Hypertension     There were no vitals filed for this visit.      Chest pain denied   SOB yes  Palpitations denied   Swelling in hands/feet yes, and abdomen   Dizziness yes  Recent hospital stays denied   Refills denied

## 2023-09-22 ENCOUNTER — TELEPHONE (OUTPATIENT)
Age: 58
End: 2023-09-22

## 2023-09-22 NOTE — TELEPHONE ENCOUNTER
Patient called requesting a call back regards results of surgery at [facility] on 9/6/23 (removal of nodules in Rt Lung).   Patient can be reached at:  [phone]    Next OV:  12/7/23

## 2023-09-22 NOTE — TELEPHONE ENCOUNTER
:    Called patient to get her scheduled for appointment with MD [place]ster to review results and next steps     Can be 1 pm 9/28  Or 1/2pm on 9/29    No answer   LVM to call back and confirm appointment slot

## 2023-09-27 ENCOUNTER — TELEPHONE (OUTPATIENT)
Age: 58
End: 2023-09-27

## 2023-09-27 NOTE — TELEPHONE ENCOUNTER
Enrolled with [employer] - Ordered and being shipped to patient's home address on file. ETA within 5-7 business days. holter  Received: LEILA Price; Ken Stock  7 day holter for afib per Dr. Radha Flores.

## 2023-09-28 ENCOUNTER — OFFICE VISIT (OUTPATIENT)
Age: 58
End: 2023-09-28
Payer: MEDICARE

## 2023-09-28 VITALS
BODY MASS INDEX: 33.82 KG/M2 | HEART RATE: 105 BPM | OXYGEN SATURATION: 96 % | DIASTOLIC BLOOD PRESSURE: 68 MMHG | WEIGHT: 198.1 LBS | RESPIRATION RATE: 18 BRPM | TEMPERATURE: 98.1 F | HEIGHT: 64 IN | SYSTOLIC BLOOD PRESSURE: 113 MMHG

## 2023-09-28 DIAGNOSIS — C64.1 MALIGNANT NEOPLASM OF RIGHT KIDNEY, EXCEPT RENAL PELVIS (HCC): Primary | ICD-10-CM

## 2023-09-28 DIAGNOSIS — E66.01 MORBID (SEVERE) OBESITY DUE TO EXCESS CALORIES (HCC): ICD-10-CM

## 2023-09-28 DIAGNOSIS — D72.829 LEUKOCYTOSIS, UNSPECIFIED TYPE: ICD-10-CM

## 2023-09-28 DIAGNOSIS — J44.9 CHRONIC OBSTRUCTIVE PULMONARY DISEASE, UNSPECIFIED COPD TYPE (HCC): ICD-10-CM

## 2023-09-28 PROCEDURE — 99215 OFFICE O/P EST HI 40 MIN: CPT | Performed by: INTERNAL MEDICINE

## 2023-09-28 PROCEDURE — 3017F COLORECTAL CA SCREEN DOC REV: CPT | Performed by: INTERNAL MEDICINE

## 2023-09-28 PROCEDURE — 3074F SYST BP LT 130 MM HG: CPT | Performed by: INTERNAL MEDICINE

## 2023-09-28 PROCEDURE — G8427 DOCREV CUR MEDS BY ELIG CLIN: HCPCS | Performed by: INTERNAL MEDICINE

## 2023-09-28 PROCEDURE — 3023F SPIROM DOC REV: CPT | Performed by: INTERNAL MEDICINE

## 2023-09-28 PROCEDURE — G8417 CALC BMI ABV UP PARAM F/U: HCPCS | Performed by: INTERNAL MEDICINE

## 2023-09-28 PROCEDURE — 4004F PT TOBACCO SCREEN RCVD TLK: CPT | Performed by: INTERNAL MEDICINE

## 2023-09-28 PROCEDURE — 3078F DIAST BP <80 MM HG: CPT | Performed by: INTERNAL MEDICINE

## 2023-10-05 ENCOUNTER — ANCILLARY PROCEDURE (OUTPATIENT)
Age: 58
End: 2023-10-05
Payer: MEDICARE

## 2023-10-05 VITALS
SYSTOLIC BLOOD PRESSURE: 134 MMHG | BODY MASS INDEX: 33.8 KG/M2 | WEIGHT: 198 LBS | DIASTOLIC BLOOD PRESSURE: 78 MMHG | HEART RATE: 93 BPM | HEIGHT: 64 IN

## 2023-10-05 VITALS
HEIGHT: 64 IN | WEIGHT: 198 LBS | BODY MASS INDEX: 33.8 KG/M2 | SYSTOLIC BLOOD PRESSURE: 134 MMHG | DIASTOLIC BLOOD PRESSURE: 78 MMHG

## 2023-10-05 DIAGNOSIS — I10 ESSENTIAL HYPERTENSION WITH GOAL BLOOD PRESSURE LESS THAN 140/90: ICD-10-CM

## 2023-10-05 DIAGNOSIS — E11.40 TYPE 2 DIABETES MELLITUS WITH DIABETIC NEUROPATHY (HCC): ICD-10-CM

## 2023-10-05 DIAGNOSIS — F17.210 CIGARETTE NICOTINE DEPENDENCE WITHOUT COMPLICATION: ICD-10-CM

## 2023-10-05 PROCEDURE — 93017 CV STRESS TEST TRACING ONLY: CPT

## 2023-10-05 PROCEDURE — A9500 TC99M SESTAMIBI: HCPCS | Performed by: INTERNAL MEDICINE

## 2023-10-05 PROCEDURE — 93306 TTE W/DOPPLER COMPLETE: CPT

## 2023-10-05 RX ORDER — REGADENOSON 0.08 MG/ML
0.4 INJECTION, SOLUTION INTRAVENOUS
Status: COMPLETED | OUTPATIENT
Start: 2023-10-05 | End: 2023-10-05

## 2023-10-05 RX ORDER — TETRAKIS(2-METHOXYISOBUTYLISOCYANIDE)COPPER(I) TETRAFLUOROBORATE 1 MG/ML
25.2 INJECTION, POWDER, LYOPHILIZED, FOR SOLUTION INTRAVENOUS
Status: COMPLETED | OUTPATIENT
Start: 2023-10-05 | End: 2023-10-05

## 2023-10-05 RX ORDER — AMINOPHYLLINE DIHYDRATE 25 MG/ML
100 INJECTION, SOLUTION INTRAVENOUS ONCE
Status: COMPLETED | OUTPATIENT
Start: 2023-10-05 | End: 2023-10-05

## 2023-10-05 RX ORDER — TETRAKIS(2-METHOXYISOBUTYLISOCYANIDE)COPPER(I) TETRAFLUOROBORATE 1 MG/ML
8 INJECTION, POWDER, LYOPHILIZED, FOR SOLUTION INTRAVENOUS
Status: COMPLETED | OUTPATIENT
Start: 2023-10-05 | End: 2023-10-05

## 2023-10-05 RX ADMIN — AMINOPHYLLINE 100 MG: 25 INJECTION, SOLUTION INTRAVENOUS at 13:44

## 2023-10-05 RX ADMIN — REGADENOSON 0.4 MG: 0.08 INJECTION, SOLUTION INTRAVENOUS at 13:34

## 2023-10-05 RX ADMIN — TECHNETIUM TC-99M SESTAMIBI 25.2 MILLICURIE: 1 INJECTION INTRAVENOUS at 13:42

## 2023-10-05 RX ADMIN — TECHNETIUM TC-99M SESTAMIBI 8 MILLICURIE: 1 INJECTION INTRAVENOUS at 12:45

## 2023-10-06 LAB
ECHO AO ASC DIAM: 3.4 CM
ECHO AO ASCENDING AORTA INDEX: 1.74 CM/M2
ECHO AO ROOT DIAM: 3.2 CM
ECHO AO ROOT INDEX: 1.64 CM/M2
ECHO BSA: 2.01 M2
ECHO BSA: 2.01 M2
ECHO LA DIAMETER INDEX: 1.74 CM/M2
ECHO LA DIAMETER: 3.4 CM
ECHO LA TO AORTIC ROOT RATIO: 1.06
ECHO LA VOL 2C: 35 ML (ref 22–52)
ECHO LA VOL 4C: 20 ML (ref 22–52)
ECHO LA VOL BP: 28 ML (ref 22–52)
ECHO LA VOL/BSA BIPLANE: 14 ML/M2 (ref 16–34)
ECHO LA VOLUME AREA LENGTH: 29 ML
ECHO LA VOLUME INDEX A2C: 18 ML/M2 (ref 16–34)
ECHO LA VOLUME INDEX A4C: 10 ML/M2 (ref 16–34)
ECHO LA VOLUME INDEX AREA LENGTH: 15 ML/M2 (ref 16–34)
ECHO LV E' LATERAL VELOCITY: 7 CM/S
ECHO LV E' SEPTAL VELOCITY: 6 CM/S
ECHO LV EJECTION FRACTION A2C: 59 %
ECHO LV EJECTION FRACTION A4C: 55 %
ECHO LV FRACTIONAL SHORTENING: 26 % (ref 28–44)
ECHO LV INTERNAL DIMENSION DIASTOLE INDEX: 2.15 CM/M2
ECHO LV INTERNAL DIMENSION DIASTOLIC: 4.2 CM (ref 3.9–5.3)
ECHO LV INTERNAL DIMENSION SYSTOLIC INDEX: 1.59 CM/M2
ECHO LV INTERNAL DIMENSION SYSTOLIC: 3.1 CM
ECHO LV IVSD: 1.2 CM (ref 0.6–0.9)
ECHO LV MASS 2D: 178.2 G (ref 67–162)
ECHO LV MASS INDEX 2D: 91.4 G/M2 (ref 43–95)
ECHO LV POSTERIOR WALL DIASTOLIC: 1.2 CM (ref 0.6–0.9)
ECHO LV RELATIVE WALL THICKNESS RATIO: 0.57
ECHO LVOT AREA: 3.1 CM2
ECHO LVOT DIAM: 2 CM
ECHO MV A VELOCITY: 0.88 M/S
ECHO MV E DECELERATION TIME (DT): 244.1 MS
ECHO MV E VELOCITY: 0.54 M/S
ECHO MV E/A RATIO: 0.61
ECHO MV E/E' LATERAL: 7.71
ECHO MV E/E' RATIO (AVERAGED): 8.36
ECHO MV E/E' SEPTAL: 9
ECHO RV BASAL DIMENSION: 2.3 CM
ECHO RV FREE WALL PEAK S': 13 CM/S
ECHO RV TAPSE: 1.8 CM (ref 1.7–?)
NUC STRESS EJECTION FRACTION: 61 %
STRESS BASELINE DIAS BP: 78 MMHG
STRESS BASELINE HR: 93 BPM
STRESS BASELINE ST DEPRESSION: 0 MM
STRESS BASELINE SYS BP: 134 MMHG
STRESS O2 SAT PEAK: 100 %
STRESS O2 SAT REST: 96 %
STRESS PEAK DIAS BP: 72 MMHG
STRESS PEAK SYS BP: 130 MMHG
STRESS PERCENT HR ACHIEVED: 71 %
STRESS POST PEAK HR: 114 BPM
STRESS RATE PRESSURE PRODUCT: NORMAL BPM*MMHG
STRESS ST DEPRESSION: 0 MM
STRESS TARGET HR: 161 BPM
TID: 1.03

## 2023-10-06 PROCEDURE — 93016 CV STRESS TEST SUPVJ ONLY: CPT | Performed by: INTERNAL MEDICINE

## 2023-10-06 PROCEDURE — PBSHW PBB SHADOW CHARGE: Performed by: INTERNAL MEDICINE

## 2023-10-06 PROCEDURE — 78452 HT MUSCLE IMAGE SPECT MULT: CPT | Performed by: INTERNAL MEDICINE

## 2023-10-06 PROCEDURE — 93018 CV STRESS TEST I&R ONLY: CPT | Performed by: INTERNAL MEDICINE

## 2023-10-06 PROCEDURE — 93306 TTE W/DOPPLER COMPLETE: CPT | Performed by: INTERNAL MEDICINE

## 2023-10-06 NOTE — RESULT ENCOUNTER NOTE
Good news, your nuclear stress test was normal.    Please call with office with further questions.     Dr. Hanane Novak

## 2023-10-09 ENCOUNTER — HOSPITAL ENCOUNTER (OUTPATIENT)
Facility: HOSPITAL | Age: 58
Discharge: HOME | End: 2023-10-12
Attending: INTERNAL MEDICINE
Payer: MEDICARE

## 2023-10-09 DIAGNOSIS — C64.1 MALIGNANT NEOPLASM OF RIGHT KIDNEY, EXCEPT RENAL PELVIS (HCC): ICD-10-CM

## 2023-10-09 DIAGNOSIS — E66.01 MORBID (SEVERE) OBESITY DUE TO EXCESS CALORIES (HCC): ICD-10-CM

## 2023-10-09 LAB — CREAT BLD-MCNC: 1.1 MG/DL (ref 0.6–1.3)

## 2023-10-09 PROCEDURE — 6360000004 HC RX CONTRAST MEDICATION: Performed by: RADIOLOGY

## 2023-10-09 PROCEDURE — 6360000004 HC RX CONTRAST MEDICATION: Performed by: INTERNAL MEDICINE

## 2023-10-09 PROCEDURE — 70553 MRI BRAIN STEM W/O & W/DYE: CPT

## 2023-10-09 PROCEDURE — 74177 CT ABD & PELVIS W/CONTRAST: CPT

## 2023-10-09 PROCEDURE — A9579 GAD-BASE MR CONTRAST NOS,1ML: HCPCS | Performed by: RADIOLOGY

## 2023-10-09 PROCEDURE — 82565 ASSAY OF CREATININE: CPT

## 2023-10-09 RX ADMIN — GADOTERIDOL 18 ML: 279.3 INJECTION, SOLUTION INTRAVENOUS at 08:32

## 2023-10-09 RX ADMIN — IOPAMIDOL 100 ML: 755 INJECTION, SOLUTION INTRAVENOUS at 08:41

## 2023-10-12 ENCOUNTER — TELEMEDICINE (OUTPATIENT)
Age: 58
End: 2023-10-12
Payer: MEDICARE

## 2023-10-12 DIAGNOSIS — C64.1 MALIGNANT NEOPLASM OF RIGHT KIDNEY, EXCEPT RENAL PELVIS (HCC): Primary | ICD-10-CM

## 2023-10-12 PROCEDURE — G8428 CUR MEDS NOT DOCUMENT: HCPCS | Performed by: INTERNAL MEDICINE

## 2023-10-12 PROCEDURE — 99214 OFFICE O/P EST MOD 30 MIN: CPT | Performed by: INTERNAL MEDICINE

## 2023-10-12 PROCEDURE — 3017F COLORECTAL CA SCREEN DOC REV: CPT | Performed by: INTERNAL MEDICINE

## 2023-10-12 NOTE — PROGRESS NOTES
negative for carcinoma (0/1). Station 12R #1 lymph node (Specimen #7); Excision:  - One lymph node negative for carcinoma (0/1). Station 12R #2 lymph node (Specimen #8); Excision:  - One lymph node negative for carcinoma (0/1). Station 4R lymph node (Specimen #9); Excision:  - One lymph node negative for carcinoma (0/1). Lung, Right Lower Lobe (Specimen #10); Wedge Resection:  - Metastatic renal cell clear cell carcinoma, 1.3 cm (see comment). - Background parenchyma with respiratory bronchiolitis and emphysematous changes. - Focus of fat/bone marrow embolism (10D). Radiology report(s) reviewed above. CT abdomen 5/2020- reviewed       CTA 8/2020- reviewed     CT 7/2023   Lungs/Pleura: Right lower lobe nodule has increased in size now measuring 13 mm. It has a subtly nodular border. Nodule along the left oblique fissure has also  increased now measuring 8 mm. No consolidation or pleural effusion. CT Abdomen and pelvis 10/2023     IMPRESSION:  Moderate right pleural effusion of uncertain etiology. No CT  evidence of locally recurrent or metastatic disease status post right  nephrectomy with incidentals as above including coronary artery disease, colonic  diverticulosis, and probable postcholecystectomy related mild biliary ductal  dilation. The previously seen right lower lobe pulmonary nodule may be obscured  by atelectasis. Assessment:   1) Renal cell carcinoma-stage IV    Right radical nephrectomy 7/2020-3.3 cm clear-cell carcinoma. Of note she had a 6 mm left lung nodule detected at that time of uncertain significance. The right lung nodule was first detected on scans on 12/9/2021 and measured 8 mm at that time    7/2023-growth in bilateral lung nodules with a right lung nodule measuring up to 13 mm in the left lung nodule 8 mm. PET scan showed some uptake in the right lung nodule but no other uptake otherwise.      9/2023-status post left lung nodule wedge resection and

## 2023-10-13 ENCOUNTER — APPOINTMENT (OUTPATIENT)
Facility: HOSPITAL | Age: 58
DRG: 193 | End: 2023-10-13
Payer: MEDICARE

## 2023-10-13 ENCOUNTER — HOSPITAL ENCOUNTER (INPATIENT)
Facility: HOSPITAL | Age: 58
LOS: 4 days | Discharge: HOME HEALTH | DRG: 193 | End: 2023-10-17
Attending: STUDENT IN AN ORGANIZED HEALTH CARE EDUCATION/TRAINING PROGRAM | Admitting: HOSPITALIST
Payer: MEDICARE

## 2023-10-13 DIAGNOSIS — C34.90 MALIGNANT NEOPLASM OF LUNG, UNSPECIFIED LATERALITY, UNSPECIFIED PART OF LUNG (HCC): ICD-10-CM

## 2023-10-13 DIAGNOSIS — J90 PLEURAL EFFUSION, RIGHT: ICD-10-CM

## 2023-10-13 DIAGNOSIS — F17.200 TOBACCO DEPENDENCE: ICD-10-CM

## 2023-10-13 DIAGNOSIS — J18.9 PNEUMONIA DUE TO INFECTIOUS ORGANISM, UNSPECIFIED LATERALITY, UNSPECIFIED PART OF LUNG: Primary | ICD-10-CM

## 2023-10-13 DIAGNOSIS — C64.1 MALIGNANT NEOPLASM OF RIGHT KIDNEY, EXCEPT RENAL PELVIS (HCC): ICD-10-CM

## 2023-10-13 PROBLEM — J15.9 BACTERIAL PNEUMONIA: Status: ACTIVE | Noted: 2023-10-13

## 2023-10-13 LAB
ALBUMIN SERPL-MCNC: 3.4 G/DL (ref 3.5–5)
ALBUMIN/GLOB SERPL: 0.9 (ref 1.1–2.2)
ALP SERPL-CCNC: 283 U/L (ref 45–117)
ALT SERPL-CCNC: 46 U/L (ref 12–78)
ANION GAP SERPL CALC-SCNC: 14 MMOL/L (ref 5–15)
AST SERPL-CCNC: 16 U/L (ref 15–37)
BASOPHILS # BLD: 0.1 K/UL (ref 0–0.1)
BASOPHILS NFR BLD: 1 % (ref 0–1)
BILIRUB SERPL-MCNC: 0.3 MG/DL (ref 0.2–1)
BUN SERPL-MCNC: 10 MG/DL (ref 6–20)
BUN/CREAT SERPL: 9 (ref 12–20)
CALCIUM SERPL-MCNC: 9.5 MG/DL (ref 8.5–10.1)
CHLORIDE SERPL-SCNC: 101 MMOL/L (ref 97–108)
CO2 SERPL-SCNC: 22 MMOL/L (ref 21–32)
CREAT SERPL-MCNC: 1.11 MG/DL (ref 0.55–1.02)
DIFFERENTIAL METHOD BLD: ABNORMAL
EKG ATRIAL RATE: 92 BPM
EKG DIAGNOSIS: NORMAL
EKG P AXIS: 52 DEGREES
EKG P-R INTERVAL: 132 MS
EKG Q-T INTERVAL: 404 MS
EKG QRS DURATION: 94 MS
EKG QTC CALCULATION (BAZETT): 499 MS
EKG R AXIS: -15 DEGREES
EKG T AXIS: 59 DEGREES
EKG VENTRICULAR RATE: 92 BPM
EOSINOPHIL # BLD: 0.3 K/UL (ref 0–0.4)
EOSINOPHIL NFR BLD: 3 % (ref 0–7)
ERYTHROCYTE [DISTWIDTH] IN BLOOD BY AUTOMATED COUNT: 14.3 % (ref 11.5–14.5)
GLOBULIN SER CALC-MCNC: 3.8 G/DL (ref 2–4)
GLUCOSE BLD STRIP.AUTO-MCNC: 108 MG/DL (ref 65–117)
GLUCOSE SERPL-MCNC: 157 MG/DL (ref 65–100)
HCT VFR BLD AUTO: 44.9 % (ref 35–47)
HGB BLD-MCNC: 15.1 G/DL (ref 11.5–16)
IMM GRANULOCYTES # BLD AUTO: 0.1 K/UL (ref 0–0.04)
IMM GRANULOCYTES NFR BLD AUTO: 1 % (ref 0–0.5)
LYMPHOCYTES # BLD: 1.6 K/UL (ref 0.8–3.5)
LYMPHOCYTES NFR BLD: 15 % (ref 12–49)
MCH RBC QN AUTO: 28 PG (ref 26–34)
MCHC RBC AUTO-ENTMCNC: 33.6 G/DL (ref 30–36.5)
MCV RBC AUTO: 83.3 FL (ref 80–99)
MONOCYTES # BLD: 0.6 K/UL (ref 0–1)
MONOCYTES NFR BLD: 6 % (ref 5–13)
NEUTS SEG # BLD: 7.7 K/UL (ref 1.8–8)
NEUTS SEG NFR BLD: 74 % (ref 32–75)
NRBC # BLD: 0 K/UL (ref 0–0.01)
NRBC BLD-RTO: 0 PER 100 WBC
NT PRO BNP: 341 PG/ML (ref 0–125)
PLATELET # BLD AUTO: 221 K/UL (ref 150–400)
PMV BLD AUTO: 9.6 FL (ref 8.9–12.9)
POTASSIUM SERPL-SCNC: 3.3 MMOL/L (ref 3.5–5.1)
PROT SERPL-MCNC: 7.2 G/DL (ref 6.4–8.2)
RBC # BLD AUTO: 5.39 M/UL (ref 3.8–5.2)
SERVICE CMNT-IMP: NORMAL
SODIUM SERPL-SCNC: 137 MMOL/L (ref 136–145)
TROPONIN I SERPL HS-MCNC: 23 NG/L (ref 0–51)
WBC # BLD AUTO: 10.3 K/UL (ref 3.6–11)

## 2023-10-13 PROCEDURE — 99285 EMERGENCY DEPT VISIT HI MDM: CPT

## 2023-10-13 PROCEDURE — 85025 COMPLETE CBC W/AUTO DIFF WBC: CPT

## 2023-10-13 PROCEDURE — 80053 COMPREHEN METABOLIC PANEL: CPT

## 2023-10-13 PROCEDURE — 96375 TX/PRO/DX INJ NEW DRUG ADDON: CPT

## 2023-10-13 PROCEDURE — 71046 X-RAY EXAM CHEST 2 VIEWS: CPT

## 2023-10-13 PROCEDURE — 71275 CT ANGIOGRAPHY CHEST: CPT

## 2023-10-13 PROCEDURE — 96374 THER/PROPH/DIAG INJ IV PUSH: CPT

## 2023-10-13 PROCEDURE — 83880 ASSAY OF NATRIURETIC PEPTIDE: CPT

## 2023-10-13 PROCEDURE — 2060000000 HC ICU INTERMEDIATE R&B

## 2023-10-13 PROCEDURE — 2580000003 HC RX 258: Performed by: STUDENT IN AN ORGANIZED HEALTH CARE EDUCATION/TRAINING PROGRAM

## 2023-10-13 PROCEDURE — 6360000002 HC RX W HCPCS: Performed by: STUDENT IN AN ORGANIZED HEALTH CARE EDUCATION/TRAINING PROGRAM

## 2023-10-13 PROCEDURE — 6360000004 HC RX CONTRAST MEDICATION: Performed by: STUDENT IN AN ORGANIZED HEALTH CARE EDUCATION/TRAINING PROGRAM

## 2023-10-13 PROCEDURE — 36415 COLL VENOUS BLD VENIPUNCTURE: CPT

## 2023-10-13 PROCEDURE — 84484 ASSAY OF TROPONIN QUANT: CPT

## 2023-10-13 PROCEDURE — 93005 ELECTROCARDIOGRAM TRACING: CPT | Performed by: STUDENT IN AN ORGANIZED HEALTH CARE EDUCATION/TRAINING PROGRAM

## 2023-10-13 PROCEDURE — 82962 GLUCOSE BLOOD TEST: CPT

## 2023-10-13 PROCEDURE — 93010 ELECTROCARDIOGRAM REPORT: CPT | Performed by: INTERNAL MEDICINE

## 2023-10-13 PROCEDURE — 6370000000 HC RX 637 (ALT 250 FOR IP): Performed by: STUDENT IN AN ORGANIZED HEALTH CARE EDUCATION/TRAINING PROGRAM

## 2023-10-13 RX ORDER — MORPHINE SULFATE 2 MG/ML
2 INJECTION, SOLUTION INTRAMUSCULAR; INTRAVENOUS ONCE
Status: COMPLETED | OUTPATIENT
Start: 2023-10-13 | End: 2023-10-13

## 2023-10-13 RX ORDER — LORAZEPAM 2 MG/ML
0.5 INJECTION INTRAMUSCULAR EVERY 6 HOURS PRN
Status: DISCONTINUED | OUTPATIENT
Start: 2023-10-13 | End: 2023-10-17 | Stop reason: HOSPADM

## 2023-10-13 RX ORDER — ONDANSETRON 2 MG/ML
4 INJECTION INTRAMUSCULAR; INTRAVENOUS
Status: COMPLETED | OUTPATIENT
Start: 2023-10-13 | End: 2023-10-13

## 2023-10-13 RX ORDER — NICOTINE 21 MG/24HR
1 PATCH, TRANSDERMAL 24 HOURS TRANSDERMAL DAILY
Status: DISCONTINUED | OUTPATIENT
Start: 2023-10-13 | End: 2023-10-17 | Stop reason: HOSPADM

## 2023-10-13 RX ORDER — MORPHINE SULFATE 4 MG/ML
4 INJECTION, SOLUTION INTRAMUSCULAR; INTRAVENOUS EVERY 4 HOURS PRN
Status: DISCONTINUED | OUTPATIENT
Start: 2023-10-13 | End: 2023-10-17 | Stop reason: HOSPADM

## 2023-10-13 RX ADMIN — IOPAMIDOL 100 ML: 755 INJECTION, SOLUTION INTRAVENOUS at 15:33

## 2023-10-13 RX ADMIN — LORAZEPAM 0.5 MG: 2 INJECTION INTRAMUSCULAR at 17:16

## 2023-10-13 RX ADMIN — MORPHINE SULFATE 2 MG: 2 INJECTION, SOLUTION INTRAMUSCULAR; INTRAVENOUS at 15:15

## 2023-10-13 RX ADMIN — WATER 1000 MG: 1 INJECTION INTRAMUSCULAR; INTRAVENOUS; SUBCUTANEOUS at 17:18

## 2023-10-13 RX ADMIN — ONDANSETRON 4 MG: 2 INJECTION INTRAMUSCULAR; INTRAVENOUS at 15:15

## 2023-10-13 RX ADMIN — MORPHINE SULFATE 4 MG: 4 INJECTION, SOLUTION INTRAMUSCULAR; INTRAVENOUS at 17:24

## 2023-10-13 RX ADMIN — AZITHROMYCIN MONOHYDRATE 500 MG: 500 INJECTION, POWDER, LYOPHILIZED, FOR SOLUTION INTRAVENOUS at 17:24

## 2023-10-13 RX ADMIN — MORPHINE SULFATE 4 MG: 4 INJECTION, SOLUTION INTRAMUSCULAR; INTRAVENOUS at 22:10

## 2023-10-13 ASSESSMENT — PAIN SCALES - GENERAL
PAINLEVEL_OUTOF10: 8
PAINLEVEL_OUTOF10: 8
PAINLEVEL_OUTOF10: 5
PAINLEVEL_OUTOF10: 8
PAINLEVEL_OUTOF10: 8
PAINLEVEL_OUTOF10: 7
PAINLEVEL_OUTOF10: 5
PAINLEVEL_OUTOF10: 0
PAINLEVEL_OUTOF10: 3

## 2023-10-13 ASSESSMENT — PAIN DESCRIPTION - LOCATION
LOCATION: BACK
LOCATION: CHEST
LOCATION: BACK

## 2023-10-13 ASSESSMENT — PAIN DESCRIPTION - ORIENTATION
ORIENTATION: RIGHT
ORIENTATION: LOWER;RIGHT
ORIENTATION: RIGHT;LOWER
ORIENTATION: RIGHT

## 2023-10-13 ASSESSMENT — PAIN DESCRIPTION - DESCRIPTORS
DESCRIPTORS: SORE
DESCRIPTORS: ACHING;DISCOMFORT
DESCRIPTORS: ACHING
DESCRIPTORS: ACHING
DESCRIPTORS: ACHING;DISCOMFORT

## 2023-10-13 NOTE — ED NOTES
Pt oxygen 88% on 2L nasal cannula after receiving pain medication. Pt oxygen titrated to 4L to bring oxygen above 90%.       Ranjana Gold, [address]  10/13/23 4103 01-Jan-2019

## 2023-10-13 NOTE — ED NOTES
Went to radiology via [address] [employer] and 02 @ 2 Roslyn Ball, KAILASH  10/13/23 5010

## 2023-10-13 NOTE — ED NOTES
Mrs. Lizeth Taylor is sitting up in bed with family and friends. AOX4, States that she is feeling better and that her back pain is improved. O2 via NC @ 2 LPM, continuous cardiac monitoring. SBAR report provided to KAILASH Roberto.       Evelin Melgar RN  10/13/23 7737

## 2023-10-13 NOTE — ED TRIAGE NOTES
Patient arrives with c/o right sided lug area pain for a week. Patient reports last night she had a fever and she took some tylenol this morning about 1 hour ago. Temp at home was 101. 4. patient reports she has a \"wedge removed from the right lung\" but this xiang is different. Also reports a cough with clear sputum when laying down. Patient reports she was told by oncologist (for stage 4 renal cancer) that she hs fluid around the right lung. MD that performed her lung procedure 9/6 was Dr Lilliam Middleton at [address] [place] [address].

## 2023-10-13 NOTE — ED NOTES
Patient reports the pain is improved, no nausea at this time. Reports breathing is feeling better, breathing is regular. Patient is ambulated in the room, maintained O2 saturation above 94% on RA with good read. Denies SOB.       Kiara Crowell RN  10/13/23 7412

## 2023-10-14 ENCOUNTER — APPOINTMENT (OUTPATIENT)
Facility: HOSPITAL | Age: 58
DRG: 193 | End: 2023-10-14
Payer: MEDICARE

## 2023-10-14 PROBLEM — J18.9 LINGULAR PNEUMONIA: Status: ACTIVE | Noted: 2023-10-14

## 2023-10-14 LAB
ALBUMIN SERPL-MCNC: 3 G/DL (ref 3.5–5)
ALBUMIN/GLOB SERPL: 0.8 (ref 1.1–2.2)
ALP SERPL-CCNC: 785 U/L (ref 45–117)
ALT SERPL-CCNC: 1213 U/L (ref 12–78)
ANION GAP SERPL CALC-SCNC: 5 MMOL/L (ref 5–15)
APTT PPP: 24.9 SEC (ref 22.1–31)
AST SERPL-CCNC: 1550 U/L (ref 15–37)
BASOPHILS # BLD: 0.1 K/UL (ref 0–0.1)
BASOPHILS NFR BLD: 1 % (ref 0–1)
BILIRUB SERPL-MCNC: 0.7 MG/DL (ref 0.2–1)
BUN SERPL-MCNC: 9 MG/DL (ref 6–20)
BUN/CREAT SERPL: 10 (ref 12–20)
CALCIUM SERPL-MCNC: 9 MG/DL (ref 8.5–10.1)
CHLORIDE SERPL-SCNC: 104 MMOL/L (ref 97–108)
CO2 SERPL-SCNC: 24 MMOL/L (ref 21–32)
CREAT SERPL-MCNC: 0.93 MG/DL (ref 0.55–1.02)
DIFFERENTIAL METHOD BLD: ABNORMAL
EOSINOPHIL # BLD: 0.2 K/UL (ref 0–0.4)
EOSINOPHIL NFR BLD: 2 % (ref 0–7)
ERYTHROCYTE [DISTWIDTH] IN BLOOD BY AUTOMATED COUNT: 14.5 % (ref 11.5–14.5)
EST. AVERAGE GLUCOSE BLD GHB EST-MCNC: 134 MG/DL
FLUAV AG NPH QL IA: NEGATIVE
FLUBV AG NOSE QL IA: NEGATIVE
GLOBULIN SER CALC-MCNC: 3.7 G/DL (ref 2–4)
GLUCOSE BLD STRIP.AUTO-MCNC: 110 MG/DL (ref 65–117)
GLUCOSE BLD STRIP.AUTO-MCNC: 133 MG/DL (ref 65–117)
GLUCOSE BLD STRIP.AUTO-MCNC: 142 MG/DL (ref 65–117)
GLUCOSE BLD STRIP.AUTO-MCNC: 145 MG/DL (ref 65–117)
GLUCOSE BLD STRIP.AUTO-MCNC: 149 MG/DL (ref 65–117)
GLUCOSE SERPL-MCNC: 125 MG/DL (ref 65–100)
HBA1C MFR BLD: 6.3 % (ref 4–5.6)
HCT VFR BLD AUTO: 40.8 % (ref 35–47)
HGB BLD-MCNC: 13.7 G/DL (ref 11.5–16)
IMM GRANULOCYTES # BLD AUTO: 0 K/UL (ref 0–0.04)
IMM GRANULOCYTES NFR BLD AUTO: 0 % (ref 0–0.5)
INR PPP: 1 (ref 0.9–1.1)
LYMPHOCYTES # BLD: 0.8 K/UL (ref 0.8–3.5)
LYMPHOCYTES NFR BLD: 8 % (ref 12–49)
MCH RBC QN AUTO: 28.1 PG (ref 26–34)
MCHC RBC AUTO-ENTMCNC: 33.6 G/DL (ref 30–36.5)
MCV RBC AUTO: 83.8 FL (ref 80–99)
MONOCYTES # BLD: 0.5 K/UL (ref 0–1)
MONOCYTES NFR BLD: 6 % (ref 5–13)
NEUTS SEG # BLD: 8.2 K/UL (ref 1.8–8)
NEUTS SEG NFR BLD: 83 % (ref 32–75)
NRBC # BLD: 0 K/UL (ref 0–0.01)
NRBC BLD-RTO: 0 PER 100 WBC
PLATELET # BLD AUTO: 203 K/UL (ref 150–400)
PMV BLD AUTO: 10.1 FL (ref 8.9–12.9)
POTASSIUM SERPL-SCNC: 4.1 MMOL/L (ref 3.5–5.1)
PROT SERPL-MCNC: 6.7 G/DL (ref 6.4–8.2)
PROTHROMBIN TIME: 10.9 SEC (ref 9–11.1)
RBC # BLD AUTO: 4.87 M/UL (ref 3.8–5.2)
SERVICE CMNT-IMP: ABNORMAL
SERVICE CMNT-IMP: NORMAL
SODIUM SERPL-SCNC: 133 MMOL/L (ref 136–145)
THERAPEUTIC RANGE: NORMAL SECS (ref 58–77)
WBC # BLD AUTO: 9.8 K/UL (ref 3.6–11)

## 2023-10-14 PROCEDURE — 80053 COMPREHEN METABOLIC PANEL: CPT

## 2023-10-14 PROCEDURE — 36415 COLL VENOUS BLD VENIPUNCTURE: CPT

## 2023-10-14 PROCEDURE — 6360000002 HC RX W HCPCS: Performed by: STUDENT IN AN ORGANIZED HEALTH CARE EDUCATION/TRAINING PROGRAM

## 2023-10-14 PROCEDURE — 85025 COMPLETE CBC W/AUTO DIFF WBC: CPT

## 2023-10-14 PROCEDURE — 87804 INFLUENZA ASSAY W/OPTIC: CPT

## 2023-10-14 PROCEDURE — 6370000000 HC RX 637 (ALT 250 FOR IP): Performed by: FAMILY MEDICINE

## 2023-10-14 PROCEDURE — 6360000002 HC RX W HCPCS: Performed by: FAMILY MEDICINE

## 2023-10-14 PROCEDURE — 82962 GLUCOSE BLOOD TEST: CPT

## 2023-10-14 PROCEDURE — 6370000000 HC RX 637 (ALT 250 FOR IP): Performed by: STUDENT IN AN ORGANIZED HEALTH CARE EDUCATION/TRAINING PROGRAM

## 2023-10-14 PROCEDURE — 76700 US EXAM ABDOM COMPLETE: CPT

## 2023-10-14 PROCEDURE — 2580000003 HC RX 258: Performed by: FAMILY MEDICINE

## 2023-10-14 PROCEDURE — 87635 SARS-COV-2 COVID-19 AMP PRB: CPT

## 2023-10-14 PROCEDURE — 83036 HEMOGLOBIN GLYCOSYLATED A1C: CPT

## 2023-10-14 PROCEDURE — 85730 THROMBOPLASTIN TIME PARTIAL: CPT

## 2023-10-14 PROCEDURE — 85610 PROTHROMBIN TIME: CPT

## 2023-10-14 PROCEDURE — 2060000000 HC ICU INTERMEDIATE R&B

## 2023-10-14 RX ORDER — PANTOPRAZOLE SODIUM 40 MG/1
40 TABLET, DELAYED RELEASE ORAL
Status: DISCONTINUED | OUTPATIENT
Start: 2023-10-14 | End: 2023-10-17 | Stop reason: HOSPADM

## 2023-10-14 RX ORDER — ROPINIROLE 1 MG/1
2 TABLET, FILM COATED ORAL 3 TIMES DAILY
Status: DISCONTINUED | OUTPATIENT
Start: 2023-10-14 | End: 2023-10-17 | Stop reason: HOSPADM

## 2023-10-14 RX ORDER — VENLAFAXINE HYDROCHLORIDE 150 MG/1
150 CAPSULE, EXTENDED RELEASE ORAL DAILY
Status: DISCONTINUED | OUTPATIENT
Start: 2023-10-14 | End: 2023-10-17 | Stop reason: HOSPADM

## 2023-10-14 RX ORDER — ONDANSETRON 2 MG/ML
4 INJECTION INTRAMUSCULAR; INTRAVENOUS EVERY 6 HOURS PRN
Status: DISCONTINUED | OUTPATIENT
Start: 2023-10-14 | End: 2023-10-17 | Stop reason: HOSPADM

## 2023-10-14 RX ORDER — INSULIN LISPRO 100 [IU]/ML
0-4 INJECTION, SOLUTION INTRAVENOUS; SUBCUTANEOUS NIGHTLY
Status: DISCONTINUED | OUTPATIENT
Start: 2023-10-14 | End: 2023-10-17 | Stop reason: HOSPADM

## 2023-10-14 RX ORDER — TRAZODONE HYDROCHLORIDE 100 MG/1
100 TABLET ORAL NIGHTLY PRN
Status: DISCONTINUED | OUTPATIENT
Start: 2023-10-14 | End: 2023-10-17 | Stop reason: HOSPADM

## 2023-10-14 RX ORDER — DILTIAZEM HYDROCHLORIDE 180 MG/1
360 CAPSULE, COATED, EXTENDED RELEASE ORAL DAILY
Status: DISCONTINUED | OUTPATIENT
Start: 2023-10-15 | End: 2023-10-17 | Stop reason: HOSPADM

## 2023-10-14 RX ORDER — DEXTROSE MONOHYDRATE 100 MG/ML
INJECTION, SOLUTION INTRAVENOUS CONTINUOUS PRN
Status: DISCONTINUED | OUTPATIENT
Start: 2023-10-14 | End: 2023-10-17 | Stop reason: HOSPADM

## 2023-10-14 RX ORDER — VITAMIN B COMPLEX
1000 TABLET ORAL DAILY
Status: DISCONTINUED | OUTPATIENT
Start: 2023-10-14 | End: 2023-10-17 | Stop reason: HOSPADM

## 2023-10-14 RX ORDER — ONDANSETRON 4 MG/1
4 TABLET, ORALLY DISINTEGRATING ORAL EVERY 8 HOURS PRN
Status: DISCONTINUED | OUTPATIENT
Start: 2023-10-14 | End: 2023-10-17 | Stop reason: HOSPADM

## 2023-10-14 RX ORDER — ENOXAPARIN SODIUM 100 MG/ML
40 INJECTION SUBCUTANEOUS DAILY
Status: DISCONTINUED | OUTPATIENT
Start: 2023-10-14 | End: 2023-10-17 | Stop reason: HOSPADM

## 2023-10-14 RX ORDER — SODIUM CHLORIDE 9 MG/ML
INJECTION, SOLUTION INTRAVENOUS PRN
Status: DISCONTINUED | OUTPATIENT
Start: 2023-10-14 | End: 2023-10-17 | Stop reason: HOSPADM

## 2023-10-14 RX ORDER — ACETAMINOPHEN 650 MG/1
650 SUPPOSITORY RECTAL EVERY 6 HOURS PRN
Status: DISCONTINUED | OUTPATIENT
Start: 2023-10-14 | End: 2023-10-17 | Stop reason: HOSPADM

## 2023-10-14 RX ORDER — IPRATROPIUM BROMIDE AND ALBUTEROL SULFATE 2.5; .5 MG/3ML; MG/3ML
1 SOLUTION RESPIRATORY (INHALATION) EVERY 4 HOURS PRN
Status: DISCONTINUED | OUTPATIENT
Start: 2023-10-14 | End: 2023-10-17 | Stop reason: HOSPADM

## 2023-10-14 RX ORDER — ACETAMINOPHEN 325 MG/1
650 TABLET ORAL EVERY 6 HOURS PRN
Status: DISCONTINUED | OUTPATIENT
Start: 2023-10-14 | End: 2023-10-17 | Stop reason: HOSPADM

## 2023-10-14 RX ORDER — INSULIN LISPRO 100 [IU]/ML
0-8 INJECTION, SOLUTION INTRAVENOUS; SUBCUTANEOUS
Status: DISCONTINUED | OUTPATIENT
Start: 2023-10-14 | End: 2023-10-17 | Stop reason: HOSPADM

## 2023-10-14 RX ORDER — ATORVASTATIN CALCIUM 40 MG/1
40 TABLET, FILM COATED ORAL DAILY
Status: DISCONTINUED | OUTPATIENT
Start: 2023-10-14 | End: 2023-10-17 | Stop reason: HOSPADM

## 2023-10-14 RX ORDER — POLYETHYLENE GLYCOL 3350 17 G/17G
17 POWDER, FOR SOLUTION ORAL DAILY PRN
Status: DISCONTINUED | OUTPATIENT
Start: 2023-10-14 | End: 2023-10-17 | Stop reason: HOSPADM

## 2023-10-14 RX ORDER — VALSARTAN 160 MG/1
320 TABLET ORAL
Status: DISCONTINUED | OUTPATIENT
Start: 2023-10-14 | End: 2023-10-17 | Stop reason: HOSPADM

## 2023-10-14 RX ORDER — DILTIAZEM HYDROCHLORIDE 180 MG/1
180 CAPSULE, COATED, EXTENDED RELEASE ORAL DAILY
Status: DISCONTINUED | OUTPATIENT
Start: 2023-10-14 | End: 2023-10-14

## 2023-10-14 RX ORDER — SODIUM CHLORIDE 0.9 % (FLUSH) 0.9 %
5-40 SYRINGE (ML) INJECTION EVERY 12 HOURS SCHEDULED
Status: DISCONTINUED | OUTPATIENT
Start: 2023-10-14 | End: 2023-10-17 | Stop reason: HOSPADM

## 2023-10-14 RX ORDER — SODIUM CHLORIDE 0.9 % (FLUSH) 0.9 %
5-40 SYRINGE (ML) INJECTION PRN
Status: DISCONTINUED | OUTPATIENT
Start: 2023-10-14 | End: 2023-10-17 | Stop reason: HOSPADM

## 2023-10-14 RX ORDER — FUROSEMIDE 20 MG/1
20 TABLET ORAL DAILY
Status: DISCONTINUED | OUTPATIENT
Start: 2023-10-14 | End: 2023-10-17 | Stop reason: HOSPADM

## 2023-10-14 RX ADMIN — PANTOPRAZOLE SODIUM 40 MG: 40 TABLET, DELAYED RELEASE ORAL at 17:02

## 2023-10-14 RX ADMIN — MORPHINE SULFATE 4 MG: 4 INJECTION, SOLUTION INTRAMUSCULAR; INTRAVENOUS at 21:57

## 2023-10-14 RX ADMIN — SODIUM CHLORIDE, PRESERVATIVE FREE 10 ML: 5 INJECTION INTRAVENOUS at 10:45

## 2023-10-14 RX ADMIN — ATORVASTATIN CALCIUM 40 MG: 40 TABLET, FILM COATED ORAL at 10:43

## 2023-10-14 RX ADMIN — Medication 1000 UNITS: at 10:44

## 2023-10-14 RX ADMIN — VALSARTAN 320 MG: 160 TABLET, FILM COATED ORAL at 22:58

## 2023-10-14 RX ADMIN — DILTIAZEM HYDROCHLORIDE 180 MG: 180 CAPSULE, COATED, EXTENDED RELEASE ORAL at 10:44

## 2023-10-14 RX ADMIN — ROPINIROLE HYDROCHLORIDE 2 MG: 1 TABLET, FILM COATED ORAL at 03:21

## 2023-10-14 RX ADMIN — LORAZEPAM 0.5 MG: 2 INJECTION INTRAMUSCULAR at 17:03

## 2023-10-14 RX ADMIN — MORPHINE SULFATE 4 MG: 4 INJECTION, SOLUTION INTRAMUSCULAR; INTRAVENOUS at 03:24

## 2023-10-14 RX ADMIN — VENLAFAXINE HYDROCHLORIDE 150 MG: 150 CAPSULE, EXTENDED RELEASE ORAL at 10:44

## 2023-10-14 RX ADMIN — MORPHINE SULFATE 4 MG: 4 INJECTION, SOLUTION INTRAMUSCULAR; INTRAVENOUS at 17:03

## 2023-10-14 RX ADMIN — PANTOPRAZOLE SODIUM 40 MG: 40 TABLET, DELAYED RELEASE ORAL at 06:49

## 2023-10-14 RX ADMIN — FUROSEMIDE 20 MG: 20 TABLET ORAL at 10:44

## 2023-10-14 RX ADMIN — ONDANSETRON 4 MG: 2 INJECTION INTRAMUSCULAR; INTRAVENOUS at 04:43

## 2023-10-14 RX ADMIN — MORPHINE SULFATE 4 MG: 4 INJECTION, SOLUTION INTRAMUSCULAR; INTRAVENOUS at 10:45

## 2023-10-14 RX ADMIN — VALSARTAN 320 MG: 160 TABLET, FILM COATED ORAL at 03:20

## 2023-10-14 RX ADMIN — SODIUM CHLORIDE, PRESERVATIVE FREE 10 ML: 5 INJECTION INTRAVENOUS at 22:48

## 2023-10-14 RX ADMIN — ENOXAPARIN SODIUM 40 MG: 100 INJECTION SUBCUTANEOUS at 10:44

## 2023-10-14 RX ADMIN — ONDANSETRON 4 MG: 2 INJECTION INTRAMUSCULAR; INTRAVENOUS at 11:01

## 2023-10-14 RX ADMIN — ROPINIROLE HYDROCHLORIDE 2 MG: 1 TABLET, FILM COATED ORAL at 10:44

## 2023-10-14 RX ADMIN — ROPINIROLE HYDROCHLORIDE 2 MG: 1 TABLET, FILM COATED ORAL at 22:46

## 2023-10-14 ASSESSMENT — PAIN DESCRIPTION - ORIENTATION: ORIENTATION: LOWER;RIGHT

## 2023-10-14 ASSESSMENT — PAIN DESCRIPTION - DESCRIPTORS: DESCRIPTORS: ACHING;DISCOMFORT

## 2023-10-14 ASSESSMENT — PAIN SCALES - GENERAL
PAINLEVEL_OUTOF10: 8
PAINLEVEL_OUTOF10: 0

## 2023-10-14 ASSESSMENT — PAIN DESCRIPTION - LOCATION: LOCATION: BACK;HEAD

## 2023-10-14 NOTE — PLAN OF CARE
Problem: Discharge Planning  Goal: Discharge to home or other facility with appropriate resources  10/13/2023 2234 by Georgina Noble RN  Outcome: Progressing  10/13/2023 2234 by Georgina Noble RN  Outcome: Progressing     Problem: Safety - Adult  Goal: Free from fall injury  10/13/2023 2234 by Georgina Noble RN  Note: Patient oriented to room, call bell within reach, pt educated on safe mobility and call light use for assistance ambulating. 10/13/2023 2234 by Georgina Noble RN  Outcome: Progressing     Problem: Pain  Goal: Verbalizes/displays adequate comfort level or baseline comfort level  10/13/2023 2234 by Georgina Noble RN  Outcome: Progressing  Note: Patient complaining of right lower back pain, being relieved with PRN morphine.    10/13/2023 2234 by Georgina Noble RN  Outcome: Progressing

## 2023-10-14 NOTE — H&P
[facility] where she seen for admission to the hospitalist service. REVIEW OF SYSTEMS:  Pertinent items are noted in the History of Present Illness. Past Medical History:   Diagnosis Date    Arthritis     Cancer ([address])     IN KIDNEY REMOVED RIGHT KIDNEY     Chronic obstructive pulmonary disease ([address])     BEGINNING STAGES     Chronic pain 1993    Diabetes ([address])     Elevated WBC count 2002 to present    GERD (gastroesophageal reflux disease)     Hypertension     Kidney stones 2020    Leg swelling 07/2020    Doppler negative    Liver disease 2021    Nausea & vomiting     Panic attacks     Skin cancer     BASAL CELL ON CHEST    Sleep apnea     Does not use CPAP- raises the head of bed up    Spondylolisthesis of lumbar region     Stroke [facility]) 2020    TIA     Tachycardia       Past Surgical History:   Procedure Laterality Date    ABLATION OF DYSRHYTHMIC FOCUS  2013    BREAST BIOPSY Bilateral     benign    CHOLECYSTECTOMY  1998    COLONOSCOPY N/A 03/01/2019    COLONOSCOPY performed by Frandy Newman MD at [address] [place] [address]  05/13/2021    Robotic repair of incisional hernia with mesh,Robotic repair of adhesions convert to open incisioal hernia repair with mesh by Dr. Henri Brito.     HYSTERECTOMY (CERVIX STATUS UNKNOWN)  2008    KNEE ARTHROSCOPY Bilateral 2012    SANDRO STEREO BREAST BX ADD LESION LEFT Left 10/29/2018    SANDRO STEREO BREAST BX ADD LESION LEFT [facility] HISTORICAL    NEPHROSTOMY Right     NEUROLOGICAL SURGERY      lumbar nerve block    OTHER SURGICAL HISTORY Right 09/06/2023    jennifer wedge    AK UNLISTED PROCEDURE CARDIAC SURGERY  2013    Ablation    SEPTOPLASTY  1993    TOTAL KNEE ARTHROPLASTY Left 2012    TOTAL KNEE ARTHROPLASTY Right 08/18/2016    TUBAL LIGATION  1989    UROLOGICAL SURGERY Right 07/2020    nephrectomy    US ASP BREAST CYST LEFT Left 10/26/2018    US BREAST CYST ASPIRATION LEFT [facility] AM[facility] HISTORICAL    WISDOM TOOTH EXTRACTION      X2

## 2023-10-15 ENCOUNTER — APPOINTMENT (OUTPATIENT)
Facility: HOSPITAL | Age: 58
DRG: 193 | End: 2023-10-15
Payer: MEDICARE

## 2023-10-15 LAB
ALBUMIN SERPL-MCNC: 3.1 G/DL (ref 3.5–5)
ALBUMIN/GLOB SERPL: 0.9 (ref 1.1–2.2)
ALP SERPL-CCNC: 857 U/L (ref 45–117)
ALT SERPL-CCNC: 1371 U/L (ref 12–78)
AMMONIA PLAS-SCNC: 34 UMOL/L
ANION GAP SERPL CALC-SCNC: 8 MMOL/L (ref 5–15)
APAP SERPL-MCNC: <2 UG/ML (ref 10–30)
AST SERPL-CCNC: 896 U/L (ref 15–37)
BASOPHILS # BLD: 0.1 K/UL (ref 0–0.1)
BASOPHILS NFR BLD: 2 % (ref 0–1)
BILIRUB SERPL-MCNC: 0.4 MG/DL (ref 0.2–1)
BUN SERPL-MCNC: 10 MG/DL (ref 6–20)
BUN/CREAT SERPL: 10 (ref 12–20)
CALCIUM SERPL-MCNC: 9.3 MG/DL (ref 8.5–10.1)
CHLORIDE SERPL-SCNC: 104 MMOL/L (ref 97–108)
CO2 SERPL-SCNC: 24 MMOL/L (ref 21–32)
CREAT SERPL-MCNC: 1.03 MG/DL (ref 0.55–1.02)
DIFFERENTIAL METHOD BLD: ABNORMAL
EOSINOPHIL # BLD: 0.4 K/UL (ref 0–0.4)
EOSINOPHIL NFR BLD: 5 % (ref 0–7)
ERYTHROCYTE [DISTWIDTH] IN BLOOD BY AUTOMATED COUNT: 14.2 % (ref 11.5–14.5)
GLOBULIN SER CALC-MCNC: 3.3 G/DL (ref 2–4)
GLUCOSE BLD STRIP.AUTO-MCNC: 117 MG/DL (ref 65–117)
GLUCOSE BLD STRIP.AUTO-MCNC: 162 MG/DL (ref 65–117)
GLUCOSE BLD STRIP.AUTO-MCNC: 163 MG/DL (ref 65–117)
GLUCOSE BLD STRIP.AUTO-MCNC: 185 MG/DL (ref 65–117)
GLUCOSE SERPL-MCNC: 157 MG/DL (ref 65–100)
HCT VFR BLD AUTO: 44.2 % (ref 35–47)
HGB BLD-MCNC: 14.6 G/DL (ref 11.5–16)
IMM GRANULOCYTES # BLD AUTO: 0 K/UL (ref 0–0.04)
IMM GRANULOCYTES NFR BLD AUTO: 0 % (ref 0–0.5)
INR PPP: 1 (ref 0.9–1.1)
LYMPHOCYTES # BLD: 0.9 K/UL (ref 0.8–3.5)
LYMPHOCYTES NFR BLD: 12 % (ref 12–49)
MCH RBC QN AUTO: 27.7 PG (ref 26–34)
MCHC RBC AUTO-ENTMCNC: 33 G/DL (ref 30–36.5)
MCV RBC AUTO: 83.9 FL (ref 80–99)
MONOCYTES # BLD: 0.4 K/UL (ref 0–1)
MONOCYTES NFR BLD: 6 % (ref 5–13)
NEUTS SEG # BLD: 5.3 K/UL (ref 1.8–8)
NEUTS SEG NFR BLD: 75 % (ref 32–75)
NRBC # BLD: 0 K/UL (ref 0–0.01)
NRBC BLD-RTO: 0 PER 100 WBC
PLATELET # BLD AUTO: 197 K/UL (ref 150–400)
PMV BLD AUTO: 10.2 FL (ref 8.9–12.9)
POTASSIUM SERPL-SCNC: 3.6 MMOL/L (ref 3.5–5.1)
PROT SERPL-MCNC: 6.4 G/DL (ref 6.4–8.2)
PROTHROMBIN TIME: 10.8 SEC (ref 9–11.1)
RBC # BLD AUTO: 5.27 M/UL (ref 3.8–5.2)
SARS-COV-2 RNA RESP QL NAA+PROBE: NOT DETECTED
SERVICE CMNT-IMP: ABNORMAL
SERVICE CMNT-IMP: NORMAL
SODIUM SERPL-SCNC: 136 MMOL/L (ref 136–145)
SOURCE: NORMAL
WBC # BLD AUTO: 7.1 K/UL (ref 3.6–11)

## 2023-10-15 PROCEDURE — 2580000003 HC RX 258: Performed by: FAMILY MEDICINE

## 2023-10-15 PROCEDURE — 85025 COMPLETE CBC W/AUTO DIFF WBC: CPT

## 2023-10-15 PROCEDURE — 6360000002 HC RX W HCPCS: Performed by: FAMILY MEDICINE

## 2023-10-15 PROCEDURE — 80143 DRUG ASSAY ACETAMINOPHEN: CPT

## 2023-10-15 PROCEDURE — 85610 PROTHROMBIN TIME: CPT

## 2023-10-15 PROCEDURE — 99222 1ST HOSP IP/OBS MODERATE 55: CPT | Performed by: INTERNAL MEDICINE

## 2023-10-15 PROCEDURE — 2060000000 HC ICU INTERMEDIATE R&B

## 2023-10-15 PROCEDURE — 6370000000 HC RX 637 (ALT 250 FOR IP): Performed by: NURSE PRACTITIONER

## 2023-10-15 PROCEDURE — 6360000002 HC RX W HCPCS: Performed by: STUDENT IN AN ORGANIZED HEALTH CARE EDUCATION/TRAINING PROGRAM

## 2023-10-15 PROCEDURE — 6360000004 HC RX CONTRAST MEDICATION: Performed by: INTERNAL MEDICINE

## 2023-10-15 PROCEDURE — 2709999900 MRI ABDOMEN W WO CONTRAST

## 2023-10-15 PROCEDURE — 82962 GLUCOSE BLOOD TEST: CPT

## 2023-10-15 PROCEDURE — 2580000003 HC RX 258: Performed by: INTERNAL MEDICINE

## 2023-10-15 PROCEDURE — 80053 COMPREHEN METABOLIC PANEL: CPT

## 2023-10-15 PROCEDURE — 6370000000 HC RX 637 (ALT 250 FOR IP): Performed by: STUDENT IN AN ORGANIZED HEALTH CARE EDUCATION/TRAINING PROGRAM

## 2023-10-15 PROCEDURE — 6370000000 HC RX 637 (ALT 250 FOR IP): Performed by: FAMILY MEDICINE

## 2023-10-15 PROCEDURE — 36415 COLL VENOUS BLD VENIPUNCTURE: CPT

## 2023-10-15 PROCEDURE — A9579 GAD-BASE MR CONTRAST NOS,1ML: HCPCS | Performed by: INTERNAL MEDICINE

## 2023-10-15 PROCEDURE — 82140 ASSAY OF AMMONIA: CPT

## 2023-10-15 RX ORDER — DIPHENHYDRAMINE HCL 25 MG
25 CAPSULE ORAL EVERY 6 HOURS PRN
Status: DISCONTINUED | OUTPATIENT
Start: 2023-10-15 | End: 2023-10-17 | Stop reason: HOSPADM

## 2023-10-15 RX ORDER — 0.9 % SODIUM CHLORIDE 0.9 %
500 INTRAVENOUS SOLUTION INTRAVENOUS ONCE
Status: COMPLETED | OUTPATIENT
Start: 2023-10-16 | End: 2023-10-16

## 2023-10-15 RX ADMIN — SODIUM CHLORIDE, PRESERVATIVE FREE 10 ML: 5 INJECTION INTRAVENOUS at 10:01

## 2023-10-15 RX ADMIN — MORPHINE SULFATE 4 MG: 4 INJECTION, SOLUTION INTRAMUSCULAR; INTRAVENOUS at 21:06

## 2023-10-15 RX ADMIN — FUROSEMIDE 20 MG: 20 TABLET ORAL at 10:02

## 2023-10-15 RX ADMIN — ENOXAPARIN SODIUM 40 MG: 100 INJECTION SUBCUTANEOUS at 09:58

## 2023-10-15 RX ADMIN — MORPHINE SULFATE 4 MG: 4 INJECTION, SOLUTION INTRAMUSCULAR; INTRAVENOUS at 10:00

## 2023-10-15 RX ADMIN — VALSARTAN 320 MG: 160 TABLET, FILM COATED ORAL at 20:47

## 2023-10-15 RX ADMIN — MORPHINE SULFATE 4 MG: 4 INJECTION, SOLUTION INTRAMUSCULAR; INTRAVENOUS at 15:05

## 2023-10-15 RX ADMIN — PANTOPRAZOLE SODIUM 40 MG: 40 TABLET, DELAYED RELEASE ORAL at 07:46

## 2023-10-15 RX ADMIN — SODIUM CHLORIDE 100 ML: 900 INJECTION, SOLUTION INTRAVENOUS at 23:40

## 2023-10-15 RX ADMIN — SODIUM CHLORIDE, PRESERVATIVE FREE 20 ML: 5 INJECTION INTRAVENOUS at 19:36

## 2023-10-15 RX ADMIN — LORAZEPAM 0.5 MG: 2 INJECTION INTRAMUSCULAR at 22:57

## 2023-10-15 RX ADMIN — DILTIAZEM HYDROCHLORIDE 360 MG: 180 CAPSULE, COATED, EXTENDED RELEASE ORAL at 10:02

## 2023-10-15 RX ADMIN — ROPINIROLE HYDROCHLORIDE 2 MG: 1 TABLET, FILM COATED ORAL at 08:18

## 2023-10-15 RX ADMIN — GADOTERIDOL 20 ML: 279.3 INJECTION, SOLUTION INTRAVENOUS at 23:40

## 2023-10-15 RX ADMIN — ATORVASTATIN CALCIUM 40 MG: 40 TABLET, FILM COATED ORAL at 10:02

## 2023-10-15 RX ADMIN — LORAZEPAM 0.5 MG: 2 INJECTION INTRAMUSCULAR at 10:01

## 2023-10-15 RX ADMIN — VENLAFAXINE HYDROCHLORIDE 150 MG: 150 CAPSULE, EXTENDED RELEASE ORAL at 10:02

## 2023-10-15 RX ADMIN — TRAZODONE HYDROCHLORIDE 100 MG: 100 TABLET ORAL at 20:47

## 2023-10-15 RX ADMIN — DIPHENHYDRAMINE HYDROCHLORIDE 25 MG: 25 CAPSULE ORAL at 20:47

## 2023-10-15 RX ADMIN — ONDANSETRON 4 MG: 2 INJECTION INTRAMUSCULAR; INTRAVENOUS at 15:09

## 2023-10-15 RX ADMIN — Medication 1000 UNITS: at 10:02

## 2023-10-15 RX ADMIN — ROPINIROLE HYDROCHLORIDE 2 MG: 1 TABLET, FILM COATED ORAL at 20:47

## 2023-10-15 RX ADMIN — PANTOPRAZOLE SODIUM 40 MG: 40 TABLET, DELAYED RELEASE ORAL at 15:05

## 2023-10-15 RX ADMIN — ROPINIROLE HYDROCHLORIDE 2 MG: 1 TABLET, FILM COATED ORAL at 12:59

## 2023-10-15 ASSESSMENT — PAIN DESCRIPTION - DESCRIPTORS: DESCRIPTORS: ACHING

## 2023-10-15 ASSESSMENT — PAIN SCALES - GENERAL: PAINLEVEL_OUTOF10: 7

## 2023-10-15 ASSESSMENT — PAIN DESCRIPTION - LOCATION: LOCATION: BACK

## 2023-10-15 ASSESSMENT — PAIN DESCRIPTION - ORIENTATION: ORIENTATION: LOWER;POSTERIOR

## 2023-10-15 NOTE — ACP (ADVANCE CARE PLANNING)
machine). Resuscitation:  In the event the patient's heart stopped as a result of an underlying serious health condition, the patient communicates a preference for      a natural death (no CPR). Outcomes/Plan:  New advance directive completed. Returned original document(s) to patient, as well as copies for distribution to appointed agents  Copy of advance directive given to staff to scan into medical record.   Teach Back Method used to verify the patient's and/or Healthcare Decision Maker's understanding of key information in the advance directive documents    Electronically signed by Antony Rodriguez [facility] on 10/15/2023 at 1:40 PM

## 2023-10-16 ENCOUNTER — HOSPITAL ENCOUNTER (INPATIENT)
Facility: HOSPITAL | Age: 58
Discharge: HOME | DRG: 193 | End: 2023-10-19
Payer: MEDICARE

## 2023-10-16 ENCOUNTER — APPOINTMENT (OUTPATIENT)
Facility: HOSPITAL | Age: 58
DRG: 193 | End: 2023-10-16
Payer: MEDICARE

## 2023-10-16 LAB
ALBUMIN SERPL-MCNC: 2.8 G/DL (ref 3.5–5)
ALBUMIN/GLOB SERPL: 0.8 (ref 1.1–2.2)
ALP SERPL-CCNC: 666 U/L (ref 45–117)
ALT SERPL-CCNC: 878 U/L (ref 12–78)
ANION GAP SERPL CALC-SCNC: 1 MMOL/L (ref 5–15)
APPEARANCE FLD: ABNORMAL
AST SERPL-CCNC: 326 U/L (ref 15–37)
BASOPHILS # BLD: 0.1 K/UL (ref 0–0.1)
BASOPHILS NFR BLD: 1 % (ref 0–1)
BILIRUB SERPL-MCNC: 0.3 MG/DL (ref 0.2–1)
BODY FLD TYPE: NORMAL
BUN SERPL-MCNC: 14 MG/DL (ref 6–20)
BUN/CREAT SERPL: 12 (ref 12–20)
CALCIUM SERPL-MCNC: 9.5 MG/DL (ref 8.5–10.1)
CHLORIDE SERPL-SCNC: 105 MMOL/L (ref 97–108)
CO2 SERPL-SCNC: 27 MMOL/L (ref 21–32)
COLOR FLD: YELLOW
CREAT SERPL-MCNC: 1.13 MG/DL (ref 0.55–1.02)
DIFFERENTIAL METHOD BLD: ABNORMAL
EOSINOPHIL # BLD: 0.3 K/UL (ref 0–0.4)
EOSINOPHIL NFR BLD: 4 % (ref 0–7)
EOSINOPHIL NFR FLD MANUAL: 1 %
ERYTHROCYTE [DISTWIDTH] IN BLOOD BY AUTOMATED COUNT: 14.3 % (ref 11.5–14.5)
GLOBULIN SER CALC-MCNC: 3.3 G/DL (ref 2–4)
GLUCOSE BLD STRIP.AUTO-MCNC: 143 MG/DL (ref 65–117)
GLUCOSE BLD STRIP.AUTO-MCNC: 155 MG/DL (ref 65–117)
GLUCOSE BLD STRIP.AUTO-MCNC: 165 MG/DL (ref 65–117)
GLUCOSE BLD STRIP.AUTO-MCNC: 215 MG/DL (ref 65–117)
GLUCOSE FLD-MCNC: 146 MG/DL
GLUCOSE SERPL-MCNC: 155 MG/DL (ref 65–100)
HCT VFR BLD AUTO: 43.8 % (ref 35–47)
HGB BLD-MCNC: 14 G/DL (ref 11.5–16)
IMM GRANULOCYTES # BLD AUTO: 0 K/UL (ref 0–0.04)
IMM GRANULOCYTES NFR BLD AUTO: 1 % (ref 0–0.5)
LDH FLD L TO P-CCNC: 201 U/L
LYMPHOCYTES # BLD: 1 K/UL (ref 0.8–3.5)
LYMPHOCYTES NFR BLD: 14 % (ref 12–49)
LYMPHOCYTES NFR FLD: 83 %
MCH RBC QN AUTO: 27.8 PG (ref 26–34)
MCHC RBC AUTO-ENTMCNC: 32 G/DL (ref 30–36.5)
MCV RBC AUTO: 86.9 FL (ref 80–99)
MONOCYTES # BLD: 0.6 K/UL (ref 0–1)
MONOCYTES NFR BLD: 7 % (ref 5–13)
MONOS+MACROS NFR FLD: 11 %
NEUTROPHILS NFR FLD: 5 %
NEUTS SEG # BLD: 5.7 K/UL (ref 1.8–8)
NEUTS SEG NFR BLD: 73 % (ref 32–75)
NRBC # BLD: 0 K/UL (ref 0–0.01)
NRBC BLD-RTO: 0 PER 100 WBC
NUC CELL # FLD: 9810 /CU MM
PH FLD: 7.6
PLATELET # BLD AUTO: 173 K/UL (ref 150–400)
PMV BLD AUTO: 9.5 FL (ref 8.9–12.9)
POTASSIUM SERPL-SCNC: 3.7 MMOL/L (ref 3.5–5.1)
PROT FLD-MCNC: 4.7 G/DL
PROT SERPL-MCNC: 6.1 G/DL (ref 6.4–8.2)
RBC # BLD AUTO: 5.04 M/UL (ref 3.8–5.2)
RBC # FLD: >100 /CU MM
SERVICE CMNT-IMP: ABNORMAL
SODIUM SERPL-SCNC: 133 MMOL/L (ref 136–145)
SPECIMEN SOURCE FLD: ABNORMAL
SPECIMEN SOURCE FLD: NORMAL
WBC # BLD AUTO: 7.7 K/UL (ref 3.6–11)

## 2023-10-16 PROCEDURE — 83615 LACTATE (LD) (LDH) ENZYME: CPT

## 2023-10-16 PROCEDURE — 86037 ANCA TITER EACH ANTIBODY: CPT

## 2023-10-16 PROCEDURE — 71045 X-RAY EXAM CHEST 1 VIEW: CPT

## 2023-10-16 PROCEDURE — 87070 CULTURE OTHR SPECIMN AEROBIC: CPT

## 2023-10-16 PROCEDURE — 6360000002 HC RX W HCPCS: Performed by: STUDENT IN AN ORGANIZED HEALTH CARE EDUCATION/TRAINING PROGRAM

## 2023-10-16 PROCEDURE — 89050 BODY FLUID CELL COUNT: CPT

## 2023-10-16 PROCEDURE — 6370000000 HC RX 637 (ALT 250 FOR IP): Performed by: STUDENT IN AN ORGANIZED HEALTH CARE EDUCATION/TRAINING PROGRAM

## 2023-10-16 PROCEDURE — 82164 ANGIOTENSIN I ENZYME TEST: CPT

## 2023-10-16 PROCEDURE — 87015 SPECIMEN INFECT AGNT CONCNTJ: CPT

## 2023-10-16 PROCEDURE — 6370000000 HC RX 637 (ALT 250 FOR IP): Performed by: FAMILY MEDICINE

## 2023-10-16 PROCEDURE — 2580000003 HC RX 258: Performed by: FAMILY MEDICINE

## 2023-10-16 PROCEDURE — 82962 GLUCOSE BLOOD TEST: CPT

## 2023-10-16 PROCEDURE — 86381 MITOCHONDRIAL ANTIBODY EACH: CPT

## 2023-10-16 PROCEDURE — 84157 ASSAY OF PROTEIN OTHER: CPT

## 2023-10-16 PROCEDURE — 2060000000 HC ICU INTERMEDIATE R&B

## 2023-10-16 PROCEDURE — 32555 ASPIRATE PLEURA W/ IMAGING: CPT

## 2023-10-16 PROCEDURE — 88341 IMHCHEM/IMCYTCHM EA ADD ANTB: CPT

## 2023-10-16 PROCEDURE — 88305 TISSUE EXAM BY PATHOLOGIST: CPT

## 2023-10-16 PROCEDURE — 36415 COLL VENOUS BLD VENIPUNCTURE: CPT

## 2023-10-16 PROCEDURE — 80053 COMPREHEN METABOLIC PANEL: CPT

## 2023-10-16 PROCEDURE — 6360000002 HC RX W HCPCS: Performed by: FAMILY MEDICINE

## 2023-10-16 PROCEDURE — 88112 CYTOPATH CELL ENHANCE TECH: CPT

## 2023-10-16 PROCEDURE — 83986 ASSAY PH BODY FLUID NOS: CPT

## 2023-10-16 PROCEDURE — 85025 COMPLETE CBC W/AUTO DIFF WBC: CPT

## 2023-10-16 PROCEDURE — 88342 IMHCHEM/IMCYTCHM 1ST ANTB: CPT

## 2023-10-16 PROCEDURE — 87205 SMEAR GRAM STAIN: CPT

## 2023-10-16 PROCEDURE — 82945 GLUCOSE OTHER FLUID: CPT

## 2023-10-16 RX ADMIN — INSULIN LISPRO 2 UNITS: 100 INJECTION, SOLUTION INTRAVENOUS; SUBCUTANEOUS at 12:37

## 2023-10-16 RX ADMIN — DILTIAZEM HYDROCHLORIDE 360 MG: 180 CAPSULE, COATED, EXTENDED RELEASE ORAL at 08:39

## 2023-10-16 RX ADMIN — MORPHINE SULFATE 4 MG: 4 INJECTION, SOLUTION INTRAMUSCULAR; INTRAVENOUS at 15:06

## 2023-10-16 RX ADMIN — TRAZODONE HYDROCHLORIDE 100 MG: 100 TABLET ORAL at 19:11

## 2023-10-16 RX ADMIN — PANTOPRAZOLE SODIUM 40 MG: 40 TABLET, DELAYED RELEASE ORAL at 06:15

## 2023-10-16 RX ADMIN — ROPINIROLE HYDROCHLORIDE 2 MG: 1 TABLET, FILM COATED ORAL at 21:32

## 2023-10-16 RX ADMIN — PANTOPRAZOLE SODIUM 40 MG: 40 TABLET, DELAYED RELEASE ORAL at 17:04

## 2023-10-16 RX ADMIN — MORPHINE SULFATE 4 MG: 4 INJECTION, SOLUTION INTRAMUSCULAR; INTRAVENOUS at 21:41

## 2023-10-16 RX ADMIN — LORAZEPAM 0.5 MG: 2 INJECTION INTRAMUSCULAR at 15:07

## 2023-10-16 RX ADMIN — ROPINIROLE HYDROCHLORIDE 2 MG: 1 TABLET, FILM COATED ORAL at 04:33

## 2023-10-16 RX ADMIN — ONDANSETRON 4 MG: 2 INJECTION INTRAMUSCULAR; INTRAVENOUS at 09:30

## 2023-10-16 RX ADMIN — SODIUM CHLORIDE, PRESERVATIVE FREE 10 ML: 5 INJECTION INTRAVENOUS at 21:33

## 2023-10-16 RX ADMIN — VALSARTAN 320 MG: 160 TABLET, FILM COATED ORAL at 21:45

## 2023-10-16 RX ADMIN — ROPINIROLE HYDROCHLORIDE 2 MG: 1 TABLET, FILM COATED ORAL at 12:36

## 2023-10-16 RX ADMIN — VENLAFAXINE HYDROCHLORIDE 150 MG: 150 CAPSULE, EXTENDED RELEASE ORAL at 08:39

## 2023-10-16 RX ADMIN — LORAZEPAM 0.5 MG: 2 INJECTION INTRAMUSCULAR at 21:41

## 2023-10-16 RX ADMIN — MORPHINE SULFATE 4 MG: 4 INJECTION, SOLUTION INTRAMUSCULAR; INTRAVENOUS at 09:45

## 2023-10-16 RX ADMIN — ATORVASTATIN CALCIUM 40 MG: 40 TABLET, FILM COATED ORAL at 08:39

## 2023-10-16 RX ADMIN — SODIUM CHLORIDE, PRESERVATIVE FREE 10 ML: 5 INJECTION INTRAVENOUS at 08:40

## 2023-10-16 RX ADMIN — ENOXAPARIN SODIUM 40 MG: 100 INJECTION SUBCUTANEOUS at 09:29

## 2023-10-16 RX ADMIN — Medication 1000 UNITS: at 08:40

## 2023-10-16 RX ADMIN — FUROSEMIDE 20 MG: 20 TABLET ORAL at 08:39

## 2023-10-16 ASSESSMENT — PAIN DESCRIPTION - LOCATION: LOCATION: BACK

## 2023-10-16 ASSESSMENT — PAIN SCALES - GENERAL
PAINLEVEL_OUTOF10: 4
PAINLEVEL_OUTOF10: 0
PAINLEVEL_OUTOF10: 10

## 2023-10-16 ASSESSMENT — PAIN DESCRIPTION - ORIENTATION: ORIENTATION: LOWER

## 2023-10-16 ASSESSMENT — PAIN DESCRIPTION - DESCRIPTORS: DESCRIPTORS: ACHING

## 2023-10-16 ASSESSMENT — PAIN - FUNCTIONAL ASSESSMENT: PAIN_FUNCTIONAL_ASSESSMENT: PREVENTS OR INTERFERES SOME ACTIVE ACTIVITIES AND ADLS

## 2023-10-17 ENCOUNTER — TELEPHONE (OUTPATIENT)
Age: 58
End: 2023-10-17

## 2023-10-17 ENCOUNTER — APPOINTMENT (OUTPATIENT)
Facility: HOSPITAL | Age: 58
DRG: 193 | End: 2023-10-17
Payer: MEDICARE

## 2023-10-17 VITALS
OXYGEN SATURATION: 90 % | SYSTOLIC BLOOD PRESSURE: 119 MMHG | WEIGHT: 196.87 LBS | HEART RATE: 111 BPM | DIASTOLIC BLOOD PRESSURE: 73 MMHG | HEIGHT: 63 IN | RESPIRATION RATE: 16 BRPM | TEMPERATURE: 97.7 F | BODY MASS INDEX: 34.88 KG/M2

## 2023-10-17 DIAGNOSIS — J90 PLEURAL EFFUSION: ICD-10-CM

## 2023-10-17 DIAGNOSIS — C64.1 MALIGNANT NEOPLASM OF RIGHT KIDNEY, EXCEPT RENAL PELVIS (HCC): Primary | ICD-10-CM

## 2023-10-17 PROBLEM — J18.9 PNEUMONIA DUE TO INFECTIOUS ORGANISM: Status: ACTIVE | Noted: 2023-10-13

## 2023-10-17 LAB
ACE SERPL-CCNC: 49 U/L (ref 14–82)
ALBUMIN SERPL-MCNC: 3 G/DL (ref 3.5–5)
ALBUMIN/GLOB SERPL: 0.9 (ref 1.1–2.2)
ALP SERPL-CCNC: 581 U/L (ref 45–117)
ALT SERPL-CCNC: 599 U/L (ref 12–78)
ANION GAP SERPL CALC-SCNC: 5 MMOL/L (ref 5–15)
AST SERPL-CCNC: 137 U/L (ref 15–37)
BASOPHILS # BLD: 0.1 K/UL (ref 0–0.1)
BASOPHILS NFR BLD: 2 % (ref 0–1)
BILIRUB SERPL-MCNC: 0.2 MG/DL (ref 0.2–1)
BUN SERPL-MCNC: 14 MG/DL (ref 6–20)
BUN/CREAT SERPL: 12 (ref 12–20)
C-ANCA TITR SER IF: NORMAL TITER
CALCIUM SERPL-MCNC: 9 MG/DL (ref 8.5–10.1)
CHLORIDE SERPL-SCNC: 104 MMOL/L (ref 97–108)
CO2 SERPL-SCNC: 28 MMOL/L (ref 21–32)
CREAT SERPL-MCNC: 1.14 MG/DL (ref 0.55–1.02)
DIFFERENTIAL METHOD BLD: ABNORMAL
EOSINOPHIL # BLD: 0.4 K/UL (ref 0–0.4)
EOSINOPHIL NFR BLD: 6 % (ref 0–7)
ERYTHROCYTE [DISTWIDTH] IN BLOOD BY AUTOMATED COUNT: 14.3 % (ref 11.5–14.5)
GLOBULIN SER CALC-MCNC: 3.4 G/DL (ref 2–4)
GLUCOSE BLD STRIP.AUTO-MCNC: 131 MG/DL (ref 65–117)
GLUCOSE BLD STRIP.AUTO-MCNC: 259 MG/DL (ref 65–117)
GLUCOSE SERPL-MCNC: 143 MG/DL (ref 65–100)
HCT VFR BLD AUTO: 44.8 % (ref 35–47)
HGB BLD-MCNC: 14.4 G/DL (ref 11.5–16)
IMM GRANULOCYTES # BLD AUTO: 0 K/UL (ref 0–0.04)
IMM GRANULOCYTES NFR BLD AUTO: 0 % (ref 0–0.5)
LYMPHOCYTES # BLD: 2.2 K/UL (ref 0.8–3.5)
LYMPHOCYTES NFR BLD: 30 % (ref 12–49)
MCH RBC QN AUTO: 27.6 PG (ref 26–34)
MCHC RBC AUTO-ENTMCNC: 32.1 G/DL (ref 30–36.5)
MCV RBC AUTO: 85.8 FL (ref 80–99)
MITOCHONDRIA M2 IGG SER-ACNC: <20 UNITS (ref 0–20)
MONOCYTES # BLD: 0.5 K/UL (ref 0–1)
MONOCYTES NFR BLD: 7 % (ref 5–13)
NEUTS SEG # BLD: 4 K/UL (ref 1.8–8)
NEUTS SEG NFR BLD: 55 % (ref 32–75)
NRBC # BLD: 0 K/UL (ref 0–0.01)
NRBC BLD-RTO: 0 PER 100 WBC
P-ANCA ATYPICAL TITR SER IF: NORMAL TITER
P-ANCA TITR SER IF: NORMAL TITER
PLATELET # BLD AUTO: 209 K/UL (ref 150–400)
PMV BLD AUTO: 9.9 FL (ref 8.9–12.9)
POTASSIUM SERPL-SCNC: 4 MMOL/L (ref 3.5–5.1)
PROCALCITONIN SERPL-MCNC: <0.05 NG/ML
PROT SERPL-MCNC: 6.4 G/DL (ref 6.4–8.2)
RBC # BLD AUTO: 5.22 M/UL (ref 3.8–5.2)
SERVICE CMNT-IMP: ABNORMAL
SERVICE CMNT-IMP: ABNORMAL
SODIUM SERPL-SCNC: 137 MMOL/L (ref 136–145)
WBC # BLD AUTO: 7.2 K/UL (ref 3.6–11)

## 2023-10-17 PROCEDURE — 82962 GLUCOSE BLOOD TEST: CPT

## 2023-10-17 PROCEDURE — 99233 SBSQ HOSP IP/OBS HIGH 50: CPT | Performed by: INTERNAL MEDICINE

## 2023-10-17 PROCEDURE — 84145 PROCALCITONIN (PCT): CPT

## 2023-10-17 PROCEDURE — 6370000000 HC RX 637 (ALT 250 FOR IP): Performed by: STUDENT IN AN ORGANIZED HEALTH CARE EDUCATION/TRAINING PROGRAM

## 2023-10-17 PROCEDURE — 71045 X-RAY EXAM CHEST 1 VIEW: CPT

## 2023-10-17 PROCEDURE — 6370000000 HC RX 637 (ALT 250 FOR IP): Performed by: FAMILY MEDICINE

## 2023-10-17 PROCEDURE — 36415 COLL VENOUS BLD VENIPUNCTURE: CPT

## 2023-10-17 PROCEDURE — 6360000002 HC RX W HCPCS: Performed by: FAMILY MEDICINE

## 2023-10-17 PROCEDURE — 6360000002 HC RX W HCPCS: Performed by: STUDENT IN AN ORGANIZED HEALTH CARE EDUCATION/TRAINING PROGRAM

## 2023-10-17 PROCEDURE — 85025 COMPLETE CBC W/AUTO DIFF WBC: CPT

## 2023-10-17 PROCEDURE — 80053 COMPREHEN METABOLIC PANEL: CPT

## 2023-10-17 PROCEDURE — 2700000000 HC OXYGEN THERAPY PER DAY

## 2023-10-17 PROCEDURE — 94760 N-INVAS EAR/PLS OXIMETRY 1: CPT

## 2023-10-17 RX ORDER — CEFPODOXIME PROXETIL 200 MG/1
200 TABLET, FILM COATED ORAL 2 TIMES DAILY
Qty: 10 TABLET | Refills: 0 | Status: SHIPPED | OUTPATIENT
Start: 2023-10-17 | End: 2023-10-22

## 2023-10-17 RX ORDER — AZITHROMYCIN 250 MG/1
250 TABLET, FILM COATED ORAL SEE ADMIN INSTRUCTIONS
Qty: 6 TABLET | Refills: 0 | Status: SHIPPED | OUTPATIENT
Start: 2023-10-17 | End: 2023-10-22

## 2023-10-17 RX ORDER — OXYCODONE HYDROCHLORIDE 10 MG/1
10 TABLET ORAL EVERY 6 HOURS PRN
Qty: 15 TABLET | Refills: 0 | Status: SHIPPED | OUTPATIENT
Start: 2023-10-17 | End: 2023-10-21

## 2023-10-17 RX ORDER — OXYCODONE HYDROCHLORIDE 5 MG/1
10 TABLET ORAL EVERY 4 HOURS PRN
Status: DISCONTINUED | OUTPATIENT
Start: 2023-10-17 | End: 2023-10-17 | Stop reason: HOSPADM

## 2023-10-17 RX ORDER — ONDANSETRON 4 MG/1
4 TABLET, FILM COATED ORAL 3 TIMES DAILY PRN
Qty: 15 TABLET | Refills: 0 | Status: SHIPPED | OUTPATIENT
Start: 2023-10-17

## 2023-10-17 RX ADMIN — ATORVASTATIN CALCIUM 40 MG: 40 TABLET, FILM COATED ORAL at 09:04

## 2023-10-17 RX ADMIN — Medication 1000 UNITS: at 09:05

## 2023-10-17 RX ADMIN — FUROSEMIDE 20 MG: 20 TABLET ORAL at 09:05

## 2023-10-17 RX ADMIN — INSULIN LISPRO 4 UNITS: 100 INJECTION, SOLUTION INTRAVENOUS; SUBCUTANEOUS at 11:25

## 2023-10-17 RX ADMIN — PANTOPRAZOLE SODIUM 40 MG: 40 TABLET, DELAYED RELEASE ORAL at 06:35

## 2023-10-17 RX ADMIN — ROPINIROLE HYDROCHLORIDE 2 MG: 1 TABLET, FILM COATED ORAL at 06:36

## 2023-10-17 RX ADMIN — ROPINIROLE HYDROCHLORIDE 2 MG: 1 TABLET, FILM COATED ORAL at 11:25

## 2023-10-17 RX ADMIN — DILTIAZEM HYDROCHLORIDE 360 MG: 180 CAPSULE, COATED, EXTENDED RELEASE ORAL at 09:05

## 2023-10-17 RX ADMIN — MORPHINE SULFATE 4 MG: 4 INJECTION, SOLUTION INTRAMUSCULAR; INTRAVENOUS at 06:30

## 2023-10-17 RX ADMIN — LORAZEPAM 0.5 MG: 2 INJECTION INTRAMUSCULAR at 11:25

## 2023-10-17 RX ADMIN — VENLAFAXINE HYDROCHLORIDE 150 MG: 150 CAPSULE, EXTENDED RELEASE ORAL at 09:12

## 2023-10-17 RX ADMIN — OXYCODONE HYDROCHLORIDE 10 MG: 5 TABLET ORAL at 11:31

## 2023-10-17 RX ADMIN — ENOXAPARIN SODIUM 40 MG: 100 INJECTION SUBCUTANEOUS at 09:06

## 2023-10-17 ASSESSMENT — PAIN DESCRIPTION - PAIN TYPE: TYPE: SURGICAL PAIN

## 2023-10-17 ASSESSMENT — PAIN SCALES - GENERAL
PAINLEVEL_OUTOF10: 8
PAINLEVEL_OUTOF10: 9

## 2023-10-17 ASSESSMENT — PAIN DESCRIPTION - DESCRIPTORS: DESCRIPTORS: ACHING

## 2023-10-17 ASSESSMENT — PAIN - FUNCTIONAL ASSESSMENT: PAIN_FUNCTIONAL_ASSESSMENT: ACTIVITIES ARE NOT PREVENTED

## 2023-10-17 ASSESSMENT — PAIN DESCRIPTION - FREQUENCY: FREQUENCY: INTERMITTENT

## 2023-10-17 ASSESSMENT — PAIN DESCRIPTION - LOCATION
LOCATION: BACK
LOCATION: BACK

## 2023-10-17 ASSESSMENT — PAIN DESCRIPTION - ORIENTATION: ORIENTATION: POSTERIOR

## 2023-10-17 NOTE — DISCHARGE SUMMARY
Dulaglutide  Inject 0.75 mg into the skin once a week For T2DM               Where to Get Your Medications        These medications were sent to [place], [address] [facility] [address] [address] [facility], [address] [place] [address]      Phone: [phone]   azithromycin 250 MG tablet  cefpodoxime 200 MG tablet  oxyCODONE HCl 10 MG immediate release tablet           NOTIFY YOUR PHYSICIAN FOR ANY OF THE FOLLOWING:   Fever over 101 degrees for 24 hours. Chest pain, shortness of breath, fever, chills, nausea, vomiting, diarrhea, change in mentation, falling, weakness, bleeding. Severe pain or pain not relieved by medications. Or, any other signs or symptoms that you may have questions about.     DISPOSITION:    Home With:   OT  PT  HH  RN       Long term SNF/Inpatient Rehab    Independent/assisted living    Hospice    Other:       PATIENT CONDITION AT DISCHARGE:     Functional status    Poor     Deconditioned    X Independent      Cognition    X Lucid     Forgetful     Dementia      Catheters/lines (plus indication)    Norman     PICC     PEG    X None      Code status    X Full code     DNR      PHYSICAL EXAMINATION AT DISCHARGE:    General : alert x 3, awake, no acute distress,   HEENT: PEERL, EOMI, moist mucus membrane  Neck: supple, no JVD, no meningeal signs  Chest: Clear to auscultation bilaterally   CVS: S1 S2 heard, Capillary refill less than 2 seconds  Abd: soft/ Non tender, non distended, BS physiological,   Ext: no clubbing, no cyanosis, no edema, brisk 2+ DP pulses  Neuro/Psych: pleasant mood and affect, CN 2-12 grossly intact, sensory grossly within normal limit, Strength 5/5 in all extremities  Skin: warm     CHRONIC MEDICAL DIAGNOSES:      Greater than 31 minutes were spent with the patient on counseling and coordination of care    Signed:   Clarence Price MD  10/17/2023  11:58 AM

## 2023-10-17 NOTE — TELEPHONE ENCOUNTER
1302:    Called patient and confirmed x2 identifiers   Informed of the following per MD Ranulfo request:     Based on discussion with pulmonary suggest with get a CT chest in 4-5 weeks and see her in 6 weeks     Patient verbalized understanding   No new questions or concerns at this time

## 2023-10-18 ENCOUNTER — TELEPHONE (OUTPATIENT)
Age: 58
End: 2023-10-18

## 2023-10-18 NOTE — TELEPHONE ENCOUNTER
Care Transitions Initial Follow Up Call    Outreach made within 2 business days of discharge: Yes    Patient: Ramandeep Modi Patient : 1965   MRN: [id number]  Reason for Admission: There are no discharge diagnoses documented for the most recent discharge. Discharge Date: 10/17/23       Spoke with: patient    Discharge department/facility: [facility] Interactive Patient Contact:  Was patient able to fill all prescriptions: Yes  Was patient instructed to bring all medications to the follow-up visit: Yes  Is patient taking all medications as directed in the discharge summary?  Yes  Does patient understand their discharge instructions: Yes  Does patient have questions or concerns that need addressed prior to 7-14 day follow up office visit: no    Scheduled appointment with PCP within 7-14 days    Follow Up  Future Appointments   Date Time Provider [address]   2023  1:00 PM Hyun Carson MD [facility] AMB   2024  1:30 PM Quan Anton MD [facility]       HILDA BANEGAS MA

## 2023-10-18 NOTE — TELEPHONE ENCOUNTER
----- Message from Portillo Monday sent at 10/18/2023 12:53 PM EDT -----  Subject: Hospital Follow Up    QUESTIONS  What hospital was the Patient Discharged from? [facility]  Date of Discharge? 2023-10-17  Discharge Location? Home  Reason for hospitalization as patient stated? fluid around lungs,   pneumonia  What question does the patient have, if applicable?   ---------------------------------------------------------------------------  --------------  CALL BACK INFO  What is the best way for the office to contact you? OK to leave message on   voicemail  Preferred Call Back Phone Number? [phone]  ---------------------------------------------------------------------------  --------------  SCRIPT ANSWERS  Relationship to Patient?  Self

## 2023-10-19 ENCOUNTER — CLINICAL DOCUMENTATION (OUTPATIENT)
Age: 58
End: 2023-10-19

## 2023-10-19 ENCOUNTER — TELEPHONE (OUTPATIENT)
Age: 58
End: 2023-10-19

## 2023-10-19 DIAGNOSIS — Z72.0 NICOTINE ABUSE: Primary | ICD-10-CM

## 2023-10-19 RX ORDER — NICOTINE 21 MG/24HR
1 PATCH, TRANSDERMAL 24 HOURS TRANSDERMAL DAILY
Qty: 42 PATCH | Refills: 0 | Status: SHIPPED | OUTPATIENT
Start: 2023-10-19 | End: 2023-11-30

## 2023-10-19 NOTE — TELEPHONE ENCOUNTER
----- Message from Dmitry Pineda sent at 10/19/2023  8:22 AM EDT -----  Regarding: FW: Nicotine patches and lozenges   Contact: [phone]  Has appt on 10/27/2023.    ----- Message -----  From: Mandeep Peña  Sent: 10/18/2023   4:58 PM EDT  To: [facility] Clinical Staff  Subject: Nicotine patches and lozenges                    Would you please send a prescription for 21mg patches and lozenges so maybe I can quit smoking.  Send to [employer]

## 2023-10-19 NOTE — TELEPHONE ENCOUNTER
1400  Called pt HIPAA verified x2  Made pt aware Dr. Kermit Thakur reviewed Pleural fluid from Thoracentesis and it showed no cancer cells. Pt voiced understanding and has no questions or concerns at this time.

## 2023-10-20 LAB
BACTERIA SPEC CULT: NORMAL
GRAM STN SPEC: NORMAL
GRAM STN SPEC: NORMAL
SERVICE CMNT-IMP: NORMAL

## 2023-10-23 ENCOUNTER — CLINICAL DOCUMENTATION (OUTPATIENT)
Age: 58
End: 2023-10-23

## 2023-10-23 NOTE — PROGRESS NOTES
1st attempt to schedule left voicemail/tb/10/23/23 DC from [facility].   FU MLS 2 months  schedule on 12/28 or 12/29 per CFW

## 2023-10-25 RX ORDER — ROPINIROLE 2 MG/1
TABLET, FILM COATED ORAL
Qty: 270 TABLET | Refills: 3 | Status: SHIPPED | OUTPATIENT
Start: 2023-10-25

## 2023-10-25 RX ORDER — FUROSEMIDE 20 MG/1
TABLET ORAL
Qty: 90 TABLET | Refills: 3 | Status: SHIPPED | OUTPATIENT
Start: 2023-10-25

## 2023-10-25 RX ORDER — OMEPRAZOLE 40 MG/1
CAPSULE, DELAYED RELEASE ORAL
Qty: 180 CAPSULE | Refills: 3 | Status: SHIPPED | OUTPATIENT
Start: 2023-10-25

## 2023-10-25 RX ORDER — LEVOCETIRIZINE DIHYDROCHLORIDE 5 MG/1
TABLET, FILM COATED ORAL
Qty: 90 TABLET | Refills: 3 | Status: SHIPPED | OUTPATIENT
Start: 2023-10-25

## 2023-10-25 RX ORDER — ATORVASTATIN CALCIUM 40 MG/1
TABLET, FILM COATED ORAL
Qty: 90 TABLET | Refills: 3 | Status: SHIPPED | OUTPATIENT
Start: 2023-10-25

## 2023-10-27 ENCOUNTER — NURSE ONLY (OUTPATIENT)
Age: 58
End: 2023-10-27
Payer: MEDICARE

## 2023-10-27 ENCOUNTER — OFFICE VISIT (OUTPATIENT)
Age: 58
End: 2023-10-27
Payer: MEDICARE

## 2023-10-27 VITALS
RESPIRATION RATE: 20 BRPM | TEMPERATURE: 98.6 F | DIASTOLIC BLOOD PRESSURE: 80 MMHG | SYSTOLIC BLOOD PRESSURE: 130 MMHG | BODY MASS INDEX: 32.78 KG/M2 | HEIGHT: 64 IN | HEART RATE: 109 BPM | WEIGHT: 192 LBS | OXYGEN SATURATION: 95 %

## 2023-10-27 DIAGNOSIS — I10 ESSENTIAL (PRIMARY) HYPERTENSION: ICD-10-CM

## 2023-10-27 DIAGNOSIS — F41.9 ANXIETY: ICD-10-CM

## 2023-10-27 DIAGNOSIS — R94.5 ABNORMAL LIVER FUNCTION: ICD-10-CM

## 2023-10-27 DIAGNOSIS — C64.1 MALIGNANT NEOPLASM OF RIGHT KIDNEY (HCC): ICD-10-CM

## 2023-10-27 DIAGNOSIS — I10 ESSENTIAL HYPERTENSION WITH GOAL BLOOD PRESSURE LESS THAN 140/90: ICD-10-CM

## 2023-10-27 DIAGNOSIS — N76.0 ACUTE VAGINITIS: ICD-10-CM

## 2023-10-27 DIAGNOSIS — Z23 NEED FOR PNEUMOCOCCAL 20-VALENT CONJUGATE VACCINATION: ICD-10-CM

## 2023-10-27 DIAGNOSIS — Z09 HOSPITAL DISCHARGE FOLLOW-UP: Primary | ICD-10-CM

## 2023-10-27 DIAGNOSIS — J18.9 PNEUMONIA OF RIGHT LUNG DUE TO INFECTIOUS ORGANISM, UNSPECIFIED PART OF LUNG: ICD-10-CM

## 2023-10-27 PROCEDURE — 99214 OFFICE O/P EST MOD 30 MIN: CPT | Performed by: FAMILY MEDICINE

## 2023-10-27 RX ORDER — LORAZEPAM 0.5 MG/1
0.5 TABLET ORAL 2 TIMES DAILY PRN
Qty: 180 TABLET | Refills: 0 | Status: SHIPPED | OUTPATIENT
Start: 2023-10-27 | End: 2024-01-25

## 2023-10-27 RX ORDER — BLOOD SUGAR DIAGNOSTIC
STRIP MISCELLANEOUS
COMMUNITY
Start: 2023-10-25

## 2023-10-27 RX ORDER — ZINC GLUCONATE 50 MG
50 TABLET ORAL DAILY
COMMUNITY

## 2023-10-27 RX ORDER — OXYCODONE HYDROCHLORIDE 5 MG/1
TABLET ORAL
COMMUNITY
Start: 2023-09-22

## 2023-10-27 RX ORDER — VALSARTAN 320 MG/1
320 TABLET ORAL DAILY
Qty: 90 TABLET | Refills: 3 | Status: SHIPPED | OUTPATIENT
Start: 2023-10-27

## 2023-10-28 LAB
ALBUMIN SERPL-MCNC: 3.6 G/DL (ref 3.5–5)
ALBUMIN/GLOB SERPL: 1.1 (ref 1.1–2.2)
ALP SERPL-CCNC: 702 U/L (ref 45–117)
ALT SERPL-CCNC: 512 U/L (ref 12–78)
ANION GAP SERPL CALC-SCNC: 8 MMOL/L (ref 5–15)
AST SERPL-CCNC: 300 U/L (ref 15–37)
BILIRUB SERPL-MCNC: 0.5 MG/DL (ref 0.2–1)
BUN SERPL-MCNC: 9 MG/DL (ref 6–20)
BUN/CREAT SERPL: 9 (ref 12–20)
CALCIUM SERPL-MCNC: 9.4 MG/DL (ref 8.5–10.1)
CHLORIDE SERPL-SCNC: 106 MMOL/L (ref 97–108)
CO2 SERPL-SCNC: 22 MMOL/L (ref 21–32)
CREAT SERPL-MCNC: 1 MG/DL (ref 0.55–1.02)
GLOBULIN SER CALC-MCNC: 3.3 G/DL (ref 2–4)
GLUCOSE SERPL-MCNC: 121 MG/DL (ref 65–100)
POTASSIUM SERPL-SCNC: 4.5 MMOL/L (ref 3.5–5.1)
PROT SERPL-MCNC: 6.9 G/DL (ref 6.4–8.2)
SODIUM SERPL-SCNC: 136 MMOL/L (ref 136–145)

## 2023-10-28 RX ORDER — FLUCONAZOLE 150 MG/1
150 TABLET ORAL ONCE
Qty: 1 TABLET | Refills: 0 | Status: SHIPPED | OUTPATIENT
Start: 2023-10-28 | End: 2023-10-28

## 2023-11-01 DIAGNOSIS — D50.9 IRON DEFICIENCY ANEMIA, UNSPECIFIED IRON DEFICIENCY ANEMIA TYPE: ICD-10-CM

## 2023-11-02 ENCOUNTER — OFFICE VISIT (OUTPATIENT)
Age: 58
End: 2023-11-02
Payer: MEDICARE

## 2023-11-02 VITALS
HEART RATE: 98 BPM | SYSTOLIC BLOOD PRESSURE: 140 MMHG | DIASTOLIC BLOOD PRESSURE: 84 MMHG | OXYGEN SATURATION: 97 % | HEIGHT: 64 IN | RESPIRATION RATE: 17 BRPM | BODY MASS INDEX: 33.48 KG/M2

## 2023-11-02 DIAGNOSIS — I10 ESSENTIAL HYPERTENSION WITH GOAL BLOOD PRESSURE LESS THAN 140/90: ICD-10-CM

## 2023-11-02 DIAGNOSIS — E11.40 TYPE 2 DIABETES MELLITUS WITH DIABETIC NEUROPATHY, UNSPECIFIED WHETHER LONG TERM INSULIN USE (HCC): ICD-10-CM

## 2023-11-02 DIAGNOSIS — F32.A DEPRESSION, UNSPECIFIED DEPRESSION TYPE: Primary | ICD-10-CM

## 2023-11-02 DIAGNOSIS — G47.33 OSA (OBSTRUCTIVE SLEEP APNEA): ICD-10-CM

## 2023-11-02 DIAGNOSIS — D12.6 COLON ADENOMA: ICD-10-CM

## 2023-11-02 PROCEDURE — 2022F DILAT RTA XM EVC RTNOPTHY: CPT | Performed by: INTERNAL MEDICINE

## 2023-11-02 PROCEDURE — 99214 OFFICE O/P EST MOD 30 MIN: CPT | Performed by: INTERNAL MEDICINE

## 2023-11-02 PROCEDURE — 3079F DIAST BP 80-89 MM HG: CPT | Performed by: INTERNAL MEDICINE

## 2023-11-02 PROCEDURE — G8484 FLU IMMUNIZE NO ADMIN: HCPCS | Performed by: INTERNAL MEDICINE

## 2023-11-02 PROCEDURE — 3017F COLORECTAL CA SCREEN DOC REV: CPT | Performed by: INTERNAL MEDICINE

## 2023-11-02 PROCEDURE — G8417 CALC BMI ABV UP PARAM F/U: HCPCS | Performed by: INTERNAL MEDICINE

## 2023-11-02 PROCEDURE — 3044F HG A1C LEVEL LT 7.0%: CPT | Performed by: INTERNAL MEDICINE

## 2023-11-02 PROCEDURE — 1111F DSCHRG MED/CURRENT MED MERGE: CPT | Performed by: INTERNAL MEDICINE

## 2023-11-02 PROCEDURE — 4004F PT TOBACCO SCREEN RCVD TLK: CPT | Performed by: INTERNAL MEDICINE

## 2023-11-02 PROCEDURE — 3077F SYST BP >= 140 MM HG: CPT | Performed by: INTERNAL MEDICINE

## 2023-11-02 PROCEDURE — G8427 DOCREV CUR MEDS BY ELIG CLIN: HCPCS | Performed by: INTERNAL MEDICINE

## 2023-11-02 NOTE — PROGRESS NOTES
Patient: Andrea Garcia  : 1965    Primary Cardiologist: Franky Matos. Berhane Grande MD, [facility]  EP Cardiologist:  PCP: Judah Overton MD    Today's Date: 2023      ASSESSMENT AND PLAN:     Assessment and Plan:   Tobacco  45 + years  Down less than a pack a day  cessation    2. HTN/HFpEF  Stop losartan  Start valsartan 320  Diltiazem 160  Lasix 20    3. HLD  atorva    4. DM2  -A1C 6.0    5.  AFIB  Sp afib ablation  Around     6. CVA    7. Carotid stenosis   US  - <50%    8. Lung CA/metastatic renal cell CA  -R lower, left  Sp R thoracotomy and resection  -R pleural effusion drained - no cancer cells     9. SOB  Pharmacologic nuc stress - normal perfusion, post stress LVEF 61%  Echo normal LV fxn, mild LVH + diastolic dysfxn  Holter NSR, no arrythmia  Possible HFpEF +/-  metastatic cancer    10. Metastatic renal cell cancer, stage IV  -total nephrectomy  due to mass.    -Dr Nohemy Anderson  -non curable    11. Stress/depression  Asks for help - will refer to [facility]      Follow up 3 months. Has FU with Oncology. No diagnosis found. HISTORY OF PRESENT ILLNESS:     History of Present Illness:  Andrea Garcia is a 60 y.o. female who presents for cardiac evaluation. HTN HLD DM2. Prior afib sp ablation 200 Dr. Mallory [address]. Cannot sleep. Wakes up 3 am.  Trazodone, melatonin. ++ SOB    No energy. Chest tightness with exertion sometimes. EKG NSR    Recent admission [facility] - SOB - PNA +/- HFpEF exacerbation. Given IV ABX< diuretics, R pleural effusion drained by IR.         PAST MEDICAL HISTORY:     Past Medical History:   Diagnosis Date    Arthritis     Cancer ([address])     IN KIDNEY REMOVED RIGHT KIDNEY     Chronic obstructive pulmonary disease ([address])     BEGINNING STAGES     Chronic pain     Diabetes ([address])     Elevated WBC count  to present    GERD (gastroesophageal reflux disease)     Hypertension     Kidney stones     Leg

## 2023-11-06 ENCOUNTER — TELEPHONE (OUTPATIENT)
Age: 58
End: 2023-11-06

## 2023-11-06 NOTE — TELEPHONE ENCOUNTER
----- Message from Randolph sent at 11/6/2023  9:48 AM EST -----  Regarding: FW: Referral   Contact: [phone]    ----- Message -----  From: Andrea Garcia  Sent: 11/6/2023   9:47 AM EST  To: [facility] Clinical Staff  Subject: Referral                                         Would you please send a referral for me to the attached information?

## 2023-11-06 NOTE — TELEPHONE ENCOUNTER
I spoke to pt about her request to see Nephrologist. She has been dx with met renal cancer. Her remaining left kidney and labs show good kidney function. Liver enzymes remain elevated. She has appt with Liver Britt in Dec. Advised to hold taking Atorvastatin for now.   Will likely repeat liver enzymes in Dec.

## 2023-11-06 NOTE — TELEPHONE ENCOUNTER
----- Message from Randolph sent at 11/6/2023  9:48 AM EST -----  Regarding: FW: Referral   Contact: [phone]    ----- Message -----  From: Tati Ortiz  Sent: 11/6/2023   9:47 AM EST  To: [facility] Clinical Staff  Subject: Referral                                         Would you please send a referral for me to the attached information?

## 2023-11-13 ENCOUNTER — TELEPHONE (OUTPATIENT)
Age: 58
End: 2023-11-13

## 2023-11-13 DIAGNOSIS — F34.1 DYSTHYMIA: ICD-10-CM

## 2023-11-13 DIAGNOSIS — F41.9 ANXIETY: Primary | ICD-10-CM

## 2023-11-13 NOTE — TELEPHONE ENCOUNTER
----- Message from Marcy Marshall sent at 11/13/2023 12:14 PM EST -----  Regarding: FW: Referral   Contact: [phone]    ----- Message -----  From: Sancho Clay  Sent: 11/13/2023  11:27 AM EST  To: [facility] Clinical Staff  Subject: Referral                                         With the following:  I think you will agree, I  can use the help.

## 2023-11-13 NOTE — TELEPHONE ENCOUNTER
Referral to specialist ordered. Please give office number to pt to call for appt. Fax any related office note and labs. Thank you.

## 2023-11-15 ENCOUNTER — HOSPITAL ENCOUNTER (OUTPATIENT)
Facility: HOSPITAL | Age: 58
Discharge: HOME | End: 2023-11-18
Payer: MEDICARE

## 2023-11-15 DIAGNOSIS — J90 PLEURAL EFFUSION: ICD-10-CM

## 2023-11-15 DIAGNOSIS — C64.1 MALIGNANT NEOPLASM OF RIGHT KIDNEY, EXCEPT RENAL PELVIS (HCC): ICD-10-CM

## 2023-11-15 PROCEDURE — 71260 CT THORAX DX C+: CPT

## 2023-11-15 PROCEDURE — 6360000004 HC RX CONTRAST MEDICATION

## 2023-11-15 RX ADMIN — IOPAMIDOL 100 ML: 755 INJECTION, SOLUTION INTRAVENOUS at 10:27

## 2023-11-20 ENCOUNTER — TELEPHONE (OUTPATIENT)
Age: 58
End: 2023-11-20

## 2023-11-20 DIAGNOSIS — D50.9 IRON DEFICIENCY ANEMIA, UNSPECIFIED IRON DEFICIENCY ANEMIA TYPE: Primary | ICD-10-CM

## 2023-11-20 NOTE — TELEPHONE ENCOUNTER
Left message for return call,  Needs update on below,   Pt called and left  asking if she needs blood work for her appt on the 30th. Pt got CT scan but is unsure if labs need to be completed.     #[phone]

## 2023-11-20 NOTE — TELEPHONE ENCOUNTER
Pt verified x2, informed pt she does have labs that need to be drawn and orders will be sent. No further questions at this time.

## 2023-11-27 NOTE — PROGRESS NOTES
[facility] at [address] , [address]   W: [phone]  F: [phone]          Reason for Visit:     Mandeep Peña is a 61 y.o.  female who is for    Follow up of leucocytosis and anemia  Stage IV RCC        Treatment history for renal cell carcinoma  7/28/2020-right radical nephrectomy-3.3 cm renal cell carcinoma. She did have a CT chest in August 2020 that showed a 6 mm lung nodule  9/6/2023-stage IV renal cell carcinoma-robotic assisted thoracoscopic right lower lobe wedge resection and mediastinal lymph node dissection         History of Present Illness:     Patient is a 61 y. o.female with PMH as below who is seen for abnormal  CBC      She has persistent leucocytosis since 2014, no other CBC abnormalities. She has known R renal cell carcinoma ( 3.3 cm) which she had a R radical nephrectomy on 7/28/2020. She has osteoarthritis and has several flare ups. She had a CT chest 8/2020 that  showed a 6 mm lung nodule which is being observed. She had a follow-up CT scan with pulmonary on 7/24/2023 which showed increasing bilateral pulmonary nodules. This prompted a PET CT scan that was done on 8/9/2023. This showed that the peripheral pulmonary nodule in the right lower lobe had an FDG activity of 3.3, subcentimeter pulmonary nodule in the left lower lobe had no FDG activity. She was referred to thoracic surgery Dr. Emma Ramos and underwent a robotic assisted thoracoscopic right lower lobe wedge resection and mediastinal lymph node dissection on 9/6/2023. Pathology revealed metastatic renal cell carcinoma in the right lower lobe 1.3 cm nodule. She is on observation and seen with scans. She was admitted 10/2023 with pneumonia and pleural effusion s/p thoracentesis. She has no sob, coughs some, no fevers, has fatigue, has had diffuse arthralgias. She has no HA.   She tells me she takes Ivermectin      As regards her history of anemia and

## 2023-11-28 DIAGNOSIS — D50.9 IRON DEFICIENCY ANEMIA, UNSPECIFIED IRON DEFICIENCY ANEMIA TYPE: ICD-10-CM

## 2023-11-28 LAB
BASOPHILS # BLD: 0.2 K/UL (ref 0–0.1)
BASOPHILS NFR BLD: 1 % (ref 0–1)
DIFFERENTIAL METHOD BLD: ABNORMAL
EOSINOPHIL # BLD: 0.7 K/UL (ref 0–0.4)
EOSINOPHIL NFR BLD: 6 % (ref 0–7)
ERYTHROCYTE [DISTWIDTH] IN BLOOD BY AUTOMATED COUNT: 14.3 % (ref 11.5–14.5)
FERRITIN SERPL-MCNC: 34 NG/ML (ref 8–252)
HCT VFR BLD AUTO: 44.7 % (ref 35–47)
HGB BLD-MCNC: 14.9 G/DL (ref 11.5–16)
IMM GRANULOCYTES # BLD AUTO: 0 K/UL (ref 0–0.04)
IMM GRANULOCYTES NFR BLD AUTO: 0 % (ref 0–0.5)
IRON SATN MFR SERPL: 14 % (ref 20–50)
IRON SERPL-MCNC: 54 UG/DL (ref 35–150)
LYMPHOCYTES # BLD: 2.5 K/UL (ref 0.8–3.5)
LYMPHOCYTES NFR BLD: 21 % (ref 12–49)
MCH RBC QN AUTO: 29.1 PG (ref 26–34)
MCHC RBC AUTO-ENTMCNC: 33.3 G/DL (ref 30–36.5)
MCV RBC AUTO: 87.3 FL (ref 80–99)
MONOCYTES # BLD: 0.5 K/UL (ref 0–1)
MONOCYTES NFR BLD: 4 % (ref 5–13)
NEUTS SEG # BLD: 8.1 K/UL (ref 1.8–8)
NEUTS SEG NFR BLD: 68 % (ref 32–75)
NRBC # BLD: 0 K/UL (ref 0–0.01)
NRBC BLD-RTO: 0 PER 100 WBC
PLATELET # BLD AUTO: 287 K/UL (ref 150–400)
PMV BLD AUTO: 9.7 FL (ref 8.9–12.9)
RBC # BLD AUTO: 5.12 M/UL (ref 3.8–5.2)
TIBC SERPL-MCNC: 386 UG/DL (ref 250–450)
WBC # BLD AUTO: 11.9 K/UL (ref 3.6–11)

## 2023-11-30 ENCOUNTER — OFFICE VISIT (OUTPATIENT)
Age: 58
End: 2023-11-30
Payer: MEDICARE

## 2023-11-30 VITALS
RESPIRATION RATE: 18 BRPM | TEMPERATURE: 98.1 F | SYSTOLIC BLOOD PRESSURE: 145 MMHG | HEART RATE: 90 BPM | OXYGEN SATURATION: 94 % | BODY MASS INDEX: 34.35 KG/M2 | WEIGHT: 197 LBS | DIASTOLIC BLOOD PRESSURE: 85 MMHG

## 2023-11-30 DIAGNOSIS — C64.1 MALIGNANT NEOPLASM OF RIGHT KIDNEY, EXCEPT RENAL PELVIS (HCC): Primary | ICD-10-CM

## 2023-11-30 DIAGNOSIS — D50.9 IRON DEFICIENCY ANEMIA, UNSPECIFIED IRON DEFICIENCY ANEMIA TYPE: ICD-10-CM

## 2023-11-30 PROCEDURE — G8428 CUR MEDS NOT DOCUMENT: HCPCS | Performed by: INTERNAL MEDICINE

## 2023-11-30 PROCEDURE — 3077F SYST BP >= 140 MM HG: CPT | Performed by: INTERNAL MEDICINE

## 2023-11-30 PROCEDURE — 99214 OFFICE O/P EST MOD 30 MIN: CPT | Performed by: INTERNAL MEDICINE

## 2023-11-30 PROCEDURE — 4004F PT TOBACCO SCREEN RCVD TLK: CPT | Performed by: INTERNAL MEDICINE

## 2023-11-30 PROCEDURE — 3079F DIAST BP 80-89 MM HG: CPT | Performed by: INTERNAL MEDICINE

## 2023-11-30 PROCEDURE — G8484 FLU IMMUNIZE NO ADMIN: HCPCS | Performed by: INTERNAL MEDICINE

## 2023-11-30 PROCEDURE — 3017F COLORECTAL CA SCREEN DOC REV: CPT | Performed by: INTERNAL MEDICINE

## 2023-11-30 PROCEDURE — G8417 CALC BMI ABV UP PARAM F/U: HCPCS | Performed by: INTERNAL MEDICINE

## 2023-11-30 NOTE — PROGRESS NOTES
Pj Vivas is a 60 y.o. female    Chief Complaint   Patient presents with    Follow-up     New diagnosis of stage IV RCC and admitted with SOB           1. Have you been to the ER, urgent care clinic since your last visit? Hospitalized since your last visit? No    2. Have you seen or consulted any other health care providers outside of the [address] [place] [address] since your last visit? Include any pap smears or colon screening.  No

## 2023-12-22 PROBLEM — M16.12 OSTEOARTHRITIS OF LEFT HIP: Status: ACTIVE | Noted: 2023-12-22

## 2023-12-22 PROBLEM — M76.892 HIP ABDUCTOR TENDONITIS, LEFT: Status: ACTIVE | Noted: 2023-12-22

## 2023-12-27 RX ORDER — TIZANIDINE 2 MG/1
TABLET ORAL
Qty: 180 TABLET | Refills: 3 | Status: SHIPPED | OUTPATIENT
Start: 2023-12-27

## 2023-12-27 RX ORDER — DILTIAZEM HYDROCHLORIDE 180 MG/1
CAPSULE, COATED, EXTENDED RELEASE ORAL
Qty: 180 CAPSULE | Refills: 3 | Status: SHIPPED | OUTPATIENT
Start: 2023-12-27

## 2023-12-27 RX ORDER — TRAZODONE HYDROCHLORIDE 50 MG/1
TABLET ORAL
Qty: 180 TABLET | Refills: 3 | Status: SHIPPED | OUTPATIENT
Start: 2023-12-27

## 2023-12-28 ENCOUNTER — OFFICE VISIT (OUTPATIENT)
Age: 58
End: 2023-12-28
Payer: MEDICARE

## 2023-12-28 VITALS
BODY MASS INDEX: 33.46 KG/M2 | TEMPERATURE: 97.4 F | OXYGEN SATURATION: 98 % | DIASTOLIC BLOOD PRESSURE: 74 MMHG | HEIGHT: 64 IN | SYSTOLIC BLOOD PRESSURE: 146 MMHG | HEART RATE: 96 BPM | RESPIRATION RATE: 16 BRPM | WEIGHT: 196 LBS

## 2023-12-28 DIAGNOSIS — R74.8 ELEVATED ALKALINE PHOSPHATASE LEVEL: Primary | ICD-10-CM

## 2023-12-28 PROCEDURE — 99214 OFFICE O/P EST MOD 30 MIN: CPT | Performed by: INTERNAL MEDICINE

## 2023-12-28 PROCEDURE — 91200 LIVER ELASTOGRAPHY: CPT | Performed by: INTERNAL MEDICINE

## 2023-12-28 RX ORDER — DULAGLUTIDE 0.75 MG/.5ML
0.75 INJECTION, SOLUTION SUBCUTANEOUS
COMMUNITY

## 2023-12-28 ASSESSMENT — PATIENT HEALTH QUESTIONNAIRE - PHQ9
1. LITTLE INTEREST OR PLEASURE IN DOING THINGS: 0
SUM OF ALL RESPONSES TO PHQ QUESTIONS 1-9: 0
SUM OF ALL RESPONSES TO PHQ QUESTIONS 1-9: 0
SUM OF ALL RESPONSES TO PHQ9 QUESTIONS 1 & 2: 0
SUM OF ALL RESPONSES TO PHQ QUESTIONS 1-9: 0
2. FEELING DOWN, DEPRESSED OR HOPELESS: 0
SUM OF ALL RESPONSES TO PHQ QUESTIONS 1-9: 0

## 2023-12-28 NOTE — PROGRESS NOTES
Identified pt with two pt identifiers(name and ). Reviewed record in preparation for visit and have obtained necessary documentation.  Vitals:    23 1148   BP: (!) 146/74   Site: Left Upper Arm   Position: Sitting   Cuff Size: Large Adult   Pulse: 96   Resp: 16   Temp: 97.4 °F (36.3 °C)   TempSrc: Temporal   SpO2: 98%   Weight: 88.9 kg (196 lb)   Height: 1.613 m (5' 3.5\")        Health Maintenance Review: Patient reminded of \"due or due soon\" health maintenance. I have asked the patient to contact his/her primary care provider (PCP) for follow-up on his/her health maintenance.    Coordination of Care Questionnaire:  :   1) Have you been to an emergency room, urgent care, or hospitalized since your last visit?  If yes, where when, and reason for visit? no       2. Have seen or consulted any other health care provider since your last visit?   If yes, where when, and reason for visit?  Yes, pcp f/u      Patient is accompanied by friend I have received verbal consent from Symone Edmonds to discuss any/all medical information while they are present in the room.

## 2023-12-28 NOTE — PROGRESS NOTES
[facility]      Alexander Quintero MD, FACP, FACG, FAASLD      YONI Lorenzo-C    Brit Loza, [facility]   Emeli Ratsavannah, [facility]-   Azizamya Oscar, [facility]-C  Reilly De Jesus, [facility]-   Lorna Haywood, [facility]   Rachel Velazco, [facility]   [address], [address]   [place]  [address] [phone]   FAX: [phone]  [facility]   [address], [address]   [place]  [address] [phone]   FAX: [phone]       Patient Care Team:  Patricia Suazo MD as PCP - General  Patricia Suazo MD as PCP - Empaneled Provider  Stu Brown MD as Physician  Monico Jacobson MD as Physician  Jazmine Costa, RN as Ambulatory Care Manager      Patient Active Problem List   Diagnosis    H/O right nephrectomy    Arthritis    Iliotibial band tendinitis of right side    Spondylosis of lumbar spine    JESSICA (obstructive sleep apnea)    Cigarette nicotine dependence without complication    Dysthymia    Abnormal mammogram of left breast    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    Hypertension    Vitamin D deficiency    Chronic obstructive pulmonary disease (HCC)    Type 2 diabetes mellitus (HCC)    Polyneuropathy associated with underlying disease (HCC)    Incisional hernia    RLS (restless legs syndrome)    Colon adenoma    Status post bilateral knee replacements    Cancer of the skin, basal cell    Hx of renal cell cancer    Iron deficiency anemia    Malignant neoplasm of right kidney, except renal pelvis (HCC)    Osteoarthritis of left hip    Elevated alkaline phosphatase level       Symone Edmonds is being seen at [facility] for management of elevated alkaline phosphatase.  The active problem list, all pertinent past medical history, medications, endoscopic

## 2023-12-29 ENCOUNTER — TELEPHONE (OUTPATIENT)
Age: 58
End: 2023-12-29

## 2023-12-29 NOTE — TELEPHONE ENCOUNTER
3:43pm: pt verified x2, informed her to move CT to mid-end of Feb also informed her she needs labs drawn prior to appt as well. No further questions.

## 2023-12-29 NOTE — TELEPHONE ENCOUNTER
Called pt back regarding vm asking about appt in Jan.    Pt does not have appt in Jan but in March. Pt wants to know if she needs to get the CT done Jan 2 (already scheduled) or move CT closer to appt date.     Cb # [phone]

## 2024-01-07 PROBLEM — J18.9 PNEUMONIA DUE TO INFECTIOUS ORGANISM: Status: RESOLVED | Noted: 2023-10-13 | Resolved: 2024-01-07

## 2024-01-07 PROBLEM — E11.9 TYPE 2 DIABETES MELLITUS (HCC): Status: ACTIVE | Noted: 2018-02-14

## 2024-01-07 PROBLEM — K43.2 INCISIONAL HERNIA, WITHOUT OBSTRUCTION OR GANGRENE: Status: RESOLVED | Noted: 2021-04-21 | Resolved: 2024-01-07

## 2024-01-07 PROBLEM — R91.1 NODULE OF RIGHT LUNG: Status: RESOLVED | Noted: 2022-03-06 | Resolved: 2024-01-07

## 2024-01-07 PROBLEM — M76.892 HIP ABDUCTOR TENDONITIS, LEFT: Status: RESOLVED | Noted: 2023-12-22 | Resolved: 2024-01-07

## 2024-01-07 PROBLEM — R74.8 ELEVATED ALKALINE PHOSPHATASE LEVEL: Status: ACTIVE | Noted: 2024-01-07

## 2024-01-07 PROBLEM — J18.9 LINGULAR PNEUMONIA: Status: RESOLVED | Noted: 2023-10-14 | Resolved: 2024-01-07

## 2024-01-07 PROBLEM — D72.829 LEUKOCYTOSIS: Status: RESOLVED | Noted: 2023-09-28 | Resolved: 2024-01-07

## 2024-01-08 ENCOUNTER — TELEPHONE (OUTPATIENT)
Age: 59
End: 2024-01-08

## 2024-01-08 NOTE — TELEPHONE ENCOUNTER
----- Message from Alexander Quintero MD sent at 1/7/2024  4:43 PM EST -----  Regarding: Appers that I forgot to repat hepatic panel at office appt.  Please marielos anna get this.  Ordered.      1/8/24@1056 Spoke w/patient concerning above message from . Patient will go to [place] in have lab work done. Nan faxing over right now.(KF)

## 2024-01-16 ENCOUNTER — HOSPITAL ENCOUNTER (OUTPATIENT)
Facility: HOSPITAL | Age: 59
Discharge: HOME | End: 2024-01-19
Payer: MEDICARE

## 2024-01-16 VITALS
WEIGHT: 201 LBS | DIASTOLIC BLOOD PRESSURE: 86 MMHG | BODY MASS INDEX: 35.61 KG/M2 | SYSTOLIC BLOOD PRESSURE: 150 MMHG | TEMPERATURE: 98.3 F | HEART RATE: 94 BPM | OXYGEN SATURATION: 98 % | HEIGHT: 63 IN

## 2024-01-16 DIAGNOSIS — R74.8 ELEVATED ALKALINE PHOSPHATASE LEVEL: ICD-10-CM

## 2024-01-16 LAB
ABO + RH BLD: NORMAL
ALBUMIN SERPL-MCNC: 3.7 G/DL (ref 3.5–5)
ALBUMIN/GLOB SERPL: 1.2 (ref 1.1–2.2)
ALP SERPL-CCNC: 172 U/L (ref 45–117)
ALT SERPL-CCNC: 25 U/L (ref 12–78)
ANION GAP SERPL CALC-SCNC: 5 MMOL/L (ref 5–15)
APPEARANCE UR: CLEAR
AST SERPL-CCNC: 16 U/L (ref 15–37)
BACTERIA URNS QL MICRO: NEGATIVE /HPF
BILIRUB DIRECT SERPL-MCNC: <0.1 MG/DL (ref 0–0.2)
BILIRUB SERPL-MCNC: 0.2 MG/DL (ref 0.2–1)
BILIRUB UR QL: NEGATIVE
BLOOD GROUP ANTIBODIES SERPL: NORMAL
BUN SERPL-MCNC: 9 MG/DL (ref 6–20)
BUN/CREAT SERPL: 9 (ref 12–20)
CALCIUM SERPL-MCNC: 9.3 MG/DL (ref 8.5–10.1)
CHLORIDE SERPL-SCNC: 110 MMOL/L (ref 97–108)
CO2 SERPL-SCNC: 25 MMOL/L (ref 21–32)
COLOR UR: NORMAL
CREAT SERPL-MCNC: 1.04 MG/DL (ref 0.55–1.02)
EPITH CASTS URNS QL MICRO: NORMAL /LPF
ERYTHROCYTE [DISTWIDTH] IN BLOOD BY AUTOMATED COUNT: 14.9 % (ref 11.5–14.5)
EST. AVERAGE GLUCOSE BLD GHB EST-MCNC: 123 MG/DL
GLOBULIN SER CALC-MCNC: 3.1 G/DL (ref 2–4)
GLUCOSE SERPL-MCNC: 107 MG/DL (ref 65–100)
GLUCOSE UR STRIP.AUTO-MCNC: NEGATIVE MG/DL
HBA1C MFR BLD: 5.9 % (ref 4–5.6)
HCT VFR BLD AUTO: 45.7 % (ref 35–47)
HGB BLD-MCNC: 14.8 G/DL (ref 11.5–16)
HGB UR QL STRIP: NEGATIVE
HYALINE CASTS URNS QL MICRO: NORMAL /LPF (ref 0–5)
INR PPP: 1 (ref 0.9–1.1)
KETONES UR QL STRIP.AUTO: NEGATIVE MG/DL
LEUKOCYTE ESTERASE UR QL STRIP.AUTO: NEGATIVE
MCH RBC QN AUTO: 27.5 PG (ref 26–34)
MCHC RBC AUTO-ENTMCNC: 32.4 G/DL (ref 30–36.5)
MCV RBC AUTO: 84.8 FL (ref 80–99)
NITRITE UR QL STRIP.AUTO: NEGATIVE
NRBC # BLD: 0 K/UL (ref 0–0.01)
NRBC BLD-RTO: 0 PER 100 WBC
PH UR STRIP: 7 (ref 5–8)
PLATELET # BLD AUTO: 301 K/UL (ref 150–400)
PMV BLD AUTO: 10.1 FL (ref 8.9–12.9)
POTASSIUM SERPL-SCNC: 4.2 MMOL/L (ref 3.5–5.1)
PROT SERPL-MCNC: 6.8 G/DL (ref 6.4–8.2)
PROT UR STRIP-MCNC: NEGATIVE MG/DL
PROTHROMBIN TIME: 10 SEC (ref 9–11.1)
RBC # BLD AUTO: 5.39 M/UL (ref 3.8–5.2)
RBC #/AREA URNS HPF: NORMAL /HPF (ref 0–5)
SODIUM SERPL-SCNC: 140 MMOL/L (ref 136–145)
SP GR UR REFRACTOMETRY: 1.01 (ref 1–1.03)
SPECIMEN EXP DATE BLD: NORMAL
URINE CULTURE IF INDICATED: NORMAL
UROBILINOGEN UR QL STRIP.AUTO: 0.2 EU/DL (ref 0.2–1)
WBC # BLD AUTO: 13.1 K/UL (ref 3.6–11)
WBC URNS QL MICRO: NORMAL /HPF (ref 0–4)

## 2024-01-16 PROCEDURE — APPNB30 APP NON BILLABLE TIME 0-30 MINS: Performed by: NURSE PRACTITIONER

## 2024-01-16 PROCEDURE — 83036 HEMOGLOBIN GLYCOSYLATED A1C: CPT

## 2024-01-16 PROCEDURE — 85027 COMPLETE CBC AUTOMATED: CPT

## 2024-01-16 PROCEDURE — 86901 BLOOD TYPING SEROLOGIC RH(D): CPT

## 2024-01-16 PROCEDURE — 80048 BASIC METABOLIC PNL TOTAL CA: CPT

## 2024-01-16 PROCEDURE — 85610 PROTHROMBIN TIME: CPT

## 2024-01-16 PROCEDURE — 86850 RBC ANTIBODY SCREEN: CPT

## 2024-01-16 PROCEDURE — 81001 URINALYSIS AUTO W/SCOPE: CPT

## 2024-01-16 PROCEDURE — 36415 COLL VENOUS BLD VENIPUNCTURE: CPT

## 2024-01-16 PROCEDURE — 86900 BLOOD TYPING SEROLOGIC ABO: CPT

## 2024-01-16 RX ORDER — SCOLOPAMINE TRANSDERMAL SYSTEM 1 MG/1
1 PATCH, EXTENDED RELEASE TRANSDERMAL ONCE
Qty: 1 PATCH | Refills: 0 | Status: SHIPPED | OUTPATIENT
Start: 2024-01-16 | End: 2024-01-16

## 2024-01-16 ASSESSMENT — PROMIS GLOBAL HEALTH SCALE
IN GENERAL, WOULD YOU SAY YOUR QUALITY OF LIFE IS...[ON A SCALE OF 1 (POOR) TO 5 (EXCELLENT)]: 2
IN GENERAL, WOULD YOU SAY YOUR HEALTH IS...[ON A SCALE OF 1 (POOR) TO 5 (EXCELLENT)]: 2
SUM OF RESPONSES TO QUESTIONS 3, 6, 7, & 8: 15
IN GENERAL, HOW WOULD YOU RATE YOUR SATISFACTION WITH YOUR SOCIAL ACTIVITIES AND RELATIONSHIPS [ON A SCALE OF 1 (POOR) TO 5 (EXCELLENT)]?: 2
IN GENERAL, HOW WOULD YOU RATE YOUR PHYSICAL HEALTH [ON A SCALE OF 1 (POOR) TO 5 (EXCELLENT)]?: 3
IN GENERAL, PLEASE RATE HOW WELL YOU CARRY OUT YOUR USUAL SOCIAL ACTIVITIES (INCLUDES ACTIVITIES AT HOME, AT WORK, AND IN YOUR COMMUNITY, AND RESPONSIBILITIES AS A PARENT, CHILD, SPOUSE, EMPLOYEE, FRIEND, ETC) [ON A SCALE OF 1 (POOR) TO 5 (EXCELLENT)]?: 2
IN GENERAL, HOW WOULD YOU RATE YOUR MENTAL HEALTH, INCLUDING YOUR MOOD AND YOUR ABILITY TO THINK [ON A SCALE OF 1 (POOR) TO 5 (EXCELLENT)]?: 3
WHO IS THE PERSON COMPLETING THE PROMIS V1.1 SURVEY?: 0
IN THE PAST 7 DAYS, HOW WOULD YOU RATE YOUR FATIGUE ON AVERAGE [ON A SCALE FROM 1 (NONE) TO 5 (VERY SEVERE)]?: 2
TO WHAT EXTENT ARE YOU ABLE TO CARRY OUT YOUR EVERYDAY PHYSICAL ACTIVITIES SUCH AS WALKING, CLIMBING STAIRS, CARRYING GROCERIES, OR MOVING A CHAIR [ON A SCALE OF 1 (NOT AT ALL) TO 5 (COMPLETELY)]?: 3
IN THE PAST 7 DAYS, HOW WOULD YOU RATE YOUR PAIN ON AVERAGE [ON A SCALE FROM 0 (NO PAIN) TO 10 (WORST IMAGINABLE PAIN)]?: 7
IN THE PAST 7 DAYS, HOW OFTEN HAVE YOU BEEN BOTHERED BY EMOTIONAL PROBLEMS, SUCH AS FEELING ANXIOUS, DEPRESSED, OR IRRITABLE [ON A SCALE FROM 1 (NEVER) TO 5 (ALWAYS)]?: 2
SUM OF RESPONSES TO QUESTIONS 2, 4, 5, & 10: 9
HOW IS THE PROMIS V1.1 BEING ADMINISTERED?: 1

## 2024-01-16 ASSESSMENT — HOOS JR
BENDING TO THE FLOOR TO PICK UP OBJECT: 2
RISING FROM SITTING: 3
HOOS JR RAW SCORE: 15
SITTING: 1
GOING UP OR DOWN STAIRS: 2
LYING IN BED (TURNING OVER, MAINTAINING HIP POSITION): 3
HOOS JR TOTAL INTERVAL SCORE: 43.335
WALKING ON UNEVEN SURFACE: 4
HOOS JR RAW SCORE: 15

## 2024-01-16 ASSESSMENT — PAIN SCALES - GENERAL: PAINLEVEL_OUTOF10: 6

## 2024-01-16 ASSESSMENT — PAIN DESCRIPTION - LOCATION: LOCATION: HIP;GROIN

## 2024-01-16 ASSESSMENT — PAIN DESCRIPTION - DESCRIPTORS: DESCRIPTORS: BURNING;ACHING

## 2024-01-16 ASSESSMENT — PAIN DESCRIPTION - PAIN TYPE: TYPE: CHRONIC PAIN

## 2024-01-16 ASSESSMENT — PAIN DESCRIPTION - ORIENTATION: ORIENTATION: LEFT

## 2024-01-16 NOTE — PERIOP NOTE
[facility] PREOPERATIVE INSTRUCTIONS  ORTHOPAEDIC    Surgery Date:   1/30/2024    Your surgeon's office or [facility] staff will call you between 4 PM- 8 PM the day before surgery with your arrival time.  If your surgery is on a Monday, you will receive a call the preceding Friday. If your surgeon's office has given you, your arrival time then go by that time.    Please report to [facility] Patient Access/Admitting on the 1st floor.  Bring your insurance card, photo identification, and any copayment (if applicable).   If you are going home the same day of your surgery, you must have a responsible adult to drive you home. You need to have a responsible adult to stay with you the first 24 hours after surgery and you should not drive a car for 24 hours following your surgery.  If you are being admitted to the hospital,please leave personal belongings/luggage in your car until you have an assigned hospital room number.  Please wear comfortable clothes. Wear your glasses instead of contacts. We ask that all money, jewelry and valuables be left at home. Wear no make up, particularly mascara, the day of surgery.  Do NOT drink alcohol or smoke 24 hours before surgery. STOP smoking for 14 days prior as it helps with breathing and healing after surgery.   All body piercings, rings, and jewelry need to be removed and left at home. Do not wear any fingernail polish except for clear. Please wear your hair loose or down. Please no pony-tails, buns, or any metal hair accessories. You may wear deodorant. Do not shave any body area within 24 hours of your surgery.  Please follow all instructions to avoid any potential surgical cancellation.  Should your physical condition change, (i.e. fever, cold, flu, etc.) please notify your surgeon as soon as possible.  It is important to be on time. If a situation occurs where you may be delayed, please call:  [phone] / [phone] on the day of surgery.  The Preadmission

## 2024-01-16 NOTE — PERIOP NOTE
PT HAS CRACKED FINGERS FROM WASHING HER HANDS, FLORI COTE, NP CONSULTED. PT OK FOR SURGERY.     EKG, CHEST X-RAY, AND CARDIAC NOTES FROM DR. COYNE IN [facility].     [facility]URSE [facility], PULMONARY NOTES TO BE FAXED.

## 2024-01-17 LAB
BACTERIA SPEC CULT: NORMAL
BACTERIA SPEC CULT: NORMAL
SERVICE CMNT-IMP: NORMAL

## 2024-01-17 NOTE — PROGRESS NOTES
SURGERY  2013    Ablation    ROTATOR CUFF REPAIR Left 2018    SEPTOPLASTY  1993    SKIN BIOPSY      TOTAL KNEE ARTHROPLASTY Left 2012    TOTAL KNEE ARTHROPLASTY Right 08/18/2016    TUBAL LIGATION  1989    UROLOGICAL SURGERY Right 07/2020    nephrectomy    US ASP BREAST CYST LEFT Left 10/26/2018     BREAST CYST ASPIRATION LEFT [facility] CC AMB HISTORICAL    WISDOM TOOTH EXTRACTION      X2      ____________________________________________  HOME MEDICATIONS  Current Outpatient Medications   Medication Sig    NONFORMULARY as needed BC Powder for Arthritis    dilTIAZem (CARDIZEM CD) 180 MG extended release capsule TAKE 2 CAPSULES EVERY DAY FOR HIGH BLOOD PRESSURE    tiZANidine (ZANAFLEX) 2 MG tablet TAKE 1 TABLET TWICE DAILY FOR MUSCLE SPASM(S)    traZODone (DESYREL) 50 MG tablet TAKE 2 TABLETS EVERY NIGHT    ACCU-CHEK SINA PLUS strip     zinc gluconate 50 MG tablet Take 1 tablet by mouth daily    LORazepam (ATIVAN) 0.5 MG tablet Take 1 tablet by mouth 2 times daily as needed for Anxiety for up to 90 days. Max Daily Amount: 1 mg    valsartan (DIOVAN) 320 MG tablet Take 1 tablet by mouth daily For hypertension (Patient taking differently: Take 1 tablet by mouth at bedtime For hypertension)    rOPINIRole (REQUIP) 2 MG tablet TAKE 1 TABLET THREE TIMES DAILY FOR RESTLESS LEGS SYNDROME    furosemide (LASIX) 20 MG tablet TAKE 1 TABLET EVERY DAY AS NEEDED    atorvastatin (LIPITOR) 40 MG tablet TAKE 1 TABLET EVERY DAY    omeprazole (PRILOSEC) 40 MG delayed release capsule TAKE 1 CAPSULE BEFORE BREAKFAST AND DINNER.    levocetirizine (XYZAL) 5 MG tablet TAKE 1 TABLET EVERY DAY    nicotine (NICODERM CQ) 21 MG/24HR Place 1 patch onto the skin daily    ondansetron (ZOFRAN) 4 MG tablet Take 1 tablet by mouth 3 times daily as needed for Nausea or Vomiting    metFORMIN (GLUCOPHAGE) 500 MG tablet Take 1 tablet by mouth 2 times daily (with meals)    venlafaxine (EFFEXOR XR) 150 MG extended release capsule TAKE 1 CAPSULE EVERY DAY

## 2024-01-18 RX ORDER — BLOOD SUGAR DIAGNOSTIC
STRIP MISCELLANEOUS
Qty: 100 STRIP | Refills: 3 | Status: SHIPPED | OUTPATIENT
Start: 2024-01-18

## 2024-01-19 ENCOUNTER — OFFICE VISIT (OUTPATIENT)
Age: 59
End: 2024-01-19
Payer: MEDICARE

## 2024-01-19 VITALS
DIASTOLIC BLOOD PRESSURE: 80 MMHG | TEMPERATURE: 97.9 F | OXYGEN SATURATION: 97 % | HEART RATE: 98 BPM | HEIGHT: 64 IN | RESPIRATION RATE: 16 BRPM | SYSTOLIC BLOOD PRESSURE: 130 MMHG | WEIGHT: 201 LBS | BODY MASS INDEX: 34.31 KG/M2

## 2024-01-19 DIAGNOSIS — M25.552 CHRONIC LEFT HIP PAIN: ICD-10-CM

## 2024-01-19 DIAGNOSIS — G63 POLYNEUROPATHY IN DISEASES CLASSIFIED ELSEWHERE (HCC): ICD-10-CM

## 2024-01-19 DIAGNOSIS — J44.9 CHRONIC OBSTRUCTIVE PULMONARY DISEASE, UNSPECIFIED COPD TYPE (HCC): ICD-10-CM

## 2024-01-19 DIAGNOSIS — Z01.818 PREOPERATIVE EXAMINATION: Primary | ICD-10-CM

## 2024-01-19 DIAGNOSIS — Z12.31 ENCOUNTER FOR SCREENING MAMMOGRAM FOR MALIGNANT NEOPLASM OF BREAST: ICD-10-CM

## 2024-01-19 DIAGNOSIS — G89.29 CHRONIC LEFT HIP PAIN: ICD-10-CM

## 2024-01-19 DIAGNOSIS — E11.40 TYPE 2 DIABETES MELLITUS WITH DIABETIC NEUROPATHY, WITHOUT LONG-TERM CURRENT USE OF INSULIN (HCC): ICD-10-CM

## 2024-01-19 DIAGNOSIS — E66.01 SEVERE OBESITY (BMI 35.0-39.9) WITH COMORBIDITY (HCC): ICD-10-CM

## 2024-01-19 LAB — SPECIMEN HOLD: NORMAL

## 2024-01-19 PROCEDURE — 99213 OFFICE O/P EST LOW 20 MIN: CPT | Performed by: FAMILY MEDICINE

## 2024-01-19 RX ORDER — SCOLOPAMINE TRANSDERMAL SYSTEM 1 MG/1
1 PATCH, EXTENDED RELEASE TRANSDERMAL
COMMUNITY

## 2024-01-19 ASSESSMENT — PATIENT HEALTH QUESTIONNAIRE - PHQ9
3. TROUBLE FALLING OR STAYING ASLEEP: 3
8. MOVING OR SPEAKING SO SLOWLY THAT OTHER PEOPLE COULD HAVE NOTICED. OR THE OPPOSITE, BEING SO FIGETY OR RESTLESS THAT YOU HAVE BEEN MOVING AROUND A LOT MORE THAN USUAL: 0
7. TROUBLE CONCENTRATING ON THINGS, SUCH AS READING THE NEWSPAPER OR WATCHING TELEVISION: 3
SUM OF ALL RESPONSES TO PHQ QUESTIONS 1-9: 14
SUM OF ALL RESPONSES TO PHQ QUESTIONS 1-9: 14
10. IF YOU CHECKED OFF ANY PROBLEMS, HOW DIFFICULT HAVE THESE PROBLEMS MADE IT FOR YOU TO DO YOUR WORK, TAKE CARE OF THINGS AT HOME, OR GET ALONG WITH OTHER PEOPLE: 0
9. THOUGHTS THAT YOU WOULD BE BETTER OFF DEAD, OR OF HURTING YOURSELF: 0
6. FEELING BAD ABOUT YOURSELF - OR THAT YOU ARE A FAILURE OR HAVE LET YOURSELF OR YOUR FAMILY DOWN: 0
SUM OF ALL RESPONSES TO PHQ9 QUESTIONS 1 & 2: 3
2. FEELING DOWN, DEPRESSED OR HOPELESS: 0
SUM OF ALL RESPONSES TO PHQ QUESTIONS 1-9: 14
SUM OF ALL RESPONSES TO PHQ QUESTIONS 1-9: 14
1. LITTLE INTEREST OR PLEASURE IN DOING THINGS: 3
5. POOR APPETITE OR OVEREATING: 2
4. FEELING TIRED OR HAVING LITTLE ENERGY: 3

## 2024-01-19 NOTE — PROGRESS NOTES
Subjective:      Symone Edmonds is a 58 y.o. female who presents to the office today for a preoperative consultation at the request of surgeon Dr Ty Wilhelm who plans on performing Left posterior total hip arthroplasty on January 30.  This consultation is requested for the specific conditions prompting preoperative evaluation (i.e. because of potential affect on operative risk): HTN, DM, hypercholesterolemia, depression, RLS, COPD.  Planned anesthesia is General.  The patient has the following known anesthesia issues: none  Patient has a bleeding risk of : no recent abnormal bleeding  Patient does not have objection to receiving blood products if needed.  Past Medical History:   Diagnosis Date    Arthritis     Cancer (HCC)     IN KIDNEY REMOVED RIGHT KIDNEY     Cancer (HCC)     LUNG CANCER    Chronic obstructive pulmonary disease (HCC)     BEGINNING STAGES     Chronic pain 1993    Elevated WBC count 2002 to present    GERD (gastroesophageal reflux disease)     Hyperlipidemia     Hypertension     Kidney stones 2020    Leg swelling 07/2020    Doppler negative    Liver disease 2021    Nausea & vomiting     Obesity     Osteoarthritis     Panic attacks     PONV (postoperative nausea and vomiting)     Restless legs syndrome     Skin cancer     BASAL CELL ON CHEST    Sleep apnea     PT DOES NOT SLEEP WTH CPAP MACHINE; SLEEPS ELEVATED.    Spondylolisthesis of lumbar region     Stage 4 chronic kidney disease (HCC) 2019    Stroke (HCC) 2020    TIA     Tachycardia     Type 2 diabetes mellitus without complication (HCC)      Patient Active Problem List    Diagnosis Date Noted    Elevated alkaline phosphatase level 01/07/2024    Osteoarthritis of left hip 12/22/2023    Malignant neoplasm of right kidney, except renal pelvis (HCC) 09/28/2023    Iron deficiency anemia 02/02/2023    Hx of renal cell cancer 12/22/2022    Cancer of the skin, basal cell 10/28/2022    Incisional hernia 05/13/2021    Vitamin D deficiency 02/03/2021    H/O

## 2024-01-19 NOTE — PROGRESS NOTES
Identified pt with two pt identifiers(name and ).    Chief Complaint   Patient presents with    Pre-op Exam     2024  Left hip possible hip abductor tendon repair        Health Maintenance Due   Topic    Hepatitis B vaccine (2 of 3 - 19+ 3-dose series)    DTaP/Tdap/Td vaccine (1 - Tdap)    Low dose CT lung screening &/or counseling     COVID-19 Vaccine ( -  season)    Diabetic retinal exam     Diabetic foot exam     Diabetic Alb to Cr ratio (uACR) test     Breast cancer screen     Annual Wellness Visit (Medicare Advantage)        Wt Readings from Last 3 Encounters:   24 91.2 kg (201 lb)   24 91.2 kg (201 lb)   23 88.9 kg (196 lb)     Temp Readings from Last 3 Encounters:   24 97.9 °F (36.6 °C) (Temporal)   24 98.3 °F (36.8 °C) (Oral)   23 97.4 °F (36.3 °C) (Temporal)     BP Readings from Last 3 Encounters:   24 (!) 148/80   24 (!) 150/86   23 (!) 146/74     Pulse Readings from Last 3 Encounters:   24 98   24 94   23 96           Depression Screening:  :         2024     2:39 PM 2023    11:47 AM 2023     8:12 AM 2023    10:00 AM 2022     2:00 PM 2022     8:00 AM 10/28/2022    10:00 AM   PHQ-9 Questionaire   Little interest or pleasure in doing things 3 0 0 0 0 0 0   Feeling down, depressed, or hopeless 0 0 0 0 0 0 0   Trouble falling or staying asleep, or sleeping too much 3         Feeling tired or having little energy 3         Poor appetite or overeating 2         Feeling bad about yourself - or that you are a failure or have let yourself or your family down 0         Trouble concentrating on things, such as reading the newspaper or watching television 3         Moving or speaking so slowly that other people could have noticed. Or the opposite - being so fidgety or restless that you have been moving around a lot more than usual 0         Thoughts that you would be better off dead, or of hurting

## 2024-01-20 LAB
CREAT UR-MCNC: 38.3 MG/DL
MICROALBUMIN UR-MCNC: 0.59 MG/DL
MICROALBUMIN/CREAT UR-RTO: 15 MG/G (ref 0–30)

## 2024-01-22 ENCOUNTER — TELEPHONE (OUTPATIENT)
Age: 59
End: 2024-01-22

## 2024-01-22 NOTE — TELEPHONE ENCOUNTER
----- Message from Patricia Suazo MD sent at 1/21/2024  6:44 PM EST -----  Advise patient normal urine protein test.

## 2024-01-26 PROBLEM — M76.892 HIP ABDUCTOR TENDONITIS, LEFT: Status: ACTIVE | Noted: 2023-12-22

## 2024-01-26 NOTE — DISCHARGE INSTRUCTIONS
Post-op Discharge Instructions Following Total Joint Replacement  Ty Wilhelm MD  [facility]  [phone]  Follow-up Office Visit  See Dr. Wilhelm approximately 3-4 weeks from date of surgery. Call [phone] to make an appointment.  If you have any postop questions for Dr. Wilhelm's clinical team, please call [phone].  Activity  Use your walker for ambulation.  Weight bearing as tolerated unless instructed otherwise by the physical therapist. Get up every hour you are awake and take a brief walk. Lengthen walking distance daily as your strength improves.  Continue using your walker until seen in the office for your first follow up visit.  Practice your exercises 3 times daily as instructed by the physical therapist. Ice for 20 minutes after exercising.  No driving until seen in the office for your first follow up visit.  Incision Care  The light brown Aquacel surgical dressing is waterproof and is to remain on your incision for 7 days. On the 7th day, carefully lift the edge of the dressing to break the adhesive seal and gently peel it off.  If your Aquacel dressings comes loose or falls off before the 7th day, replace it with a dry sterile gauze dressing and change this dressing daily. Once there is no drainage on the bandage, you mean leave the incision open to air.  You may take a shower with the Aquacel dressing in place. After you remove the Aquacel dressing on day 7, you may continue to shower and get your incision wet in the shower. Do not submerge your incision under water in a bathtub, hot tub, swimming pool, etc. until after you have been evaluated at your first office visit.  Medications  Blood Clot Prevention: Take medication as prescribed by your physician for 4 weeks postop.  Pain Management: Take pain medication as prescribed; wean yourself off of pain medication as your pain lessens. Take with food.  You make also take Tylenol every 4-6 hours as needed for pain.  Do not exceed 3

## 2024-01-30 ENCOUNTER — HOSPITAL ENCOUNTER (OUTPATIENT)
Facility: HOSPITAL | Age: 59
Setting detail: OBSERVATION
Discharge: HOME | End: 2024-01-31
Attending: ORTHOPAEDIC SURGERY | Admitting: ORTHOPAEDIC SURGERY
Payer: MEDICARE

## 2024-01-30 ENCOUNTER — ANESTHESIA EVENT (OUTPATIENT)
Facility: HOSPITAL | Age: 59
End: 2024-01-30
Payer: MEDICARE

## 2024-01-30 ENCOUNTER — APPOINTMENT (OUTPATIENT)
Facility: HOSPITAL | Age: 59
End: 2024-01-30
Attending: ORTHOPAEDIC SURGERY
Payer: MEDICARE

## 2024-01-30 ENCOUNTER — ANESTHESIA (OUTPATIENT)
Facility: HOSPITAL | Age: 59
End: 2024-01-30
Payer: MEDICARE

## 2024-01-30 DIAGNOSIS — Z96.642 STATUS POST TOTAL REPLACEMENT OF LEFT HIP: Primary | ICD-10-CM

## 2024-01-30 PROBLEM — M16.12 PRIMARY OSTEOARTHRITIS OF LEFT HIP: Status: ACTIVE | Noted: 2024-01-30

## 2024-01-30 LAB
GLUCOSE BLD STRIP.AUTO-MCNC: 107 MG/DL (ref 65–117)
GLUCOSE BLD STRIP.AUTO-MCNC: 111 MG/DL (ref 65–117)
GLUCOSE BLD STRIP.AUTO-MCNC: 119 MG/DL (ref 65–117)
HCG UR QL: NEGATIVE
SERVICE CMNT-IMP: ABNORMAL
SERVICE CMNT-IMP: NORMAL
SERVICE CMNT-IMP: NORMAL

## 2024-01-30 PROCEDURE — 97530 THERAPEUTIC ACTIVITIES: CPT

## 2024-01-30 PROCEDURE — 2709999900 HC NON-CHARGEABLE SUPPLY: Performed by: ORTHOPAEDIC SURGERY

## 2024-01-30 PROCEDURE — G0378 HOSPITAL OBSERVATION PER HR: HCPCS

## 2024-01-30 PROCEDURE — 72170 X-RAY EXAM OF PELVIS: CPT

## 2024-01-30 PROCEDURE — 2580000003 HC RX 258: Performed by: PHYSICIAN ASSISTANT

## 2024-01-30 PROCEDURE — 7100000000 HC PACU RECOVERY - FIRST 15 MIN: Performed by: ORTHOPAEDIC SURGERY

## 2024-01-30 PROCEDURE — 6360000002 HC RX W HCPCS: Performed by: PHYSICIAN ASSISTANT

## 2024-01-30 PROCEDURE — 6370000000 HC RX 637 (ALT 250 FOR IP): Performed by: PHYSICIAN ASSISTANT

## 2024-01-30 PROCEDURE — 97161 PT EVAL LOW COMPLEX 20 MIN: CPT

## 2024-01-30 PROCEDURE — 7100000001 HC PACU RECOVERY - ADDTL 15 MIN: Performed by: ORTHOPAEDIC SURGERY

## 2024-01-30 PROCEDURE — 3600000015 HC SURGERY LEVEL 5 ADDTL 15MIN: Performed by: ORTHOPAEDIC SURGERY

## 2024-01-30 PROCEDURE — 3600000005 HC SURGERY LEVEL 5 BASE: Performed by: ORTHOPAEDIC SURGERY

## 2024-01-30 PROCEDURE — 6370000000 HC RX 637 (ALT 250 FOR IP): Performed by: ORTHOPAEDIC SURGERY

## 2024-01-30 PROCEDURE — 82962 GLUCOSE BLOOD TEST: CPT

## 2024-01-30 PROCEDURE — 3700000001 HC ADD 15 MINUTES (ANESTHESIA): Performed by: ORTHOPAEDIC SURGERY

## 2024-01-30 PROCEDURE — 2500000003 HC RX 250 WO HCPCS: Performed by: ANESTHESIOLOGY

## 2024-01-30 PROCEDURE — 6360000002 HC RX W HCPCS: Performed by: ANESTHESIOLOGY

## 2024-01-30 PROCEDURE — 3700000000 HC ANESTHESIA ATTENDED CARE: Performed by: ORTHOPAEDIC SURGERY

## 2024-01-30 PROCEDURE — 97116 GAIT TRAINING THERAPY: CPT

## 2024-01-30 PROCEDURE — P9045 ALBUMIN (HUMAN), 5%, 250 ML: HCPCS | Performed by: ANESTHESIOLOGY

## 2024-01-30 PROCEDURE — 2580000003 HC RX 258: Performed by: ANESTHESIOLOGY

## 2024-01-30 PROCEDURE — 81025 URINE PREGNANCY TEST: CPT

## 2024-01-30 PROCEDURE — C1776 JOINT DEVICE (IMPLANTABLE): HCPCS | Performed by: ORTHOPAEDIC SURGERY

## 2024-01-30 PROCEDURE — 2500000003 HC RX 250 WO HCPCS: Performed by: PHYSICIAN ASSISTANT

## 2024-01-30 DEVICE — TAPERFILL HIP STEM, STANDARD, SIZE 8
Type: IMPLANTABLE DEVICE | Site: HIP | Status: FUNCTIONAL
Brand: DJO SURGICAL

## 2024-01-30 DEVICE — EMPOWR ACETABULAR SYSTEM, LINER, NEUTRAL, HXE+, 36G
Type: IMPLANTABLE DEVICE | Site: HIP | Status: FUNCTIONAL
Brand: DJO SURGICAL

## 2024-01-30 DEVICE — IMPL CAPPED HIP H2 TOTAL ADV OTHER HEAD DJO: Type: IMPLANTABLE DEVICE | Site: HIP | Status: FUNCTIONAL

## 2024-01-30 DEVICE — EMPOWR ACETABULAR, BONE SCREW, 30MM
Type: IMPLANTABLE DEVICE | Site: HIP | Status: FUNCTIONAL
Brand: DJO SURGICAL

## 2024-01-30 DEVICE — EMPOWR ACET SYSTEM, CUP, HEMISPHERICAL, CLUSTER HOLE, 54MM
Type: IMPLANTABLE DEVICE | Site: HIP | Status: FUNCTIONAL
Brand: DJO SURGICAL

## 2024-01-30 DEVICE — HEAD, FEMORAL, CERAMIC, BILOX DELTA, 36MM -4.0
Type: IMPLANTABLE DEVICE | Site: HIP | Status: FUNCTIONAL
Brand: DJO SURGICAL

## 2024-01-30 RX ORDER — SODIUM CHLORIDE 9 MG/ML
INJECTION, SOLUTION INTRAVENOUS PRN
Status: DISCONTINUED | OUTPATIENT
Start: 2024-01-30 | End: 2024-01-30 | Stop reason: HOSPADM

## 2024-01-30 RX ORDER — DILTIAZEM HYDROCHLORIDE 180 MG/1
360 CAPSULE, COATED, EXTENDED RELEASE ORAL DAILY
Status: DISCONTINUED | OUTPATIENT
Start: 2024-01-31 | End: 2024-01-31 | Stop reason: HOSPADM

## 2024-01-30 RX ORDER — TIZANIDINE 4 MG/1
2 TABLET ORAL 2 TIMES DAILY PRN
Status: DISCONTINUED | OUTPATIENT
Start: 2024-01-30 | End: 2024-01-31 | Stop reason: HOSPADM

## 2024-01-30 RX ORDER — SODIUM CHLORIDE 0.9 % (FLUSH) 0.9 %
5-40 SYRINGE (ML) INJECTION EVERY 12 HOURS SCHEDULED
Status: DISCONTINUED | OUTPATIENT
Start: 2024-01-30 | End: 2024-01-30 | Stop reason: HOSPADM

## 2024-01-30 RX ORDER — SODIUM CHLORIDE, SODIUM LACTATE, POTASSIUM CHLORIDE, CALCIUM CHLORIDE 600; 310; 30; 20 MG/100ML; MG/100ML; MG/100ML; MG/100ML
INJECTION, SOLUTION INTRAVENOUS CONTINUOUS
Status: DISCONTINUED | OUTPATIENT
Start: 2024-01-30 | End: 2024-01-30 | Stop reason: HOSPADM

## 2024-01-30 RX ORDER — FENTANYL CITRATE 50 UG/ML
25 INJECTION, SOLUTION INTRAMUSCULAR; INTRAVENOUS EVERY 5 MIN PRN
Status: DISCONTINUED | OUTPATIENT
Start: 2024-01-30 | End: 2024-01-30 | Stop reason: HOSPADM

## 2024-01-30 RX ORDER — VALSARTAN 160 MG/1
320 TABLET ORAL NIGHTLY
Status: DISCONTINUED | OUTPATIENT
Start: 2024-01-30 | End: 2024-01-31 | Stop reason: HOSPADM

## 2024-01-30 RX ORDER — HYDROMORPHONE HYDROCHLORIDE 1 MG/ML
0.5 INJECTION, SOLUTION INTRAMUSCULAR; INTRAVENOUS; SUBCUTANEOUS EVERY 5 MIN PRN
Status: COMPLETED | OUTPATIENT
Start: 2024-01-30 | End: 2024-01-30

## 2024-01-30 RX ORDER — LIDOCAINE HYDROCHLORIDE 20 MG/ML
INJECTION, SOLUTION EPIDURAL; INFILTRATION; INTRACAUDAL; PERINEURAL PRN
Status: DISCONTINUED | OUTPATIENT
Start: 2024-01-30 | End: 2024-01-30 | Stop reason: SDUPTHER

## 2024-01-30 RX ORDER — 0.9 % SODIUM CHLORIDE 0.9 %
500 INTRAVENOUS SOLUTION INTRAVENOUS PRN
Status: DISCONTINUED | OUTPATIENT
Start: 2024-01-30 | End: 2024-01-31 | Stop reason: HOSPADM

## 2024-01-30 RX ORDER — ALBUTEROL SULFATE 2.5 MG/3ML
2.5 SOLUTION RESPIRATORY (INHALATION) EVERY 4 HOURS PRN
Status: DISCONTINUED | OUTPATIENT
Start: 2024-01-30 | End: 2024-01-31 | Stop reason: HOSPADM

## 2024-01-30 RX ORDER — SODIUM CHLORIDE, SODIUM LACTATE, POTASSIUM CHLORIDE, CALCIUM CHLORIDE 600; 310; 30; 20 MG/100ML; MG/100ML; MG/100ML; MG/100ML
INJECTION, SOLUTION INTRAVENOUS CONTINUOUS PRN
Status: DISCONTINUED | OUTPATIENT
Start: 2024-01-30 | End: 2024-01-30 | Stop reason: SDUPTHER

## 2024-01-30 RX ORDER — ACETAMINOPHEN 500 MG
500 TABLET ORAL
Status: DISCONTINUED | OUTPATIENT
Start: 2024-01-30 | End: 2024-01-31 | Stop reason: HOSPADM

## 2024-01-30 RX ORDER — SODIUM CHLORIDE 0.9 % (FLUSH) 0.9 %
5-40 SYRINGE (ML) INJECTION PRN
Status: DISCONTINUED | OUTPATIENT
Start: 2024-01-30 | End: 2024-01-31 | Stop reason: HOSPADM

## 2024-01-30 RX ORDER — LIDOCAINE HYDROCHLORIDE 10 MG/ML
1 INJECTION, SOLUTION EPIDURAL; INFILTRATION; INTRACAUDAL; PERINEURAL
Status: DISCONTINUED | OUTPATIENT
Start: 2024-01-30 | End: 2024-01-30 | Stop reason: HOSPADM

## 2024-01-30 RX ORDER — CETIRIZINE HYDROCHLORIDE 10 MG/1
10 TABLET ORAL DAILY
Status: DISCONTINUED | OUTPATIENT
Start: 2024-01-31 | End: 2024-01-31 | Stop reason: HOSPADM

## 2024-01-30 RX ORDER — PROCHLORPERAZINE EDISYLATE 5 MG/ML
5 INJECTION INTRAMUSCULAR; INTRAVENOUS
Status: DISCONTINUED | OUTPATIENT
Start: 2024-01-30 | End: 2024-01-30 | Stop reason: HOSPADM

## 2024-01-30 RX ORDER — SENNA AND DOCUSATE SODIUM 50; 8.6 MG/1; MG/1
1 TABLET, FILM COATED ORAL 2 TIMES DAILY
Status: DISCONTINUED | OUTPATIENT
Start: 2024-01-30 | End: 2024-01-31 | Stop reason: HOSPADM

## 2024-01-30 RX ORDER — SODIUM CHLORIDE 0.9 % (FLUSH) 0.9 %
5-40 SYRINGE (ML) INJECTION EVERY 12 HOURS SCHEDULED
Status: DISCONTINUED | OUTPATIENT
Start: 2024-01-30 | End: 2024-01-31 | Stop reason: HOSPADM

## 2024-01-30 RX ORDER — INSULIN LISPRO 100 [IU]/ML
0-4 INJECTION, SOLUTION INTRAVENOUS; SUBCUTANEOUS NIGHTLY
Status: DISCONTINUED | OUTPATIENT
Start: 2024-01-30 | End: 2024-01-31 | Stop reason: HOSPADM

## 2024-01-30 RX ORDER — MIDAZOLAM HYDROCHLORIDE 2 MG/2ML
2 INJECTION, SOLUTION INTRAMUSCULAR; INTRAVENOUS
Status: COMPLETED | OUTPATIENT
Start: 2024-01-30 | End: 2024-01-30

## 2024-01-30 RX ORDER — OXYCODONE HYDROCHLORIDE 5 MG/1
5 TABLET ORAL
Status: DISCONTINUED | OUTPATIENT
Start: 2024-01-30 | End: 2024-01-31 | Stop reason: HOSPADM

## 2024-01-30 RX ORDER — INSULIN LISPRO 100 [IU]/ML
0-8 INJECTION, SOLUTION INTRAVENOUS; SUBCUTANEOUS
Status: DISCONTINUED | OUTPATIENT
Start: 2024-01-30 | End: 2024-01-31 | Stop reason: HOSPADM

## 2024-01-30 RX ORDER — ONDANSETRON 2 MG/ML
4 INJECTION INTRAMUSCULAR; INTRAVENOUS
Status: DISCONTINUED | OUTPATIENT
Start: 2024-01-30 | End: 2024-01-30 | Stop reason: HOSPADM

## 2024-01-30 RX ORDER — OXYCODONE HYDROCHLORIDE 5 MG/1
5 TABLET ORAL
Status: DISCONTINUED | OUTPATIENT
Start: 2024-01-30 | End: 2024-01-30 | Stop reason: HOSPADM

## 2024-01-30 RX ORDER — ALBUMIN, HUMAN INJ 5% 5 %
SOLUTION INTRAVENOUS PRN
Status: DISCONTINUED | OUTPATIENT
Start: 2024-01-30 | End: 2024-01-30 | Stop reason: SDUPTHER

## 2024-01-30 RX ORDER — SODIUM CHLORIDE 0.9 % (FLUSH) 0.9 %
5-40 SYRINGE (ML) INJECTION PRN
Status: DISCONTINUED | OUTPATIENT
Start: 2024-01-30 | End: 2024-01-30 | Stop reason: HOSPADM

## 2024-01-30 RX ORDER — SODIUM CHLORIDE 9 MG/ML
INJECTION, SOLUTION INTRAVENOUS PRN
Status: DISCONTINUED | OUTPATIENT
Start: 2024-01-30 | End: 2024-01-31 | Stop reason: HOSPADM

## 2024-01-30 RX ORDER — VENLAFAXINE HYDROCHLORIDE 37.5 MG/1
150 CAPSULE, EXTENDED RELEASE ORAL EVERY EVENING
Status: DISCONTINUED | OUTPATIENT
Start: 2024-01-30 | End: 2024-01-31 | Stop reason: HOSPADM

## 2024-01-30 RX ORDER — FENTANYL CITRATE 50 UG/ML
100 INJECTION, SOLUTION INTRAMUSCULAR; INTRAVENOUS
Status: DISCONTINUED | OUTPATIENT
Start: 2024-01-30 | End: 2024-01-30 | Stop reason: HOSPADM

## 2024-01-30 RX ORDER — TRAZODONE HYDROCHLORIDE 50 MG/1
50 TABLET ORAL NIGHTLY
Status: DISCONTINUED | OUTPATIENT
Start: 2024-01-30 | End: 2024-01-31 | Stop reason: HOSPADM

## 2024-01-30 RX ORDER — PANTOPRAZOLE SODIUM 40 MG/1
40 TABLET, DELAYED RELEASE ORAL
Status: DISCONTINUED | OUTPATIENT
Start: 2024-01-30 | End: 2024-01-31 | Stop reason: HOSPADM

## 2024-01-30 RX ORDER — ONDANSETRON 2 MG/ML
4 INJECTION INTRAMUSCULAR; INTRAVENOUS EVERY 6 HOURS PRN
Status: DISCONTINUED | OUTPATIENT
Start: 2024-01-30 | End: 2024-01-31 | Stop reason: HOSPADM

## 2024-01-30 RX ORDER — ONDANSETRON 2 MG/ML
INJECTION INTRAMUSCULAR; INTRAVENOUS PRN
Status: DISCONTINUED | OUTPATIENT
Start: 2024-01-30 | End: 2024-01-30 | Stop reason: SDUPTHER

## 2024-01-30 RX ORDER — ACETAMINOPHEN 500 MG
1000 TABLET ORAL ONCE
Status: DISCONTINUED | OUTPATIENT
Start: 2024-01-30 | End: 2024-01-30 | Stop reason: HOSPADM

## 2024-01-30 RX ORDER — HYDROXYZINE HYDROCHLORIDE 10 MG/1
10 TABLET, FILM COATED ORAL EVERY 8 HOURS PRN
Status: DISCONTINUED | OUTPATIENT
Start: 2024-01-30 | End: 2024-01-31 | Stop reason: HOSPADM

## 2024-01-30 RX ORDER — HYDROMORPHONE HYDROCHLORIDE 1 MG/ML
0.5 INJECTION, SOLUTION INTRAMUSCULAR; INTRAVENOUS; SUBCUTANEOUS EVERY 4 HOURS PRN
Status: DISCONTINUED | OUTPATIENT
Start: 2024-01-30 | End: 2024-01-31 | Stop reason: HOSPADM

## 2024-01-30 RX ORDER — ROPINIROLE 1 MG/1
2 TABLET, FILM COATED ORAL 3 TIMES DAILY
Status: DISCONTINUED | OUTPATIENT
Start: 2024-01-30 | End: 2024-01-31 | Stop reason: HOSPADM

## 2024-01-30 RX ORDER — SODIUM CHLORIDE 9 MG/ML
INJECTION, SOLUTION INTRAVENOUS CONTINUOUS
Status: DISCONTINUED | OUTPATIENT
Start: 2024-01-30 | End: 2024-01-31 | Stop reason: HOSPADM

## 2024-01-30 RX ORDER — HYDRALAZINE HYDROCHLORIDE 20 MG/ML
10 INJECTION INTRAMUSCULAR; INTRAVENOUS
Status: DISCONTINUED | OUTPATIENT
Start: 2024-01-30 | End: 2024-01-30 | Stop reason: HOSPADM

## 2024-01-30 RX ORDER — ONDANSETRON 4 MG/1
4 TABLET, ORALLY DISINTEGRATING ORAL EVERY 8 HOURS PRN
Status: DISCONTINUED | OUTPATIENT
Start: 2024-01-30 | End: 2024-01-31 | Stop reason: HOSPADM

## 2024-01-30 RX ORDER — ACETAMINOPHEN 500 MG
1000 TABLET ORAL ONCE
Status: COMPLETED | OUTPATIENT
Start: 2024-01-30 | End: 2024-01-30

## 2024-01-30 RX ORDER — POLYETHYLENE GLYCOL 3350 17 G/17G
17 POWDER, FOR SOLUTION ORAL DAILY PRN
Status: DISCONTINUED | OUTPATIENT
Start: 2024-01-30 | End: 2024-01-31 | Stop reason: HOSPADM

## 2024-01-30 RX ORDER — DEXTROSE MONOHYDRATE 100 MG/ML
INJECTION, SOLUTION INTRAVENOUS CONTINUOUS PRN
Status: DISCONTINUED | OUTPATIENT
Start: 2024-01-30 | End: 2024-01-31 | Stop reason: HOSPADM

## 2024-01-30 RX ORDER — IPRATROPIUM BROMIDE AND ALBUTEROL SULFATE 2.5; .5 MG/3ML; MG/3ML
1 SOLUTION RESPIRATORY (INHALATION) EVERY 6 HOURS PRN
Status: DISCONTINUED | OUTPATIENT
Start: 2024-01-30 | End: 2024-01-31 | Stop reason: HOSPADM

## 2024-01-30 RX ORDER — BISACODYL 10 MG
10 SUPPOSITORY, RECTAL RECTAL DAILY PRN
Status: DISCONTINUED | OUTPATIENT
Start: 2024-01-30 | End: 2024-01-31 | Stop reason: HOSPADM

## 2024-01-30 RX ORDER — ATORVASTATIN CALCIUM 40 MG/1
40 TABLET, FILM COATED ORAL EVERY EVENING
Status: DISCONTINUED | OUTPATIENT
Start: 2024-01-30 | End: 2024-01-31 | Stop reason: HOSPADM

## 2024-01-30 RX ORDER — OXYCODONE HYDROCHLORIDE 5 MG/1
2.5 TABLET ORAL
Status: DISCONTINUED | OUTPATIENT
Start: 2024-01-30 | End: 2024-01-31 | Stop reason: HOSPADM

## 2024-01-30 RX ADMIN — SENNOSIDES AND DOCUSATE SODIUM 1 TABLET: 8.6; 5 TABLET ORAL at 21:06

## 2024-01-30 RX ADMIN — PHENYLEPHRINE HYDROCHLORIDE 40 MCG/MIN: 10 INJECTION INTRAVENOUS at 12:58

## 2024-01-30 RX ADMIN — ALBUMIN (HUMAN) 250 ML: 12.5 INJECTION, SOLUTION INTRAVENOUS at 13:43

## 2024-01-30 RX ADMIN — HYDROMORPHONE HYDROCHLORIDE 0.5 MG: 1 INJECTION, SOLUTION INTRAMUSCULAR; INTRAVENOUS; SUBCUTANEOUS at 15:24

## 2024-01-30 RX ADMIN — WATER 2000 MG: 1 INJECTION INTRAMUSCULAR; INTRAVENOUS; SUBCUTANEOUS at 21:08

## 2024-01-30 RX ADMIN — ROPINIROLE HYDROCHLORIDE 2 MG: 1 TABLET, FILM COATED ORAL at 21:06

## 2024-01-30 RX ADMIN — FENTANYL CITRATE 25 MCG: 50 INJECTION INTRAMUSCULAR; INTRAVENOUS at 16:22

## 2024-01-30 RX ADMIN — ACETAMINOPHEN 500 MG: 500 TABLET ORAL at 17:26

## 2024-01-30 RX ADMIN — TRANEXAMIC ACID 1000 MG: 100 INJECTION, SOLUTION INTRAVENOUS at 12:48

## 2024-01-30 RX ADMIN — OXYCODONE 5 MG: 5 TABLET ORAL at 21:06

## 2024-01-30 RX ADMIN — VENLAFAXINE HYDROCHLORIDE 150 MG: 37.5 CAPSULE, EXTENDED RELEASE ORAL at 17:24

## 2024-01-30 RX ADMIN — ACETAMINOPHEN 500 MG: 500 TABLET ORAL at 21:05

## 2024-01-30 RX ADMIN — PANTOPRAZOLE SODIUM 40 MG: 40 TABLET, DELAYED RELEASE ORAL at 17:26

## 2024-01-30 RX ADMIN — WATER 2000 MG: 1 INJECTION INTRAMUSCULAR; INTRAVENOUS; SUBCUTANEOUS at 12:33

## 2024-01-30 RX ADMIN — PROPOFOL 150 MCG/KG/MIN: 10 INJECTION, EMULSION INTRAVENOUS at 12:21

## 2024-01-30 RX ADMIN — OXYCODONE 5 MG: 5 TABLET ORAL at 18:19

## 2024-01-30 RX ADMIN — TRAZODONE HYDROCHLORIDE 50 MG: 50 TABLET ORAL at 21:06

## 2024-01-30 RX ADMIN — ACETAMINOPHEN 1000 MG: 500 TABLET ORAL at 11:26

## 2024-01-30 RX ADMIN — ATORVASTATIN CALCIUM 40 MG: 40 TABLET, FILM COATED ORAL at 17:26

## 2024-01-30 RX ADMIN — SODIUM CHLORIDE, PRESERVATIVE FREE 10 ML: 5 INJECTION INTRAVENOUS at 21:00

## 2024-01-30 RX ADMIN — SODIUM CHLORIDE, POTASSIUM CHLORIDE, SODIUM LACTATE AND CALCIUM CHLORIDE: 600; 310; 30; 20 INJECTION, SOLUTION INTRAVENOUS at 12:18

## 2024-01-30 RX ADMIN — FENTANYL CITRATE 25 MCG: 50 INJECTION INTRAMUSCULAR; INTRAVENOUS at 15:41

## 2024-01-30 RX ADMIN — HYDROMORPHONE HYDROCHLORIDE 0.5 MG: 1 INJECTION, SOLUTION INTRAMUSCULAR; INTRAVENOUS; SUBCUTANEOUS at 17:21

## 2024-01-30 RX ADMIN — ONDANSETRON 4 MG: 2 INJECTION INTRAMUSCULAR; INTRAVENOUS at 14:13

## 2024-01-30 RX ADMIN — SODIUM CHLORIDE: 9 INJECTION, SOLUTION INTRAVENOUS at 15:34

## 2024-01-30 RX ADMIN — SODIUM CHLORIDE, POTASSIUM CHLORIDE, SODIUM LACTATE AND CALCIUM CHLORIDE: 600; 310; 30; 20 INJECTION, SOLUTION INTRAVENOUS at 13:43

## 2024-01-30 RX ADMIN — HYDROMORPHONE HYDROCHLORIDE 0.5 MG: 1 INJECTION, SOLUTION INTRAMUSCULAR; INTRAVENOUS; SUBCUTANEOUS at 15:19

## 2024-01-30 RX ADMIN — MIDAZOLAM HYDROCHLORIDE 2 MG: 1 INJECTION, SOLUTION INTRAMUSCULAR; INTRAVENOUS at 12:12

## 2024-01-30 RX ADMIN — MEPIVACAINE HYDROCHLORIDE 60 MG: 15 INJECTION, SOLUTION EPIDURAL; INFILTRATION at 12:25

## 2024-01-30 RX ADMIN — LIDOCAINE HYDROCHLORIDE 40 MG: 20 INJECTION, SOLUTION EPIDURAL; INFILTRATION; INTRACAUDAL; PERINEURAL at 12:20

## 2024-01-30 ASSESSMENT — PAIN SCALES - GENERAL
PAINLEVEL_OUTOF10: 7
PAINLEVEL_OUTOF10: 10
PAINLEVEL_OUTOF10: 7
PAINLEVEL_OUTOF10: 4
PAINLEVEL_OUTOF10: 5
PAINLEVEL_OUTOF10: 9
PAINLEVEL_OUTOF10: 7

## 2024-01-30 ASSESSMENT — PAIN DESCRIPTION - ORIENTATION
ORIENTATION: LEFT

## 2024-01-30 ASSESSMENT — PAIN DESCRIPTION - LOCATION
LOCATION: HIP

## 2024-01-30 ASSESSMENT — PAIN DESCRIPTION - DESCRIPTORS
DESCRIPTORS: ACHING

## 2024-01-30 ASSESSMENT — PAIN - FUNCTIONAL ASSESSMENT: PAIN_FUNCTIONAL_ASSESSMENT: 0-10

## 2024-01-30 NOTE — BRIEF OP NOTE
Brief Postoperative Note      Patient: Symone Edmonds  YOB: 1965  MRN: [id number]    Date of Procedure: 1/30/2024    Pre-Op Diagnosis Codes:     * Osteoarthritis of left hip [M16.12]     * Hip abductor tendonitis, left [M76.892]    Post-Op Diagnosis:  Osteoarthritis left hip       Procedure(s):  LEFT POSTERIOR TOTAL HIP ARTHROPLASTY    Surgeon(s):  Ty Wilhelm MD    Assistant:  Surgical Assistant: Terrie Jay    Anesthesia: Spinal    Estimated Blood Loss (mL): 200     Complications: None    Specimens:   * No specimens in log *    Implants:  Implant Name Type Inv. Item Serial No.  Lot No. LRB No. Used Action   CUP ACET CLUS HOLE G 54 MM W/ DOME HOLE PLUG P2 COAT EMPOWR - SN/A  CUP ACET CLUS HOLE G 54 MM W/ DOME HOLE PLUG P2 COAT EMPOWR N/A [facility] [id number] Left 1 Implanted   STEM FEM STD OFFSET 8  MM 37 MM HIP CMNTLS CLLRLSS - SN/A  STEM FEM STD OFFSET 8  MM 37 MM HIP CMNTLS CLLRLSS N/A [facility] [id number] Left 1 Implanted   SCREW BNE 30 MM ACET EMPOWR - SN/A  SCREW BNE 30 MM ACET EMPOWR N/A [facility] [id number] Left 1 Implanted   LINER ACET NEUT G 36X54 MM HIP HXE+ VIT E POLYETH EMPOWR - SN/A  LINER ACET NEUT G 36X54 MM HIP HXE+ VIT E POLYETH EMPOWR N/A [facility] [id number] Left 1 Implanted   LINER ACET NEUT G 36X54 MM HIP HXE+ VIT E POLYETH EMPOWR - SN/A  LINER ACET NEUT G 36X54 MM HIP HXE+ VIT E POLYETH EMPOWR N/A [facility] [id number] Left 1 Implanted         Drains: * No LDAs found *    Findings: severe hip OA; abductor tendon insertion grossly normal      Electronically signed by Ty Wilhelm MD on 1/30/2024 at 2:18 PM

## 2024-01-30 NOTE — ANESTHESIA PROCEDURE NOTES
Spinal Block    Patient location during procedure: OR  Reason for block: primary anesthetic and at surgeon's request  Staffing  Performed by: Judith Curiel DO  Authorized by: Judith Curiel DO    Spinal Block  Patient position: sitting  Prep: ChloraPrep and site prepped and draped  Patient monitoring: cardiac monitor, continuous pulse ox, continuous capnometry, frequent blood pressure checks and oxygen  Approach: midline  Location: L3/L4  Provider prep: sterile gloves and mask  Needle  Needle gauge: 24 G  Assessment  Attempts: 1  Hemodynamics: stable

## 2024-01-30 NOTE — PLAN OF CARE
Problem: Physical Therapy - Adult  Goal: By Discharge: Performs mobility at highest level of function for planned discharge setting.  See evaluation for individualized goals.  Description: FUNCTIONAL STATUS PRIOR TO ADMISSION: Patient was independent and active without use of DME.    HOME SUPPORT PRIOR TO ADMISSION: The patient lived with family but did not require assistance.    Physical Therapy Goals  Initiated 1/30/2024  1.  Patient will move from supine to sit and sit to supine, scoot up and down, and roll side to side in bed with modified independence within 4 day(s).    2.  Patient will perform sit to stand with modified independence within 4 day(s).  3.  Patient will transfer from bed to chair and chair to bed with modified independence using the least restrictive device within 4 day(s).  4.  Patient will ambulate with modified independence for 150 feet with the least restrictive device within 4 day(s).   5.  Patient will ascend/descend 4 stairs with cane and one handrail(s) with modified independence within 4 day(s).  6.  Patient will perform post-JETHRO home exercise program per protocol with independence within 4 days.       Outcome: Progressing   PHYSICAL THERAPY EVALUATION    Patient: Symone Edmonds (58 y.o. female)  Date: 1/30/2024  Primary Diagnosis: Osteoarthritis of left hip [M16.12]  Hip abductor tendonitis, left [M76.892]  Primary osteoarthritis of left hip [M16.12]  Procedure(s) (LRB):  LEFT POSTERIOR TOTAL HIP ARTHROPLASTY (Left) Day of Surgery   Precautions: Restrictions/Precautions: Weight Bearing, Fall Risk  Required Braces or Orthoses?: No Required Braces or Orthoses?: No Lower Extremity Weight Bearing Restrictions  Left Lower Extremity Weight Bearing: Weight Bearing As Tolerated                  ASSESSMENT :   DEFICITS/IMPAIRMENTS:   Based on the objective data described below, the patient presents with  impairment in functional mobility, activity tolerance and balance s/p L JETHRO.  PLOF:

## 2024-01-30 NOTE — ANESTHESIA POSTPROCEDURE EVALUATION
Department of Anesthesiology  Postprocedure Note    Patient: Symone Edmonds  MRN: [id number]  YOB: 1965  Date of evaluation: 1/30/2024    Procedure Summary       Date: 01/30/24 Room / Location: [facility] MAIN OR [address] [place] [place] [address] MAIN OR    Anesthesia Start: 1218 Anesthesia Stop: 1457    Procedure: LEFT POSTERIOR TOTAL HIP ARTHROPLASTY (Left: Hip) Diagnosis:       Osteoarthritis of left hip      Hip abductor tendonitis, left      (Osteoarthritis of left hip [M16.12])      (Hip abductor tendonitis, left [M76.892])    Providers: Ty Wilhelm MD Responsible Provider: Judith Curiel DO    Anesthesia Type: MAC, spinal ASA Status: 3            Anesthesia Type: No value filed.    Clayton Phase I: Clayton Score: 9    Clayton Phase II:      Anesthesia Post Evaluation    Patient location during evaluation: PACU  Level of consciousness: awake  Airway patency: patent  Nausea & Vomiting: no nausea  Cardiovascular status: hemodynamically stable  Respiratory status: acceptable  Hydration status: stable  Multimodal analgesia pain management approach  Pain management: adequate    No notable events documented.

## 2024-01-30 NOTE — ANESTHESIA PRE PROCEDURE
REPLACEMENT Bilateral 2016    KNEE    KNEE ARTHROSCOPY Bilateral 2012    SANDRO STEREO BREAST BX ADD LESION LEFT Left 10/29/2018    SANDRO STEREO BREAST BX ADD LESION LEFT [facility] HISTOR[facility]    NEPHROSTOMY Right     NEUROLOGICAL SURGERY      lumbar nerve block    OTHER SURGICAL HISTORY Right 09/06/2023    jennifer wedge    PA UNLISTED PROCEDURE CARDIAC SURGERY  2013    Ablation    ROTATOR CUFF REPAIR Left 2018    SEPTOPLASTY  1993    SKIN BIOPSY      TOTAL KNEE ARTHROPLASTY Left 2012    TOTAL KNEE ARTHROPLASTY Right 08/18/2016    TUBAL LIGATION  1989    UROLOGICAL SURGERY Right 07/2020    nephrectomy    US ASP BREAST CYST LEFT Left 10/26/2018    US BREAST CYST ASPIRATION LEFT [facility]OR[facility]    WISDOM TOOTH EXTRACTION      X2       Social History:    Social History     Tobacco Use    Smoking status: Every Day     Current packs/day: 1.00     Average packs/day: 0.6 packs/day for 45.1 years (25.1 ttl pk-yrs)     Types: Cigarettes     Start date: 01/2019    Smokeless tobacco: Never   Substance Use Topics    Alcohol use: Not Currently                                Ready to quit: Not Answered  Counseling given: Not Answered      Vital Signs (Current):   Vitals:    01/30/24 1109   BP: (!) 145/88   Pulse: 83   Resp: 16   Temp: 98.2 °F (36.8 °C)   TempSrc: Oral   SpO2: 94%   Weight: 91.2 kg (201 lb 1 oz)   Height: 1.613 m (5' 3.5\")                                              BP Readings from Last 3 Encounters:   01/30/24 (!) 145/88   01/16/24 (!) 150/86   01/19/24 130/80       NPO Status: Time of last liquid consumption: 1000 (sips water)                        Time of last solid consumption: 2000                        Date of last liquid consumption: 01/30/24                        Date of last solid food consumption: 01/29/24    BMI:   Wt Readings from Last 3 Encounters:   01/30/24 91.2 kg (201 lb 1 oz)   01/16/24 91.2 kg (201 lb)   01/19/24 91.2 kg (201 lb)     Body mass index is 35.06 kg/m².    CBC:   Lab Results

## 2024-01-30 NOTE — PROGRESS NOTES
TRANSFER - OUT REPORT:    Verbal report given to Lito RN(name) on Symone Edmonds  being transferred to (unit) for routine transfer of care.       Report consisted of patient’s Situation, Background, Assessment and   Recommendations(SBAR).     Time Pre op antibiotic given:1233  Anesthesia Stop time: 1455    Information from the following report(s) SBAR, procedure, medications given was reviewed with the receiving nurse.    Opportunity for questions and clarification was provided.     Is the patient on 02? yes       L/Min 2       Other     Is the patient on a monitor? no    Is the nurse transporting with the patient? no    Surgical Waiting Area notified of patient's transfer from PACU? yes     Mom calls regarding dropping off a urine sample yesterday and if results have come in. Mom states patient recently completed antibiotic therapy for a UTI and placed on Omnicef suspension for 7 days started on 11/4/18. Mom states patient had e-coli in his urine. Mom states patient is complaining of pain when he is cathed and having urgency. Mom states the urine is having a smell, cloudy and having a lot of sediment. Mom is wondering if the antibiotic completed was sensitive and was given the correct antibiotic to completely get rid of the UTI. Mom states this is the third time in the row of having UTI's since August and usually does not have problems and usually has once per year. Mom concerned if patient is clearing the UTI with the antibiotic prescribed. Mom states with treatment patient slightly improves, extra water given and caution used but then eventually the urine and patient starts to show symptoms of UTI. Mom is wondering if patient needs to be on the antibiotic for a longer period of time. 7 and 10 day prescriptions have been prescribed but nothing longer. Per chart review UA is positive for nitrite and large amount of bacteria culture is pending.       Please review previous culture and current UA results per mom's request.    Thank you

## 2024-01-31 VITALS
TEMPERATURE: 98.1 F | DIASTOLIC BLOOD PRESSURE: 74 MMHG | HEIGHT: 64 IN | BODY MASS INDEX: 34.33 KG/M2 | OXYGEN SATURATION: 97 % | WEIGHT: 201.06 LBS | HEART RATE: 93 BPM | RESPIRATION RATE: 14 BRPM | SYSTOLIC BLOOD PRESSURE: 154 MMHG

## 2024-01-31 LAB
ANION GAP SERPL CALC-SCNC: 3 MMOL/L (ref 5–15)
BUN SERPL-MCNC: 9 MG/DL (ref 6–20)
BUN/CREAT SERPL: 10 (ref 12–20)
CALCIUM SERPL-MCNC: 8.4 MG/DL (ref 8.5–10.1)
CHLORIDE SERPL-SCNC: 110 MMOL/L (ref 97–108)
CO2 SERPL-SCNC: 26 MMOL/L (ref 21–32)
CREAT SERPL-MCNC: 0.93 MG/DL (ref 0.55–1.02)
GLUCOSE BLD STRIP.AUTO-MCNC: 119 MG/DL (ref 65–117)
GLUCOSE BLD STRIP.AUTO-MCNC: 131 MG/DL (ref 65–117)
GLUCOSE SERPL-MCNC: 138 MG/DL (ref 65–100)
HCT VFR BLD AUTO: 39.2 % (ref 35–47)
HGB BLD-MCNC: 12.5 G/DL (ref 11.5–16)
POTASSIUM SERPL-SCNC: 4.4 MMOL/L (ref 3.5–5.1)
SERVICE CMNT-IMP: ABNORMAL
SERVICE CMNT-IMP: ABNORMAL
SODIUM SERPL-SCNC: 139 MMOL/L (ref 136–145)

## 2024-01-31 PROCEDURE — 85014 HEMATOCRIT: CPT

## 2024-01-31 PROCEDURE — 82962 GLUCOSE BLOOD TEST: CPT

## 2024-01-31 PROCEDURE — 97165 OT EVAL LOW COMPLEX 30 MIN: CPT

## 2024-01-31 PROCEDURE — 6360000002 HC RX W HCPCS: Performed by: PHYSICIAN ASSISTANT

## 2024-01-31 PROCEDURE — 94760 N-INVAS EAR/PLS OXIMETRY 1: CPT

## 2024-01-31 PROCEDURE — APPNB60 APP NON BILLABLE TIME 46-60 MINS: Performed by: NURSE PRACTITIONER

## 2024-01-31 PROCEDURE — 97535 SELF CARE MNGMENT TRAINING: CPT

## 2024-01-31 PROCEDURE — 96374 THER/PROPH/DIAG INJ IV PUSH: CPT

## 2024-01-31 PROCEDURE — 36415 COLL VENOUS BLD VENIPUNCTURE: CPT

## 2024-01-31 PROCEDURE — 6370000000 HC RX 637 (ALT 250 FOR IP): Performed by: PHYSICIAN ASSISTANT

## 2024-01-31 PROCEDURE — G0378 HOSPITAL OBSERVATION PER HR: HCPCS

## 2024-01-31 PROCEDURE — 80048 BASIC METABOLIC PNL TOTAL CA: CPT

## 2024-01-31 PROCEDURE — 2580000003 HC RX 258: Performed by: PHYSICIAN ASSISTANT

## 2024-01-31 PROCEDURE — 97116 GAIT TRAINING THERAPY: CPT

## 2024-01-31 PROCEDURE — 94640 AIRWAY INHALATION TREATMENT: CPT

## 2024-01-31 PROCEDURE — 96376 TX/PRO/DX INJ SAME DRUG ADON: CPT

## 2024-01-31 PROCEDURE — 2700000000 HC OXYGEN THERAPY PER DAY

## 2024-01-31 PROCEDURE — 85018 HEMOGLOBIN: CPT

## 2024-01-31 PROCEDURE — 97110 THERAPEUTIC EXERCISES: CPT

## 2024-01-31 RX ORDER — OXYCODONE HYDROCHLORIDE 5 MG/1
2.5-5 TABLET ORAL EVERY 4 HOURS PRN
Qty: 42 TABLET | Refills: 0 | Status: SHIPPED | OUTPATIENT
Start: 2024-01-31 | End: 2024-02-07

## 2024-01-31 RX ORDER — SENNA AND DOCUSATE SODIUM 50; 8.6 MG/1; MG/1
1 TABLET, FILM COATED ORAL 2 TIMES DAILY
Qty: 14 TABLET | Refills: 0 | Status: SHIPPED | OUTPATIENT
Start: 2024-01-31 | End: 2024-02-07

## 2024-01-31 RX ADMIN — SODIUM CHLORIDE: 9 INJECTION, SOLUTION INTRAVENOUS at 00:32

## 2024-01-31 RX ADMIN — HYDROMORPHONE HYDROCHLORIDE 0.5 MG: 1 INJECTION, SOLUTION INTRAMUSCULAR; INTRAVENOUS; SUBCUTANEOUS at 04:46

## 2024-01-31 RX ADMIN — CETIRIZINE HYDROCHLORIDE 10 MG: 10 TABLET, FILM COATED ORAL at 08:44

## 2024-01-31 RX ADMIN — IPRATROPIUM BROMIDE 0.5 MG: 0.5 SOLUTION RESPIRATORY (INHALATION) at 08:13

## 2024-01-31 RX ADMIN — WATER 2000 MG: 1 INJECTION INTRAMUSCULAR; INTRAVENOUS; SUBCUTANEOUS at 04:31

## 2024-01-31 RX ADMIN — OXYCODONE 5 MG: 5 TABLET ORAL at 03:19

## 2024-01-31 RX ADMIN — SENNOSIDES AND DOCUSATE SODIUM 1 TABLET: 8.6; 5 TABLET ORAL at 08:43

## 2024-01-31 RX ADMIN — APIXABAN 2.5 MG: 5 TABLET, FILM COATED ORAL at 08:44

## 2024-01-31 RX ADMIN — ACETAMINOPHEN 500 MG: 500 TABLET ORAL at 08:43

## 2024-01-31 RX ADMIN — PANTOPRAZOLE SODIUM 40 MG: 40 TABLET, DELAYED RELEASE ORAL at 08:21

## 2024-01-31 RX ADMIN — DILTIAZEM HYDROCHLORIDE 360 MG: 180 CAPSULE, COATED, EXTENDED RELEASE ORAL at 08:43

## 2024-01-31 RX ADMIN — ROPINIROLE HYDROCHLORIDE 2 MG: 1 TABLET, FILM COATED ORAL at 08:43

## 2024-01-31 RX ADMIN — HYDROMORPHONE HYDROCHLORIDE 0.5 MG: 1 INJECTION, SOLUTION INTRAMUSCULAR; INTRAVENOUS; SUBCUTANEOUS at 08:57

## 2024-01-31 RX ADMIN — ACETAMINOPHEN 500 MG: 500 TABLET ORAL at 04:46

## 2024-01-31 ASSESSMENT — PAIN DESCRIPTION - LOCATION
LOCATION: HIP
LOCATION: HIP

## 2024-01-31 ASSESSMENT — PAIN DESCRIPTION - ORIENTATION: ORIENTATION: LEFT

## 2024-01-31 ASSESSMENT — PAIN SCALES - GENERAL
PAINLEVEL_OUTOF10: 5
PAINLEVEL_OUTOF10: 5

## 2024-01-31 ASSESSMENT — PAIN DESCRIPTION - DESCRIPTORS: DESCRIPTORS: DISCOMFORT

## 2024-01-31 NOTE — PROGRESS NOTES
Patient seen and cleared by PT Team. Most functional mobility tasks performed with supervision/sba including functional transfers, unit ambulation and stairs. Pt already owns DME. Recommend dc to home with familial/friends support and HHPT. If still at [facility] this afternoon, PT Team will follow as appropriate. Otherwise, Pt is cleared for home and confirms readiness for discharge, when able.  Note to follow.    Yolanda Munoz, PT, DPT

## 2024-01-31 NOTE — DISCHARGE SUMMARY
Ortho Discharge Summary    Patient ID:  Symone Edmonds  [id number]  female  58 y.o.  1965    Admit date: 1/30/2024    Discharge date: 1/31/2024    Admitting Physician: Ty Wilhelm MD     Consulting Physician(s):   Treatment Team: Attending Provider: Ty Wilhelm MD; Surgeon: Ty Wilhelm MD; Occupational Therapist: Agustina Lake OT; Utilization Reviewer: Ashley Morales RN; Registered Nurse: Dara Fonseca RN; Physical Therapist: Yolanda Munoz, PT    Date of Surgery:   1/30/2024     Preoperative Diagnosis:  Osteoarthritis of left hip [M16.12]  Hip abductor tendonitis, left [M76.892]    Postoperative Diagnosis:   * No post-op diagnosis entered *    Procedure(s):   LEFT POSTERIOR TOTAL HIP ARTHROPLASTY     Anesthesia Type:   Spinal     Surgeon: Ty Wilhelm MD                            HPI:  Pt is a 58 y.o. female who has a history of Osteoarthritis of left hip [M16.12]  Hip abductor tendonitis, left [M76.892]  with pain and limitations of activities of daily living who presents at this time for a LEFT POSTERIOR TOTAL HIP ARTHROPLASTY following the failure of conservative management.    PMH:   Past Medical History:   Diagnosis Date    Arthritis     Cancer (HCC)     IN KIDNEY REMOVED RIGHT KIDNEY     Cancer (HCC)     LUNG CANCER    Chronic obstructive pulmonary disease (HCC)     BEGINNING STAGES     Chronic pain 1993    Elevated WBC count 2002 to present    GERD (gastroesophageal reflux disease)     Hyperlipidemia     Hypertension     Kidney stones 2020    Leg swelling 07/2020    Doppler negative    Liver disease 2021    Nausea & vomiting     Obesity     Osteoarthritis     Panic attacks     PONV (postoperative nausea and vomiting)     Restless legs syndrome     Skin cancer     BASAL CELL ON CHEST    Sleep apnea     PT DOES NOT SLEEP WTH CPAP MACHINE; SLEEPS ELEVATED.    Spondylolisthesis of lumbar region     Stage 4 chronic kidney disease (HCC) 2019    Stroke (HCC) 2020    TIA     Tachycardia

## 2024-01-31 NOTE — PROGRESS NOTES
Ortho NP Note    POD# 1  s/p LEFT POSTERIOR TOTAL HIP ARTHROPLASTY     Pt seen earlier with Dr. Wilhelm and YONI Brown    Pt resting in bed.   Postop pain well controlled with oxycodone  Cleared by therapy for discharge   Dressing c.d.i  No other complaints or concerns at this time.   Ready for discharge home today with DANIELLE Montoya NP

## 2024-01-31 NOTE — OP NOTE
[facility]  OPERATIVE REPORT    Name:  MARCELO MORALES  MR#:  [id number]  :  1965  ACCOUNT #:  [id number]  DATE OF SERVICE:  2024      PREOPERATIVE DIAGNOSES:  1.  Osteoarthritis of left hip.  2.  Left hip abductor tendinopathy.    POSTOPERATIVE DIAGNOSIS:  Osteoarthritis of left hip.    PROCEDURE PERFORMED:  Left total hip arthroplasty.    SURGEON:  Ty Wilhelm MD    FIRST ASSISTANT:   Terrie Jay SA    ANESTHESIA:  Spinal with sedation.    COMPLICATIONS:  None.    SPECIMENS REMOVED:  None.    IMPLANTS:  DonJoy 54-mm Empowr acetabular shell with 1 cancellous screw and 36-mm inside diameter neutral polyethylene liner, size 8 standard offset TaperFill femoral stem with 36 mm -4 ceramic femoral head.    ESTIMATED BLOOD LOSS:  200 mL.    INDICATIONS:  The patient is a 58-year-old female with progressive left hip and groin pain due to severe osteoarthritis of the left hip.  She also has significant trochanteric tenderness and pain with presumed abductor tendinopathy.  Symptoms have progressed despite comprehensive conservative treatment and she presents for left total hip replacement with possible abductor tendon debridement and repair.  Risks, benefits, and alternatives of the procedure were reviewed with her in detail and she desires to proceed.  She understands increased risk for perioperative medical complications including VTE and infection due to ongoing active cancer treatment.    PROCEDURE:  The patient was taken to the operating room where anesthesia team placed a spinal.  Preoperative IV antibiotics were administered.  She was turned to right lateral decubitus position on a Lopez frame hip positioner.  All bony prominences were well-padded and an axillary roll was placed.  Left hip and thigh were prepped and draped in the usual sterile fashion.  Through a lateral hip incision, I performed a posterior approach to the left hip.  Abductor insertion was carefully examined and

## 2024-01-31 NOTE — CARE COORDINATION
Care Management Initial Assessment       RUR: N/A  Readmission? No  1st IM letter given? No  1st  letter given: No    CM met with patient at bedside to introduce self and explain role. Patient states she is independent with ADLs prior to admission. Patient lives with her mother in a 2 level house with 4 steps to enter. Patient plans to stay on the first level while she recovers. Patient states a friend plans to stay with her during her recovery. Patient owns a RW. Patient confirms family will transport her home at time of discharge. HH referrals have been sent.     12:06PM: [facility] accepted patient for HH, CM added to AVS.      01/31/24 1122   Service Assessment   Patient Orientation Alert and Oriented;Person;Place;Situation;Self   Cognition Alert   History Provided By Patient   Primary Caregiver Self   Support Systems Family Members;Friends/Neighbors   Patient's Healthcare Decision Maker is: Named in Scanned ACP Document   PCP Verified by CM Yes  (Patricia Suazo MD)   Last Visit to PCP Within last 3 months   Prior Functional Level Independent in ADLs/IADLs   Can patient return to prior living arrangement Yes   Ability to make needs known: Good   Family able to assist with home care needs: Yes   Financial Resources Medicare   Social/Functional History   Lives With Parent   Type of Home House   Home Layout Two level   Entrance Stairs - Number of Steps 4   Discharge Planning   Type of Residence House   DME Ordered? No   Services At/After Discharge   Services At/After Discharge Home Health   Condition of Participation: Discharge Planning   The Plan for Transition of Care is related to the following treatment goals: home health   The Patient and/or Patient Representative was provided with a Choice of Provider? Patient   The Patient and/Or Patient Representative agree with the Discharge Plan? Yes   Freedom of Choice list was provided with basic dialogue that supports the patient's individualized plan of

## 2024-01-31 NOTE — PROGRESS NOTES
Orthopaedics Daily Progress Note                            Date of Surgery:  1/30/2024    Patient: Symone Edmonds   YOB: 1965  Age: 58 y.o.      SUBJECTIVE:   1 Day Post-Op following LEFT POSTERIOR TOTAL HIP ARTHROPLASTY.      The patient's post operative pain is controlled.  No CP/SOB.  No N/V. The patient's mobility will be evaluated today during PT sessions.    OBJECTIVE:     Vital Signs:    /76   Pulse 94   Temp 97.4 °F (36.3 °C) (Oral)   Resp 15   Ht 1.613 m (5' 3.5\")   Wt 91.2 kg (201 lb 1 oz)   SpO2 97%   BMI 35.06 kg/m²     Physical Exam:  General: A&Ox3. The patient is cooperative, and in no acute distress.    Respiratory: Respirations are unlabored.  Surgical site(s): dressing clean, dry  Musculoskeletal: Calves are soft, supple, and non-tender upon palpation.  Motor 5/5.  Neurological:  Neurovascularly intact with good dorsi and plantar flexion.    Pulses symmetrical.    Laboratory Values:             Recent Results (from the past 12 hour(s))   POCT Glucose    Collection Time: 01/30/24  8:53 PM   Result Value Ref Range    POC Glucose 111 65 - 117 mg/dL    Performed by: BHAVNA Mayen RN    Basic Metabolic Panel    Collection Time: 01/31/24  3:31 AM   Result Value Ref Range    Sodium 139 136 - 145 mmol/L    Potassium 4.4 3.5 - 5.1 mmol/L    Chloride 110 (H) 97 - 108 mmol/L    CO2 26 21 - 32 mmol/L    Anion Gap 3 (L) 5 - 15 mmol/L    Glucose 138 (H) 65 - 100 mg/dL    BUN 9 6 - 20 MG/DL    Creatinine 0.93 0.55 - 1.02 MG/DL    Bun/Cre Ratio 10 (L) 12 - 20      Est, Glom Filt Rate >60 >60 ml/min/1.73m2    Calcium 8.4 (L) 8.5 - 10.1 MG/DL   Hemoglobin and Hematocrit    Collection Time: 01/31/24  3:31 AM   Result Value Ref Range    Hemoglobin 12.5 11.5 - 16.0 g/dL    Hematocrit 39.2 35.0 - 47.0 %   POCT Glucose    Collection Time: 01/31/24  8:02 AM   Result Value Ref Range    POC Glucose 119 (H) 65 - 117 mg/dL    Performed by: BHAVNA Mayen RN          PLAN:     S/P LEFT

## 2024-01-31 NOTE — PLAN OF CARE
Problem: Physical Therapy - Adult  Goal: By Discharge: Performs mobility at highest level of function for planned discharge setting.  See evaluation for individualized goals.  Description: FUNCTIONAL STATUS PRIOR TO ADMISSION: Patient was independent and active without use of DME.    HOME SUPPORT PRIOR TO ADMISSION: The patient lived with family but did not require assistance.    Physical Therapy Goals  Initiated 1/30/2024  1.  Patient will move from supine to sit and sit to supine, scoot up and down, and roll side to side in bed with modified independence within 4 day(s).    2.  Patient will perform sit to stand with modified independence within 4 day(s).  3.  Patient will transfer from bed to chair and chair to bed with modified independence using the least restrictive device within 4 day(s).  4.  Patient will ambulate with modified independence for 150 feet with the least restrictive device within 4 day(s).   5.  Patient will ascend/descend 4 stairs with cane and one handrail(s) with modified independence within 4 day(s).  6.  Patient will perform post-JETHRO home exercise program per protocol with independence within 4 days.    Outcome: Progressing     PHYSICAL THERAPY TREATMENT    Patient: Symone Edmonds (58 y.o. female)  Date: 1/31/2024  Diagnosis: Osteoarthritis of left hip [M16.12]  Hip abductor tendonitis, left [M76.892]  Primary osteoarthritis of left hip [M16.12] Primary osteoarthritis of left hip  Procedure(s) (LRB):  LEFT POSTERIOR TOTAL HIP ARTHROPLASTY (Left) 1 Day Post-Op  Precautions:  (LLE WBAT)   Left Lower Extremity Weight Bearing: Weight Bearing As Tolerated                  ASSESSMENT:  Pt tolerated PT services well and continues to progress toward PT POC goals. Pt was received s/p OT Team services and amenable to continued therapy toward optimal PLOF return and (hopeful) dc to home later today. Excellent effort and active engagement t/o. With increased time/effort, most tasks performed with

## 2024-02-16 ENCOUNTER — TELEPHONE (OUTPATIENT)
Age: 59
End: 2024-02-16

## 2024-02-16 NOTE — TELEPHONE ENCOUNTER
Patient took blood pressure because she was angry at something. No chest pains or shortness of breath.     She just retook blood pressure and it is 160/91.

## 2024-02-16 NOTE — TELEPHONE ENCOUNTER
Called patient and relayed message. She understood and will call us back next week if it is still staying elevated.

## 2024-02-16 NOTE — TELEPHONE ENCOUNTER
----- Message from Symone Edmonds sent at 2/16/2024 11:08 AM EST -----  Regarding: Blood pressure   Contact: [phone]  184/108 is what my blood pressure was at 10 this morning.  Should I be real concerned about this? Haven't missed any meds. Been cooped up with mama since 2/24. Should I wait and take again in a bit?

## 2024-02-16 NOTE — TELEPHONE ENCOUNTER
This is not worrisome since it is coming down. Check it over weekend.  Call me next week if staying high, will need OV.

## 2024-02-26 ENCOUNTER — TELEPHONE (OUTPATIENT)
Age: 59
End: 2024-02-26

## 2024-02-26 NOTE — TELEPHONE ENCOUNTER
Patient will send My Chart image of recent Bps; sounds like average is 150-70s/ HR average  ; see patient msg. Today 175/107  @ 12:30pm       No health changes since last OV Nov 2022 except Jan 30 2024 hip replaced.     Valsartan 320 mg daily   Diltiazem 180 mg 2 capsules daily  Lasix 20 PRN

## 2024-03-01 ENCOUNTER — OFFICE VISIT (OUTPATIENT)
Age: 59
End: 2024-03-01
Payer: MEDICARE

## 2024-03-01 VITALS
HEART RATE: 106 BPM | RESPIRATION RATE: 16 BRPM | OXYGEN SATURATION: 98 % | SYSTOLIC BLOOD PRESSURE: 160 MMHG | BODY MASS INDEX: 33.97 KG/M2 | HEIGHT: 64 IN | WEIGHT: 199 LBS | DIASTOLIC BLOOD PRESSURE: 80 MMHG

## 2024-03-01 DIAGNOSIS — E11.9 TYPE 2 DIABETES MELLITUS WITHOUT COMPLICATION, UNSPECIFIED WHETHER LONG TERM INSULIN USE (HCC): ICD-10-CM

## 2024-03-01 DIAGNOSIS — I10 HYPERTENSION, UNSPECIFIED TYPE: ICD-10-CM

## 2024-03-01 DIAGNOSIS — G47.33 OSA (OBSTRUCTIVE SLEEP APNEA): ICD-10-CM

## 2024-03-01 DIAGNOSIS — I10 ESSENTIAL HYPERTENSION WITH GOAL BLOOD PRESSURE LESS THAN 140/90: ICD-10-CM

## 2024-03-01 DIAGNOSIS — R00.0 TACHYCARDIA: Primary | ICD-10-CM

## 2024-03-01 DIAGNOSIS — Z85.528 HX OF RENAL CELL CANCER: ICD-10-CM

## 2024-03-01 DIAGNOSIS — F17.210 CIGARETTE NICOTINE DEPENDENCE WITHOUT COMPLICATION: ICD-10-CM

## 2024-03-01 PROBLEM — E11.22 TYPE 2 DIABETES MELLITUS WITH CHRONIC KIDNEY DISEASE (HCC): Status: ACTIVE | Noted: 2024-03-01

## 2024-03-01 PROBLEM — E11.40 TYPE 2 DIABETES MELLITUS WITH DIABETIC NEUROPATHY (HCC): Status: ACTIVE | Noted: 2024-03-01

## 2024-03-01 PROCEDURE — G8427 DOCREV CUR MEDS BY ELIG CLIN: HCPCS | Performed by: INTERNAL MEDICINE

## 2024-03-01 PROCEDURE — 99214 OFFICE O/P EST MOD 30 MIN: CPT | Performed by: INTERNAL MEDICINE

## 2024-03-01 PROCEDURE — G8417 CALC BMI ABV UP PARAM F/U: HCPCS | Performed by: INTERNAL MEDICINE

## 2024-03-01 PROCEDURE — 3079F DIAST BP 80-89 MM HG: CPT | Performed by: INTERNAL MEDICINE

## 2024-03-01 PROCEDURE — 93010 ELECTROCARDIOGRAM REPORT: CPT | Performed by: INTERNAL MEDICINE

## 2024-03-01 PROCEDURE — 3074F SYST BP LT 130 MM HG: CPT | Performed by: INTERNAL MEDICINE

## 2024-03-01 PROCEDURE — 93005 ELECTROCARDIOGRAM TRACING: CPT | Performed by: INTERNAL MEDICINE

## 2024-03-01 PROCEDURE — 3044F HG A1C LEVEL LT 7.0%: CPT | Performed by: INTERNAL MEDICINE

## 2024-03-01 PROCEDURE — 3017F COLORECTAL CA SCREEN DOC REV: CPT | Performed by: INTERNAL MEDICINE

## 2024-03-01 PROCEDURE — G8484 FLU IMMUNIZE NO ADMIN: HCPCS | Performed by: INTERNAL MEDICINE

## 2024-03-01 PROCEDURE — 4004F PT TOBACCO SCREEN RCVD TLK: CPT | Performed by: INTERNAL MEDICINE

## 2024-03-01 PROCEDURE — 2022F DILAT RTA XM EVC RTNOPTHY: CPT | Performed by: INTERNAL MEDICINE

## 2024-03-01 RX ORDER — LORAZEPAM 0.5 MG/1
TABLET ORAL
COMMUNITY
Start: 2024-01-30

## 2024-03-01 RX ORDER — VALSARTAN 320 MG/1
320 TABLET ORAL DAILY
Qty: 90 TABLET | Refills: 3 | Status: SHIPPED | OUTPATIENT
Start: 2024-03-01

## 2024-03-01 ASSESSMENT — PATIENT HEALTH QUESTIONNAIRE - PHQ9
SUM OF ALL RESPONSES TO PHQ QUESTIONS 1-9: 0
6. FEELING BAD ABOUT YOURSELF - OR THAT YOU ARE A FAILURE OR HAVE LET YOURSELF OR YOUR FAMILY DOWN: 0
2. FEELING DOWN, DEPRESSED OR HOPELESS: 0
SUM OF ALL RESPONSES TO PHQ QUESTIONS 1-9: 0
SUM OF ALL RESPONSES TO PHQ9 QUESTIONS 1 & 2: 0
1. LITTLE INTEREST OR PLEASURE IN DOING THINGS: 0
9. THOUGHTS THAT YOU WOULD BE BETTER OFF DEAD, OR OF HURTING YOURSELF: 0
5. POOR APPETITE OR OVEREATING: 0
SUM OF ALL RESPONSES TO PHQ QUESTIONS 1-9: 0
8. MOVING OR SPEAKING SO SLOWLY THAT OTHER PEOPLE COULD HAVE NOTICED. OR THE OPPOSITE, BEING SO FIGETY OR RESTLESS THAT YOU HAVE BEEN MOVING AROUND A LOT MORE THAN USUAL: 0
3. TROUBLE FALLING OR STAYING ASLEEP: 0
10. IF YOU CHECKED OFF ANY PROBLEMS, HOW DIFFICULT HAVE THESE PROBLEMS MADE IT FOR YOU TO DO YOUR WORK, TAKE CARE OF THINGS AT HOME, OR GET ALONG WITH OTHER PEOPLE: 0
7. TROUBLE CONCENTRATING ON THINGS, SUCH AS READING THE NEWSPAPER OR WATCHING TELEVISION: 0
SUM OF ALL RESPONSES TO PHQ QUESTIONS 1-9: 0
4. FEELING TIRED OR HAVING LITTLE ENERGY: 0

## 2024-03-01 NOTE — PROGRESS NOTES
Patient: Symone Edmonds  : 1965    Primary Cardiologist: Elisha Marley MS, MD, [facility]  EP Cardiologist:  PCP: Patricia Suazo MD    Today's Date: 3/1/2024    Recent surgery.  L THR 24.  Did well - [facility].  Pain.  Ran out of valsartan took losartan.   Blood to be done Urology.      ASSESSMENT AND PLAN:     Assessment and Plan:   Tobacco  45 + years  Down less than a pack a day  cessation    2. HTN/HFpEF  Stop losartan  Start valsartan 320  Diltiazem 160  Lasix 20    3. HLD  atorva    4.  DM2  -A1C 6.0    5.  AFIB  Sp afib ablation  Around     6.  CVA    7.  Carotid stenosis   US  - <50%    8.  Lung CA/metastatic renal cell CA  -R lower, left  Sp R thoracotomy and resection  -R pleural effusion drained - no cancer cells     9.  SOB  Pharmacologic nuc stress - normal perfusion, post stress LVEF 61%  Echo normal LV fxn, mild LVH + diastolic dysfxn  Holter NSR, no arrythmia  Possible HFpEF +/-  metastatic cancer    10.  Metastatic renal cell cancer, stage IV  -total nephrectomy 2019 due to mass.    -Dr Winchester  -non curable    11.  Stress/depression  [facility] to [facility]      She will restart valsartan, keep BP log, MYCHART message if not lower.        ICD-10-CM    1. Tachycardia  R00.0       2. Hypertension, unspecified type  I10 EKG 12 Lead      3. Type 2 diabetes mellitus without complication, unspecified whether long term insulin use (HCC)  E11.9       4. JESSICA (obstructive sleep apnea)  G47.33       5. Cigarette nicotine dependence without complication  F17.210       6. Hx of renal cell cancer  Z85.528       7. Essential hypertension with goal blood pressure less than 140/90  I10 valsartan (DIOVAN) 320 MG tablet            HISTORY OF PRESENT ILLNESS:     History of Present Illness:  Symone Edmonds is a 58 y.o. female who presents for cardiac evaluation.    HTN HLD DM2.    Prior afib sp ablation 200 Dr. Lester Vazquez.      Cannot sleep.  Wakes up 3 am.

## 2024-03-05 ENCOUNTER — HOSPITAL ENCOUNTER (OUTPATIENT)
Facility: HOSPITAL | Age: 59
Discharge: HOME | End: 2024-03-08
Attending: INTERNAL MEDICINE
Payer: MEDICARE

## 2024-03-05 DIAGNOSIS — C64.1 MALIGNANT NEOPLASM OF RIGHT KIDNEY, EXCEPT RENAL PELVIS (HCC): ICD-10-CM

## 2024-03-05 DIAGNOSIS — D50.9 IRON DEFICIENCY ANEMIA, UNSPECIFIED IRON DEFICIENCY ANEMIA TYPE: ICD-10-CM

## 2024-03-05 LAB
ALBUMIN SERPL-MCNC: 3.6 G/DL (ref 3.5–5)
ALBUMIN/GLOB SERPL: 1.2 (ref 1.1–2.2)
ALP SERPL-CCNC: 228 U/L (ref 45–117)
ALT SERPL-CCNC: 31 U/L (ref 12–78)
ANION GAP SERPL CALC-SCNC: 5 MMOL/L (ref 5–15)
AST SERPL-CCNC: 21 U/L (ref 15–37)
BASOPHILS # BLD: 0.2 K/UL (ref 0–0.1)
BASOPHILS NFR BLD: 1 % (ref 0–1)
BILIRUB SERPL-MCNC: 0.2 MG/DL (ref 0.2–1)
BUN SERPL-MCNC: 12 MG/DL (ref 6–20)
BUN/CREAT SERPL: 12 (ref 12–20)
CALCIUM SERPL-MCNC: 9.8 MG/DL (ref 8.5–10.1)
CHLORIDE SERPL-SCNC: 106 MMOL/L (ref 97–108)
CO2 SERPL-SCNC: 24 MMOL/L (ref 21–32)
CREAT SERPL-MCNC: 1.04 MG/DL (ref 0.55–1.02)
DIFFERENTIAL METHOD BLD: ABNORMAL
EOSINOPHIL # BLD: 0.7 K/UL (ref 0–0.4)
EOSINOPHIL NFR BLD: 4 % (ref 0–7)
ERYTHROCYTE [DISTWIDTH] IN BLOOD BY AUTOMATED COUNT: 15.4 % (ref 11.5–14.5)
FERRITIN SERPL-MCNC: 29 NG/ML (ref 8–252)
GLOBULIN SER CALC-MCNC: 3.1 G/DL (ref 2–4)
GLUCOSE SERPL-MCNC: 129 MG/DL (ref 65–100)
HCT VFR BLD AUTO: 43.4 % (ref 35–47)
HGB BLD-MCNC: 14.3 G/DL (ref 11.5–16)
IMM GRANULOCYTES # BLD AUTO: 0.2 K/UL (ref 0–0.04)
IMM GRANULOCYTES NFR BLD AUTO: 1 % (ref 0–0.5)
LYMPHOCYTES # BLD: 1.9 K/UL (ref 0.8–3.5)
LYMPHOCYTES NFR BLD: 11 % (ref 12–49)
MCH RBC QN AUTO: 27.1 PG (ref 26–34)
MCHC RBC AUTO-ENTMCNC: 32.9 G/DL (ref 30–36.5)
MCV RBC AUTO: 82.2 FL (ref 80–99)
MONOCYTES # BLD: 0.7 K/UL (ref 0–1)
MONOCYTES NFR BLD: 4 % (ref 5–13)
NEUTS SEG # BLD: 13.7 K/UL (ref 1.8–8)
NEUTS SEG NFR BLD: 79 % (ref 32–75)
NRBC # BLD: 0 K/UL (ref 0–0.01)
NRBC BLD-RTO: 0 PER 100 WBC
PLATELET # BLD AUTO: 325 K/UL (ref 150–400)
PMV BLD AUTO: 10.3 FL (ref 8.9–12.9)
POTASSIUM SERPL-SCNC: 4 MMOL/L (ref 3.5–5.1)
PROT SERPL-MCNC: 6.7 G/DL (ref 6.4–8.2)
RBC # BLD AUTO: 5.28 M/UL (ref 3.8–5.2)
RBC MORPH BLD: ABNORMAL
SODIUM SERPL-SCNC: 135 MMOL/L (ref 136–145)
WBC # BLD AUTO: 17.4 K/UL (ref 3.6–11)

## 2024-03-05 PROCEDURE — 71260 CT THORAX DX C+: CPT

## 2024-03-05 PROCEDURE — 6360000004 HC RX CONTRAST MEDICATION: Performed by: INTERNAL MEDICINE

## 2024-03-05 RX ADMIN — IOPAMIDOL 100 ML: 755 INJECTION, SOLUTION INTRAVENOUS at 09:46

## 2024-03-13 ENCOUNTER — OFFICE VISIT (OUTPATIENT)
Age: 59
End: 2024-03-13
Payer: MEDICARE

## 2024-03-13 VITALS
DIASTOLIC BLOOD PRESSURE: 83 MMHG | BODY MASS INDEX: 34.87 KG/M2 | TEMPERATURE: 98.3 F | OXYGEN SATURATION: 95 % | SYSTOLIC BLOOD PRESSURE: 157 MMHG | WEIGHT: 200 LBS | RESPIRATION RATE: 18 BRPM | HEART RATE: 101 BPM

## 2024-03-13 DIAGNOSIS — C64.1 MALIGNANT NEOPLASM OF RIGHT KIDNEY, EXCEPT RENAL PELVIS (HCC): Primary | ICD-10-CM

## 2024-03-13 PROCEDURE — 3017F COLORECTAL CA SCREEN DOC REV: CPT | Performed by: INTERNAL MEDICINE

## 2024-03-13 PROCEDURE — 99215 OFFICE O/P EST HI 40 MIN: CPT | Performed by: INTERNAL MEDICINE

## 2024-03-13 PROCEDURE — 3079F DIAST BP 80-89 MM HG: CPT | Performed by: INTERNAL MEDICINE

## 2024-03-13 PROCEDURE — G8428 CUR MEDS NOT DOCUMENT: HCPCS | Performed by: INTERNAL MEDICINE

## 2024-03-13 PROCEDURE — G8484 FLU IMMUNIZE NO ADMIN: HCPCS | Performed by: INTERNAL MEDICINE

## 2024-03-13 PROCEDURE — 3077F SYST BP >= 140 MM HG: CPT | Performed by: INTERNAL MEDICINE

## 2024-03-13 PROCEDURE — 4004F PT TOBACCO SCREEN RCVD TLK: CPT | Performed by: INTERNAL MEDICINE

## 2024-03-13 PROCEDURE — G8417 CALC BMI ABV UP PARAM F/U: HCPCS | Performed by: INTERNAL MEDICINE

## 2024-03-13 NOTE — PROGRESS NOTES
Symone Edmonds is a 58 y.o. female    Chief Complaint   Patient presents with    Follow-up     Follow up of leucocytosis and anemia  Stage IV RCC          1. Have you been to the ER, urgent care clinic since your last visit?  Hospitalized since your last visit?No    2. Have you seen or consulted any other health care providers outside of the [facility] since your last visit?  Include any pap smears or colon screening. No      
lymph node (Specimen #6); Excision:  - One lymph node negative for carcinoma (0/1).     Station 12R #1 lymph node (Specimen #7); Excision:  - One lymph node negative for carcinoma (0/1).     Station 12R #2 lymph node (Specimen #8); Excision:  - One lymph node negative for carcinoma (0/1).     Station 4R lymph node (Specimen #9); Excision:  - One lymph node negative for carcinoma (0/1).     Lung, Right Lower Lobe (Specimen #10); Wedge Resection:  - Metastatic renal cell clear cell carcinoma, 1.3 cm (see comment).  - Background parenchyma with respiratory bronchiolitis and emphysematous changes.   - Focus of fat/bone marrow embolism (10D).          Radiology report(s) reviewed above.   CT abdomen 5/2020- reviewed       CTA 8/2020- reviewed     CT 7/2023   Lungs/Pleura: Right lower lobe nodule has increased in size now measuring 13 mm.  It has a subtly nodular border. Nodule along the left oblique fissure has also  increased now measuring 8 mm. No consolidation or pleural effusion.    CT Abdomen and pelvis 10/2023     IMPRESSION:  Moderate right pleural effusion of uncertain etiology. No CT  evidence of locally recurrent or metastatic disease status post right  nephrectomy with incidentals as above including coronary artery disease, colonic  diverticulosis, and probable postcholecystectomy related mild biliary ductal  dilation. The previously seen right lower lobe pulmonary nodule may be obscured  by atelectasis.    CT 11/2023   IMPRESSION:     1. Stable 8 mm nodular density laterally in the plane of the left major fissure  continued follow-up is suggested. New 3 mm nodule medially in the lingula. This  is just posterior to the previously seen areas of consolidation. Conceivably  this could be postinflammatory however nodule from neoplasm is not excluded  close follow-up is suggested. Follow-up exam in 3 months is suggested.  2. Decreased right pleural effusion with improved aeration lungs bilaterally  3. Near complete

## 2024-03-14 ENCOUNTER — TELEPHONE (OUTPATIENT)
Age: 59
End: 2024-03-14

## 2024-03-14 NOTE — TELEPHONE ENCOUNTER
TC:    Pt. Called to ask when she should reschedule 3/26 appt with Dr. Winchester.    Reasoning for reschedule include:    Biopsy not schedule until 3/28 (after 3/26),  And Dr. Winchester wanted to see pt. Before vacation (which pt. Believed to be the first week of April)    Please advise.    CB# is [phone]

## 2024-03-27 NOTE — TELEPHONE ENCOUNTER
[facility] Hospitalist Progress Note    Chief Complaint   Patient presents with    Post-op Problem         Assessment and Plan  Patria Dumont  is a 39 year old female admitted for concern for post op infection    Concern for wound dehiscence  Pt repots some purulent fluid coming from left side of surgical site.   Jps in place  ENT consult  Imaging reviewed. Cont abx : zosyn    Bcx obtained   Plan for OR for exploration      Hyponatremia  Improved        WHITNEY  IVFs        Leukocytosis  Per #1     Anemia, acute blood loss  Monitor     Hypothyroidism, secondary to thryoidectomy   Synthroid      T2DM  Ssi        Nutrition status: Does Not Meet Criteria for Malnutrition   FEN:  Diet  Dietary Orders (From admission, onward)       Start     Ordered    03/27/24 0600  NPO Diet without Exceptions  DIET EFFECTIVE NOW        Question:  NPO Modifier  Answer:  without Exceptions    03/26/24 2003                    Disposition  INPT    EKG: reviewed personally   Labs: reviewed personally  Imaging: reviewed personally   Discussed care with RN    DVT PPX:      Code Status:    Code Status Information       Code Status    Full Resuscitation             -----------------------------------------------------------------------------------------------------------------------------------    Subjective:  Plan for OR today   No CP or SOB  No N V       Physical Exam    VITAL SIGNS:    Vitals:    03/27/24 1056   BP: 137/82   Pulse: 93   Resp: 15   Temp:          INTAKE/OUTPUT:      Intake/Output Summary (Last 24 hours) at 3/27/2024 1144  Last data filed at 3/27/2024 0723  Gross per 24 hour   Intake 2617.91 ml   Output --   Net 2617.91 ml        General: No acute distress, age appropriate  Respiratory: Lungs clear to auscultation bilaterally, respirations non-labored, breath sounds equal, symmetrical chest wall expansion  Cardiovascular: Normal rate, regular rhythm, no murmur, good pulses equal in all extremities, normal peripheral perfusion, no lower  The pt is wanting a recommendation for a therapist.     [phone] extremity pitting edema.  Gastrointestinal : Soft, non-tender, no obvious distension, no HSM, active bowel sounds.  Lymphatics: No lower ext edema        Total time spent today on this visit is 50 minutes which includes reviewing tests in preparation to see patient, obtaining and reviewing separately obtained history, performing a medically appropriate exam and evaluation, counseling and educating the patient/family/caregiver, ordering medications, tests or procedures, communicating with other healthcare professionals, and independently interpreting test results that are not separately billed.      Josh Rodriguez MD  [facility] Medicine   3/27/2024

## 2024-03-28 ENCOUNTER — HOSPITAL ENCOUNTER (OUTPATIENT)
Facility: HOSPITAL | Age: 59
Setting detail: OUTPATIENT SURGERY
Discharge: HOME | End: 2024-03-28
Attending: INTERNAL MEDICINE | Admitting: INTERNAL MEDICINE
Payer: MEDICARE

## 2024-03-28 ENCOUNTER — ANESTHESIA (OUTPATIENT)
Facility: HOSPITAL | Age: 59
End: 2024-03-28
Payer: MEDICARE

## 2024-03-28 ENCOUNTER — ANESTHESIA EVENT (OUTPATIENT)
Facility: HOSPITAL | Age: 59
End: 2024-03-28
Payer: MEDICARE

## 2024-03-28 VITALS
SYSTOLIC BLOOD PRESSURE: 129 MMHG | BODY MASS INDEX: 34.15 KG/M2 | OXYGEN SATURATION: 90 % | HEART RATE: 106 BPM | RESPIRATION RATE: 20 BRPM | DIASTOLIC BLOOD PRESSURE: 59 MMHG | TEMPERATURE: 97.6 F | WEIGHT: 200 LBS | HEIGHT: 64 IN

## 2024-03-28 PROCEDURE — 88172 CYTP DX EVAL FNA 1ST EA SITE: CPT

## 2024-03-28 PROCEDURE — 88173 CYTOPATH EVAL FNA REPORT: CPT

## 2024-03-28 PROCEDURE — 2709999900 HC NON-CHARGEABLE SUPPLY: Performed by: INTERNAL MEDICINE

## 2024-03-28 PROCEDURE — 3600007512: Performed by: INTERNAL MEDICINE

## 2024-03-28 PROCEDURE — 88305 TISSUE EXAM BY PATHOLOGIST: CPT

## 2024-03-28 PROCEDURE — 2500000003 HC RX 250 WO HCPCS: Performed by: NURSE ANESTHETIST, CERTIFIED REGISTERED

## 2024-03-28 PROCEDURE — 7100000010 HC PHASE II RECOVERY - FIRST 15 MIN: Performed by: INTERNAL MEDICINE

## 2024-03-28 PROCEDURE — 2720000010 HC SURG SUPPLY STERILE: Performed by: INTERNAL MEDICINE

## 2024-03-28 PROCEDURE — 3700000000 HC ANESTHESIA ATTENDED CARE: Performed by: INTERNAL MEDICINE

## 2024-03-28 PROCEDURE — 2580000003 HC RX 258: Performed by: INTERNAL MEDICINE

## 2024-03-28 PROCEDURE — 3600007502: Performed by: INTERNAL MEDICINE

## 2024-03-28 PROCEDURE — 3700000001 HC ADD 15 MINUTES (ANESTHESIA): Performed by: INTERNAL MEDICINE

## 2024-03-28 PROCEDURE — 6360000002 HC RX W HCPCS: Performed by: NURSE ANESTHETIST, CERTIFIED REGISTERED

## 2024-03-28 RX ORDER — LIDOCAINE HYDROCHLORIDE 20 MG/ML
INJECTION, SOLUTION EPIDURAL; INFILTRATION; INTRACAUDAL; PERINEURAL PRN
Status: DISCONTINUED | OUTPATIENT
Start: 2024-03-28 | End: 2024-03-28 | Stop reason: SDUPTHER

## 2024-03-28 RX ORDER — ROCURONIUM BROMIDE 10 MG/ML
INJECTION, SOLUTION INTRAVENOUS PRN
Status: DISCONTINUED | OUTPATIENT
Start: 2024-03-28 | End: 2024-03-28 | Stop reason: SDUPTHER

## 2024-03-28 RX ORDER — ONDANSETRON 2 MG/ML
INJECTION INTRAMUSCULAR; INTRAVENOUS PRN
Status: DISCONTINUED | OUTPATIENT
Start: 2024-03-28 | End: 2024-03-28 | Stop reason: SDUPTHER

## 2024-03-28 RX ORDER — SODIUM CHLORIDE 9 MG/ML
25 INJECTION, SOLUTION INTRAVENOUS PRN
Status: DISCONTINUED | OUTPATIENT
Start: 2024-03-28 | End: 2024-03-28 | Stop reason: HOSPADM

## 2024-03-28 RX ORDER — SODIUM CHLORIDE 0.9 % (FLUSH) 0.9 %
5-40 SYRINGE (ML) INJECTION EVERY 12 HOURS SCHEDULED
Status: DISCONTINUED | OUTPATIENT
Start: 2024-03-28 | End: 2024-03-28 | Stop reason: HOSPADM

## 2024-03-28 RX ORDER — SODIUM CHLORIDE 9 MG/ML
INJECTION, SOLUTION INTRAVENOUS PRN
Status: CANCELLED | OUTPATIENT
Start: 2024-03-28

## 2024-03-28 RX ORDER — SODIUM CHLORIDE 0.9 % (FLUSH) 0.9 %
5-40 SYRINGE (ML) INJECTION PRN
Status: CANCELLED | OUTPATIENT
Start: 2024-03-28

## 2024-03-28 RX ORDER — SODIUM CHLORIDE 0.9 % (FLUSH) 0.9 %
5-40 SYRINGE (ML) INJECTION PRN
Status: DISCONTINUED | OUTPATIENT
Start: 2024-03-28 | End: 2024-03-28 | Stop reason: HOSPADM

## 2024-03-28 RX ORDER — SODIUM CHLORIDE 0.9 % (FLUSH) 0.9 %
5-40 SYRINGE (ML) INJECTION EVERY 12 HOURS SCHEDULED
Status: CANCELLED | OUTPATIENT
Start: 2024-03-28

## 2024-03-28 RX ORDER — SUCCINYLCHOLINE CHLORIDE 20 MG/ML
INJECTION INTRAMUSCULAR; INTRAVENOUS PRN
Status: DISCONTINUED | OUTPATIENT
Start: 2024-03-28 | End: 2024-03-28 | Stop reason: SDUPTHER

## 2024-03-28 RX ORDER — SODIUM CHLORIDE 9 MG/ML
INJECTION, SOLUTION INTRAVENOUS CONTINUOUS
Status: DISCONTINUED | OUTPATIENT
Start: 2024-03-28 | End: 2024-03-28 | Stop reason: HOSPADM

## 2024-03-28 RX ORDER — FENTANYL CITRATE 50 UG/ML
INJECTION, SOLUTION INTRAMUSCULAR; INTRAVENOUS PRN
Status: DISCONTINUED | OUTPATIENT
Start: 2024-03-28 | End: 2024-03-28 | Stop reason: SDUPTHER

## 2024-03-28 RX ORDER — DEXAMETHASONE SODIUM PHOSPHATE 4 MG/ML
INJECTION, SOLUTION INTRA-ARTICULAR; INTRALESIONAL; INTRAMUSCULAR; INTRAVENOUS; SOFT TISSUE PRN
Status: DISCONTINUED | OUTPATIENT
Start: 2024-03-28 | End: 2024-03-28 | Stop reason: SDUPTHER

## 2024-03-28 RX ADMIN — PROPOFOL 50 MG: 10 INJECTION, EMULSION INTRAVENOUS at 09:19

## 2024-03-28 RX ADMIN — PROPOFOL 200 MG: 10 INJECTION, EMULSION INTRAVENOUS at 08:17

## 2024-03-28 RX ADMIN — ROCURONIUM BROMIDE 5 MG: 10 SOLUTION INTRAVENOUS at 08:17

## 2024-03-28 RX ADMIN — DEXAMETHASONE SODIUM PHOSPHATE 10 MG: 4 INJECTION, SOLUTION INTRAMUSCULAR; INTRAVENOUS at 08:22

## 2024-03-28 RX ADMIN — LIDOCAINE HYDROCHLORIDE 100 MG: 20 INJECTION, SOLUTION EPIDURAL; INFILTRATION; INTRACAUDAL; PERINEURAL at 08:17

## 2024-03-28 RX ADMIN — ONDANSETRON 4 MG: 2 INJECTION INTRAMUSCULAR; INTRAVENOUS at 08:27

## 2024-03-28 RX ADMIN — FENTANYL CITRATE 100 MCG: 50 INJECTION, SOLUTION INTRAMUSCULAR; INTRAVENOUS at 08:28

## 2024-03-28 RX ADMIN — SUCCINYLCHOLINE CHLORIDE 200 MG: 20 INJECTION, SOLUTION INTRAMUSCULAR; INTRAVENOUS at 08:17

## 2024-03-28 RX ADMIN — SODIUM CHLORIDE: 9 INJECTION, SOLUTION INTRAVENOUS at 08:12

## 2024-03-28 NOTE — DISCHARGE INSTRUCTIONS
Name: Symone Edmonds   : 1965   MRN: [id number]   Date: 3/28/2024 9:28 AM     BRONCHOSCOPY / EUS / EBUS DISCHARGE INSTRUCTIONS  Discomfort:  Sore throat- throat lozenges or warm salt water gargle  redness at IV site- apply warm compress to area; if redness or soreness persist- contact your physician  Gaseous discomfort- walking, belching will help relieve any discomfort  Should not operate a vehicle for at least 12 hours  You should not engage in an occupation involving machinery or appliances for rest of today  You should not drink alcoholic beverages for at least 12 hours  Avoid making any critical decisions for at least 24 hour  Blood tinged secretions - this should stop within 2-3 hours  DIET  Nothing by mouth- do not eat or drink for two hours.  You may eat and drink after 9:30 am   You may resume your regular diet - however -  remember your colon is empty  and a heavy meal will produce gas.   Avoid these foods:  vegetables, fried / greasy foods, carbonated drinks  MEDICATIONS:  Resume  all medications  ACTIVITY  You may resume your normal daily activities however it is recommended that you spend the remainder of the day resting -  avoid any strenuous activity.  CALL M.D.  ANY SIGN OF   Increasing pain, nausea, vomiting  New increased bleeding  Fever (chills)  Pain in chest area  Bloody discharge from nose or mouth  Shortness of breath  Bubbles under the skin around the collarbone. These may crackle and pop when you press on them.   Coughing up more than ½ cup of blood.    Call physician’s office for the following  Results of procedure / biopsy in 3-5 days  Follow up appointment:  With Dr Bernardo 24 at 8 am  Telephone #  [phone]

## 2024-03-28 NOTE — H&P
Pulmonary    58-year-old female with history of renal cell cancer status post previous nephrectomy and resection of single pulmonary met.  Has been followed by oncology.  Most recent screening CT scan showed increased mediastinal lymphadenopathy.  I have been asked to perform EBUS macroscopy for tissue diagnosis of the enlarged mediastinal lymph nodes.    Allergies   Allergen Reactions    Nsaids Other (See Comments)     Stomach hurts    Diclofenac Sodium Other (See Comments)    Celecoxib Hives    Hydroxychloroquine Hives    Meloxicam Hives    Oxybutynin Hives and Itching    Oxybutynin Chloride Hives and Itching    Penicillins      Other reaction(s): Unknown (comments)  AS A CHILD-HALLUCINATIONS    Patient screened for any delayed non-IgE-mediated reaction to PCN.        Patient notes the following:    No delayed non-IgE-mediated reaction to PCN     Past Medical History:   Diagnosis Date    Arthritis     Cancer (HCC)     IN KIDNEY REMOVED RIGHT KIDNEY     Cancer (HCC)     LUNG CANCER    Chronic obstructive pulmonary disease (HCC)     BEGINNING STAGES     Chronic pain 1993    Elevated WBC count 2002 to present    GERD (gastroesophageal reflux disease)     Hyperlipidemia     Hypertension     Kidney stones 2020    Leg swelling 07/2020    Doppler negative    Liver disease 2021    Nausea & vomiting     Obesity     Osteoarthritis     Panic attacks     PONV (postoperative nausea and vomiting)     Restless legs syndrome     Skin cancer     BASAL CELL ON CHEST    Sleep apnea     PT DOES NOT SLEEP WTH CPAP MACHINE; SLEEPS ELEVATED.    Spondylolisthesis of lumbar region     Stage 4 chronic kidney disease (HCC) 2019    Stroke (HCC) 2020    TIA     Tachycardia     Type 2 diabetes mellitus without complication (HCC)      Past Surgical History:   Procedure Laterality Date    ABLATION OF DYSRHYTHMIC FOCUS  2013    BREAST BIOPSY Bilateral     benign    CHOLECYSTECTOMY  1998    COLONOSCOPY N/A 03/01/2019    COLONOSCOPY performed by

## 2024-03-28 NOTE — OP NOTE
Operative Note      Patient: Symone Edmonds  YOB: 1965  MRN: [id number]    Date of Procedure: 3/28/2024    Pre-Op Diagnosis Codes:     * Hilar lymphadenopathy [R59.0]    Post-Op Diagnosis: Same       Procedure(s):  ebus    Surgeon(s):  Froilan Bernardo MD    Assistant:   * No surgical staff found *    Anesthesia: General    Estimated Blood Loss (mL): less than 50     Complications: None    Specimens:   EBUS FNA bx of station 7 and 4R LN stations    Implants:  * No implants in log *      Drains: * No LDAs found *    Findings: Normal anatomy.  No endobronchial  lesionis        Detailed Description of Procedure:   Consent  obtained  Time out performed  ETT and general  anesthesia per anesthesia team  FOB passed through 8.5 ETT.  Airway inspection unremarkable.  EBUS scope passed through ETT and 3 cm station 7 LN isolated and multiple passes made with 21 G and 25 G olympus EBUS needles.  FNA bx of 4R LN with 25G ebus needle  also  performed  MARTHA - bloody   Samples  sent  for  further  pathologic examination  No complications   Well tolerated    Froilan Bernardo MD        Electronically signed by Froilan Bernardo MD on 3/28/2024 at 9:23 AM

## 2024-03-28 NOTE — PROGRESS NOTES
Respiratory Therapy  Bronchoscopy Note    Name: Symone Edmonds MRN: [id number]   : 1965     Date: 3/28/2024            Procedure: Diagnostic bronchoscopy.  Scope ID: [id number])      Indication: Mediastinal lymphadenopathy and patient with history of renal cell cancer and resection single metastasis metastasis in the lung.     Consent/Treatment: Timeout verified the correct patient and correct procedure.     Anesthesia:   General anesthesia was performed by anesthesiology    Physician: Dr. Jamison  Respiratory Therapist: Wilber Maxwell  Nurse: Jarret Rodriguez    Specimens:   FNA bx of station 7 and 4R LN stations       Complications: none        WILBER MAXWELL, RT  2024  12:22 PM

## 2024-03-28 NOTE — PROGRESS NOTES

## 2024-03-28 NOTE — DISCHARGE SUMMARY
Pulmonary    S/p bronchoscopy and EBUS with FNA bx of enlarged subcarinal and precarinal LN under general anesthesia    No complications  Well  tolerated  Discharge  home  when  criteria met    Office follow up as scheduled to review final path results    Froilan Bernardo MD

## 2024-03-29 NOTE — ANESTHESIA PRE PROCEDURE
Department of Anesthesiology  Preprocedure Note       Name:  Symone Edmonds   Age:  58 y.o.  :  1965                                          MRN:  [id number]         Date:  3/29/2024      Surgeon: Surgeon(s):  Froilan Bernardo MD    Procedure: Procedure(s):  ebus    Medications prior to admission:   Prior to Admission medications    Medication Sig Start Date End Date Taking? Authorizing Provider   LORazepam (ATIVAN) 0.5 MG tablet  24   Gurpreet Trevizo MD   LOSARTAN POTASSIUM PO Take 180 mg by mouth at bedtime  Patient not taking: Reported on 3/28/2024    Gurpreet Trevizo MD   valsartan (DIOVAN) 320 MG tablet Take 1 tablet by mouth daily For hypertension 3/1/24   Elisha Marley MD   ACCU-CHEK SINA PLUS strip TEST BLOOD SUGAR EVERY DAY 24   Patricia Suazo MD   dilTIAZem (CARDIZEM CD) 180 MG extended release capsule TAKE 2 CAPSULES EVERY DAY FOR HIGH BLOOD PRESSURE 23   Patricia Suazo MD   tiZANidine (ZANAFLEX) 2 MG tablet TAKE 1 TABLET TWICE DAILY FOR MUSCLE SPASM(S) 23   Patricia Suazo MD   traZODone (DESYREL) 50 MG tablet TAKE 2 TABLETS EVERY NIGHT 23   Patricia Suazo MD   zinc gluconate 50 MG tablet Take 1 tablet by mouth daily    Gurpreet Trevizo MD   rOPINIRole (REQUIP) 2 MG tablet TAKE 1 TABLET THREE TIMES DAILY FOR RESTLESS LEGS SYNDROME 10/25/23   Patricia Suazo MD   furosemide (LASIX) 20 MG tablet TAKE 1 TABLET EVERY DAY AS NEEDED 10/25/23   Patricia Suazo MD   atorvastatin (LIPITOR) 40 MG tablet TAKE 1 TABLET EVERY DAY 10/25/23   Patricia Suazo MD   omeprazole (PRILOSEC) 40 MG delayed release capsule TAKE 1 CAPSULE BEFORE BREAKFAST AND DINNER. 10/25/23   Patricia Suazo MD   levocetirizine (XYZAL) 5 MG tablet TAKE 1 TABLET EVERY DAY 10/25/23   Patricia Suazo MD   ondansetron (ZOFRAN) 4 MG tablet Take 1 tablet by mouth 3 times daily as needed for Nausea or Vomiting 10/17/23   Dusty Rushing MD   metFORMIN (GLUCOPHAGE) 500 MG tablet 
113/46 (!) 146/62 (!) 129/59   Pulse: (!) 114  (!) 109 (!) 106   Resp:   16 20   Temp:   97.6 °F (36.4 °C)    TempSrc:   Temporal    SpO2: 97%  96% 90%   Weight:       Height:                                                  BP Readings from Last 3 Encounters:   03/28/24 (!) 129/59   03/13/24 (!) 157/83   03/01/24 (!) 160/80       NPO Status: Time of last liquid consumption: 2000                        Time of last solid consumption: 2000                        Date of last liquid consumption: 03/27/24                        Date of last solid food consumption: 03/27/24    BMI:   Wt Readings from Last 3 Encounters:   03/28/24 90.7 kg (200 lb)   03/22/24 90.7 kg (200 lb)   03/13/24 90.7 kg (200 lb)     Body mass index is 34.87 kg/m².    CBC:   Lab Results   Component Value Date/Time    WBC 17.4 03/05/2024 09:23 AM    RBC 5.28 03/05/2024 09:23 AM    HGB 14.3 03/05/2024 09:23 AM    HCT 43.4 03/05/2024 09:23 AM    MCV 82.2 03/05/2024 09:23 AM    RDW 15.4 03/05/2024 09:23 AM     03/05/2024 09:23 AM       CMP:   Lab Results   Component Value Date/Time     03/05/2024 09:23 AM    K 4.0 03/05/2024 09:23 AM     03/05/2024 09:23 AM    CO2 24 03/05/2024 09:23 AM    BUN 12 03/05/2024 09:23 AM    CREATININE 1.04 03/05/2024 09:23 AM    GFRAA >60 03/07/2022 01:13 PM    AGRATIO 1.2 03/05/2024 09:23 AM    AGRATIO 1.0 11/11/2022 09:15 AM    LABGLOM >60 03/05/2024 09:23 AM    GLUCOSE 129 03/05/2024 09:23 AM    PROT 6.7 03/05/2024 09:23 AM    CALCIUM 9.8 03/05/2024 09:23 AM    BILITOT 0.2 03/05/2024 09:23 AM    ALKPHOS 228 03/05/2024 09:23 AM    ALKPHOS 172 11/11/2022 09:15 AM    AST 21 03/05/2024 09:23 AM    ALT 31 03/05/2024 09:23 AM       POC Tests: No results for input(s): \"POCGLU\", \"POCNA\", \"POCK\", \"POCCL\", \"POCBUN\", \"POCHEMO\", \"POCHCT\" in the last 72 hours.    Coags:   Lab Results   Component Value Date/Time    PROTIME 10.0 01/16/2024 10:16 AM    INR 1.0 01/16/2024 10:16 AM    APTT 24.9 10/14/2023 04:06 AM

## 2024-03-29 NOTE — ANESTHESIA POSTPROCEDURE EVALUATION
Department of Anesthesiology  Postprocedure Note    Patient: Symone Edmonds  MRN: [id number]  YOB: 1965  Date of evaluation: 3/29/2024    Procedure Summary       Date: 03/28/24 Room / Location: [facility] ENDO 01 / [facility]    Anesthesia Start: 0812 Anesthesia Stop: 0942    Procedure: ebus Diagnosis:       Hilar lymphadenopathy      (Hilar lymphadenopathy [R59.0])    Surgeons: Froilan Bernardo MD Responsible Provider: Drew Burrell MD    Anesthesia Type: General ASA Status: Not recorded            Anesthesia Type: General    Clayton Phase I: Clayton Score: 10    Clayton Phase II: Clayton Score: 10    Anesthesia Post Evaluation    Patient location during evaluation: bedside  Nausea & Vomiting: no nausea  Cardiovascular status: blood pressure returned to baseline  Respiratory status: acceptable    No notable events documented.

## 2024-04-08 NOTE — PROGRESS NOTES
[facility]   [address], [address] [place] [id number]   W: [phone]  F: [phone]          Reason for Visit:     Symone Edmonds is a 57 y.o.  female who is for    Follow up of leucocytosis and anemia  Stage IV RCC        Treatment history for renal cell carcinoma  7/28/2020-right radical nephrectomy-3.3 cm renal cell carcinoma.  She did have a CT chest in August 2020 that showed a 6 mm lung nodule  9/6/2023-stage IV renal cell carcinoma-robotic assisted thoracoscopic right lower lobe wedge resection and mediastinal lymph node dissection         History of Present Illness:     Patient is a 57 y.o.female with PMH as below who is seen for abnormal  CBC      She has persistent leucocytosis since 2014, no other CBC abnormalities. She has known R renal cell carcinoma ( 3.3 cm) which she had a R radical nephrectomy on 7/28/2020. She has osteoarthritis and has several flare ups. She had a CT chest 8/2020 that  showed a 6 mm lung nodule which is being observed.  She had a follow-up CT scan with pulmonary on 7/24/2023 which showed increasing bilateral pulmonary nodules.  This prompted a PET CT scan that was done on 8/9/2023.  This showed that the peripheral pulmonary nodule in the right lower lobe had an FDG activity of 3.3, subcentimeter pulmonary nodule in the left lower lobe had no FDG activity.  She was referred to thoracic surgery Dr. Ross and underwent a robotic assisted thoracoscopic right lower lobe wedge resection and mediastinal lymph node dissection on 9/6/2023.  Pathology revealed metastatic renal cell carcinoma in the right lower lobe 1.3 cm nodule. She is on observation and seen with scans and recent EBUS    Very overwhelmed with moms care, she has some cough, rare occipital pain lately, does not sleep well, these come and go.     As regards her history of anemia and leukocytosis she has had a history of iron deficiency anemia.  This was corrected on

## 2024-04-08 NOTE — PATIENT INSTRUCTIONS
choice. Or you may want to swim, bike, or do other activities. Bit by bit, increase the time you're active every day. Try for at least 30 minutes on most days of the week.     Try to quit or cut back on using tobacco and other nicotine products. This includes smoking and vaping. If you need help quitting, talk to your doctor about stop-smoking programs and medicines. These can increase your chances of quitting for good. Quitting is one of the most important things you can do to protect your heart. It is never too late to quit. Try to avoid secondhand smoke too.     Stay at a weight that's healthy for you. Talk to your doctor if you need help losing weight.     Try to get 7 to 9 hours of sleep each night.     Limit alcohol to 2 drinks a day for men and 1 drink a day for women. Too much alcohol can cause health problems.     Manage other health problems such as diabetes, high blood pressure, and high cholesterol. If you think you may have a problem with alcohol or drug use, talk to your doctor.   Medicines    Take your medicines exactly as prescribed. Call your doctor if you think you are having a problem with your medicine.     If your doctor recommends aspirin, take the amount directed each day. Make sure you take aspirin and not another kind of pain reliever, such as acetaminophen (Tylenol).   When should you call for help?   Call 911 if you have symptoms of a heart attack. These may include:    Chest pain or pressure, or a strange feeling in the chest.     Sweating.     Shortness of breath.     Pain, pressure, or a strange feeling in the back, neck, jaw, or upper belly or in one or both shoulders or arms.     Lightheadedness or sudden weakness.     A fast or irregular heartbeat.   After you call 911, the  may tell you to chew 1 adult-strength or 2 to 4 low-dose aspirin. Wait for an ambulance. Do not try to drive yourself.  Watch closely for changes in your health, and be sure to contact your doctor if you

## 2024-04-10 ENCOUNTER — OFFICE VISIT (OUTPATIENT)
Age: 59
End: 2024-04-10
Payer: MEDICARE

## 2024-04-10 ENCOUNTER — TELEPHONE (OUTPATIENT)
Age: 59
End: 2024-04-10

## 2024-04-10 ENCOUNTER — CLINICAL DOCUMENTATION (OUTPATIENT)
Age: 59
End: 2024-04-10

## 2024-04-10 ENCOUNTER — NURSE ONLY (OUTPATIENT)
Age: 59
End: 2024-04-10
Payer: MEDICARE

## 2024-04-10 VITALS
DIASTOLIC BLOOD PRESSURE: 77 MMHG | WEIGHT: 199 LBS | BODY MASS INDEX: 34.7 KG/M2 | OXYGEN SATURATION: 97 % | SYSTOLIC BLOOD PRESSURE: 132 MMHG | TEMPERATURE: 97.8 F | RESPIRATION RATE: 16 BRPM | HEART RATE: 94 BPM

## 2024-04-10 VITALS
RESPIRATION RATE: 16 BRPM | WEIGHT: 199 LBS | OXYGEN SATURATION: 97 % | TEMPERATURE: 97.8 F | DIASTOLIC BLOOD PRESSURE: 90 MMHG | BODY MASS INDEX: 33.97 KG/M2 | HEIGHT: 64 IN | HEART RATE: 94 BPM | SYSTOLIC BLOOD PRESSURE: 158 MMHG

## 2024-04-10 DIAGNOSIS — C78.00 SECONDARY RENAL CELL CARCINOMA OF LUNG, UNSPECIFIED LATERALITY (HCC): ICD-10-CM

## 2024-04-10 DIAGNOSIS — F34.1 DYSTHYMIA: ICD-10-CM

## 2024-04-10 DIAGNOSIS — E55.9 VITAMIN D DEFICIENCY: ICD-10-CM

## 2024-04-10 DIAGNOSIS — C64.9 SECONDARY RENAL CELL CARCINOMA OF LUNG, UNSPECIFIED LATERALITY (HCC): ICD-10-CM

## 2024-04-10 DIAGNOSIS — C64.1 MALIGNANT NEOPLASM OF RIGHT KIDNEY, EXCEPT RENAL PELVIS (HCC): Primary | ICD-10-CM

## 2024-04-10 DIAGNOSIS — I10 ESSENTIAL HYPERTENSION WITH GOAL BLOOD PRESSURE LESS THAN 140/90: ICD-10-CM

## 2024-04-10 DIAGNOSIS — Z00.00 MEDICARE ANNUAL WELLNESS VISIT, SUBSEQUENT: Primary | ICD-10-CM

## 2024-04-10 DIAGNOSIS — E11.40 TYPE 2 DIABETES MELLITUS WITH DIABETIC NEUROPATHY, WITHOUT LONG-TERM CURRENT USE OF INSULIN (HCC): ICD-10-CM

## 2024-04-10 DIAGNOSIS — I10 ESSENTIAL (PRIMARY) HYPERTENSION: ICD-10-CM

## 2024-04-10 DIAGNOSIS — Z73.0 BURNOUT OF CAREGIVER: ICD-10-CM

## 2024-04-10 PROCEDURE — G0439 PPPS, SUBSEQ VISIT: HCPCS | Performed by: FAMILY MEDICINE

## 2024-04-10 PROCEDURE — G8428 CUR MEDS NOT DOCUMENT: HCPCS | Performed by: FAMILY MEDICINE

## 2024-04-10 PROCEDURE — 4004F PT TOBACCO SCREEN RCVD TLK: CPT | Performed by: FAMILY MEDICINE

## 2024-04-10 PROCEDURE — 3017F COLORECTAL CA SCREEN DOC REV: CPT | Performed by: FAMILY MEDICINE

## 2024-04-10 PROCEDURE — 99215 OFFICE O/P EST HI 40 MIN: CPT | Performed by: INTERNAL MEDICINE

## 2024-04-10 PROCEDURE — 3077F SYST BP >= 140 MM HG: CPT | Performed by: FAMILY MEDICINE

## 2024-04-10 PROCEDURE — 3044F HG A1C LEVEL LT 7.0%: CPT | Performed by: FAMILY MEDICINE

## 2024-04-10 PROCEDURE — 99214 OFFICE O/P EST MOD 30 MIN: CPT | Performed by: FAMILY MEDICINE

## 2024-04-10 PROCEDURE — 3080F DIAST BP >= 90 MM HG: CPT | Performed by: FAMILY MEDICINE

## 2024-04-10 PROCEDURE — G8417 CALC BMI ABV UP PARAM F/U: HCPCS | Performed by: FAMILY MEDICINE

## 2024-04-10 PROCEDURE — 2022F DILAT RTA XM EVC RTNOPTHY: CPT | Performed by: FAMILY MEDICINE

## 2024-04-10 RX ORDER — VALSARTAN 320 MG/1
320 TABLET ORAL DAILY
Qty: 90 TABLET | Refills: 3 | Status: SHIPPED | OUTPATIENT
Start: 2024-04-10

## 2024-04-10 ASSESSMENT — PATIENT HEALTH QUESTIONNAIRE - PHQ9
SUM OF ALL RESPONSES TO PHQ QUESTIONS 1-9: 13
SUM OF ALL RESPONSES TO PHQ9 QUESTIONS 1 & 2: 2
SUM OF ALL RESPONSES TO PHQ QUESTIONS 1-9: 13
8. MOVING OR SPEAKING SO SLOWLY THAT OTHER PEOPLE COULD HAVE NOTICED. OR THE OPPOSITE, BEING SO FIGETY OR RESTLESS THAT YOU HAVE BEEN MOVING AROUND A LOT MORE THAN USUAL: NOT AT ALL
7. TROUBLE CONCENTRATING ON THINGS, SUCH AS READING THE NEWSPAPER OR WATCHING TELEVISION: MORE THAN HALF THE DAYS
4. FEELING TIRED OR HAVING LITTLE ENERGY: NEARLY EVERY DAY
1. LITTLE INTEREST OR PLEASURE IN DOING THINGS: SEVERAL DAYS
10. IF YOU CHECKED OFF ANY PROBLEMS, HOW DIFFICULT HAVE THESE PROBLEMS MADE IT FOR YOU TO DO YOUR WORK, TAKE CARE OF THINGS AT HOME, OR GET ALONG WITH OTHER PEOPLE: VERY DIFFICULT
9. THOUGHTS THAT YOU WOULD BE BETTER OFF DEAD, OR OF HURTING YOURSELF: NOT AT ALL
5. POOR APPETITE OR OVEREATING: NEARLY EVERY DAY
2. FEELING DOWN, DEPRESSED OR HOPELESS: SEVERAL DAYS
3. TROUBLE FALLING OR STAYING ASLEEP: NEARLY EVERY DAY
6. FEELING BAD ABOUT YOURSELF - OR THAT YOU ARE A FAILURE OR HAVE LET YOURSELF OR YOUR FAMILY DOWN: NOT AT ALL

## 2024-04-10 ASSESSMENT — LIFESTYLE VARIABLES
HOW OFTEN DO YOU HAVE A DRINK CONTAINING ALCOHOL: NEVER
HOW MANY STANDARD DRINKS CONTAINING ALCOHOL DO YOU HAVE ON A TYPICAL DAY: PATIENT DOES NOT DRINK

## 2024-04-10 NOTE — PROGRESS NOTES
Symone Edmonds is a 58 y.o. female    Chief Complaint   Patient presents with    Follow-up     Follow up of leucocytosis and anemia  Stage IV RCC          1. Have you been to the ER, urgent care clinic since your last visit?  Hospitalized since your last visit?No    2. Have you seen or consulted any other health care providers outside of the [facility] since your last visit?  Include any pap smears or colon screening. No

## 2024-04-10 NOTE — PROGRESS NOTES
Derian Velasco  Oncology Social Work Encounter    [] Med-Onc [facility] [] Med-Onc [facility] [] Med-Onc [facility] [] Rad-Onc [facility] [] Rad-Onc [facility] [] Rad-Onc [facility] [] Rad-Onc [facility] [] [facility]    Patient: Symone Edmonds    Encounter Type:    [] Initial SW Encounter  [] Patient Initiated  [x] Referral  [] Distress/PHQ Screening  [] Other:      Concern(s)/Barrier(s) to Care: caregiver burden    Narrative: SW referral received. Met with patient at . We discussed the caregiver burden she is experiencing. Patient is feeling very overwhelmed with the lack of support from her brother and sister in caring for their mom and also caring for her when she has had needs (e.g. recovering from surgeries). Shares that her siblings both work and expect that, since patient is on disability, the work involved with caring for their mother falls on pt. Engaged patient in active listening and emotional validation. Explored what mom's caregiving needs look like. She stated her mom doesn't have any current health problems and is able to take care of all ADLs. Presented the possibility that her mom actually needs less care than pt is taking on. She acknowledged she hadn't thought about it this way before, and that this was a needed shift in her perspective. Encouraged patient to prioritize her needs right now and to consider how she can set new patterns in the relationship with her mom so as not to feel so burdened. Patient thanked SW for helping to see the situation differently, saying she needed some outside validation.     We discussed continued support through therapy and psych referrals. She is going to contact her PCP Dr. Suazo to discuss changing/adjusting her antidepressant medication in the meantime. Plan made for writer to send referral info via [employer].     Patient was appreciative of the visit and support. Encouraged her to reach out as desired.     Referral/Handouts:        Behavioral health referral    Plan:   Writer send therapy/psych referrals

## 2024-04-10 NOTE — PROGRESS NOTES
Identified pt with two pt identifiers(name and ).    Chief Complaint   Patient presents with    Medicare AWV    Referral - General     Would like referral for therapist  Would like someone to talk to    Lab Collection     Fasting        Health Maintenance Due   Topic    Hepatitis B vaccine (2 of 3 - 19+ 3-dose series)    DTaP/Tdap/Td vaccine (1 - Tdap)    Low dose CT lung screening &/or counseling     COVID-19 Vaccine (2 - Moderna risk series)    Breast cancer screen        Wt Readings from Last 3 Encounters:   04/10/24 90.3 kg (199 lb)   24 90.7 kg (200 lb)   24 90.7 kg (200 lb)     Temp Readings from Last 3 Encounters:   04/10/24 97.8 °F (36.6 °C) (Temporal)   24 97.6 °F (36.4 °C) (Temporal)   24 98.3 °F (36.8 °C)     BP Readings from Last 3 Encounters:   04/10/24 (!) 158/90   24 (!) 129/59   24 (!) 157/83     Pulse Readings from Last 3 Encounters:   04/10/24 94   24 (!) 106   24 (!) 101           Depression Screening:  :         4/10/2024     9:11 AM 3/1/2024    10:53 AM 2024     2:39 PM 2023    11:47 AM 2023     8:12 AM 2023    10:00 AM 2022     2:00 PM   PHQ-9 Questionaire   Little interest or pleasure in doing things 1 0 3 0 0 0 0   Feeling down, depressed, or hopeless 1 0 0 0 0 0 0   Trouble falling or staying asleep, or sleeping too much 3 0 3       Feeling tired or having little energy 3 0 3       Poor appetite or overeating 3 0 2       Feeling bad about yourself - or that you are a failure or have let yourself or your family down 0 0 0       Trouble concentrating on things, such as reading the newspaper or watching television 2 0 3       Moving or speaking so slowly that other people could have noticed. Or the opposite - being so fidgety or restless that you have been moving around a lot more than usual 0 0 0       Thoughts that you would be better off dead, or of hurting yourself in some way 0 0 0       PHQ-9 Total Score 13 0 14 
  Tobacco Use:  Tobacco Use: High Risk (4/10/2024)    Patient History     Smoking Tobacco Use: Every Day     Smokeless Tobacco Use: Never     Passive Exposure: Past     E-cigarette/Vaping       Questions Responses    E-cigarette/Vaping Use Never User    Start Date     Passive Exposure No    Quit Date     Counseling Given     Comments         Interventions:  Patient declined any further intervention or treatment                  Objective   Vitals:    04/10/24 0914   BP: (!) 158/90   Site: Left Upper Arm   Position: Sitting   Cuff Size: Medium Adult   Pulse: 94   Resp: 16   Temp: 97.8 °F (36.6 °C)   TempSrc: Temporal   SpO2: 97%   Weight: 90.3 kg (199 lb)   Height: 1.613 m (5' 3.5\")      Body mass index is 34.7 kg/m².        General Appearance: alert and oriented to person, place and time, well-developed and well-nourished, in no acute distress and depressed affect, tearful  Head: normocephalic and atraumatic  Neck: neck supple and non tender without mass, no thyromegaly or thyroid nodules, no cervical lymphadenopathy   Pulmonary/Chest: clear to auscultation bilaterally- no wheezes, rales or rhonchi, normal air movement, no respiratory distress  Cardiovascular: normal rate, normal S1 and S2, and no gallops  Neurologic: gait and coordination normal and speech normal       Allergies   Allergen Reactions    Nsaids Other (See Comments)     Stomach hurts    Diclofenac Sodium Other (See Comments)    Celecoxib Hives    Hydroxychloroquine Hives    Meloxicam Hives    Oxybutynin Hives and Itching    Oxybutynin Chloride Hives and Itching    Penicillins      Other reaction(s): Unknown (comments)  AS A CHILD-HALLUCINATIONS    Patient screened for any delayed non-IgE-mediated reaction to PCN.        Patient notes the following:    No delayed non-IgE-mediated reaction to PCN     Prior to Visit Medications    Medication Sig Taking? Authorizing Provider   valsartan (DIOVAN) 320 MG tablet Take 1 tablet by mouth daily For hypertension

## 2024-04-10 NOTE — TELEPHONE ENCOUNTER
2:42pm: pt verified x2, informed pt PET scan was moved up to 4/22 at 8am with arrival time of 7:45am. Informed pt of all prep she was appreciative, no further questions.

## 2024-04-11 LAB
25(OH)D3 SERPL-MCNC: 28.7 NG/ML (ref 30–100)
CHOLEST SERPL-MCNC: 153 MG/DL
HDLC SERPL-MCNC: 52 MG/DL
HDLC SERPL: 2.9 (ref 0–5)
LDLC SERPL CALC-MCNC: 77 MG/DL (ref 0–100)
TRIGL SERPL-MCNC: 120 MG/DL
VIT B12 SERPL-MCNC: >2000 PG/ML (ref 193–986)
VLDLC SERPL CALC-MCNC: 24 MG/DL

## 2024-04-12 ENCOUNTER — TELEPHONE (OUTPATIENT)
Age: 59
End: 2024-04-12

## 2024-04-12 NOTE — TELEPHONE ENCOUNTER
----- Message from Patricia Suazo MD sent at 4/11/2024  5:38 PM EDT -----  Plan labs show very high vitamin B12 level.  Recommend stop taking any supplements that may contain B12.  Vitamin D level is a little low.  Make sure to take daily vitamin D3 supplement.  Good cholesterol numbers.

## 2024-04-15 ENCOUNTER — TELEPHONE (OUTPATIENT)
Age: 59
End: 2024-04-15

## 2024-04-15 DIAGNOSIS — E11.40 TYPE 2 DIABETES MELLITUS WITH DIABETIC NEUROPATHY, WITHOUT LONG-TERM CURRENT USE OF INSULIN (HCC): Primary | ICD-10-CM

## 2024-04-15 RX ORDER — CALCIUM CITRATE/VITAMIN D3 200MG-6.25
TABLET ORAL
Qty: 100 EACH | Refills: 3 | Status: SHIPPED | OUTPATIENT
Start: 2024-04-15

## 2024-04-15 NOTE — TELEPHONE ENCOUNTER
----- Message from Symone Edmonds sent at 4/15/2024  1:04 PM EDT -----  Regarding: Sugar testing kit  Contact: [phone]  Yes

## 2024-04-15 NOTE — TELEPHONE ENCOUNTER
----- Message from Symone Edmonds sent at 4/15/2024  9:54 AM EDT -----  Regarding: Sugar testing kit  Contact: [phone]  Good morning   Can u please send prescription to [facility] for sugar monitor for me please. Went out of town and lost it. I didn't have blood pressure device to check my bp this weekend to send to you. Will send this week readings to you Thursday or Friday.  Thank you

## 2024-04-22 ENCOUNTER — HOSPITAL ENCOUNTER (OUTPATIENT)
Facility: HOSPITAL | Age: 59
Discharge: HOME | End: 2024-04-25
Attending: INTERNAL MEDICINE
Payer: MEDICARE

## 2024-04-22 DIAGNOSIS — C77.8 MALIGNANT NEOPLASM METASTATIC TO LYMPH NODES OF MULTIPLE SITES (HCC): ICD-10-CM

## 2024-04-22 LAB
GLUCOSE BLD STRIP.AUTO-MCNC: 142 MG/DL (ref 65–117)
SERVICE CMNT-IMP: ABNORMAL

## 2024-04-22 PROCEDURE — 82962 GLUCOSE BLOOD TEST: CPT

## 2024-04-22 PROCEDURE — 3430000000 HC RX DIAGNOSTIC RADIOPHARMACEUTICAL: Performed by: INTERNAL MEDICINE

## 2024-04-22 PROCEDURE — A9609 HC RX DIAGNOSTIC RADIOPHARMACEUTICAL: HCPCS | Performed by: INTERNAL MEDICINE

## 2024-04-22 PROCEDURE — 78815 PET IMAGE W/CT SKULL-THIGH: CPT

## 2024-04-22 RX ORDER — FLUDEOXYGLUCOSE F-18 500 MCI/ML
10 INJECTION INTRAVENOUS
Status: COMPLETED | OUTPATIENT
Start: 2024-04-22 | End: 2024-04-22

## 2024-04-22 RX ADMIN — FLUDEOXYGLUCOSE F-18 10 MILLICURIE: 500 INJECTION INTRAVENOUS at 07:50

## 2024-04-25 NOTE — PROGRESS NOTES
[facility] [place] at [facility]   [address] [facility] [address], [address] [place] [address]   W: [phone]  F: [phone]          Reason for Visit:     Symone Edmonds is a 57 y.o.  female who is for    Follow up of leucocytosis and anemia  Stage IV RCC        Treatment history for renal cell carcinoma  7/28/2020-right radical nephrectomy-3.3 cm renal cell carcinoma.  She did have a CT chest in August 2020 that showed a 6 mm lung nodule  9/6/2023-stage IV renal cell carcinoma-robotic assisted thoracoscopic right lower lobe wedge resection and mediastinal lymph node dissection         History of Present Illness:     Patient is a 57 y.o.female with PMH as below who is seen for abnormal  CBC      She has persistent leucocytosis since 2014, no other CBC abnormalities. She has known R renal cell carcinoma ( 3.3 cm) which she had a R radical nephrectomy on 7/28/2020. She has osteoarthritis and has several flare ups. She had a CT chest 8/2020 that  showed a 6 mm lung nodule which is being observed.  She had a follow-up CT scan with pulmonary on 7/24/2023 which showed increasing bilateral pulmonary nodules.  This prompted a PET CT scan that was done on 8/9/2023.  This showed that the peripheral pulmonary nodule in the right lower lobe had an FDG activity of 3.3, subcentimeter pulmonary nodule in the left lower lobe had no FDG activity.  She was referred to thoracic surgery Dr. Ross and underwent a robotic assisted thoracoscopic right lower lobe wedge resection and mediastinal lymph node dissection on 9/6/2023.  Pathology revealed metastatic renal cell carcinoma in the right lower lobe 1.3 cm nodule. She is on observation and seen with scans and recent EBUS    She is ok. She comes with a friend.  She has no HA.     As regards her history of anemia and leukocytosis she has had a history of iron deficiency anemia.  This was corrected on replacement.    She comes with scans   Feels fine, some SAUNDERS.        She

## 2024-04-26 ENCOUNTER — CLINICAL DOCUMENTATION (OUTPATIENT)
Age: 59
End: 2024-04-26

## 2024-04-26 ENCOUNTER — OFFICE VISIT (OUTPATIENT)
Age: 59
End: 2024-04-26
Payer: MEDICARE

## 2024-04-26 VITALS
SYSTOLIC BLOOD PRESSURE: 149 MMHG | RESPIRATION RATE: 16 BRPM | DIASTOLIC BLOOD PRESSURE: 84 MMHG | HEART RATE: 99 BPM | OXYGEN SATURATION: 95 % | BODY MASS INDEX: 34.58 KG/M2 | WEIGHT: 198.3 LBS | TEMPERATURE: 98.1 F

## 2024-04-26 DIAGNOSIS — C64.1 MALIGNANT NEOPLASM OF RIGHT KIDNEY, EXCEPT RENAL PELVIS (HCC): Primary | ICD-10-CM

## 2024-04-26 PROCEDURE — 99215 OFFICE O/P EST HI 40 MIN: CPT | Performed by: INTERNAL MEDICINE

## 2024-04-26 RX ORDER — PROCHLORPERAZINE MALEATE 10 MG
10 TABLET ORAL EVERY 6 HOURS PRN
Qty: 60 TABLET | Refills: 2 | Status: SHIPPED | OUTPATIENT
Start: 2024-04-26

## 2024-04-26 RX ORDER — ONDANSETRON 8 MG/1
8 TABLET, ORALLY DISINTEGRATING ORAL EVERY 8 HOURS PRN
Qty: 30 TABLET | Refills: 2 | Status: SHIPPED | OUTPATIENT
Start: 2024-04-26

## 2024-04-26 NOTE — PROGRESS NOTES
Symone Edmonds is a 58 y.o. female  Chief Complaint   Patient presents with    Follow-up     leucocytosis and anemia  Stage IV RCC        1. Have you been to the ER, urgent care clinic since your last visit?  Hospitalized since your last visit?No    2. Have you seen or consulted any other health care providers outside of the [facility] since your last visit?  Include any pap smears or colon screening. Yes [facility]

## 2024-04-26 NOTE — PROGRESS NOTES
Pharmacy Note- Immunotherapy Education    Symone Edmonds is a  58 y.o.female  diagnosed with renal cell cancer here today for  immunotherapy counseling and consent. Ms. Edmonds is being treated with ipilimumab and nivolumab.       Ms. Edmonds was provided information regarding the risks of immune-mediated adverse reactions secondary to ipilimumab and nivolumab that may require interruption/delay of treatment and possible use of corticosteroids.  These reactions include, but are not limited to: colitis (diarrhea or severe abdominal pain); hepatitis (jaundice, severe nausea, or vomiting, easy bruising, and/or bleeding); hypophysitis (persistent or unusual headache, extreme weakness, dizziness/fainting, and/or vision changes); dermatitis (skin rash, mouth sores, skin blisters, and/or skin peeling); ocular toxicity (uveitis, iritis, and/or episcleritis); neuropathy (motor or sensory); hyper/hypothyroidism.    Patient given ways to manage these side effects and when to contact office.     [facility] Handout of medications provided to patient. Ms. Edmonds verbalized understanding of the information presented and all of the patient's questions were answered.    Rosario Bear, PharmD, BCPS, BCOP    For Pharmacy Admin Tracking Only    Program: Medical Group  CPA in place:  No  Recommendation Provided To: Patient/Caregiver: 1 via In person    Intervention Accepted By: Patient/Caregiver: 1    Time Spent (min): 30

## 2024-04-29 ENCOUNTER — TELEPHONE (OUTPATIENT)
Age: 59
End: 2024-04-29

## 2024-04-29 NOTE — TELEPHONE ENCOUNTER
1974  R/t call to pt HIPAA verified x2  Made pt aware these are nausea medications only to be taken as needed.  Explained to pt how to take these medications.  Pt voiced understanding and was thankful for my call.

## 2024-04-29 NOTE — TELEPHONE ENCOUNTER
Called patient to schedule treatment DONE> Patient would like to speak about the 2 medications that were ordered. She would like to know when she should start taking these? Does she need to bring them with her to her next appt?

## 2024-05-09 ENCOUNTER — CLINICAL DOCUMENTATION (OUTPATIENT)
Facility: HOSPITAL | Age: 59
End: 2024-05-09

## 2024-05-09 NOTE — PROGRESS NOTES
Patient Assistance    Met with: Spoke to patient by phone    Navigator Type: Infusion  Documentation Type: New Patient  Contact Type: Telephone  Navigation Status: Copay Enrollment  Status of Patient Insurance Coverage: Patient has active coverage          Additional notes:      Drug Name: Opdivo  Other Drug Name: yervoy  Form of PAP Assistance: [employer] Assistance    Drug Name: Yervoy  Form of PAP Assistance: [employer] Assistance

## 2024-05-13 DIAGNOSIS — C78.00 SECONDARY RENAL CELL CARCINOMA OF LUNG, UNSPECIFIED LATERALITY (HCC): ICD-10-CM

## 2024-05-13 DIAGNOSIS — C64.1 MALIGNANT NEOPLASM OF RIGHT KIDNEY, EXCEPT RENAL PELVIS (HCC): Primary | ICD-10-CM

## 2024-05-13 DIAGNOSIS — C64.9 SECONDARY RENAL CELL CARCINOMA OF LUNG, UNSPECIFIED LATERALITY (HCC): ICD-10-CM

## 2024-05-13 RX ORDER — SODIUM CHLORIDE 9 MG/ML
INJECTION, SOLUTION INTRAVENOUS CONTINUOUS
Status: CANCELLED | OUTPATIENT
Start: 2024-05-16

## 2024-05-13 RX ORDER — SODIUM CHLORIDE 9 MG/ML
5-250 INJECTION, SOLUTION INTRAVENOUS PRN
Status: CANCELLED | OUTPATIENT
Start: 2024-05-16

## 2024-05-13 RX ORDER — ONDANSETRON 2 MG/ML
8 INJECTION INTRAMUSCULAR; INTRAVENOUS
Status: CANCELLED | OUTPATIENT
Start: 2024-05-16

## 2024-05-13 RX ORDER — SODIUM CHLORIDE 0.9 % (FLUSH) 0.9 %
5-40 SYRINGE (ML) INJECTION PRN
Status: CANCELLED | OUTPATIENT
Start: 2024-05-16

## 2024-05-13 RX ORDER — ACETAMINOPHEN 325 MG/1
650 TABLET ORAL
Status: CANCELLED | OUTPATIENT
Start: 2024-05-16

## 2024-05-13 RX ORDER — EPINEPHRINE 1 MG/ML
0.3 INJECTION, SOLUTION, CONCENTRATE INTRAVENOUS PRN
Status: CANCELLED | OUTPATIENT
Start: 2024-05-16

## 2024-05-13 RX ORDER — HEPARIN 100 UNIT/ML
500 SYRINGE INTRAVENOUS PRN
Status: CANCELLED | OUTPATIENT
Start: 2024-05-16

## 2024-05-13 RX ORDER — DIPHENHYDRAMINE HYDROCHLORIDE 50 MG/ML
50 INJECTION INTRAMUSCULAR; INTRAVENOUS
Status: CANCELLED | OUTPATIENT
Start: 2024-05-16

## 2024-05-13 RX ORDER — ALBUTEROL SULFATE 90 UG/1
4 AEROSOL, METERED RESPIRATORY (INHALATION) PRN
Status: CANCELLED | OUTPATIENT
Start: 2024-05-16

## 2024-05-13 RX ORDER — MEPERIDINE HYDROCHLORIDE 25 MG/ML
12.5 INJECTION INTRAMUSCULAR; INTRAVENOUS; SUBCUTANEOUS PRN
Status: CANCELLED | OUTPATIENT
Start: 2024-05-16

## 2024-05-15 NOTE — PROGRESS NOTES
Cancer [place] at [facility]   [address] [place] [address], [address] [place] [address]   W: [phone]  F: [phone]          Reason for Visit:     Symone Edmonds is a 58 y.o. female who is for    Follow up of leucocytosis and anemia  Stage IV RCC        Treatment history for renal cell carcinoma  7/28/2020-right radical nephrectomy-3.3 cm renal cell carcinoma.  She did have a CT chest in August 2020 that showed a 6 mm lung nodule  9/6/2023-stage IV renal cell carcinoma-robotic assisted thoracoscopic right lower lobe wedge resection and mediastinal lymph node dissection  3/2024: CT CAP  with progression (increase in mediastinal and hilar LN)  5/16/2024: Nivolumab + Ipilimumab         History of Present Illness:     Patient is a  58 y.o. female with PMH as below who is seen for abnormal  CBC      She has persistent leucocytosis since 2014, no other CBC abnormalities. She has known R renal cell carcinoma ( 3.3 cm) which she had a R radical nephrectomy on 7/28/2020. She has osteoarthritis and has several flare ups. She had a CT chest 8/2020 that  showed a 6 mm lung nodule which is being observed.  She had a follow-up CT scan with pulmonary on 7/24/2023 which showed increasing bilateral pulmonary nodules.  This prompted a PET CT scan that was done on 8/9/2023.  This showed that the peripheral pulmonary nodule in the right lower lobe had an FDG activity of 3.3, subcentimeter pulmonary nodule in the left lower lobe had no FDG activity.  She was referred to thoracic surgery Dr. Ross and underwent a robotic assisted thoracoscopic right lower lobe wedge resection and mediastinal lymph node dissection on 9/6/2023.  Pathology revealed metastatic renal cell carcinoma in the right lower lobe 1.3 cm nodule. 3/2024 Scans showed progression although EBUS path was benign. She is here to start Ipi Nivo.       She reports feeling nervous to start her treatment today. She has some dizziness, confusion where she

## 2024-05-16 ENCOUNTER — OFFICE VISIT (OUTPATIENT)
Age: 59
End: 2024-05-16
Payer: MEDICARE

## 2024-05-16 ENCOUNTER — HOSPITAL ENCOUNTER (OUTPATIENT)
Facility: HOSPITAL | Age: 59
Setting detail: INFUSION SERIES
Discharge: HOME | End: 2024-05-16
Payer: MEDICARE

## 2024-05-16 ENCOUNTER — CLINICAL DOCUMENTATION (OUTPATIENT)
Age: 59
End: 2024-05-16

## 2024-05-16 VITALS
HEART RATE: 90 BPM | BODY MASS INDEX: 32.8 KG/M2 | RESPIRATION RATE: 18 BRPM | SYSTOLIC BLOOD PRESSURE: 144 MMHG | DIASTOLIC BLOOD PRESSURE: 69 MMHG | TEMPERATURE: 97.7 F | WEIGHT: 192.1 LBS | HEIGHT: 64 IN | OXYGEN SATURATION: 98 %

## 2024-05-16 VITALS
BODY MASS INDEX: 32.91 KG/M2 | SYSTOLIC BLOOD PRESSURE: 144 MMHG | RESPIRATION RATE: 18 BRPM | TEMPERATURE: 97.7 F | WEIGHT: 192.8 LBS | OXYGEN SATURATION: 98 % | HEART RATE: 90 BPM | HEIGHT: 64 IN | DIASTOLIC BLOOD PRESSURE: 69 MMHG

## 2024-05-16 DIAGNOSIS — C64.1 MALIGNANT NEOPLASM OF RIGHT KIDNEY, EXCEPT RENAL PELVIS (HCC): Primary | ICD-10-CM

## 2024-05-16 DIAGNOSIS — C64.9 SECONDARY RENAL CELL CARCINOMA OF LUNG, UNSPECIFIED LATERALITY (HCC): Primary | ICD-10-CM

## 2024-05-16 DIAGNOSIS — C64.1 MALIGNANT NEOPLASM OF RIGHT KIDNEY, EXCEPT RENAL PELVIS (HCC): ICD-10-CM

## 2024-05-16 DIAGNOSIS — C78.00 SECONDARY RENAL CELL CARCINOMA OF LUNG, UNSPECIFIED LATERALITY (HCC): Primary | ICD-10-CM

## 2024-05-16 LAB
ALBUMIN SERPL-MCNC: 3.4 G/DL (ref 3.5–5)
ALBUMIN SERPL-MCNC: 3.6 G/DL (ref 3.5–5)
ALBUMIN/GLOB SERPL: 1 (ref 1.1–2.2)
ALBUMIN/GLOB SERPL: 1.2 (ref 1.1–2.2)
ALP SERPL-CCNC: 193 U/L (ref 45–117)
ALP SERPL-CCNC: 202 U/L (ref 45–117)
ALT SERPL-CCNC: 29 U/L (ref 12–78)
ALT SERPL-CCNC: ABNORMAL U/L (ref 12–78)
ANION GAP SERPL CALC-SCNC: 2 MMOL/L (ref 5–15)
ANION GAP SERPL CALC-SCNC: 2 MMOL/L (ref 5–15)
APPEARANCE UR: CLEAR
AST SERPL-CCNC: 21 U/L (ref 15–37)
AST SERPL-CCNC: ABNORMAL U/L (ref 15–37)
BACTERIA URNS QL MICRO: NEGATIVE /HPF
BASOPHILS # BLD: 0.2 K/UL (ref 0–0.1)
BASOPHILS NFR BLD: 1 % (ref 0–1)
BILIRUB SERPL-MCNC: 0.2 MG/DL (ref 0.2–1)
BILIRUB SERPL-MCNC: 0.3 MG/DL (ref 0.2–1)
BILIRUB UR QL: NEGATIVE
BUN SERPL-MCNC: 10 MG/DL (ref 6–20)
BUN SERPL-MCNC: 9 MG/DL (ref 6–20)
BUN/CREAT SERPL: 8 (ref 12–20)
BUN/CREAT SERPL: 9 (ref 12–20)
CALCIUM SERPL-MCNC: 9.5 MG/DL (ref 8.5–10.1)
CALCIUM SERPL-MCNC: 9.7 MG/DL (ref 8.5–10.1)
CHLORIDE SERPL-SCNC: 110 MMOL/L (ref 97–108)
CHLORIDE SERPL-SCNC: 111 MMOL/L (ref 97–108)
CO2 SERPL-SCNC: 24 MMOL/L (ref 21–32)
CO2 SERPL-SCNC: 28 MMOL/L (ref 21–32)
COLOR UR: ABNORMAL
CORTIS SERPL-MCNC: 4.6 UG/DL
CREAT SERPL-MCNC: 1.09 MG/DL (ref 0.55–1.02)
CREAT SERPL-MCNC: 1.12 MG/DL (ref 0.55–1.02)
DIFFERENTIAL METHOD BLD: ABNORMAL
EOSINOPHIL # BLD: 0.5 K/UL (ref 0–0.4)
EOSINOPHIL NFR BLD: 4 % (ref 0–7)
EPITH CASTS URNS QL MICRO: ABNORMAL /LPF
ERYTHROCYTE [DISTWIDTH] IN BLOOD BY AUTOMATED COUNT: 15.9 % (ref 11.5–14.5)
FERRITIN SERPL-MCNC: 15 NG/ML (ref 8–252)
GLOBULIN SER CALC-MCNC: 3 G/DL (ref 2–4)
GLOBULIN SER CALC-MCNC: 3.5 G/DL (ref 2–4)
GLUCOSE SERPL-MCNC: 120 MG/DL (ref 65–100)
GLUCOSE SERPL-MCNC: 95 MG/DL (ref 65–100)
GLUCOSE UR STRIP.AUTO-MCNC: NEGATIVE MG/DL
HBV SURFACE AB SER QL: REACTIVE
HBV SURFACE AB SER-ACNC: 20.99 MIU/ML
HBV SURFACE AG SER QL: 0.17 INDEX
HBV SURFACE AG SER QL: NEGATIVE
HCT VFR BLD AUTO: 41.6 % (ref 35–47)
HGB BLD-MCNC: 13.3 G/DL (ref 11.5–16)
HGB UR QL STRIP: NEGATIVE
HYALINE CASTS URNS QL MICRO: ABNORMAL /LPF (ref 0–5)
IMM GRANULOCYTES # BLD AUTO: 0 K/UL (ref 0–0.04)
IMM GRANULOCYTES NFR BLD AUTO: 0 % (ref 0–0.5)
IRON SATN MFR SERPL: 8 % (ref 20–50)
IRON SERPL-MCNC: 38 UG/DL (ref 35–150)
KETONES UR QL STRIP.AUTO: NEGATIVE MG/DL
LEUKOCYTE ESTERASE UR QL STRIP.AUTO: ABNORMAL
LYMPHOCYTES # BLD: 2.1 K/UL (ref 0.8–3.5)
LYMPHOCYTES NFR BLD: 16 % (ref 12–49)
MCH RBC QN AUTO: 26 PG (ref 26–34)
MCHC RBC AUTO-ENTMCNC: 32 G/DL (ref 30–36.5)
MCV RBC AUTO: 81.3 FL (ref 80–99)
MONOCYTES # BLD: 0.4 K/UL (ref 0–1)
MONOCYTES NFR BLD: 3 % (ref 5–13)
NEUTS SEG # BLD: 10.1 K/UL (ref 1.8–8)
NEUTS SEG NFR BLD: 76 % (ref 32–75)
NITRITE UR QL STRIP.AUTO: NEGATIVE
NRBC # BLD: 0 K/UL (ref 0–0.01)
NRBC BLD-RTO: 0 PER 100 WBC
PH UR STRIP: 7 (ref 5–8)
PLATELET # BLD AUTO: 253 K/UL (ref 150–400)
PMV BLD AUTO: 10.6 FL (ref 8.9–12.9)
POTASSIUM SERPL-SCNC: 4.4 MMOL/L (ref 3.5–5.1)
POTASSIUM SERPL-SCNC: ABNORMAL MMOL/L (ref 3.5–5.1)
PROT SERPL-MCNC: 6.6 G/DL (ref 6.4–8.2)
PROT SERPL-MCNC: 6.9 G/DL (ref 6.4–8.2)
PROT UR STRIP-MCNC: NEGATIVE MG/DL
RBC # BLD AUTO: 5.12 M/UL (ref 3.8–5.2)
RBC #/AREA URNS HPF: ABNORMAL /HPF (ref 0–5)
SODIUM SERPL-SCNC: 136 MMOL/L (ref 136–145)
SODIUM SERPL-SCNC: 141 MMOL/L (ref 136–145)
SP GR UR REFRACTOMETRY: 1.01 (ref 1–1.03)
TIBC SERPL-MCNC: 455 UG/DL (ref 250–450)
TSH SERPL DL<=0.05 MIU/L-ACNC: 1.38 UIU/ML (ref 0.36–3.74)
URINE CULTURE IF INDICATED: ABNORMAL
UROBILINOGEN UR QL STRIP.AUTO: 0.2 EU/DL (ref 0.2–1)
WBC # BLD AUTO: 13.3 K/UL (ref 3.6–11)
WBC URNS QL MICRO: ABNORMAL /HPF (ref 0–4)

## 2024-05-16 PROCEDURE — 84443 ASSAY THYROID STIM HORMONE: CPT

## 2024-05-16 PROCEDURE — 6360000002 HC RX W HCPCS: Performed by: INTERNAL MEDICINE

## 2024-05-16 PROCEDURE — 3017F COLORECTAL CA SCREEN DOC REV: CPT

## 2024-05-16 PROCEDURE — 87086 URINE CULTURE/COLONY COUNT: CPT

## 2024-05-16 PROCEDURE — 83550 IRON BINDING TEST: CPT

## 2024-05-16 PROCEDURE — 99215 OFFICE O/P EST HI 40 MIN: CPT

## 2024-05-16 PROCEDURE — 2580000003 HC RX 258: Performed by: INTERNAL MEDICINE

## 2024-05-16 PROCEDURE — G8427 DOCREV CUR MEDS BY ELIG CLIN: HCPCS

## 2024-05-16 PROCEDURE — 86704 HEP B CORE ANTIBODY TOTAL: CPT

## 2024-05-16 PROCEDURE — 82728 ASSAY OF FERRITIN: CPT

## 2024-05-16 PROCEDURE — 96417 CHEMO IV INFUS EACH ADDL SEQ: CPT

## 2024-05-16 PROCEDURE — 86706 HEP B SURFACE ANTIBODY: CPT

## 2024-05-16 PROCEDURE — 96413 CHEMO IV INFUSION 1 HR: CPT

## 2024-05-16 PROCEDURE — 83540 ASSAY OF IRON: CPT

## 2024-05-16 PROCEDURE — 3077F SYST BP >= 140 MM HG: CPT

## 2024-05-16 PROCEDURE — G8417 CALC BMI ABV UP PARAM F/U: HCPCS

## 2024-05-16 PROCEDURE — 87340 HEPATITIS B SURFACE AG IA: CPT

## 2024-05-16 PROCEDURE — 87186 SC STD MICRODIL/AGAR DIL: CPT

## 2024-05-16 PROCEDURE — 36415 COLL VENOUS BLD VENIPUNCTURE: CPT

## 2024-05-16 PROCEDURE — 82533 TOTAL CORTISOL: CPT

## 2024-05-16 PROCEDURE — 4004F PT TOBACCO SCREEN RCVD TLK: CPT

## 2024-05-16 PROCEDURE — 81001 URINALYSIS AUTO W/SCOPE: CPT

## 2024-05-16 PROCEDURE — 87088 URINE BACTERIA CULTURE: CPT

## 2024-05-16 PROCEDURE — 80053 COMPREHEN METABOLIC PANEL: CPT

## 2024-05-16 PROCEDURE — 3078F DIAST BP <80 MM HG: CPT

## 2024-05-16 PROCEDURE — 85025 COMPLETE CBC W/AUTO DIFF WBC: CPT

## 2024-05-16 RX ORDER — SODIUM CHLORIDE 0.9 % (FLUSH) 0.9 %
5-40 SYRINGE (ML) INJECTION PRN
OUTPATIENT
Start: 2024-06-06

## 2024-05-16 RX ORDER — ONDANSETRON 2 MG/ML
8 INJECTION INTRAMUSCULAR; INTRAVENOUS
OUTPATIENT
Start: 2024-06-06

## 2024-05-16 RX ORDER — DIPHENHYDRAMINE HYDROCHLORIDE 50 MG/ML
50 INJECTION INTRAMUSCULAR; INTRAVENOUS
OUTPATIENT
Start: 2024-06-06

## 2024-05-16 RX ORDER — HEPARIN 100 UNIT/ML
500 SYRINGE INTRAVENOUS PRN
OUTPATIENT
Start: 2024-06-06

## 2024-05-16 RX ORDER — SODIUM CHLORIDE 9 MG/ML
INJECTION, SOLUTION INTRAVENOUS CONTINUOUS
Status: DISCONTINUED | OUTPATIENT
Start: 2024-05-16 | End: 2024-05-17 | Stop reason: HOSPADM

## 2024-05-16 RX ORDER — EPINEPHRINE 1 MG/ML
0.3 INJECTION, SOLUTION INTRAMUSCULAR; SUBCUTANEOUS PRN
Status: DISCONTINUED | OUTPATIENT
Start: 2024-05-16 | End: 2024-05-17 | Stop reason: HOSPADM

## 2024-05-16 RX ORDER — SODIUM CHLORIDE 9 MG/ML
5-250 INJECTION, SOLUTION INTRAVENOUS PRN
OUTPATIENT
Start: 2024-06-06

## 2024-05-16 RX ORDER — ACETAMINOPHEN 325 MG/1
650 TABLET ORAL
OUTPATIENT
Start: 2024-06-06

## 2024-05-16 RX ORDER — SODIUM CHLORIDE 9 MG/ML
5-250 INJECTION, SOLUTION INTRAVENOUS PRN
Status: DISCONTINUED | OUTPATIENT
Start: 2024-05-16 | End: 2024-05-17 | Stop reason: HOSPADM

## 2024-05-16 RX ORDER — SODIUM CHLORIDE 0.9 % (FLUSH) 0.9 %
5-40 SYRINGE (ML) INJECTION PRN
Status: DISCONTINUED | OUTPATIENT
Start: 2024-05-16 | End: 2024-05-17 | Stop reason: HOSPADM

## 2024-05-16 RX ORDER — MEPERIDINE HYDROCHLORIDE 25 MG/ML
12.5 INJECTION INTRAMUSCULAR; INTRAVENOUS; SUBCUTANEOUS PRN
Status: DISCONTINUED | OUTPATIENT
Start: 2024-05-16 | End: 2024-05-17 | Stop reason: HOSPADM

## 2024-05-16 RX ORDER — HEPARIN 100 UNIT/ML
500 SYRINGE INTRAVENOUS PRN
Status: DISCONTINUED | OUTPATIENT
Start: 2024-05-16 | End: 2024-05-17 | Stop reason: HOSPADM

## 2024-05-16 RX ORDER — ALBUTEROL SULFATE 90 UG/1
4 AEROSOL, METERED RESPIRATORY (INHALATION) PRN
OUTPATIENT
Start: 2024-06-06

## 2024-05-16 RX ORDER — ACETAMINOPHEN 325 MG/1
650 TABLET ORAL
Status: DISCONTINUED | OUTPATIENT
Start: 2024-05-16 | End: 2024-05-17 | Stop reason: HOSPADM

## 2024-05-16 RX ORDER — ALBUTEROL SULFATE 90 UG/1
4 AEROSOL, METERED RESPIRATORY (INHALATION) PRN
Status: DISCONTINUED | OUTPATIENT
Start: 2024-05-16 | End: 2024-05-17 | Stop reason: HOSPADM

## 2024-05-16 RX ORDER — DIPHENHYDRAMINE HYDROCHLORIDE 50 MG/ML
50 INJECTION INTRAMUSCULAR; INTRAVENOUS
Status: DISCONTINUED | OUTPATIENT
Start: 2024-05-16 | End: 2024-05-17 | Stop reason: HOSPADM

## 2024-05-16 RX ORDER — SODIUM CHLORIDE 9 MG/ML
INJECTION, SOLUTION INTRAVENOUS CONTINUOUS
OUTPATIENT
Start: 2024-06-06

## 2024-05-16 RX ORDER — EPINEPHRINE 1 MG/ML
0.3 INJECTION, SOLUTION, CONCENTRATE INTRAVENOUS PRN
OUTPATIENT
Start: 2024-06-06

## 2024-05-16 RX ORDER — ONDANSETRON 2 MG/ML
8 INJECTION INTRAMUSCULAR; INTRAVENOUS
Status: DISCONTINUED | OUTPATIENT
Start: 2024-05-16 | End: 2024-05-17 | Stop reason: HOSPADM

## 2024-05-16 RX ADMIN — SODIUM CHLORIDE 270 MG: 9 INJECTION, SOLUTION INTRAVENOUS at 14:50

## 2024-05-16 RX ADMIN — SODIUM CHLORIDE 90 MG: 9 INJECTION, SOLUTION INTRAVENOUS at 15:27

## 2024-05-16 ASSESSMENT — PAIN SCALES - GENERAL: PAINLEVEL_OUTOF10: 0

## 2024-05-16 NOTE — PROGRESS NOTES
Outpatient [facility]fusion Center - Chemotherapy Progress Note    1015 Pt admit to [facility] for Opdivo/Yervoy/C1D1 ambulatory in stable condition accompanied by family. Assessment completed by access RN. PIV access established by access RN with positive blood return. Labs drawn per order and sent.  Line flushed, clamped, Curos Cap applied to end clave.    1430 PIV flushed w/ + blood return; NS infusing    BP (!) 144/69   Pulse 90   Temp 97.7 °F (36.5 °C) (Temporal)   Resp 18   Ht 1.613 m (5' 3.5\")   Wt 87.5 kg (192 lb 12.8 oz)   SpO2 98%   BMI 33.62 kg/m²     Medications Administered         ipilimumab (YERVOY) 90 mg in sodium chloride 0.9 % 50 mL IVPB Admin Date  05/16/2024 Action  New Bag Dose  90 mg Rate  146 mL/hr Route  IntraVENous Administered By  Hien Marks RN        nivolumab (OPDIVO) 270 mg in sodium chloride 0.9 % 100 mL IVPB Admin Date  05/16/2024 Action  New Bag Dose  270 mg Rate  274 mL/hr Route  IntraVENous Administered By  Hien Marks RN              Two nurses verified prior to administering:, Drug name, Drug dose, Infusion volume or drug volume when prepared in a syringe, Rate of administration, Route of administration, Expiration dates and/or times, Appearance and physical integrity of the drugs, Rate set on infusion pump, when used, Sequencing of drug administration         1535 Pt tolerated treatment well. PIV removed at discharge. D/c home ambulatory in no distress. Pt aware of next [facility] appointment scheduled for 06/06/2024.    Recent Results (from the past 12 hour(s))   CBC With Auto Differential    Collection Time: 05/16/24 10:31 AM   Result Value Ref Range    WBC 13.3 (H) 3.6 - 11.0 K/uL    RBC 5.12 3.80 - 5.20 M/uL    Hemoglobin 13.3 11.5 - 16.0 g/dL    Hematocrit 41.6 35.0 - 47.0 %    MCV 81.3 80.0 - 99.0 FL    MCH 26.0 26.0 - 34.0 PG    MCHC 32.0 30.0 - 36.5 g/dL    RDW 15.9 (H) 11.5 - 14.5 %    Platelets 253 150 - 400 K/uL    MPV 10.6 8.9 - 12.9 FL    Nucleated RBCs 0.0 0 PER

## 2024-05-16 NOTE — PROGRESS NOTES
Rm    Chief Complaint   Patient presents with    Follow-up        BP (!) 144/69 (Site: Left Upper Arm)   Pulse 90   Temp 97.7 °F (36.5 °C)   Resp 18   Ht 1.613 m (5' 3.5\")   Wt 87.1 kg (192 lb 1.6 oz)   SpO2 98%   BMI 33.50 kg/m²      1. Have you been to the ER, urgent care clinic since your last visit?  Hospitalized since your last visit? No     2. Have you seen or consulted any other health care providers outside of the [facility] since your last visit?  Include any pap smears or colon screening.  No     Health Maintenance Due   Topic Date Due    Hepatitis B vaccine (2 of 3 - 19+ 3-dose series) 11/26/2008    DTaP/Tdap/Td vaccine (1 - Tdap) 05/26/2012    COVID-19 Vaccine (2 - Moderna risk series) 05/12/2021             No data to display                 Failed to redirect to the Timeline version of the [employer] SmartLink.    Failed to redirect to the Timeline version of the [employer] Smart[employer].

## 2024-05-16 NOTE — PROGRESS NOTES
Pharmacy Note- Immunotherapy Education     Symone Edmonds is a  58 y.o.female  diagnosed with renal cell cancer here today for Cycle 1, Day 1  immunotherapy counseling. Ms. Edmonds is being treated with ipilimumab and nivolumab.      Provided Ms. Edmonds with handouts and reviewed home supportive care regimen.      Ms. Edmonds was provided information regarding the risks of immune-mediated adverse reactions secondary to ipilimumab and nivolumab that may require interruption/delay of treatment and possible use of corticosteroids.  These reactions include, but are not limited to: colitis (diarrhea or severe abdominal pain); hepatitis (jaundice, severe nausea, or vomiting, easy bruising, and/or bleeding); hypophysitis (persistent or unusual headache, extreme weakness, dizziness/fainting, and/or vision changes); dermatitis (skin rash, mouth sores, skin blisters, and/or skin peeling); ocular toxicity (uveitis, iritis, and/or episcleritis); neuropathy (motor or sensory); hyper/hypothyroidism.     Patient given ways to manage these side effects and when to contact office.       Ms. Edmonds verbalized understanding of the information presented and all of the patient's questions were answered.     Rosario Bear, PharmD, BCPS, BCOP    For Pharmacy Admin Tracking Only    Program: Medical Group  CPA in place:  No  Recommendation Provided To: Patient/Caregiver: 1 via In person    Intervention Accepted By: Patient/Caregiver: 1    Time Spent (min): 15

## 2024-05-17 LAB — HBV CORE AB SERPL QL IA: NEGATIVE

## 2024-05-17 RX ORDER — NITROFURANTOIN 25; 75 MG/1; MG/1
100 CAPSULE ORAL 2 TIMES DAILY
Qty: 14 CAPSULE | Refills: 0 | Status: SHIPPED | OUTPATIENT
Start: 2024-05-17 | End: 2024-05-24

## 2024-05-17 NOTE — RESULT ENCOUNTER NOTE
Symone- I'm sending in Antibiotics for you to start for you Urinary Tract infection   Please  at the pharmacy and start asap

## 2024-05-19 LAB
BACTERIA SPEC CULT: ABNORMAL
CC UR VC: ABNORMAL
SERVICE CMNT-IMP: ABNORMAL

## 2024-05-24 ENCOUNTER — HOSPITAL ENCOUNTER (EMERGENCY)
Facility: HOSPITAL | Age: 59
Discharge: HOME | End: 2024-05-24
Attending: EMERGENCY MEDICINE
Payer: MEDICARE

## 2024-05-24 ENCOUNTER — APPOINTMENT (OUTPATIENT)
Facility: HOSPITAL | Age: 59
End: 2024-05-24
Payer: MEDICARE

## 2024-05-24 VITALS
DIASTOLIC BLOOD PRESSURE: 93 MMHG | WEIGHT: 195.99 LBS | SYSTOLIC BLOOD PRESSURE: 147 MMHG | RESPIRATION RATE: 18 BRPM | OXYGEN SATURATION: 94 % | HEART RATE: 99 BPM | BODY MASS INDEX: 34.73 KG/M2 | TEMPERATURE: 98.6 F | HEIGHT: 63 IN

## 2024-05-24 DIAGNOSIS — R50.9 ACUTE FEBRILE ILLNESS: Primary | ICD-10-CM

## 2024-05-24 DIAGNOSIS — B34.9 VIRAL ILLNESS: ICD-10-CM

## 2024-05-24 LAB
ALBUMIN SERPL-MCNC: 3.6 G/DL (ref 3.5–5)
ALBUMIN/GLOB SERPL: 1.1 (ref 1.1–2.2)
ALP SERPL-CCNC: 192 U/L (ref 45–117)
ALT SERPL-CCNC: 32 U/L (ref 12–78)
ANION GAP SERPL CALC-SCNC: 12 MMOL/L (ref 5–15)
APPEARANCE UR: CLEAR
AST SERPL-CCNC: 19 U/L (ref 15–37)
BACTERIA URNS QL MICRO: NEGATIVE /HPF
BASOPHILS # BLD: 0.1 K/UL (ref 0–0.1)
BASOPHILS NFR BLD: 1 % (ref 0–1)
BILIRUB SERPL-MCNC: 0.3 MG/DL (ref 0.2–1)
BILIRUB UR QL: NEGATIVE
BUN SERPL-MCNC: 13 MG/DL (ref 6–20)
BUN/CREAT SERPL: 12 (ref 12–20)
CALCIUM SERPL-MCNC: 9.2 MG/DL (ref 8.5–10.1)
CHLORIDE SERPL-SCNC: 103 MMOL/L (ref 97–108)
CO2 SERPL-SCNC: 24 MMOL/L (ref 21–32)
COLOR UR: NORMAL
CREAT SERPL-MCNC: 1.08 MG/DL (ref 0.55–1.02)
DEPRECATED S PYO AG THROAT QL EIA: NEGATIVE
DIFFERENTIAL METHOD BLD: ABNORMAL
EOSINOPHIL # BLD: 0.7 K/UL (ref 0–0.4)
EOSINOPHIL NFR BLD: 5 % (ref 0–7)
EPITH CASTS URNS QL MICRO: NORMAL /LPF
ERYTHROCYTE [DISTWIDTH] IN BLOOD BY AUTOMATED COUNT: 16 % (ref 11.5–14.5)
FLUAV RNA SPEC QL NAA+PROBE: NOT DETECTED
FLUBV RNA SPEC QL NAA+PROBE: NOT DETECTED
GLOBULIN SER CALC-MCNC: 3.3 G/DL (ref 2–4)
GLUCOSE SERPL-MCNC: 85 MG/DL (ref 65–100)
GLUCOSE UR STRIP.AUTO-MCNC: NEGATIVE MG/DL
HCT VFR BLD AUTO: 43.7 % (ref 35–47)
HGB BLD-MCNC: 14.2 G/DL (ref 11.5–16)
HGB UR QL STRIP: NEGATIVE
IMM GRANULOCYTES # BLD AUTO: 0 K/UL (ref 0–0.04)
IMM GRANULOCYTES NFR BLD AUTO: 0 % (ref 0–0.5)
KETONES UR QL STRIP.AUTO: NEGATIVE MG/DL
LEUKOCYTE ESTERASE UR QL STRIP.AUTO: NEGATIVE
LYMPHOCYTES # BLD: 1.9 K/UL (ref 0.8–3.5)
LYMPHOCYTES NFR BLD: 12 % (ref 12–49)
MCH RBC QN AUTO: 26.1 PG (ref 26–34)
MCHC RBC AUTO-ENTMCNC: 32.5 G/DL (ref 30–36.5)
MCV RBC AUTO: 80.2 FL (ref 80–99)
MONOCYTES # BLD: 0.8 K/UL (ref 0–1)
MONOCYTES NFR BLD: 5 % (ref 5–13)
NEUTS SEG # BLD: 11.8 K/UL (ref 1.8–8)
NEUTS SEG NFR BLD: 77 % (ref 32–75)
NITRITE UR QL STRIP.AUTO: NEGATIVE
NRBC # BLD: 0 K/UL (ref 0–0.01)
NRBC BLD-RTO: 0 PER 100 WBC
PH UR STRIP: 7.5 (ref 5–8)
PLATELET # BLD AUTO: 235 K/UL (ref 150–400)
PMV BLD AUTO: 9.8 FL (ref 8.9–12.9)
POTASSIUM SERPL-SCNC: 3.7 MMOL/L (ref 3.5–5.1)
PROT SERPL-MCNC: 6.9 G/DL (ref 6.4–8.2)
PROT UR STRIP-MCNC: NEGATIVE MG/DL
RBC # BLD AUTO: 5.45 M/UL (ref 3.8–5.2)
RBC #/AREA URNS HPF: NORMAL /HPF (ref 0–5)
SARS-COV-2 RNA RESP QL NAA+PROBE: NOT DETECTED
SODIUM SERPL-SCNC: 139 MMOL/L (ref 136–145)
SP GR UR REFRACTOMETRY: 1.01 (ref 1–1.03)
URINE CULTURE IF INDICATED: NORMAL
UROBILINOGEN UR QL STRIP.AUTO: 0.2 EU/DL (ref 0.2–1)
WBC # BLD AUTO: 15.3 K/UL (ref 3.6–11)
WBC URNS QL MICRO: NORMAL /HPF (ref 0–4)

## 2024-05-24 PROCEDURE — 85025 COMPLETE CBC W/AUTO DIFF WBC: CPT

## 2024-05-24 PROCEDURE — 71046 X-RAY EXAM CHEST 2 VIEWS: CPT

## 2024-05-24 PROCEDURE — 87040 BLOOD CULTURE FOR BACTERIA: CPT

## 2024-05-24 PROCEDURE — 87636 SARSCOV2 & INF A&B AMP PRB: CPT

## 2024-05-24 PROCEDURE — 87070 CULTURE OTHR SPECIMN AEROBIC: CPT

## 2024-05-24 PROCEDURE — 80053 COMPREHEN METABOLIC PANEL: CPT

## 2024-05-24 PROCEDURE — 87880 STREP A ASSAY W/OPTIC: CPT

## 2024-05-24 PROCEDURE — 36415 COLL VENOUS BLD VENIPUNCTURE: CPT

## 2024-05-24 PROCEDURE — 81001 URINALYSIS AUTO W/SCOPE: CPT

## 2024-05-24 PROCEDURE — 99284 EMERGENCY DEPT VISIT MOD MDM: CPT

## 2024-05-24 ASSESSMENT — ENCOUNTER SYMPTOMS
ABDOMINAL PAIN: 0
COUGH: 1
SHORTNESS OF BREATH: 0
SORE THROAT: 1
CHEST TIGHTNESS: 0
EYE PAIN: 0
VOMITING: 0
BACK PAIN: 0

## 2024-05-24 ASSESSMENT — PAIN DESCRIPTION - PAIN TYPE: TYPE: CHRONIC PAIN

## 2024-05-24 ASSESSMENT — PAIN DESCRIPTION - LOCATION: LOCATION: BACK;NECK

## 2024-05-24 ASSESSMENT — PAIN DESCRIPTION - DESCRIPTORS: DESCRIPTORS: PRESSURE

## 2024-05-24 ASSESSMENT — LIFESTYLE VARIABLES
HOW MANY STANDARD DRINKS CONTAINING ALCOHOL DO YOU HAVE ON A TYPICAL DAY: PATIENT DOES NOT DRINK
HOW OFTEN DO YOU HAVE A DRINK CONTAINING ALCOHOL: NEVER

## 2024-05-24 ASSESSMENT — PAIN SCALES - GENERAL: PAINLEVEL_OUTOF10: 7

## 2024-05-24 ASSESSMENT — PAIN - FUNCTIONAL ASSESSMENT: PAIN_FUNCTIONAL_ASSESSMENT: 0-10

## 2024-05-24 NOTE — DISCHARGE INSTRUCTIONS
Return to the ER if any of your symptoms start to feel worse.  Otherwise please follow-up with your primary care doctor and your oncologist next week.

## 2024-05-24 NOTE — ED TRIAGE NOTES
Patient presents ambulatory to triage. Pt complains of fever, sore throat, nausea; x onset 2 days ago. Tmax 101.5 per pt. Hx stage 4 renal cancer, and receiving infusions for tx. Pt reports takingTylenol at 930a today.

## 2024-05-24 NOTE — ED PROVIDER NOTES
BASAL CELL ON CHEST    Sleep apnea     PT DOES NOT SLEEP [facility] CPAP MACHINE; SLEEPS ELEVATED.    Spondylolisthesis of lumbar region     Stage 4 chronic kidney disease (HCC) 2019    Stroke (HCC) 2020    TIA     Tachycardia     Type 2 diabetes mellitus without complication (HCC)          SURGICAL HISTORY       Past Surgical History:   Procedure Laterality Date    ABLATION OF DYSRHYTHMIC FOCUS  2013    BREAST BIOPSY Bilateral     benign    BRONCHOSCOPY N/A 03/28/2024    ebus performed by Froilan Bernardo MD at [facility] ENDOSCOPY    CHOLECYSTECTOMY  1998    COLONOSCOPY N/A 03/01/2019    COLONOSCOPY performed by Davida Chiang MD at [facility] ENDOSCOPY    ENDOSCOPY, COLON, DIAGNOSTIC      HEENT  1993    DEVIATED SEPTUM    HERNIA REPAIR  05/13/2021    Robotic repair of incisional hernia with mesh,Robotic repair of adhesions convert to open incisioal hernia repair with mesh by Dr. Ck Nina.    HYSTERECTOMY (CERVIX STATUS UNKNOWN)  2008    HYSTERECTOMY, VAGINAL      JOINT REPLACEMENT Bilateral 2016    KNEE    JOINT REPLACEMENT Left 01/01/2024    KNEE ARTHROSCOPY Bilateral 2012    SANDRO STEREO BREAST BX ADD LESION LEFT Left 10/29/2018    SANDRO STEREO BREAST BX ADD LESION LEFT [facility] AMB HISTORICAL    NEPHROSTOMY Right     NEUROLOGICAL SURGERY      lumbar nerve block    OTHER SURGICAL HISTORY Right 09/06/2023    jennifer wedge    PA UNLISTED PROCEDURE CARDIAC SURGERY  2013    Ablation    ROTATOR CUFF REPAIR Left 2018    SEPTOPLASTY  1993    SKIN BIOPSY      TOTAL HIP ARTHROPLASTY Left 01/30/2024    LEFT POSTERIOR TOTAL HIP ARTHROPLASTY performed by Ty Wilhelm MD at [facility] MAIN OR    TOTAL KNEE ARTHROPLASTY Left 2012    TOTAL KNEE ARTHROPLASTY Right 08/18/2016    TUBAL LIGATION  1989    UROLOGICAL SURGERY Right 07/2020    nephrectomy    US ASP BREAST CYST LEFT Left 10/26/2018    US BREAST CYST ASPIRATION LEFT [facility] AM[facility] HISTORICAL    WISDOM TOOTH EXTRACTION      X2         CURRENT MEDICATIONS       Previous Medications    ALBUTEROL

## 2024-05-26 LAB
BACTERIA SPEC CULT: NORMAL
SERVICE CMNT-IMP: NORMAL

## 2024-05-29 LAB
BACTERIA SPEC CULT: NORMAL
BACTERIA SPEC CULT: NORMAL
SERVICE CMNT-IMP: NORMAL
SERVICE CMNT-IMP: NORMAL

## 2024-05-30 RX ORDER — ALBUTEROL SULFATE 90 UG/1
4 AEROSOL, METERED RESPIRATORY (INHALATION) PRN
Status: CANCELLED | OUTPATIENT
Start: 2024-06-06

## 2024-05-30 RX ORDER — HEPARIN 100 UNIT/ML
500 SYRINGE INTRAVENOUS PRN
Status: CANCELLED | OUTPATIENT
Start: 2024-06-06

## 2024-05-30 RX ORDER — ONDANSETRON 2 MG/ML
8 INJECTION INTRAMUSCULAR; INTRAVENOUS
Status: CANCELLED | OUTPATIENT
Start: 2024-06-06

## 2024-05-30 RX ORDER — SODIUM CHLORIDE 9 MG/ML
INJECTION, SOLUTION INTRAVENOUS CONTINUOUS
Status: CANCELLED | OUTPATIENT
Start: 2024-06-06

## 2024-05-30 RX ORDER — ACETAMINOPHEN 325 MG/1
650 TABLET ORAL
Status: CANCELLED | OUTPATIENT
Start: 2024-06-06

## 2024-05-30 RX ORDER — SODIUM CHLORIDE 9 MG/ML
5-250 INJECTION, SOLUTION INTRAVENOUS PRN
Status: CANCELLED | OUTPATIENT
Start: 2024-06-06

## 2024-05-30 RX ORDER — DIPHENHYDRAMINE HYDROCHLORIDE 50 MG/ML
50 INJECTION INTRAMUSCULAR; INTRAVENOUS
Status: CANCELLED | OUTPATIENT
Start: 2024-06-06

## 2024-05-30 RX ORDER — EPINEPHRINE 1 MG/ML
0.3 INJECTION, SOLUTION INTRAMUSCULAR; SUBCUTANEOUS PRN
Status: CANCELLED | OUTPATIENT
Start: 2024-06-06

## 2024-06-05 ENCOUNTER — HOSPITAL ENCOUNTER (OUTPATIENT)
Facility: HOSPITAL | Age: 59
Setting detail: INFUSION SERIES
Discharge: HOME | End: 2024-06-05
Payer: MEDICARE

## 2024-06-05 VITALS — HEART RATE: 100 BPM | RESPIRATION RATE: 18 BRPM | DIASTOLIC BLOOD PRESSURE: 82 MMHG | SYSTOLIC BLOOD PRESSURE: 146 MMHG

## 2024-06-05 DIAGNOSIS — C64.1 MALIGNANT NEOPLASM OF RIGHT KIDNEY, EXCEPT RENAL PELVIS (HCC): ICD-10-CM

## 2024-06-05 DIAGNOSIS — C64.9 SECONDARY RENAL CELL CARCINOMA OF LUNG, UNSPECIFIED LATERALITY (HCC): ICD-10-CM

## 2024-06-05 DIAGNOSIS — C78.00 SECONDARY RENAL CELL CARCINOMA OF LUNG, UNSPECIFIED LATERALITY (HCC): ICD-10-CM

## 2024-06-05 LAB
ALBUMIN SERPL-MCNC: 3.3 G/DL (ref 3.5–5)
ALBUMIN/GLOB SERPL: 1 (ref 1.1–2.2)
ALP SERPL-CCNC: 352 U/L (ref 45–117)
ALT SERPL-CCNC: 76 U/L (ref 12–78)
ANION GAP SERPL CALC-SCNC: 5 MMOL/L (ref 5–15)
AST SERPL-CCNC: 21 U/L (ref 15–37)
BASOPHILS # BLD: 0.1 K/UL (ref 0–0.1)
BASOPHILS NFR BLD: 1 % (ref 0–1)
BILIRUB SERPL-MCNC: 0.3 MG/DL (ref 0.2–1)
BUN SERPL-MCNC: 7 MG/DL (ref 6–20)
BUN/CREAT SERPL: 8 (ref 12–20)
CALCIUM SERPL-MCNC: 9.6 MG/DL (ref 8.5–10.1)
CHLORIDE SERPL-SCNC: 109 MMOL/L (ref 97–108)
CO2 SERPL-SCNC: 24 MMOL/L (ref 21–32)
CREAT SERPL-MCNC: 0.88 MG/DL (ref 0.55–1.02)
DIFFERENTIAL METHOD BLD: ABNORMAL
EOSINOPHIL # BLD: 1.1 K/UL (ref 0–0.4)
EOSINOPHIL NFR BLD: 8 % (ref 0–7)
ERYTHROCYTE [DISTWIDTH] IN BLOOD BY AUTOMATED COUNT: 16 % (ref 11.5–14.5)
GLOBULIN SER CALC-MCNC: 3.2 G/DL (ref 2–4)
GLUCOSE SERPL-MCNC: 81 MG/DL (ref 65–100)
HCT VFR BLD AUTO: 40.2 % (ref 35–47)
HGB BLD-MCNC: 12.9 G/DL (ref 11.5–16)
IMM GRANULOCYTES # BLD AUTO: 0 K/UL (ref 0–0.04)
IMM GRANULOCYTES NFR BLD AUTO: 0 % (ref 0–0.5)
LYMPHOCYTES # BLD: 2 K/UL (ref 0.8–3.5)
LYMPHOCYTES NFR BLD: 15 % (ref 12–49)
MCH RBC QN AUTO: 25.8 PG (ref 26–34)
MCHC RBC AUTO-ENTMCNC: 32.1 G/DL (ref 30–36.5)
MCV RBC AUTO: 80.4 FL (ref 80–99)
MONOCYTES # BLD: 0.4 K/UL (ref 0–1)
MONOCYTES NFR BLD: 3 % (ref 5–13)
NEUTS SEG # BLD: 9.9 K/UL (ref 1.8–8)
NEUTS SEG NFR BLD: 73 % (ref 32–75)
NRBC # BLD: 0 K/UL (ref 0–0.01)
NRBC BLD-RTO: 0 PER 100 WBC
PLATELET # BLD AUTO: 263 K/UL (ref 150–400)
PMV BLD AUTO: 9.5 FL (ref 8.9–12.9)
POTASSIUM SERPL-SCNC: 3.8 MMOL/L (ref 3.5–5.1)
PROT SERPL-MCNC: 6.5 G/DL (ref 6.4–8.2)
RBC # BLD AUTO: 5 M/UL (ref 3.8–5.2)
RBC MORPH BLD: ABNORMAL
SODIUM SERPL-SCNC: 138 MMOL/L (ref 136–145)
WBC # BLD AUTO: 13.5 K/UL (ref 3.6–11)

## 2024-06-05 PROCEDURE — 36415 COLL VENOUS BLD VENIPUNCTURE: CPT

## 2024-06-05 PROCEDURE — 80053 COMPREHEN METABOLIC PANEL: CPT

## 2024-06-05 PROCEDURE — 85025 COMPLETE CBC W/AUTO DIFF WBC: CPT

## 2024-06-05 NOTE — PROGRESS NOTES
Cancer [place] at [facility]   [address] [facility], [address] [place] [address]   W: [phone]  F: [phone]          Reason for Visit:     Symone Edmonds is a 58 y.o. female who is for    Follow up of leucocytosis and anemia  Stage IV RCC        Treatment history for renal cell carcinoma  7/28/2020-right radical nephrectomy-3.3 cm renal cell carcinoma.  She did have a CT chest in August 2020 that showed a 6 mm lung nodule  9/6/2023-stage IV renal cell carcinoma-robotic assisted thoracoscopic right lower lobe wedge resection and mediastinal lymph node dissection  3/2024: CT CAP  with progression (increase in mediastinal and hilar LN)  5/16/2024: Nivolumab + Ipilimumab         History of Present Illness:     Patient is a  58 y.o. female with PMH as below who is seen for abnormal  CBC      She has persistent leucocytosis since 2014, no other CBC abnormalities. She has known R renal cell carcinoma ( 3.3 cm) which she had a R radical nephrectomy on 7/28/2020. She has osteoarthritis and has several flare ups. She had a CT chest 8/2020 that  showed a 6 mm lung nodule which is being observed.  She had a follow-up CT scan with pulmonary on 7/24/2023 which showed increasing bilateral pulmonary nodules.  This prompted a PET CT scan that was done on 8/9/2023.  This showed that the peripheral pulmonary nodule in the right lower lobe had an FDG activity of 3.3, subcentimeter pulmonary nodule in the left lower lobe had no FDG activity.  She was referred to thoracic surgery Dr. Ross and underwent a robotic assisted thoracoscopic right lower lobe wedge resection and mediastinal lymph node dissection on 9/6/2023.  Pathology revealed metastatic renal cell carcinoma in the right lower lobe 1.3 cm nodule. 3/2024 Scans showed progression although EBUS path was benign. Started on Ipi Nivo.     Today is C2.   She is feeling well. Had a few days of diarrhea, had to take some imodium for this. Had one episode

## 2024-06-05 NOTE — PROGRESS NOTES
[facility] Progress Note    Date: 2024    Name: Symone Edmonds    MRN: [id number]         : 1965      Pt arrived ambulatory and in no distress, for lab visit.  Labs drawn via R hand, patient tolerated well.        Please check Epic for Results.     Departed [facility] ambulatory and in no distress.    Future Appointments   Date Time Provider Department Center   2024 11:00 AM [facility] CHEMO CHAIR 3 [facility]   2024 11:15 AM Lena Thakkar APRN - NP [facility] BS AMB   2024  1:00 PM [facility] MED INF CHAIR 2 [facility]   2024  1:00 PM [facility] MED INF CHAIR 2 [facility]   2024 11:00 AM [facility] CHEMO CHAIR 3 [facility]   2024 11:15 AM Nhi Winchester MD [facility] BS AMB   2024  9:30 AM [facility] MED INF CHAIR 1 [facility]   2024 11:00 AM [facility] CHEMO CHAIR 3 [facility]   2024 11:15 AM Lena Thakkar APRN - NP [facility] AMB   2024 10:40 AM Elisha Marley MD [facility] AM[facility]   2025  2:30 PM Emeli Zamora APRN - NP [facility] AMB       Pablo Anderson  2024

## 2024-06-06 ENCOUNTER — HOSPITAL ENCOUNTER (OUTPATIENT)
Facility: HOSPITAL | Age: 59
Setting detail: INFUSION SERIES
Discharge: HOME | End: 2024-06-06
Payer: MEDICARE

## 2024-06-06 ENCOUNTER — OFFICE VISIT (OUTPATIENT)
Age: 59
End: 2024-06-06
Payer: MEDICARE

## 2024-06-06 VITALS
HEART RATE: 97 BPM | DIASTOLIC BLOOD PRESSURE: 80 MMHG | BODY MASS INDEX: 34.54 KG/M2 | RESPIRATION RATE: 17 BRPM | OXYGEN SATURATION: 98 % | TEMPERATURE: 98.6 F | SYSTOLIC BLOOD PRESSURE: 143 MMHG | WEIGHT: 195 LBS

## 2024-06-06 VITALS
RESPIRATION RATE: 17 BRPM | SYSTOLIC BLOOD PRESSURE: 142 MMHG | HEART RATE: 90 BPM | OXYGEN SATURATION: 98 % | DIASTOLIC BLOOD PRESSURE: 74 MMHG | WEIGHT: 195.6 LBS | BODY MASS INDEX: 34.65 KG/M2 | TEMPERATURE: 98.6 F

## 2024-06-06 DIAGNOSIS — C78.00 SECONDARY RENAL CELL CARCINOMA OF LUNG, UNSPECIFIED LATERALITY (HCC): ICD-10-CM

## 2024-06-06 DIAGNOSIS — C64.1 MALIGNANT NEOPLASM OF RIGHT KIDNEY, EXCEPT RENAL PELVIS (HCC): Primary | ICD-10-CM

## 2024-06-06 DIAGNOSIS — C64.9 SECONDARY RENAL CELL CARCINOMA OF LUNG, UNSPECIFIED LATERALITY (HCC): ICD-10-CM

## 2024-06-06 PROCEDURE — 96417 CHEMO IV INFUS EACH ADDL SEQ: CPT

## 2024-06-06 PROCEDURE — 2580000003 HC RX 258: Performed by: INTERNAL MEDICINE

## 2024-06-06 PROCEDURE — 3017F COLORECTAL CA SCREEN DOC REV: CPT

## 2024-06-06 PROCEDURE — 3077F SYST BP >= 140 MM HG: CPT

## 2024-06-06 PROCEDURE — 99215 OFFICE O/P EST HI 40 MIN: CPT

## 2024-06-06 PROCEDURE — 6360000002 HC RX W HCPCS: Performed by: INTERNAL MEDICINE

## 2024-06-06 PROCEDURE — 3079F DIAST BP 80-89 MM HG: CPT

## 2024-06-06 PROCEDURE — G8427 DOCREV CUR MEDS BY ELIG CLIN: HCPCS

## 2024-06-06 PROCEDURE — 96375 TX/PRO/DX INJ NEW DRUG ADDON: CPT

## 2024-06-06 PROCEDURE — 6360000002 HC RX W HCPCS

## 2024-06-06 PROCEDURE — 4004F PT TOBACCO SCREEN RCVD TLK: CPT

## 2024-06-06 PROCEDURE — G8417 CALC BMI ABV UP PARAM F/U: HCPCS

## 2024-06-06 PROCEDURE — 96413 CHEMO IV INFUSION 1 HR: CPT

## 2024-06-06 RX ORDER — SODIUM CHLORIDE 9 MG/ML
5-250 INJECTION, SOLUTION INTRAVENOUS PRN
Status: DISCONTINUED | OUTPATIENT
Start: 2024-06-06 | End: 2024-06-07 | Stop reason: HOSPADM

## 2024-06-06 RX ORDER — SODIUM CHLORIDE 0.9 % (FLUSH) 0.9 %
5-40 SYRINGE (ML) INJECTION PRN
Status: DISCONTINUED | OUTPATIENT
Start: 2024-06-06 | End: 2024-06-07 | Stop reason: HOSPADM

## 2024-06-06 RX ORDER — DIPHENHYDRAMINE HYDROCHLORIDE 50 MG/ML
50 INJECTION INTRAMUSCULAR; INTRAVENOUS
Status: CANCELLED | OUTPATIENT
Start: 2024-06-13

## 2024-06-06 RX ORDER — SODIUM CHLORIDE 0.9 % (FLUSH) 0.9 %
5-40 SYRINGE (ML) INJECTION PRN
Status: CANCELLED | OUTPATIENT
Start: 2024-06-13

## 2024-06-06 RX ORDER — ACETAMINOPHEN 325 MG/1
650 TABLET ORAL
Status: CANCELLED | OUTPATIENT
Start: 2024-06-13

## 2024-06-06 RX ORDER — ONDANSETRON 2 MG/ML
8 INJECTION INTRAMUSCULAR; INTRAVENOUS
Status: CANCELLED | OUTPATIENT
Start: 2024-06-13

## 2024-06-06 RX ORDER — HEPARIN 100 UNIT/ML
500 SYRINGE INTRAVENOUS PRN
Status: CANCELLED | OUTPATIENT
Start: 2024-06-13

## 2024-06-06 RX ORDER — SODIUM CHLORIDE 9 MG/ML
5-250 INJECTION, SOLUTION INTRAVENOUS PRN
Status: CANCELLED | OUTPATIENT
Start: 2024-06-13

## 2024-06-06 RX ORDER — SODIUM CHLORIDE 9 MG/ML
INJECTION, SOLUTION INTRAVENOUS CONTINUOUS
Status: CANCELLED | OUTPATIENT
Start: 2024-06-13

## 2024-06-06 RX ORDER — EPINEPHRINE 1 MG/ML
0.3 INJECTION, SOLUTION INTRAMUSCULAR; SUBCUTANEOUS PRN
Status: CANCELLED | OUTPATIENT
Start: 2024-06-13

## 2024-06-06 RX ORDER — ALBUTEROL SULFATE 90 UG/1
4 AEROSOL, METERED RESPIRATORY (INHALATION) PRN
Status: CANCELLED | OUTPATIENT
Start: 2024-06-13

## 2024-06-06 RX ADMIN — IRON SUCROSE 200 MG: 20 INJECTION, SOLUTION INTRAVENOUS at 12:54

## 2024-06-06 RX ADMIN — SODIUM CHLORIDE 270 MG: 9 INJECTION, SOLUTION INTRAVENOUS at 13:02

## 2024-06-06 RX ADMIN — SODIUM CHLORIDE 90 MG: 9 INJECTION, SOLUTION INTRAVENOUS at 13:35

## 2024-06-06 ASSESSMENT — PAIN DESCRIPTION - PAIN TYPE: TYPE: ACUTE PAIN

## 2024-06-06 ASSESSMENT — PAIN DESCRIPTION - DESCRIPTORS: DESCRIPTORS: SHARP

## 2024-06-06 ASSESSMENT — PAIN DESCRIPTION - ORIENTATION: ORIENTATION: RIGHT

## 2024-06-06 ASSESSMENT — PAIN SCALES - GENERAL: PAINLEVEL_OUTOF10: 6

## 2024-06-06 ASSESSMENT — PAIN DESCRIPTION - LOCATION: LOCATION: PELVIS

## 2024-06-06 NOTE — PROGRESS NOTES
[facility]   6/27/2024 11:15 AM Nhi Winchester MD [facility] AMB   7/5/2024  9:30 AM [facility] MED INF CHAIR 1 [facility]   7/18/2024 11:00 AM [facility] CHEMO CHAIR 3 [facility]   7/18/2024 11:15 AM Lena Thakkar, APRN - NP [facility] BS [facility]   9/5/2024 10:40 AM Elisha Marley MD [facility] AMB   1/6/2025  2:30 PM Emeli Zamora, APRN - NP [facility] AMB         Chantal Chiu, RN  June 6, 2024

## 2024-06-06 NOTE — PROGRESS NOTES
Symone Edmonds is a 58 y.o. female    Chief Complaint   Patient presents with    Follow-up     Follow up of leucocytosis and anemia  Stage IV RCC          1. Have you been to the ER, urgent care clinic since your last visit?  Hospitalized since your last visit?Yes, [place] [facility]    2. Have you seen or consulted any other health care providers outside of the [facility] since your last visit?  Include any pap smears or colon screening. No

## 2024-06-13 ENCOUNTER — HOSPITAL ENCOUNTER (OUTPATIENT)
Facility: HOSPITAL | Age: 59
Setting detail: INFUSION SERIES
Discharge: HOME | End: 2024-06-13
Payer: MEDICARE

## 2024-06-13 VITALS
RESPIRATION RATE: 18 BRPM | SYSTOLIC BLOOD PRESSURE: 153 MMHG | TEMPERATURE: 97.3 F | DIASTOLIC BLOOD PRESSURE: 84 MMHG | HEART RATE: 91 BPM

## 2024-06-13 DIAGNOSIS — C64.9 SECONDARY RENAL CELL CARCINOMA OF LUNG, UNSPECIFIED LATERALITY (HCC): Primary | ICD-10-CM

## 2024-06-13 DIAGNOSIS — C78.00 SECONDARY RENAL CELL CARCINOMA OF LUNG, UNSPECIFIED LATERALITY (HCC): Primary | ICD-10-CM

## 2024-06-13 PROCEDURE — 96374 THER/PROPH/DIAG INJ IV PUSH: CPT

## 2024-06-13 PROCEDURE — 2580000003 HC RX 258

## 2024-06-13 PROCEDURE — 6360000002 HC RX W HCPCS

## 2024-06-13 RX ORDER — SODIUM CHLORIDE 9 MG/ML
5-250 INJECTION, SOLUTION INTRAVENOUS PRN
OUTPATIENT
Start: 2024-06-20

## 2024-06-13 RX ORDER — SODIUM CHLORIDE 9 MG/ML
INJECTION, SOLUTION INTRAVENOUS CONTINUOUS
OUTPATIENT
Start: 2024-06-20

## 2024-06-13 RX ORDER — ACETAMINOPHEN 325 MG/1
650 TABLET ORAL
OUTPATIENT
Start: 2024-06-20

## 2024-06-13 RX ORDER — ALBUTEROL SULFATE 90 UG/1
4 AEROSOL, METERED RESPIRATORY (INHALATION) PRN
OUTPATIENT
Start: 2024-06-20

## 2024-06-13 RX ORDER — EPINEPHRINE 1 MG/ML
0.3 INJECTION, SOLUTION INTRAMUSCULAR; SUBCUTANEOUS PRN
OUTPATIENT
Start: 2024-06-20

## 2024-06-13 RX ORDER — HEPARIN 100 UNIT/ML
500 SYRINGE INTRAVENOUS PRN
OUTPATIENT
Start: 2024-06-20

## 2024-06-13 RX ORDER — SODIUM CHLORIDE 9 MG/ML
5-250 INJECTION, SOLUTION INTRAVENOUS PRN
Status: DISCONTINUED | OUTPATIENT
Start: 2024-06-13 | End: 2024-06-14 | Stop reason: HOSPADM

## 2024-06-13 RX ORDER — SODIUM CHLORIDE 0.9 % (FLUSH) 0.9 %
5-40 SYRINGE (ML) INJECTION PRN
OUTPATIENT
Start: 2024-06-20

## 2024-06-13 RX ORDER — DIPHENHYDRAMINE HYDROCHLORIDE 50 MG/ML
50 INJECTION INTRAMUSCULAR; INTRAVENOUS
OUTPATIENT
Start: 2024-06-20

## 2024-06-13 RX ORDER — ONDANSETRON 2 MG/ML
8 INJECTION INTRAMUSCULAR; INTRAVENOUS
OUTPATIENT
Start: 2024-06-20

## 2024-06-13 RX ADMIN — IRON SUCROSE 200 MG: 20 INJECTION, SOLUTION INTRAVENOUS at 12:59

## 2024-06-13 RX ADMIN — SODIUM CHLORIDE 250 ML/HR: 9 INJECTION, SOLUTION INTRAVENOUS at 12:58

## 2024-06-13 NOTE — PROGRESS NOTES
[facility] Progress Note    Date: June 13, 2024        1300: Pt arrived ambulatory to [facility] for Venofer in stable condition.  Assessment completed. PIV placed to left forearm with positive blood return. Labs drawn and sent for processing.      Patient Vitals for the past 12 hrs:   Temp Pulse Resp BP   06/13/24 1300 -- 91 -- (!) 153/84   06/13/24 1245 97.3 °F (36.3 °C) 97 18 (!) 162/76         Medications Administered         0.9 % sodium chloride infusion Admin Date  06/13/2024 Action  New Bag Dose  250 mL/hr Rate  250 mL/hr Route  IntraVENous Administered By  Mitali Fields, RN        iron sucrose (VENOFER) injection 200 mg Admin Date  06/13/2024 Action  Given Dose  200 mg Rate   Route  IntraVENous Administered By  Mitali Fields, RN               Ms. Edmonds tolerated the infusion, and had no complaints.    PIV flushed and removed. 2x2 and coban placed    Ms. Edmonds was discharged from [facility]patient [facility]fusion [facility] in stable condition. Patient is aware of next scheduled [facility] appointment.         Future Appointments   Date Time Provider Department Center   6/20/2024  1:00 PM [facility]   6/27/2024 11:00 AM [facility]   6/27/2024 11:15 AM Nhi Winchester MD [facility] BS [facility]   7/18/2024 11:00 AM [facility]   7/18/2024 11:15 AM Lena Thakkar APRN - NP [facility] AMB   9/5/2024 10:40 AM Elisha Marley MD [facility] AM[facility]   1/6/2025  2:30 PM Emeli Zamora APRN - NP [facility] BS AMB         Mitali Fields, KAILASH, RN  June 13, 2024

## 2024-06-20 ENCOUNTER — HOSPITAL ENCOUNTER (OUTPATIENT)
Facility: HOSPITAL | Age: 59
Setting detail: INFUSION SERIES
Discharge: HOME | End: 2024-06-20
Payer: MEDICARE

## 2024-06-20 VITALS
TEMPERATURE: 97.8 F | DIASTOLIC BLOOD PRESSURE: 81 MMHG | OXYGEN SATURATION: 99 % | SYSTOLIC BLOOD PRESSURE: 141 MMHG | HEART RATE: 93 BPM | RESPIRATION RATE: 18 BRPM

## 2024-06-20 DIAGNOSIS — C78.00 SECONDARY RENAL CELL CARCINOMA OF LUNG, UNSPECIFIED LATERALITY (HCC): Primary | ICD-10-CM

## 2024-06-20 DIAGNOSIS — C64.9 SECONDARY RENAL CELL CARCINOMA OF LUNG, UNSPECIFIED LATERALITY (HCC): Primary | ICD-10-CM

## 2024-06-20 PROCEDURE — 2580000003 HC RX 258

## 2024-06-20 PROCEDURE — 96374 THER/PROPH/DIAG INJ IV PUSH: CPT

## 2024-06-20 PROCEDURE — 6360000002 HC RX W HCPCS

## 2024-06-20 RX ORDER — ONDANSETRON 2 MG/ML
8 INJECTION INTRAMUSCULAR; INTRAVENOUS
Status: CANCELLED | OUTPATIENT
Start: 2024-06-27

## 2024-06-20 RX ORDER — HEPARIN 100 UNIT/ML
500 SYRINGE INTRAVENOUS PRN
OUTPATIENT
Start: 2024-06-27

## 2024-06-20 RX ORDER — DIPHENHYDRAMINE HYDROCHLORIDE 50 MG/ML
50 INJECTION INTRAMUSCULAR; INTRAVENOUS
OUTPATIENT
Start: 2024-06-27

## 2024-06-20 RX ORDER — HEPARIN 100 UNIT/ML
500 SYRINGE INTRAVENOUS PRN
Status: DISCONTINUED | OUTPATIENT
Start: 2024-06-20 | End: 2024-06-21 | Stop reason: HOSPADM

## 2024-06-20 RX ORDER — SODIUM CHLORIDE 9 MG/ML
5-250 INJECTION, SOLUTION INTRAVENOUS PRN
OUTPATIENT
Start: 2024-06-27

## 2024-06-20 RX ORDER — SODIUM CHLORIDE 9 MG/ML
5-250 INJECTION, SOLUTION INTRAVENOUS PRN
Status: DISCONTINUED | OUTPATIENT
Start: 2024-06-20 | End: 2024-06-21 | Stop reason: HOSPADM

## 2024-06-20 RX ORDER — EPINEPHRINE 1 MG/ML
0.3 INJECTION, SOLUTION INTRAMUSCULAR; SUBCUTANEOUS PRN
OUTPATIENT
Start: 2024-06-27

## 2024-06-20 RX ORDER — ONDANSETRON 2 MG/ML
8 INJECTION INTRAMUSCULAR; INTRAVENOUS
OUTPATIENT
Start: 2024-06-27

## 2024-06-20 RX ORDER — ALBUTEROL SULFATE 90 UG/1
4 AEROSOL, METERED RESPIRATORY (INHALATION) PRN
Status: CANCELLED | OUTPATIENT
Start: 2024-06-27

## 2024-06-20 RX ORDER — SODIUM CHLORIDE 9 MG/ML
INJECTION, SOLUTION INTRAVENOUS CONTINUOUS
Status: CANCELLED | OUTPATIENT
Start: 2024-06-27

## 2024-06-20 RX ORDER — SODIUM CHLORIDE 9 MG/ML
5-250 INJECTION, SOLUTION INTRAVENOUS PRN
Status: CANCELLED | OUTPATIENT
Start: 2024-06-27

## 2024-06-20 RX ORDER — SODIUM CHLORIDE 0.9 % (FLUSH) 0.9 %
5-40 SYRINGE (ML) INJECTION PRN
Status: DISCONTINUED | OUTPATIENT
Start: 2024-06-20 | End: 2024-06-21 | Stop reason: HOSPADM

## 2024-06-20 RX ORDER — ACETAMINOPHEN 325 MG/1
650 TABLET ORAL
Status: CANCELLED | OUTPATIENT
Start: 2024-06-27

## 2024-06-20 RX ORDER — ALBUTEROL SULFATE 90 UG/1
4 AEROSOL, METERED RESPIRATORY (INHALATION) PRN
OUTPATIENT
Start: 2024-06-27

## 2024-06-20 RX ORDER — SODIUM CHLORIDE 0.9 % (FLUSH) 0.9 %
5-40 SYRINGE (ML) INJECTION PRN
OUTPATIENT
Start: 2024-06-27

## 2024-06-20 RX ORDER — ACETAMINOPHEN 325 MG/1
650 TABLET ORAL
OUTPATIENT
Start: 2024-06-27

## 2024-06-20 RX ORDER — SODIUM CHLORIDE 9 MG/ML
INJECTION, SOLUTION INTRAVENOUS CONTINUOUS
OUTPATIENT
Start: 2024-06-27

## 2024-06-20 RX ORDER — EPINEPHRINE 1 MG/ML
0.3 INJECTION, SOLUTION INTRAMUSCULAR; SUBCUTANEOUS PRN
Status: CANCELLED | OUTPATIENT
Start: 2024-06-27

## 2024-06-20 RX ORDER — DIPHENHYDRAMINE HYDROCHLORIDE 50 MG/ML
50 INJECTION INTRAMUSCULAR; INTRAVENOUS
Status: CANCELLED | OUTPATIENT
Start: 2024-06-27

## 2024-06-20 RX ADMIN — SODIUM CHLORIDE 20 ML/HR: 9 INJECTION, SOLUTION INTRAVENOUS at 12:54

## 2024-06-20 RX ADMIN — IRON SUCROSE 200 MG: 20 INJECTION, SOLUTION INTRAVENOUS at 12:54

## 2024-06-20 ASSESSMENT — PAIN SCALES - GENERAL: PAINLEVEL_OUTOF10: 0

## 2024-06-20 NOTE — PROGRESS NOTES
[facility] [place] Short Note                       Date: 2024    Name: Symone Edmonds    MRN: [id number]         : 1965      1245 Pt admit to [facility] [place] [facility] Ven[facility]er ambulatory in stable condition. Assessment completed. No new concerns voiced.       Ms. Edmonds's vitals were reviewed prior to and after treatment.   Patient Vitals for the past 12 hrs:   Temp Pulse Resp BP SpO2   24 1305 -- 93 18 (!) 141/81 --   24 1245 97.8 °F (36.6 °C) 98 18 (!) 148/92 99 %         Lab results were obtained and reviewed.   Please review pending lab results in Epic.  No results found for this or any previous visit (from the past 12 hour(s)).    Medications given:   Medications Administered         0.9 % sodium chloride infusion Admin Date  2024 Action  New Bag Dose  20 mL/hr Rate  20 mL/hr Route  IntraVENous Administered By  Rose Calabrese RN        iron sucrose (VENOFER) injection 200 mg Admin Date  2024 Action  Given Dose  200 mg Rate   Route  IntraVENous Administered By  Rose Calabrese RN            Port accessed with positive blood return. Port flushed and de-accessed per protocol.     Ms. Edmonds tolerated the infusion, and had no complaints.    Ms. Edmonds was discharged from [facility]fusion [facility] in stable condition.     Future Appointments   Date Time Provider Department Center   2024 11:00 AM [facility]   2024 11:15 AM hNi Winchester MD [facility] BS AMB   2024 11:00 AM [facility]   2024 11:15 AM Lena Thakkar APRN - NP [facility] AMB   2024 10:40 AM Elisha Marley MD [facility] AMB   2025  2:30 PM Emeli Zamora APRN - NP [facility] BS AMB       Rose Calabrese RN  2024  1:21 PM

## 2024-06-20 NOTE — PLAN OF CARE
Patient tolerated infusion without complications.     Problem: Chronic Conditions and Co-morbidities  Goal: Patient's chronic conditions and co-morbidity symptoms are monitored and maintained or improved  Outcome: Adequate for Discharge     Problem: Pain  Goal: Verbalizes/displays adequate comfort level or baseline comfort level  Outcome: Adequate for Discharge     Problem: Safety - Adult  Goal: Free from fall injury  Outcome: Adequate for Discharge

## 2024-06-26 ENCOUNTER — HOSPITAL ENCOUNTER (OUTPATIENT)
Facility: HOSPITAL | Age: 59
Setting detail: INFUSION SERIES
Discharge: HOME | End: 2024-06-26
Payer: MEDICARE

## 2024-06-26 VITALS
SYSTOLIC BLOOD PRESSURE: 126 MMHG | DIASTOLIC BLOOD PRESSURE: 83 MMHG | HEART RATE: 93 BPM | RESPIRATION RATE: 20 BRPM | TEMPERATURE: 97.8 F

## 2024-06-26 DIAGNOSIS — C64.1 MALIGNANT NEOPLASM OF RIGHT KIDNEY, EXCEPT RENAL PELVIS (HCC): ICD-10-CM

## 2024-06-26 DIAGNOSIS — C64.9 SECONDARY RENAL CELL CARCINOMA OF LUNG, UNSPECIFIED LATERALITY (HCC): ICD-10-CM

## 2024-06-26 DIAGNOSIS — C78.00 SECONDARY RENAL CELL CARCINOMA OF LUNG, UNSPECIFIED LATERALITY (HCC): ICD-10-CM

## 2024-06-26 LAB
ALBUMIN SERPL-MCNC: 3.6 G/DL (ref 3.5–5)
ALBUMIN/GLOB SERPL: 1.1 (ref 1.1–2.2)
ALP SERPL-CCNC: 193 U/L (ref 45–117)
ALT SERPL-CCNC: 28 U/L (ref 12–78)
ANION GAP SERPL CALC-SCNC: 9 MMOL/L (ref 5–15)
AST SERPL-CCNC: 40 U/L (ref 15–37)
BASOPHILS # BLD: 0.3 K/UL (ref 0–0.1)
BASOPHILS NFR BLD: 1 % (ref 0–1)
BILIRUB SERPL-MCNC: 0.3 MG/DL (ref 0.2–1)
BUN SERPL-MCNC: 19 MG/DL (ref 6–20)
BUN/CREAT SERPL: 15 (ref 12–20)
CALCIUM SERPL-MCNC: 10.2 MG/DL (ref 8.5–10.1)
CHLORIDE SERPL-SCNC: 101 MMOL/L (ref 97–108)
CO2 SERPL-SCNC: 25 MMOL/L (ref 21–32)
CREAT SERPL-MCNC: 1.24 MG/DL (ref 0.55–1.02)
DIFFERENTIAL METHOD BLD: ABNORMAL
EOSINOPHIL # BLD: 1.5 K/UL (ref 0–0.4)
EOSINOPHIL NFR BLD: 6 % (ref 0–7)
ERYTHROCYTE [DISTWIDTH] IN BLOOD BY AUTOMATED COUNT: 18.6 % (ref 11.5–14.5)
GLOBULIN SER CALC-MCNC: 3.3 G/DL (ref 2–4)
GLUCOSE SERPL-MCNC: 116 MG/DL (ref 65–100)
HCT VFR BLD AUTO: 44.4 % (ref 35–47)
HGB BLD-MCNC: 14.8 G/DL (ref 11.5–16)
IMM GRANULOCYTES # BLD AUTO: 0.3 K/UL (ref 0–0.04)
IMM GRANULOCYTES NFR BLD AUTO: 1 % (ref 0–0.5)
LYMPHOCYTES # BLD: 3.3 K/UL (ref 0.8–3.5)
LYMPHOCYTES NFR BLD: 13 % (ref 12–49)
MCH RBC QN AUTO: 26.8 PG (ref 26–34)
MCHC RBC AUTO-ENTMCNC: 33.3 G/DL (ref 30–36.5)
MCV RBC AUTO: 80.4 FL (ref 80–99)
MONOCYTES # BLD: 0.8 K/UL (ref 0–1)
MONOCYTES NFR BLD: 3 % (ref 5–13)
NEUTS SEG # BLD: 19.2 K/UL (ref 1.8–8)
NEUTS SEG NFR BLD: 76 % (ref 32–75)
NRBC # BLD: 0 K/UL (ref 0–0.01)
NRBC BLD-RTO: 0 PER 100 WBC
PLATELET # BLD AUTO: 283 K/UL (ref 150–400)
PMV BLD AUTO: 10 FL (ref 8.9–12.9)
POTASSIUM SERPL-SCNC: 4.2 MMOL/L (ref 3.5–5.1)
PROT SERPL-MCNC: 6.9 G/DL (ref 6.4–8.2)
RBC # BLD AUTO: 5.52 M/UL (ref 3.8–5.2)
RBC MORPH BLD: ABNORMAL
SODIUM SERPL-SCNC: 135 MMOL/L (ref 136–145)
WBC # BLD AUTO: 25.4 K/UL (ref 3.6–11)

## 2024-06-26 PROCEDURE — 85025 COMPLETE CBC W/AUTO DIFF WBC: CPT

## 2024-06-26 PROCEDURE — 80053 COMPREHEN METABOLIC PANEL: CPT

## 2024-06-26 PROCEDURE — 36415 COLL VENOUS BLD VENIPUNCTURE: CPT

## 2024-06-26 NOTE — PROGRESS NOTES
[facility] at [facility]   [address], [address] [place] [id number]   W: [phone]  F: [phone]          Reason for Visit:     Symone Edmonds is a 58 y.o. female who is for    Follow up of leucocytosis and anemia  Stage IV RCC        Treatment history for renal cell carcinoma  7/28/2020-right radical nephrectomy-3.3 cm renal cell carcinoma.  She did have a CT chest in August 2020 that showed a 6 mm lung nodule  9/6/2023-stage IV renal cell carcinoma-robotic assisted thoracoscopic right lower lobe wedge resection and mediastinal lymph node dissection  3/2024: CT CAP  with progression (increase in mediastinal and hilar LN)  5/16/2024: Nivolumab + Ipilimumab         History of Present Illness:     Patient is a  58 y.o. female with PMH as below who is seen for abnormal  CBC      She has persistent leucocytosis since 2014, no other CBC abnormalities. She has known R renal cell carcinoma ( 3.3 cm) which she had a R radical nephrectomy on 7/28/2020. She has osteoarthritis and has several flare ups. She had a CT chest 8/2020 that  showed a 6 mm lung nodule which is being observed.  She had a follow-up CT scan with pulmonary on 7/24/2023 which showed increasing bilateral pulmonary nodules.  This prompted a PET CT scan that was done on 8/9/2023.  This showed that the peripheral pulmonary nodule in the right lower lobe had an FDG activity of 3.3, subcentimeter pulmonary nodule in the left lower lobe had no FDG activity.  She was referred to thoracic surgery Dr. Ross and underwent a robotic assisted thoracoscopic right lower lobe wedge resection and mediastinal lymph node dissection on 9/6/2023.  Pathology revealed metastatic renal cell carcinoma in the right lower lobe 1.3 cm nodule. 3/2024 Scans showed progression although EBUS path was benign. Started on Ipi Nivo.     Today is C3.   She is tired. She has some mid chest ache with deep inspiration, no diarrhea, rash comes and goes.

## 2024-06-26 NOTE — PROGRESS NOTES
[facility] Progress Note    Date: 2024    Name: Symone Edmonds    MRN: [id number]         : 1965      Pt arrived ambulatory and in no distress, for lab visit.  Labs drawn via L AC, patient tolerated well.        Please check Epic for Results.     Departed [facility] ambulatory and in no distress.    Future Appointments   Date Time Provider Department Center   2024 11:00 AM [facility] CHEMO CHAIR 3 [facility]   2024 11:15 AM Nhi Winchester MD [facility]   2024 11:00 AM [facility] CHEMO CHAIR 3 [facility]   2024 11:15 AM Lena Thakkar APRN - NP [facility]   2024 10:40 AM Elisha Marley MD [facility]   2025  2:30 PM Emeli Zamora APRN - NP [facility]       Pablo Anderson  2024

## 2024-06-27 ENCOUNTER — HOSPITAL ENCOUNTER (OUTPATIENT)
Facility: HOSPITAL | Age: 59
Setting detail: INFUSION SERIES
Discharge: HOME | End: 2024-06-27
Payer: MEDICARE

## 2024-06-27 ENCOUNTER — OFFICE VISIT (OUTPATIENT)
Age: 59
End: 2024-06-27
Payer: MEDICARE

## 2024-06-27 VITALS
HEART RATE: 71 BPM | BODY MASS INDEX: 34.15 KG/M2 | TEMPERATURE: 97.9 F | HEIGHT: 64 IN | SYSTOLIC BLOOD PRESSURE: 131 MMHG | RESPIRATION RATE: 16 BRPM | OXYGEN SATURATION: 94 % | DIASTOLIC BLOOD PRESSURE: 85 MMHG | WEIGHT: 200 LBS

## 2024-06-27 VITALS
WEIGHT: 200 LBS | SYSTOLIC BLOOD PRESSURE: 139 MMHG | TEMPERATURE: 97.9 F | HEIGHT: 64 IN | BODY MASS INDEX: 34.15 KG/M2 | OXYGEN SATURATION: 94 % | RESPIRATION RATE: 18 BRPM | HEART RATE: 93 BPM | DIASTOLIC BLOOD PRESSURE: 85 MMHG

## 2024-06-27 DIAGNOSIS — C78.00 SECONDARY RENAL CELL CARCINOMA OF LUNG, UNSPECIFIED LATERALITY (HCC): ICD-10-CM

## 2024-06-27 DIAGNOSIS — C64.1 MALIGNANT NEOPLASM OF RIGHT KIDNEY, EXCEPT RENAL PELVIS (HCC): Primary | ICD-10-CM

## 2024-06-27 DIAGNOSIS — C64.9 SECONDARY RENAL CELL CARCINOMA OF LUNG, UNSPECIFIED LATERALITY (HCC): ICD-10-CM

## 2024-06-27 DIAGNOSIS — C64.1 MALIGNANT NEOPLASM OF RIGHT KIDNEY, EXCEPT RENAL PELVIS (HCC): ICD-10-CM

## 2024-06-27 DIAGNOSIS — C64.9 RENAL CELL CARCINOMA, UNSPECIFIED LATERALITY (HCC): Primary | ICD-10-CM

## 2024-06-27 PROCEDURE — 3079F DIAST BP 80-89 MM HG: CPT | Performed by: INTERNAL MEDICINE

## 2024-06-27 PROCEDURE — 96375 TX/PRO/DX INJ NEW DRUG ADDON: CPT

## 2024-06-27 PROCEDURE — 6360000002 HC RX W HCPCS: Performed by: INTERNAL MEDICINE

## 2024-06-27 PROCEDURE — 99214 OFFICE O/P EST MOD 30 MIN: CPT | Performed by: INTERNAL MEDICINE

## 2024-06-27 PROCEDURE — 4004F PT TOBACCO SCREEN RCVD TLK: CPT | Performed by: INTERNAL MEDICINE

## 2024-06-27 PROCEDURE — 96417 CHEMO IV INFUS EACH ADDL SEQ: CPT

## 2024-06-27 PROCEDURE — 2580000003 HC RX 258: Performed by: INTERNAL MEDICINE

## 2024-06-27 PROCEDURE — G8428 CUR MEDS NOT DOCUMENT: HCPCS | Performed by: INTERNAL MEDICINE

## 2024-06-27 PROCEDURE — 3017F COLORECTAL CA SCREEN DOC REV: CPT | Performed by: INTERNAL MEDICINE

## 2024-06-27 PROCEDURE — 6360000002 HC RX W HCPCS

## 2024-06-27 PROCEDURE — 3075F SYST BP GE 130 - 139MM HG: CPT | Performed by: INTERNAL MEDICINE

## 2024-06-27 PROCEDURE — G8417 CALC BMI ABV UP PARAM F/U: HCPCS | Performed by: INTERNAL MEDICINE

## 2024-06-27 PROCEDURE — 96413 CHEMO IV INFUSION 1 HR: CPT

## 2024-06-27 RX ORDER — SODIUM CHLORIDE 9 MG/ML
INJECTION, SOLUTION INTRAVENOUS CONTINUOUS
OUTPATIENT
Start: 2024-07-04

## 2024-06-27 RX ORDER — SODIUM CHLORIDE 0.9 % (FLUSH) 0.9 %
5-40 SYRINGE (ML) INJECTION PRN
OUTPATIENT
Start: 2024-07-04

## 2024-06-27 RX ORDER — SODIUM CHLORIDE 9 MG/ML
5-250 INJECTION, SOLUTION INTRAVENOUS PRN
Status: DISCONTINUED | OUTPATIENT
Start: 2024-06-27 | End: 2024-06-28 | Stop reason: HOSPADM

## 2024-06-27 RX ORDER — SODIUM CHLORIDE 0.9 % (FLUSH) 0.9 %
5-40 SYRINGE (ML) INJECTION PRN
Status: DISCONTINUED | OUTPATIENT
Start: 2024-06-27 | End: 2024-06-28 | Stop reason: HOSPADM

## 2024-06-27 RX ORDER — ACETAMINOPHEN 325 MG/1
650 TABLET ORAL
OUTPATIENT
Start: 2024-07-04

## 2024-06-27 RX ORDER — EPINEPHRINE 1 MG/ML
0.3 INJECTION, SOLUTION INTRAMUSCULAR; SUBCUTANEOUS PRN
OUTPATIENT
Start: 2024-07-04

## 2024-06-27 RX ORDER — SODIUM CHLORIDE 9 MG/ML
5-250 INJECTION, SOLUTION INTRAVENOUS PRN
OUTPATIENT
Start: 2024-07-04

## 2024-06-27 RX ORDER — ONDANSETRON 2 MG/ML
8 INJECTION INTRAMUSCULAR; INTRAVENOUS
OUTPATIENT
Start: 2024-07-04

## 2024-06-27 RX ORDER — DIPHENHYDRAMINE HYDROCHLORIDE 50 MG/ML
50 INJECTION INTRAMUSCULAR; INTRAVENOUS
OUTPATIENT
Start: 2024-07-04

## 2024-06-27 RX ORDER — HEPARIN 100 UNIT/ML
500 SYRINGE INTRAVENOUS PRN
OUTPATIENT
Start: 2024-07-04

## 2024-06-27 RX ORDER — ALBUTEROL SULFATE 90 UG/1
4 AEROSOL, METERED RESPIRATORY (INHALATION) PRN
OUTPATIENT
Start: 2024-07-04

## 2024-06-27 RX ORDER — HEPARIN 100 UNIT/ML
500 SYRINGE INTRAVENOUS PRN
Status: DISCONTINUED | OUTPATIENT
Start: 2024-06-27 | End: 2024-06-28 | Stop reason: HOSPADM

## 2024-06-27 RX ADMIN — IRON SUCROSE 200 MG: 20 INJECTION, SOLUTION INTRAVENOUS at 13:17

## 2024-06-27 RX ADMIN — SODIUM CHLORIDE 270 MG: 9 INJECTION, SOLUTION INTRAVENOUS at 13:58

## 2024-06-27 RX ADMIN — SODIUM CHLORIDE 100 ML/HR: 9 INJECTION, SOLUTION INTRAVENOUS at 13:16

## 2024-06-27 RX ADMIN — SODIUM CHLORIDE 90 MG: 9 INJECTION, SOLUTION INTRAVENOUS at 14:39

## 2024-06-27 ASSESSMENT — PATIENT HEALTH QUESTIONNAIRE - PHQ9
SUM OF ALL RESPONSES TO PHQ QUESTIONS 1-9: 0
2. FEELING DOWN, DEPRESSED OR HOPELESS: NOT AT ALL
SUM OF ALL RESPONSES TO PHQ QUESTIONS 1-9: 0
1. LITTLE INTEREST OR PLEASURE IN DOING THINGS: NOT AT ALL
SUM OF ALL RESPONSES TO PHQ9 QUESTIONS 1 & 2: 0
SUM OF ALL RESPONSES TO PHQ QUESTIONS 1-9: 0
SUM OF ALL RESPONSES TO PHQ QUESTIONS 1-9: 0

## 2024-06-27 NOTE — PROGRESS NOTES
Patient identified with two identification factors, Name and Date of Birth.    Chief Complaint   Patient presents with    Follow-up     Malignant neoplasm of right kidney, except renal pelvis       /85 (Site: Right Upper Arm, Position: Sitting, Cuff Size: Large Adult)   Pulse 71   Temp 97.9 °F (36.6 °C) (Temporal)   Resp 16   Ht 1.613 m (5' 3.5\")   Wt 90.7 kg (200 lb)   SpO2 94%   BMI 34.87 kg/m²       1. \"Have you been to the ER, urgent care clinic since your last visit?  Hospitalized since your last visit?\" No    2. \"Have you seen or consulted any other health care providers outside of the [facility] since your last visit?\" No

## 2024-06-27 NOTE — PROGRESS NOTES
[facility] Progress Note    Ms. Edmonds Arrived ambulatory and in no distress for Opdivo/Yervoy/Venofer C3D1 regimen.  Assessment was completed and PIV started by assessment RN. Positive blood return noted to IV.    Ms. Edmonds's vitals were reviewed.  Patient Vitals for the past 12 hrs:   Temp Pulse Resp BP SpO2   06/27/24 1515 -- 93 -- 139/85 --   06/27/24 1100 97.9 °F (36.6 °C) 93 18 (!) 145/89 94 %       Lab results were obtained yesterday and reviewed.  Pre-medications  were administered as ordered and chemotherapy was initiated.  Medications Administered         0.9 % sodium chloride infusion Admin Date  06/27/2024 Action  New Bag Dose  100 mL/hr Rate  100 mL/hr Route  IntraVENous Administered By  Jennifer Coronado RN        ipilimumab (YERVOY) 90 mg in sodium chloride 0.9 % 50 mL IVPB Admin Date  06/27/2024 Action  New Bag Dose  90 mg Rate  146 mL/hr Route  IntraVENous Administered By  Jennifer Coronado RN        iron sucrose (VENOFER) injection 200 mg Admin Date  06/27/2024 Action  Given Dose  200 mg Rate   Route  IntraVENous Administered By  Jennifer Coronado RN        nivolumab (OPDIVO) 270 mg in sodium chloride 0.9 % 100 mL IVPB Admin Date  06/27/2024 Action  New Bag Dose  270 mg Rate  274 mL/hr Route  IntraVENous Administered By  Jennifer Coronado RN            Two nurses verified prior to administering: Drug name, Drug dose, Infusion volume or drug volume when prepared in a syringe, Rate of administration, Route of administration, Expiration dates and/or times, Appearance and physical integrity of the drugs, Rate set on infusion pump, when used, and Sequencing of drug administration.    Ms. Edmonds tolerated treatment well. IV maintained blood return throughout the entire treatment. IV flushed and removed at end of treatment. [facility].     Notified nima Juarez RN that pt was due for TSH lab today. Per charge RN, she will notify MD office so it can be drawn

## 2024-07-04 DIAGNOSIS — J44.9 CHRONIC OBSTRUCTIVE PULMONARY DISEASE, UNSPECIFIED COPD TYPE (HCC): ICD-10-CM

## 2024-07-04 RX ORDER — TIOTROPIUM BROMIDE 18 UG/1
CAPSULE ORAL; RESPIRATORY (INHALATION)
Qty: 90 CAPSULE | Refills: 3 | Status: SHIPPED | OUTPATIENT
Start: 2024-07-04

## 2024-07-05 ENCOUNTER — APPOINTMENT (OUTPATIENT)
Facility: HOSPITAL | Age: 59
End: 2024-07-05
Payer: MEDICARE

## 2024-07-08 ENCOUNTER — HOSPITAL ENCOUNTER (OUTPATIENT)
Facility: HOSPITAL | Age: 59
Discharge: HOME | End: 2024-07-11
Attending: INTERNAL MEDICINE
Payer: MEDICARE

## 2024-07-08 ENCOUNTER — TELEPHONE (OUTPATIENT)
Age: 59
End: 2024-07-08

## 2024-07-08 DIAGNOSIS — C64.9 RENAL CELL CARCINOMA, UNSPECIFIED LATERALITY (HCC): ICD-10-CM

## 2024-07-08 DIAGNOSIS — C64.1 MALIGNANT NEOPLASM OF RIGHT KIDNEY, EXCEPT RENAL PELVIS (HCC): ICD-10-CM

## 2024-07-08 PROCEDURE — 6360000004 HC RX CONTRAST MEDICATION: Performed by: INTERNAL MEDICINE

## 2024-07-08 PROCEDURE — 74177 CT ABD & PELVIS W/CONTRAST: CPT

## 2024-07-08 RX ADMIN — IOPAMIDOL 100 ML: 755 INJECTION, SOLUTION INTRAVENOUS at 09:36

## 2024-07-08 NOTE — TELEPHONE ENCOUNTER
PRITESH from Patient, wondering if she can labs on Monday for her Thursday infusion appt.  Please give her a call back at [phone]

## 2024-07-11 RX ORDER — SODIUM CHLORIDE 9 MG/ML
5-250 INJECTION, SOLUTION INTRAVENOUS PRN
Status: CANCELLED | OUTPATIENT
Start: 2024-07-18

## 2024-07-11 RX ORDER — ONDANSETRON 2 MG/ML
8 INJECTION INTRAMUSCULAR; INTRAVENOUS
Status: CANCELLED | OUTPATIENT
Start: 2024-07-18

## 2024-07-11 RX ORDER — SODIUM CHLORIDE 9 MG/ML
INJECTION, SOLUTION INTRAVENOUS CONTINUOUS
Status: CANCELLED | OUTPATIENT
Start: 2024-07-18

## 2024-07-11 RX ORDER — DIPHENHYDRAMINE HYDROCHLORIDE 50 MG/ML
50 INJECTION INTRAMUSCULAR; INTRAVENOUS
Status: CANCELLED | OUTPATIENT
Start: 2024-07-18

## 2024-07-11 RX ORDER — EPINEPHRINE 1 MG/ML
0.3 INJECTION, SOLUTION INTRAMUSCULAR; SUBCUTANEOUS PRN
Status: CANCELLED | OUTPATIENT
Start: 2024-07-18

## 2024-07-11 RX ORDER — HEPARIN 100 UNIT/ML
500 SYRINGE INTRAVENOUS PRN
Status: CANCELLED | OUTPATIENT
Start: 2024-07-18

## 2024-07-11 RX ORDER — ACETAMINOPHEN 325 MG/1
650 TABLET ORAL
Status: CANCELLED | OUTPATIENT
Start: 2024-07-18

## 2024-07-11 RX ORDER — ALBUTEROL SULFATE 90 UG/1
4 AEROSOL, METERED RESPIRATORY (INHALATION) PRN
Status: CANCELLED | OUTPATIENT
Start: 2024-07-18

## 2024-07-12 ENCOUNTER — TELEPHONE (OUTPATIENT)
Age: 59
End: 2024-07-12

## 2024-07-12 NOTE — TELEPHONE ENCOUNTER
3:42pm: pt verified x2, states she has never felt like this before. However, she stated that she is a little stuffy. Pt said it may be a cold but is unsure.

## 2024-07-12 NOTE — TELEPHONE ENCOUNTER
10:28am: pt verified x2, pt says she is extremely fatigued. Started a week ago. Pt has checked blood pressure and sugar which is within normal range. Pt denies fever. Pt has slight cough for the last couple of days. States that there is a little clear fluid. Feels as if she has to gasp to get a deep breath. States that it is worse at night when she lays down. Pt also has slight nausea when her throat is dry, as soon as she drinks something it subsides. Pt is going to find her home pulse ox and check O2. She will send [facility] message with it. No further questions.

## 2024-07-12 NOTE — TELEPHONE ENCOUNTER
Verified patient ID x 2    Advised patient to obtain OTC covid test d/t COVID resurgence in the area. Our team will touch base Monday.     If patient has increased SOB or chest pain in the mean time over the weekend, she should present to ER for further evaluation.     Patient verbalized understanding.

## 2024-07-17 ENCOUNTER — TELEPHONE (OUTPATIENT)
Age: 59
End: 2024-07-17

## 2024-07-17 DIAGNOSIS — E11.40 TYPE 2 DIABETES MELLITUS WITH DIABETIC NEUROPATHY, WITHOUT LONG-TERM CURRENT USE OF INSULIN (HCC): Primary | ICD-10-CM

## 2024-07-17 NOTE — TELEPHONE ENCOUNTER
----- Message from Jazmine Costa RN sent at 7/17/2024 11:50 AM EDT -----  Regarding: FW: ACM FOLLOW UP  Contact: [phone]  Please place order for REF20 Diabetes education class for patient - she is very interested!  Thank you so much!  Jazmine  ----- Message -----  From: Symone Edmonds  Sent: 7/16/2024   3:41 PM EDT  To: Jazmine Costa RN  Subject: ACM FOLLOW UP                                    Yes, that would be awesome.   Thank you

## 2024-07-18 ENCOUNTER — HOSPITAL ENCOUNTER (OUTPATIENT)
Facility: HOSPITAL | Age: 59
Setting detail: INFUSION SERIES
Discharge: HOME | End: 2024-07-18
Payer: MEDICARE

## 2024-07-18 ENCOUNTER — TELEPHONE (OUTPATIENT)
Age: 59
End: 2024-07-18

## 2024-07-18 ENCOUNTER — OFFICE VISIT (OUTPATIENT)
Age: 59
End: 2024-07-18
Payer: MEDICARE

## 2024-07-18 VITALS
HEART RATE: 93 BPM | SYSTOLIC BLOOD PRESSURE: 149 MMHG | DIASTOLIC BLOOD PRESSURE: 79 MMHG | HEIGHT: 64 IN | TEMPERATURE: 97.7 F | RESPIRATION RATE: 18 BRPM | OXYGEN SATURATION: 95 % | BODY MASS INDEX: 34.62 KG/M2 | WEIGHT: 202.8 LBS

## 2024-07-18 VITALS
BODY MASS INDEX: 35.22 KG/M2 | WEIGHT: 202 LBS | OXYGEN SATURATION: 93 % | SYSTOLIC BLOOD PRESSURE: 136 MMHG | HEART RATE: 103 BPM | RESPIRATION RATE: 18 BRPM | TEMPERATURE: 97.7 F | DIASTOLIC BLOOD PRESSURE: 83 MMHG

## 2024-07-18 DIAGNOSIS — C64.1 MALIGNANT NEOPLASM OF RIGHT KIDNEY, EXCEPT RENAL PELVIS (HCC): Primary | ICD-10-CM

## 2024-07-18 DIAGNOSIS — C64.9 SECONDARY RENAL CELL CARCINOMA OF LUNG, UNSPECIFIED LATERALITY (HCC): ICD-10-CM

## 2024-07-18 DIAGNOSIS — E03.2 HYPOTHYROIDISM DUE TO MEDICATION: Primary | ICD-10-CM

## 2024-07-18 DIAGNOSIS — C64.9 RENAL CELL CARCINOMA, UNSPECIFIED LATERALITY (HCC): Primary | ICD-10-CM

## 2024-07-18 DIAGNOSIS — C78.00 SECONDARY RENAL CELL CARCINOMA OF LUNG, UNSPECIFIED LATERALITY (HCC): ICD-10-CM

## 2024-07-18 LAB
ALBUMIN SERPL-MCNC: 3.6 G/DL (ref 3.5–5)
ALBUMIN/GLOB SERPL: 1.1 (ref 1.1–2.2)
ALP SERPL-CCNC: 202 U/L (ref 45–117)
ALT SERPL-CCNC: 55 U/L (ref 12–78)
ANION GAP SERPL CALC-SCNC: 7 MMOL/L (ref 5–15)
AST SERPL-CCNC: 47 U/L (ref 15–37)
BASOPHILS # BLD: 0.1 K/UL (ref 0–0.1)
BASOPHILS NFR BLD: 1 % (ref 0–1)
BILIRUB SERPL-MCNC: 0.2 MG/DL (ref 0.2–1)
BUN SERPL-MCNC: 10 MG/DL (ref 6–20)
BUN/CREAT SERPL: 9 (ref 12–20)
CALCIUM SERPL-MCNC: 9.7 MG/DL (ref 8.5–10.1)
CHLORIDE SERPL-SCNC: 104 MMOL/L (ref 97–108)
CO2 SERPL-SCNC: 25 MMOL/L (ref 21–32)
CREAT SERPL-MCNC: 1.17 MG/DL (ref 0.55–1.02)
DIFFERENTIAL METHOD BLD: ABNORMAL
EOSINOPHIL # BLD: 1.2 K/UL (ref 0–0.4)
EOSINOPHIL NFR BLD: 10 % (ref 0–7)
ERYTHROCYTE [DISTWIDTH] IN BLOOD BY AUTOMATED COUNT: 19.2 % (ref 11.5–14.5)
GLOBULIN SER CALC-MCNC: 3.2 G/DL (ref 2–4)
GLUCOSE SERPL-MCNC: 144 MG/DL (ref 65–100)
HCT VFR BLD AUTO: 43.6 % (ref 35–47)
HGB BLD-MCNC: 14.3 G/DL (ref 11.5–16)
IMM GRANULOCYTES # BLD AUTO: 0.1 K/UL (ref 0–0.04)
IMM GRANULOCYTES NFR BLD AUTO: 1 % (ref 0–0.5)
LYMPHOCYTES # BLD: 1.9 K/UL (ref 0.8–3.5)
LYMPHOCYTES NFR BLD: 16 % (ref 12–49)
MCH RBC QN AUTO: 27.2 PG (ref 26–34)
MCHC RBC AUTO-ENTMCNC: 32.8 G/DL (ref 30–36.5)
MCV RBC AUTO: 82.9 FL (ref 80–99)
MONOCYTES # BLD: 0.5 K/UL (ref 0–1)
MONOCYTES NFR BLD: 4 % (ref 5–13)
NEUTS SEG # BLD: 8.1 K/UL (ref 1.8–8)
NEUTS SEG NFR BLD: 68 % (ref 32–75)
NRBC # BLD: 0 K/UL (ref 0–0.01)
NRBC BLD-RTO: 0 PER 100 WBC
PLATELET # BLD AUTO: 207 K/UL (ref 150–400)
PMV BLD AUTO: 10.1 FL (ref 8.9–12.9)
POTASSIUM SERPL-SCNC: 4 MMOL/L (ref 3.5–5.1)
PROT SERPL-MCNC: 6.8 G/DL (ref 6.4–8.2)
RBC # BLD AUTO: 5.26 M/UL (ref 3.8–5.2)
RBC MORPH BLD: ABNORMAL
SODIUM SERPL-SCNC: 136 MMOL/L (ref 136–145)
TSH SERPL DL<=0.05 MIU/L-ACNC: 90.4 UIU/ML (ref 0.36–3.74)
WBC # BLD AUTO: 11.9 K/UL (ref 3.6–11)

## 2024-07-18 PROCEDURE — 3017F COLORECTAL CA SCREEN DOC REV: CPT | Performed by: INTERNAL MEDICINE

## 2024-07-18 PROCEDURE — 96417 CHEMO IV INFUS EACH ADDL SEQ: CPT

## 2024-07-18 PROCEDURE — 85025 COMPLETE CBC W/AUTO DIFF WBC: CPT

## 2024-07-18 PROCEDURE — 2580000003 HC RX 258: Performed by: INTERNAL MEDICINE

## 2024-07-18 PROCEDURE — 6360000002 HC RX W HCPCS: Performed by: INTERNAL MEDICINE

## 2024-07-18 PROCEDURE — G8428 CUR MEDS NOT DOCUMENT: HCPCS | Performed by: INTERNAL MEDICINE

## 2024-07-18 PROCEDURE — G8417 CALC BMI ABV UP PARAM F/U: HCPCS | Performed by: INTERNAL MEDICINE

## 2024-07-18 PROCEDURE — 3075F SYST BP GE 130 - 139MM HG: CPT | Performed by: INTERNAL MEDICINE

## 2024-07-18 PROCEDURE — 4004F PT TOBACCO SCREEN RCVD TLK: CPT | Performed by: INTERNAL MEDICINE

## 2024-07-18 PROCEDURE — 80053 COMPREHEN METABOLIC PANEL: CPT

## 2024-07-18 PROCEDURE — 99215 OFFICE O/P EST HI 40 MIN: CPT | Performed by: INTERNAL MEDICINE

## 2024-07-18 PROCEDURE — 36415 COLL VENOUS BLD VENIPUNCTURE: CPT

## 2024-07-18 PROCEDURE — 3079F DIAST BP 80-89 MM HG: CPT | Performed by: INTERNAL MEDICINE

## 2024-07-18 PROCEDURE — 96413 CHEMO IV INFUSION 1 HR: CPT

## 2024-07-18 PROCEDURE — 96375 TX/PRO/DX INJ NEW DRUG ADDON: CPT

## 2024-07-18 PROCEDURE — 6360000002 HC RX W HCPCS

## 2024-07-18 PROCEDURE — 84443 ASSAY THYROID STIM HORMONE: CPT

## 2024-07-18 RX ORDER — LEVOTHYROXINE SODIUM 0.05 MG/1
50 TABLET ORAL DAILY
Qty: 30 TABLET | Refills: 0 | Status: SHIPPED | OUTPATIENT
Start: 2024-07-18

## 2024-07-18 RX ORDER — DIPHENHYDRAMINE HYDROCHLORIDE 50 MG/ML
50 INJECTION INTRAMUSCULAR; INTRAVENOUS
OUTPATIENT
Start: 2024-07-18

## 2024-07-18 RX ORDER — SODIUM CHLORIDE 9 MG/ML
5-250 INJECTION, SOLUTION INTRAVENOUS PRN
OUTPATIENT
Start: 2024-07-18

## 2024-07-18 RX ORDER — SODIUM CHLORIDE 9 MG/ML
INJECTION, SOLUTION INTRAVENOUS CONTINUOUS
OUTPATIENT
Start: 2024-07-18

## 2024-07-18 RX ORDER — HEPARIN 100 UNIT/ML
500 SYRINGE INTRAVENOUS PRN
OUTPATIENT
Start: 2024-07-18

## 2024-07-18 RX ORDER — SODIUM CHLORIDE 9 MG/ML
5-250 INJECTION, SOLUTION INTRAVENOUS PRN
Status: DISCONTINUED | OUTPATIENT
Start: 2024-07-18 | End: 2024-07-19 | Stop reason: HOSPADM

## 2024-07-18 RX ORDER — ONDANSETRON 2 MG/ML
8 INJECTION INTRAMUSCULAR; INTRAVENOUS
OUTPATIENT
Start: 2024-07-18

## 2024-07-18 RX ORDER — SODIUM CHLORIDE 0.9 % (FLUSH) 0.9 %
5-40 SYRINGE (ML) INJECTION PRN
OUTPATIENT
Start: 2024-07-18

## 2024-07-18 RX ORDER — SODIUM CHLORIDE 0.9 % (FLUSH) 0.9 %
5-40 SYRINGE (ML) INJECTION PRN
Status: DISCONTINUED | OUTPATIENT
Start: 2024-07-18 | End: 2024-07-19 | Stop reason: HOSPADM

## 2024-07-18 RX ORDER — ALBUTEROL SULFATE 90 UG/1
4 AEROSOL, METERED RESPIRATORY (INHALATION) PRN
OUTPATIENT
Start: 2024-07-18

## 2024-07-18 RX ORDER — EPINEPHRINE 1 MG/ML
0.3 INJECTION, SOLUTION INTRAMUSCULAR; SUBCUTANEOUS PRN
OUTPATIENT
Start: 2024-07-18

## 2024-07-18 RX ORDER — ACETAMINOPHEN 325 MG/1
650 TABLET ORAL
OUTPATIENT
Start: 2024-07-18

## 2024-07-18 RX ADMIN — SODIUM CHLORIDE 25 ML/HR: 9 INJECTION, SOLUTION INTRAVENOUS at 13:05

## 2024-07-18 RX ADMIN — SODIUM CHLORIDE 270 MG: 9 INJECTION, SOLUTION INTRAVENOUS at 13:50

## 2024-07-18 RX ADMIN — IRON SUCROSE 200 MG: 20 INJECTION, SOLUTION INTRAVENOUS at 13:05

## 2024-07-18 RX ADMIN — SODIUM CHLORIDE 90 MG: 9 INJECTION, SOLUTION INTRAVENOUS at 14:23

## 2024-07-18 ASSESSMENT — PAIN SCALES - GENERAL: PAINLEVEL_OUTOF10: 0

## 2024-07-18 NOTE — TELEPHONE ENCOUNTER
2:36pm: pt verified x2, informed pt that she likely has fatigue due to hypothyroidism. This is from her treatment. Np Mickey sent in Utan and we will check lab values with every other treatment. She had no further questions and was appreciative of call.

## 2024-07-18 NOTE — PROGRESS NOTES
[facility] Progress Note    11:00 Ms. Edmonds Arrived ambulatory and in no distress for OPDIVO/YERVOY/VENOFER C4D1 regimen.  Assessment was completed, no acute issues at this time, no new complaints voiced.  Port accessed without difficulty, labs drawn and processed.    Ms. Edmonds's vitals were reviewed.  Patient Vitals for the past 12 hrs:   Temp Pulse Resp BP SpO2   07/18/24 1457 -- 93 -- (!) 149/79 --   07/18/24 1303 -- 87 -- -- --   07/18/24 1100 97.7 °F (36.5 °C) -- 18 136/83 95 %         Lab results were obtained and reviewed.  Recent Results (from the past 12 hour(s))   Comprehensive metabolic panel    Collection Time: 07/18/24 11:02 AM   Result Value Ref Range    Sodium 136 136 - 145 mmol/L    Potassium 4.0 3.5 - 5.1 mmol/L    Chloride 104 97 - 108 mmol/L    CO2 25 21 - 32 mmol/L    Anion Gap 7 5 - 15 mmol/L    Glucose 144 (H) 65 - 100 mg/dL    BUN 10 6 - 20 MG/DL    Creatinine 1.17 (H) 0.55 - 1.02 MG/DL    BUN/Creatinine Ratio 9 (L) 12 - 20      Est, Glom Filt Rate 54 (L) >60 ml/min/1.73m2    Calcium 9.7 8.5 - 10.1 MG/DL    Total Bilirubin 0.2 0.2 - 1.0 MG/DL    ALT 55 12 - 78 U/L    AST 47 (H) 15 - 37 U/L    Alk Phosphatase 202 (H) 45 - 117 U/L    Total Protein 6.8 6.4 - 8.2 g/dL    Albumin 3.6 3.5 - 5.0 g/dL    Globulin 3.2 2.0 - 4.0 g/dL    Albumin/Globulin Ratio 1.1 1.1 - 2.2     CBC With Auto Differential    Collection Time: 07/18/24 11:02 AM   Result Value Ref Range    WBC 11.9 (H) 3.6 - 11.0 K/uL    RBC 5.26 (H) 3.80 - 5.20 M/uL    Hemoglobin 14.3 11.5 - 16.0 g/dL    Hematocrit 43.6 35.0 - 47.0 %    MCV 82.9 80.0 - 99.0 FL    MCH 27.2 26.0 - 34.0 PG    MCHC 32.8 30.0 - 36.5 g/dL    RDW 19.2 (H) 11.5 - 14.5 %    Platelets 207 150 - 400 K/uL    MPV 10.1 8.9 - 12.9 FL    Nucleated RBCs 0.0 0  WBC    nRBC 0.00 0.00 - 0.01 K/uL    Neutrophils % 68 32 - 75 %    Lymphocytes % 16 12 - 49 %    Monocytes % 4 (L) 5 - 13 %    Eosinophils % 10 (H) 0 - 7 %    Basophils % 1 0 - 1 %    Immature Granulocytes % 1 (H)

## 2024-07-18 NOTE — PROGRESS NOTES
Symone Edmonds is a 58 y.o. female    Chief Complaint   Patient presents with    Follow-up     Follow up of leucocytosis and anemia  Stage IV RCC          1. Have you been to the ER, urgent care clinic since your last visit?  Hospitalized since your last visit?No    2. Have you seen or consulted any other health care providers outside of the [facility] since your last visit?  Include any pap smears or colon screening. No

## 2024-07-31 ENCOUNTER — APPOINTMENT (OUTPATIENT)
Facility: HOSPITAL | Age: 59
End: 2024-07-31
Payer: MEDICARE

## 2024-07-31 ENCOUNTER — HOSPITAL ENCOUNTER (INPATIENT)
Facility: HOSPITAL | Age: 59
LOS: 3 days | Discharge: HOME | End: 2024-08-03
Attending: STUDENT IN AN ORGANIZED HEALTH CARE EDUCATION/TRAINING PROGRAM | Admitting: FAMILY MEDICINE
Payer: MEDICARE

## 2024-07-31 DIAGNOSIS — R74.01 TRANSAMINITIS: ICD-10-CM

## 2024-07-31 DIAGNOSIS — R65.10 SIRS (SYSTEMIC INFLAMMATORY RESPONSE SYNDROME) (HCC): Primary | ICD-10-CM

## 2024-07-31 DIAGNOSIS — J90 PLEURAL EFFUSION: ICD-10-CM

## 2024-07-31 DIAGNOSIS — E87.1 HYPONATREMIA: ICD-10-CM

## 2024-07-31 DIAGNOSIS — D69.6 THROMBOCYTOPENIA (HCC): ICD-10-CM

## 2024-07-31 DIAGNOSIS — R50.9 FEVER, UNSPECIFIED FEVER CAUSE: ICD-10-CM

## 2024-07-31 LAB
ALBUMIN SERPL-MCNC: 3.3 G/DL (ref 3.5–5)
ALBUMIN/GLOB SERPL: 1 (ref 1.1–2.2)
ALP SERPL-CCNC: 573 U/L (ref 45–117)
ALT SERPL-CCNC: 784 U/L (ref 12–78)
ANION GAP SERPL CALC-SCNC: 10 MMOL/L (ref 5–15)
APPEARANCE UR: CLEAR
AST SERPL-CCNC: 950 U/L (ref 15–37)
BACTERIA URNS QL MICRO: NEGATIVE /HPF
BASOPHILS # BLD: 0.1 K/UL (ref 0–0.1)
BASOPHILS NFR BLD: 1 % (ref 0–1)
BILIRUB SERPL-MCNC: 0.5 MG/DL (ref 0.2–1)
BILIRUB UR QL: NEGATIVE
BUN SERPL-MCNC: 17 MG/DL (ref 6–20)
BUN/CREAT SERPL: 13 (ref 12–20)
CALCIUM SERPL-MCNC: 8.4 MG/DL (ref 8.5–10.1)
CHLORIDE SERPL-SCNC: 94 MMOL/L (ref 97–108)
CO2 SERPL-SCNC: 25 MMOL/L (ref 21–32)
COLOR UR: ABNORMAL
CREAT SERPL-MCNC: 1.36 MG/DL (ref 0.55–1.02)
DIFFERENTIAL METHOD BLD: ABNORMAL
EKG ATRIAL RATE: 105 BPM
EKG DIAGNOSIS: NORMAL
EKG P AXIS: 46 DEGREES
EKG P-R INTERVAL: 130 MS
EKG Q-T INTERVAL: 330 MS
EKG QRS DURATION: 92 MS
EKG QTC CALCULATION (BAZETT): 436 MS
EKG R AXIS: -8 DEGREES
EKG T AXIS: 110 DEGREES
EKG VENTRICULAR RATE: 105 BPM
EOSINOPHIL # BLD: 0 K/UL (ref 0–0.4)
EOSINOPHIL NFR BLD: 0 % (ref 0–7)
EPITH CASTS URNS QL MICRO: ABNORMAL /LPF
ERYTHROCYTE [DISTWIDTH] IN BLOOD BY AUTOMATED COUNT: 18.7 % (ref 11.5–14.5)
FLUAV RNA SPEC QL NAA+PROBE: NOT DETECTED
FLUBV RNA SPEC QL NAA+PROBE: NOT DETECTED
GLOBULIN SER CALC-MCNC: 3.4 G/DL (ref 2–4)
GLUCOSE SERPL-MCNC: 98 MG/DL (ref 65–100)
GLUCOSE UR STRIP.AUTO-MCNC: NEGATIVE MG/DL
HCT VFR BLD AUTO: 38.6 % (ref 35–47)
HGB BLD-MCNC: 13.1 G/DL (ref 11.5–16)
HGB UR QL STRIP: ABNORMAL
IMM GRANULOCYTES # BLD AUTO: 0 K/UL (ref 0–0.04)
IMM GRANULOCYTES NFR BLD AUTO: 0 % (ref 0–0.5)
KETONES UR QL STRIP.AUTO: NEGATIVE MG/DL
LACTATE SERPL-SCNC: 1.6 MMOL/L (ref 0.4–2)
LEUKOCYTE ESTERASE UR QL STRIP.AUTO: NEGATIVE
LYMPHOCYTES # BLD: 0.7 K/UL (ref 0.8–3.5)
LYMPHOCYTES NFR BLD: 11 % (ref 12–49)
MCH RBC QN AUTO: 27.2 PG (ref 26–34)
MCHC RBC AUTO-ENTMCNC: 33.9 G/DL (ref 30–36.5)
MCV RBC AUTO: 80.2 FL (ref 80–99)
MONOCYTES # BLD: 0.1 K/UL (ref 0–1)
MONOCYTES NFR BLD: 2 % (ref 5–13)
NEUTS SEG # BLD: 5.9 K/UL (ref 1.8–8)
NEUTS SEG NFR BLD: 86 % (ref 32–75)
NITRITE UR QL STRIP.AUTO: NEGATIVE
NRBC # BLD: 0 K/UL (ref 0–0.01)
NRBC BLD-RTO: 0 PER 100 WBC
PH UR STRIP: 6 (ref 5–8)
PLATELET # BLD AUTO: 108 K/UL (ref 150–400)
PMV BLD AUTO: 9.9 FL (ref 8.9–12.9)
POTASSIUM SERPL-SCNC: 3.5 MMOL/L (ref 3.5–5.1)
PROCALCITONIN SERPL-MCNC: 1.5 NG/ML
PROT SERPL-MCNC: 6.7 G/DL (ref 6.4–8.2)
PROT UR STRIP-MCNC: 30 MG/DL
RBC # BLD AUTO: 4.81 M/UL (ref 3.8–5.2)
RBC #/AREA URNS HPF: ABNORMAL /HPF (ref 0–5)
SARS-COV-2 RNA RESP QL NAA+PROBE: NOT DETECTED
SODIUM SERPL-SCNC: 129 MMOL/L (ref 136–145)
SP GR UR REFRACTOMETRY: 1.01 (ref 1–1.03)
TROPONIN I SERPL HS-MCNC: 20 NG/L (ref 0–51)
URINE CULTURE IF INDICATED: ABNORMAL
UROBILINOGEN UR QL STRIP.AUTO: 0.2 EU/DL (ref 0.2–1)
WBC # BLD AUTO: 6.9 K/UL (ref 3.6–11)
WBC URNS QL MICRO: ABNORMAL /HPF (ref 0–4)

## 2024-07-31 PROCEDURE — 2060000000 HC ICU INTERMEDIATE R&B

## 2024-07-31 PROCEDURE — 81001 URINALYSIS AUTO W/SCOPE: CPT

## 2024-07-31 PROCEDURE — 2580000003 HC RX 258: Performed by: STUDENT IN AN ORGANIZED HEALTH CARE EDUCATION/TRAINING PROGRAM

## 2024-07-31 PROCEDURE — 80074 ACUTE HEPATITIS PANEL: CPT

## 2024-07-31 PROCEDURE — 84484 ASSAY OF TROPONIN QUANT: CPT

## 2024-07-31 PROCEDURE — 84145 PROCALCITONIN (PCT): CPT

## 2024-07-31 PROCEDURE — 96365 THER/PROPH/DIAG IV INF INIT: CPT

## 2024-07-31 PROCEDURE — 87040 BLOOD CULTURE FOR BACTERIA: CPT

## 2024-07-31 PROCEDURE — 74176 CT ABD & PELVIS W/O CONTRAST: CPT

## 2024-07-31 PROCEDURE — 99285 EMERGENCY DEPT VISIT HI MDM: CPT

## 2024-07-31 PROCEDURE — 80053 COMPREHEN METABOLIC PANEL: CPT

## 2024-07-31 PROCEDURE — 71045 X-RAY EXAM CHEST 1 VIEW: CPT

## 2024-07-31 PROCEDURE — 96366 THER/PROPH/DIAG IV INF ADDON: CPT

## 2024-07-31 PROCEDURE — 6360000002 HC RX W HCPCS: Performed by: STUDENT IN AN ORGANIZED HEALTH CARE EDUCATION/TRAINING PROGRAM

## 2024-07-31 PROCEDURE — 93005 ELECTROCARDIOGRAM TRACING: CPT | Performed by: STUDENT IN AN ORGANIZED HEALTH CARE EDUCATION/TRAINING PROGRAM

## 2024-07-31 PROCEDURE — 36415 COLL VENOUS BLD VENIPUNCTURE: CPT

## 2024-07-31 PROCEDURE — 83605 ASSAY OF LACTIC ACID: CPT

## 2024-07-31 PROCEDURE — 96375 TX/PRO/DX INJ NEW DRUG ADDON: CPT

## 2024-07-31 PROCEDURE — 85025 COMPLETE CBC W/AUTO DIFF WBC: CPT

## 2024-07-31 PROCEDURE — 6370000000 HC RX 637 (ALT 250 FOR IP): Performed by: STUDENT IN AN ORGANIZED HEALTH CARE EDUCATION/TRAINING PROGRAM

## 2024-07-31 PROCEDURE — 87636 SARSCOV2 & INF A&B AMP PRB: CPT

## 2024-07-31 RX ORDER — SODIUM CHLORIDE, SODIUM LACTATE, POTASSIUM CHLORIDE, AND CALCIUM CHLORIDE .6; .31; .03; .02 G/100ML; G/100ML; G/100ML; G/100ML
30 INJECTION, SOLUTION INTRAVENOUS ONCE
Status: COMPLETED | OUTPATIENT
Start: 2024-07-31 | End: 2024-07-31

## 2024-07-31 RX ORDER — ACETAMINOPHEN 325 MG/1
650 TABLET ORAL
Status: COMPLETED | OUTPATIENT
Start: 2024-07-31 | End: 2024-07-31

## 2024-07-31 RX ORDER — ONDANSETRON 2 MG/ML
4 INJECTION INTRAMUSCULAR; INTRAVENOUS ONCE
Status: COMPLETED | OUTPATIENT
Start: 2024-07-31 | End: 2024-07-31

## 2024-07-31 RX ADMIN — ONDANSETRON 4 MG: 2 INJECTION INTRAMUSCULAR; INTRAVENOUS at 17:48

## 2024-07-31 RX ADMIN — ACETAMINOPHEN 650 MG: 325 TABLET ORAL at 17:47

## 2024-07-31 RX ADMIN — WATER 2000 MG: 1 INJECTION INTRAMUSCULAR; INTRAVENOUS; SUBCUTANEOUS at 17:48

## 2024-07-31 RX ADMIN — SODIUM CHLORIDE, POTASSIUM CHLORIDE, SODIUM LACTATE AND CALCIUM CHLORIDE 1572 ML: 600; 310; 30; 20 INJECTION, SOLUTION INTRAVENOUS at 17:42

## 2024-07-31 RX ADMIN — VANCOMYCIN HYDROCHLORIDE 1000 MG: 1 INJECTION, POWDER, LYOPHILIZED, FOR SOLUTION INTRAVENOUS at 18:11

## 2024-07-31 RX ADMIN — VANCOMYCIN HYDROCHLORIDE 1250 MG: 1.25 INJECTION, POWDER, LYOPHILIZED, FOR SOLUTION INTRAVENOUS at 18:10

## 2024-07-31 ASSESSMENT — PAIN DESCRIPTION - DESCRIPTORS: DESCRIPTORS: DULL

## 2024-07-31 ASSESSMENT — PAIN SCALES - GENERAL
PAINLEVEL_OUTOF10: 4
PAINLEVEL_OUTOF10: 6

## 2024-07-31 ASSESSMENT — ENCOUNTER SYMPTOMS
EYE DISCHARGE: 0
EYE REDNESS: 0
DIARRHEA: 0
RHINORRHEA: 0
NAUSEA: 1
COUGH: 0
ABDOMINAL PAIN: 0
SHORTNESS OF BREATH: 0
VOMITING: 0

## 2024-07-31 ASSESSMENT — PAIN - FUNCTIONAL ASSESSMENT: PAIN_FUNCTIONAL_ASSESSMENT: 0-10

## 2024-07-31 NOTE — ED TRIAGE NOTES
Patient states that she has stage 4 renal cancer. States she has been running a fever for the past 7 days. She also has been feeling nauseas and has been unable to keep down solids or liquids.   States she has one kidney, and is concerned about it due to dehydration.

## 2024-07-31 NOTE — ED PROVIDER NOTES
[facility] EMERGENCY CTR  EMERGENCY DEPARTMENT ENCOUNTER      Pt Name: Symone Edmonds  MRN: [id number]  Birthdate 1965  Date of evaluation: 7/31/2024  Provider: Stephanie Arthur DO    CHIEF COMPLAINT       Chief Complaint   Patient presents with    Fever    Dehydration         HISTORY OF PRESENT ILLNESS    HPI    Symone Edmonds is a 59 y.o. female with a history of renal cell carcinoma status post right nephrectomy with metastasis to the lung currently on chemotherapy who presents to the emergency department for evaluation of fever.  Patient notes 1 week of persistent fever, states she will take Tylenol but fever will return.  She endorses associated nausea but no vomiting.  She did had some diarrhea earlier in the week twice which resolved after use of Imodium.  No bloody stools.  No abdominal pain.  Does endorse some left flank pain.  States she has some urinary incontinence since the fever started.  No cough or congestion.  No known sick contacts.    Nursing Notes were reviewed.    REVIEW OF SYSTEMS       Review of Systems   Constitutional:  Positive for fatigue and fever. Negative for chills.   HENT:  Negative for congestion and rhinorrhea.    Eyes:  Negative for discharge and redness.   Respiratory:  Negative for cough and shortness of breath.    Cardiovascular:  Negative for chest pain.   Gastrointestinal:  Positive for nausea. Negative for abdominal pain, diarrhea and vomiting.   Genitourinary:  Positive for flank pain and frequency.   Neurological:  Negative for speech difficulty.   Psychiatric/Behavioral:  Negative for agitation.            PAST MEDICAL HISTORY     Past Medical History:   Diagnosis Date    Arthritis     Cancer (HCC)     IN KIDNEY REMOVED RIGHT KIDNEY     Cancer (HCC)     LUNG CANCER    Chronic obstructive pulmonary disease (HCC)     BEGINNING STAGES     Chronic pain 1993    Elevated WBC count 2002 to present    GERD (gastroesophageal reflux disease)     Hyperlipidemia     Hypertension      Admission  7:36 PM    ED Room Number: SER12/12  Patient Name and age:  Symone Edmonds 59 y.o.  female  Working Diagnosis:   1. SIRS (systemic inflammatory response syndrome) (HCC)    2. Fever, unspecified fever cause    3. Transaminitis    4. Hyponatremia    5. Thrombocytopenia (HCC)        COVID-19 Suspicion: No  Sepsis present:  Yes  Reassessment needed: No  Code Status:  Full Code  Readmission: No  Isolation Requirements: no  Recommended Level of Care: telemetry  Department: [place] [facility] - [phone]  Consulting Provider:     Other:  59 y.o. female with a history of renal cell carcinoma status post right nephrectomy with metastasis to the lung currently on chemotherapy who presents to the emergency department for evaluation of fever.  On arrival, temperature 39.2 °C and heart rate 119 bpm.  Sepsis workup initiated on arrival with empiric antibiotics and IV fluids.  No leukocytosis and lactate normal.  Thrombocytopenia, platelets 108K.  Hyponatremia, sodium 129.  LFTs elevated, ALT is 784, , bilirubin normal.  CT abdomen pelvis without contrast negative for acute intra-abdominal process.  Chest x-ray negative for pneumonia.  UA pending, patient has not been able to urinate yet, will trial after finishing her IV fluids.    ED Sepsis Documentation (2024)  - Broad Spectrum Antibiotics ordered: Cefepime and Vancomycin  - Repeat lactic acid: not indicated due to initial lactate < 2  - Sepsis re-assessment performed 07/31/24 at time 7:30 PM and clinical condition improved.  Recent Labs     07/31/24  1721 07/31/24  1729   LACTSEPSIS 1.6  --    CREATININE 1.36*  --    BILITOT 0.5  --    *  --    COVID19  --  Not detected   Organ dysfunction (Lactic acid >2, Cr>2, bili >2, INR>1.5, Plt<100, BiPap, intubated) exclusions if applicable: None    - Hypotension or Lactic Acidosis present (SBP<90, MAP<65, Lactate?4): NO  If YES:  - IVF: 30 cc/kg ideal Body Weight, pt obese with BMI >30 Total volume: 1572 cc

## 2024-08-01 ENCOUNTER — APPOINTMENT (OUTPATIENT)
Facility: HOSPITAL | Age: 59
End: 2024-08-01
Payer: MEDICARE

## 2024-08-01 LAB
-: NORMAL
ALBUMIN SERPL-MCNC: 3.3 G/DL (ref 3.5–5)
ALBUMIN/GLOB SERPL: 1 (ref 1.1–2.2)
ALP SERPL-CCNC: 516 U/L (ref 45–117)
ALT SERPL-CCNC: 678 U/L (ref 12–78)
ANION GAP SERPL CALC-SCNC: 5 MMOL/L (ref 5–15)
AST SERPL-CCNC: 773 U/L (ref 15–37)
BASOPHILS # BLD: 0.1 K/UL (ref 0–0.1)
BASOPHILS NFR BLD: 1 % (ref 0–1)
BILIRUB SERPL-MCNC: 0.4 MG/DL (ref 0.2–1)
BUN SERPL-MCNC: 12 MG/DL (ref 6–20)
BUN/CREAT SERPL: 11 (ref 12–20)
CALCIUM SERPL-MCNC: 9.2 MG/DL (ref 8.5–10.1)
CHLORIDE SERPL-SCNC: 104 MMOL/L (ref 97–108)
CO2 SERPL-SCNC: 23 MMOL/L (ref 21–32)
CREAT SERPL-MCNC: 1.11 MG/DL (ref 0.55–1.02)
DIFFERENTIAL METHOD BLD: ABNORMAL
EOSINOPHIL # BLD: 0.1 K/UL (ref 0–0.4)
EOSINOPHIL NFR BLD: 1 % (ref 0–7)
ERYTHROCYTE [DISTWIDTH] IN BLOOD BY AUTOMATED COUNT: 19.4 % (ref 11.5–14.5)
EST. AVERAGE GLUCOSE BLD GHB EST-MCNC: 137 MG/DL
GLOBULIN SER CALC-MCNC: 3.4 G/DL (ref 2–4)
GLUCOSE BLD STRIP.AUTO-MCNC: 101 MG/DL (ref 65–117)
GLUCOSE BLD STRIP.AUTO-MCNC: 103 MG/DL (ref 65–117)
GLUCOSE BLD STRIP.AUTO-MCNC: 73 MG/DL (ref 65–117)
GLUCOSE BLD STRIP.AUTO-MCNC: 93 MG/DL (ref 65–117)
GLUCOSE SERPL-MCNC: 95 MG/DL (ref 65–100)
HAV IGM SER QL: NONREACTIVE
HBA1C MFR BLD: 6.4 % (ref 4–5.6)
HBV CORE IGM SER QL: NONREACTIVE
HBV SURFACE AG SER QL: <0.1 INDEX
HBV SURFACE AG SER QL: NEGATIVE
HCT VFR BLD AUTO: 39.1 % (ref 35–47)
HCV AB SER IA-ACNC: 0.05 INDEX
HCV AB SERPL QL IA: NONREACTIVE
HGB BLD-MCNC: 13.4 G/DL (ref 11.5–16)
IMM GRANULOCYTES # BLD AUTO: 0.1 K/UL (ref 0–0.04)
IMM GRANULOCYTES NFR BLD AUTO: 1 % (ref 0–0.5)
LACTATE SERPL-SCNC: 1.8 MMOL/L (ref 0.4–2)
LYMPHOCYTES # BLD: 1 K/UL (ref 0.8–3.5)
LYMPHOCYTES NFR BLD: 15 % (ref 12–49)
MCH RBC QN AUTO: 27.8 PG (ref 26–34)
MCHC RBC AUTO-ENTMCNC: 34.3 G/DL (ref 30–36.5)
MCV RBC AUTO: 81.1 FL (ref 80–99)
MONOCYTES # BLD: 0.1 K/UL (ref 0–1)
MONOCYTES NFR BLD: 2 % (ref 5–13)
NEUTS SEG # BLD: 5.1 K/UL (ref 1.8–8)
NEUTS SEG NFR BLD: 80 % (ref 32–75)
NRBC # BLD: 0 K/UL (ref 0–0.01)
NRBC BLD-RTO: 0 PER 100 WBC
PLATELET # BLD AUTO: 94 K/UL (ref 150–400)
PMV BLD AUTO: 10.4 FL (ref 8.9–12.9)
POTASSIUM SERPL-SCNC: 3.6 MMOL/L (ref 3.5–5.1)
PROT SERPL-MCNC: 6.7 G/DL (ref 6.4–8.2)
RBC # BLD AUTO: 4.82 M/UL (ref 3.8–5.2)
RBC MORPH BLD: ABNORMAL
SERVICE CMNT-IMP: NORMAL
SODIUM SERPL-SCNC: 132 MMOL/L (ref 136–145)
WBC # BLD AUTO: 6.5 K/UL (ref 3.6–11)

## 2024-08-01 PROCEDURE — 83605 ASSAY OF LACTIC ACID: CPT

## 2024-08-01 PROCEDURE — A9579 GAD-BASE MR CONTRAST NOS,1ML: HCPCS | Performed by: INTERNAL MEDICINE

## 2024-08-01 PROCEDURE — 99223 1ST HOSP IP/OBS HIGH 75: CPT | Performed by: INTERNAL MEDICINE

## 2024-08-01 PROCEDURE — 80053 COMPREHEN METABOLIC PANEL: CPT

## 2024-08-01 PROCEDURE — 6360000004 HC RX CONTRAST MEDICATION: Performed by: INTERNAL MEDICINE

## 2024-08-01 PROCEDURE — 2060000000 HC ICU INTERMEDIATE R&B

## 2024-08-01 PROCEDURE — 36415 COLL VENOUS BLD VENIPUNCTURE: CPT

## 2024-08-01 PROCEDURE — 6370000000 HC RX 637 (ALT 250 FOR IP): Performed by: INTERNAL MEDICINE

## 2024-08-01 PROCEDURE — 83036 HEMOGLOBIN GLYCOSYLATED A1C: CPT

## 2024-08-01 PROCEDURE — 6370000000 HC RX 637 (ALT 250 FOR IP): Performed by: FAMILY MEDICINE

## 2024-08-01 PROCEDURE — 2580000003 HC RX 258: Performed by: FAMILY MEDICINE

## 2024-08-01 PROCEDURE — 94640 AIRWAY INHALATION TREATMENT: CPT

## 2024-08-01 PROCEDURE — 6360000002 HC RX W HCPCS: Performed by: FAMILY MEDICINE

## 2024-08-01 PROCEDURE — 85025 COMPLETE CBC W/AUTO DIFF WBC: CPT

## 2024-08-01 PROCEDURE — 82962 GLUCOSE BLOOD TEST: CPT

## 2024-08-01 PROCEDURE — 72157 MRI CHEST SPINE W/O & W/DYE: CPT

## 2024-08-01 PROCEDURE — 74183 MRI ABD W/O CNTR FLWD CNTR: CPT

## 2024-08-01 PROCEDURE — 99222 1ST HOSP IP/OBS MODERATE 55: CPT | Performed by: INTERNAL MEDICINE

## 2024-08-01 RX ORDER — TRAZODONE HYDROCHLORIDE 50 MG/1
50 TABLET ORAL NIGHTLY PRN
Status: DISCONTINUED | OUTPATIENT
Start: 2024-08-01 | End: 2024-08-03 | Stop reason: HOSPADM

## 2024-08-01 RX ORDER — ROPINIROLE 1 MG/1
2 TABLET, FILM COATED ORAL 3 TIMES DAILY
Status: DISCONTINUED | OUTPATIENT
Start: 2024-08-01 | End: 2024-08-01

## 2024-08-01 RX ORDER — SODIUM CHLORIDE 0.9 % (FLUSH) 0.9 %
5-40 SYRINGE (ML) INJECTION PRN
Status: DISCONTINUED | OUTPATIENT
Start: 2024-08-01 | End: 2024-08-03 | Stop reason: HOSPADM

## 2024-08-01 RX ORDER — SODIUM CHLORIDE 9 MG/ML
INJECTION, SOLUTION INTRAVENOUS CONTINUOUS
Status: DISCONTINUED | OUTPATIENT
Start: 2024-08-01 | End: 2024-08-02

## 2024-08-01 RX ORDER — DILTIAZEM HYDROCHLORIDE 180 MG/1
360 CAPSULE, COATED, EXTENDED RELEASE ORAL DAILY
Status: DISCONTINUED | OUTPATIENT
Start: 2024-08-02 | End: 2024-08-03 | Stop reason: HOSPADM

## 2024-08-01 RX ORDER — SODIUM CHLORIDE 9 MG/ML
INJECTION, SOLUTION INTRAVENOUS PRN
Status: DISCONTINUED | OUTPATIENT
Start: 2024-08-01 | End: 2024-08-03 | Stop reason: HOSPADM

## 2024-08-01 RX ORDER — LEVOTHYROXINE SODIUM 0.05 MG/1
50 TABLET ORAL DAILY
Status: DISCONTINUED | OUTPATIENT
Start: 2024-08-01 | End: 2024-08-03 | Stop reason: HOSPADM

## 2024-08-01 RX ORDER — NICOTINE 21 MG/24HR
1 PATCH, TRANSDERMAL 24 HOURS TRANSDERMAL DAILY
Status: DISCONTINUED | OUTPATIENT
Start: 2024-08-01 | End: 2024-08-03 | Stop reason: HOSPADM

## 2024-08-01 RX ORDER — SODIUM CHLORIDE 0.9 % (FLUSH) 0.9 %
5-40 SYRINGE (ML) INJECTION EVERY 12 HOURS SCHEDULED
Status: DISCONTINUED | OUTPATIENT
Start: 2024-08-01 | End: 2024-08-03 | Stop reason: HOSPADM

## 2024-08-01 RX ORDER — PANTOPRAZOLE SODIUM 40 MG/1
40 TABLET, DELAYED RELEASE ORAL
Status: DISCONTINUED | OUTPATIENT
Start: 2024-08-01 | End: 2024-08-03 | Stop reason: HOSPADM

## 2024-08-01 RX ORDER — DILTIAZEM HYDROCHLORIDE 180 MG/1
180 CAPSULE, COATED, EXTENDED RELEASE ORAL DAILY
Status: DISCONTINUED | OUTPATIENT
Start: 2024-08-01 | End: 2024-08-01

## 2024-08-01 RX ORDER — IBUPROFEN 400 MG/1
400 TABLET ORAL EVERY 6 HOURS PRN
Status: DISCONTINUED | OUTPATIENT
Start: 2024-08-01 | End: 2024-08-01

## 2024-08-01 RX ORDER — CETIRIZINE HYDROCHLORIDE 10 MG/1
5 TABLET ORAL DAILY
Status: DISCONTINUED | OUTPATIENT
Start: 2024-08-01 | End: 2024-08-03 | Stop reason: HOSPADM

## 2024-08-01 RX ORDER — DEXTROSE MONOHYDRATE 100 MG/ML
INJECTION, SOLUTION INTRAVENOUS CONTINUOUS PRN
Status: DISCONTINUED | OUTPATIENT
Start: 2024-08-01 | End: 2024-08-03 | Stop reason: HOSPADM

## 2024-08-01 RX ORDER — IPRATROPIUM BROMIDE AND ALBUTEROL SULFATE 2.5; .5 MG/3ML; MG/3ML
1 SOLUTION RESPIRATORY (INHALATION) EVERY 6 HOURS PRN
Status: DISCONTINUED | OUTPATIENT
Start: 2024-08-01 | End: 2024-08-03 | Stop reason: HOSPADM

## 2024-08-01 RX ORDER — ZINC SULFATE 50(220)MG
50 CAPSULE ORAL DAILY
Status: DISCONTINUED | OUTPATIENT
Start: 2024-08-01 | End: 2024-08-03 | Stop reason: HOSPADM

## 2024-08-01 RX ORDER — IBUPROFEN 400 MG/1
400 TABLET ORAL EVERY 8 HOURS PRN
Status: DISCONTINUED | OUTPATIENT
Start: 2024-08-01 | End: 2024-08-03 | Stop reason: HOSPADM

## 2024-08-01 RX ORDER — INSULIN LISPRO 100 [IU]/ML
0-4 INJECTION, SOLUTION INTRAVENOUS; SUBCUTANEOUS
Status: DISCONTINUED | OUTPATIENT
Start: 2024-08-01 | End: 2024-08-03 | Stop reason: HOSPADM

## 2024-08-01 RX ORDER — 0.9 % SODIUM CHLORIDE 0.9 %
100 INTRAVENOUS SOLUTION INTRAVENOUS ONCE
Status: DISCONTINUED | OUTPATIENT
Start: 2024-08-01 | End: 2024-08-03 | Stop reason: HOSPADM

## 2024-08-01 RX ORDER — VENLAFAXINE HYDROCHLORIDE 150 MG/1
150 CAPSULE, EXTENDED RELEASE ORAL DAILY
Status: DISCONTINUED | OUTPATIENT
Start: 2024-08-01 | End: 2024-08-03 | Stop reason: HOSPADM

## 2024-08-01 RX ORDER — INSULIN LISPRO 100 [IU]/ML
0-4 INJECTION, SOLUTION INTRAVENOUS; SUBCUTANEOUS NIGHTLY
Status: DISCONTINUED | OUTPATIENT
Start: 2024-08-01 | End: 2024-08-03 | Stop reason: HOSPADM

## 2024-08-01 RX ORDER — ROPINIROLE 1 MG/1
2 TABLET, FILM COATED ORAL 3 TIMES DAILY
Status: DISCONTINUED | OUTPATIENT
Start: 2024-08-01 | End: 2024-08-03 | Stop reason: HOSPADM

## 2024-08-01 RX ORDER — ACETAMINOPHEN 325 MG/1
650 TABLET ORAL EVERY 6 HOURS PRN
Status: DISCONTINUED | OUTPATIENT
Start: 2024-08-01 | End: 2024-08-01

## 2024-08-01 RX ORDER — OXYCODONE HYDROCHLORIDE 5 MG/1
5 TABLET ORAL EVERY 6 HOURS PRN
Status: DISCONTINUED | OUTPATIENT
Start: 2024-08-01 | End: 2024-08-03 | Stop reason: HOSPADM

## 2024-08-01 RX ORDER — NICOTINE 21 MG/24HR
1 PATCH, TRANSDERMAL 24 HOURS TRANSDERMAL DAILY
Status: DISCONTINUED | OUTPATIENT
Start: 2024-08-01 | End: 2024-08-01

## 2024-08-01 RX ORDER — ACETAMINOPHEN 650 MG/1
650 SUPPOSITORY RECTAL EVERY 6 HOURS PRN
Status: DISCONTINUED | OUTPATIENT
Start: 2024-08-01 | End: 2024-08-01

## 2024-08-01 RX ORDER — VALSARTAN 160 MG/1
320 TABLET ORAL DAILY
Status: DISCONTINUED | OUTPATIENT
Start: 2024-08-01 | End: 2024-08-03 | Stop reason: HOSPADM

## 2024-08-01 RX ORDER — VITAMIN B COMPLEX
1000 TABLET ORAL DAILY
Status: DISCONTINUED | OUTPATIENT
Start: 2024-08-01 | End: 2024-08-03 | Stop reason: HOSPADM

## 2024-08-01 RX ADMIN — ROPINIROLE HYDROCHLORIDE 2 MG: 1 TABLET, FILM COATED ORAL at 22:10

## 2024-08-01 RX ADMIN — PANTOPRAZOLE SODIUM 40 MG: 40 TABLET, DELAYED RELEASE ORAL at 06:40

## 2024-08-01 RX ADMIN — ACETAMINOPHEN 650 MG: 325 TABLET ORAL at 01:38

## 2024-08-01 RX ADMIN — OXYCODONE HYDROCHLORIDE 5 MG: 5 TABLET ORAL at 19:20

## 2024-08-01 RX ADMIN — SODIUM CHLORIDE: 9 INJECTION, SOLUTION INTRAVENOUS at 14:13

## 2024-08-01 RX ADMIN — CEFEPIME 2000 MG: 2 INJECTION, POWDER, FOR SOLUTION INTRAVENOUS at 19:19

## 2024-08-01 RX ADMIN — SODIUM CHLORIDE, PRESERVATIVE FREE 10 ML: 5 INJECTION INTRAVENOUS at 22:10

## 2024-08-01 RX ADMIN — ROPINIROLE HYDROCHLORIDE 2 MG: 1 TABLET, FILM COATED ORAL at 03:47

## 2024-08-01 RX ADMIN — CETIRIZINE HYDROCHLORIDE 5 MG: 10 TABLET, FILM COATED ORAL at 10:27

## 2024-08-01 RX ADMIN — SODIUM CHLORIDE, PRESERVATIVE FREE 10 ML: 5 INJECTION INTRAVENOUS at 10:45

## 2024-08-01 RX ADMIN — TRAZODONE HYDROCHLORIDE 50 MG: 50 TABLET ORAL at 03:47

## 2024-08-01 RX ADMIN — ROPINIROLE HYDROCHLORIDE 2 MG: 1 TABLET, FILM COATED ORAL at 10:28

## 2024-08-01 RX ADMIN — OXYCODONE HYDROCHLORIDE 5 MG: 5 TABLET ORAL at 14:09

## 2024-08-01 RX ADMIN — VENLAFAXINE HYDROCHLORIDE 150 MG: 150 CAPSULE, EXTENDED RELEASE ORAL at 10:28

## 2024-08-01 RX ADMIN — ROPINIROLE HYDROCHLORIDE 2 MG: 1 TABLET, FILM COATED ORAL at 14:08

## 2024-08-01 RX ADMIN — SODIUM CHLORIDE: 9 INJECTION, SOLUTION INTRAVENOUS at 01:40

## 2024-08-01 RX ADMIN — GADOTERIDOL 19 ML: 279.3 INJECTION, SOLUTION INTRAVENOUS at 17:40

## 2024-08-01 RX ADMIN — Medication 1000 UNITS: at 10:27

## 2024-08-01 RX ADMIN — DILTIAZEM HYDROCHLORIDE 180 MG: 180 CAPSULE, EXTENDED RELEASE ORAL at 10:28

## 2024-08-01 RX ADMIN — VALSARTAN 320 MG: 160 TABLET, FILM COATED ORAL at 10:28

## 2024-08-01 RX ADMIN — ZINC SULFATE 220 MG (50 MG) CAPSULE 50 MG: CAPSULE at 10:28

## 2024-08-01 RX ADMIN — SODIUM CHLORIDE 100 ML: 9 INJECTION, SOLUTION INTRAVENOUS at 17:40

## 2024-08-01 RX ADMIN — CEFEPIME 2000 MG: 2 INJECTION, POWDER, FOR SOLUTION INTRAVENOUS at 03:53

## 2024-08-01 RX ADMIN — LEVOTHYROXINE SODIUM 50 MCG: 0.05 TABLET ORAL at 10:27

## 2024-08-01 RX ADMIN — IBUPROFEN 400 MG: 400 TABLET, FILM COATED ORAL at 12:14

## 2024-08-01 ASSESSMENT — PAIN DESCRIPTION - ORIENTATION
ORIENTATION: MID;LOWER
ORIENTATION: LOWER
ORIENTATION: LOWER

## 2024-08-01 ASSESSMENT — PAIN - FUNCTIONAL ASSESSMENT
PAIN_FUNCTIONAL_ASSESSMENT: ACTIVITIES ARE NOT PREVENTED

## 2024-08-01 ASSESSMENT — PAIN DESCRIPTION - FREQUENCY: FREQUENCY: CONTINUOUS

## 2024-08-01 ASSESSMENT — PAIN DESCRIPTION - PAIN TYPE: TYPE: ACUTE PAIN

## 2024-08-01 ASSESSMENT — PAIN DESCRIPTION - DESCRIPTORS
DESCRIPTORS: ACHING;THROBBING
DESCRIPTORS: ACHING;THROBBING
DESCRIPTORS: ACHING;SHARP

## 2024-08-01 ASSESSMENT — PAIN DESCRIPTION - LOCATION
LOCATION: ABDOMEN;BACK

## 2024-08-01 ASSESSMENT — PAIN SCALES - GENERAL
PAINLEVEL_OUTOF10: 10

## 2024-08-01 ASSESSMENT — PAIN DESCRIPTION - ONSET: ONSET: ON-GOING

## 2024-08-01 NOTE — PROGRESS NOTES
TRANSFER - IN REPORT:    Verbal report received from Brandi LINDER on Symone Edmonds  being received from [place] [facility] for routine progression of patient care      Report consisted of patient's Situation, Background, Assessment and   Recommendations(SBAR).     Information from the following report(s) ED Encounter Summary, ED SBAR, MAR, Quality Measures, and Neuro Assessment was reviewed with the receiving nurse.    Opportunity for questions and clarification was provided.      Assessment completed upon patient's arrival to unit and care assumed.    I note only benign skin findings. No unusual rashes or suspicious skin lesions noted. Nails appear normal.

## 2024-08-01 NOTE — PROGRESS NOTES
Admission Medication Reconciliation:    Information obtained from:  patient  RxQuery data available¹:  Yes    Comments/Recommendations: Updated PTA meds/reviewed patient's allergies.    1)  Informants were patient     2)  Medication changes (since last review):  Added  - none    Adjusted  - none    Removed  - Vitamin D     3)  Patient will start using hydrocortisone cream this week     4) Patient may bring in home Sprivia inhaler     5) Patient taking a total of 360 mg of Diltiazem      ¹RxQuery pharmacy benefit data reflects medications filled and processed through the patient's insurance, however   this data does NOT capture whether the medication was picked up or is currently being taken by the patient.    Allergies:  Nsaids, Diclofenac sodium, Celecoxib, Hydroxychloroquine, Meloxicam, Oxybutynin, Oxybutynin chloride, and Penicillins    Significant PMH/Disease States:   Past Medical History:   Diagnosis Date    Arthritis     Cancer (HCC)     IN KIDNEY REMOVED RIGHT KIDNEY     Cancer (HCC)     LUNG CANCER    Chronic obstructive pulmonary disease (HCC)     BEGINNING STAGES     Chronic pain 1993    Elevated WBC count 2002 to present    GERD (gastroesophageal reflux disease)     Hyperlipidemia     Hypertension     Kidney stones 2020    Leg swelling 07/2020    Doppler negative    Liver disease 2021    Nausea & vomiting     Obesity     Osteoarthritis     Panic attacks     PONV (postoperative nausea and vomiting)     Restless legs syndrome     Skin cancer     BASAL CELL ON CHEST    Sleep apnea     PT DOES NOT SLEEP WTH CPAP MACHINE; SLEEPS ELEVATED.    Spondylolisthesis of lumbar region     Stage 4 chronic kidney disease (HCC) 2019    Stroke (HCC) 2020    TIA     Tachycardia     Type 2 diabetes mellitus without complication (HCC)      Chief Complaint for this Admission:    Chief Complaint   Patient presents with    Fever    Dehydration     Prior to Admission Medications:   Prior to Admission Medications   Prescriptions  Last Dose Informant   Blood Glucose Monitoring Suppl (TRUE METRIX METER) w/Device KIT  Self   Sig: Check sugar once daily.  E 11.9   LORazepam (ATIVAN) 0.5 MG tablet 7/31/2024 Self   Sig: Take 1 tablet by mouth every 6 hours as needed for Anxiety.   SPIRIVA HANDIHALER 18 MCG inhalation capsule 7/31/2024 Self   Sig: INHALE THE CONTENTS OF 1 CAPSULE INTO THE LUNGS ONE TIME DAILY   albuterol sulfate HFA (PROVENTIL;VENTOLIN;PROAIR) 108 (90 Base) MCG/ACT inhaler Past Month Self   Sig: Inhale 2 puffs into the lungs every 4 hours as needed   atorvastatin (LIPITOR) 40 MG tablet 7/31/2024 Self   Sig: TAKE 1 TABLET EVERY DAY   blood glucose test strips (TRUE METRIX BLOOD GLUCOSE TEST) strip  Self   Sig: Test blood sugar once daily.  E 11.9   dilTIAZem (CARDIZEM CD) 180 MG extended release capsule Taking Differently Self   Sig: TAKE 2 CAPSULES EVERY DAY FOR HIGH BLOOD PRESSURE   Patient taking differently: Take 2 capsules by mouth daily   furosemide (LASIX) 20 MG tablet Past Week Self   Sig: TAKE 1 TABLET EVERY DAY AS NEEDED   hydrocortisone 2.5 % cream  Self   Sig: Apply topically 2 times daily.   ipratropium-albuterol (DUONEB) 0.5-2.5 (3) MG/3ML SOLN nebulizer solution 7/31/2024 Self   Sig: Inhale 3 mLs into the lungs every 6 hours as needed   levocetirizine (XYZAL) 5 MG tablet 7/31/2024 Self   Sig: TAKE 1 TABLET EVERY DAY   levothyroxine (SYNTHROID) 50 MCG tablet 7/31/2024 Self   Sig: Take 1 tablet by mouth daily   metFORMIN (GLUCOPHAGE) 500 MG tablet 7/31/2024 Self   Sig: Take 1 tablet by mouth 2 times daily (with meals)   omeprazole (PRILOSEC) 40 MG delayed release capsule 7/31/2024 Self   Sig: TAKE 1 CAPSULE BEFORE BREAKFAST AND DINNER.   ondansetron (ZOFRAN-ODT) 8 MG TBDP disintegrating tablet 7/31/2024 Self   Sig: Place 1 tablet under the tongue every 8 hours as needed for Nausea or Vomiting   prochlorperazine (COMPAZINE) 10 MG tablet 7/31/2024 Self   Sig: Take 1 tablet by mouth every 6 hours as needed (nausea and

## 2024-08-01 NOTE — CONSULTS
[facility] at [facility]   [address], [address] [place] [address]   W: [phone]  F: [phone]          Reason for consult      Symone Edmonds is a 59 y.o. female who is for seen at the request of Dr. Nina for evaluation of stage IV renal cell carcinoma, abnormal LFTs and fever       History of Present Illness:     Patient is a  59 y.o. female with known to me for stage IV RCC.  She is on ipilimumab and nivolumab.  She received cycle 4 of the combination on 7/18/2024.  Scans in July 2024 were considered to represent pseudo progression.  She presented to the emergency room on 7/31/2024 with complaints of fever up to 102.6 Fahrenheit, tachycardia, diarrhea that resolved with Imodium, nausea and emesis.  She was saturating 89% and was placed on 2 L of oxygen.  CT abdomen pelvis obtained without contrast showed a small right pleural effusion, 8 mm left lower lobe lung nodule but nothing significant otherwise.  Her sodium was 129, creatinine 1.3, alkaline phosphatase was elevated at 573, , , platelets were low at 108, she was started on fluids, cefepime and vancomycin and we are consulted for further evaluation.    She states that she has had fever on and off for the last week.  She has no new cough, diarrhea was only mild, no dysuria, no rash, no change in breathing.  She has worsening and 7 out of 10 mid back pain which is new.  She has not had any falls.  She is a little tearful.    Physical Exam:   Visit Vitals  /78   Pulse (!) 112   Temp 98.3 °F (36.8 °C) (Oral)   Resp 16   Ht 1.6 m (5' 3\")   Wt 91.7 kg (202 lb 1.6 oz)   SpO2 92%   BMI 35.80 kg/m²            General: Appears well-developed and well-nourished and is in pain   Mental status: Alert and awake, Oriented to person/place/time, Able to follow commands   Resp: normal respiratory effort    CV: no LE swelling   Psychiatric: Normal affect, normal judgment/insight.  Skin: no visible papules

## 2024-08-01 NOTE — PROGRESS NOTES
Day #1 of Vancomycin  Indication:  concern for sepsis  Current regimen:  750 mg Q18H  Abx regimen:  vanc + cefepime  ID Following ?: NO  Concomitant nephrotoxic drugs (requires more frequent monitoring): NSAIDs  Frequency of BMP?: daily through     Recent Labs     24  1721 24  0145   WBC 6.9 6.5   CREATININE 1.36* 1.11*   BUN 17 12     Est CrCl: 55-60  ml/min; UO: not documented   Temp (24hrs), Av.4 °F (37.4 °C), Min:98 °F (36.7 °C), Max:102.8 °F (39.3 °C)    Cultures:    Blood cultures pending; NGTD   MRSA swab in process    MRSA Swab ordered (if applicable)? YES    Goal target range AUC/GRADY 400-600    Recent level history:  Date/Time Dose & Interval Measured Level (mcg/mL) Associated AUC/GRADY Dose Adjustment                                                 With improvement in renal function current dose predicts AUC less then 400. Will change to 1250mg Q24h for predicted AUC of 446.     Plan: Change to 1250mg Q24H

## 2024-08-01 NOTE — PROGRESS NOTES
Patient was admitted earlier this AM by night team. Refer to H&P. Pt seen and examined. Afebrile since the 102.6 temp on arrival. C/o chills. Labs improving, cont to trend. Likely d/t Tylenol toxicity as has been taking 1000 mg Tylenol q4h x2.5 days. No source of infection found thus far; consider de-escalate abx soon. Pt believes her fevers are 2/2 her cancer meds. Appreciate consultants.    Agnes Nina MD

## 2024-08-01 NOTE — ED NOTES
TRANSFER - OUT REPORT:    Verbal report given to KAILASH Watson on Symone Edmonds  being transferred to [address] [place] [address] for routine progression of patient care       Report consisted of patient's Situation, Background, Assessment and   Recommendations(SBAR).     Information from the following report(s) Nurse Handoff Report, ED Encounter Summary, Intake/Output, MAR, Recent Results, and Cardiac Rhythm NSR with PVC  was reviewed with the receiving nurse.    [place] Fall Assessment:    Presents to emergency department  because of falls (Syncope, seizure, or loss of consciousness): No  Age > 70: No  Altered Mental Status, Intoxication with alcohol or substance confusion (Disorientation, impaired judgment, poor safety awaremess, or inability to follow instructions): No  Impaired Mobility: Ambulates or transfers with assistive devices or assistance; Unable to ambulate or transer.: No  Nursing Judgement: No          Lines:   Peripheral IV 07/31/24 Left Antecubital (Active)        Opportunity for questions and clarification was provided.      Patient transported with:  Monitor and O2 @ 2lpm  /81   Pulse 97   Temp 98.7 °F (37.1 °C) (Oral)   Resp 18   Ht 1.6 m (5' 3\")   Wt 91.4 kg (201 lb 8 oz)   SpO2 94%   BMI 35.69 kg/m²     Reports was given to [facility] transport team. Patient left ED in no acute distress, A/O x4.

## 2024-08-01 NOTE — H&P
History and Physical    Date of Service:  8/1/2024  Primary Care Provider: Patricia Suazo MD  Source of information: The patient and Chart review    Chief Complaint: Fever       History of Presenting Illness:   Symone Edmonds is a 59 y.o. female with past medical history of renal cell carcinoma with metastasis to lung, status post right nephrectomy, CKD, type 2 diabetes mellitus, hyperlipidemia, hypertension, hypothyroidism, chronic pain, osteoarthritis, GERD, kidney stones, panic attacks, JESSICA (obstructive sleep apnea), COPD, basal cell carcinoma chest, TIA presented as a direct admission/transfer from [facility] ED to [facility] with chief complaint of fever.  Symptoms onset reportedly began about 7 days ago and was recurrent despite the use of Tylenol.  Patient also had nausea and vomiting unable to keep anything down.  She initially also had diarrhea which resolved with the use of Imodium.  Patient has solitary kidney status post right nephrectomy and is concerned for dehydration.  Yesterday, patient went to [facility] ED where initial recorded vital signs were temperature 102.6 degrees overnight, /70, heart rate 119, respiratory rate 16, O2 saturation 92% on room air.  O2 saturation decreased to 89%.  Patient was placed on 2 L oxygen via nasal cannula.  12-lead EKG shows sinus tachycardia, frequent PVCs at 105 bpm.  Chest x-ray portable showed shallow lung volumes with mild bibasilar atelectasis.  CT abdomen pelvis without IV contrast showed total right nephrectomy, small right pleural effusion, 8 mm left lower lobe nodule but no acute findings.  UA showed trace blood and 30 protein but otherwise negative UA.  Abnormal labs included sodium 129, chloride 94, creatinine 1.36, GFR 45, calcium 8.4, albumin 3.3, alkaline phosphatase 573, , , platelets 108, procalcitonin 1.50, neutrophils 86%.  ED ordered LR 30 ml/kg (1,572 mL), Tylenol 650 mg p.o., cefepime 2000 mg IV, vancomycin  - Transportation     Lack of Transportation (Medical): Not on file     Lack of Transportation (Non-Medical): No   Physical Activity: Inactive (4/10/2024)    Exercise Vital Sign     Days of Exercise per Week: 0 days     Minutes of Exercise per Session: 0 min   Stress: Not on file   Social Connections: Not on file   Intimate Partner Violence: Not on file   Depression: Not at risk (6/27/2024)    PHQ-2     PHQ-2 Score: 0   Recent Concern: Depression - At risk (4/10/2024)    PHQ-2     PHQ-2 Score: 13   Housing Stability: Unknown (6/19/2023)    Housing Stability Vital Sign     Unable to Pay for Housing in the Last Year: Not on file     Number of Places Lived in the Last Year: Not on file     Unstable Housing in the Last Year: No   Interpersonal Safety: Not At Risk (7/31/2024)    Interpersonal Safety Domain Source: IP Abuse Screening     Physical abuse: Denies     Verbal abuse: Denies     Emotional abuse: Denies     Financial abuse: Denies     Sexual abuse: Denies   Utilities: Not on file        Medications were reconciled to the best of my ability given all available resources at the time of admission. Route is PO if not otherwise noted.     Family and social history were personally reviewed, all pertinent and relevant details are outlined as above.    Objective:   BP (!) 123/95   Pulse (!) 101   Temp 98.3 °F (36.8 °C) (Oral)   Resp 25   Ht 1.6 m (5' 3\")   Wt 91.4 kg (201 lb 8 oz)   SpO2 94%   BMI 35.69 kg/m²         PHYSICAL EXAM:   General: Patient in no acute respiratory distress.  HEENT: Normocephalic.  Atraumatic.  PERRLA EOMI.  Pupils 2 mm reactive bilateral.  Sclera anicteric.  Conjunctive clear.  No otorrhea.  No rhinorrhea.  Nares patent.  Oropharynx clear.  Tongue midline/nonedematous.   Neck: Supple, no JVD, no meningeal signs.  No carotid bruits.  Trachea midline.  Respiratory/ Chest: Clear to auscultation bilaterally   CVS:  Tachycardic.  Regular rhythm. S1 S2 heard, no murmurs/rubs/gallops  GI/ Abd:

## 2024-08-02 ENCOUNTER — TELEPHONE (OUTPATIENT)
Age: 59
End: 2024-08-02

## 2024-08-02 ENCOUNTER — APPOINTMENT (OUTPATIENT)
Facility: HOSPITAL | Age: 59
End: 2024-08-02
Attending: INTERNAL MEDICINE
Payer: MEDICARE

## 2024-08-02 DIAGNOSIS — R79.89 ELEVATED LFTS: ICD-10-CM

## 2024-08-02 DIAGNOSIS — C78.00 SECONDARY RENAL CELL CARCINOMA OF LUNG, UNSPECIFIED LATERALITY (HCC): ICD-10-CM

## 2024-08-02 DIAGNOSIS — C64.9 RENAL CELL CARCINOMA, UNSPECIFIED LATERALITY (HCC): Primary | ICD-10-CM

## 2024-08-02 DIAGNOSIS — C64.9 SECONDARY RENAL CELL CARCINOMA OF LUNG, UNSPECIFIED LATERALITY (HCC): ICD-10-CM

## 2024-08-02 DIAGNOSIS — C64.9 MALIGNANT NEOPLASM OF KIDNEY, UNSPECIFIED LATERALITY (HCC): Primary | ICD-10-CM

## 2024-08-02 PROBLEM — K75.4 AUTOIMMUNE HEPATITIS (HCC): Status: ACTIVE | Noted: 2024-08-02

## 2024-08-02 PROBLEM — K71.9 DRUG INDUCED LIVER DISEASE: Status: ACTIVE | Noted: 2024-08-02

## 2024-08-02 PROBLEM — B17.9 ACUTE HEPATITIS: Status: ACTIVE | Noted: 2024-08-02

## 2024-08-02 LAB
ALBUMIN SERPL-MCNC: 2.9 G/DL (ref 3.5–5)
ALBUMIN/GLOB SERPL: 0.9 (ref 1.1–2.2)
ALP SERPL-CCNC: 516 U/L (ref 45–117)
ALT SERPL-CCNC: 612 U/L (ref 12–78)
ANION GAP SERPL CALC-SCNC: 6 MMOL/L (ref 5–15)
AST SERPL-CCNC: 650 U/L (ref 15–37)
BACTERIA SPEC CULT: NORMAL
BACTERIA SPEC CULT: NORMAL
BASOPHILS # BLD: 0.1 K/UL (ref 0–0.1)
BASOPHILS NFR BLD: 1 % (ref 0–1)
BILIRUB SERPL-MCNC: 0.5 MG/DL (ref 0.2–1)
BUN SERPL-MCNC: 11 MG/DL (ref 6–20)
BUN/CREAT SERPL: 10 (ref 12–20)
CALCIUM SERPL-MCNC: 8.6 MG/DL (ref 8.5–10.1)
CHLORIDE SERPL-SCNC: 104 MMOL/L (ref 97–108)
CO2 SERPL-SCNC: 22 MMOL/L (ref 21–32)
CREAT SERPL-MCNC: 1.1 MG/DL (ref 0.55–1.02)
DIFFERENTIAL METHOD BLD: ABNORMAL
ECHO AO ASC DIAM: 2.8 CM
ECHO AO ASCENDING AORTA INDEX: 1.44 CM/M2
ECHO AO ROOT DIAM: 2.8 CM
ECHO AO ROOT INDEX: 1.44 CM/M2
ECHO AV MEAN GRADIENT: 3 MMHG
ECHO AV MEAN VELOCITY: 0.8 M/S
ECHO AV PEAK GRADIENT: 7 MMHG
ECHO AV PEAK VELOCITY: 1.3 M/S
ECHO AV VELOCITY RATIO: 0.62
ECHO AV VTI: 23.1 CM
ECHO BSA: 2.02 M2
ECHO LA DIAMETER INDEX: 1.7 CM/M2
ECHO LA DIAMETER: 3.3 CM
ECHO LA TO AORTIC ROOT RATIO: 1.18
ECHO LA VOL A-L A2C: 60 ML (ref 22–52)
ECHO LA VOL A-L A4C: 41 ML (ref 22–52)
ECHO LA VOL MOD A2C: 57 ML (ref 22–52)
ECHO LA VOL MOD A4C: 39 ML (ref 22–52)
ECHO LA VOLUME AREA LENGTH: 49 ML
ECHO LA VOLUME INDEX A-L A2C: 31 ML/M2 (ref 16–34)
ECHO LA VOLUME INDEX A-L A4C: 21 ML/M2 (ref 16–34)
ECHO LA VOLUME INDEX AREA LENGTH: 25 ML/M2 (ref 16–34)
ECHO LA VOLUME INDEX MOD A2C: 29 ML/M2 (ref 16–34)
ECHO LA VOLUME INDEX MOD A4C: 20 ML/M2 (ref 16–34)
ECHO LV E' SEPTAL VELOCITY: 8 CM/S
ECHO LV EDV A2C: 94 ML
ECHO LV EDV A4C: 118 ML
ECHO LV EDV BP: 106 ML (ref 56–104)
ECHO LV EDV INDEX A4C: 61 ML/M2
ECHO LV EDV INDEX BP: 55 ML/M2
ECHO LV EDV NDEX A2C: 48 ML/M2
ECHO LV EJECTION FRACTION A2C: 61 %
ECHO LV EJECTION FRACTION A4C: 59 %
ECHO LV EJECTION FRACTION BIPLANE: 60 % (ref 55–100)
ECHO LV ESV A2C: 37 ML
ECHO LV ESV A4C: 49 ML
ECHO LV ESV BP: 43 ML (ref 19–49)
ECHO LV ESV INDEX A2C: 19 ML/M2
ECHO LV ESV INDEX A4C: 25 ML/M2
ECHO LV ESV INDEX BP: 22 ML/M2
ECHO LV FRACTIONAL SHORTENING: 35 % (ref 28–44)
ECHO LV INTERNAL DIMENSION DIASTOLE INDEX: 2.47 CM/M2
ECHO LV INTERNAL DIMENSION DIASTOLIC: 4.8 CM (ref 3.9–5.3)
ECHO LV INTERNAL DIMENSION SYSTOLIC INDEX: 1.6 CM/M2
ECHO LV INTERNAL DIMENSION SYSTOLIC: 3.1 CM
ECHO LV IVSD: 0.9 CM (ref 0.6–0.9)
ECHO LV MASS 2D: 158.8 G (ref 67–162)
ECHO LV MASS INDEX 2D: 81.9 G/M2 (ref 43–95)
ECHO LV POSTERIOR WALL DIASTOLIC: 1 CM (ref 0.6–0.9)
ECHO LV RELATIVE WALL THICKNESS RATIO: 0.42
ECHO LVOT AV VTI INDEX: 0.65
ECHO LVOT MEAN GRADIENT: 1 MMHG
ECHO LVOT PEAK GRADIENT: 2 MMHG
ECHO LVOT PEAK VELOCITY: 0.8 M/S
ECHO LVOT VTI: 15 CM
ECHO MV A VELOCITY: 0.91 M/S
ECHO MV AREA PHT: 4.9 CM2
ECHO MV E DECELERATION TIME (DT): 153.3 MS
ECHO MV E VELOCITY: 0.92 M/S
ECHO MV E/A RATIO: 1.01
ECHO MV E/E' SEPTAL: 11.5
ECHO MV LVOT VTI INDEX: 1.35
ECHO MV MAX VELOCITY: 1.1 M/S
ECHO MV MEAN GRADIENT: 2 MMHG
ECHO MV MEAN VELOCITY: 0.8 M/S
ECHO MV PEAK GRADIENT: 4 MMHG
ECHO MV PRESSURE HALF TIME (PHT): 44.5 MS
ECHO MV VTI: 20.3 CM
ECHO RV INTERNAL DIMENSION: 3.1 CM
ECHO RV TAPSE: 2.1 CM (ref 1.7–?)
EOSINOPHIL # BLD: 0.1 K/UL (ref 0–0.4)
EOSINOPHIL NFR BLD: 1 % (ref 0–7)
ERYTHROCYTE [DISTWIDTH] IN BLOOD BY AUTOMATED COUNT: 18.9 % (ref 11.5–14.5)
GLOBULIN SER CALC-MCNC: 3.2 G/DL (ref 2–4)
GLUCOSE BLD STRIP.AUTO-MCNC: 142 MG/DL (ref 65–117)
GLUCOSE BLD STRIP.AUTO-MCNC: 154 MG/DL (ref 65–117)
GLUCOSE BLD STRIP.AUTO-MCNC: 195 MG/DL (ref 65–117)
GLUCOSE BLD STRIP.AUTO-MCNC: 202 MG/DL (ref 65–117)
GLUCOSE SERPL-MCNC: 123 MG/DL (ref 65–100)
HCT VFR BLD AUTO: 34.4 % (ref 35–47)
HGB BLD-MCNC: 12 G/DL (ref 11.5–16)
IMM GRANULOCYTES # BLD AUTO: 0 K/UL (ref 0–0.04)
IMM GRANULOCYTES NFR BLD AUTO: 0 % (ref 0–0.5)
LYMPHOCYTES # BLD: 0.8 K/UL (ref 0.8–3.5)
LYMPHOCYTES NFR BLD: 12 % (ref 12–49)
MCH RBC QN AUTO: 28 PG (ref 26–34)
MCHC RBC AUTO-ENTMCNC: 34.9 G/DL (ref 30–36.5)
MCV RBC AUTO: 80.4 FL (ref 80–99)
MONOCYTES # BLD: 0.3 K/UL (ref 0–1)
MONOCYTES NFR BLD: 4 % (ref 5–13)
NEUTS SEG # BLD: 5.3 K/UL (ref 1.8–8)
NEUTS SEG NFR BLD: 82 % (ref 32–75)
NRBC # BLD: 0 K/UL (ref 0–0.01)
NRBC BLD-RTO: 0 PER 100 WBC
PLATELET # BLD AUTO: 99 K/UL (ref 150–400)
PMV BLD AUTO: 10.7 FL (ref 8.9–12.9)
POTASSIUM SERPL-SCNC: 3.7 MMOL/L (ref 3.5–5.1)
PROCALCITONIN SERPL-MCNC: 0.96 NG/ML
PROT SERPL-MCNC: 6.1 G/DL (ref 6.4–8.2)
RBC # BLD AUTO: 4.28 M/UL (ref 3.8–5.2)
RBC MORPH BLD: ABNORMAL
SERVICE CMNT-IMP: ABNORMAL
SERVICE CMNT-IMP: NORMAL
SODIUM SERPL-SCNC: 132 MMOL/L (ref 136–145)
WBC # BLD AUTO: 6.6 K/UL (ref 3.6–11)

## 2024-08-02 PROCEDURE — 36415 COLL VENOUS BLD VENIPUNCTURE: CPT

## 2024-08-02 PROCEDURE — 94760 N-INVAS EAR/PLS OXIMETRY 1: CPT

## 2024-08-02 PROCEDURE — 2700000000 HC OXYGEN THERAPY PER DAY

## 2024-08-02 PROCEDURE — 6360000002 HC RX W HCPCS: Performed by: FAMILY MEDICINE

## 2024-08-02 PROCEDURE — 2060000000 HC ICU INTERMEDIATE R&B

## 2024-08-02 PROCEDURE — 85025 COMPLETE CBC W/AUTO DIFF WBC: CPT

## 2024-08-02 PROCEDURE — 2580000003 HC RX 258: Performed by: INTERNAL MEDICINE

## 2024-08-02 PROCEDURE — 80053 COMPREHEN METABOLIC PANEL: CPT

## 2024-08-02 PROCEDURE — 93306 TTE W/DOPPLER COMPLETE: CPT | Performed by: INTERNAL MEDICINE

## 2024-08-02 PROCEDURE — 93306 TTE W/DOPPLER COMPLETE: CPT

## 2024-08-02 PROCEDURE — 6370000000 HC RX 637 (ALT 250 FOR IP): Performed by: INTERNAL MEDICINE

## 2024-08-02 PROCEDURE — 99232 SBSQ HOSP IP/OBS MODERATE 35: CPT | Performed by: INTERNAL MEDICINE

## 2024-08-02 PROCEDURE — 2580000003 HC RX 258: Performed by: FAMILY MEDICINE

## 2024-08-02 PROCEDURE — 82962 GLUCOSE BLOOD TEST: CPT

## 2024-08-02 PROCEDURE — 6370000000 HC RX 637 (ALT 250 FOR IP): Performed by: FAMILY MEDICINE

## 2024-08-02 PROCEDURE — 84145 PROCALCITONIN (PCT): CPT

## 2024-08-02 PROCEDURE — 6360000002 HC RX W HCPCS: Performed by: INTERNAL MEDICINE

## 2024-08-02 RX ORDER — PREDNISONE 50 MG/1
100 TABLET ORAL DAILY
Status: DISCONTINUED | OUTPATIENT
Start: 2024-08-02 | End: 2024-08-03 | Stop reason: HOSPADM

## 2024-08-02 RX ORDER — LORAZEPAM 0.5 MG/1
0.5 TABLET ORAL DAILY PRN
Status: DISCONTINUED | OUTPATIENT
Start: 2024-08-02 | End: 2024-08-03 | Stop reason: HOSPADM

## 2024-08-02 RX ADMIN — PANTOPRAZOLE SODIUM 40 MG: 40 TABLET, DELAYED RELEASE ORAL at 05:51

## 2024-08-02 RX ADMIN — SODIUM CHLORIDE: 9 INJECTION, SOLUTION INTRAVENOUS at 00:34

## 2024-08-02 RX ADMIN — CETIRIZINE HYDROCHLORIDE 5 MG: 10 TABLET, FILM COATED ORAL at 09:22

## 2024-08-02 RX ADMIN — SODIUM CHLORIDE: 9 INJECTION, SOLUTION INTRAVENOUS at 07:18

## 2024-08-02 RX ADMIN — ZINC SULFATE 220 MG (50 MG) CAPSULE 50 MG: CAPSULE at 09:22

## 2024-08-02 RX ADMIN — OXYCODONE HYDROCHLORIDE 5 MG: 5 TABLET ORAL at 19:51

## 2024-08-02 RX ADMIN — DILTIAZEM HYDROCHLORIDE 360 MG: 180 CAPSULE, EXTENDED RELEASE ORAL at 09:22

## 2024-08-02 RX ADMIN — ROPINIROLE HYDROCHLORIDE 2 MG: 1 TABLET, FILM COATED ORAL at 21:03

## 2024-08-02 RX ADMIN — OXYCODONE HYDROCHLORIDE 5 MG: 5 TABLET ORAL at 06:00

## 2024-08-02 RX ADMIN — PREDNISONE 100 MG: 50 TABLET ORAL at 13:26

## 2024-08-02 RX ADMIN — ROPINIROLE HYDROCHLORIDE 2 MG: 1 TABLET, FILM COATED ORAL at 09:22

## 2024-08-02 RX ADMIN — CEFEPIME 2000 MG: 2 INJECTION, POWDER, FOR SOLUTION INTRAVENOUS at 05:55

## 2024-08-02 RX ADMIN — VALSARTAN 320 MG: 160 TABLET, FILM COATED ORAL at 09:22

## 2024-08-02 RX ADMIN — OXYCODONE HYDROCHLORIDE 5 MG: 5 TABLET ORAL at 13:30

## 2024-08-02 RX ADMIN — OXYCODONE HYDROCHLORIDE 5 MG: 5 TABLET ORAL at 00:02

## 2024-08-02 RX ADMIN — ROPINIROLE HYDROCHLORIDE 2 MG: 1 TABLET, FILM COATED ORAL at 14:19

## 2024-08-02 RX ADMIN — WATER 60 MG: 1 INJECTION INTRAMUSCULAR; INTRAVENOUS; SUBCUTANEOUS at 00:03

## 2024-08-02 RX ADMIN — SODIUM CHLORIDE, PRESERVATIVE FREE 10 ML: 5 INJECTION INTRAVENOUS at 21:03

## 2024-08-02 RX ADMIN — Medication 1000 UNITS: at 09:23

## 2024-08-02 RX ADMIN — LORAZEPAM 0.5 MG: 0.5 TABLET ORAL at 15:47

## 2024-08-02 RX ADMIN — VENLAFAXINE HYDROCHLORIDE 150 MG: 150 CAPSULE, EXTENDED RELEASE ORAL at 09:23

## 2024-08-02 RX ADMIN — LEVOTHYROXINE SODIUM 50 MCG: 0.05 TABLET ORAL at 09:22

## 2024-08-02 ASSESSMENT — PAIN SCALES - GENERAL
PAINLEVEL_OUTOF10: 8
PAINLEVEL_OUTOF10: 8
PAINLEVEL_OUTOF10: 7
PAINLEVEL_OUTOF10: 10
PAINLEVEL_OUTOF10: 8

## 2024-08-02 ASSESSMENT — PAIN DESCRIPTION - ORIENTATION: ORIENTATION: MID;LOWER

## 2024-08-02 ASSESSMENT — PAIN - FUNCTIONAL ASSESSMENT: PAIN_FUNCTIONAL_ASSESSMENT: ACTIVITIES ARE NOT PREVENTED

## 2024-08-02 ASSESSMENT — PAIN DESCRIPTION - LOCATION
LOCATION: BACK
LOCATION: ABDOMEN;BACK
LOCATION: BACK

## 2024-08-02 NOTE — PROGRESS NOTES
[facility]                                                                               Hospitalist Progress Note  Agnes Nina MD  Answering service: [phone] OR [phone] from in house phone        Date of Service:  2024  NAME:  Symone Edmonds  :  1965  MRN:  [id number]       Admission Summary:   Symone Edmonds is a 59 y.o. female with past medical history of renal cell carcinoma with metastasis to lung, status post right nephrectomy, CKD, type 2 diabetes mellitus, hyperlipidemia, hypertension, hypothyroidism, chronic pain, osteoarthritis, GERD, kidney stones, panic attacks, JESSICA (obstructive sleep apnea), COPD, basal cell carcinoma chest, TIA presented as a direct admission/transfer from [facility] ED to [facility] with chief complaint of fever.  Symptoms onset reportedly began about 7 days ago and was recurrent despite the use of Tylenol.  Patient also had nausea and vomiting unable to keep anything down.  She initially also had diarrhea which resolved with the use of Imodium.  Patient has solitary kidney status post right nephrectomy and is concerned for dehydration.  Yesterday, patient went to [facility] ED where initial recorded vital signs were temperature 102.6 degrees overnight, /70, heart rate 119, respiratory rate 16, O2 saturation 92% on room air.  O2 saturation decreased to 89%.  Patient was placed on 2 L oxygen via nasal cannula.  12-lead EKG shows sinus tachycardia, frequent PVCs at 105 bpm.  Chest x-ray portable showed shallow lung volumes with mild bibasilar atelectasis.  CT abdomen pelvis without IV contrast showed total right nephrectomy, small right pleural effusion, 8 mm left lower lobe nodule but no acute findings.  UA showed trace blood and 30 protein but otherwise negative UA.  Abnormal labs included sodium 129, chloride 94, creatinine 1.36, GFR 45, calcium 8.4, albumin 3.3, alkaline phosphatase 573, , , platelets 108,

## 2024-08-02 NOTE — PROGRESS NOTES
[facility]   [address], [address] [place] [id number]   W: [phone]  F: [phone]          Reason for consult      Symone Edmonds is a 59 y.o. female who is for seen at the request of Dr. Nina for evaluation of stage IV renal cell carcinoma, abnormal LFTs and fever       History of Present Illness:     Patient is a  59 y.o. female with known to me for stage IV RCC.  She is on ipilimumab and nivolumab.  She received cycle 4 of the combination on 7/18/2024.  Scans in July 2024 were considered to represent pseudo progression.  She presented to the emergency room on 7/31/2024 with complaints of fever up to 102.6 Fahrenheit, tachycardia, diarrhea that resolved with Imodium, nausea and emesis.  She was saturating 89% and was placed on 2 L of oxygen.  CT abdomen pelvis obtained without contrast showed a small right pleural effusion, 8 mm left lower lobe lung nodule but nothing significant otherwise.  Her sodium was 129, creatinine 1.3, alkaline phosphatase was elevated at 573, , , platelets were low at 108, she was started on fluids, cefepime and vancomycin and we are consulted for further evaluation.    Today  She is afebrile, her back pain is better, she has no diarrhea, no cough, she is very tearful    Physical Exam:   Visit Vitals  /78   Pulse 86   Temp 98.2 °F (36.8 °C) (Oral)   Resp 19   Ht 1.6 m (5' 3\")   Wt 91.7 kg (202 lb 1.6 oz)   SpO2 93%   BMI 35.80 kg/m²            General: Appears well-developed and well-nourished and is in pain   Mental status: Alert and awake, Oriented to person/place/time, Able to follow commands   Resp: normal respiratory effort    CV: no LE swelling   Psychiatric: Normal affect, normal judgment/insight.  Skin: no visible papules noted          Results:          Lab Results   Component Value Date/Time    WBC 6.6 08/02/2024 12:47 AM    WBC Comment 09/05/2018 11:26 AM    HGB 12.0 08/02/2024 12:47 AM    HCT 34.4

## 2024-08-02 NOTE — CARE COORDINATION
Care Management Initial Assessment       RUR: 18% Moderate  Readmission? No  1st IM letter given? Yes - 8/2/24  1st  letter given: No    Hem/onc, Hepatology, oncology following. The patient is from home, is independent with adl's/iadl's, drives a vehicle, does not own or use any dme, uses [facility] and [facility], has history of home health with [facility] after knee surgeries, has no history of rehab and plans to discharge home with mother to transport. The patient's independent mother lives with her. CM following for discharge needs.     08/02/24 1610   Service Assessment   Patient Orientation Alert and Oriented   Cognition Alert   History Provided By Patient   Primary Caregiver Self   Support Systems Parent   Patient's Healthcare Decision Maker is: Legal Next of Kin   PCP Verified by CM Yes   Last Visit to PCP Within last 6 months   Prior Functional Level Independent in ADLs/IADLs   Current Functional Level Independent in ADLs/IADLs   Can patient return to prior living arrangement Yes   Ability to make needs known: Good   Family able to assist with home care needs: Yes  (Patient's independent mother lives with her.)   Would you like for me to discuss the discharge plan with any other family members/significant others, and if so, who? No   Financial Resources Medicare   Social/Functional History   Lives With Parent   Type of Home House   Home Layout Two level   Home Access Stairs to enter with rails   Entrance Stairs - Number of Steps 3   Home Equipment None   ADL Assistance Independent   Ambulation Assistance Independent   Transfer Assistance Independent   Active  Yes   Discharge Planning   Type of Residence House   Living Arrangements Parent   Potential Assistance Purchasing Medications No   Patient expects to be discharged to: House   Condition of Participation: Discharge Planning   The Plan for Transition of Care is related to the following treatment goals: The

## 2024-08-02 NOTE — TELEPHONE ENCOUNTER
1440  Called pt and made her aware Dr. Winchester wants her to HOLD Nivolumab  Dr. Winchester would like her to have weekly labs done- pt prefers [facility].  She will have an OV/Opic appt with Dr. Winchester in 3 weeks. Scheduling will contact her with the appt date/time.  Pt voiced understanding and was thankful for my call.

## 2024-08-02 NOTE — CONSULTS
[facility]      Alexander Quintero MD, FACP, FACG, FAASLD      ELENO Lorenzo, [facility]   Emeli Zamora, [facility]   Aziza Dayne, [facility]  Reilly De Jesus, [facility]   Lorna Haywood, [facility]   Rachel Romanon, [facility]   [address], [address]   [place]  [address] [phone]   FAX: [phone]  [facility]   [address], [address]   [place]  [address] [phone]   FAX: [phone]       HEPATOLOGY CONSULT NOTE  I was asked to see this patient in consultation for evaluation and management of elevated liver enzymes.  I have reviewed the Emergency room note, Hospital admission note, Notes by all other physicians who have seen the patient during this hospitalization to date.  I have reviewed the problem list, all past medical history and the reason for this hospitalization.  I have reviewed the allergies and the medications the patient was taking at home prior to this hospitalization.    HISTORY:  The patient is a 59 y.o. female with stage 4 renal cancer being treated with immune therapy, Nivo and Ipi atarting in 7/2024.      She came to the Ed with fevers to 102, tachycardia, diarrhea, nausea, vomiting.    In the ED Laboratory studies were significant for:    Sna 129, Scr 1.36 mg, , , , TBILI 0.5 mg, ALB 3.3 gm, WBC 6.9, HB 13.1 gms, ,    Imaging of the liver with CT scan demonstrated normal appearing liver, no liver mass lesions, no dilated bile ducts, no ascites.    MRI demonstrated a normal appearing liver, no liver masses, no dilated bile ducts.      Serology is negative for HBV, HCV, acute HAV and AMA, ASMA  SANTANA was previously positive in 2014.    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        PHYSICAL EXAMINATION:  /80   Pulse 94   Temp 98.4 °F (36.9 °C) (Oral)   Resp 18   Ht 1.6 m (5' 3\")   Wt 91.7 kg (202 lb 1.6 oz)   SpO2 93%   BMI 35.80 kg/m²       General: No acute distress.   Eyes: Sclera anicteric.   ENT: No oral lesions.  Thyroid normal.  Nodes: No adenopathy.   Skin: No spider angiomata.  No jaundice.  No palmar erythema.  Respiratory: Lungs clear to auscultation.   Cardiovascular: Regular heart rate.  No murmurs.  No JVD.  Abdomen: Soft non-tender, liver size normal to percussion/palpation.  Spleen not palpable. No obvious ascites.  Extremities: No edema.  No muscle wasting.  No gross arthritic changes.  Neurologic: Alert and oriented.  Cranial nerves grossly intact.  No asterixis.      LABORATORY:   Latest Reference Range & Units 07/31/24 17:21 08/01/24 01:45   Sodium 136 - 145 mmol/L 129 (L) 132 (L)   Potassium 3.5 - 5.1 mmol/L 3.5 3.6   Chloride 97 - 108 mmol/L 94 (L) 104   CARBON DIOXIDE 21 - 32 mmol/L 25 23   BUN,BUNPL 6 - 20 MG/DL 17 12   Creatinine 0.55 - 1.02 MG/DL 1.36 (H) 1.11 (H)   Albumin 3.5 - 5.0 g/dL 3.3 (L) 3.3 (L)   Alkaline Phosphatase 45 - 117 U/L 573 (H) 516 (H)   ALT 12 - 78 U/L 784 (H) 678 (H)   AST 15 - 37 U/L 950 (H) 773 (H)   Total Bilirubin 0.2 - 1.0 MG/DL 0.5 0.4   WBC 3.6 - 11.0 K/uL 6.9 6.5   Hemoglobin Quant 11.5 - 16.0 g/dL 13.1 13.4   Platelet Count 150 - 400 K/uL 108 (L) 94 (L)   (L): Data is abnormally low  (H): Data is abnormally high      MD [facility] [address], [address]  [place]  [address] [phone]  [facility]

## 2024-08-02 NOTE — PROGRESS NOTES
Reviewed MRI  This is IO mediated grade 3 hepatitits  For grade 3 hepatitis 1 mg/kg prednisone is the appropriate dose which is ordered

## 2024-08-02 NOTE — PROGRESS NOTES
The patient's O2 saturation is 77-79% on room air while she sleeps. She is without labored breathing. She is awakened. 2L/min via nasal cannula is applied. O2 sats are in the 80s, then 3L/min is applied and there was no change. O2 is increased to 4L/min. Oxygen saturation is 91-92%  The patient's lungs sounds are diminished.  She remains alert and oriented x 4.  Yanci Johnson, covering [facility], is notified.  No order was given.

## 2024-08-03 VITALS
SYSTOLIC BLOOD PRESSURE: 127 MMHG | OXYGEN SATURATION: 90 % | WEIGHT: 202.1 LBS | DIASTOLIC BLOOD PRESSURE: 80 MMHG | BODY MASS INDEX: 35.81 KG/M2 | HEIGHT: 63 IN | RESPIRATION RATE: 16 BRPM | TEMPERATURE: 97.6 F | HEART RATE: 81 BPM

## 2024-08-03 LAB
ALBUMIN SERPL-MCNC: 2.8 G/DL (ref 3.5–5)
ALBUMIN/GLOB SERPL: 0.8 (ref 1.1–2.2)
ALP SERPL-CCNC: 427 U/L (ref 45–117)
ALT SERPL-CCNC: 489 U/L (ref 12–78)
ANION GAP SERPL CALC-SCNC: 4 MMOL/L (ref 5–15)
AST SERPL-CCNC: 317 U/L (ref 15–37)
BILIRUB SERPL-MCNC: 0.3 MG/DL (ref 0.2–1)
BUN SERPL-MCNC: 11 MG/DL (ref 6–20)
BUN/CREAT SERPL: 12 (ref 12–20)
CALCIUM SERPL-MCNC: 8.7 MG/DL (ref 8.5–10.1)
CHLORIDE SERPL-SCNC: 108 MMOL/L (ref 97–108)
CO2 SERPL-SCNC: 23 MMOL/L (ref 21–32)
COMMENT:: NORMAL
CREAT SERPL-MCNC: 0.91 MG/DL (ref 0.55–1.02)
GLOBULIN SER CALC-MCNC: 3.3 G/DL (ref 2–4)
GLUCOSE BLD STRIP.AUTO-MCNC: 180 MG/DL (ref 65–117)
GLUCOSE SERPL-MCNC: 207 MG/DL (ref 65–100)
POTASSIUM SERPL-SCNC: 3.5 MMOL/L (ref 3.5–5.1)
PROT SERPL-MCNC: 6.1 G/DL (ref 6.4–8.2)
SERVICE CMNT-IMP: ABNORMAL
SODIUM SERPL-SCNC: 135 MMOL/L (ref 136–145)
SPECIMEN HOLD: NORMAL

## 2024-08-03 PROCEDURE — 82962 GLUCOSE BLOOD TEST: CPT

## 2024-08-03 PROCEDURE — 6370000000 HC RX 637 (ALT 250 FOR IP): Performed by: INTERNAL MEDICINE

## 2024-08-03 PROCEDURE — 80053 COMPREHEN METABOLIC PANEL: CPT

## 2024-08-03 PROCEDURE — 36415 COLL VENOUS BLD VENIPUNCTURE: CPT

## 2024-08-03 PROCEDURE — 99232 SBSQ HOSP IP/OBS MODERATE 35: CPT | Performed by: INTERNAL MEDICINE

## 2024-08-03 PROCEDURE — 2700000000 HC OXYGEN THERAPY PER DAY

## 2024-08-03 PROCEDURE — 6370000000 HC RX 637 (ALT 250 FOR IP): Performed by: FAMILY MEDICINE

## 2024-08-03 RX ORDER — PREDNISONE 50 MG/1
100 TABLET ORAL DAILY
Qty: 30 TABLET | Refills: 0 | Status: SHIPPED | OUTPATIENT
Start: 2024-08-03

## 2024-08-03 RX ADMIN — ROPINIROLE HYDROCHLORIDE 2 MG: 1 TABLET, FILM COATED ORAL at 09:45

## 2024-08-03 RX ADMIN — LEVOTHYROXINE SODIUM 50 MCG: 0.05 TABLET ORAL at 09:45

## 2024-08-03 RX ADMIN — Medication 1000 UNITS: at 09:45

## 2024-08-03 RX ADMIN — ZINC SULFATE 220 MG (50 MG) CAPSULE 50 MG: CAPSULE at 09:45

## 2024-08-03 RX ADMIN — VENLAFAXINE HYDROCHLORIDE 150 MG: 150 CAPSULE, EXTENDED RELEASE ORAL at 09:45

## 2024-08-03 RX ADMIN — PREDNISONE 100 MG: 50 TABLET ORAL at 09:45

## 2024-08-03 RX ADMIN — OXYCODONE HYDROCHLORIDE 5 MG: 5 TABLET ORAL at 09:45

## 2024-08-03 RX ADMIN — VALSARTAN 320 MG: 160 TABLET, FILM COATED ORAL at 09:45

## 2024-08-03 RX ADMIN — DILTIAZEM HYDROCHLORIDE 360 MG: 180 CAPSULE, EXTENDED RELEASE ORAL at 09:45

## 2024-08-03 RX ADMIN — CETIRIZINE HYDROCHLORIDE 5 MG: 10 TABLET, FILM COATED ORAL at 09:45

## 2024-08-03 RX ADMIN — PANTOPRAZOLE SODIUM 40 MG: 40 TABLET, DELAYED RELEASE ORAL at 05:56

## 2024-08-03 ASSESSMENT — PAIN SCALES - GENERAL
PAINLEVEL_OUTOF10: 0
PAINLEVEL_OUTOF10: 0

## 2024-08-03 NOTE — DISCHARGE SUMMARY
reformats were generated. CT dose reduction was achieved through use of a standardized protocol tailored for this examination and automatic exposure control for dose modulation. FINDINGS: CHEST: Small right pleural effusion. Atelectatic changes. 8 mm left lower lobe nodule, 3-1. Severe coronary calcium. LIVER: No mass. BILIARY TREE: Cholecystectomy. Compensatory biliary dilatation. SPLEEN: Unremarkable PANCREAS: No mass or ductal dilatation. ADRENALS: Unremarkable. KIDNEYS: Total right nephrectomy. Punctate nonobstructive calculus left inferior pole. No hydronephrosis. STOMACH: Unremarkable. SMALL BOWEL: Small duodenal lipoma. No dilatation or wall thickening. COLON: Diverticulosis coli. No dilatation or wall thickening. APPENDIX: Unremarkable PERITONEUM: No ascites or pneumoperitoneum. RETROPERITONEUM: No lymphadenopathy or aortic aneurysm. REPRODUCTIVE ORGANS: Hysterectomy. No adnexal masses. URINARY BLADDER: No mass or calculus. BONES: Left hip arthroplasty. Moderate degenerative changes of the right hip joint. ABDOMINAL WALL: Bilateral fat-containing inguinal hernias. ADDITIONAL COMMENTS: N/A     1.  No acute findings in the abdomen and pelvis. 2.  Total right nephrectomy. 3.  Small right pleural effusion. 4.  8 mm left lower lobe nodule. Consider 12-month CT in high-risk patients. Electronically signed by RAFAELA HEWITT    XR CHEST PORTABLE    Result Date: 7/31/2024  PORTABLE CHEST RADIOGRAPH/S: 7/31/2024 5:22 PM INDICATION: Sepsis. COMPARISON: 5/24/2024, 10/16/2023, CT 7/8/2024. TECHNIQUE: Portable frontal upright radiograph/s of the chest. FINDINGS: The lungs are hypoinflated and there is mild bibasilar atelectasis. The central airways are patent. No pneumothorax and no sizable pleural effusion.     Shallow volumes and mild bibasilar atelectasis. Electronically signed by Adalberto Lozano    CT CHEST ABDOMEN PELVIS W CONTRAST Additional Contrast? None    Result Date: 7/11/2024  INDICATION: Malignant neoplasm  of unspecified kidney, except renal pelvis; Malignant neoplasm of right kidney, except renal pelvis COMPARISON: 3/5/2024, 4/22/2024 TECHNIQUE:  Following the uneventful intravenous administration of 100 cc Isovue-300, 5 mm axial images were obtained through the chest, abdomen, and pelvis. Oral contrast was not administered.  Coronal and sagittal reconstructions were generated. CT dose reduction was achieved through use of a standardized protocol tailored for this examination and automatic exposure control for dose modulation. FINDINGS: THYROID: No nodule. MEDIASTINUM: 22 x 21 mm subcarinal lymph node (series 3 image 51), previously 18 x 15 mm. IRASEMA: 30 x 22 mm right hilar lymph node (series 3 image 48), previously 29 x 18 mm. THORACIC AORTA: No dissection or aneurysm. MAIN PULMONARY ARTERY: Normal in caliber. TRACHEA/BRONCHI: Patent. ESOPHAGUS: No wall thickening or dilatation. HEART: Normal in size.  Coronary artery calcium:  present PLEURA: Small right pleural effusion, not significantly changed. LUNGS: Stable small fissural nodule in the left lung (series 4 image 48). Postsurgical changes in the right lung. LIVER: No mass or biliary dilatation. GALLBLADDER: Cholecystectomy. SPLEEN: No mass. PANCREAS: No mass or ductal dilatation. ADRENALS: Unremarkable. KIDNEYS: Status post right nephrectomy. No abnormal soft tissue in the nephrectomy bed. Punctate nonobstructing stone in the lower pole right kidney. STOMACH: Unremarkable. SMALL BOWEL: No dilatation or wall thickening. COLON: Colonic diverticulosis, most prominent in the sigmoid colon. There is persistent long segment wall thickening of ascending and transverse colon. Scattered diverticula are seen along the hepatic flexure as well. Specifically, previously described 1 cm soft tissue nodule along medial aspect of ascending colon near hepatic flexure is consistent with diverticulosis. APPENDIX: Normal. PERITONEUM: No ascites or pneumoperitoneum. RETROPERITONEUM:

## 2024-08-03 NOTE — PROGRESS NOTES
corresponds to right hilar lymph  nodes described on prior chest CT. Otherwise normal tracer distribution.    CT 7/2024  IMPRESSION:  Interval enlargement of mediastinal and right hilar adenopathy.  Stable right pleural effusion.  Previously described small soft tissue nodule along hepatic flexure is  consistent with diverticulosis.    MRI Spine  IMPRESSION:  1.   T9-T10 disc herniation and mild spinal canal stenosis. No high-grade  neuroforaminal stenosis.  2.  Large right and small left pleural effusions.      MRI Abdomen  IMPRESSION:     1. Status post right nephrectomy. No evidence of residual recurrent disease.     2. Dilated common bile duct may represent postcholecystectomy changes. Correlate  with serum alkaline phosphatase.     3. Moderate right effusion.            Assessment:   1) Renal cell carcinoma-stage IV    Right radical nephrectomy 7/2020-3.3 cm clear-cell carcinoma.  Of note she had a 6 mm left lung nodule detected at that time of uncertain significance.  The right lung nodule was first detected on scans on 12/9/2021 and measured 8 mm at that time    7/2023-growth in bilateral lung nodules with a right lung nodule measuring up to 13 mm in the left lung nodule 8 mm.  PET scan showed some uptake in the right lung nodule but no other uptake otherwise.     9/2023-status post left lung nodule wedge resection and lymph node sampling.-1.3 cm focus of metastatic RCC in the left lung removed.  Nodes were negative.- stage IV RCC  Oligometastatic disease at least to the right lung as early as December 2021 which would be 18 months after her nephrectomy.  Was initially on observation. By 3/2024 she had recurrent leucocytosis and new mediastinal nodes, R effusion. Scans reviewed in TB. Concerning for metastasis. However EBUS bx showed benign tissue only. This does not r/o a malignancy. In fact the nodes appear vascular which suggests an RCC primary. She also may have a locally recurrent soft tissue nodule.

## 2024-08-07 SDOH — ECONOMIC STABILITY: INCOME INSECURITY: HOW HARD IS IT FOR YOU TO PAY FOR THE VERY BASICS LIKE FOOD, HOUSING, MEDICAL CARE, AND HEATING?: SOMEWHAT HARD

## 2024-08-07 SDOH — ECONOMIC STABILITY: FOOD INSECURITY: WITHIN THE PAST 12 MONTHS, THE FOOD YOU BOUGHT JUST DIDN'T LAST AND YOU DIDN'T HAVE MONEY TO GET MORE.: SOMETIMES TRUE

## 2024-08-07 SDOH — ECONOMIC STABILITY: FOOD INSECURITY: WITHIN THE PAST 12 MONTHS, YOU WORRIED THAT YOUR FOOD WOULD RUN OUT BEFORE YOU GOT MONEY TO BUY MORE.: SOMETIMES TRUE

## 2024-08-07 SDOH — ECONOMIC STABILITY: TRANSPORTATION INSECURITY
IN THE PAST 12 MONTHS, HAS LACK OF TRANSPORTATION KEPT YOU FROM MEETINGS, WORK, OR FROM GETTING THINGS NEEDED FOR DAILY LIVING?: NO

## 2024-08-08 ENCOUNTER — HOSPITAL ENCOUNTER (OUTPATIENT)
Facility: HOSPITAL | Age: 59
Setting detail: INFUSION SERIES
End: 2024-08-08

## 2024-08-08 ENCOUNTER — OFFICE VISIT (OUTPATIENT)
Age: 59
End: 2024-08-08

## 2024-08-08 ENCOUNTER — APPOINTMENT (OUTPATIENT)
Facility: HOSPITAL | Age: 59
End: 2024-08-08
Payer: MEDICARE

## 2024-08-08 VITALS
OXYGEN SATURATION: 97 % | TEMPERATURE: 97.8 F | HEIGHT: 63 IN | DIASTOLIC BLOOD PRESSURE: 80 MMHG | RESPIRATION RATE: 16 BRPM | WEIGHT: 204.6 LBS | BODY MASS INDEX: 36.25 KG/M2 | HEART RATE: 91 BPM | SYSTOLIC BLOOD PRESSURE: 136 MMHG

## 2024-08-08 DIAGNOSIS — C78.00 SECONDARY RENAL CELL CARCINOMA OF LUNG, UNSPECIFIED LATERALITY (HCC): ICD-10-CM

## 2024-08-08 DIAGNOSIS — C64.9 SECONDARY RENAL CELL CARCINOMA OF LUNG, UNSPECIFIED LATERALITY (HCC): ICD-10-CM

## 2024-08-08 DIAGNOSIS — Z09 HOSPITAL DISCHARGE FOLLOW-UP: Primary | ICD-10-CM

## 2024-08-08 DIAGNOSIS — B17.9 ACUTE HEPATITIS: ICD-10-CM

## 2024-08-08 DIAGNOSIS — M51.24 HERNIATED THORACIC DISC WITHOUT MYELOPATHY: ICD-10-CM

## 2024-08-08 DIAGNOSIS — E11.22 TYPE 2 DIABETES MELLITUS WITH CHRONIC KIDNEY DISEASE, WITHOUT LONG-TERM CURRENT USE OF INSULIN, UNSPECIFIED CKD STAGE (HCC): ICD-10-CM

## 2024-08-08 DIAGNOSIS — R65.10 SIRS (SYSTEMIC INFLAMMATORY RESPONSE SYNDROME) (HCC): ICD-10-CM

## 2024-08-08 DIAGNOSIS — E03.9 ACQUIRED HYPOTHYROIDISM: ICD-10-CM

## 2024-08-08 DIAGNOSIS — I10 PRIMARY HYPERTENSION: ICD-10-CM

## 2024-08-08 DIAGNOSIS — J90 PLEURAL EFFUSION: ICD-10-CM

## 2024-08-08 DIAGNOSIS — C64.9 METASTATIC RENAL CELL CARCINOMA, UNSPECIFIED LATERALITY (HCC): ICD-10-CM

## 2024-08-08 RX ORDER — TRAMADOL HYDROCHLORIDE 50 MG/1
50 TABLET ORAL EVERY 8 HOURS PRN
Qty: 20 TABLET | Refills: 0 | Status: SHIPPED | OUTPATIENT
Start: 2024-08-08 | End: 2024-08-15

## 2024-08-08 NOTE — PROGRESS NOTES
Chief Complaint   Patient presents with    Follow-Up from Hospital     /80   Pulse 91   Temp 97.8 °F (36.6 °C)   Resp 16   Ht 1.6 m (5' 2.99\")   Wt 92.8 kg (204 lb 9.6 oz)   SpO2 97%   BMI 36.25 kg/m²   \"Have you been to the ER, urgent care clinic since your last visit?  Hospitalized since your last visit?\"    YES    “Have you seen or consulted any other health care providers outside of [facility] since your last visit?”    NO            Click Here for Release of Records Request    
MCG inhalation capsule INHALE THE CONTENTS OF 1 CAPSULE INTO THE LUNGS ONE TIME DAILY 90 capsule 3    hydrocortisone 2.5 % cream Apply topically 2 times daily. 20 g 0    ondansetron (ZOFRAN-ODT) 8 MG TBDP disintegrating tablet Place 1 tablet under the tongue every 8 hours as needed for Nausea or Vomiting 30 tablet 2    prochlorperazine (COMPAZINE) 10 MG tablet Take 1 tablet by mouth every 6 hours as needed (nausea and vomiting) 60 tablet 2    Blood Glucose Monitoring Suppl (TRUE METRIX METER) w/Device KIT Check sugar once daily.  E 11.9 1 kit 0    blood glucose test strips (TRUE METRIX BLOOD GLUCOSE TEST) strip Test blood sugar once daily.  E 11.9 100 each 3    valsartan (DIOVAN) 320 MG tablet Take 1 tablet by mouth daily For hypertension 90 tablet 3    LORazepam (ATIVAN) 0.5 MG tablet Take 1 tablet by mouth every 6 hours as needed for Anxiety.      dilTIAZem (CARDIZEM CD) 180 MG extended release capsule TAKE 2 CAPSULES EVERY DAY FOR HIGH BLOOD PRESSURE (Patient taking differently: Take 2 capsules by mouth daily) 180 capsule 3    tiZANidine (ZANAFLEX) 2 MG tablet TAKE 1 TABLET TWICE DAILY FOR MUSCLE SPASM(S) 180 tablet 3    traZODone (DESYREL) 50 MG tablet TAKE 2 TABLETS EVERY NIGHT (Patient taking differently: 1-2 tablets nightly) 180 tablet 3    zinc gluconate 50 MG tablet Take 1 tablet by mouth daily      rOPINIRole (REQUIP) 2 MG tablet TAKE 1 TABLET THREE TIMES DAILY FOR RESTLESS LEGS SYNDROME 270 tablet 3    furosemide (LASIX) 20 MG tablet TAKE 1 TABLET EVERY DAY AS NEEDED 90 tablet 3    omeprazole (PRILOSEC) 40 MG delayed release capsule TAKE 1 CAPSULE BEFORE BREAKFAST AND DINNER. 180 capsule 3    levocetirizine (XYZAL) 5 MG tablet TAKE 1 TABLET EVERY DAY 90 tablet 3    metFORMIN (GLUCOPHAGE) 500 MG tablet Take 2 tablets by mouth 2 times daily (with meals)      venlafaxine (EFFEXOR XR) 150 MG extended release capsule TAKE 1 CAPSULE EVERY DAY 90 capsule 0    albuterol sulfate HFA (PROVENTIL;VENTOLIN;PROAIR) 108

## 2024-08-15 DIAGNOSIS — C64.9 RENAL CELL CARCINOMA, UNSPECIFIED LATERALITY (HCC): ICD-10-CM

## 2024-08-15 DIAGNOSIS — R79.89 ELEVATED LFTS: ICD-10-CM

## 2024-08-15 LAB
ALBUMIN SERPL-MCNC: 3.5 G/DL (ref 3.5–5)
ALBUMIN/GLOB SERPL: 1.3 (ref 1.1–2.2)
ALP SERPL-CCNC: 243 U/L (ref 45–117)
ALT SERPL-CCNC: 73 U/L (ref 12–78)
ANION GAP SERPL CALC-SCNC: 3 MMOL/L (ref 5–15)
AST SERPL-CCNC: 20 U/L (ref 15–37)
BILIRUB SERPL-MCNC: 0.3 MG/DL (ref 0.2–1)
BUN SERPL-MCNC: 9 MG/DL (ref 6–20)
BUN/CREAT SERPL: 11 (ref 12–20)
CALCIUM SERPL-MCNC: 9.3 MG/DL (ref 8.5–10.1)
CHLORIDE SERPL-SCNC: 102 MMOL/L (ref 97–108)
CO2 SERPL-SCNC: 31 MMOL/L (ref 21–32)
CREAT SERPL-MCNC: 0.85 MG/DL (ref 0.55–1.02)
GLOBULIN SER CALC-MCNC: 2.6 G/DL (ref 2–4)
GLUCOSE SERPL-MCNC: 143 MG/DL (ref 65–100)
POTASSIUM SERPL-SCNC: 4 MMOL/L (ref 3.5–5.1)
PROT SERPL-MCNC: 6.1 G/DL (ref 6.4–8.2)
SODIUM SERPL-SCNC: 136 MMOL/L (ref 136–145)

## 2024-08-19 DIAGNOSIS — E03.2 HYPOTHYROIDISM DUE TO MEDICATION: ICD-10-CM

## 2024-08-19 RX ORDER — LEVOTHYROXINE SODIUM 0.05 MG/1
50 TABLET ORAL DAILY
Qty: 90 TABLET | Refills: 1 | Status: SHIPPED
Start: 2024-08-19 | End: 2024-08-23 | Stop reason: SDUPTHER

## 2024-08-22 NOTE — PROGRESS NOTES
[facility] [place] at [facility]   [address] [place] [address], [address] [place] [address]   W: [phone]  F: [phone]          Reason for Visit:     Symone Edmonds is a 59 y.o. female who is for    Follow up of leucocytosis and anemia  Stage IV RCC        Treatment history for renal cell carcinoma  7/28/2020-right radical nephrectomy-3.3 cm renal cell carcinoma.  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 female with PMH as below who is seen for abnormal  CBC      She has persistent leucocytosis since 2014, no other CBC abnormalities. She has known R renal cell carcinoma ( 3.3 cm) which she had a R radical nephrectomy on 7/28/2020. She has osteoarthritis and has several flare ups. She had a CT chest 8/2020 that  showed a 6 mm lung nodule which is being observed.  She had a follow-up CT scan with pulmonary on 7/24/2023 which showed increasing bilateral pulmonary nodules.  This prompted a PET CT scan that was done on 8/9/2023.  This showed that the peripheral pulmonary nodule in the right lower lobe had an FDG activity of 3.3, subcentimeter pulmonary nodule in the left lower lobe had no FDG activity.  She was referred to thoracic surgery Dr. Ross and underwent a robotic assisted thoracoscopic right lower lobe wedge resection and mediastinal lymph node dissection on 9/6/2023.  Pathology revealed metastatic renal cell carcinoma in the right lower lobe 1.3 cm nodule. 3/2024 Scans showed progression although EBUS path was benign. Started on Ipi Nivo ans is s/p 4 cycles    Admitted 8/1 with

## 2024-08-23 ENCOUNTER — HOSPITAL ENCOUNTER (OUTPATIENT)
Facility: HOSPITAL | Age: 59
Setting detail: INFUSION SERIES
Discharge: HOME | End: 2024-08-23
Payer: MEDICARE

## 2024-08-23 ENCOUNTER — OFFICE VISIT (OUTPATIENT)
Age: 59
End: 2024-08-23
Payer: MEDICARE

## 2024-08-23 VITALS
DIASTOLIC BLOOD PRESSURE: 83 MMHG | OXYGEN SATURATION: 93 % | BODY MASS INDEX: 35.09 KG/M2 | TEMPERATURE: 98.2 F | SYSTOLIC BLOOD PRESSURE: 126 MMHG | RESPIRATION RATE: 20 BRPM | HEART RATE: 111 BPM | WEIGHT: 198 LBS

## 2024-08-23 DIAGNOSIS — C78.00 SECONDARY RENAL CELL CARCINOMA OF LUNG, UNSPECIFIED LATERALITY (HCC): Primary | ICD-10-CM

## 2024-08-23 DIAGNOSIS — E03.2 HYPOTHYROIDISM DUE TO MEDICATION: ICD-10-CM

## 2024-08-23 DIAGNOSIS — C64.9 SECONDARY RENAL CELL CARCINOMA OF LUNG, UNSPECIFIED LATERALITY (HCC): Primary | ICD-10-CM

## 2024-08-23 DIAGNOSIS — C64.9 MALIGNANT NEOPLASM OF KIDNEY, UNSPECIFIED LATERALITY (HCC): ICD-10-CM

## 2024-08-23 DIAGNOSIS — C78.00 SECONDARY RENAL CELL CARCINOMA OF LUNG, UNSPECIFIED LATERALITY (HCC): ICD-10-CM

## 2024-08-23 DIAGNOSIS — C64.9 SECONDARY RENAL CELL CARCINOMA OF LUNG, UNSPECIFIED LATERALITY (HCC): ICD-10-CM

## 2024-08-23 LAB
ALBUMIN SERPL-MCNC: 3.4 G/DL (ref 3.5–5)
ALBUMIN/GLOB SERPL: 1.1 (ref 1.1–2.2)
ALP SERPL-CCNC: 248 U/L (ref 45–117)
ALT SERPL-CCNC: 140 U/L (ref 12–78)
ANION GAP SERPL CALC-SCNC: 4 MMOL/L (ref 5–15)
AST SERPL-CCNC: 76 U/L (ref 15–37)
BASOPHILS # BLD: 0.1 K/UL (ref 0–0.1)
BASOPHILS NFR BLD: 1 % (ref 0–1)
BILIRUB SERPL-MCNC: 0.3 MG/DL (ref 0.2–1)
BUN SERPL-MCNC: 9 MG/DL (ref 6–20)
BUN/CREAT SERPL: 9 (ref 12–20)
CALCIUM SERPL-MCNC: 9.7 MG/DL (ref 8.5–10.1)
CHLORIDE SERPL-SCNC: 101 MMOL/L (ref 97–108)
CO2 SERPL-SCNC: 29 MMOL/L (ref 21–32)
CREAT SERPL-MCNC: 1.04 MG/DL (ref 0.55–1.02)
DIFFERENTIAL METHOD BLD: ABNORMAL
EOSINOPHIL # BLD: 0.5 K/UL (ref 0–0.4)
EOSINOPHIL NFR BLD: 5 % (ref 0–7)
ERYTHROCYTE [DISTWIDTH] IN BLOOD BY AUTOMATED COUNT: 17.8 % (ref 11.5–14.5)
GLOBULIN SER CALC-MCNC: 3 G/DL (ref 2–4)
GLUCOSE SERPL-MCNC: 173 MG/DL (ref 65–100)
HCT VFR BLD AUTO: 39.9 % (ref 35–47)
HGB BLD-MCNC: 13.3 G/DL (ref 11.5–16)
IMM GRANULOCYTES # BLD AUTO: 0.1 K/UL (ref 0–0.04)
IMM GRANULOCYTES NFR BLD AUTO: 1 % (ref 0–0.5)
LYMPHOCYTES # BLD: 1.9 K/UL (ref 0.8–3.5)
LYMPHOCYTES NFR BLD: 20 % (ref 12–49)
MCH RBC QN AUTO: 27.8 PG (ref 26–34)
MCHC RBC AUTO-ENTMCNC: 33.3 G/DL (ref 30–36.5)
MCV RBC AUTO: 83.5 FL (ref 80–99)
MONOCYTES # BLD: 0.4 K/UL (ref 0–1)
MONOCYTES NFR BLD: 4 % (ref 5–13)
NEUTS SEG # BLD: 6.4 K/UL (ref 1.8–8)
NEUTS SEG NFR BLD: 69 % (ref 32–75)
NRBC # BLD: 0 K/UL (ref 0–0.01)
NRBC BLD-RTO: 0 PER 100 WBC
PLATELET # BLD AUTO: 141 K/UL (ref 150–400)
PMV BLD AUTO: 9.6 FL (ref 8.9–12.9)
POTASSIUM SERPL-SCNC: 4.8 MMOL/L (ref 3.5–5.1)
PROT SERPL-MCNC: 6.4 G/DL (ref 6.4–8.2)
RBC # BLD AUTO: 4.78 M/UL (ref 3.8–5.2)
SODIUM SERPL-SCNC: 134 MMOL/L (ref 136–145)
TSH SERPL DL<=0.05 MIU/L-ACNC: 62.8 UIU/ML (ref 0.36–3.74)
WBC # BLD AUTO: 9.3 K/UL (ref 3.6–11)

## 2024-08-23 PROCEDURE — 3017F COLORECTAL CA SCREEN DOC REV: CPT | Performed by: INTERNAL MEDICINE

## 2024-08-23 PROCEDURE — 3074F SYST BP LT 130 MM HG: CPT | Performed by: INTERNAL MEDICINE

## 2024-08-23 PROCEDURE — 1111F DSCHRG MED/CURRENT MED MERGE: CPT | Performed by: INTERNAL MEDICINE

## 2024-08-23 PROCEDURE — G8428 CUR MEDS NOT DOCUMENT: HCPCS | Performed by: INTERNAL MEDICINE

## 2024-08-23 PROCEDURE — 80053 COMPREHEN METABOLIC PANEL: CPT

## 2024-08-23 PROCEDURE — 3079F DIAST BP 80-89 MM HG: CPT | Performed by: INTERNAL MEDICINE

## 2024-08-23 PROCEDURE — 36415 COLL VENOUS BLD VENIPUNCTURE: CPT

## 2024-08-23 PROCEDURE — 85025 COMPLETE CBC W/AUTO DIFF WBC: CPT

## 2024-08-23 PROCEDURE — 84443 ASSAY THYROID STIM HORMONE: CPT

## 2024-08-23 PROCEDURE — 99215 OFFICE O/P EST HI 40 MIN: CPT | Performed by: INTERNAL MEDICINE

## 2024-08-23 PROCEDURE — G8417 CALC BMI ABV UP PARAM F/U: HCPCS | Performed by: INTERNAL MEDICINE

## 2024-08-23 PROCEDURE — 4004F PT TOBACCO SCREEN RCVD TLK: CPT | Performed by: INTERNAL MEDICINE

## 2024-08-23 RX ORDER — ONDANSETRON 2 MG/ML
8 INJECTION INTRAMUSCULAR; INTRAVENOUS
OUTPATIENT
Start: 2024-08-30

## 2024-08-23 RX ORDER — SODIUM CHLORIDE 9 MG/ML
5-250 INJECTION, SOLUTION INTRAVENOUS PRN
OUTPATIENT
Start: 2024-09-13

## 2024-08-23 RX ORDER — ALBUTEROL SULFATE 90 UG/1
4 AEROSOL, METERED RESPIRATORY (INHALATION) PRN
OUTPATIENT
Start: 2024-08-30

## 2024-08-23 RX ORDER — PREDNISONE 10 MG/1
10 TABLET ORAL DAILY
Qty: 30 TABLET | Refills: 0 | Status: SHIPPED | OUTPATIENT
Start: 2024-08-23 | End: 2024-09-22

## 2024-08-23 RX ORDER — OXYCODONE HYDROCHLORIDE AND ACETAMINOPHEN 5; 325 MG/1; MG/1
1 TABLET ORAL EVERY 6 HOURS PRN
Qty: 12 TABLET | Refills: 0 | Status: SHIPPED | OUTPATIENT
Start: 2024-08-23 | End: 2024-08-26

## 2024-08-23 RX ORDER — ACETAMINOPHEN 325 MG/1
650 TABLET ORAL
OUTPATIENT
Start: 2024-08-30

## 2024-08-23 RX ORDER — SODIUM CHLORIDE 0.9 % (FLUSH) 0.9 %
5-40 SYRINGE (ML) INJECTION PRN
OUTPATIENT
Start: 2024-08-30

## 2024-08-23 RX ORDER — SODIUM CHLORIDE 9 MG/ML
5-250 INJECTION, SOLUTION INTRAVENOUS PRN
OUTPATIENT
Start: 2024-08-30

## 2024-08-23 RX ORDER — DIPHENHYDRAMINE HYDROCHLORIDE 50 MG/ML
50 INJECTION INTRAMUSCULAR; INTRAVENOUS
OUTPATIENT
Start: 2024-08-30

## 2024-08-23 RX ORDER — SODIUM CHLORIDE 0.9 % (FLUSH) 0.9 %
5-40 SYRINGE (ML) INJECTION PRN
OUTPATIENT
Start: 2024-09-13

## 2024-08-23 RX ORDER — SODIUM CHLORIDE 9 MG/ML
INJECTION, SOLUTION INTRAVENOUS CONTINUOUS
OUTPATIENT
Start: 2024-09-13

## 2024-08-23 RX ORDER — SODIUM CHLORIDE 9 MG/ML
INJECTION, SOLUTION INTRAVENOUS CONTINUOUS
OUTPATIENT
Start: 2024-08-30

## 2024-08-23 RX ORDER — EPINEPHRINE 1 MG/ML
0.3 INJECTION, SOLUTION, CONCENTRATE INTRAVENOUS PRN
OUTPATIENT
Start: 2024-08-30

## 2024-08-23 RX ORDER — ONDANSETRON 2 MG/ML
8 INJECTION INTRAMUSCULAR; INTRAVENOUS
OUTPATIENT
Start: 2024-09-13

## 2024-08-23 RX ORDER — HEPARIN 100 UNIT/ML
500 SYRINGE INTRAVENOUS PRN
OUTPATIENT
Start: 2024-08-30

## 2024-08-23 RX ORDER — MEPERIDINE HYDROCHLORIDE 25 MG/ML
12.5 INJECTION INTRAMUSCULAR; INTRAVENOUS; SUBCUTANEOUS PRN
OUTPATIENT
Start: 2024-08-30

## 2024-08-23 RX ORDER — MEPERIDINE HYDROCHLORIDE 25 MG/ML
12.5 INJECTION INTRAMUSCULAR; INTRAVENOUS; SUBCUTANEOUS PRN
OUTPATIENT
Start: 2024-09-13

## 2024-08-23 RX ORDER — HEPARIN 100 UNIT/ML
500 SYRINGE INTRAVENOUS PRN
OUTPATIENT
Start: 2024-09-13

## 2024-08-23 RX ORDER — ACETAMINOPHEN 325 MG/1
650 TABLET ORAL
OUTPATIENT
Start: 2024-09-13

## 2024-08-23 RX ORDER — ALBUTEROL SULFATE 90 UG/1
4 AEROSOL, METERED RESPIRATORY (INHALATION) PRN
OUTPATIENT
Start: 2024-09-13

## 2024-08-23 RX ORDER — DIPHENHYDRAMINE HYDROCHLORIDE 50 MG/ML
50 INJECTION INTRAMUSCULAR; INTRAVENOUS
OUTPATIENT
Start: 2024-09-13

## 2024-08-23 RX ORDER — LEVOTHYROXINE SODIUM 0.05 MG/1
50 TABLET ORAL DAILY
Qty: 90 TABLET | Refills: 1 | Status: SHIPPED | OUTPATIENT
Start: 2024-08-23

## 2024-08-23 RX ORDER — EPINEPHRINE 1 MG/ML
0.3 INJECTION, SOLUTION, CONCENTRATE INTRAVENOUS PRN
OUTPATIENT
Start: 2024-09-13

## 2024-08-23 NOTE — PROGRESS NOTES
Symone Edmonds is a 59 y.o. female    Chief Complaint   Patient presents with    Follow-up     leucocytosis and anemia  Stage IV RCC        1. Have you been to the ER, urgent care clinic since your last visit?  Hospitalized since your last visit? Yes, [facility] and [facility]    2. Have you seen or consulted any other health care providers outside of the [facility] since your last visit?  Include any pap smears or colon screening. No

## 2024-08-23 NOTE — PROGRESS NOTES
[facility] Progress Note    Date: 2024    Name: Symone Edmonds    MRN: [id number]         : 1965      1000:  Pt arrived ambulatory and in no distress, for lab visit.  Labs drawn via L AC, patient tolerated well.      Departed [facility] ambulatory and in no distress for Medical Oncology appointment upstairs.    Future Appointments   Date Time Provider Department Center   2024 10:40 AM Elisha Marley MD [facility] AMB   2024 10:30 AM Patricia Suazo MD [facility]   2025  2:30 PM Emeli Zamora, APRN - NP [facility] AMB       Miranda Perez, RN  2024

## 2024-08-26 ENCOUNTER — TELEPHONE (OUTPATIENT)
Age: 59
End: 2024-08-26

## 2024-08-26 DIAGNOSIS — R74.01 TRANSAMINITIS: Primary | ICD-10-CM

## 2024-08-26 RX ORDER — PREDNISONE 10 MG/1
40 TABLET ORAL DAILY
Qty: 60 TABLET | Refills: 0 | Status: SHIPPED | OUTPATIENT
Start: 2024-08-26

## 2024-08-26 NOTE — TELEPHONE ENCOUNTER
1100  Called pt HIPAA verified x2  Made pt aware Dr. Winchester reviewed her labs and her Her LFTs are rising again   She has sent in a script for her to take take 40 mg of prednisone for 4 days and repeat labs next week. Hold nivolumab for now. She will need to get weekly labs until we can taper down to 10 over 3 weeks.  Pt voiced understanding, inquired if she needs iron- confirmed with Mickey NP she does not need iron at this time.  Pt voiced understanding and was appreciative of my call.

## 2024-09-03 DIAGNOSIS — C64.9 SECONDARY RENAL CELL CARCINOMA OF LUNG, UNSPECIFIED LATERALITY (HCC): ICD-10-CM

## 2024-09-03 DIAGNOSIS — C64.9 MALIGNANT NEOPLASM OF KIDNEY, UNSPECIFIED LATERALITY (HCC): ICD-10-CM

## 2024-09-03 DIAGNOSIS — C78.00 SECONDARY RENAL CELL CARCINOMA OF LUNG, UNSPECIFIED LATERALITY (HCC): ICD-10-CM

## 2024-09-03 LAB
ALBUMIN SERPL-MCNC: 4 G/DL (ref 3.5–5)
ALBUMIN/GLOB SERPL: 1.3 (ref 1.1–2.2)
ALP SERPL-CCNC: 230 U/L (ref 45–117)
ALT SERPL-CCNC: 46 U/L (ref 12–78)
ANION GAP SERPL CALC-SCNC: 9 MMOL/L (ref 5–15)
AST SERPL-CCNC: 14 U/L (ref 15–37)
BASOPHILS # BLD: 0.2 K/UL (ref 0–0.1)
BASOPHILS NFR BLD: 1 % (ref 0–1)
BILIRUB SERPL-MCNC: 0.3 MG/DL (ref 0.2–1)
BUN SERPL-MCNC: 15 MG/DL (ref 6–20)
BUN/CREAT SERPL: 13 (ref 12–20)
CALCIUM SERPL-MCNC: 10.8 MG/DL (ref 8.5–10.1)
CHLORIDE SERPL-SCNC: 97 MMOL/L (ref 97–108)
CO2 SERPL-SCNC: 29 MMOL/L (ref 21–32)
CREAT SERPL-MCNC: 1.15 MG/DL (ref 0.55–1.02)
DIFFERENTIAL METHOD BLD: ABNORMAL
EOSINOPHIL # BLD: 0.2 K/UL (ref 0–0.4)
EOSINOPHIL NFR BLD: 1 % (ref 0–7)
ERYTHROCYTE [DISTWIDTH] IN BLOOD BY AUTOMATED COUNT: 18.5 % (ref 11.5–14.5)
GLOBULIN SER CALC-MCNC: 3 G/DL (ref 2–4)
GLUCOSE SERPL-MCNC: 228 MG/DL (ref 65–100)
HCT VFR BLD AUTO: 42.9 % (ref 35–47)
HGB BLD-MCNC: 14.2 G/DL (ref 11.5–16)
IMM GRANULOCYTES # BLD AUTO: 1 K/UL (ref 0–0.04)
IMM GRANULOCYTES NFR BLD AUTO: 5 % (ref 0–0.5)
LYMPHOCYTES # BLD: 3.9 K/UL (ref 0.8–3.5)
LYMPHOCYTES NFR BLD: 20 % (ref 12–49)
MCH RBC QN AUTO: 28.3 PG (ref 26–34)
MCHC RBC AUTO-ENTMCNC: 33.1 G/DL (ref 30–36.5)
MCV RBC AUTO: 85.6 FL (ref 80–99)
MONOCYTES # BLD: 1.2 K/UL (ref 0–1)
MONOCYTES NFR BLD: 6 % (ref 5–13)
NEUTS SEG # BLD: 12.8 K/UL (ref 1.8–8)
NEUTS SEG NFR BLD: 67 % (ref 32–75)
NRBC # BLD: 0 K/UL (ref 0–0.01)
NRBC BLD-RTO: 0 PER 100 WBC
PLATELET # BLD AUTO: 397 K/UL (ref 150–400)
PMV BLD AUTO: 9.6 FL (ref 8.9–12.9)
POTASSIUM SERPL-SCNC: 3.6 MMOL/L (ref 3.5–5.1)
PROT SERPL-MCNC: 7 G/DL (ref 6.4–8.2)
RBC # BLD AUTO: 5.01 M/UL (ref 3.8–5.2)
RBC MORPH BLD: ABNORMAL
SODIUM SERPL-SCNC: 135 MMOL/L (ref 136–145)
WBC # BLD AUTO: 19.3 K/UL (ref 3.6–11)

## 2024-09-04 ENCOUNTER — TELEPHONE (OUTPATIENT)
Age: 59
End: 2024-09-04

## 2024-09-04 DIAGNOSIS — C64.9 RENAL CELL CARCINOMA, UNSPECIFIED LATERALITY (HCC): Primary | ICD-10-CM

## 2024-09-04 RX ORDER — PREDNISONE 10 MG/1
TABLET ORAL
Qty: 40 TABLET | Refills: 0 | Status: SHIPPED | OUTPATIENT
Start: 2024-09-04

## 2024-09-04 NOTE — TELEPHONE ENCOUNTER
1630 [facility] x2  Called pt and made her aware ELEANOR Arevalo reviewed her labs and her liver enzynes are looking better- will decrease steroids to 20mg x 7 days at least, then repeat labs and   hope to get down to10mg daily.  Pt voiced understanding and was appreciative of my call.

## 2024-09-05 ENCOUNTER — APPOINTMENT (OUTPATIENT)
Facility: HOSPITAL | Age: 59
End: 2024-09-05
Payer: MEDICARE

## 2024-09-09 ENCOUNTER — TELEMEDICINE (OUTPATIENT)
Age: 59
End: 2024-09-09

## 2024-09-09 DIAGNOSIS — C64.9 METASTATIC RENAL CELL CARCINOMA, UNSPECIFIED LATERALITY (HCC): Primary | ICD-10-CM

## 2024-09-09 DIAGNOSIS — B37.9 YEAST INFECTION: ICD-10-CM

## 2024-09-09 DIAGNOSIS — R74.01 TRANSAMINITIS: ICD-10-CM

## 2024-09-09 DIAGNOSIS — J30.89 SEASONAL ALLERGIC RHINITIS DUE TO OTHER ALLERGIC TRIGGER: ICD-10-CM

## 2024-09-09 DIAGNOSIS — M51.24 HERNIATED THORACIC DISC WITHOUT MYELOPATHY: ICD-10-CM

## 2024-09-09 DIAGNOSIS — E03.9 ACQUIRED HYPOTHYROIDISM: ICD-10-CM

## 2024-09-09 PROCEDURE — 4004F PT TOBACCO SCREEN RCVD TLK: CPT | Performed by: FAMILY MEDICINE

## 2024-09-09 PROCEDURE — G8427 DOCREV CUR MEDS BY ELIG CLIN: HCPCS | Performed by: FAMILY MEDICINE

## 2024-09-09 PROCEDURE — 3017F COLORECTAL CA SCREEN DOC REV: CPT | Performed by: FAMILY MEDICINE

## 2024-09-09 PROCEDURE — G8417 CALC BMI ABV UP PARAM F/U: HCPCS | Performed by: FAMILY MEDICINE

## 2024-09-09 PROCEDURE — 99213 OFFICE O/P EST LOW 20 MIN: CPT | Performed by: FAMILY MEDICINE

## 2024-09-09 RX ORDER — FLUTICASONE PROPIONATE 50 MCG
2 SPRAY, SUSPENSION (ML) NASAL DAILY
Qty: 48 G | Refills: 1 | Status: SHIPPED | OUTPATIENT
Start: 2024-09-09

## 2024-09-09 RX ORDER — FLUCONAZOLE 150 MG/1
150 TABLET ORAL ONCE
Qty: 1 TABLET | Refills: 0 | Status: SHIPPED | OUTPATIENT
Start: 2024-09-09 | End: 2024-09-09

## 2024-09-09 ASSESSMENT — ENCOUNTER SYMPTOMS: BACK PAIN: 1

## 2024-09-11 ENCOUNTER — TRANSCRIBE ORDERS (OUTPATIENT)
Facility: HOSPITAL | Age: 59
End: 2024-09-11

## 2024-09-11 ENCOUNTER — HOSPITAL ENCOUNTER (OUTPATIENT)
Facility: HOSPITAL | Age: 59
Discharge: HOME | End: 2024-09-14
Payer: MEDICARE

## 2024-09-11 DIAGNOSIS — R74.01 TRANSAMINITIS: ICD-10-CM

## 2024-09-11 DIAGNOSIS — M51.34 DEGENERATION OF THORACIC INTERVERTEBRAL DISC: Primary | ICD-10-CM

## 2024-09-11 DIAGNOSIS — M51.34 DEGENERATION OF THORACIC INTERVERTEBRAL DISC: ICD-10-CM

## 2024-09-11 LAB
ALBUMIN SERPL-MCNC: 4 G/DL (ref 3.5–5)
ALBUMIN/GLOB SERPL: 1.3 (ref 1.1–2.2)
ALP SERPL-CCNC: 160 U/L (ref 45–117)
ALT SERPL-CCNC: 34 U/L (ref 12–78)
ANION GAP SERPL CALC-SCNC: 7 MMOL/L (ref 2–12)
AST SERPL-CCNC: 15 U/L (ref 15–37)
BILIRUB SERPL-MCNC: 0.3 MG/DL (ref 0.2–1)
BUN SERPL-MCNC: 12 MG/DL (ref 6–20)
BUN/CREAT SERPL: 11 (ref 12–20)
CALCIUM SERPL-MCNC: 10.1 MG/DL (ref 8.5–10.1)
CHLORIDE SERPL-SCNC: 102 MMOL/L (ref 97–108)
CO2 SERPL-SCNC: 26 MMOL/L (ref 21–32)
CREAT SERPL-MCNC: 1.08 MG/DL (ref 0.55–1.02)
GLOBULIN SER CALC-MCNC: 3 G/DL (ref 2–4)
GLUCOSE SERPL-MCNC: 248 MG/DL (ref 65–100)
POTASSIUM SERPL-SCNC: 4.9 MMOL/L (ref 3.5–5.1)
PROT SERPL-MCNC: 7 G/DL (ref 6.4–8.2)
SODIUM SERPL-SCNC: 135 MMOL/L (ref 136–145)

## 2024-09-11 PROCEDURE — 72072 X-RAY EXAM THORAC SPINE 3VWS: CPT

## 2024-09-12 ENCOUNTER — TELEPHONE (OUTPATIENT)
Age: 59
End: 2024-09-12

## 2024-09-13 ENCOUNTER — HOSPITAL ENCOUNTER (OUTPATIENT)
Facility: HOSPITAL | Age: 59
Setting detail: INFUSION SERIES
End: 2024-09-13

## 2024-09-19 ENCOUNTER — APPOINTMENT (OUTPATIENT)
Facility: HOSPITAL | Age: 59
End: 2024-09-19
Payer: MEDICARE

## 2024-09-20 ENCOUNTER — OFFICE VISIT (OUTPATIENT)
Age: 59
End: 2024-09-20
Payer: MEDICARE

## 2024-09-20 VITALS
OXYGEN SATURATION: 96 % | BODY MASS INDEX: 35.79 KG/M2 | HEART RATE: 106 BPM | WEIGHT: 202 LBS | HEIGHT: 63 IN | SYSTOLIC BLOOD PRESSURE: 138 MMHG | DIASTOLIC BLOOD PRESSURE: 88 MMHG

## 2024-09-20 DIAGNOSIS — R00.0 TACHYCARDIA: ICD-10-CM

## 2024-09-20 DIAGNOSIS — E03.2 HYPOTHYROIDISM DUE TO MEDICATION: ICD-10-CM

## 2024-09-20 DIAGNOSIS — R74.01 TRANSAMINITIS: ICD-10-CM

## 2024-09-20 DIAGNOSIS — F17.210 CIGARETTE NICOTINE DEPENDENCE WITHOUT COMPLICATION: ICD-10-CM

## 2024-09-20 DIAGNOSIS — C78.00 SECONDARY RENAL CELL CARCINOMA OF LUNG, UNSPECIFIED LATERALITY (HCC): ICD-10-CM

## 2024-09-20 DIAGNOSIS — I48.0 PAROXYSMAL ATRIAL FIBRILLATION (HCC): ICD-10-CM

## 2024-09-20 DIAGNOSIS — Z85.528 HX OF RENAL CELL CANCER: ICD-10-CM

## 2024-09-20 DIAGNOSIS — C64.9 MALIGNANT NEOPLASM OF KIDNEY, UNSPECIFIED LATERALITY (HCC): Primary | ICD-10-CM

## 2024-09-20 DIAGNOSIS — C64.9 SECONDARY RENAL CELL CARCINOMA OF LUNG, UNSPECIFIED LATERALITY (HCC): ICD-10-CM

## 2024-09-20 DIAGNOSIS — I10 HYPERTENSION, UNSPECIFIED TYPE: Primary | ICD-10-CM

## 2024-09-20 LAB
ALBUMIN SERPL-MCNC: 3.7 G/DL (ref 3.5–5)
ALBUMIN/GLOB SERPL: 1.4 (ref 1.1–2.2)
ALP SERPL-CCNC: 144 U/L (ref 45–117)
ALT SERPL-CCNC: 42 U/L (ref 12–78)
ANION GAP SERPL CALC-SCNC: 9 MMOL/L (ref 2–12)
AST SERPL-CCNC: 14 U/L (ref 15–37)
BILIRUB SERPL-MCNC: 0.3 MG/DL (ref 0.2–1)
BUN SERPL-MCNC: 15 MG/DL (ref 6–20)
BUN/CREAT SERPL: 15 (ref 12–20)
CALCIUM SERPL-MCNC: 9.3 MG/DL (ref 8.5–10.1)
CHLORIDE SERPL-SCNC: 100 MMOL/L (ref 97–108)
CO2 SERPL-SCNC: 26 MMOL/L (ref 21–32)
CREAT SERPL-MCNC: 1 MG/DL (ref 0.55–1.02)
GLOBULIN SER CALC-MCNC: 2.7 G/DL (ref 2–4)
GLUCOSE SERPL-MCNC: 253 MG/DL (ref 65–100)
POTASSIUM SERPL-SCNC: 3.6 MMOL/L (ref 3.5–5.1)
PROT SERPL-MCNC: 6.4 G/DL (ref 6.4–8.2)
SODIUM SERPL-SCNC: 135 MMOL/L (ref 136–145)

## 2024-09-20 PROCEDURE — 4004F PT TOBACCO SCREEN RCVD TLK: CPT | Performed by: INTERNAL MEDICINE

## 2024-09-20 PROCEDURE — 3017F COLORECTAL CA SCREEN DOC REV: CPT | Performed by: INTERNAL MEDICINE

## 2024-09-20 PROCEDURE — 3075F SYST BP GE 130 - 139MM HG: CPT | Performed by: INTERNAL MEDICINE

## 2024-09-20 PROCEDURE — 3079F DIAST BP 80-89 MM HG: CPT | Performed by: INTERNAL MEDICINE

## 2024-09-20 PROCEDURE — 99214 OFFICE O/P EST MOD 30 MIN: CPT | Performed by: INTERNAL MEDICINE

## 2024-09-20 PROCEDURE — G8417 CALC BMI ABV UP PARAM F/U: HCPCS | Performed by: INTERNAL MEDICINE

## 2024-09-20 PROCEDURE — G8427 DOCREV CUR MEDS BY ELIG CLIN: HCPCS | Performed by: INTERNAL MEDICINE

## 2024-09-20 ASSESSMENT — PATIENT HEALTH QUESTIONNAIRE - PHQ9
SUM OF ALL RESPONSES TO PHQ QUESTIONS 1-9: 0
2. FEELING DOWN, DEPRESSED OR HOPELESS: NOT AT ALL
SUM OF ALL RESPONSES TO PHQ QUESTIONS 1-9: 0
1. LITTLE INTEREST OR PLEASURE IN DOING THINGS: NOT AT ALL
SUM OF ALL RESPONSES TO PHQ QUESTIONS 1-9: 0
SUM OF ALL RESPONSES TO PHQ9 QUESTIONS 1 & 2: 0
SUM OF ALL RESPONSES TO PHQ QUESTIONS 1-9: 0

## 2024-09-27 ENCOUNTER — TELEMEDICINE (OUTPATIENT)
Age: 59
End: 2024-09-27
Payer: MEDICARE

## 2024-09-27 ENCOUNTER — HOSPITAL ENCOUNTER (OUTPATIENT)
Facility: HOSPITAL | Age: 59
Setting detail: INFUSION SERIES
Discharge: HOME | End: 2024-09-27
Payer: MEDICARE

## 2024-09-27 VITALS
BODY MASS INDEX: 34.15 KG/M2 | DIASTOLIC BLOOD PRESSURE: 74 MMHG | OXYGEN SATURATION: 94 % | WEIGHT: 200 LBS | TEMPERATURE: 97.9 F | SYSTOLIC BLOOD PRESSURE: 153 MMHG | HEART RATE: 103 BPM | HEIGHT: 64 IN | RESPIRATION RATE: 20 BRPM

## 2024-09-27 DIAGNOSIS — C64.9 SECONDARY RENAL CELL CARCINOMA OF LUNG, UNSPECIFIED LATERALITY (HCC): ICD-10-CM

## 2024-09-27 DIAGNOSIS — C64.9 RENAL CELL CARCINOMA, UNSPECIFIED LATERALITY (HCC): Primary | ICD-10-CM

## 2024-09-27 DIAGNOSIS — C64.9 MALIGNANT NEOPLASM OF KIDNEY, UNSPECIFIED LATERALITY (HCC): Primary | ICD-10-CM

## 2024-09-27 DIAGNOSIS — E03.2 HYPOTHYROIDISM DUE TO MEDICATION: Primary | ICD-10-CM

## 2024-09-27 DIAGNOSIS — C78.00 SECONDARY RENAL CELL CARCINOMA OF LUNG, UNSPECIFIED LATERALITY (HCC): ICD-10-CM

## 2024-09-27 DIAGNOSIS — E03.2 HYPOTHYROIDISM DUE TO MEDICATION: ICD-10-CM

## 2024-09-27 LAB
ALBUMIN SERPL-MCNC: 3.7 G/DL (ref 3.5–5)
ALBUMIN/GLOB SERPL: 1.2 (ref 1.1–2.2)
ALP SERPL-CCNC: 136 U/L (ref 45–117)
ALT SERPL-CCNC: 39 U/L (ref 12–78)
ANION GAP SERPL CALC-SCNC: 8 MMOL/L (ref 2–12)
APPEARANCE UR: CLEAR
AST SERPL-CCNC: 14 U/L (ref 15–37)
BACTERIA URNS QL MICRO: NEGATIVE /HPF
BASOPHILS # BLD: 0.2 K/UL (ref 0–0.1)
BASOPHILS NFR BLD: 1 % (ref 0–1)
BILIRUB SERPL-MCNC: 0.4 MG/DL (ref 0.2–1)
BILIRUB UR QL: NEGATIVE
BUN SERPL-MCNC: 10 MG/DL (ref 6–20)
BUN/CREAT SERPL: 10 (ref 12–20)
CALCIUM SERPL-MCNC: 9.7 MG/DL (ref 8.5–10.1)
CHLORIDE SERPL-SCNC: 104 MMOL/L (ref 97–108)
CO2 SERPL-SCNC: 23 MMOL/L (ref 21–32)
COLOR UR: ABNORMAL
CREAT SERPL-MCNC: 0.96 MG/DL (ref 0.55–1.02)
DIFFERENTIAL METHOD BLD: ABNORMAL
EOSINOPHIL # BLD: 0.7 K/UL (ref 0–0.4)
EOSINOPHIL NFR BLD: 5 % (ref 0–7)
EPITH CASTS URNS QL MICRO: ABNORMAL /LPF
ERYTHROCYTE [DISTWIDTH] IN BLOOD BY AUTOMATED COUNT: 16.3 % (ref 11.5–14.5)
GLOBULIN SER CALC-MCNC: 3.1 G/DL (ref 2–4)
GLUCOSE SERPL-MCNC: 209 MG/DL (ref 65–100)
GLUCOSE UR STRIP.AUTO-MCNC: >1000 MG/DL
HCT VFR BLD AUTO: 43.7 % (ref 35–47)
HGB BLD-MCNC: 14.9 G/DL (ref 11.5–16)
HGB UR QL STRIP: NEGATIVE
HYALINE CASTS URNS QL MICRO: ABNORMAL /LPF (ref 0–2)
IMM GRANULOCYTES # BLD AUTO: 0.1 K/UL (ref 0–0.04)
IMM GRANULOCYTES NFR BLD AUTO: 1 % (ref 0–0.5)
KETONES UR QL STRIP.AUTO: ABNORMAL MG/DL
LEUKOCYTE ESTERASE UR QL STRIP.AUTO: ABNORMAL
LYMPHOCYTES # BLD: 2.8 K/UL (ref 0.8–3.5)
LYMPHOCYTES NFR BLD: 19 % (ref 12–49)
MCH RBC QN AUTO: 29.6 PG (ref 26–34)
MCHC RBC AUTO-ENTMCNC: 34.1 G/DL (ref 30–36.5)
MCV RBC AUTO: 86.7 FL (ref 80–99)
MONOCYTES # BLD: 0.6 K/UL (ref 0–1)
MONOCYTES NFR BLD: 4 % (ref 5–13)
NEUTS SEG # BLD: 10.5 K/UL (ref 1.8–8)
NEUTS SEG NFR BLD: 70 % (ref 32–75)
NITRITE UR QL STRIP.AUTO: NEGATIVE
NRBC # BLD: 0 K/UL (ref 0–0.01)
NRBC BLD-RTO: 0 PER 100 WBC
PH UR STRIP: 7 (ref 5–8)
PLATELET # BLD AUTO: 210 K/UL (ref 150–400)
PMV BLD AUTO: 9.3 FL (ref 8.9–12.9)
POTASSIUM SERPL-SCNC: 3.7 MMOL/L (ref 3.5–5.1)
PROT SERPL-MCNC: 6.8 G/DL (ref 6.4–8.2)
PROT UR STRIP-MCNC: 30 MG/DL
RBC # BLD AUTO: 5.04 M/UL (ref 3.8–5.2)
RBC #/AREA URNS HPF: ABNORMAL /HPF (ref 0–5)
SODIUM SERPL-SCNC: 135 MMOL/L (ref 136–145)
SP GR UR REFRACTOMETRY: 1.03 (ref 1–1.03)
TSH SERPL DL<=0.05 MIU/L-ACNC: 71.3 UIU/ML (ref 0.36–3.74)
URINE CULTURE IF INDICATED: ABNORMAL
UROBILINOGEN UR QL STRIP.AUTO: 1 EU/DL (ref 0.2–1)
WBC # BLD AUTO: 15 K/UL (ref 3.6–11)
WBC URNS QL MICRO: >100 /HPF (ref 0–4)

## 2024-09-27 PROCEDURE — 6360000002 HC RX W HCPCS

## 2024-09-27 PROCEDURE — 81001 URINALYSIS AUTO W/SCOPE: CPT

## 2024-09-27 PROCEDURE — 36415 COLL VENOUS BLD VENIPUNCTURE: CPT

## 2024-09-27 PROCEDURE — 80053 COMPREHEN METABOLIC PANEL: CPT

## 2024-09-27 PROCEDURE — 87186 SC STD MICRODIL/AGAR DIL: CPT

## 2024-09-27 PROCEDURE — G8428 CUR MEDS NOT DOCUMENT: HCPCS | Performed by: INTERNAL MEDICINE

## 2024-09-27 PROCEDURE — 99215 OFFICE O/P EST HI 40 MIN: CPT | Performed by: INTERNAL MEDICINE

## 2024-09-27 PROCEDURE — 96413 CHEMO IV INFUSION 1 HR: CPT

## 2024-09-27 PROCEDURE — 87088 URINE BACTERIA CULTURE: CPT

## 2024-09-27 PROCEDURE — 3017F COLORECTAL CA SCREEN DOC REV: CPT | Performed by: INTERNAL MEDICINE

## 2024-09-27 PROCEDURE — 85025 COMPLETE CBC W/AUTO DIFF WBC: CPT

## 2024-09-27 PROCEDURE — 84443 ASSAY THYROID STIM HORMONE: CPT

## 2024-09-27 PROCEDURE — 87086 URINE CULTURE/COLONY COUNT: CPT

## 2024-09-27 PROCEDURE — 2580000003 HC RX 258

## 2024-09-27 RX ORDER — SODIUM CHLORIDE 0.9 % (FLUSH) 0.9 %
5-40 SYRINGE (ML) INJECTION PRN
Status: CANCELLED | OUTPATIENT
Start: 2024-09-27

## 2024-09-27 RX ORDER — ONDANSETRON 2 MG/ML
8 INJECTION INTRAMUSCULAR; INTRAVENOUS
Status: CANCELLED | OUTPATIENT
Start: 2024-09-27

## 2024-09-27 RX ORDER — SODIUM CHLORIDE 9 MG/ML
INJECTION, SOLUTION INTRAVENOUS CONTINUOUS
Status: CANCELLED | OUTPATIENT
Start: 2024-09-27

## 2024-09-27 RX ORDER — HEPARIN 100 UNIT/ML
500 SYRINGE INTRAVENOUS PRN
Status: CANCELLED | OUTPATIENT
Start: 2024-09-27

## 2024-09-27 RX ORDER — ALBUTEROL SULFATE 90 UG/1
4 INHALANT RESPIRATORY (INHALATION) PRN
Status: CANCELLED | OUTPATIENT
Start: 2024-09-27

## 2024-09-27 RX ORDER — DIPHENHYDRAMINE HYDROCHLORIDE 50 MG/ML
50 INJECTION INTRAMUSCULAR; INTRAVENOUS
Status: CANCELLED | OUTPATIENT
Start: 2024-09-27

## 2024-09-27 RX ORDER — MEPERIDINE HYDROCHLORIDE 25 MG/ML
12.5 INJECTION INTRAMUSCULAR; INTRAVENOUS; SUBCUTANEOUS PRN
Status: CANCELLED | OUTPATIENT
Start: 2024-09-27

## 2024-09-27 RX ORDER — SODIUM CHLORIDE 9 MG/ML
5-250 INJECTION, SOLUTION INTRAVENOUS PRN
Status: CANCELLED | OUTPATIENT
Start: 2024-09-27

## 2024-09-27 RX ORDER — ONDANSETRON 2 MG/ML
8 INJECTION INTRAMUSCULAR; INTRAVENOUS
Status: DISCONTINUED | OUTPATIENT
Start: 2024-09-27 | End: 2024-09-28 | Stop reason: HOSPADM

## 2024-09-27 RX ORDER — SODIUM CHLORIDE 0.9 % (FLUSH) 0.9 %
5-40 SYRINGE (ML) INJECTION PRN
Status: DISCONTINUED | OUTPATIENT
Start: 2024-09-27 | End: 2024-09-28 | Stop reason: HOSPADM

## 2024-09-27 RX ORDER — EPINEPHRINE 1 MG/ML
0.3 INJECTION, SOLUTION, CONCENTRATE INTRAVENOUS PRN
Status: CANCELLED | OUTPATIENT
Start: 2024-09-27

## 2024-09-27 RX ORDER — SODIUM CHLORIDE 9 MG/ML
5-250 INJECTION, SOLUTION INTRAVENOUS PRN
Status: DISCONTINUED | OUTPATIENT
Start: 2024-09-27 | End: 2024-09-28 | Stop reason: HOSPADM

## 2024-09-27 RX ORDER — LEVOTHYROXINE SODIUM 75 UG/1
75 TABLET ORAL DAILY
Qty: 30 TABLET | Refills: 1 | Status: SHIPPED | OUTPATIENT
Start: 2024-09-27

## 2024-09-27 RX ORDER — ACETAMINOPHEN 325 MG/1
650 TABLET ORAL
Status: CANCELLED | OUTPATIENT
Start: 2024-09-27

## 2024-09-27 RX ADMIN — SODIUM CHLORIDE 240 MG: 9 INJECTION, SOLUTION INTRAVENOUS at 09:49

## 2024-09-27 RX ADMIN — SODIUM CHLORIDE 25 ML/HR: 9 INJECTION, SOLUTION INTRAVENOUS at 09:45

## 2024-09-27 RX ADMIN — Medication 10 ML: at 09:30

## 2024-09-27 ASSESSMENT — PAIN DESCRIPTION - LOCATION: LOCATION: BACK

## 2024-09-27 ASSESSMENT — PAIN DESCRIPTION - ORIENTATION: ORIENTATION: MID

## 2024-09-27 ASSESSMENT — PAIN DESCRIPTION - DESCRIPTORS: DESCRIPTORS: ACHING;SHARP

## 2024-09-27 ASSESSMENT — PAIN DESCRIPTION - PAIN TYPE: TYPE: CHRONIC PAIN

## 2024-09-27 ASSESSMENT — PAIN SCALES - GENERAL: PAINLEVEL_OUTOF10: 7

## 2024-09-27 NOTE — PROGRESS NOTES
[facility] Progress Note    Date: 2024    Name: Symone Edmonds    MRN: [id number]         : 1965    Ms. Edmonds arrived ambulatory and in no distress for C5D1 of Opdivo Regimen.  Assessment was completed, patient c/o fatigue, poor eyesight, back pain.   Also she has new c/o burning with urination.  Right arm PIV #24 placed after 2 attempts, labs drawn and sent for processing.      Patient participated in a virtual visit with Dr. Lori Grimm to proceed.  New order received for a u/a with reflex culture.      Ms. Edmonds's vitals were reviewed.  Vitals:    24 0756 24 1022   BP: 136/83 (!) 153/74   Pulse: (!) 113 (!) 103   Resp: 20 20   Temp: 97.5 °F (36.4 °C) 97.9 °F (36.6 °C)   TempSrc: Temporal Temporal   SpO2: 94%    Weight: 90.7 kg (200 lb)    Height: 1.613 m (5' 3.5\")         Lab results were obtained and reviewed.  Recent Results (from the past 12 hour(s))   CBC with Auto Differential    Collection Time: 24  8:22 AM   Result Value Ref Range    WBC 15.0 (H) 3.6 - 11.0 K/uL    RBC 5.04 3.80 - 5.20 M/uL    Hemoglobin 14.9 11.5 - 16.0 g/dL    Hematocrit 43.7 35.0 - 47.0 %    MCV 86.7 80.0 - 99.0 FL    MCH 29.6 26.0 - 34.0 PG    MCHC 34.1 30.0 - 36.5 g/dL    RDW 16.3 (H) 11.5 - 14.5 %    Platelets 210 150 - 400 K/uL    MPV 9.3 8.9 - 12.9 FL    Nucleated RBCs 0.0 0  WBC    nRBC 0.00 0.00 - 0.01 K/uL    Neutrophils % 70 32 - 75 %    Lymphocytes % 19 12 - 49 %    Monocytes % 4 (L) 5 - 13 %    Eosinophils % 5 0 - 7 %    Basophils % 1 0 - 1 %    Immature Granulocytes % 1 (H) 0.0 - 0.5 %    Neutrophils Absolute 10.5 (H) 1.8 - 8.0 K/UL    Lymphocytes Absolute 2.8 0.8 - 3.5 K/UL    Monocytes Absolute 0.6 0.0 - 1.0 K/UL    Eosinophils Absolute 0.7 (H) 0.0 - 0.4 K/UL    Basophils Absolute 0.2 (H) 0.0 - 0.1 K/UL    Immature Granulocytes Absolute 0.1 (H) 0.00 - 0.04 K/UL    Differential Type AUTOMATED     Comprehensive Metabolic Panel    Collection Time: 24  8:22 AM   Result Value Ref Range

## 2024-09-29 DIAGNOSIS — N30.00 ACUTE CYSTITIS WITHOUT HEMATURIA: Primary | ICD-10-CM

## 2024-09-29 LAB
BACTERIA SPEC CULT: ABNORMAL
BACTERIA SPEC CULT: ABNORMAL
CC UR VC: ABNORMAL
SERVICE CMNT-IMP: ABNORMAL

## 2024-09-29 RX ORDER — CIPROFLOXACIN 250 MG/1
250 TABLET, FILM COATED ORAL 2 TIMES DAILY
Qty: 14 TABLET | Refills: 0 | Status: SHIPPED | OUTPATIENT
Start: 2024-09-29 | End: 2024-10-06

## 2024-10-03 ENCOUNTER — APPOINTMENT (OUTPATIENT)
Facility: HOSPITAL | Age: 59
End: 2024-10-03
Payer: MEDICARE

## 2024-10-03 DIAGNOSIS — C64.9 RENAL CELL CARCINOMA, UNSPECIFIED LATERALITY (HCC): ICD-10-CM

## 2024-10-03 LAB
ALBUMIN SERPL-MCNC: 4 G/DL (ref 3.5–5)
ALBUMIN/GLOB SERPL: 1.6 (ref 1.1–2.2)
ALP SERPL-CCNC: 142 U/L (ref 45–117)
ALT SERPL-CCNC: 41 U/L (ref 12–78)
ANION GAP SERPL CALC-SCNC: 6 MMOL/L (ref 2–12)
AST SERPL-CCNC: 17 U/L (ref 15–37)
BILIRUB SERPL-MCNC: 0.3 MG/DL (ref 0.2–1)
BUN SERPL-MCNC: 13 MG/DL (ref 6–20)
BUN/CREAT SERPL: 13 (ref 12–20)
CALCIUM SERPL-MCNC: 9.7 MG/DL (ref 8.5–10.1)
CHLORIDE SERPL-SCNC: 102 MMOL/L (ref 97–108)
CO2 SERPL-SCNC: 26 MMOL/L (ref 21–32)
CREAT SERPL-MCNC: 1.03 MG/DL (ref 0.55–1.02)
GLOBULIN SER CALC-MCNC: 2.5 G/DL (ref 2–4)
GLUCOSE SERPL-MCNC: 246 MG/DL (ref 65–100)
POTASSIUM SERPL-SCNC: 3.6 MMOL/L (ref 3.5–5.1)
PROT SERPL-MCNC: 6.5 G/DL (ref 6.4–8.2)
SODIUM SERPL-SCNC: 134 MMOL/L (ref 136–145)

## 2024-10-04 ENCOUNTER — TELEPHONE (OUTPATIENT)
Age: 59
End: 2024-10-04

## 2024-10-04 NOTE — TELEPHONE ENCOUNTER
HIPAA x2   SW pt to let her know that her LFTs look good and to continue taking her 10 mg prednisone. Pt verbalized understanding and was appreciative of my call.        ----- Message from Dr. Nhi Winchester MD sent at 10/3/2024  8:16 PM EDT -----  LFTs look good  Continue 10 mg prednisone   Please let her know

## 2024-10-05 ENCOUNTER — CLINICAL DOCUMENTATION (OUTPATIENT)
Age: 59
End: 2024-10-05

## 2024-10-05 NOTE — PROGRESS NOTES
Left message to conf new day/time.Per email: The following fibroscans need to be moved to [facility]’s new fibroscan blocks (Tuesdays 1-3 pm and Wednesdays 8 to 11 am)   Grey Ashby [phone] 1/6/2025 2:30 PM Monday Fibroscan

## 2024-10-08 DIAGNOSIS — C64.9 SECONDARY RENAL CELL CARCINOMA OF LUNG, UNSPECIFIED LATERALITY (HCC): ICD-10-CM

## 2024-10-08 DIAGNOSIS — E03.2 HYPOTHYROIDISM DUE TO MEDICATION: ICD-10-CM

## 2024-10-08 DIAGNOSIS — C78.00 SECONDARY RENAL CELL CARCINOMA OF LUNG, UNSPECIFIED LATERALITY (HCC): ICD-10-CM

## 2024-10-08 DIAGNOSIS — C64.9 RENAL CELL CARCINOMA, UNSPECIFIED LATERALITY (HCC): Primary | ICD-10-CM

## 2024-10-11 ENCOUNTER — TELEPHONE (OUTPATIENT)
Age: 59
End: 2024-10-11

## 2024-10-11 ENCOUNTER — TELEMEDICINE (OUTPATIENT)
Age: 59
End: 2024-10-11
Payer: MEDICARE

## 2024-10-11 ENCOUNTER — HOSPITAL ENCOUNTER (OUTPATIENT)
Facility: HOSPITAL | Age: 59
Setting detail: INFUSION SERIES
Discharge: HOME | End: 2024-10-11
Payer: MEDICARE

## 2024-10-11 VITALS
TEMPERATURE: 97.9 F | HEART RATE: 101 BPM | SYSTOLIC BLOOD PRESSURE: 147 MMHG | OXYGEN SATURATION: 96 % | DIASTOLIC BLOOD PRESSURE: 87 MMHG | HEIGHT: 64 IN | BODY MASS INDEX: 34.04 KG/M2 | WEIGHT: 199.4 LBS

## 2024-10-11 DIAGNOSIS — C78.00 SECONDARY RENAL CELL CARCINOMA OF LUNG, UNSPECIFIED LATERALITY (HCC): Primary | ICD-10-CM

## 2024-10-11 DIAGNOSIS — E03.2 HYPOTHYROIDISM DUE TO MEDICATION: ICD-10-CM

## 2024-10-11 DIAGNOSIS — C64.9 RENAL CELL CARCINOMA, UNSPECIFIED LATERALITY (HCC): ICD-10-CM

## 2024-10-11 DIAGNOSIS — C64.9 SECONDARY RENAL CELL CARCINOMA OF LUNG, UNSPECIFIED LATERALITY (HCC): Primary | ICD-10-CM

## 2024-10-11 LAB
ALBUMIN SERPL-MCNC: 3.9 G/DL (ref 3.5–5)
ALBUMIN/GLOB SERPL: 1.3 (ref 1.1–2.2)
ALP SERPL-CCNC: 130 U/L (ref 45–117)
ALT SERPL-CCNC: 48 U/L (ref 12–78)
ANION GAP SERPL CALC-SCNC: 6 MMOL/L (ref 2–12)
AST SERPL-CCNC: 26 U/L (ref 15–37)
BASOPHILS # BLD: 0.2 K/UL (ref 0–0.1)
BASOPHILS NFR BLD: 1 % (ref 0–1)
BILIRUB SERPL-MCNC: 0.3 MG/DL (ref 0.2–1)
BUN SERPL-MCNC: 12 MG/DL (ref 6–20)
BUN/CREAT SERPL: 12 (ref 12–20)
CALCIUM SERPL-MCNC: 9.4 MG/DL (ref 8.5–10.1)
CHLORIDE SERPL-SCNC: 102 MMOL/L (ref 97–108)
CO2 SERPL-SCNC: 27 MMOL/L (ref 21–32)
CREAT SERPL-MCNC: 0.99 MG/DL (ref 0.55–1.02)
DIFFERENTIAL METHOD BLD: ABNORMAL
EOSINOPHIL # BLD: 0.9 K/UL (ref 0–0.4)
EOSINOPHIL NFR BLD: 6 % (ref 0–7)
ERYTHROCYTE [DISTWIDTH] IN BLOOD BY AUTOMATED COUNT: 15.4 % (ref 11.5–14.5)
GLOBULIN SER CALC-MCNC: 3.1 G/DL (ref 2–4)
GLUCOSE SERPL-MCNC: 210 MG/DL (ref 65–100)
HCT VFR BLD AUTO: 43 % (ref 35–47)
HGB BLD-MCNC: 14.7 G/DL (ref 11.5–16)
IMM GRANULOCYTES # BLD AUTO: 0.2 K/UL (ref 0–0.04)
IMM GRANULOCYTES NFR BLD AUTO: 1 % (ref 0–0.5)
LYMPHOCYTES # BLD: 3.1 K/UL (ref 0.8–3.5)
LYMPHOCYTES NFR BLD: 20 % (ref 12–49)
MCH RBC QN AUTO: 29.6 PG (ref 26–34)
MCHC RBC AUTO-ENTMCNC: 34.2 G/DL (ref 30–36.5)
MCV RBC AUTO: 86.7 FL (ref 80–99)
MONOCYTES # BLD: 0.6 K/UL (ref 0–1)
MONOCYTES NFR BLD: 4 % (ref 5–13)
NEUTS SEG # BLD: 10.3 K/UL (ref 1.8–8)
NEUTS SEG NFR BLD: 68 % (ref 32–75)
NRBC # BLD: 0 K/UL (ref 0–0.01)
NRBC BLD-RTO: 0 PER 100 WBC
PLATELET # BLD AUTO: 250 K/UL (ref 150–400)
PLATELET COMMENT: ABNORMAL
PMV BLD AUTO: 10 FL (ref 8.9–12.9)
POTASSIUM SERPL-SCNC: 3.4 MMOL/L (ref 3.5–5.1)
PROT SERPL-MCNC: 7 G/DL (ref 6.4–8.2)
RBC # BLD AUTO: 4.96 M/UL (ref 3.8–5.2)
RBC MORPH BLD: ABNORMAL
SODIUM SERPL-SCNC: 135 MMOL/L (ref 136–145)
TSH SERPL DL<=0.05 MIU/L-ACNC: 91.2 UIU/ML (ref 0.36–3.74)
WBC # BLD AUTO: 15.3 K/UL (ref 3.6–11)
WBC MORPH BLD: ABNORMAL

## 2024-10-11 PROCEDURE — 2580000003 HC RX 258

## 2024-10-11 PROCEDURE — G8428 CUR MEDS NOT DOCUMENT: HCPCS | Performed by: INTERNAL MEDICINE

## 2024-10-11 PROCEDURE — 99215 OFFICE O/P EST HI 40 MIN: CPT | Performed by: INTERNAL MEDICINE

## 2024-10-11 PROCEDURE — 3017F COLORECTAL CA SCREEN DOC REV: CPT | Performed by: INTERNAL MEDICINE

## 2024-10-11 PROCEDURE — 85025 COMPLETE CBC W/AUTO DIFF WBC: CPT

## 2024-10-11 PROCEDURE — 96413 CHEMO IV INFUSION 1 HR: CPT

## 2024-10-11 PROCEDURE — 84443 ASSAY THYROID STIM HORMONE: CPT

## 2024-10-11 PROCEDURE — 80053 COMPREHEN METABOLIC PANEL: CPT

## 2024-10-11 PROCEDURE — 6360000002 HC RX W HCPCS

## 2024-10-11 RX ORDER — PREDNISONE 5 MG/1
5 TABLET ORAL DAILY
Qty: 30 TABLET | Refills: 2 | Status: SHIPPED | OUTPATIENT
Start: 2024-10-11 | End: 2024-11-10

## 2024-10-11 RX ORDER — SODIUM CHLORIDE 9 MG/ML
INJECTION, SOLUTION INTRAVENOUS CONTINUOUS
Status: DISCONTINUED | OUTPATIENT
Start: 2024-10-11 | End: 2024-10-12 | Stop reason: HOSPADM

## 2024-10-11 RX ORDER — EPINEPHRINE 1 MG/ML
0.3 INJECTION, SOLUTION INTRAMUSCULAR; SUBCUTANEOUS PRN
Status: DISCONTINUED | OUTPATIENT
Start: 2024-10-11 | End: 2024-10-12 | Stop reason: HOSPADM

## 2024-10-11 RX ORDER — SODIUM CHLORIDE 0.9 % (FLUSH) 0.9 %
5-40 SYRINGE (ML) INJECTION PRN
Status: DISCONTINUED | OUTPATIENT
Start: 2024-10-11 | End: 2024-10-12 | Stop reason: HOSPADM

## 2024-10-11 RX ORDER — MEPERIDINE HYDROCHLORIDE 25 MG/ML
12.5 INJECTION INTRAMUSCULAR; INTRAVENOUS; SUBCUTANEOUS PRN
Status: DISCONTINUED | OUTPATIENT
Start: 2024-10-11 | End: 2024-10-12 | Stop reason: HOSPADM

## 2024-10-11 RX ORDER — SODIUM CHLORIDE 9 MG/ML
5-250 INJECTION, SOLUTION INTRAVENOUS PRN
Status: DISCONTINUED | OUTPATIENT
Start: 2024-10-11 | End: 2024-10-12 | Stop reason: HOSPADM

## 2024-10-11 RX ORDER — ONDANSETRON 2 MG/ML
8 INJECTION INTRAMUSCULAR; INTRAVENOUS
Status: DISCONTINUED | OUTPATIENT
Start: 2024-10-11 | End: 2024-10-12 | Stop reason: HOSPADM

## 2024-10-11 RX ORDER — HEPARIN 100 UNIT/ML
500 SYRINGE INTRAVENOUS PRN
Status: DISCONTINUED | OUTPATIENT
Start: 2024-10-11 | End: 2024-10-12 | Stop reason: HOSPADM

## 2024-10-11 RX ORDER — LEVOTHYROXINE SODIUM 100 UG/1
100 TABLET ORAL DAILY
Qty: 30 TABLET | Refills: 1 | Status: SHIPPED | OUTPATIENT
Start: 2024-10-11

## 2024-10-11 RX ORDER — ACETAMINOPHEN 325 MG/1
650 TABLET ORAL
Status: DISCONTINUED | OUTPATIENT
Start: 2024-10-11 | End: 2024-10-12 | Stop reason: HOSPADM

## 2024-10-11 RX ORDER — FAMOTIDINE 10 MG/ML
20 INJECTION, SOLUTION INTRAVENOUS
Status: DISCONTINUED | OUTPATIENT
Start: 2024-10-11 | End: 2024-10-12 | Stop reason: HOSPADM

## 2024-10-11 RX ORDER — DIPHENHYDRAMINE HYDROCHLORIDE 50 MG/ML
50 INJECTION INTRAMUSCULAR; INTRAVENOUS
Status: DISCONTINUED | OUTPATIENT
Start: 2024-10-11 | End: 2024-10-12 | Stop reason: HOSPADM

## 2024-10-11 RX ORDER — ALBUTEROL SULFATE 90 UG/1
4 INHALANT RESPIRATORY (INHALATION) PRN
Status: DISCONTINUED | OUTPATIENT
Start: 2024-10-11 | End: 2024-10-12 | Stop reason: HOSPADM

## 2024-10-11 RX ADMIN — SODIUM CHLORIDE 240 MG: 9 INJECTION, SOLUTION INTRAVENOUS at 12:02

## 2024-10-11 RX ADMIN — SODIUM CHLORIDE 25 ML/HR: 9 INJECTION, SOLUTION INTRAVENOUS at 11:55

## 2024-10-11 ASSESSMENT — PAIN SCALES - GENERAL: PAINLEVEL_OUTOF10: 5

## 2024-10-11 ASSESSMENT — PAIN DESCRIPTION - LOCATION: LOCATION: BACK;STERNUM

## 2024-10-11 NOTE — PROGRESS NOTES
[facility] Chemotherapy Progress Note  Date: 2024  Name: Symone Edmonds  MRN: [id number]       : 1965    Pt arrived ambulatory to [facility] for C6 Nivolumab   Assessment completed, no new concerns.   PIV inserted in LFA with +BR labs drawn and sent for processing. Pt proceeded to virtual MD visit.   Parameters met for treatment.   TSH 91.2 today Lena guillory NP notified.     Ms. Edmonds's vitals were reviewed.  Vitals:    10/11/24 0907 10/11/24 1239   BP: (!) 161/88 (!) 147/87   Pulse: (!) 109 (!) 101   Temp: 97.9 °F (36.6 °C)    TempSrc: Temporal    SpO2: 96%    Weight: 90.4 kg (199 lb 6.4 oz)    Height: 1.613 m (5' 3.5\")            Lab results were obtained and reviewed.  Recent Results (from the past 12 hour(s))   Comprehensive Metabolic Panel    Collection Time: 10/11/24  9:10 AM   Result Value Ref Range    Sodium 135 (L) 136 - 145 mmol/L    Potassium 3.4 (L) 3.5 - 5.1 mmol/L    Chloride 102 97 - 108 mmol/L    CO2 27 21 - 32 mmol/L    Anion Gap 6 2 - 12 mmol/L    Glucose 210 (H) 65 - 100 mg/dL    BUN 12 6 - 20 MG/DL    Creatinine 0.99 0.55 - 1.02 MG/DL    BUN/Creatinine Ratio 12 12 - 20      Est, Glom Filt Rate 65 >60 ml/min/1.73m2    Calcium 9.4 8.5 - 10.1 MG/DL    Total Bilirubin 0.3 0.2 - 1.0 MG/DL    ALT 48 12 - 78 U/L    AST 26 15 - 37 U/L    Alk Phosphatase 130 (H) 45 - 117 U/L    Total Protein 7.0 6.4 - 8.2 g/dL    Albumin 3.9 3.5 - 5.0 g/dL    Globulin 3.1 2.0 - 4.0 g/dL    Albumin/Globulin Ratio 1.3 1.1 - 2.2     CBC with Auto Differential    Collection Time: 10/11/24  9:10 AM   Result Value Ref Range    WBC 15.3 (H) 3.6 - 11.0 K/uL    RBC 4.96 3.80 - 5.20 M/uL    Hemoglobin 14.7 11.5 - 16.0 g/dL    Hematocrit 43.0 35.0 - 47.0 %    MCV 86.7 80.0 - 99.0 FL    MCH 29.6 26.0 - 34.0 PG    MCHC 34.2 30.0 - 36.5 g/dL    RDW 15.4 (H) 11.5 - 14.5 %    Platelets 250 150 - 400 K/uL    MPV 10.0 8.9 - 12.9 FL    Nucleated RBCs 0.0 0  WBC    nRBC 0.00 0.00 - 0.01 K/uL    Neutrophils % 68 32 - 75 %

## 2024-10-11 NOTE — PROGRESS NOTES
Cancer [place] at [facility]   [address] [place] [address], [address] [place] [address]   W: [phone]  F: [phone]          Reason for Visit:     Symone Edmonds is a 59 y.o. female who is for    Follow up of leucocytosis and anemia  Stage IV RCC     Symone Edmonds is a 59 y.o. female who is seen by synchronous (real-time) audio-video technology on 10/11/2024 for follow up       Treatment history for renal cell carcinoma  7/28/2020-right radical nephrectomy-3.3 cm renal cell carcinoma.  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 female with PMH as below who is seen for abnormal  CBC      She has persistent leucocytosis since 2014, no other CBC abnormalities. She has known R renal cell carcinoma ( 3.3 cm) which she had a R radical nephrectomy on 7/28/2020. She has osteoarthritis and has several flare ups. She had a CT chest 8/2020 that  showed a 6 mm lung nodule which is being observed.  She had a follow-up CT scan with pulmonary on 7/24/2023 which showed increasing bilateral pulmonary nodules.  This prompted a PET CT scan that was done on 8/9/2023.  This showed that the peripheral pulmonary nodule in the right lower lobe had an FDG activity of 3.3, subcentimeter pulmonary nodule in the left lower lobe had no FDG activity.  She was referred to thoracic surgery Dr. Ross and underwent a robotic assisted thoracoscopic right lower lobe wedge resection and mediastinal lymph node dissection on 9/6/2023.  Pathology revealed metastatic renal cell carcinoma in the right lower lobe 1.3 cm nodule.

## 2024-10-11 NOTE — TELEPHONE ENCOUNTER
HIPAA x2   SW to let her know that Dr. Winchester is increasing her synthroid and she needs to go to her pharmacy to get it picked up. Pt verbalized understanding and was appreciative of my call.

## 2024-10-16 RX ORDER — TRAZODONE HYDROCHLORIDE 50 MG/1
TABLET, FILM COATED ORAL
Qty: 180 TABLET | Refills: 3 | Status: SHIPPED | OUTPATIENT
Start: 2024-10-16

## 2024-10-16 RX ORDER — DILTIAZEM HYDROCHLORIDE 180 MG/1
CAPSULE, COATED, EXTENDED RELEASE ORAL
Qty: 180 CAPSULE | Refills: 3 | Status: SHIPPED | OUTPATIENT
Start: 2024-10-16

## 2024-10-16 RX ORDER — TIZANIDINE 2 MG/1
TABLET ORAL
Qty: 180 TABLET | Refills: 3 | Status: SHIPPED | OUTPATIENT
Start: 2024-10-16

## 2024-10-24 NOTE — PROGRESS NOTES
Cancer [place] at [facility]   [address] [place] [address], [address] [place] [address]   W: [phone]  F: [phone]          Reason for Visit:     Symone Edmonds is a 59 y.o. female who is for    Follow up of leucocytosis and anemia  Stage IV RCC     Symone Edmonds is a 59 y.o. female who is seen by synchronous (real-time) audio-video technology on 10/25/2024 for follow up       Treatment history for renal cell carcinoma  7/28/2020-right radical nephrectomy-3.3 cm renal cell carcinoma.  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 female with PMH as below who is seen for abnormal  CBC      She has persistent leucocytosis since 2014, no other CBC abnormalities. She has known R renal cell carcinoma ( 3.3 cm) which she had a R radical nephrectomy on 7/28/2020. She has osteoarthritis and has several flare ups. She had a CT chest 8/2020 that  showed a 6 mm lung nodule which is being observed.  She had a follow-up CT scan with pulmonary on 7/24/2023 which showed increasing bilateral pulmonary nodules.  This prompted a PET CT scan that was done on 8/9/2023.  This showed that the peripheral pulmonary nodule in the right lower lobe had an FDG activity of 3.3, subcentimeter pulmonary nodule in the left lower lobe had no FDG activity.  She was referred to thoracic surgery Dr. Ross and underwent a robotic assisted thoracoscopic right lower lobe wedge resection and mediastinal lymph node dissection on 9/6/2023.  Pathology revealed metastatic renal cell carcinoma in the right lower lobe 1.3 cm nodule.

## 2024-10-25 ENCOUNTER — TELEMEDICINE (OUTPATIENT)
Age: 59
End: 2024-10-25
Payer: MEDICARE

## 2024-10-25 ENCOUNTER — APPOINTMENT (OUTPATIENT)
Facility: HOSPITAL | Age: 59
End: 2024-10-25
Payer: MEDICARE

## 2024-10-25 ENCOUNTER — HOSPITAL ENCOUNTER (OUTPATIENT)
Facility: HOSPITAL | Age: 59
Setting detail: INFUSION SERIES
Discharge: HOME | End: 2024-10-25
Payer: MEDICARE

## 2024-10-25 VITALS
WEIGHT: 195.2 LBS | TEMPERATURE: 97.6 F | SYSTOLIC BLOOD PRESSURE: 156 MMHG | BODY MASS INDEX: 33.32 KG/M2 | DIASTOLIC BLOOD PRESSURE: 64 MMHG | HEART RATE: 99 BPM | OXYGEN SATURATION: 97 % | HEIGHT: 64 IN

## 2024-10-25 DIAGNOSIS — C64.9 SECONDARY RENAL CELL CARCINOMA OF LUNG, UNSPECIFIED LATERALITY (HCC): ICD-10-CM

## 2024-10-25 DIAGNOSIS — C64.9 SECONDARY RENAL CELL CARCINOMA OF LUNG, UNSPECIFIED LATERALITY (HCC): Primary | ICD-10-CM

## 2024-10-25 DIAGNOSIS — C64.9 RENAL CELL CARCINOMA, UNSPECIFIED LATERALITY (HCC): Primary | ICD-10-CM

## 2024-10-25 DIAGNOSIS — C78.00 SECONDARY RENAL CELL CARCINOMA OF LUNG, UNSPECIFIED LATERALITY (HCC): Primary | ICD-10-CM

## 2024-10-25 DIAGNOSIS — C78.00 SECONDARY RENAL CELL CARCINOMA OF LUNG, UNSPECIFIED LATERALITY (HCC): ICD-10-CM

## 2024-10-25 DIAGNOSIS — C64.1 RENAL CELL CARCINOMA OF RIGHT KIDNEY METASTATIC TO OTHER SITE (HCC): ICD-10-CM

## 2024-10-25 LAB
ALBUMIN SERPL-MCNC: 3.8 G/DL (ref 3.5–5)
ALBUMIN/GLOB SERPL: 1.3 (ref 1.1–2.2)
ALP SERPL-CCNC: 130 U/L (ref 45–117)
ALT SERPL-CCNC: 48 U/L (ref 12–78)
ANION GAP SERPL CALC-SCNC: 5 MMOL/L (ref 2–12)
AST SERPL-CCNC: 21 U/L (ref 15–37)
BASOPHILS # BLD: 0.2 K/UL (ref 0–0.1)
BASOPHILS NFR BLD: 1 % (ref 0–1)
BILIRUB SERPL-MCNC: 0.3 MG/DL (ref 0.2–1)
BUN SERPL-MCNC: 9 MG/DL (ref 6–20)
BUN/CREAT SERPL: 9 (ref 12–20)
CALCIUM SERPL-MCNC: 9.6 MG/DL (ref 8.5–10.1)
CHLORIDE SERPL-SCNC: 105 MMOL/L (ref 97–108)
CO2 SERPL-SCNC: 26 MMOL/L (ref 21–32)
CREAT SERPL-MCNC: 0.98 MG/DL (ref 0.55–1.02)
DIFFERENTIAL METHOD BLD: ABNORMAL
EOSINOPHIL # BLD: 1.1 K/UL (ref 0–0.4)
EOSINOPHIL NFR BLD: 7 % (ref 0–7)
ERYTHROCYTE [DISTWIDTH] IN BLOOD BY AUTOMATED COUNT: 15.3 % (ref 11.5–14.5)
GLOBULIN SER CALC-MCNC: 3 G/DL (ref 2–4)
GLUCOSE SERPL-MCNC: 167 MG/DL (ref 65–100)
HCT VFR BLD AUTO: 43.4 % (ref 35–47)
HGB BLD-MCNC: 15.2 G/DL (ref 11.5–16)
IMM GRANULOCYTES # BLD AUTO: 0.2 K/UL (ref 0–0.04)
IMM GRANULOCYTES NFR BLD AUTO: 1 % (ref 0–0.5)
LYMPHOCYTES # BLD: 3.1 K/UL (ref 0.8–3.5)
LYMPHOCYTES NFR BLD: 20 % (ref 12–49)
MCH RBC QN AUTO: 31 PG (ref 26–34)
MCHC RBC AUTO-ENTMCNC: 35 G/DL (ref 30–36.5)
MCV RBC AUTO: 88.6 FL (ref 80–99)
MONOCYTES # BLD: 0.6 K/UL (ref 0–1)
MONOCYTES NFR BLD: 4 % (ref 5–13)
NEUTS SEG # BLD: 10.3 K/UL (ref 1.8–8)
NEUTS SEG NFR BLD: 67 % (ref 32–75)
NRBC # BLD: 0 K/UL (ref 0–0.01)
NRBC BLD-RTO: 0 PER 100 WBC
PLATELET # BLD AUTO: 263 K/UL (ref 150–400)
PMV BLD AUTO: 9.8 FL (ref 8.9–12.9)
POTASSIUM SERPL-SCNC: 3.6 MMOL/L (ref 3.5–5.1)
PROT SERPL-MCNC: 6.8 G/DL (ref 6.4–8.2)
RBC # BLD AUTO: 4.9 M/UL (ref 3.8–5.2)
RBC MORPH BLD: ABNORMAL
SODIUM SERPL-SCNC: 136 MMOL/L (ref 136–145)
WBC # BLD AUTO: 15.5 K/UL (ref 3.6–11)

## 2024-10-25 PROCEDURE — 3017F COLORECTAL CA SCREEN DOC REV: CPT | Performed by: INTERNAL MEDICINE

## 2024-10-25 PROCEDURE — G8428 CUR MEDS NOT DOCUMENT: HCPCS | Performed by: INTERNAL MEDICINE

## 2024-10-25 PROCEDURE — 96413 CHEMO IV INFUSION 1 HR: CPT

## 2024-10-25 PROCEDURE — 99214 OFFICE O/P EST MOD 30 MIN: CPT | Performed by: INTERNAL MEDICINE

## 2024-10-25 PROCEDURE — 36415 COLL VENOUS BLD VENIPUNCTURE: CPT

## 2024-10-25 PROCEDURE — 80053 COMPREHEN METABOLIC PANEL: CPT

## 2024-10-25 PROCEDURE — 2580000003 HC RX 258: Performed by: INTERNAL MEDICINE

## 2024-10-25 PROCEDURE — 6360000002 HC RX W HCPCS: Performed by: INTERNAL MEDICINE

## 2024-10-25 PROCEDURE — 85025 COMPLETE CBC W/AUTO DIFF WBC: CPT

## 2024-10-25 RX ORDER — SODIUM CHLORIDE 0.9 % (FLUSH) 0.9 %
5-40 SYRINGE (ML) INJECTION PRN
Status: DISCONTINUED | OUTPATIENT
Start: 2024-10-25 | End: 2024-10-26 | Stop reason: HOSPADM

## 2024-10-25 RX ORDER — FAMOTIDINE 10 MG/ML
20 INJECTION, SOLUTION INTRAVENOUS
Status: DISCONTINUED | OUTPATIENT
Start: 2024-10-25 | End: 2024-10-26 | Stop reason: HOSPADM

## 2024-10-25 RX ORDER — ALBUTEROL SULFATE 90 UG/1
4 INHALANT RESPIRATORY (INHALATION) PRN
Status: DISCONTINUED | OUTPATIENT
Start: 2024-10-25 | End: 2024-10-26 | Stop reason: HOSPADM

## 2024-10-25 RX ORDER — ONDANSETRON 2 MG/ML
8 INJECTION INTRAMUSCULAR; INTRAVENOUS
Status: DISCONTINUED | OUTPATIENT
Start: 2024-10-25 | End: 2024-10-26 | Stop reason: HOSPADM

## 2024-10-25 RX ORDER — SODIUM CHLORIDE 9 MG/ML
INJECTION, SOLUTION INTRAVENOUS CONTINUOUS
Status: DISCONTINUED | OUTPATIENT
Start: 2024-10-25 | End: 2024-10-26 | Stop reason: HOSPADM

## 2024-10-25 RX ORDER — DIPHENHYDRAMINE HYDROCHLORIDE 50 MG/ML
50 INJECTION INTRAMUSCULAR; INTRAVENOUS
Status: DISCONTINUED | OUTPATIENT
Start: 2024-10-25 | End: 2024-10-26 | Stop reason: HOSPADM

## 2024-10-25 RX ORDER — MEPERIDINE HYDROCHLORIDE 25 MG/ML
12.5 INJECTION INTRAMUSCULAR; INTRAVENOUS; SUBCUTANEOUS PRN
Status: DISCONTINUED | OUTPATIENT
Start: 2024-10-25 | End: 2024-10-26 | Stop reason: HOSPADM

## 2024-10-25 RX ORDER — SODIUM CHLORIDE 9 MG/ML
5-250 INJECTION, SOLUTION INTRAVENOUS PRN
Status: DISCONTINUED | OUTPATIENT
Start: 2024-10-25 | End: 2024-10-26 | Stop reason: HOSPADM

## 2024-10-25 RX ORDER — HEPARIN 100 UNIT/ML
500 SYRINGE INTRAVENOUS PRN
Status: DISCONTINUED | OUTPATIENT
Start: 2024-10-25 | End: 2024-10-26 | Stop reason: HOSPADM

## 2024-10-25 RX ORDER — ACETAMINOPHEN 325 MG/1
650 TABLET ORAL
Status: DISCONTINUED | OUTPATIENT
Start: 2024-10-25 | End: 2024-10-26 | Stop reason: HOSPADM

## 2024-10-25 RX ORDER — EPINEPHRINE 1 MG/ML
0.3 INJECTION, SOLUTION INTRAMUSCULAR; SUBCUTANEOUS PRN
Status: DISCONTINUED | OUTPATIENT
Start: 2024-10-25 | End: 2024-10-26 | Stop reason: HOSPADM

## 2024-10-25 RX ADMIN — SODIUM CHLORIDE 240 MG: 9 INJECTION, SOLUTION INTRAVENOUS at 11:09

## 2024-10-25 ASSESSMENT — PAIN SCALES - GENERAL: PAINLEVEL_OUTOF10: 0

## 2024-10-25 NOTE — PROGRESS NOTES
[facility] Chemotherapy Progress Note  Date: 2024  Name: Symone Edmonds  MRN: [id number]       : 1965    Pt arrived ambulatory to [facility] for C7 Nivolumab   Assessment completed, no new concerns voiced. PIV inserted in RFA with +BR labs drawn and sent for processing. Pt proceeded with virtual MD visit. Parameters met for treatment today.      Ms. Edmonds's vitals were reviewed.  Vitals:    10/25/24 0804 10/25/24 1007 10/25/24 1012   BP: (!) 175/95 (!) 175/94 (!) 156/64   Pulse: 99     Temp: 97.6 °F (36.4 °C)     TempSrc: Temporal     SpO2: 97%     Weight: 88.5 kg (195 lb 3.2 oz)     Height: 1.613 m (5' 3.5\")             Lab results were obtained and reviewed.  Recent Results (from the past 12 hour(s))   Comprehensive metabolic panel    Collection Time: 10/25/24  8:07 AM   Result Value Ref Range    Sodium 136 136 - 145 mmol/L    Potassium 3.6 3.5 - 5.1 mmol/L    Chloride 105 97 - 108 mmol/L    CO2 26 21 - 32 mmol/L    Anion Gap 5 2 - 12 mmol/L    Glucose 167 (H) 65 - 100 mg/dL    BUN 9 6 - 20 MG/DL    Creatinine 0.98 0.55 - 1.02 MG/DL    BUN/Creatinine Ratio 9 (L) 12 - 20      Est, Glom Filt Rate 66 >60 ml/min/1.73m2    Calcium 9.6 8.5 - 10.1 MG/DL    Total Bilirubin 0.3 0.2 - 1.0 MG/DL    ALT 48 12 - 78 U/L    AST 21 15 - 37 U/L    Alk Phosphatase 130 (H) 45 - 117 U/L    Total Protein 6.8 6.4 - 8.2 g/dL    Albumin 3.8 3.5 - 5.0 g/dL    Globulin 3.0 2.0 - 4.0 g/dL    Albumin/Globulin Ratio 1.3 1.1 - 2.2     CBC With Auto Differential    Collection Time: 10/25/24  8:07 AM   Result Value Ref Range    WBC 15.5 (H) 3.6 - 11.0 K/uL    RBC 4.90 3.80 - 5.20 M/uL    Hemoglobin 15.2 11.5 - 16.0 g/dL    Hematocrit 43.4 35.0 - 47.0 %    MCV 88.6 80.0 - 99.0 FL    MCH 31.0 26.0 - 34.0 PG    MCHC 35.0 30.0 - 36.5 g/dL    RDW 15.3 (H) 11.5 - 14.5 %    Platelets 263 150 - 400 K/uL    MPV 9.8 8.9 - 12.9 FL    Nucleated RBCs 0.0 0  WBC    nRBC 0.00 0.00 - 0.01 K/uL    Neutrophils % 67 32 - 75 %    Lymphocytes % 20 12 - 49

## 2024-11-01 RX ORDER — ONDANSETRON 2 MG/ML
8 INJECTION INTRAMUSCULAR; INTRAVENOUS
Status: CANCELLED | OUTPATIENT
Start: 2024-11-08

## 2024-11-01 RX ORDER — DIPHENHYDRAMINE HYDROCHLORIDE 50 MG/ML
50 INJECTION INTRAMUSCULAR; INTRAVENOUS
Status: CANCELLED | OUTPATIENT
Start: 2024-11-08

## 2024-11-01 RX ORDER — SODIUM CHLORIDE 9 MG/ML
INJECTION, SOLUTION INTRAVENOUS CONTINUOUS
Status: CANCELLED | OUTPATIENT
Start: 2024-11-08

## 2024-11-01 RX ORDER — FAMOTIDINE 10 MG/ML
20 INJECTION, SOLUTION INTRAVENOUS
Status: CANCELLED | OUTPATIENT
Start: 2024-11-08

## 2024-11-01 RX ORDER — HEPARIN 100 UNIT/ML
500 SYRINGE INTRAVENOUS PRN
Status: CANCELLED | OUTPATIENT
Start: 2024-11-08

## 2024-11-01 RX ORDER — SODIUM CHLORIDE 9 MG/ML
5-250 INJECTION, SOLUTION INTRAVENOUS PRN
Status: CANCELLED | OUTPATIENT
Start: 2024-11-08

## 2024-11-01 RX ORDER — SODIUM CHLORIDE 0.9 % (FLUSH) 0.9 %
5-40 SYRINGE (ML) INJECTION PRN
Status: CANCELLED | OUTPATIENT
Start: 2024-11-08

## 2024-11-01 RX ORDER — MEPERIDINE HYDROCHLORIDE 25 MG/ML
12.5 INJECTION INTRAMUSCULAR; INTRAVENOUS; SUBCUTANEOUS PRN
Status: CANCELLED | OUTPATIENT
Start: 2024-11-08

## 2024-11-01 RX ORDER — EPINEPHRINE 1 MG/ML
0.3 INJECTION, SOLUTION INTRAMUSCULAR; SUBCUTANEOUS PRN
Status: CANCELLED | OUTPATIENT
Start: 2024-11-08

## 2024-11-01 RX ORDER — ACETAMINOPHEN 325 MG/1
650 TABLET ORAL
Status: CANCELLED | OUTPATIENT
Start: 2024-11-08

## 2024-11-01 RX ORDER — ALBUTEROL SULFATE 90 UG/1
4 INHALANT RESPIRATORY (INHALATION) PRN
Status: CANCELLED | OUTPATIENT
Start: 2024-11-08

## 2024-11-02 DIAGNOSIS — K21.9 GASTROESOPHAGEAL REFLUX DISEASE, UNSPECIFIED WHETHER ESOPHAGITIS PRESENT: Primary | ICD-10-CM

## 2024-11-03 RX ORDER — FUROSEMIDE 20 MG/1
TABLET ORAL
Qty: 90 TABLET | Refills: 3 | Status: SHIPPED | OUTPATIENT
Start: 2024-11-03

## 2024-11-03 RX ORDER — OMEPRAZOLE 40 MG/1
CAPSULE, DELAYED RELEASE ORAL
Qty: 180 CAPSULE | Refills: 3 | Status: SHIPPED | OUTPATIENT
Start: 2024-11-03

## 2024-11-03 RX ORDER — ATORVASTATIN CALCIUM 40 MG/1
TABLET, FILM COATED ORAL
Qty: 90 TABLET | Refills: 1 | Status: SHIPPED | OUTPATIENT
Start: 2024-11-03

## 2024-11-03 RX ORDER — LEVOCETIRIZINE DIHYDROCHLORIDE 5 MG/1
TABLET, FILM COATED ORAL
Qty: 90 TABLET | Refills: 3 | Status: SHIPPED | OUTPATIENT
Start: 2024-11-03

## 2024-11-03 RX ORDER — ROPINIROLE 2 MG/1
TABLET, FILM COATED ORAL
Qty: 270 TABLET | Refills: 3 | Status: SHIPPED | OUTPATIENT
Start: 2024-11-03

## 2024-11-06 DIAGNOSIS — C64.9 SECONDARY RENAL CELL CARCINOMA OF LUNG, UNSPECIFIED LATERALITY (HCC): ICD-10-CM

## 2024-11-06 DIAGNOSIS — C78.00 SECONDARY RENAL CELL CARCINOMA OF LUNG, UNSPECIFIED LATERALITY (HCC): ICD-10-CM

## 2024-11-06 DIAGNOSIS — C64.1 RENAL CELL CARCINOMA OF RIGHT KIDNEY METASTATIC TO OTHER SITE (HCC): Primary | ICD-10-CM

## 2024-11-08 ENCOUNTER — HOSPITAL ENCOUNTER (OUTPATIENT)
Facility: HOSPITAL | Age: 59
Setting detail: INFUSION SERIES
Discharge: HOME | End: 2024-11-08
Payer: MEDICARE

## 2024-11-08 ENCOUNTER — HOSPITAL ENCOUNTER (OUTPATIENT)
Facility: HOSPITAL | Age: 59
Discharge: HOME | End: 2024-11-11
Payer: MEDICARE

## 2024-11-08 VITALS
SYSTOLIC BLOOD PRESSURE: 117 MMHG | HEART RATE: 69 BPM | RESPIRATION RATE: 18 BRPM | WEIGHT: 197.2 LBS | BODY MASS INDEX: 33.67 KG/M2 | OXYGEN SATURATION: 97 % | DIASTOLIC BLOOD PRESSURE: 80 MMHG | TEMPERATURE: 98 F | HEIGHT: 64 IN

## 2024-11-08 DIAGNOSIS — C78.00 SECONDARY RENAL CELL CARCINOMA OF LUNG, UNSPECIFIED LATERALITY (HCC): ICD-10-CM

## 2024-11-08 DIAGNOSIS — C64.1 RENAL CELL CARCINOMA OF RIGHT KIDNEY METASTATIC TO OTHER SITE (HCC): ICD-10-CM

## 2024-11-08 DIAGNOSIS — C64.9 SECONDARY RENAL CELL CARCINOMA OF LUNG, UNSPECIFIED LATERALITY (HCC): ICD-10-CM

## 2024-11-08 DIAGNOSIS — C64.9 RENAL CELL CARCINOMA, UNSPECIFIED LATERALITY (HCC): Primary | ICD-10-CM

## 2024-11-08 LAB
ALBUMIN SERPL-MCNC: 3.9 G/DL (ref 3.5–5)
ALBUMIN/GLOB SERPL: 1.2 (ref 1.1–2.2)
ALP SERPL-CCNC: 137 U/L (ref 45–117)
ALT SERPL-CCNC: 30 U/L (ref 12–78)
ANION GAP SERPL CALC-SCNC: 5 MMOL/L (ref 2–12)
AST SERPL-CCNC: 18 U/L (ref 15–37)
BASOPHILS # BLD: 0.2 K/UL (ref 0–0.1)
BASOPHILS NFR BLD: 1 % (ref 0–1)
BILIRUB SERPL-MCNC: 0.3 MG/DL (ref 0.2–1)
BUN SERPL-MCNC: 13 MG/DL (ref 6–20)
BUN/CREAT SERPL: 12 (ref 12–20)
CALCIUM SERPL-MCNC: 10.3 MG/DL (ref 8.5–10.1)
CHLORIDE SERPL-SCNC: 104 MMOL/L (ref 97–108)
CO2 SERPL-SCNC: 28 MMOL/L (ref 21–32)
CREAT SERPL-MCNC: 1.12 MG/DL (ref 0.55–1.02)
DIFFERENTIAL METHOD BLD: ABNORMAL
EOSINOPHIL # BLD: 1.7 K/UL (ref 0–0.4)
EOSINOPHIL NFR BLD: 9 % (ref 0–7)
ERYTHROCYTE [DISTWIDTH] IN BLOOD BY AUTOMATED COUNT: 15.4 % (ref 11.5–14.5)
GLOBULIN SER CALC-MCNC: 3.2 G/DL (ref 2–4)
GLUCOSE SERPL-MCNC: 129 MG/DL (ref 65–100)
HCT VFR BLD AUTO: 45.4 % (ref 35–47)
HGB BLD-MCNC: 15 G/DL (ref 11.5–16)
IMM GRANULOCYTES # BLD AUTO: 0.2 K/UL (ref 0–0.04)
IMM GRANULOCYTES NFR BLD AUTO: 1 % (ref 0–0.5)
LYMPHOCYTES # BLD: 3 K/UL (ref 0.8–3.5)
LYMPHOCYTES NFR BLD: 16 % (ref 12–49)
MCH RBC QN AUTO: 29.9 PG (ref 26–34)
MCHC RBC AUTO-ENTMCNC: 33 G/DL (ref 30–36.5)
MCV RBC AUTO: 90.6 FL (ref 80–99)
MONOCYTES # BLD: 0.8 K/UL (ref 0–1)
MONOCYTES NFR BLD: 4 % (ref 5–13)
NEUTS SEG # BLD: 12.9 K/UL (ref 1.8–8)
NEUTS SEG NFR BLD: 69 % (ref 32–75)
NRBC # BLD: 0 K/UL (ref 0–0.01)
NRBC BLD-RTO: 0 PER 100 WBC
PLATELET # BLD AUTO: 252 K/UL (ref 150–400)
PMV BLD AUTO: 9.7 FL (ref 8.9–12.9)
POTASSIUM SERPL-SCNC: 4.1 MMOL/L (ref 3.5–5.1)
PROT SERPL-MCNC: 7.1 G/DL (ref 6.4–8.2)
RBC # BLD AUTO: 5.01 M/UL (ref 3.8–5.2)
RBC MORPH BLD: ABNORMAL
SODIUM SERPL-SCNC: 137 MMOL/L (ref 136–145)
TSH SERPL DL<=0.05 MIU/L-ACNC: 51.4 UIU/ML (ref 0.36–3.74)
WBC # BLD AUTO: 18.8 K/UL (ref 3.6–11)

## 2024-11-08 PROCEDURE — 6360000004 HC RX CONTRAST MEDICATION

## 2024-11-08 PROCEDURE — 2580000003 HC RX 258: Performed by: INTERNAL MEDICINE

## 2024-11-08 PROCEDURE — 71260 CT THORAX DX C+: CPT

## 2024-11-08 PROCEDURE — 6360000002 HC RX W HCPCS: Performed by: INTERNAL MEDICINE

## 2024-11-08 PROCEDURE — 96413 CHEMO IV INFUSION 1 HR: CPT

## 2024-11-08 PROCEDURE — 70491 CT SOFT TISSUE NECK W/DYE: CPT

## 2024-11-08 PROCEDURE — 84443 ASSAY THYROID STIM HORMONE: CPT

## 2024-11-08 PROCEDURE — 80053 COMPREHEN METABOLIC PANEL: CPT

## 2024-11-08 PROCEDURE — 85025 COMPLETE CBC W/AUTO DIFF WBC: CPT

## 2024-11-08 PROCEDURE — 2500000003 HC RX 250 WO HCPCS

## 2024-11-08 RX ORDER — IOPAMIDOL 755 MG/ML
100 INJECTION, SOLUTION INTRAVASCULAR
Status: COMPLETED | OUTPATIENT
Start: 2024-11-08 | End: 2024-11-08

## 2024-11-08 RX ORDER — SODIUM CHLORIDE 9 MG/ML
5-250 INJECTION, SOLUTION INTRAVENOUS PRN
Status: DISCONTINUED | OUTPATIENT
Start: 2024-11-08 | End: 2024-11-09 | Stop reason: HOSPADM

## 2024-11-08 RX ADMIN — SODIUM CHLORIDE 240 MG: 9 INJECTION, SOLUTION INTRAVENOUS at 11:03

## 2024-11-08 RX ADMIN — IOPAMIDOL 100 ML: 755 INJECTION, SOLUTION INTRAVENOUS at 14:49

## 2024-11-08 RX ADMIN — BARIUM SULFATE 900 ML: 20 SUSPENSION ORAL at 14:49

## 2024-11-08 ASSESSMENT — PAIN SCALES - GENERAL: PAINLEVEL_OUTOF10: 0

## 2024-11-11 DIAGNOSIS — E03.2 HYPOTHYROIDISM DUE TO MEDICATION: ICD-10-CM

## 2024-11-12 RX ORDER — LEVOTHYROXINE SODIUM 100 UG/1
100 TABLET ORAL DAILY
Qty: 30 TABLET | Refills: 1 | Status: SHIPPED | OUTPATIENT
Start: 2024-11-12

## 2024-11-14 RX ORDER — EPINEPHRINE 1 MG/ML
0.3 INJECTION, SOLUTION INTRAMUSCULAR; SUBCUTANEOUS PRN
Status: CANCELLED | OUTPATIENT
Start: 2024-11-22

## 2024-11-14 RX ORDER — ONDANSETRON 2 MG/ML
8 INJECTION INTRAMUSCULAR; INTRAVENOUS
Status: CANCELLED | OUTPATIENT
Start: 2024-11-22

## 2024-11-14 RX ORDER — HYDROCORTISONE SODIUM SUCCINATE 100 MG/2ML
100 INJECTION INTRAMUSCULAR; INTRAVENOUS
Status: CANCELLED | OUTPATIENT
Start: 2024-11-22

## 2024-11-14 RX ORDER — HEPARIN 100 UNIT/ML
500 SYRINGE INTRAVENOUS PRN
Status: CANCELLED | OUTPATIENT
Start: 2024-11-22

## 2024-11-14 RX ORDER — SODIUM CHLORIDE 9 MG/ML
INJECTION, SOLUTION INTRAVENOUS CONTINUOUS
Status: CANCELLED | OUTPATIENT
Start: 2024-11-22

## 2024-11-14 RX ORDER — ALBUTEROL SULFATE 90 UG/1
4 INHALANT RESPIRATORY (INHALATION) PRN
Status: CANCELLED | OUTPATIENT
Start: 2024-11-22

## 2024-11-14 RX ORDER — ACETAMINOPHEN 325 MG/1
650 TABLET ORAL
Status: CANCELLED | OUTPATIENT
Start: 2024-11-22

## 2024-11-14 RX ORDER — DIPHENHYDRAMINE HYDROCHLORIDE 50 MG/ML
50 INJECTION INTRAMUSCULAR; INTRAVENOUS
Status: CANCELLED | OUTPATIENT
Start: 2024-11-22

## 2024-11-14 RX ORDER — FAMOTIDINE 10 MG/ML
20 INJECTION, SOLUTION INTRAVENOUS
Status: CANCELLED | OUTPATIENT
Start: 2024-11-22

## 2024-11-14 RX ORDER — SODIUM CHLORIDE 9 MG/ML
5-250 INJECTION, SOLUTION INTRAVENOUS PRN
Status: CANCELLED | OUTPATIENT
Start: 2024-11-22

## 2024-11-14 RX ORDER — MEPERIDINE HYDROCHLORIDE 25 MG/ML
12.5 INJECTION INTRAMUSCULAR; INTRAVENOUS; SUBCUTANEOUS PRN
Status: CANCELLED | OUTPATIENT
Start: 2024-11-22

## 2024-11-21 NOTE — PROGRESS NOTES
Cancer [place] at [facility]   [address] [place] [address], [address] [place] [address]   W: [phone]  F: [phone]          Reason for Visit:     Symone Edmonds is a 59 y.o. female who is for    Follow up of leucocytosis and anemia  Stage IV RCC     Symone Edmonds is a 59 y.o. female who is seen by synchronous (real-time) audio-video technology on 11/22/2024 for follow up       Treatment history for renal cell carcinoma  7/28/2020-right radical nephrectomy-3.3 cm renal cell carcinoma.  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 female with PMH as below who is seen for abnormal  CBC      She has persistent leucocytosis since 2014, no other CBC abnormalities. She has known R renal cell carcinoma ( 3.3 cm) which she had a R radical nephrectomy on 7/28/2020. She has osteoarthritis and has several flare ups. She had a CT chest 8/2020 that  showed a 6 mm lung nodule which is being observed.  She had a follow-up CT scan with pulmonary on 7/24/2023 which showed increasing bilateral pulmonary nodules.  This prompted a PET CT scan that was done on 8/9/2023.  This showed that the peripheral pulmonary nodule in the right lower lobe had an FDG activity of 3.3, subcentimeter pulmonary nodule in the left lower lobe had no FDG activity.  She was referred to thoracic surgery Dr. Ross and underwent a robotic assisted thoracoscopic right lower lobe wedge resection and mediastinal lymph node dissection on 9/6/2023.  Pathology revealed metastatic renal cell carcinoma in the right lower lobe 1.3 cm nodule.

## 2024-11-22 ENCOUNTER — HOSPITAL ENCOUNTER (OUTPATIENT)
Facility: HOSPITAL | Age: 59
Setting detail: INFUSION SERIES
Discharge: HOME | End: 2024-11-22
Payer: MEDICARE

## 2024-11-22 ENCOUNTER — TELEMEDICINE (OUTPATIENT)
Age: 59
End: 2024-11-22
Payer: MEDICARE

## 2024-11-22 VITALS
SYSTOLIC BLOOD PRESSURE: 155 MMHG | TEMPERATURE: 98.2 F | OXYGEN SATURATION: 98 % | HEART RATE: 90 BPM | HEIGHT: 64 IN | BODY MASS INDEX: 33.67 KG/M2 | WEIGHT: 197.2 LBS | DIASTOLIC BLOOD PRESSURE: 88 MMHG | RESPIRATION RATE: 18 BRPM

## 2024-11-22 DIAGNOSIS — C78.00 SECONDARY RENAL CELL CARCINOMA OF LUNG, UNSPECIFIED LATERALITY (HCC): Primary | ICD-10-CM

## 2024-11-22 DIAGNOSIS — C78.00 SECONDARY RENAL CELL CARCINOMA OF LUNG, UNSPECIFIED LATERALITY (HCC): ICD-10-CM

## 2024-11-22 DIAGNOSIS — C64.9 SECONDARY RENAL CELL CARCINOMA OF LUNG, UNSPECIFIED LATERALITY (HCC): ICD-10-CM

## 2024-11-22 DIAGNOSIS — C64.9 RENAL CELL CARCINOMA, UNSPECIFIED LATERALITY (HCC): Primary | ICD-10-CM

## 2024-11-22 DIAGNOSIS — C64.9 SECONDARY RENAL CELL CARCINOMA OF LUNG, UNSPECIFIED LATERALITY (HCC): Primary | ICD-10-CM

## 2024-11-22 LAB
ALBUMIN SERPL-MCNC: 3.6 G/DL (ref 3.5–5)
ALBUMIN/GLOB SERPL: 1.1 (ref 1.1–2.2)
ALP SERPL-CCNC: 190 U/L (ref 45–117)
ALT SERPL-CCNC: 61 U/L (ref 12–78)
ANION GAP SERPL CALC-SCNC: 7 MMOL/L (ref 2–12)
AST SERPL-CCNC: 32 U/L (ref 15–37)
BASOPHILS # BLD: 0.2 K/UL (ref 0–0.1)
BASOPHILS NFR BLD: 1 % (ref 0–1)
BILIRUB SERPL-MCNC: 0.3 MG/DL (ref 0.2–1)
BUN SERPL-MCNC: 9 MG/DL (ref 6–20)
BUN/CREAT SERPL: 8 (ref 12–20)
CALCIUM SERPL-MCNC: 9.9 MG/DL (ref 8.5–10.1)
CHLORIDE SERPL-SCNC: 106 MMOL/L (ref 97–108)
CO2 SERPL-SCNC: 28 MMOL/L (ref 21–32)
CREAT SERPL-MCNC: 1.06 MG/DL (ref 0.55–1.02)
DIFFERENTIAL METHOD BLD: ABNORMAL
EOSINOPHIL # BLD: 1.3 K/UL (ref 0–0.4)
EOSINOPHIL NFR BLD: 7 % (ref 0–7)
ERYTHROCYTE [DISTWIDTH] IN BLOOD BY AUTOMATED COUNT: 14.5 % (ref 11.5–14.5)
GLOBULIN SER CALC-MCNC: 3.2 G/DL (ref 2–4)
GLUCOSE SERPL-MCNC: 100 MG/DL (ref 65–100)
HCT VFR BLD AUTO: 45.5 % (ref 35–47)
HGB BLD-MCNC: 15.2 G/DL (ref 11.5–16)
IMM GRANULOCYTES # BLD AUTO: 0.2 K/UL (ref 0–0.04)
IMM GRANULOCYTES NFR BLD AUTO: 1 % (ref 0–0.5)
LYMPHOCYTES # BLD: 2 K/UL (ref 0.8–3.5)
LYMPHOCYTES NFR BLD: 11 % (ref 12–49)
MCH RBC QN AUTO: 29.7 PG (ref 26–34)
MCHC RBC AUTO-ENTMCNC: 33.4 G/DL (ref 30–36.5)
MCV RBC AUTO: 89 FL (ref 80–99)
MONOCYTES # BLD: 0.7 K/UL (ref 0–1)
MONOCYTES NFR BLD: 4 % (ref 5–13)
NEUTS SEG # BLD: 13.5 K/UL (ref 1.8–8)
NEUTS SEG NFR BLD: 76 % (ref 32–75)
NRBC # BLD: 0 K/UL (ref 0–0.01)
NRBC BLD-RTO: 0 PER 100 WBC
PLATELET # BLD AUTO: 271 K/UL (ref 150–400)
PMV BLD AUTO: 9.6 FL (ref 8.9–12.9)
POTASSIUM SERPL-SCNC: 4.5 MMOL/L (ref 3.5–5.1)
PROT SERPL-MCNC: 6.8 G/DL (ref 6.4–8.2)
RBC # BLD AUTO: 5.11 M/UL (ref 3.8–5.2)
RBC MORPH BLD: ABNORMAL
SODIUM SERPL-SCNC: 141 MMOL/L (ref 136–145)
WBC # BLD AUTO: 17.9 K/UL (ref 3.6–11)
WBC MORPH BLD: ABNORMAL

## 2024-11-22 PROCEDURE — 80053 COMPREHEN METABOLIC PANEL: CPT

## 2024-11-22 PROCEDURE — 6360000002 HC RX W HCPCS: Performed by: INTERNAL MEDICINE

## 2024-11-22 PROCEDURE — G8428 CUR MEDS NOT DOCUMENT: HCPCS | Performed by: INTERNAL MEDICINE

## 2024-11-22 PROCEDURE — 36415 COLL VENOUS BLD VENIPUNCTURE: CPT

## 2024-11-22 PROCEDURE — 3017F COLORECTAL CA SCREEN DOC REV: CPT | Performed by: INTERNAL MEDICINE

## 2024-11-22 PROCEDURE — 96413 CHEMO IV INFUSION 1 HR: CPT

## 2024-11-22 PROCEDURE — 2580000003 HC RX 258: Performed by: INTERNAL MEDICINE

## 2024-11-22 PROCEDURE — 85025 COMPLETE CBC W/AUTO DIFF WBC: CPT

## 2024-11-22 PROCEDURE — 99215 OFFICE O/P EST HI 40 MIN: CPT | Performed by: INTERNAL MEDICINE

## 2024-11-22 RX ORDER — SODIUM CHLORIDE 0.9 % (FLUSH) 0.9 %
5-40 SYRINGE (ML) INJECTION PRN
Status: DISCONTINUED | OUTPATIENT
Start: 2024-11-22 | End: 2024-11-23 | Stop reason: HOSPADM

## 2024-11-22 RX ORDER — CLOBETASOL PROPIONATE 0.5 MG/G
OINTMENT TOPICAL
Qty: 1 EACH | Refills: 2 | Status: SHIPPED | OUTPATIENT
Start: 2024-11-22

## 2024-11-22 RX ORDER — BENZONATATE 100 MG/1
100 CAPSULE ORAL 3 TIMES DAILY PRN
Qty: 30 CAPSULE | Refills: 0 | Status: SHIPPED | OUTPATIENT
Start: 2024-11-22 | End: 2024-12-02

## 2024-11-22 RX ADMIN — SODIUM CHLORIDE 240 MG: 9 INJECTION, SOLUTION INTRAVENOUS at 10:41

## 2024-11-22 RX ADMIN — SODIUM CHLORIDE, PRESERVATIVE FREE 10 ML: 5 INJECTION INTRAVENOUS at 11:12

## 2024-11-22 ASSESSMENT — PAIN DESCRIPTION - DESCRIPTORS: DESCRIPTORS: ACHING;SHARP

## 2024-11-22 ASSESSMENT — PAIN DESCRIPTION - ORIENTATION: ORIENTATION: LOWER;MID

## 2024-11-22 ASSESSMENT — PAIN DESCRIPTION - PAIN TYPE: TYPE: CHRONIC PAIN

## 2024-11-22 ASSESSMENT — PAIN SCALES - GENERAL: PAINLEVEL_OUTOF10: 5

## 2024-11-22 ASSESSMENT — PAIN DESCRIPTION - LOCATION: LOCATION: STERNUM

## 2024-11-22 ASSESSMENT — PAIN - FUNCTIONAL ASSESSMENT: PAIN_FUNCTIONAL_ASSESSMENT: ACTIVITIES ARE NOT PREVENTED

## 2024-12-02 RX ORDER — DIPHENHYDRAMINE HYDROCHLORIDE 50 MG/ML
50 INJECTION INTRAMUSCULAR; INTRAVENOUS
Status: CANCELLED | OUTPATIENT
Start: 2024-12-06

## 2024-12-02 RX ORDER — SODIUM CHLORIDE 9 MG/ML
5-250 INJECTION, SOLUTION INTRAVENOUS PRN
Status: CANCELLED | OUTPATIENT
Start: 2024-12-06

## 2024-12-02 RX ORDER — FAMOTIDINE 10 MG/ML
20 INJECTION, SOLUTION INTRAVENOUS
Status: CANCELLED | OUTPATIENT
Start: 2024-12-06

## 2024-12-02 RX ORDER — SODIUM CHLORIDE 9 MG/ML
INJECTION, SOLUTION INTRAVENOUS CONTINUOUS
Status: CANCELLED | OUTPATIENT
Start: 2024-12-06

## 2024-12-02 RX ORDER — ACETAMINOPHEN 325 MG/1
650 TABLET ORAL
Status: CANCELLED | OUTPATIENT
Start: 2024-12-06

## 2024-12-02 RX ORDER — ALBUTEROL SULFATE 90 UG/1
4 INHALANT RESPIRATORY (INHALATION) PRN
Status: CANCELLED | OUTPATIENT
Start: 2024-12-06

## 2024-12-02 RX ORDER — HEPARIN 100 UNIT/ML
500 SYRINGE INTRAVENOUS PRN
Status: CANCELLED | OUTPATIENT
Start: 2024-12-06

## 2024-12-02 RX ORDER — EPINEPHRINE 1 MG/ML
0.3 INJECTION, SOLUTION INTRAMUSCULAR; SUBCUTANEOUS PRN
Status: CANCELLED | OUTPATIENT
Start: 2024-12-06

## 2024-12-02 RX ORDER — ONDANSETRON 2 MG/ML
8 INJECTION INTRAMUSCULAR; INTRAVENOUS
Status: CANCELLED | OUTPATIENT
Start: 2024-12-06

## 2024-12-02 RX ORDER — HYDROCORTISONE SODIUM SUCCINATE 100 MG/2ML
100 INJECTION INTRAMUSCULAR; INTRAVENOUS
Status: CANCELLED | OUTPATIENT
Start: 2024-12-06

## 2024-12-02 RX ORDER — MEPERIDINE HYDROCHLORIDE 25 MG/ML
12.5 INJECTION INTRAMUSCULAR; INTRAVENOUS; SUBCUTANEOUS PRN
Status: CANCELLED | OUTPATIENT
Start: 2024-12-06

## 2024-12-05 ENCOUNTER — OFFICE VISIT (OUTPATIENT)
Age: 59
End: 2024-12-05
Payer: MEDICARE

## 2024-12-05 VITALS
SYSTOLIC BLOOD PRESSURE: 156 MMHG | TEMPERATURE: 98.1 F | OXYGEN SATURATION: 94 % | RESPIRATION RATE: 18 BRPM | WEIGHT: 194 LBS | DIASTOLIC BLOOD PRESSURE: 86 MMHG | BODY MASS INDEX: 33.83 KG/M2 | HEART RATE: 100 BPM

## 2024-12-05 DIAGNOSIS — Z79.899 ENCOUNTER FOR LONG-TERM (CURRENT) USE OF HIGH-RISK MEDICATION: ICD-10-CM

## 2024-12-05 DIAGNOSIS — C64.9 SECONDARY RENAL CELL CARCINOMA OF LUNG, UNSPECIFIED LATERALITY (HCC): ICD-10-CM

## 2024-12-05 DIAGNOSIS — C64.9 SECONDARY RENAL CELL CARCINOMA OF LUNG, UNSPECIFIED LATERALITY (HCC): Primary | ICD-10-CM

## 2024-12-05 DIAGNOSIS — C78.00 SECONDARY RENAL CELL CARCINOMA OF LUNG, UNSPECIFIED LATERALITY (HCC): Primary | ICD-10-CM

## 2024-12-05 DIAGNOSIS — C78.00 SECONDARY RENAL CELL CARCINOMA OF LUNG, UNSPECIFIED LATERALITY (HCC): ICD-10-CM

## 2024-12-05 LAB
BASOPHILS # BLD: 0.1 K/UL (ref 0–0.1)
BASOPHILS NFR BLD: 1 % (ref 0–1)
COMMENT:: NORMAL
DIFFERENTIAL METHOD BLD: ABNORMAL
EOSINOPHIL # BLD: 1.1 K/UL (ref 0–0.4)
EOSINOPHIL NFR BLD: 9 % (ref 0–7)
ERYTHROCYTE [DISTWIDTH] IN BLOOD BY AUTOMATED COUNT: 14.1 % (ref 11.5–14.5)
HCT VFR BLD AUTO: 44.4 % (ref 35–47)
HGB BLD-MCNC: 14.9 G/DL (ref 11.5–16)
IMM GRANULOCYTES # BLD AUTO: 0.1 K/UL (ref 0–0.04)
IMM GRANULOCYTES NFR BLD AUTO: 1 % (ref 0–0.5)
LYMPHOCYTES # BLD: 2.2 K/UL (ref 0.8–3.5)
LYMPHOCYTES NFR BLD: 18 % (ref 12–49)
MCH RBC QN AUTO: 29.5 PG (ref 26–34)
MCHC RBC AUTO-ENTMCNC: 33.6 G/DL (ref 30–36.5)
MCV RBC AUTO: 87.9 FL (ref 80–99)
MONOCYTES # BLD: 0.4 K/UL (ref 0–1)
MONOCYTES NFR BLD: 3 % (ref 5–13)
NEUTS SEG # BLD: 8.4 K/UL (ref 1.8–8)
NEUTS SEG NFR BLD: 68 % (ref 32–75)
NRBC # BLD: 0 K/UL (ref 0–0.01)
NRBC BLD-RTO: 0 PER 100 WBC
PLATELET # BLD AUTO: 301 K/UL (ref 150–400)
PMV BLD AUTO: 10.1 FL (ref 8.9–12.9)
RBC # BLD AUTO: 5.05 M/UL (ref 3.8–5.2)
RBC MORPH BLD: ABNORMAL
SPECIMEN HOLD: NORMAL
WBC # BLD AUTO: 12.3 K/UL (ref 3.6–11)

## 2024-12-05 PROCEDURE — G8417 CALC BMI ABV UP PARAM F/U: HCPCS | Performed by: INTERNAL MEDICINE

## 2024-12-05 PROCEDURE — 3017F COLORECTAL CA SCREEN DOC REV: CPT | Performed by: INTERNAL MEDICINE

## 2024-12-05 PROCEDURE — 3079F DIAST BP 80-89 MM HG: CPT | Performed by: INTERNAL MEDICINE

## 2024-12-05 PROCEDURE — G8484 FLU IMMUNIZE NO ADMIN: HCPCS | Performed by: INTERNAL MEDICINE

## 2024-12-05 PROCEDURE — 3077F SYST BP >= 140 MM HG: CPT | Performed by: INTERNAL MEDICINE

## 2024-12-05 PROCEDURE — G8428 CUR MEDS NOT DOCUMENT: HCPCS | Performed by: INTERNAL MEDICINE

## 2024-12-05 PROCEDURE — 4004F PT TOBACCO SCREEN RCVD TLK: CPT | Performed by: INTERNAL MEDICINE

## 2024-12-05 PROCEDURE — 99215 OFFICE O/P EST HI 40 MIN: CPT | Performed by: INTERNAL MEDICINE

## 2024-12-05 NOTE — PROGRESS NOTES
Symone Edmonds is a 59 y.o. female    Chief Complaint   Patient presents with    Follow-up     Follow up of leucocytosis and anemia  Stage IV RCC          1. Have you been to the ER, urgent care clinic since your last visit?  Hospitalized since your last visit?No    2. Have you seen or consulted any other health care providers outside of the [facility] since your last visit?  Include any pap smears or colon screening. No      
new severe back pain  Due to disc herniation  To get epidural injections     4) Elevated liver enzymes   Grade 3 IO mediated  MRI reviewed  Grade 1 and now off steroids        5) Iron deficiency anemia  Cannot tolerate PO iron  Resolved after IV iron     6) Leucocytosis   Follow      7) R pleural effusion  Post op reactive? S/P Thoracentecis 10/2023 and cytology negative    8) Rash  Immune mediated  Grade 1     9) Cough  New  Likely due to adenopathy    10) Encounter for management of HR disease   Palliative   Patient on drug therapy requiring intensive monitoring for toxicity.        Plan:          Single agent Nivolumab for 2 years given every 2 weeks  L Thyroxine 100mcg daily- adjust per TSH from 12/5  CBC, CMP, TSH with each treatment  Refer back to pulmonary for bronch biopsy   Tessalon pearls  Clobetasol on rash  She will get infusions at [facility]  See 2 weeks VV           I appreciate the opportunity to participate in Ms. Symone Edmonds 's care.        Signed By: Nhi Winchester MD

## 2024-12-06 ENCOUNTER — HOSPITAL ENCOUNTER (OUTPATIENT)
Facility: HOSPITAL | Age: 59
Setting detail: INFUSION SERIES
Discharge: HOME | End: 2024-12-06
Payer: MEDICARE

## 2024-12-06 VITALS
TEMPERATURE: 98 F | DIASTOLIC BLOOD PRESSURE: 89 MMHG | SYSTOLIC BLOOD PRESSURE: 143 MMHG | HEART RATE: 100 BPM | RESPIRATION RATE: 16 BRPM | HEIGHT: 64 IN | BODY MASS INDEX: 33.29 KG/M2 | WEIGHT: 195 LBS | OXYGEN SATURATION: 100 %

## 2024-12-06 DIAGNOSIS — C78.00 SECONDARY RENAL CELL CARCINOMA OF LUNG, UNSPECIFIED LATERALITY (HCC): ICD-10-CM

## 2024-12-06 DIAGNOSIS — C64.9 RENAL CELL CARCINOMA, UNSPECIFIED LATERALITY (HCC): Primary | ICD-10-CM

## 2024-12-06 DIAGNOSIS — C64.9 SECONDARY RENAL CELL CARCINOMA OF LUNG, UNSPECIFIED LATERALITY (HCC): ICD-10-CM

## 2024-12-06 LAB
ALBUMIN SERPL-MCNC: 3.8 G/DL (ref 3.5–5)
ALBUMIN/GLOB SERPL: 1.5 (ref 1.1–2.2)
ALP SERPL-CCNC: 171 U/L (ref 45–117)
ALT SERPL-CCNC: 29 U/L (ref 12–78)
ANION GAP SERPL CALC-SCNC: 5 MMOL/L (ref 2–12)
AST SERPL-CCNC: 24 U/L (ref 15–37)
BILIRUB SERPL-MCNC: 0.3 MG/DL (ref 0.2–1)
BUN SERPL-MCNC: 7 MG/DL (ref 6–20)
BUN/CREAT SERPL: 7 (ref 12–20)
CALCIUM SERPL-MCNC: 10 MG/DL (ref 8.5–10.1)
CHLORIDE SERPL-SCNC: 107 MMOL/L (ref 97–108)
CO2 SERPL-SCNC: 25 MMOL/L (ref 21–32)
CREAT SERPL-MCNC: 0.98 MG/DL (ref 0.55–1.02)
FERRITIN SERPL-MCNC: 61 NG/ML (ref 8–252)
GLOBULIN SER CALC-MCNC: 2.5 G/DL (ref 2–4)
GLUCOSE SERPL-MCNC: 112 MG/DL (ref 65–100)
IRON SATN MFR SERPL: 17 % (ref 20–50)
IRON SERPL-MCNC: 56 UG/DL (ref 35–150)
POTASSIUM SERPL-SCNC: 4 MMOL/L (ref 3.5–5.1)
PROT SERPL-MCNC: 6.3 G/DL (ref 6.4–8.2)
SODIUM SERPL-SCNC: 137 MMOL/L (ref 136–145)
TIBC SERPL-MCNC: 336 UG/DL (ref 250–450)
TSH SERPL DL<=0.05 MIU/L-ACNC: 44 UIU/ML (ref 0.36–3.74)

## 2024-12-06 PROCEDURE — 96413 CHEMO IV INFUSION 1 HR: CPT

## 2024-12-06 PROCEDURE — 2580000003 HC RX 258: Performed by: INTERNAL MEDICINE

## 2024-12-06 PROCEDURE — 6360000002 HC RX W HCPCS: Performed by: INTERNAL MEDICINE

## 2024-12-06 RX ORDER — SODIUM CHLORIDE 9 MG/ML
5-250 INJECTION, SOLUTION INTRAVENOUS PRN
Status: DISCONTINUED | OUTPATIENT
Start: 2024-12-06 | End: 2024-12-07 | Stop reason: HOSPADM

## 2024-12-06 RX ORDER — SODIUM CHLORIDE 0.9 % (FLUSH) 0.9 %
5-40 SYRINGE (ML) INJECTION PRN
Status: DISCONTINUED | OUTPATIENT
Start: 2024-12-06 | End: 2024-12-07 | Stop reason: HOSPADM

## 2024-12-06 RX ADMIN — SODIUM CHLORIDE 240 MG: 9 INJECTION, SOLUTION INTRAVENOUS at 10:10

## 2024-12-06 ASSESSMENT — PAIN SCALES - GENERAL: PAINLEVEL_OUTOF10: 0

## 2024-12-06 NOTE — PROGRESS NOTES
[facility] Progress Note    Date: 2024    Name: Symone Edmonds    MRN: [id number]         : 1965    Ms. Edmonds Arrived ambulatory and in no distress for C10D1 of Opdivo Regimen.  Assessment was completed, no acute issues at this time, no new complaints voiced.  Chest wall port accessed without difficulty, labs drawn & sent for processing.        Ms. Edmonds's vitals were reviewed.  Vitals:    24 0905   BP: (!) 159/82   Pulse: 100   Resp: 16   Temp: 98 °F (36.7 °C)   SpO2: 100%       Lab results were obtained on 24 and reviewed.      Medications:  Medications Administered         nivolumab (OPDIVO) 240 mg in sodium chloride 0.9 % 100 mL IVPB Admin Date  2024 Action  New Bag Dose  240 mg Rate  268 mL/hr Route  IntraVENous Documented By  Juan Jose Quevedo, RN                Two nurses verified prior to administering: Drug name, drug dose, infusion volume or drug volume when prepared in a syringe, rate of administration, route of administration,  expiration dates and/or times, appearance and physical integrity of the drugs, rate set on infusion pump, when used sequencing of drug administration.           Ms. Edmonds tolerated treatment well and [facility].   PIV removed. Patient aware of their next appointment.    Juan Jose Quevedo RN  2024    Future Appointments   Date Time Provider Department Center   2024  8:00 AM  CHEMO CHAIR 4 [facility]   2024  8:30 AM Lena Thakkar APRN - NP [facility] AMB   1/3/2025  9:00 AM  CHEMO CHAIR 5 [facility]   1/15/2025  9:00 AM Emeli Zamora APRN - NP [facility] AMB   3/20/2025 11:20 AM Elisha Marley MD [facility] AMB   2025  9:10 AM Meggan Smith MD RDE [facility] BS AMB

## 2024-12-12 RX ORDER — ONDANSETRON 2 MG/ML
8 INJECTION INTRAMUSCULAR; INTRAVENOUS
Status: CANCELLED | OUTPATIENT
Start: 2024-12-20

## 2024-12-12 RX ORDER — FAMOTIDINE 10 MG/ML
20 INJECTION, SOLUTION INTRAVENOUS
Status: CANCELLED | OUTPATIENT
Start: 2024-12-20

## 2024-12-12 RX ORDER — EPINEPHRINE 1 MG/ML
0.3 INJECTION, SOLUTION INTRAMUSCULAR; SUBCUTANEOUS PRN
Status: CANCELLED | OUTPATIENT
Start: 2024-12-20

## 2024-12-12 RX ORDER — HYDROCORTISONE SODIUM SUCCINATE 100 MG/2ML
100 INJECTION INTRAMUSCULAR; INTRAVENOUS
Status: CANCELLED | OUTPATIENT
Start: 2024-12-20

## 2024-12-12 RX ORDER — ALBUTEROL SULFATE 90 UG/1
4 INHALANT RESPIRATORY (INHALATION) PRN
Status: CANCELLED | OUTPATIENT
Start: 2024-12-20

## 2024-12-12 RX ORDER — MEPERIDINE HYDROCHLORIDE 25 MG/ML
12.5 INJECTION INTRAMUSCULAR; INTRAVENOUS; SUBCUTANEOUS PRN
Status: CANCELLED | OUTPATIENT
Start: 2024-12-20

## 2024-12-12 RX ORDER — ACETAMINOPHEN 325 MG/1
650 TABLET ORAL
Status: CANCELLED | OUTPATIENT
Start: 2024-12-20

## 2024-12-12 RX ORDER — SODIUM CHLORIDE 9 MG/ML
INJECTION, SOLUTION INTRAVENOUS CONTINUOUS
Status: CANCELLED | OUTPATIENT
Start: 2024-12-20

## 2024-12-12 RX ORDER — DIPHENHYDRAMINE HYDROCHLORIDE 50 MG/ML
50 INJECTION INTRAMUSCULAR; INTRAVENOUS
Status: CANCELLED | OUTPATIENT
Start: 2024-12-20

## 2024-12-16 ENCOUNTER — TELEPHONE (OUTPATIENT)
Age: 59
End: 2024-12-16

## 2024-12-16 NOTE — PROGRESS NOTES
[facility] [place] [facility]   [address] [facility], [address] [place] [id number]   W: [phone]  F: [phone]     Reason for Visit:   Symone Edmonds is a 59 y.o. female who is seen by synchronous (real-time) audio-video technology on 12/20/2024 for follow up for:      Follow up of leucocytosis and anemia  Stage IV RCC     Treatment history for renal cell carcinoma  7/28/2020-right radical nephrectomy-3.3 cm renal cell carcinoma.  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 female with PMH as below who is seen for abnormal  CBC      She has persistent leucocytosis since 2014, no other CBC abnormalities. She has known R renal cell carcinoma ( 3.3 cm) which she had a R radical nephrectomy on 7/28/2020. She has osteoarthritis and has several flare ups. She had a CT chest 8/2020 that  showed a 6 mm lung nodule which is being observed.  She had a follow-up CT scan with pulmonary on 7/24/2023 which showed increasing bilateral pulmonary nodules.  This prompted a PET CT scan that was done on 8/9/2023.  This showed that the peripheral pulmonary nodule in the right lower lobe had an FDG activity of 3.3, subcentimeter pulmonary nodule in the left lower lobe had no FDG activity.  She was referred to thoracic surgery Dr. Ross and underwent a robotic assisted thoracoscopic right lower lobe wedge resection and mediastinal lymph node dissection on 9/6/2023.  Pathology revealed metastatic renal cell carcinoma in the right lower lobe 1.3 cm nodule. 3/2024 Scans showed progression although EBUS path was

## 2024-12-16 NOTE — TELEPHONE ENCOUNTER
HIPAA x2  SW pt to find out when her bronch biopsy had it done this past Thursday by Dr. Soto at [facility]. Has not heard anything from Dr. Soto, stated she let them know to send a copy of her report to us and her PCP. I let her know I will f/u with them. Pt verbalized understanding and was appreciative of my call.

## 2024-12-20 ENCOUNTER — HOSPITAL ENCOUNTER (OUTPATIENT)
Facility: HOSPITAL | Age: 59
Setting detail: INFUSION SERIES
Discharge: HOME | End: 2024-12-20
Payer: MEDICARE

## 2024-12-20 ENCOUNTER — TELEMEDICINE (OUTPATIENT)
Age: 59
End: 2024-12-20
Payer: MEDICARE

## 2024-12-20 VITALS
WEIGHT: 193.8 LBS | HEART RATE: 100 BPM | RESPIRATION RATE: 17 BRPM | OXYGEN SATURATION: 100 % | SYSTOLIC BLOOD PRESSURE: 164 MMHG | HEIGHT: 64 IN | DIASTOLIC BLOOD PRESSURE: 93 MMHG | BODY MASS INDEX: 33.09 KG/M2 | TEMPERATURE: 97.9 F

## 2024-12-20 DIAGNOSIS — C64.9 SECONDARY RENAL CELL CARCINOMA OF LUNG, UNSPECIFIED LATERALITY (HCC): ICD-10-CM

## 2024-12-20 DIAGNOSIS — R09.1 PLEURISY: Primary | ICD-10-CM

## 2024-12-20 DIAGNOSIS — C64.9 RENAL CELL CARCINOMA, UNSPECIFIED LATERALITY (HCC): Primary | ICD-10-CM

## 2024-12-20 DIAGNOSIS — R07.81 PLEURITIC PAIN: ICD-10-CM

## 2024-12-20 DIAGNOSIS — C78.00 SECONDARY RENAL CELL CARCINOMA OF LUNG, UNSPECIFIED LATERALITY (HCC): ICD-10-CM

## 2024-12-20 LAB
ALBUMIN SERPL-MCNC: 3.5 G/DL (ref 3.5–5)
ALBUMIN/GLOB SERPL: 1.1 (ref 1.1–2.2)
ALP SERPL-CCNC: 214 U/L (ref 45–117)
ALT SERPL-CCNC: 135 U/L (ref 12–78)
ANION GAP SERPL CALC-SCNC: 4 MMOL/L (ref 2–12)
AST SERPL-CCNC: 24 U/L (ref 15–37)
BASOPHILS # BLD: 0.2 K/UL (ref 0–0.1)
BASOPHILS NFR BLD: 1 % (ref 0–1)
BILIRUB SERPL-MCNC: 0.3 MG/DL (ref 0.2–1)
BUN SERPL-MCNC: 11 MG/DL (ref 6–20)
BUN/CREAT SERPL: 11 (ref 12–20)
CALCIUM SERPL-MCNC: 9.9 MG/DL (ref 8.5–10.1)
CHLORIDE SERPL-SCNC: 104 MMOL/L (ref 97–108)
CO2 SERPL-SCNC: 29 MMOL/L (ref 21–32)
CREAT SERPL-MCNC: 0.98 MG/DL (ref 0.55–1.02)
DIFFERENTIAL METHOD BLD: ABNORMAL
EOSINOPHIL # BLD: 2.7 K/UL (ref 0–0.4)
EOSINOPHIL NFR BLD: 14 % (ref 0–7)
ERYTHROCYTE [DISTWIDTH] IN BLOOD BY AUTOMATED COUNT: 14.1 % (ref 11.5–14.5)
GLOBULIN SER CALC-MCNC: 3.1 G/DL (ref 2–4)
GLUCOSE SERPL-MCNC: 132 MG/DL (ref 65–100)
HCT VFR BLD AUTO: 42.6 % (ref 35–47)
HGB BLD-MCNC: 14.2 G/DL (ref 11.5–16)
IMM GRANULOCYTES # BLD AUTO: 0.2 K/UL (ref 0–0.04)
IMM GRANULOCYTES NFR BLD AUTO: 1 % (ref 0–0.5)
LYMPHOCYTES # BLD: 1.7 K/UL (ref 0.8–3.5)
LYMPHOCYTES NFR BLD: 9 % (ref 12–49)
MCH RBC QN AUTO: 29.2 PG (ref 26–34)
MCHC RBC AUTO-ENTMCNC: 33.3 G/DL (ref 30–36.5)
MCV RBC AUTO: 87.7 FL (ref 80–99)
MONOCYTES # BLD: 0.8 K/UL (ref 0–1)
MONOCYTES NFR BLD: 4 % (ref 5–13)
NEUTS SEG # BLD: 13.4 K/UL (ref 1.8–8)
NEUTS SEG NFR BLD: 71 % (ref 32–75)
NRBC # BLD: 0 K/UL (ref 0–0.01)
NRBC BLD-RTO: 0 PER 100 WBC
PLATELET # BLD AUTO: 270 K/UL (ref 150–400)
PMV BLD AUTO: 9.6 FL (ref 8.9–12.9)
POTASSIUM SERPL-SCNC: 4 MMOL/L (ref 3.5–5.1)
PROT SERPL-MCNC: 6.6 G/DL (ref 6.4–8.2)
RBC # BLD AUTO: 4.86 M/UL (ref 3.8–5.2)
RBC MORPH BLD: ABNORMAL
SODIUM SERPL-SCNC: 137 MMOL/L (ref 136–145)
TSH SERPL DL<=0.05 MIU/L-ACNC: 35.3 UIU/ML (ref 0.36–3.74)
WBC # BLD AUTO: 19 K/UL (ref 3.6–11)

## 2024-12-20 PROCEDURE — 84443 ASSAY THYROID STIM HORMONE: CPT

## 2024-12-20 PROCEDURE — 96413 CHEMO IV INFUSION 1 HR: CPT

## 2024-12-20 PROCEDURE — 99215 OFFICE O/P EST HI 40 MIN: CPT

## 2024-12-20 PROCEDURE — 6360000002 HC RX W HCPCS: Performed by: INTERNAL MEDICINE

## 2024-12-20 PROCEDURE — 85025 COMPLETE CBC W/AUTO DIFF WBC: CPT

## 2024-12-20 PROCEDURE — 2580000003 HC RX 258: Performed by: INTERNAL MEDICINE

## 2024-12-20 PROCEDURE — 3017F COLORECTAL CA SCREEN DOC REV: CPT

## 2024-12-20 PROCEDURE — G8427 DOCREV CUR MEDS BY ELIG CLIN: HCPCS

## 2024-12-20 PROCEDURE — 80053 COMPREHEN METABOLIC PANEL: CPT

## 2024-12-20 RX ORDER — SODIUM CHLORIDE 0.9 % (FLUSH) 0.9 %
5-40 SYRINGE (ML) INJECTION PRN
Status: DISCONTINUED | OUTPATIENT
Start: 2024-12-20 | End: 2024-12-21 | Stop reason: HOSPADM

## 2024-12-20 RX ORDER — SODIUM CHLORIDE 9 MG/ML
5-250 INJECTION, SOLUTION INTRAVENOUS PRN
Status: DISCONTINUED | OUTPATIENT
Start: 2024-12-20 | End: 2024-12-21 | Stop reason: HOSPADM

## 2024-12-20 RX ORDER — HEPARIN 100 UNIT/ML
500 SYRINGE INTRAVENOUS PRN
Status: DISCONTINUED | OUTPATIENT
Start: 2024-12-20 | End: 2024-12-21 | Stop reason: HOSPADM

## 2024-12-20 RX ORDER — OXYCODONE HYDROCHLORIDE 5 MG/1
5 TABLET ORAL EVERY 6 HOURS PRN
Qty: 56 TABLET | Refills: 0 | Status: SHIPPED | OUTPATIENT
Start: 2024-12-20 | End: 2025-01-03

## 2024-12-20 RX ADMIN — SODIUM CHLORIDE 240 MG: 9 INJECTION, SOLUTION INTRAVENOUS at 10:00

## 2024-12-20 ASSESSMENT — PAIN DESCRIPTION - LOCATION: LOCATION: CHEST

## 2024-12-20 ASSESSMENT — PAIN SCALES - GENERAL: PAINLEVEL_OUTOF10: 8

## 2024-12-20 ASSESSMENT — PAIN DESCRIPTION - DESCRIPTORS: DESCRIPTORS: ACHING;CRAMPING

## 2024-12-20 ASSESSMENT — PAIN DESCRIPTION - ORIENTATION: ORIENTATION: LOWER;MID

## 2024-12-20 NOTE — PROGRESS NOTES
[facility] Chemo Progress Note    Date: December 20, 2024        Ms. Edmonds Arrived to [facility] for  Opdivo ambulatory in stable condition.  Assessment was completed patient c/o pain in chest r/t possible pleurisy. MD office notified. PIV placed to left arm with positive blood return. Labs drawn and sent for processing. Patient had a virtual visit with provider from Medical Oncology.    Labs reviewed. Criteria for treatment was met.    Ms. Edmonds's vitals were reviewed.  Patient Vitals for the past 12 hrs:   Temp Pulse Resp BP SpO2   12/20/24 0800 97.9 °F (36.6 °C) 100 17 (!) 164/93 100 %         Lab results were obtained and reviewed.  Recent Results (from the past 12 hour(s))   Comprehensive metabolic panel    Collection Time: 12/20/24  8:09 AM   Result Value Ref Range    Sodium 137 136 - 145 mmol/L    Potassium 4.0 3.5 - 5.1 mmol/L    Chloride 104 97 - 108 mmol/L    CO2 29 21 - 32 mmol/L    Anion Gap 4 2 - 12 mmol/L    Glucose 132 (H) 65 - 100 mg/dL    BUN 11 6 - 20 MG/DL    Creatinine 0.98 0.55 - 1.02 MG/DL    BUN/Creatinine Ratio 11 (L) 12 - 20      Est, Glom Filt Rate 66 >60 ml/min/1.73m2    Calcium 9.9 8.5 - 10.1 MG/DL    Total Bilirubin 0.3 0.2 - 1.0 MG/DL     (H) 12 - 78 U/L    AST 24 15 - 37 U/L    Alk Phosphatase 214 (H) 45 - 117 U/L    Total Protein 6.6 6.4 - 8.2 g/dL    Albumin 3.5 3.5 - 5.0 g/dL    Globulin 3.1 2.0 - 4.0 g/dL    Albumin/Globulin Ratio 1.1 1.1 - 2.2     CBC With Auto Differential    Collection Time: 12/20/24  8:09 AM   Result Value Ref Range    WBC 19.0 (H) 3.6 - 11.0 K/uL    RBC 4.86 3.80 - 5.20 M/uL    Hemoglobin 14.2 11.5 - 16.0 g/dL    Hematocrit 42.6 35.0 - 47.0 %    MCV 87.7 80.0 - 99.0 FL    MCH 29.2 26.0 - 34.0 PG    MCHC 33.3 30.0 - 36.5 g/dL    RDW 14.1 11.5 - 14.5 %    Platelets 270 150 - 400 K/uL    MPV 9.6 8.9 - 12.9 FL    Nucleated RBCs 0.0 0  WBC    nRBC 0.00 0.00 - 0.01 K/uL    Neutrophils % 71 32 - 75 %    Lymphocytes % 9 (L) 12 - 49 %    Monocytes % 4 (L) 5 - 13 %     [facility]   3/20/2025 11:20 AM Elisha Marley MD [facility] BS [facility]   5/12/2025  9:10 AM Meggan Smith MD [facility] BS AMB         Yanci Izaguirre RN  December 20, 2024

## 2024-12-23 ENCOUNTER — TELEPHONE (OUTPATIENT)
Age: 59
End: 2024-12-23

## 2024-12-23 ENCOUNTER — HOSPITAL ENCOUNTER (OUTPATIENT)
Facility: HOSPITAL | Age: 59
Discharge: HOME | End: 2024-12-26
Payer: MEDICARE

## 2024-12-23 DIAGNOSIS — R07.81 PLEURITIC PAIN: ICD-10-CM

## 2024-12-23 PROCEDURE — 71046 X-RAY EXAM CHEST 2 VIEWS: CPT

## 2024-12-23 NOTE — TELEPHONE ENCOUNTER
HIPAA x2  SW pt to let her know that Lena Thakkar NP reviewed her Xray and noted that is shows pleural fluid buildup, slightly more than recent CT scan but no other inflammation or concerns for infection. I inquired how here back pain is doing, pt stated her pain is doing much better and that the pain medication is helping. Pt rated pain a 9 before pain medication and a 4 after taking pain medication. I let her know I would update Lena Thakkar NP with this information. Pt verbalized understanding and was appreciative of my call.

## 2024-12-23 NOTE — TELEPHONE ENCOUNTER
HIPAA x2  SW pt to inquire about CXR ordered on 12/20 by Lena Thakkar NP, pt stated she's sitting in the lobby now waiting to have it done. I let her know we will contact her once results are in. Pt verbalized understanding and was appreciative of my call.

## 2024-12-26 ENCOUNTER — APPOINTMENT (OUTPATIENT)
Facility: HOSPITAL | Age: 59
End: 2024-12-26
Payer: MEDICARE

## 2024-12-26 RX ORDER — HEPARIN 100 UNIT/ML
500 SYRINGE INTRAVENOUS PRN
Status: CANCELLED | OUTPATIENT
Start: 2025-01-03

## 2024-12-26 RX ORDER — ACETAMINOPHEN 325 MG/1
650 TABLET ORAL
Status: CANCELLED | OUTPATIENT
Start: 2025-01-03

## 2024-12-26 RX ORDER — ONDANSETRON 2 MG/ML
8 INJECTION INTRAMUSCULAR; INTRAVENOUS
Status: CANCELLED | OUTPATIENT
Start: 2025-01-03

## 2024-12-26 RX ORDER — SODIUM CHLORIDE 9 MG/ML
5-250 INJECTION, SOLUTION INTRAVENOUS PRN
Status: CANCELLED | OUTPATIENT
Start: 2025-01-03

## 2024-12-26 RX ORDER — ALBUTEROL SULFATE 90 UG/1
4 INHALANT RESPIRATORY (INHALATION) PRN
Status: CANCELLED | OUTPATIENT
Start: 2025-01-03

## 2024-12-26 RX ORDER — HYDROCORTISONE SODIUM SUCCINATE 100 MG/2ML
100 INJECTION INTRAMUSCULAR; INTRAVENOUS
Status: CANCELLED | OUTPATIENT
Start: 2025-01-03

## 2024-12-26 RX ORDER — DIPHENHYDRAMINE HYDROCHLORIDE 50 MG/ML
50 INJECTION INTRAMUSCULAR; INTRAVENOUS
Status: CANCELLED | OUTPATIENT
Start: 2025-01-03

## 2024-12-26 RX ORDER — EPINEPHRINE 1 MG/ML
0.3 INJECTION, SOLUTION INTRAMUSCULAR; SUBCUTANEOUS PRN
Status: CANCELLED | OUTPATIENT
Start: 2025-01-03

## 2024-12-26 RX ORDER — MEPERIDINE HYDROCHLORIDE 25 MG/ML
12.5 INJECTION INTRAMUSCULAR; INTRAVENOUS; SUBCUTANEOUS PRN
Status: CANCELLED | OUTPATIENT
Start: 2025-01-03

## 2024-12-26 RX ORDER — SODIUM CHLORIDE 9 MG/ML
INJECTION, SOLUTION INTRAVENOUS CONTINUOUS
Status: CANCELLED | OUTPATIENT
Start: 2025-01-03

## 2024-12-26 RX ORDER — FAMOTIDINE 10 MG/ML
20 INJECTION, SOLUTION INTRAVENOUS
Status: CANCELLED | OUTPATIENT
Start: 2025-01-03

## 2024-12-30 DIAGNOSIS — F34.1 DYSTHYMIA: Primary | ICD-10-CM

## 2024-12-30 RX ORDER — VENLAFAXINE HYDROCHLORIDE 150 MG/1
150 CAPSULE, EXTENDED RELEASE ORAL DAILY
Qty: 90 CAPSULE | Refills: 0 | Status: SHIPPED | OUTPATIENT
Start: 2024-12-30

## 2024-12-31 DIAGNOSIS — E03.2 HYPOTHYROIDISM DUE TO MEDICATION: ICD-10-CM

## 2025-01-02 RX ORDER — LEVOTHYROXINE SODIUM 100 UG/1
100 TABLET ORAL DAILY
Qty: 30 TABLET | Refills: 1 | Status: SHIPPED | OUTPATIENT
Start: 2025-01-02

## 2025-01-02 NOTE — PROGRESS NOTES
12/20/2024 08:09 AM    AST 24 12/20/2024 08:09 AM    GLOB 3.1 12/20/2024 08:09 AM      Records reviewed and summarized above.   Pathology report(s) reviewed above.    9/6/2023  Final Diagnosis     Station 9R lymph node (Specimen #1); Excision:  - One lymph node negative for carcinoma (0/1).     Station 8R lymph node (Specimen #2); Excision:  - One lymph node negative for carcinoma (0/1).     Station 10R #1 lymph node (Specimen #3); Excision:  - One lymph node negative for carcinoma (0/1).     Station 10R #2 lymph node (Specimen #4); Excision:  - One lymph node negative for carcinoma (0/1).     Station 7R lymph node (Specimen #5); Excision:  - One lymph node negative for carcinoma (0/1).     Station 11R lymph node (Specimen #6); Excision:  - One lymph node negative for carcinoma (0/1).     Station 12R #1 lymph node (Specimen #7); Excision:  - One lymph node negative for carcinoma (0/1).     Station 12R #2 lymph node (Specimen #8); Excision:  - One lymph node negative for carcinoma (0/1).     Station 4R lymph node (Specimen #9); Excision:  - One lymph node negative for carcinoma (0/1).     Lung, Right Lower Lobe (Specimen #10); Wedge Resection:  - Metastatic renal cell clear cell carcinoma, 1.3 cm (see comment).  - Background parenchyma with respiratory bronchiolitis and emphysematous changes.   - Focus of fat/bone marrow embolism (10D).          Radiology report(s) reviewed above.   CT abdomen 5/2020- reviewed       CTA 8/2020- reviewed     CT 7/2023   Lungs/Pleura: Right lower lobe nodule has increased in size now measuring 13 mm.  It has a subtly nodular border. Nodule along the left oblique fissure has also  increased now measuring 8 mm. No consolidation or pleural effusion.    CT Abdomen and pelvis 10/2023     IMPRESSION:  Moderate right pleural effusion of uncertain etiology. No CT  evidence of locally recurrent or metastatic disease status post right  nephrectomy with incidentals as above including coronary

## 2025-01-03 ENCOUNTER — HOSPITAL ENCOUNTER (OUTPATIENT)
Facility: HOSPITAL | Age: 60
Setting detail: INFUSION SERIES
Discharge: HOME | End: 2025-01-03
Payer: MEDICARE

## 2025-01-03 ENCOUNTER — TELEMEDICINE (OUTPATIENT)
Age: 60
End: 2025-01-03
Payer: MEDICARE

## 2025-01-03 VITALS
OXYGEN SATURATION: 94 % | RESPIRATION RATE: 16 BRPM | HEART RATE: 83 BPM | WEIGHT: 192.5 LBS | TEMPERATURE: 98 F | BODY MASS INDEX: 32.87 KG/M2 | SYSTOLIC BLOOD PRESSURE: 142 MMHG | DIASTOLIC BLOOD PRESSURE: 83 MMHG | HEIGHT: 64 IN

## 2025-01-03 DIAGNOSIS — C64.1 RENAL CELL CARCINOMA OF RIGHT KIDNEY METASTATIC TO OTHER SITE (HCC): Primary | ICD-10-CM

## 2025-01-03 DIAGNOSIS — C78.00 SECONDARY RENAL CELL CARCINOMA OF LUNG, UNSPECIFIED LATERALITY (HCC): ICD-10-CM

## 2025-01-03 DIAGNOSIS — C64.9 SECONDARY RENAL CELL CARCINOMA OF LUNG, UNSPECIFIED LATERALITY (HCC): ICD-10-CM

## 2025-01-03 DIAGNOSIS — C64.9 RENAL CELL CARCINOMA, UNSPECIFIED LATERALITY (HCC): Primary | ICD-10-CM

## 2025-01-03 LAB
ALBUMIN SERPL-MCNC: 3.6 G/DL (ref 3.5–5)
ALBUMIN/GLOB SERPL: 1.1 (ref 1.1–2.2)
ALP SERPL-CCNC: 159 U/L (ref 45–117)
ALT SERPL-CCNC: 27 U/L (ref 12–78)
ANION GAP SERPL CALC-SCNC: 5 MMOL/L (ref 2–12)
AST SERPL-CCNC: 16 U/L (ref 15–37)
BASOPHILS # BLD: 0.1 K/UL (ref 0–0.1)
BASOPHILS NFR BLD: 1 % (ref 0–1)
BILIRUB SERPL-MCNC: 0.5 MG/DL (ref 0.2–1)
BUN SERPL-MCNC: 9 MG/DL (ref 6–20)
BUN/CREAT SERPL: 10 (ref 12–20)
CALCIUM SERPL-MCNC: 9.9 MG/DL (ref 8.5–10.1)
CHLORIDE SERPL-SCNC: 106 MMOL/L (ref 97–108)
CO2 SERPL-SCNC: 27 MMOL/L (ref 21–32)
CREAT SERPL-MCNC: 0.92 MG/DL (ref 0.55–1.02)
DIFFERENTIAL METHOD BLD: ABNORMAL
EOSINOPHIL # BLD: 1.5 K/UL (ref 0–0.4)
EOSINOPHIL NFR BLD: 11 % (ref 0–7)
ERYTHROCYTE [DISTWIDTH] IN BLOOD BY AUTOMATED COUNT: 13.7 % (ref 11.5–14.5)
GLOBULIN SER CALC-MCNC: 3.2 G/DL (ref 2–4)
GLUCOSE SERPL-MCNC: 108 MG/DL (ref 65–100)
HCT VFR BLD AUTO: 43.2 % (ref 35–47)
HGB BLD-MCNC: 14.4 G/DL (ref 11.5–16)
IMM GRANULOCYTES # BLD AUTO: 0 K/UL (ref 0–0.04)
IMM GRANULOCYTES NFR BLD AUTO: 0 % (ref 0–0.5)
LYMPHOCYTES # BLD: 1.6 K/UL (ref 0.8–3.5)
LYMPHOCYTES NFR BLD: 12 % (ref 12–49)
MCH RBC QN AUTO: 28.7 PG (ref 26–34)
MCHC RBC AUTO-ENTMCNC: 33.3 G/DL (ref 30–36.5)
MCV RBC AUTO: 86.1 FL (ref 80–99)
MONOCYTES # BLD: 0.5 K/UL (ref 0–1)
MONOCYTES NFR BLD: 4 % (ref 5–13)
NEUTS SEG # BLD: 9.9 K/UL (ref 1.8–8)
NEUTS SEG NFR BLD: 72 % (ref 32–75)
NRBC # BLD: 0 K/UL (ref 0–0.01)
NRBC BLD-RTO: 0 PER 100 WBC
PLATELET # BLD AUTO: 256 K/UL (ref 150–400)
PMV BLD AUTO: 9.7 FL (ref 8.9–12.9)
POTASSIUM SERPL-SCNC: 3.9 MMOL/L (ref 3.5–5.1)
PROT SERPL-MCNC: 6.8 G/DL (ref 6.4–8.2)
RBC # BLD AUTO: 5.02 M/UL (ref 3.8–5.2)
RBC MORPH BLD: ABNORMAL
SODIUM SERPL-SCNC: 138 MMOL/L (ref 136–145)
TSH SERPL DL<=0.05 MIU/L-ACNC: 31.2 UIU/ML (ref 0.36–3.74)
WBC # BLD AUTO: 13.6 K/UL (ref 3.6–11)

## 2025-01-03 PROCEDURE — 84443 ASSAY THYROID STIM HORMONE: CPT

## 2025-01-03 PROCEDURE — 99214 OFFICE O/P EST MOD 30 MIN: CPT

## 2025-01-03 PROCEDURE — 80053 COMPREHEN METABOLIC PANEL: CPT

## 2025-01-03 PROCEDURE — 6360000002 HC RX W HCPCS: Performed by: INTERNAL MEDICINE

## 2025-01-03 PROCEDURE — 2580000003 HC RX 258: Performed by: INTERNAL MEDICINE

## 2025-01-03 PROCEDURE — 2500000003 HC RX 250 WO HCPCS: Performed by: INTERNAL MEDICINE

## 2025-01-03 PROCEDURE — 36415 COLL VENOUS BLD VENIPUNCTURE: CPT

## 2025-01-03 PROCEDURE — 85025 COMPLETE CBC W/AUTO DIFF WBC: CPT

## 2025-01-03 PROCEDURE — 96413 CHEMO IV INFUSION 1 HR: CPT

## 2025-01-03 RX ORDER — SODIUM CHLORIDE 0.9 % (FLUSH) 0.9 %
5-40 SYRINGE (ML) INJECTION PRN
Status: DISCONTINUED | OUTPATIENT
Start: 2025-01-03 | End: 2025-01-04 | Stop reason: HOSPADM

## 2025-01-03 RX ORDER — BENZONATATE 100 MG/1
CAPSULE ORAL
Qty: 90 CAPSULE | Refills: 1 | Status: SHIPPED | OUTPATIENT
Start: 2025-01-03

## 2025-01-03 RX ADMIN — SODIUM CHLORIDE 240 MG: 9 INJECTION, SOLUTION INTRAVENOUS at 11:31

## 2025-01-03 RX ADMIN — Medication 10 ML: at 12:02

## 2025-01-03 ASSESSMENT — PAIN SCALES - GENERAL: PAINLEVEL_OUTOF10: 6

## 2025-01-03 ASSESSMENT — PAIN DESCRIPTION - PAIN TYPE: TYPE: CHRONIC PAIN

## 2025-01-03 ASSESSMENT — PAIN DESCRIPTION - DESCRIPTORS: DESCRIPTORS: ACHING

## 2025-01-03 ASSESSMENT — PAIN - FUNCTIONAL ASSESSMENT: PAIN_FUNCTIONAL_ASSESSMENT: ACTIVITIES ARE NOT PREVENTED

## 2025-01-03 ASSESSMENT — PAIN DESCRIPTION - LOCATION: LOCATION: HIP

## 2025-01-03 ASSESSMENT — PAIN DESCRIPTION - ORIENTATION: ORIENTATION: LEFT

## 2025-01-03 NOTE — PROGRESS NOTES
[facility] Progress Note    Date: January 3, 2025    Name: Symone Edmonds    MRN: [id number]         : 1965    Ms. Edmonds arrived ambulatory and in no distress for C12D1 of Opdivo Regimen.  Assessment was completed and #24 PIV placed in left arm by KAILASH Leslie, after 2 prior attempts.  Labs drawn and sent for processing.    Patient had virtual visit today with Lena Thakkar NP.      Ms. Edmonds's vitals were reviewed.  Vitals:    25 0923 25 1201   BP: 139/87 (!) 142/83   Pulse: 92 83   Resp: 18 16   Temp: 98.1 °F (36.7 °C) 98 °F (36.7 °C)   TempSrc: Temporal Temporal   SpO2: 94%    Weight: 87.3 kg (192 lb 8 oz)    Height: 1.613 m (5' 3.5\")         Lab results were obtained and reviewed.  Recent Results (from the past 12 hour(s))   Comprehensive metabolic panel    Collection Time: 25  9:50 AM   Result Value Ref Range    Sodium 138 136 - 145 mmol/L    Potassium 3.9 3.5 - 5.1 mmol/L    Chloride 106 97 - 108 mmol/L    CO2 27 21 - 32 mmol/L    Anion Gap 5 2 - 12 mmol/L    Glucose 108 (H) 65 - 100 mg/dL    BUN 9 6 - 20 MG/DL    Creatinine 0.92 0.55 - 1.02 MG/DL    BUN/Creatinine Ratio 10 (L) 12 - 20      Est, Glom Filt Rate 71 >60 ml/min/1.73m2    Calcium 9.9 8.5 - 10.1 MG/DL    Total Bilirubin 0.5 0.2 - 1.0 MG/DL    ALT 27 12 - 78 U/L    AST 16 15 - 37 U/L    Alk Phosphatase 159 (H) 45 - 117 U/L    Total Protein 6.8 6.4 - 8.2 g/dL    Albumin 3.6 3.5 - 5.0 g/dL    Globulin 3.2 2.0 - 4.0 g/dL    Albumin/Globulin Ratio 1.1 1.1 - 2.2     CBC With Auto Differential    Collection Time: 25  9:50 AM   Result Value Ref Range    WBC 13.6 (H) 3.6 - 11.0 K/uL    RBC 5.02 3.80 - 5.20 M/uL    Hemoglobin 14.4 11.5 - 16.0 g/dL    Hematocrit 43.2 35.0 - 47.0 %    MCV 86.1 80.0 - 99.0 FL    MCH 28.7 26.0 - 34.0 PG    MCHC 33.3 30.0 - 36.5 g/dL    RDW 13.7 11.5 - 14.5 %    Platelets 256 150 - 400 K/uL    MPV 9.7 8.9 - 12.9 FL    Nucleated RBCs 0.0 0  WBC    nRBC 0.00 0.00 - 0.01 K/uL    Neutrophils % 72 32 - 75

## 2025-01-09 RX ORDER — SODIUM CHLORIDE 9 MG/ML
5-250 INJECTION, SOLUTION INTRAVENOUS PRN
Status: CANCELLED | OUTPATIENT
Start: 2025-01-17

## 2025-01-09 RX ORDER — MEPERIDINE HYDROCHLORIDE 25 MG/ML
12.5 INJECTION INTRAMUSCULAR; INTRAVENOUS; SUBCUTANEOUS PRN
Status: CANCELLED | OUTPATIENT
Start: 2025-01-17

## 2025-01-09 RX ORDER — FAMOTIDINE 10 MG/ML
20 INJECTION, SOLUTION INTRAVENOUS
Status: CANCELLED | OUTPATIENT
Start: 2025-01-17

## 2025-01-09 RX ORDER — EPINEPHRINE 1 MG/ML
0.3 INJECTION, SOLUTION INTRAMUSCULAR; SUBCUTANEOUS PRN
Status: CANCELLED | OUTPATIENT
Start: 2025-01-17

## 2025-01-09 RX ORDER — HYDROCORTISONE SODIUM SUCCINATE 100 MG/2ML
100 INJECTION INTRAMUSCULAR; INTRAVENOUS
Status: CANCELLED | OUTPATIENT
Start: 2025-01-17

## 2025-01-09 RX ORDER — ONDANSETRON 2 MG/ML
8 INJECTION INTRAMUSCULAR; INTRAVENOUS
Status: CANCELLED | OUTPATIENT
Start: 2025-01-17

## 2025-01-09 RX ORDER — SODIUM CHLORIDE 0.9 % (FLUSH) 0.9 %
5-40 SYRINGE (ML) INJECTION PRN
Status: CANCELLED | OUTPATIENT
Start: 2025-01-17

## 2025-01-09 RX ORDER — ALBUTEROL SULFATE 90 UG/1
4 INHALANT RESPIRATORY (INHALATION) PRN
Status: CANCELLED | OUTPATIENT
Start: 2025-01-17

## 2025-01-09 RX ORDER — SODIUM CHLORIDE 9 MG/ML
INJECTION, SOLUTION INTRAVENOUS CONTINUOUS
Status: CANCELLED | OUTPATIENT
Start: 2025-01-17

## 2025-01-09 RX ORDER — DIPHENHYDRAMINE HYDROCHLORIDE 50 MG/ML
50 INJECTION INTRAMUSCULAR; INTRAVENOUS
Status: CANCELLED | OUTPATIENT
Start: 2025-01-17

## 2025-01-09 RX ORDER — HEPARIN 100 UNIT/ML
500 SYRINGE INTRAVENOUS PRN
Status: CANCELLED | OUTPATIENT
Start: 2025-01-17

## 2025-01-09 RX ORDER — ACETAMINOPHEN 325 MG/1
650 TABLET ORAL
Status: CANCELLED | OUTPATIENT
Start: 2025-01-17

## 2025-01-13 DIAGNOSIS — E03.2 HYPOTHYROIDISM DUE TO MEDICATION: ICD-10-CM

## 2025-01-13 RX ORDER — LEVOTHYROXINE SODIUM 100 UG/1
100 TABLET ORAL DAILY
Qty: 30 TABLET | Refills: 1 | OUTPATIENT
Start: 2025-01-13

## 2025-01-15 NOTE — PROGRESS NOTES
progression.  We discussed that since her initial biopsy was nondiagnostic ideally I would like to repeat and confirm the diagnosis but we both feel comfortable continuing immunotherapy for now.      Patient had bronch biopsy for subcarinal lymph node which was negative for malignancy but this again would not completely r/o metastasis.    She is tolerating treatment well  Has gr 1 arthralgias  Continue with maintenance nivolumab.    If there is continued disease progression on subsequent scan then I would consider switching to cabozantinib- however given 2 negative biopsies diagnostic confirmation would be ideal on radiologic progression.    2) Hypothyroidism   Due to IO   See below   On L thyroxine, dose was increased to 100 mcg on 10/11 when TSH was 91, TSH 51 1 month later- continue same dose   TSH 31 ON 1/3- increase dose as below      3) new severe back pain  Due to disc herniation  To get epidural injections     4) Elevated liver enzymes   Grade 3 IO mediated  MRI reviewed  Grade 1 and now off steroids     5) Iron deficiency anemia  Cannot tolerate PO iron  Resolved after IV iron     6) Leucocytosis   Follow    7) R pleural effusion  Post op reactive? S/P Thoracentecis 10/2023 and cytology negative  Now with pleurisy- CXR 12/2024 notable for ,mild increase in effusin    8) Rash  Immune mediated  Grade 1     9) Cough  Likely due to adenopathy    11) Encounter for management of HR disease   Palliative   Patient on drug therapy requiring intensive monitoring for toxicity.        Plan:         Single agent Nivolumab for 2 years given every 2 weeks  L Thyroxine 125mcg daily- adjust per TSH from  8 weeks from now  CBC, CMP, TSH with each treatment  Follows with Dr. Soto with Pulmonary  Tessalon pearls  Clobetasol on rash  Pain medicine as needed   See 2 weeks for VV, [facility] at [facility]    Patient to call or MyChart message with any questions or concerns.     I appreciate the opportunity to participate in Ms.  Symone

## 2025-01-17 ENCOUNTER — TELEMEDICINE (OUTPATIENT)
Age: 60
End: 2025-01-17
Payer: MEDICARE

## 2025-01-17 ENCOUNTER — HOSPITAL ENCOUNTER (OUTPATIENT)
Facility: HOSPITAL | Age: 60
Setting detail: INFUSION SERIES
Discharge: HOME | End: 2025-01-17
Payer: MEDICARE

## 2025-01-17 VITALS
TEMPERATURE: 97.7 F | BODY MASS INDEX: 32.71 KG/M2 | HEIGHT: 64 IN | RESPIRATION RATE: 18 BRPM | SYSTOLIC BLOOD PRESSURE: 138 MMHG | OXYGEN SATURATION: 95 % | DIASTOLIC BLOOD PRESSURE: 96 MMHG | WEIGHT: 191.6 LBS | HEART RATE: 100 BPM

## 2025-01-17 DIAGNOSIS — C78.00 SECONDARY RENAL CELL CARCINOMA OF LUNG, UNSPECIFIED LATERALITY (HCC): Primary | ICD-10-CM

## 2025-01-17 DIAGNOSIS — C78.00 SECONDARY RENAL CELL CARCINOMA OF LUNG, UNSPECIFIED LATERALITY (HCC): ICD-10-CM

## 2025-01-17 DIAGNOSIS — C64.9 SECONDARY RENAL CELL CARCINOMA OF LUNG, UNSPECIFIED LATERALITY (HCC): ICD-10-CM

## 2025-01-17 DIAGNOSIS — C64.9 SECONDARY RENAL CELL CARCINOMA OF LUNG, UNSPECIFIED LATERALITY (HCC): Primary | ICD-10-CM

## 2025-01-17 DIAGNOSIS — C64.9 RENAL CELL CARCINOMA, UNSPECIFIED LATERALITY (HCC): Primary | ICD-10-CM

## 2025-01-17 LAB
ALBUMIN SERPL-MCNC: 3.5 G/DL (ref 3.5–5)
ALBUMIN/GLOB SERPL: 1.1 (ref 1.1–2.2)
ALP SERPL-CCNC: 143 U/L (ref 45–117)
ALT SERPL-CCNC: 24 U/L (ref 12–78)
ANION GAP SERPL CALC-SCNC: 2 MMOL/L (ref 2–12)
AST SERPL-CCNC: 19 U/L (ref 15–37)
BASOPHILS # BLD: 0.21 K/UL (ref 0–0.1)
BASOPHILS NFR BLD: 1.6 % (ref 0–1)
BILIRUB SERPL-MCNC: 0.2 MG/DL (ref 0.2–1)
BUN SERPL-MCNC: 10 MG/DL (ref 6–20)
BUN/CREAT SERPL: 10 (ref 12–20)
CALCIUM SERPL-MCNC: 9.4 MG/DL (ref 8.5–10.1)
CHLORIDE SERPL-SCNC: 109 MMOL/L (ref 97–108)
CO2 SERPL-SCNC: 27 MMOL/L (ref 21–32)
CREAT SERPL-MCNC: 1.04 MG/DL (ref 0.55–1.02)
DIFFERENTIAL METHOD BLD: ABNORMAL
EOSINOPHIL # BLD: 1.15 K/UL (ref 0–0.4)
EOSINOPHIL NFR BLD: 8.8 % (ref 0–7)
ERYTHROCYTE [DISTWIDTH] IN BLOOD BY AUTOMATED COUNT: 13.7 % (ref 11.5–14.5)
GLOBULIN SER CALC-MCNC: 3.1 G/DL (ref 2–4)
GLUCOSE SERPL-MCNC: 148 MG/DL (ref 65–100)
HCT VFR BLD AUTO: 41.5 % (ref 35–47)
HGB BLD-MCNC: 14.2 G/DL (ref 11.5–16)
IMM GRANULOCYTES # BLD AUTO: 0.05 K/UL (ref 0–0.04)
IMM GRANULOCYTES NFR BLD AUTO: 0.4 % (ref 0–0.5)
LYMPHOCYTES # BLD: 1.72 K/UL (ref 0.8–3.5)
LYMPHOCYTES NFR BLD: 13.1 % (ref 12–49)
MCH RBC QN AUTO: 29.4 PG (ref 26–34)
MCHC RBC AUTO-ENTMCNC: 34.2 G/DL (ref 30–36.5)
MCV RBC AUTO: 85.9 FL (ref 80–99)
MONOCYTES # BLD: 0.54 K/UL (ref 0–1)
MONOCYTES NFR BLD: 4.1 % (ref 5–13)
NEUTS SEG # BLD: 9.43 K/UL (ref 1.8–8)
NEUTS SEG NFR BLD: 72 % (ref 32–75)
NRBC # BLD: 0 K/UL (ref 0–0.01)
NRBC BLD-RTO: 0 PER 100 WBC
PLATELET # BLD AUTO: 220 K/UL (ref 150–400)
PMV BLD AUTO: 10 FL (ref 8.9–12.9)
POTASSIUM SERPL-SCNC: 4 MMOL/L (ref 3.5–5.1)
PROT SERPL-MCNC: 6.6 G/DL (ref 6.4–8.2)
RBC # BLD AUTO: 4.83 M/UL (ref 3.8–5.2)
RBC MORPH BLD: ABNORMAL
SODIUM SERPL-SCNC: 138 MMOL/L (ref 136–145)
WBC # BLD AUTO: 13.1 K/UL (ref 3.6–11)

## 2025-01-17 PROCEDURE — 80053 COMPREHEN METABOLIC PANEL: CPT

## 2025-01-17 PROCEDURE — 3017F COLORECTAL CA SCREEN DOC REV: CPT | Performed by: INTERNAL MEDICINE

## 2025-01-17 PROCEDURE — 36415 COLL VENOUS BLD VENIPUNCTURE: CPT

## 2025-01-17 PROCEDURE — 99214 OFFICE O/P EST MOD 30 MIN: CPT | Performed by: INTERNAL MEDICINE

## 2025-01-17 PROCEDURE — 6360000002 HC RX W HCPCS: Performed by: INTERNAL MEDICINE

## 2025-01-17 PROCEDURE — 85025 COMPLETE CBC W/AUTO DIFF WBC: CPT

## 2025-01-17 PROCEDURE — G8428 CUR MEDS NOT DOCUMENT: HCPCS | Performed by: INTERNAL MEDICINE

## 2025-01-17 PROCEDURE — 2580000003 HC RX 258: Performed by: INTERNAL MEDICINE

## 2025-01-17 PROCEDURE — 96413 CHEMO IV INFUSION 1 HR: CPT

## 2025-01-17 RX ORDER — LEVOTHYROXINE SODIUM 25 UG/1
25 TABLET ORAL DAILY
Qty: 30 TABLET | Refills: 5 | Status: SHIPPED | OUTPATIENT
Start: 2025-01-17

## 2025-01-17 RX ADMIN — SODIUM CHLORIDE 240 MG: 9 INJECTION, SOLUTION INTRAVENOUS at 10:54

## 2025-01-17 ASSESSMENT — PAIN SCALES - GENERAL: PAINLEVEL_OUTOF10: 0

## 2025-01-17 NOTE — PROGRESS NOTES
[facility] Chemo Progress Note    Date: January 17, 2025        0900: Ms. Edmonds Arrived to [facility] for  Opdivo C13 D1 ambulatory in stable condition.  Assessment was completed and PIV placed to back of Right FA by Skylar LINDER. Labs drawn and sent for processing. Went to provider appointment with Medical Oncology.    Returned from provider appointment. Labs reviewed. Criteria for treatment was met.    Ms. Edmonds's vitals were reviewed.  Patient Vitals for the past 12 hrs:   Temp Pulse Resp BP SpO2   01/17/25 1115 -- 100 -- (!) 138/96 --   01/17/25 1030 -- -- -- (!) 145/92 --   01/17/25 0845 97.7 °F (36.5 °C) 93 18 (!) 159/101 95 %         Lab results were obtained and reviewed.  Recent Results (from the past 12 hour(s))   Comprehensive Metabolic Panel    Collection Time: 01/17/25  9:02 AM   Result Value Ref Range    Sodium 138 136 - 145 mmol/L    Potassium 4.0 3.5 - 5.1 mmol/L    Chloride 109 (H) 97 - 108 mmol/L    CO2 27 21 - 32 mmol/L    Anion Gap 2 2 - 12 mmol/L    Glucose 148 (H) 65 - 100 mg/dL    BUN 10 6 - 20 MG/DL    Creatinine 1.04 (H) 0.55 - 1.02 MG/DL    BUN/Creatinine Ratio 10 (L) 12 - 20      Est, Glom Filt Rate 62 >60 ml/min/1.73m2    Calcium 9.4 8.5 - 10.1 MG/DL    Total Bilirubin 0.2 0.2 - 1.0 MG/DL    ALT 24 12 - 78 U/L    AST 19 15 - 37 U/L    Alk Phosphatase 143 (H) 45 - 117 U/L    Total Protein 6.6 6.4 - 8.2 g/dL    Albumin 3.5 3.5 - 5.0 g/dL    Globulin 3.1 2.0 - 4.0 g/dL    Albumin/Globulin Ratio 1.1 1.1 - 2.2     CBC with Auto Differential    Collection Time: 01/17/25  9:02 AM   Result Value Ref Range    WBC 13.1 (H) 3.6 - 11.0 K/uL    RBC 4.83 3.80 - 5.20 M/uL    Hemoglobin 14.2 11.5 - 16.0 g/dL    Hematocrit 41.5 35.0 - 47.0 %    MCV 85.9 80.0 - 99.0 FL    MCH 29.4 26.0 - 34.0 PG    MCHC 34.2 30.0 - 36.5 g/dL    RDW 13.7 11.5 - 14.5 %    Platelets 220 150 - 400 K/uL    MPV 10.0 8.9 - 12.9 FL    Nucleated RBCs 0.0 0  WBC    nRBC 0.00 0.00 - 0.01 K/uL    Neutrophils % 72.0 32.0 - 75.0 %     Lymphocytes % 13.1 12.0 - 49.0 %    Monocytes % 4.1 (L) 5.0 - 13.0 %    Eosinophils % 8.8 (H) 0.0 - 7.0 %    Basophils % 1.6 (H) 0.0 - 1.0 %    Immature Granulocytes % 0.4 0.0 - 0.5 %    Neutrophils Absolute 9.43 (H) 1.80 - 8.00 K/UL    Lymphocytes Absolute 1.72 0.80 - 3.50 K/UL    Monocytes Absolute 0.54 0.00 - 1.00 K/UL    Eosinophils Absolute 1.15 (H) 0.00 - 0.40 K/UL    Basophils Absolute 0.21 (H) 0.00 - 0.10 K/UL    Immature Granulocytes Absolute 0.05 (H) 0.00 - 0.04 K/UL    Differential Type SMEAR SCANNED      RBC Comment NORMOCYTIC, NORMOCHROMIC           Pre-medications  were administered as ordered and chemotherapy was initiated.  Medications Administered         nivolumab (OPDIVO) 240 mg in sodium chloride 0.9 % 100 mL IVPB Admin Date  01/17/2025 Action  New Bag Dose  240 mg Rate  268 mL/hr Route  IntraVENous Documented By  Ivan Good, RN              Two nurses verified prior to administering: Drug name, Drug dose, Infusion volume or drug volume when prepared in a syringe, Rate of administration, Route of administration, Expiration dates and/or times, Appearance and physical integrity of the drugs, Rate set on infusion pump, when used, and Sequencing of drug administration.    1130 Patient tolerated treatment well.  PIV maintained blood return throughout treatment. PIV removed and 2x2 with coban placed. Patient was discharged in stable condition. Patient is aware of next scheduled [facility]IC appointment on 1/31/25 at 0900.    Future Appointments   Date Time Provider Department Center   1/31/2025  9:00 AM SS CHEMO CHAIR 4 [facility]   1/31/2025  9:45 AM Lena Thakkar APRN - NP [facility] BS AMB   2/14/2025  9:00 AM SS CHEMO CHAIR 5 [facility]   2/28/2025  9:00 AM SS CHEMO CHAIR 5 [facility]   3/14/2025  9:00 AM SS CHEMO CHAIR 5 [facility]   3/20/2025 11:20 AM Elisha Marley MD [facility] AMB   5/12/2025  9:10 AM Meggan Smith MD [facility] BS AMB         IVAN GOOD RN,

## 2025-01-23 RX ORDER — ONDANSETRON 2 MG/ML
8 INJECTION INTRAMUSCULAR; INTRAVENOUS
Status: CANCELLED | OUTPATIENT
Start: 2025-01-31

## 2025-01-23 RX ORDER — FAMOTIDINE 10 MG/ML
20 INJECTION, SOLUTION INTRAVENOUS
Status: CANCELLED | OUTPATIENT
Start: 2025-01-31

## 2025-01-23 RX ORDER — SODIUM CHLORIDE 9 MG/ML
INJECTION, SOLUTION INTRAVENOUS CONTINUOUS
Status: CANCELLED | OUTPATIENT
Start: 2025-01-31

## 2025-01-23 RX ORDER — DIPHENHYDRAMINE HYDROCHLORIDE 50 MG/ML
50 INJECTION INTRAMUSCULAR; INTRAVENOUS
Status: CANCELLED | OUTPATIENT
Start: 2025-01-31

## 2025-01-23 RX ORDER — EPINEPHRINE 1 MG/ML
0.3 INJECTION, SOLUTION INTRAMUSCULAR; SUBCUTANEOUS PRN
Status: CANCELLED | OUTPATIENT
Start: 2025-01-31

## 2025-01-23 RX ORDER — ACETAMINOPHEN 325 MG/1
650 TABLET ORAL
Status: CANCELLED | OUTPATIENT
Start: 2025-01-31

## 2025-01-23 RX ORDER — ALBUTEROL SULFATE 90 UG/1
4 INHALANT RESPIRATORY (INHALATION) PRN
Status: CANCELLED | OUTPATIENT
Start: 2025-01-31

## 2025-01-23 RX ORDER — HEPARIN 100 UNIT/ML
500 SYRINGE INTRAVENOUS PRN
Status: CANCELLED | OUTPATIENT
Start: 2025-01-31

## 2025-01-23 RX ORDER — SODIUM CHLORIDE 9 MG/ML
5-250 INJECTION, SOLUTION INTRAVENOUS PRN
Status: CANCELLED | OUTPATIENT
Start: 2025-01-31

## 2025-01-23 RX ORDER — MEPERIDINE HYDROCHLORIDE 25 MG/ML
12.5 INJECTION INTRAMUSCULAR; INTRAVENOUS; SUBCUTANEOUS PRN
Status: CANCELLED | OUTPATIENT
Start: 2025-01-31

## 2025-01-23 RX ORDER — HYDROCORTISONE SODIUM SUCCINATE 100 MG/2ML
100 INJECTION INTRAMUSCULAR; INTRAVENOUS
Status: CANCELLED | OUTPATIENT
Start: 2025-01-31

## 2025-01-29 ENCOUNTER — TELEPHONE (OUTPATIENT)
Age: 60
End: 2025-01-29

## 2025-01-29 DIAGNOSIS — E11.22 TYPE 2 DIABETES MELLITUS WITH CHRONIC KIDNEY DISEASE, WITHOUT LONG-TERM CURRENT USE OF INSULIN, UNSPECIFIED CKD STAGE (HCC): Primary | ICD-10-CM

## 2025-01-30 ENCOUNTER — OFFICE VISIT (OUTPATIENT)
Age: 60
End: 2025-01-30
Payer: MEDICARE

## 2025-01-30 ENCOUNTER — PATIENT MESSAGE (OUTPATIENT)
Age: 60
End: 2025-01-30

## 2025-01-30 VITALS
HEART RATE: 89 BPM | HEIGHT: 63 IN | DIASTOLIC BLOOD PRESSURE: 95 MMHG | WEIGHT: 188 LBS | BODY MASS INDEX: 33.31 KG/M2 | SYSTOLIC BLOOD PRESSURE: 178 MMHG

## 2025-01-30 DIAGNOSIS — E03.9 ACQUIRED HYPOTHYROIDISM: Primary | ICD-10-CM

## 2025-01-30 DIAGNOSIS — Z29.89 IMMUNOTHERAPY: ICD-10-CM

## 2025-01-30 DIAGNOSIS — E11.65 TYPE 2 DIABETES MELLITUS WITH HYPERGLYCEMIA, WITHOUT LONG-TERM CURRENT USE OF INSULIN (HCC): ICD-10-CM

## 2025-01-30 LAB
T4 FREE SERPL-MCNC: 1.1 NG/DL (ref 0.8–1.5)
TSH SERPL DL<=0.05 MIU/L-ACNC: 43.4 UIU/ML (ref 0.36–3.74)

## 2025-01-30 PROCEDURE — G8428 CUR MEDS NOT DOCUMENT: HCPCS | Performed by: INTERNAL MEDICINE

## 2025-01-30 PROCEDURE — 99205 OFFICE O/P NEW HI 60 MIN: CPT | Performed by: INTERNAL MEDICINE

## 2025-01-30 PROCEDURE — 76536 US EXAM OF HEAD AND NECK: CPT | Performed by: INTERNAL MEDICINE

## 2025-01-30 PROCEDURE — 4004F PT TOBACCO SCREEN RCVD TLK: CPT | Performed by: INTERNAL MEDICINE

## 2025-01-30 PROCEDURE — 3080F DIAST BP >= 90 MM HG: CPT | Performed by: INTERNAL MEDICINE

## 2025-01-30 PROCEDURE — G2211 COMPLEX E/M VISIT ADD ON: HCPCS | Performed by: INTERNAL MEDICINE

## 2025-01-30 PROCEDURE — 3017F COLORECTAL CA SCREEN DOC REV: CPT | Performed by: INTERNAL MEDICINE

## 2025-01-30 PROCEDURE — G8417 CALC BMI ABV UP PARAM F/U: HCPCS | Performed by: INTERNAL MEDICINE

## 2025-01-30 PROCEDURE — 3046F HEMOGLOBIN A1C LEVEL >9.0%: CPT | Performed by: INTERNAL MEDICINE

## 2025-01-30 PROCEDURE — 2022F DILAT RTA XM EVC RTNOPTHY: CPT | Performed by: INTERNAL MEDICINE

## 2025-01-30 PROCEDURE — 3077F SYST BP >= 140 MM HG: CPT | Performed by: INTERNAL MEDICINE

## 2025-01-30 RX ORDER — LEVOTHYROXINE SODIUM 150 UG/1
150 TABLET ORAL DAILY
Qty: 30 TABLET | Refills: 3 | Status: SHIPPED | OUTPATIENT
Start: 2025-01-30

## 2025-01-30 RX ORDER — OXYCODONE HYDROCHLORIDE 5 MG/1
5 TABLET ORAL EVERY 4 HOURS PRN
COMMUNITY

## 2025-01-30 RX ORDER — METFORMIN HYDROCHLORIDE 500 MG/1
TABLET, EXTENDED RELEASE ORAL
Qty: 60 TABLET | Refills: 5 | Status: SHIPPED | OUTPATIENT
Start: 2025-01-30

## 2025-01-30 NOTE — PROGRESS NOTES
Diagnostic Thyroid Ultrasound    Date: 1/30/2025     Real time ultrasound of the thyroid gland was performed in both transverse and sagittal planes    Right Lobe:  Size: 1.22 x 1.10 x 1.96 cm  Texture: hypoechoic  Nodules: none      Isthmus:    Size: normal  Nodules: none      Left Lobe:  Size: 1.12 x 0.72 x 2.70 cm   Texture: hypoechoic  Nodules: none        Impression:  Small gland w/o nodules.  Texture hypoechoic c/w damage/inflammation

## 2025-01-30 NOTE — PROGRESS NOTES
Chief Complaint   Patient presents with    Thyroid Problem    Diabetes    New Patient       * New Patient Visit   THYROID:  Dx hypothyroidism since kidney cancer w/mets to lung treatment (immunotherapy) - after a few doses TSH skyrocketed   Stared on 25mcg, titrated up to 100mcg then 25mcg more added recently for a total of 125mcg --> just stared last week   Wt based 136mcg    Takes in AM with water, eats hours later  No iron, calcium or multi-vitamins within 4 hours     Family History of thyroid disease:  mom - hypothyroid - better w/brand    Thyroid Symptoms  **has decreased energy**, denies change in weight, denies change in appetite, **has sleep issues**, denies hair changes, no skin changes, denies sweats, denies hot/cold intolerance, denies tremor, denies palpitations, **has diarrhea**, no change in menses, denies mood changes    Neck Symptoms  denies dysphagia, denies anterior neck pain, denies neck swelling, notes no nodules    DIABETES   Referred for thyroid  Will touch on DM  Dx 2013   Started on insulin  Then came off   Currently on Metformin - sends her to the bathroom; stared 2 x 500 BID, now 1 BID if no diarrhea, if diarrhea only one in AM   Checks AM -  (highest 121)     Labs/Studies    Lab Results   Component Value Date/Time    TSH 31.20 01/03/2025 09:50 AM    T4FREE 1.00 09/20/2019 02:10 PM       Lab Results   Component Value Date/Time    TSH 31.20 01/03/2025 09:50 AM    TSH 35.30 12/20/2024 08:09 AM    TSH 44.00 12/05/2024 01:13 PM    TSH 51.40 11/08/2024 09:00 AM    TSH 91.20 10/11/2024 09:10 AM    TSH 71.30 09/27/2024 08:22 AM    TSH 62.80 08/23/2024 10:14 AM    TSH 90.40 07/18/2024 11:02 AM    TSH 1.38 05/16/2024 10:31 AM    TSH 2.67 11/11/2022 09:15 AM    TSH 0.89 03/07/2022 01:13 PM    TSH 1.51 09/09/2021 09:56 AM    TSH 1.18 02/05/2021 11:10 AM    TSH 1.45 10/28/2020 07:07 AM    TSH 1.510 09/20/2019 02:10 PM      Past Medical History:   Diagnosis Date    Arthritis     Chronic

## 2025-01-31 ENCOUNTER — HOSPITAL ENCOUNTER (OUTPATIENT)
Facility: HOSPITAL | Age: 60
Setting detail: INFUSION SERIES
Discharge: HOME | End: 2025-01-31
Payer: MEDICARE

## 2025-01-31 ENCOUNTER — TELEMEDICINE (OUTPATIENT)
Age: 60
End: 2025-01-31
Payer: MEDICARE

## 2025-01-31 VITALS
BODY MASS INDEX: 33.31 KG/M2 | SYSTOLIC BLOOD PRESSURE: 137 MMHG | HEIGHT: 63 IN | HEART RATE: 97 BPM | RESPIRATION RATE: 18 BRPM | WEIGHT: 188 LBS | OXYGEN SATURATION: 94 % | TEMPERATURE: 98 F | DIASTOLIC BLOOD PRESSURE: 87 MMHG

## 2025-01-31 DIAGNOSIS — C64.9 SECONDARY RENAL CELL CARCINOMA OF LUNG, UNSPECIFIED LATERALITY (HCC): ICD-10-CM

## 2025-01-31 DIAGNOSIS — C78.00 SECONDARY RENAL CELL CARCINOMA OF LUNG, UNSPECIFIED LATERALITY (HCC): Primary | ICD-10-CM

## 2025-01-31 DIAGNOSIS — C64.1 RENAL CELL CARCINOMA OF RIGHT KIDNEY METASTATIC TO OTHER SITE (HCC): ICD-10-CM

## 2025-01-31 DIAGNOSIS — C64.9 SECONDARY RENAL CELL CARCINOMA OF LUNG, UNSPECIFIED LATERALITY (HCC): Primary | ICD-10-CM

## 2025-01-31 DIAGNOSIS — C64.9 RENAL CELL CARCINOMA, UNSPECIFIED LATERALITY (HCC): Primary | ICD-10-CM

## 2025-01-31 DIAGNOSIS — C78.00 SECONDARY RENAL CELL CARCINOMA OF LUNG, UNSPECIFIED LATERALITY (HCC): ICD-10-CM

## 2025-01-31 LAB
ALBUMIN SERPL-MCNC: 3.7 G/DL (ref 3.5–5)
ALBUMIN/GLOB SERPL: 1.2 (ref 1.1–2.2)
ALP SERPL-CCNC: 126 U/L (ref 45–117)
ALT SERPL-CCNC: 20 U/L (ref 12–78)
ANION GAP SERPL CALC-SCNC: 7 MMOL/L (ref 2–12)
AST SERPL-CCNC: 15 U/L (ref 15–37)
BASOPHILS # BLD: 0.17 K/UL (ref 0–0.1)
BASOPHILS NFR BLD: 1.2 % (ref 0–1)
BILIRUB SERPL-MCNC: 0.4 MG/DL (ref 0.2–1)
BUN SERPL-MCNC: 9 MG/DL (ref 6–20)
BUN/CREAT SERPL: 9 (ref 12–20)
CALCIUM SERPL-MCNC: 9.9 MG/DL (ref 8.5–10.1)
CHLORIDE SERPL-SCNC: 104 MMOL/L (ref 97–108)
CO2 SERPL-SCNC: 26 MMOL/L (ref 21–32)
CREAT SERPL-MCNC: 0.97 MG/DL (ref 0.55–1.02)
DIFFERENTIAL METHOD BLD: ABNORMAL
EOSINOPHIL # BLD: 0.85 K/UL (ref 0–0.4)
EOSINOPHIL NFR BLD: 6.1 % (ref 0–7)
ERYTHROCYTE [DISTWIDTH] IN BLOOD BY AUTOMATED COUNT: 13.8 % (ref 11.5–14.5)
EST. AVERAGE GLUCOSE BLD GHB EST-MCNC: 126 MG/DL
GLOBULIN SER CALC-MCNC: 3 G/DL (ref 2–4)
GLUCOSE SERPL-MCNC: 187 MG/DL (ref 65–100)
HBA1C MFR BLD: 6 % (ref 4–5.6)
HCT VFR BLD AUTO: 43.5 % (ref 35–47)
HGB BLD-MCNC: 14.6 G/DL (ref 11.5–16)
IMM GRANULOCYTES # BLD AUTO: 0.05 K/UL (ref 0–0.04)
IMM GRANULOCYTES NFR BLD AUTO: 0.4 % (ref 0–0.5)
LYMPHOCYTES # BLD: 1.52 K/UL (ref 0.8–3.5)
LYMPHOCYTES NFR BLD: 11 % (ref 12–49)
MCH RBC QN AUTO: 28.6 PG (ref 26–34)
MCHC RBC AUTO-ENTMCNC: 33.6 G/DL (ref 30–36.5)
MCV RBC AUTO: 85.1 FL (ref 80–99)
MONOCYTES # BLD: 0.41 K/UL (ref 0–1)
MONOCYTES NFR BLD: 3 % (ref 5–13)
NEUTS SEG # BLD: 10.83 K/UL (ref 1.8–8)
NEUTS SEG NFR BLD: 78.3 % (ref 32–75)
NRBC # BLD: 0 K/UL (ref 0–0.01)
NRBC BLD-RTO: 0 PER 100 WBC
PLATELET # BLD AUTO: 236 K/UL (ref 150–400)
PMV BLD AUTO: 9.6 FL (ref 8.9–12.9)
POTASSIUM SERPL-SCNC: 3.5 MMOL/L (ref 3.5–5.1)
PROT SERPL-MCNC: 6.7 G/DL (ref 6.4–8.2)
RBC # BLD AUTO: 5.11 M/UL (ref 3.8–5.2)
SODIUM SERPL-SCNC: 137 MMOL/L (ref 136–145)
WBC # BLD AUTO: 13.8 K/UL (ref 3.6–11)

## 2025-01-31 PROCEDURE — 85025 COMPLETE CBC W/AUTO DIFF WBC: CPT

## 2025-01-31 PROCEDURE — 80053 COMPREHEN METABOLIC PANEL: CPT

## 2025-01-31 PROCEDURE — 2580000003 HC RX 258: Performed by: INTERNAL MEDICINE

## 2025-01-31 PROCEDURE — 99215 OFFICE O/P EST HI 40 MIN: CPT

## 2025-01-31 PROCEDURE — 3017F COLORECTAL CA SCREEN DOC REV: CPT

## 2025-01-31 PROCEDURE — 6360000002 HC RX W HCPCS: Performed by: INTERNAL MEDICINE

## 2025-01-31 PROCEDURE — G8427 DOCREV CUR MEDS BY ELIG CLIN: HCPCS

## 2025-01-31 PROCEDURE — 96413 CHEMO IV INFUSION 1 HR: CPT

## 2025-01-31 PROCEDURE — 83036 HEMOGLOBIN GLYCOSYLATED A1C: CPT

## 2025-01-31 RX ORDER — SODIUM CHLORIDE 9 MG/ML
5-250 INJECTION, SOLUTION INTRAVENOUS PRN
Status: DISCONTINUED | OUTPATIENT
Start: 2025-01-31 | End: 2025-02-01 | Stop reason: HOSPADM

## 2025-01-31 RX ORDER — SODIUM CHLORIDE 0.9 % (FLUSH) 0.9 %
5-40 SYRINGE (ML) INJECTION PRN
Status: DISCONTINUED | OUTPATIENT
Start: 2025-01-31 | End: 2025-02-01 | Stop reason: HOSPADM

## 2025-01-31 RX ADMIN — SODIUM CHLORIDE 240 MG: 9 INJECTION, SOLUTION INTRAVENOUS at 10:46

## 2025-01-31 ASSESSMENT — PAIN SCALES - GENERAL: PAINLEVEL_OUTOF10: 0

## 2025-01-31 NOTE — PROGRESS NOTES
[facility] Progress Note    Date: 2025    Name: Symone Edmonds    MRN: [id number]         : 1965    Ms. Edmonds Arrived ambulatory and in no distress for C14D1 of Opdivo Regimen.  Assessment was completed, no acute issues at this time, no new complaints voiced.  24G PIV placed in R forearm, labs drawn & sent for processing.      Patient proceed to appointment with Dr. Winchester.    Ms. Edmonds's vitals were reviewed.  Vitals:    25 0845   BP: (!) 160/96   Pulse: 97   Resp: 18   Temp: 98 °F (36.7 °C)   SpO2: 94%       Lab results were obtained and reviewed.  Recent Results (from the past 12 hour(s))   Comprehensive metabolic panel    Collection Time: 25  9:16 AM   Result Value Ref Range    Sodium 137 136 - 145 mmol/L    Potassium 3.5 3.5 - 5.1 mmol/L    Chloride 104 97 - 108 mmol/L    CO2 26 21 - 32 mmol/L    Anion Gap 7 2 - 12 mmol/L    Glucose 187 (H) 65 - 100 mg/dL    BUN 9 6 - 20 MG/DL    Creatinine 0.97 0.55 - 1.02 MG/DL    BUN/Creatinine Ratio 9 (L) 12 - 20      Est, Glom Filt Rate 67 >60 ml/min/1.73m2    Calcium 9.9 8.5 - 10.1 MG/DL    Total Bilirubin 0.4 0.2 - 1.0 MG/DL    ALT 20 12 - 78 U/L    AST 15 15 - 37 U/L    Alk Phosphatase 126 (H) 45 - 117 U/L    Total Protein 6.7 6.4 - 8.2 g/dL    Albumin 3.7 3.5 - 5.0 g/dL    Globulin 3.0 2.0 - 4.0 g/dL    Albumin/Globulin Ratio 1.2 1.1 - 2.2     CBC With Auto Differential    Collection Time: 25  9:16 AM   Result Value Ref Range    WBC 13.8 (H) 3.6 - 11.0 K/uL    RBC 5.11 3.80 - 5.20 M/uL    Hemoglobin 14.6 11.5 - 16.0 g/dL    Hematocrit 43.5 35.0 - 47.0 %    MCV 85.1 80.0 - 99.0 FL    MCH 28.6 26.0 - 34.0 PG    MCHC 33.6 30.0 - 36.5 g/dL    RDW 13.8 11.5 - 14.5 %    Platelets 236 150 - 400 K/uL    MPV 9.6 8.9 - 12.9 FL    Nucleated RBCs 0.0 0  WBC    nRBC 0.00 0.00 - 0.01 K/uL    Neutrophils % 78.3 (H) 32.0 - 75.0 %    Lymphocytes % 11.0 (L) 12.0 - 49.0 %    Monocytes % 3.0 (L) 5.0 - 13.0 %    Eosinophils % 6.1 0.0 - 7.0 %    Basophils %

## 2025-02-02 LAB
ENDOMYSIUM IGA SER QL: NORMAL
IGA SERPL-MCNC: 180 MG/DL (ref 87–352)
TTG IGA SER-ACNC: <2 U/ML (ref 0–3)

## 2025-02-03 ENCOUNTER — TELEPHONE (OUTPATIENT)
Age: 60
End: 2025-02-03

## 2025-02-03 ENCOUNTER — PATIENT MESSAGE (OUTPATIENT)
Age: 60
End: 2025-02-03

## 2025-02-03 DIAGNOSIS — I10 ESSENTIAL HYPERTENSION WITH GOAL BLOOD PRESSURE LESS THAN 140/90: ICD-10-CM

## 2025-02-03 NOTE — TELEPHONE ENCOUNTER
----- Message from Dr. Patricia Suazo MD sent at 2/3/2025 12:50 PM EST -----  Good A1C level indicates good sugar control.

## 2025-02-04 ENCOUNTER — TELEPHONE (OUTPATIENT)
Age: 60
End: 2025-02-04

## 2025-02-04 LAB
ENDOMYSIUM IGA SER QL: NEGATIVE
IGA SERPL-MCNC: 180 MG/DL (ref 87–352)
TTG IGA SER-ACNC: <2 U/ML (ref 0–3)

## 2025-02-05 RX ORDER — METOPROLOL SUCCINATE 25 MG/1
25 TABLET, EXTENDED RELEASE ORAL DAILY
Qty: 30 TABLET | Refills: 3 | Status: SHIPPED | OUTPATIENT
Start: 2025-02-05

## 2025-02-05 RX ORDER — VALSARTAN 320 MG/1
320 TABLET ORAL DAILY
Qty: 90 TABLET | Refills: 3 | Status: SHIPPED | OUTPATIENT
Start: 2025-02-05

## 2025-02-05 NOTE — TELEPHONE ENCOUNTER
Please call patient.  I was notified by nurse practitioner that her blood pressure is out of control.  Need office visit and medication adjustment.

## 2025-02-05 NOTE — TELEPHONE ENCOUNTER
Spoke with patient after ID x2   Ranging 180/117 last week   172/105 this week  BP checked every 2 weeks at her appts, consistently high   HR  @ rest  Patient is currently undergoing cancer treatment     Dilt 180, 2 caps daily   Lasix 20 mg daily  Valsartan 320 mg daily   Weight 187-189 normal 205     [facility]

## 2025-02-05 NOTE — TELEPHONE ENCOUNTER
Per verbal order from Dr. Elisha Marley  Last appt: 9/20/2024     Future Appointments   Date Time Provider Department Center   2/10/2025 10:30 AM Patricia Suazo MD [facility]   2/14/2025  9:00 AM SS CHEMO CHAIR 5 [facility]   2/14/2025  9:15 AM Lena Thakkra APRN - NP [place] [facility]   2/28/2025  9:00 AM SS CHEMO CHAIR 5 [facility]   2/28/2025  9:45 AM Nhi Winchester MD [facility] AMB   3/14/2025  9:00 AM SS CHEMO CHAIR 5 [facility]   3/20/2025 11:20 AM Elisha Marley MD [facility]   3/28/2025  9:00 AM SS CHEMO CHAIR 5 [facility]   4/8/2025  2:50 PM Meggan Smith MD [facility]   4/11/2025  9:00 AM SS CHEMO CHAIR 5 [facility]   4/25/2025  9:00 AM SS CHEMO CHAIR 5 [facility]       Requested Prescriptions     Signed Prescriptions Disp Refills    metoprolol succinate (TOPROL XL) 25 MG extended release tablet 30 tablet 3     Sig: Take 1 tablet by mouth daily     Authorizing Provider: ELISHA MARLEY     Ordering User: PAGE UNGER    valsartan (DIOVAN) 320 MG tablet 90 tablet 3     Sig: Take 1 tablet by mouth daily For hypertension     Authorizing Provider: ELISHA MARLEY     Ordering User: PAGE UNGER

## 2025-02-05 NOTE — TELEPHONE ENCOUNTER
Per Dr. Marley- verbal order start toprol XL 25 mg daily until follow up, con't to monitor BP and report back if no improvement, keep March Fup as scheduled.

## 2025-02-06 RX ORDER — DIPHENHYDRAMINE HYDROCHLORIDE 50 MG/ML
50 INJECTION INTRAMUSCULAR; INTRAVENOUS
Status: CANCELLED | OUTPATIENT
Start: 2025-02-14

## 2025-02-06 RX ORDER — SODIUM CHLORIDE 9 MG/ML
INJECTION, SOLUTION INTRAVENOUS CONTINUOUS
Status: CANCELLED | OUTPATIENT
Start: 2025-02-14

## 2025-02-06 RX ORDER — HYDROCORTISONE SODIUM SUCCINATE 100 MG/2ML
100 INJECTION INTRAMUSCULAR; INTRAVENOUS
Status: CANCELLED | OUTPATIENT
Start: 2025-02-14

## 2025-02-06 RX ORDER — FAMOTIDINE 10 MG/ML
20 INJECTION, SOLUTION INTRAVENOUS
Status: CANCELLED | OUTPATIENT
Start: 2025-02-14

## 2025-02-06 RX ORDER — EPINEPHRINE 1 MG/ML
0.3 INJECTION, SOLUTION INTRAMUSCULAR; SUBCUTANEOUS PRN
Status: CANCELLED | OUTPATIENT
Start: 2025-02-14

## 2025-02-06 RX ORDER — ACETAMINOPHEN 325 MG/1
650 TABLET ORAL
Status: CANCELLED | OUTPATIENT
Start: 2025-02-14

## 2025-02-06 RX ORDER — ALBUTEROL SULFATE 90 UG/1
4 INHALANT RESPIRATORY (INHALATION) PRN
Status: CANCELLED | OUTPATIENT
Start: 2025-02-14

## 2025-02-06 RX ORDER — ONDANSETRON 2 MG/ML
8 INJECTION INTRAMUSCULAR; INTRAVENOUS
Status: CANCELLED | OUTPATIENT
Start: 2025-02-14

## 2025-02-10 LAB
ENDOMYSIUM IGA SER QL: NEGATIVE
IGA SERPL-MCNC: 180 MG/DL (ref 87–352)
Lab: NORMAL
Lab: NORMAL
REFERENCE LAB: NORMAL
T4 FREE SERPL-MCNC: 1.1 NG/DL (ref 0.8–1.5)
TSH SERPL DL<=0.05 MIU/L-ACNC: 43.4 UIU/ML (ref 0.36–3.74)
TTG IGA SER-ACNC: <2 U/ML (ref 0–3)

## 2025-02-13 NOTE — PROGRESS NOTES
[facility]   [address] [facility], [address] [place] [address]   W: [phone]  F: [phone]     Reason for Visit:   Symone Edmonds is a 59 y.o. female who is seen by synchronous (real-time) audio-video technology on 2/14/2025 for follow up for:    Follow up of leucocytosis and anemia  Stage IV RCC     Treatment history for renal cell carcinoma  7/28/2020-right radical nephrectomy-3.3 cm renal cell carcinoma.  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 female with PMH as below who is seen for the above in addition to abnormal CBC.    She has persistent leucocytosis since 2014, no other CBC abnormalities. She has known R renal cell carcinoma ( 3.3 cm) which she had a R radical nephrectomy on 7/28/2020. She has osteoarthritis and has several flare ups. She had a CT chest 8/2020 that  showed a 6 mm lung nodule which is being observed.  She had a follow-up CT scan with pulmonary on 7/24/2023 which showed increasing bilateral pulmonary nodules.  This prompted a PET CT scan that was done on 8/9/2023.  This showed that the peripheral pulmonary nodule in the right lower lobe had an FDG activity of 3.3, subcentimeter pulmonary nodule in the left lower lobe had no FDG activity.  She was referred to thoracic surgery Dr. Ross and underwent a robotic assisted thoracoscopic right lower lobe wedge resection and mediastinal lymph node dissection on 9/6/2023.  Pathology revealed metastatic renal cell carcinoma in the right lower lobe 1.3 cm nodule. 3/2024 Scans showed progression

## 2025-02-14 ENCOUNTER — TELEMEDICINE (OUTPATIENT)
Age: 60
End: 2025-02-14
Payer: MEDICARE

## 2025-02-14 ENCOUNTER — HOSPITAL ENCOUNTER (OUTPATIENT)
Facility: HOSPITAL | Age: 60
Setting detail: INFUSION SERIES
Discharge: HOME | End: 2025-02-14
Payer: MEDICARE

## 2025-02-14 VITALS
HEART RATE: 71 BPM | DIASTOLIC BLOOD PRESSURE: 79 MMHG | SYSTOLIC BLOOD PRESSURE: 115 MMHG | RESPIRATION RATE: 16 BRPM | TEMPERATURE: 97.5 F

## 2025-02-14 DIAGNOSIS — C78.00 SECONDARY RENAL CELL CARCINOMA OF LUNG, UNSPECIFIED LATERALITY (HCC): Primary | ICD-10-CM

## 2025-02-14 DIAGNOSIS — C64.9 RENAL CELL CARCINOMA, UNSPECIFIED LATERALITY (HCC): Primary | ICD-10-CM

## 2025-02-14 DIAGNOSIS — C64.9 SECONDARY RENAL CELL CARCINOMA OF LUNG, UNSPECIFIED LATERALITY (HCC): ICD-10-CM

## 2025-02-14 DIAGNOSIS — C78.00 SECONDARY RENAL CELL CARCINOMA OF LUNG, UNSPECIFIED LATERALITY (HCC): ICD-10-CM

## 2025-02-14 DIAGNOSIS — C64.9 SECONDARY RENAL CELL CARCINOMA OF LUNG, UNSPECIFIED LATERALITY (HCC): Primary | ICD-10-CM

## 2025-02-14 PROBLEM — R00.0 TACHYCARDIA: Status: RESOLVED | Noted: 2024-09-20 | Resolved: 2025-02-14

## 2025-02-14 PROBLEM — I48.0 PAROXYSMAL ATRIAL FIBRILLATION (HCC): Status: RESOLVED | Noted: 2024-09-20 | Resolved: 2025-02-14

## 2025-02-14 PROBLEM — K75.4 AUTOIMMUNE HEPATITIS (HCC): Status: RESOLVED | Noted: 2024-08-02 | Resolved: 2025-02-14

## 2025-02-14 PROBLEM — M47.12 SPONDYLOSIS OF CERVICAL SPINE WITH MYELOPATHY AND RADICULOPATHY: Status: RESOLVED | Noted: 2018-12-03 | Resolved: 2025-02-14

## 2025-02-14 PROBLEM — R65.10 SIRS (SYSTEMIC INFLAMMATORY RESPONSE SYNDROME) (HCC): Status: RESOLVED | Noted: 2024-07-31 | Resolved: 2025-02-14

## 2025-02-14 PROBLEM — M47.22 SPONDYLOSIS OF CERVICAL SPINE WITH MYELOPATHY AND RADICULOPATHY: Status: RESOLVED | Noted: 2018-12-03 | Resolved: 2025-02-14

## 2025-02-14 PROBLEM — B17.9 ACUTE HEPATITIS: Status: RESOLVED | Noted: 2024-08-02 | Resolved: 2025-02-14

## 2025-02-14 LAB
ALBUMIN SERPL-MCNC: 4 G/DL (ref 3.5–5)
ALBUMIN/GLOB SERPL: 1 (ref 1.1–2.2)
ALP SERPL-CCNC: 174 U/L (ref 45–117)
ALT SERPL-CCNC: 38 U/L (ref 12–78)
ANION GAP SERPL CALC-SCNC: 4 MMOL/L (ref 2–12)
AST SERPL-CCNC: ABNORMAL U/L (ref 15–37)
BASOPHILS # BLD: 0.16 K/UL (ref 0–0.1)
BASOPHILS NFR BLD: 1.3 % (ref 0–1)
BILIRUB SERPL-MCNC: 0.5 MG/DL (ref 0.2–1)
BUN SERPL-MCNC: 11 MG/DL (ref 6–20)
BUN/CREAT SERPL: 11 (ref 12–20)
CALCIUM SERPL-MCNC: 10 MG/DL (ref 8.5–10.1)
CHLORIDE SERPL-SCNC: 106 MMOL/L (ref 97–108)
CO2 SERPL-SCNC: 27 MMOL/L (ref 21–32)
COMMENT:: NORMAL
CREAT SERPL-MCNC: 1.03 MG/DL (ref 0.55–1.02)
DIFFERENTIAL METHOD BLD: ABNORMAL
EOSINOPHIL # BLD: 0.78 K/UL (ref 0–0.4)
EOSINOPHIL NFR BLD: 6.3 % (ref 0–7)
ERYTHROCYTE [DISTWIDTH] IN BLOOD BY AUTOMATED COUNT: 13.8 % (ref 11.5–14.5)
GLOBULIN SER CALC-MCNC: 4.1 G/DL (ref 2–4)
GLUCOSE SERPL-MCNC: 121 MG/DL (ref 65–100)
HCT VFR BLD AUTO: 49.8 % (ref 35–47)
HGB BLD-MCNC: 16.6 G/DL (ref 11.5–16)
IMM GRANULOCYTES # BLD AUTO: 0.04 K/UL (ref 0–0.04)
IMM GRANULOCYTES NFR BLD AUTO: 0.3 % (ref 0–0.5)
LYMPHOCYTES # BLD: 1.93 K/UL (ref 0.8–3.5)
LYMPHOCYTES NFR BLD: 15.6 % (ref 12–49)
MCH RBC QN AUTO: 28.6 PG (ref 26–34)
MCHC RBC AUTO-ENTMCNC: 33.3 G/DL (ref 30–36.5)
MCV RBC AUTO: 85.9 FL (ref 80–99)
MONOCYTES # BLD: 0.48 K/UL (ref 0–1)
MONOCYTES NFR BLD: 3.9 % (ref 5–13)
NEUTS SEG # BLD: 8.99 K/UL (ref 1.8–8)
NEUTS SEG NFR BLD: 72.6 % (ref 32–75)
NRBC # BLD: 0 K/UL (ref 0–0.01)
NRBC BLD-RTO: 0 PER 100 WBC
PLATELET # BLD AUTO: 246 K/UL (ref 150–400)
PMV BLD AUTO: 10.2 FL (ref 8.9–12.9)
POTASSIUM SERPL-SCNC: ABNORMAL MMOL/L (ref 3.5–5.1)
PROT SERPL-MCNC: 8.1 G/DL (ref 6.4–8.2)
RBC # BLD AUTO: 5.8 M/UL (ref 3.8–5.2)
SODIUM SERPL-SCNC: 137 MMOL/L (ref 136–145)
SPECIMEN HOLD: NORMAL
WBC # BLD AUTO: 12.4 K/UL (ref 3.6–11)

## 2025-02-14 PROCEDURE — 96417 CHEMO IV INFUS EACH ADDL SEQ: CPT

## 2025-02-14 PROCEDURE — 80053 COMPREHEN METABOLIC PANEL: CPT

## 2025-02-14 PROCEDURE — 36415 COLL VENOUS BLD VENIPUNCTURE: CPT

## 2025-02-14 PROCEDURE — 99214 OFFICE O/P EST MOD 30 MIN: CPT | Performed by: INTERNAL MEDICINE

## 2025-02-14 PROCEDURE — 2580000003 HC RX 258: Performed by: INTERNAL MEDICINE

## 2025-02-14 PROCEDURE — 3017F COLORECTAL CA SCREEN DOC REV: CPT | Performed by: INTERNAL MEDICINE

## 2025-02-14 PROCEDURE — 6360000002 HC RX W HCPCS: Performed by: INTERNAL MEDICINE

## 2025-02-14 PROCEDURE — 85025 COMPLETE CBC W/AUTO DIFF WBC: CPT

## 2025-02-14 PROCEDURE — G8428 CUR MEDS NOT DOCUMENT: HCPCS | Performed by: INTERNAL MEDICINE

## 2025-02-14 RX ORDER — SODIUM CHLORIDE 9 MG/ML
5-250 INJECTION, SOLUTION INTRAVENOUS PRN
Status: DISCONTINUED | OUTPATIENT
Start: 2025-02-14 | End: 2025-02-15 | Stop reason: HOSPADM

## 2025-02-14 RX ORDER — HEPARIN 100 UNIT/ML
500 SYRINGE INTRAVENOUS PRN
Status: DISCONTINUED | OUTPATIENT
Start: 2025-02-14 | End: 2025-02-15 | Stop reason: HOSPADM

## 2025-02-14 RX ORDER — OXYCODONE HYDROCHLORIDE 5 MG/1
5 TABLET ORAL EVERY 6 HOURS PRN
Qty: 12 TABLET | Refills: 0 | Status: SHIPPED | OUTPATIENT
Start: 2025-02-14 | End: 2025-02-17

## 2025-02-14 RX ORDER — SODIUM CHLORIDE 0.9 % (FLUSH) 0.9 %
5-40 SYRINGE (ML) INJECTION PRN
Status: DISCONTINUED | OUTPATIENT
Start: 2025-02-14 | End: 2025-02-15 | Stop reason: HOSPADM

## 2025-02-14 RX ADMIN — SODIUM CHLORIDE 25 ML/HR: 9 INJECTION, SOLUTION INTRAVENOUS at 12:00

## 2025-02-14 RX ADMIN — SODIUM CHLORIDE 240 MG: 9 INJECTION, SOLUTION INTRAVENOUS at 12:08

## 2025-02-14 ASSESSMENT — PAIN SCALES - GENERAL: PAINLEVEL_OUTOF10: 0

## 2025-02-14 NOTE — PROGRESS NOTES
[facility] Progress Note    Date: 2025    Name: Symone Edmonds    MRN: [id number]         : 1965    Ms. Edmonds Arrived ambulatory and in no distress for C15D1 of OpdivoRegimen.  Assessment was completed, no acute issues at this time, no new complaints voiced.  R PIV accessed without difficulty, labs drawn & sent for processing.       Patient proceed to virtual appointment with Dr. Winchester.    Ms. Edmonds's vitals were reviewed.  Vitals:    25 1230   BP: 115/79   Pulse: 71   Resp: 16   Temp:        Lab results were obtained and reviewed.  Recent Results (from the past 12 hour(s))   CBC With Auto Differential    Collection Time: 25  9:27 AM   Result Value Ref Range    WBC 12.4 (H) 3.6 - 11.0 K/uL    RBC 5.80 (H) 3.80 - 5.20 M/uL    Hemoglobin 16.6 (H) 11.5 - 16.0 g/dL    Hematocrit 49.8 (H) 35.0 - 47.0 %    MCV 85.9 80.0 - 99.0 FL    MCH 28.6 26.0 - 34.0 PG    MCHC 33.3 30.0 - 36.5 g/dL    RDW 13.8 11.5 - 14.5 %    Platelets 246 150 - 400 K/uL    MPV 10.2 8.9 - 12.9 FL    Nucleated RBCs 0.0 0  WBC    nRBC 0.00 0.00 - 0.01 K/uL    Neutrophils % 72.6 32.0 - 75.0 %    Lymphocytes % 15.6 12.0 - 49.0 %    Monocytes % 3.9 (L) 5.0 - 13.0 %    Eosinophils % 6.3 0.0 - 7.0 %    Basophils % 1.3 (H) 0.0 - 1.0 %    Immature Granulocytes % 0.3 0.0 - 0.5 %    Neutrophils Absolute 8.99 (H) 1.80 - 8.00 K/UL    Lymphocytes Absolute 1.93 0.80 - 3.50 K/UL    Monocytes Absolute 0.48 0.00 - 1.00 K/UL    Eosinophils Absolute 0.78 (H) 0.00 - 0.40 K/UL    Basophils Absolute 0.16 (H) 0.00 - 0.10 K/UL    Immature Granulocytes Absolute 0.04 0.00 - 0.04 K/UL    Differential Type AUTOMATED     Comprehensive metabolic panel    Collection Time: 25  9:27 AM   Result Value Ref Range    Sodium 137 136 - 145 mmol/L    Potassium Hemolyzed, Recollection Recommended 3.5 - 5.1 mmol/L    Chloride 106 97 - 108 mmol/L    CO2 27 21 - 32 mmol/L    Anion Gap 4 2 - 12 mmol/L    Glucose 121 (H) 65 - 100 mg/dL    BUN 11 6 - 20 MG/DL

## 2025-02-17 ENCOUNTER — TELEPHONE (OUTPATIENT)
Age: 60
End: 2025-02-17

## 2025-02-17 NOTE — TELEPHONE ENCOUNTER
HIPAA x2  SW pt to find out if she has been tested for flu/covid/rsv. Pt stated she didn't go get tested and inquired if she needs to as she doesn't have a ride today. I let her know that Lena Thakkar NP recommends her getting tested soon. Pt stated she will get tested as soon as she can get a ride. Pt also inquired if she had an appt for CT scan. I let her know that she has the orders but no appt set up yet. I provided her with the number to Central Scheduling at [phone] and let her know they will assist her with setting up these tests. Pt verbalized understanding and was appreciative of my call.

## 2025-02-17 NOTE — TELEPHONE ENCOUNTER
HIPAA x2  SW pt to see how pt is doing. Pt states temp hasn't gone over 100.1, cough is being mostly managed by the Tessalon pearls, pt states diarrhea started again today but that it will probably be fine by tomorrow. I let the pt know that I will update Dr. Winchester/Lena Thakkar NP with this information and to call us if her temp goes up or she starts to feel worse. Pt verbalized understanding and was appreciative of my call.

## 2025-02-20 RX ORDER — ALBUTEROL SULFATE 90 UG/1
4 INHALANT RESPIRATORY (INHALATION) PRN
Status: CANCELLED | OUTPATIENT
Start: 2025-02-28

## 2025-02-20 RX ORDER — SODIUM CHLORIDE 9 MG/ML
INJECTION, SOLUTION INTRAVENOUS CONTINUOUS
Status: CANCELLED | OUTPATIENT
Start: 2025-02-28

## 2025-02-20 RX ORDER — EPINEPHRINE 1 MG/ML
0.3 INJECTION, SOLUTION INTRAMUSCULAR; SUBCUTANEOUS PRN
Status: CANCELLED | OUTPATIENT
Start: 2025-02-28

## 2025-02-20 RX ORDER — DIPHENHYDRAMINE HYDROCHLORIDE 50 MG/ML
50 INJECTION INTRAMUSCULAR; INTRAVENOUS
Status: CANCELLED | OUTPATIENT
Start: 2025-02-28

## 2025-02-20 RX ORDER — FAMOTIDINE 10 MG/ML
20 INJECTION, SOLUTION INTRAVENOUS
Status: CANCELLED | OUTPATIENT
Start: 2025-02-28

## 2025-02-20 RX ORDER — HEPARIN 100 UNIT/ML
500 SYRINGE INTRAVENOUS PRN
Status: CANCELLED | OUTPATIENT
Start: 2025-02-28

## 2025-02-20 RX ORDER — ONDANSETRON 2 MG/ML
8 INJECTION INTRAMUSCULAR; INTRAVENOUS
Status: CANCELLED | OUTPATIENT
Start: 2025-02-28

## 2025-02-20 RX ORDER — SODIUM CHLORIDE 0.9 % (FLUSH) 0.9 %
5-40 SYRINGE (ML) INJECTION PRN
Status: CANCELLED | OUTPATIENT
Start: 2025-02-28

## 2025-02-20 RX ORDER — ACETAMINOPHEN 325 MG/1
650 TABLET ORAL
Status: CANCELLED | OUTPATIENT
Start: 2025-02-28

## 2025-02-20 RX ORDER — SODIUM CHLORIDE 9 MG/ML
5-250 INJECTION, SOLUTION INTRAVENOUS PRN
Status: CANCELLED | OUTPATIENT
Start: 2025-02-28

## 2025-02-20 RX ORDER — HYDROCORTISONE SODIUM SUCCINATE 100 MG/2ML
100 INJECTION INTRAMUSCULAR; INTRAVENOUS
Status: CANCELLED | OUTPATIENT
Start: 2025-02-28

## 2025-02-20 NOTE — PROGRESS NOTES
although EBUS path was benign. Started on Ipi Nivo ans is s/p 4 cycles. Admitted 8/1 with fevers, abnormal LFTs and sob. Completed prednisone course. Resumed Nivolumab every 2 weeks.    Seen virtually for follow up.   She reports 3 days of diarrhea- sometimes up to 5 episodes in a day. She is taking Imodium.  She felt dizzy yesterday when she moved from sitting to standing. BP has been high.   Was recently evaluated by Endo.      She smokes 1 ppd for 40 years.     Mother has breast cancer- diagnosed at 82     Review of systems was obtained and pertinent findings reviewed above. Past medical history, social history, family history, medications, and allergies are located in the electronic medical record.         Physical Exam:     Vitals stable    General: alert, cooperative, no distress   Mental  status: normal mood, behavior, speech, dress, motor activity, and thought processes, able to follow commands   HENT: NCAT   Neck: no visualized mass   Resp: no respiratory distress   Neuro: no gross deficits   Skin: no discoloration or lesions of concern on visible areas   Psychiatric: normal affect, consistent with stated mood, no evidence of hallucinations     Due to this being a TeleHealth evaluation, many elements of the physical examination are unable to be assessed.  Evaluation of the following organ systems was limited: Vitals/Constitutional/EENT/Resp/CV/GI//MS/Neuro/Skin/Heme-Lymph-Imm.       Results:       Lab Results   Component Value Date/Time    WBC 13.1 01/17/2025 09:02 AM    WBC Comment 09/05/2018 11:26 AM    HGB 14.2 01/17/2025 09:02 AM    HCT 41.5 01/17/2025 09:02 AM     01/17/2025 09:02 AM    MCV 85.9 01/17/2025 09:02 AM     Lab Results   Component Value Date/Time     01/17/2025 09:02 AM    K 4.0 01/17/2025 09:02 AM     01/17/2025 09:02 AM    CO2 27 01/17/2025 09:02 AM    BUN 10 01/17/2025 09:02 AM    GFRAA >60 03/07/2022 01:13 PM     Lab Results   Component Value Date/Time    ALT 24  Shani

## 2025-02-24 ENCOUNTER — HOSPITAL ENCOUNTER (OUTPATIENT)
Facility: HOSPITAL | Age: 60
Discharge: HOME | End: 2025-02-27
Payer: MEDICARE

## 2025-02-24 DIAGNOSIS — C64.9 SECONDARY RENAL CELL CARCINOMA OF LUNG, UNSPECIFIED LATERALITY (HCC): ICD-10-CM

## 2025-02-24 DIAGNOSIS — C64.1 RENAL CELL CARCINOMA OF RIGHT KIDNEY METASTATIC TO OTHER SITE (HCC): ICD-10-CM

## 2025-02-24 DIAGNOSIS — C78.00 SECONDARY RENAL CELL CARCINOMA OF LUNG, UNSPECIFIED LATERALITY (HCC): ICD-10-CM

## 2025-02-24 PROCEDURE — 74177 CT ABD & PELVIS W/CONTRAST: CPT

## 2025-02-24 PROCEDURE — 70491 CT SOFT TISSUE NECK W/DYE: CPT

## 2025-02-24 PROCEDURE — 2500000003 HC RX 250 WO HCPCS

## 2025-02-24 PROCEDURE — 6360000004 HC RX CONTRAST MEDICATION

## 2025-02-24 RX ORDER — IOPAMIDOL 755 MG/ML
100 INJECTION, SOLUTION INTRAVASCULAR
Status: COMPLETED | OUTPATIENT
Start: 2025-02-24 | End: 2025-02-24

## 2025-02-24 RX ADMIN — BARIUM SULFATE 900 ML: 20 SUSPENSION ORAL at 11:19

## 2025-02-24 RX ADMIN — IOPAMIDOL 100 ML: 755 INJECTION, SOLUTION INTRAVENOUS at 11:19

## 2025-02-28 ENCOUNTER — HOSPITAL ENCOUNTER (OUTPATIENT)
Facility: HOSPITAL | Age: 60
Setting detail: INFUSION SERIES
Discharge: HOME | End: 2025-02-28
Payer: MEDICARE

## 2025-02-28 ENCOUNTER — TELEPHONE (OUTPATIENT)
Age: 60
End: 2025-02-28

## 2025-02-28 VITALS
WEIGHT: 183.6 LBS | OXYGEN SATURATION: 93 % | HEIGHT: 64 IN | RESPIRATION RATE: 18 BRPM | DIASTOLIC BLOOD PRESSURE: 70 MMHG | SYSTOLIC BLOOD PRESSURE: 119 MMHG | HEART RATE: 74 BPM | BODY MASS INDEX: 31.34 KG/M2 | TEMPERATURE: 97.9 F

## 2025-02-28 DIAGNOSIS — C78.00 SECONDARY RENAL CELL CARCINOMA OF LUNG, UNSPECIFIED LATERALITY (HCC): ICD-10-CM

## 2025-02-28 DIAGNOSIS — C64.9 SECONDARY RENAL CELL CARCINOMA OF LUNG, UNSPECIFIED LATERALITY (HCC): ICD-10-CM

## 2025-02-28 DIAGNOSIS — C64.9 RENAL CELL CARCINOMA, UNSPECIFIED LATERALITY (HCC): Primary | ICD-10-CM

## 2025-02-28 LAB
ALBUMIN SERPL-MCNC: 3.3 G/DL (ref 3.5–5)
ALBUMIN/GLOB SERPL: 0.9 (ref 1.1–2.2)
ALP SERPL-CCNC: 170 U/L (ref 45–117)
ALT SERPL-CCNC: 42 U/L (ref 12–78)
ANION GAP SERPL CALC-SCNC: 7 MMOL/L (ref 2–12)
AST SERPL-CCNC: 18 U/L (ref 15–37)
BASOPHILS # BLD: 0.16 K/UL (ref 0–0.1)
BASOPHILS NFR BLD: 1.4 % (ref 0–1)
BILIRUB SERPL-MCNC: 0.4 MG/DL (ref 0.2–1)
BUN SERPL-MCNC: 10 MG/DL (ref 6–20)
BUN/CREAT SERPL: 10 (ref 12–20)
CALCIUM SERPL-MCNC: 9.5 MG/DL (ref 8.5–10.1)
CHLORIDE SERPL-SCNC: 104 MMOL/L (ref 97–108)
CO2 SERPL-SCNC: 26 MMOL/L (ref 21–32)
CREAT SERPL-MCNC: 1.01 MG/DL (ref 0.55–1.02)
DIFFERENTIAL METHOD BLD: ABNORMAL
EOSINOPHIL # BLD: 0.98 K/UL (ref 0–0.4)
EOSINOPHIL NFR BLD: 8.4 % (ref 0–7)
ERYTHROCYTE [DISTWIDTH] IN BLOOD BY AUTOMATED COUNT: 14.2 % (ref 11.5–14.5)
GLOBULIN SER CALC-MCNC: 3.5 G/DL (ref 2–4)
GLUCOSE SERPL-MCNC: 112 MG/DL (ref 65–100)
HCT VFR BLD AUTO: 42.6 % (ref 35–47)
HGB BLD-MCNC: 13.9 G/DL (ref 11.5–16)
IMM GRANULOCYTES # BLD AUTO: 0.05 K/UL (ref 0–0.04)
IMM GRANULOCYTES NFR BLD AUTO: 0.4 % (ref 0–0.5)
LYMPHOCYTES # BLD: 2.06 K/UL (ref 0.8–3.5)
LYMPHOCYTES NFR BLD: 17.6 % (ref 12–49)
MCH RBC QN AUTO: 27.6 PG (ref 26–34)
MCHC RBC AUTO-ENTMCNC: 32.6 G/DL (ref 30–36.5)
MCV RBC AUTO: 84.7 FL (ref 80–99)
MONOCYTES # BLD: 0.74 K/UL (ref 0–1)
MONOCYTES NFR BLD: 6.3 % (ref 5–13)
NEUTS SEG # BLD: 7.71 K/UL (ref 1.8–8)
NEUTS SEG NFR BLD: 65.9 % (ref 32–75)
NRBC # BLD: 0 K/UL (ref 0–0.01)
NRBC BLD-RTO: 0 PER 100 WBC
PLATELET # BLD AUTO: 336 K/UL (ref 150–400)
PMV BLD AUTO: 8.9 FL (ref 8.9–12.9)
POTASSIUM SERPL-SCNC: 4.1 MMOL/L (ref 3.5–5.1)
PROT SERPL-MCNC: 6.8 G/DL (ref 6.4–8.2)
RBC # BLD AUTO: 5.03 M/UL (ref 3.8–5.2)
SODIUM SERPL-SCNC: 137 MMOL/L (ref 136–145)
TSH SERPL DL<=0.05 MIU/L-ACNC: 11.4 UIU/ML (ref 0.36–3.74)
WBC # BLD AUTO: 11.7 K/UL (ref 3.6–11)

## 2025-02-28 PROCEDURE — 84443 ASSAY THYROID STIM HORMONE: CPT

## 2025-02-28 PROCEDURE — 6360000002 HC RX W HCPCS: Performed by: INTERNAL MEDICINE

## 2025-02-28 PROCEDURE — 80053 COMPREHEN METABOLIC PANEL: CPT

## 2025-02-28 PROCEDURE — 85025 COMPLETE CBC W/AUTO DIFF WBC: CPT

## 2025-02-28 PROCEDURE — 2580000003 HC RX 258: Performed by: INTERNAL MEDICINE

## 2025-02-28 PROCEDURE — 96413 CHEMO IV INFUSION 1 HR: CPT

## 2025-02-28 RX ORDER — SODIUM CHLORIDE 9 MG/ML
5-250 INJECTION, SOLUTION INTRAVENOUS PRN
Status: DISCONTINUED | OUTPATIENT
Start: 2025-02-28 | End: 2025-03-01 | Stop reason: HOSPADM

## 2025-02-28 RX ADMIN — SODIUM CHLORIDE 240 MG: 9 INJECTION, SOLUTION INTRAVENOUS at 12:05

## 2025-02-28 RX ADMIN — SODIUM CHLORIDE 25 ML/HR: 9 INJECTION, SOLUTION INTRAVENOUS at 12:02

## 2025-02-28 ASSESSMENT — PAIN DESCRIPTION - ORIENTATION: ORIENTATION: LOWER

## 2025-02-28 ASSESSMENT — PAIN SCALES - GENERAL: PAINLEVEL_OUTOF10: 6

## 2025-02-28 ASSESSMENT — PAIN DESCRIPTION - DESCRIPTORS: DESCRIPTORS: ACHING

## 2025-02-28 ASSESSMENT — PAIN DESCRIPTION - LOCATION: LOCATION: BACK

## 2025-02-28 NOTE — PROGRESS NOTES
[facility] Progress Note    Pt admit to [facility] for C16D1 Opdivo in stable condition. Assessment completed.  RFA PIV with positive blood return.    No new concerns voiced.  Pt reported to assessment nurse that she has mild congestion and that she had a fever last week (\"but that was days ago\").    Pt had virtual visit with her medical oncology provider today, prior to receiving treatment.       VS  Vitals:    02/28/25 0900 02/28/25 0907 02/28/25 1237   BP:  134/81 119/70   Pulse:  79 74   Resp:  18    Temp:  97.9 °F (36.6 °C)    SpO2:  93%    Weight: 83.3 kg (183 lb 9.6 oz)     Height: 1.613 m (5' 3.5\")           Medications:  Medications Administered         0.9 % sodium chloride infusion Admin Date  02/28/2025 Action  New Bag Dose  25 mL/hr Rate  25 mL/hr Route  IntraVENous Documented By  Rachel Yip RN        nivolumab (OPDIVO) 240 mg in sodium chloride 0.9 % 100 mL IVPB Admin Date  02/28/2025 Action  New Bag Dose  240 mg Rate  268 mL/hr Route  IntraVENous Documented By  Rachel Yip RN              Two nurses verified prior to administering:  drug name, drug dose, infusion volume or drug volume when prepared in a syringe, rate of administration, route of administration, expiration dates and/or times, appearance and physical integrity of the drugs, rate set on infusion pump, when used, sequencing of drug administration    Pt tolerated treatment well. PIV maintained positive blood return throughout treatment, flushed with positive blood return at conclusion. D/c home in no distress. Pt aware of next appointment scheduled for 3/14.    Labs  Recent Results (from the past 12 hour(s))   CBC with Auto Differential    Collection Time: 02/28/25  9:09 AM   Result Value Ref Range    WBC 11.7 (H) 3.6 - 11.0 K/uL    RBC 5.03 3.80 - 5.20 M/uL    Hemoglobin 13.9 11.5 - 16.0 g/dL    Hematocrit 42.6 35.0 - 47.0 %    MCV 84.7 80.0 - 99.0 FL    MCH 27.6 26.0 - 34.0 PG    MCHC  32.6 30.0 - 36.5 g/dL    RDW 14.2 11.5 - 14.5 %    Platelets 336 150 - 400 K/uL    MPV 8.9 8.9 - 12.9 FL    Nucleated RBCs 0.0 0  WBC    nRBC 0.00 0.00 - 0.01 K/uL    Neutrophils % 65.9 32.0 - 75.0 %    Lymphocytes % 17.6 12.0 - 49.0 %    Monocytes % 6.3 5.0 - 13.0 %    Eosinophils % 8.4 (H) 0.0 - 7.0 %    Basophils % 1.4 (H) 0.0 - 1.0 %    Immature Granulocytes % 0.4 0.0 - 0.5 %    Neutrophils Absolute 7.71 1.80 - 8.00 K/UL    Lymphocytes Absolute 2.06 0.80 - 3.50 K/UL    Monocytes Absolute 0.74 0.00 - 1.00 K/UL    Eosinophils Absolute 0.98 (H) 0.00 - 0.40 K/UL    Basophils Absolute 0.16 (H) 0.00 - 0.10 K/UL    Immature Granulocytes Absolute 0.05 (H) 0.00 - 0.04 K/UL    Differential Type AUTOMATED     Comprehensive Metabolic Panel    Collection Time: 02/28/25  9:09 AM   Result Value Ref Range    Sodium 137 136 - 145 mmol/L    Potassium 4.1 3.5 - 5.1 mmol/L    Chloride 104 97 - 108 mmol/L    CO2 26 21 - 32 mmol/L    Anion Gap 7 2 - 12 mmol/L    Glucose 112 (H) 65 - 100 mg/dL    BUN 10 6 - 20 MG/DL    Creatinine 1.01 0.55 - 1.02 MG/DL    BUN/Creatinine Ratio 10 (L) 12 - 20      Est, Glom Filt Rate 64 >60 ml/min/1.73m2    Calcium 9.5 8.5 - 10.1 MG/DL    Total Bilirubin 0.4 0.2 - 1.0 MG/DL    ALT 42 12 - 78 U/L    AST 18 15 - 37 U/L    Alk Phosphatase 170 (H) 45 - 117 U/L    Total Protein 6.8 6.4 - 8.2 g/dL    Albumin 3.3 (L) 3.5 - 5.0 g/dL    Globulin 3.5 2.0 - 4.0 g/dL    Albumin/Globulin Ratio 0.9 (L) 1.1 - 2.2     TSH    Collection Time: 02/28/25  9:09 AM   Result Value Ref Range    TSH, 3rd Generation 11.40 (H) 0.36 - 3.74 uIU/mL

## 2025-02-28 NOTE — PROGRESS NOTES
for toxicity.          Plan:         Hold  Nivolumab   Prednisone 40 mg x 4 days then 20 mg x 1 week and then 10 mg x 2 weeks followed by repeat CT Chest   Review scans in TB  L Thyroxine per endocrinologist -currently on 150 micrograms   CBC, CMP, TSH with each treatment  Follows with Dr. Soto with Pulmonary  Follow up with Dr. Marley or PCP for BP medications  Imodium  See 2 weeks for in person    Patient to call or MyChart message with any questions or concerns.     I appreciate the opportunity to participate in Ms. Symone Edmonds's care.    Signed By: Nhi Winchester MD     The patient was evaluated through a synchronous (real-time) audio-video encounter. The patient (or guardian if applicable) is aware that this is a billable service, which includes applicable co-pays. This Virtual Visit was conducted with patient's (and/or legal guardian's) consent. Patient identification was verified, and a caregiver was present when appropriate.   The patient was located at Other: car parked at [facility]  Provider was located at Facility (Appt Dept): [address] [address]  Confirm you are appropriately licensed, registered, or certified to deliver care in the state where the patient is located as indicated above. If you are not or unsure, please re-schedule the visit: Yes, I confirm.

## 2025-02-28 NOTE — TELEPHONE ENCOUNTER
HIPAA x2  SW pt to let her know that Dr. Winchester is still waiting on the results of her scans and will call her to let her know the results once we have them. I inquired how she's feeling. Pt stated she feels well, just having some sinus congestion, \"probably seasonal allergies\", otherwise feels fine. Pt verbalized understanding and was appreciative of my call.

## 2025-02-28 NOTE — TELEPHONE ENCOUNTER
HIPAA x2  SW pt to let her know that her appt is being moved to 3/4/25 at 10:45 am for VV d/t her scans not being read yet. Pt verbalized understanding and was appreciative of my call.

## 2025-03-04 ENCOUNTER — TELEMEDICINE (OUTPATIENT)
Age: 60
End: 2025-03-04

## 2025-03-04 DIAGNOSIS — C64.1 MALIGNANT NEOPLASM OF RIGHT KIDNEY, EXCEPT RENAL PELVIS (HCC): ICD-10-CM

## 2025-03-04 DIAGNOSIS — C64.1 RENAL CELL CARCINOMA OF RIGHT KIDNEY METASTATIC TO OTHER SITE (HCC): Primary | ICD-10-CM

## 2025-03-04 RX ORDER — PREDNISONE 20 MG/1
40 TABLET ORAL SEE ADMIN INSTRUCTIONS
Qty: 18 TABLET | Refills: 0 | Status: SHIPPED | OUTPATIENT
Start: 2025-03-04 | End: 2025-03-13

## 2025-03-04 RX ORDER — PROCHLORPERAZINE MALEATE 10 MG
TABLET ORAL
Qty: 60 TABLET | Refills: 2 | OUTPATIENT
Start: 2025-03-04

## 2025-03-10 NOTE — PROGRESS NOTES
[facility] [place] [facility]   [address] [facility], [address] [place] [address]   W: [phone]  F: [phone]     Reason for Visit:   Symone Edmonds is a 59 y.o. female who is seen on 3/14/2025 for follow up for:    Follow up of leucocytosis and anemia  Stage IV RCC     Treatment history for renal cell carcinoma  7/28/2020-right radical nephrectomy-3.3 cm renal cell carcinoma.  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 female with PMH as below who is seen for the above in addition to abnormal CBC.    She has persistent leucocytosis since 2014, no other CBC abnormalities. She has known R renal cell carcinoma ( 3.3 cm) which she had a R radical nephrectomy on 7/28/2020. She has osteoarthritis and has several flare ups. She had a CT chest 8/2020 that  showed a 6 mm lung nodule which is being observed.  She had a follow-up CT scan with pulmonary on 7/24/2023 which showed increasing bilateral pulmonary nodules.  This prompted a PET CT scan that was done on 8/9/2023.  This showed that the peripheral pulmonary nodule in the right lower lobe had an FDG activity of 3.3, subcentimeter pulmonary nodule in the left lower lobe had no FDG activity.  She was referred to thoracic surgery Dr. Ross and underwent a robotic assisted thoracoscopic right lower lobe wedge resection and mediastinal lymph node dissection on 9/6/2023.  Pathology revealed metastatic renal cell carcinoma in the right lower lobe 1.3 cm nodule. 3/2024 Scans showed progression although EBUS path was benign. Started on Ipi Nivo ans

## 2025-03-11 ENCOUNTER — TELEPHONE (OUTPATIENT)
Age: 60
End: 2025-03-11

## 2025-03-11 ENCOUNTER — CLINICAL DOCUMENTATION (OUTPATIENT)
Age: 60
End: 2025-03-11

## 2025-03-11 NOTE — TELEPHONE ENCOUNTER
LVM for pt to let her know that Lena Thakkar NP reviewed your case in TB today and the recommendations is for repeat bronch biopsy. I also let her know that she wanted me to check and see how her cough is doing. I left office number for callback and also will send [facility].

## 2025-03-11 NOTE — TELEPHONE ENCOUNTER
HIPAA x2  Pt returned call and I let her that Lena Thakkar NP reviewed her case in TB today and the recommendations is for her to have a repeat bronch biopsy. I also let her know that she wanted me to check and see how her cough is doing. Pt stated that her cough isn't constant but when she does cough \"it's a good one\". I let her know that I will update Lena Thakkar NP with this information and will send her a MCM. Pt verbalized understanding and was appreciative of my call.

## 2025-03-11 NOTE — PROGRESS NOTES
Reviewed in TB with radiology  Subcarinal LN is thickened, has central necrosis, similar appearance in right hilar LN; increased in size since 11/24 CT  These nodes appear flagrantly malignant  Recommendations is for repeat bronch biopsy

## 2025-03-12 DIAGNOSIS — C64.1 RENAL CELL CARCINOMA OF RIGHT KIDNEY METASTATIC TO OTHER SITE (HCC): Primary | ICD-10-CM

## 2025-03-13 DIAGNOSIS — E11.40 TYPE 2 DIABETES MELLITUS WITH DIABETIC NEUROPATHY, WITHOUT LONG-TERM CURRENT USE OF INSULIN (HCC): ICD-10-CM

## 2025-03-13 RX ORDER — DIPHENHYDRAMINE HCL 25 MG
TABLET ORAL
Qty: 1 KIT | Refills: 3 | Status: SHIPPED | OUTPATIENT
Start: 2025-03-13

## 2025-03-14 ENCOUNTER — OFFICE VISIT (OUTPATIENT)
Age: 60
End: 2025-03-14
Payer: MEDICARE

## 2025-03-14 VITALS
OXYGEN SATURATION: 93 % | TEMPERATURE: 98.2 F | BODY MASS INDEX: 32.26 KG/M2 | RESPIRATION RATE: 18 BRPM | WEIGHT: 185 LBS | SYSTOLIC BLOOD PRESSURE: 161 MMHG | DIASTOLIC BLOOD PRESSURE: 87 MMHG | HEART RATE: 85 BPM

## 2025-03-14 DIAGNOSIS — C64.1 MALIGNANT NEOPLASM OF RIGHT KIDNEY, EXCEPT RENAL PELVIS (HCC): Primary | ICD-10-CM

## 2025-03-14 PROCEDURE — 4004F PT TOBACCO SCREEN RCVD TLK: CPT | Performed by: INTERNAL MEDICINE

## 2025-03-14 PROCEDURE — 3017F COLORECTAL CA SCREEN DOC REV: CPT | Performed by: INTERNAL MEDICINE

## 2025-03-14 PROCEDURE — 99215 OFFICE O/P EST HI 40 MIN: CPT | Performed by: INTERNAL MEDICINE

## 2025-03-14 PROCEDURE — 3079F DIAST BP 80-89 MM HG: CPT | Performed by: INTERNAL MEDICINE

## 2025-03-14 PROCEDURE — G8417 CALC BMI ABV UP PARAM F/U: HCPCS | Performed by: INTERNAL MEDICINE

## 2025-03-14 PROCEDURE — G8428 CUR MEDS NOT DOCUMENT: HCPCS | Performed by: INTERNAL MEDICINE

## 2025-03-14 PROCEDURE — 3077F SYST BP >= 140 MM HG: CPT | Performed by: INTERNAL MEDICINE

## 2025-03-14 RX ORDER — PREDNISONE 10 MG/1
10 TABLET ORAL DAILY
Qty: 3 TABLET | Refills: 0 | Status: SHIPPED | OUTPATIENT
Start: 2025-03-14 | End: 2025-03-17

## 2025-03-14 NOTE — PROGRESS NOTES
Symone Edmonds is a 59 y.o. female    Chief Complaint   Patient presents with    Follow-up     Follow up of leucocytosis and anemia  Stage IV RCC          1. Have you been to the ER, urgent care clinic since your last visit?  Hospitalized since your last visit?No    2. Have you seen or consulted any other health care providers outside of the [facility] since your last visit?  Include any pap smears or colon screening. No

## 2025-03-17 ENCOUNTER — PATIENT MESSAGE (OUTPATIENT)
Age: 60
End: 2025-03-17

## 2025-03-18 ENCOUNTER — TELEPHONE (OUTPATIENT)
Age: 60
End: 2025-03-18

## 2025-03-18 ENCOUNTER — OFFICE VISIT (OUTPATIENT)
Age: 60
End: 2025-03-18
Payer: MEDICARE

## 2025-03-18 ENCOUNTER — PREP FOR PROCEDURE (OUTPATIENT)
Age: 60
End: 2025-03-18

## 2025-03-18 VITALS
HEIGHT: 64 IN | RESPIRATION RATE: 16 BRPM | BODY MASS INDEX: 31.67 KG/M2 | TEMPERATURE: 98.5 F | HEART RATE: 94 BPM | WEIGHT: 185.5 LBS | DIASTOLIC BLOOD PRESSURE: 88 MMHG | OXYGEN SATURATION: 95 % | SYSTOLIC BLOOD PRESSURE: 160 MMHG

## 2025-03-18 DIAGNOSIS — R59.0 MEDIASTINAL LYMPHADENOPATHY: Primary | ICD-10-CM

## 2025-03-18 DIAGNOSIS — J98.51 MEDIASTINITIS: ICD-10-CM

## 2025-03-18 PROCEDURE — 3017F COLORECTAL CA SCREEN DOC REV: CPT | Performed by: STUDENT IN AN ORGANIZED HEALTH CARE EDUCATION/TRAINING PROGRAM

## 2025-03-18 PROCEDURE — 99204 OFFICE O/P NEW MOD 45 MIN: CPT | Performed by: STUDENT IN AN ORGANIZED HEALTH CARE EDUCATION/TRAINING PROGRAM

## 2025-03-18 PROCEDURE — 3077F SYST BP >= 140 MM HG: CPT | Performed by: STUDENT IN AN ORGANIZED HEALTH CARE EDUCATION/TRAINING PROGRAM

## 2025-03-18 PROCEDURE — 4004F PT TOBACCO SCREEN RCVD TLK: CPT | Performed by: STUDENT IN AN ORGANIZED HEALTH CARE EDUCATION/TRAINING PROGRAM

## 2025-03-18 PROCEDURE — G8427 DOCREV CUR MEDS BY ELIG CLIN: HCPCS | Performed by: STUDENT IN AN ORGANIZED HEALTH CARE EDUCATION/TRAINING PROGRAM

## 2025-03-18 PROCEDURE — 3079F DIAST BP 80-89 MM HG: CPT | Performed by: STUDENT IN AN ORGANIZED HEALTH CARE EDUCATION/TRAINING PROGRAM

## 2025-03-18 PROCEDURE — G8417 CALC BMI ABV UP PARAM F/U: HCPCS | Performed by: STUDENT IN AN ORGANIZED HEALTH CARE EDUCATION/TRAINING PROGRAM

## 2025-03-18 RX ORDER — PREDNISONE 10 MG/1
10 TABLET ORAL EVERY MORNING
COMMUNITY
End: 2025-03-27

## 2025-03-18 ASSESSMENT — ENCOUNTER SYMPTOMS
SORE THROAT: 0
TROUBLE SWALLOWING: 0
COUGH: 1
EYE PAIN: 0
BACK PAIN: 0
VOMITING: 0
WHEEZING: 0
NAUSEA: 0
STRIDOR: 0
VOICE CHANGE: 0
SHORTNESS OF BREATH: 1

## 2025-03-18 ASSESSMENT — PATIENT HEALTH QUESTIONNAIRE - PHQ9
SUM OF ALL RESPONSES TO PHQ QUESTIONS 1-9: 0
1. LITTLE INTEREST OR PLEASURE IN DOING THINGS: NOT AT ALL
SUM OF ALL RESPONSES TO PHQ QUESTIONS 1-9: 0
2. FEELING DOWN, DEPRESSED OR HOPELESS: NOT AT ALL

## 2025-03-18 NOTE — H&P (VIEW-ONLY)
Thoracic Surgery  History and Physical    Chief Complaint:   Chief Complaint   Patient presents with    New Patient     Renal carcinoma of right kidney, Lung Nodule       Patient Active Problem List   Diagnosis    H/O right nephrectomy    Arthritis    Iliotibial band tendinitis of right side    Spondylosis of lumbar spine    JESSICA (obstructive sleep apnea)    Cigarette nicotine dependence without complication    Dysthymia    Abnormal mammogram of left breast    Hypertension    Vitamin D deficiency    Chronic obstructive pulmonary disease (HCC)    Type 2 diabetes mellitus (HCC)    Polyneuropathy associated with underlying disease    Incisional hernia    RLS (restless legs syndrome)    Colon adenoma    Status post bilateral knee replacements    Cancer of the skin, basal cell    Hx of renal cell cancer    Iron deficiency anemia    Renal cell carcinoma (HCC)    Osteoarthritis of left hip    Elevated alkaline phosphatase level    Hip abductor tendonitis, left    Primary osteoarthritis of left hip    Type 2 diabetes mellitus with chronic kidney disease    Type 2 diabetes mellitus with diabetic neuropathy    Secondary renal cell carcinoma of lung (HCC)    Drug induced liver disease    Hypothyroidism due to medication    Mediastinitis     HPI: Symone Edmonds is a 59 y.o. female presenting with mediastinal lymphadenopathy. Had an EBUS 1 year ago by Dr. Bernardo of [facility] that was non-diagnostic. She was referred by Dr. Winchester. Had a prior RLL wedge resection by Dr. Ross in Sept 2023 demonstrating metastatic RCC. In March 2024 had recurrent leukocytosis and new mediastinal nodes, right effusion. Cytology non-diagnostic. Lymphadenopathy is progressing. Patient currently on steroids which is reducing her fatigue and symptoms. Occasional cough, some shortness of breath. Denies hemoptysis.     Past Medical History:   Diagnosis Date    Arthritis     Chronic obstructive pulmonary disease (HCC)     BEGINNING STAGES     Chronic pain 1993     process.  Incidental/nonemergent findings are as described above.    Electronically signed by HARRY SOSA    Reviewed imaging personally: enlarging right hilar and mediastinal nodes in the subcarinal location. Right simple appearing effusion.     10/2023  Specimen Source   1: Pleural Fluid, ThinPrep and cell block:       CYTOLOGIC INTERPRETATION:   Pleural Fluid, ThinPrep and cell block:     Atypical cells present in a background of chronic inflammation,   consistent with reactive mesothelial cells.     General Categorization   Atypical, favor benign reactive process.     Specimen Adequacy   Satisfactory for evaluation.       3/2024Pathology reviewed:   Clinical History   -: Renal Cell w/prior Lung resection for Metastic Renal cell   Specimen Description   Prepared & examined slide(s) x 2     Specimen Preparation / Slide(s):   Cyto eval, FNA (R) x 1   FNA, Interpretation and Report (R) x 1   Labels Generated 10 (R) x 10       Specimen Source   1: Lymph Node, 4R, Fine needle aspiration:       CYTOLOGIC INTERPRETATION:   Lymph Node, 4R, Fine needle aspiration:     Rare ciliated bronchial cells, lipid laden macrophages, rare clusters of   benign lymph nodes   No lesional tissue present     General Categorization   No cells diagnostic for malignancy     CYTOLOGY REPORT   Clinical History   -: Renal cell Carcinoma w/resection of Lung for mets.   Specimen Description   Prepared & examined slide(s) x 8   Tissue in formalin, aggregate of bloody tissue submitted into toto in   cassette A & B       Specimen Preparation / Slide(s):   Cyto eval, FNA (R) x 1   FFPE (R) x 1   FNA, Interpretation and Report (R) x 1   H&E, Initial x 1   Labels Generated x 10   H&E, Initial x 1   Labels Generated x 10       Specimen Source   1: Lymph Node, Station 7, Fine needle aspiration and cell block:       CYTOLOGIC INTERPRETATION:   Station 7 Lymph node, Fine Needle Aspiration Biopsy:        Blood and blood elements with rare fragment of

## 2025-03-18 NOTE — TELEPHONE ENCOUNTER
Lynn called and states the patient needs a prior auth for the surgery on 3/20, but there is not one on file.

## 2025-03-18 NOTE — PROGRESS NOTES
Identified patient with two patient identifiers (name and ). Reviewed chart in preparation for visit and have obtained necessary documentation.    Symone Edmonds is a 59 y.o. female  Chief Complaint   Patient presents with    New Patient     Renal carcinoma of right kidney, Lung Nodule     BP (!) 152/80 (BP Site: Left Upper Arm, Patient Position: Sitting, BP Cuff Size: Large Adult)   Pulse 94   Temp 98.5 °F (36.9 °C) (Oral)   Resp 16   Ht 1.613 m (5' 3.5\")   Wt 84.1 kg (185 lb 8 oz)   SpO2 95%   BMI 32.34 kg/m²     1. Have you been to the ER, urgent care clinic since your last visit?  Hospitalized since your last visit?no    2. Have you seen or consulted any other health care providers outside of the [facility] since your last visit?  Include any pap smears or colon screening. No    Patient and provider made aware of elevated BP x2. Patient asymptomatic. Patient reminded to monitor BP, continue to take BP medications if prescribed, and follow up with PCP/Cardiologist.  Patient expressed understanding and agreement.    
Mother     Cancer Mother         2020 patient here.    Hypertension Mother     Thyroid Disease Mother     Glaucoma Mother     Breast Cancer Mother     Arthritis Mother     Heart Disease Father         He [facility]    Osteoarthritis Father     Psychiatric Disorder Father         BIPOLAR    Hypertension Father     Heart Surgery Father     Alcohol Abuse Father     Depression Father     Early Death Father     Heart Attack Father     High Blood Pressure Father     High Cholesterol Father     Mental Illness Father     Substance Abuse Father     Arthritis Father     No Known Problems Sister     Deep Vein Thrombosis Brother         Unknown cause    Hypertension Brother     Osteoarthritis Brother     Diabetes Brother     Obesity Brother     Arthritis Brother     Clotting Disorder Brother     High Blood Pressure Brother     Anesth Problems Neg Hx        Review of Systems   Constitutional:  Positive for activity change and fatigue. Negative for appetite change, chills, fever and unexpected weight change.   HENT:  Negative for sneezing, sore throat, trouble swallowing and voice change.    Eyes:  Negative for pain and visual disturbance.   Respiratory:  Positive for cough and shortness of breath. Negative for wheezing and stridor.    Cardiovascular:  Negative for chest pain, palpitations and leg swelling.   Gastrointestinal:  Negative for nausea and vomiting.   Musculoskeletal:  Negative for back pain, gait problem, myalgias and neck pain.   Skin:  Negative for pallor, rash and wound.   Neurological:  Positive for headaches. Negative for dizziness, syncope, weakness and light-headedness.   Hematological:  Negative for adenopathy.   Psychiatric/Behavioral:  Negative for dysphoric mood. The patient is not nervous/anxious.        Physical Exam:  BP (!) 160/88 (BP Site: Left Upper Arm, Patient Position: Sitting, BP Cuff Size: Large Adult)   Pulse 94   Temp 98.5 °F (36.9 °C) (Oral)   Resp 16   Ht 1.613 m (5' 3.5\")   Wt

## 2025-03-18 NOTE — PERIOP NOTE
[address] [address]      MAIN PRE OP             [phone]                                                                                AMBULATORY PRE OP          [phone]    PRE-ADMISSION TESTING    [phone]     Surgery Date:  03-        [facility] staff will call you between 4 and 7pm the day before your surgery with your arrival time. (If your surgery is on a Monday, we will call you the Friday before.)    Call [phone] after 7pm Monday-Friday if you did not receive this call.    INSTRUCTIONS BEFORE YOUR SURGERY   When You  Arrive Arrive at [facility] Patient Access on 1st floor the day of your surgery.  Have your insurance card, photo ID,living will/advanced directive/POA (if applicable),  and any copayment (if needed)   Food   and   Drink NO solid food after midnight the night before surgery. You can drink clear liquids from midnight until ONE hour prior to your arrival at the hospital on the day of your surgery. Clear liquids include:  Water  Apple juice (no sediment)  Carbonated beverages  Black coffee(no cream/milk)  Tea(no cream/milk)  Gatorade    No alcohol (beer, wine, liquor) or marijuana (smoking) 24 hours prior to surgery.   No edibles for 3 days prior to surgery.    Stop smoking cigarettes 14 days before surgery (helps w/healing and breathing).   Medications to   TAKE   Morning of Surgery MEDICATIONS TO TAKE THE MORNING OF SURGERY: Cardizem, Euthyrox,Xyzal, Omeprazole, Prednisone, Requip, Effexor, Spirivia    You may take these medications, IF NEEDED, the morning of surgery: Zanaflex, Zofran    Ask your surgeon/prescribing doctor for instructions on taking or stopping these medications prior to surgery:    Medications to STOP  before surgery Non-Steroidal anti-inflammatory Drugs (NSAID's): for example, Diclofenac (Voltaren), Ibuprofen (Advil, Motrin), Naproxen (Aleve) 3 days    STOP all herbal supplements and  wound healing. This may be a good time to stop smoking.  If you have diabetes, it is important for you to manage your blood sugar levels properly before your surgery as well as after your surgery. Poorly managed blood sugar levels slow down wound healing and prevent you from healing completely.       Follow all instructions so your surgery won’t be cancelled.  Please, be on time.                    If a situation occurs and you are delayed the day of surgery, call [phone]/ [phone].    If your physical condition changes (like a fever, cold, flu, etc.) call your surgeon as soon as possible.    Home medication(s) reviewed and verified via during PAT appointment/call.    The patient was contacted via telephone.     She verbalized understanding of all instructions does not need reinforcement.

## 2025-03-19 ENCOUNTER — TELEPHONE (OUTPATIENT)
Age: 60
End: 2025-03-19

## 2025-03-19 ENCOUNTER — ANESTHESIA EVENT (OUTPATIENT)
Facility: HOSPITAL | Age: 60
End: 2025-03-19
Payer: MEDICARE

## 2025-03-19 NOTE — TELEPHONE ENCOUNTER
[facility]  . BRIELLE IS REQUIRED FOR SURGERY ON 3/20/25 ALL DOCUMENTS HAVE BEEN SCANNED IN PATIENT'S CHART UNDER

## 2025-03-20 ENCOUNTER — APPOINTMENT (OUTPATIENT)
Facility: HOSPITAL | Age: 60
End: 2025-03-20
Attending: STUDENT IN AN ORGANIZED HEALTH CARE EDUCATION/TRAINING PROGRAM
Payer: MEDICARE

## 2025-03-20 ENCOUNTER — HOSPITAL ENCOUNTER (OUTPATIENT)
Facility: HOSPITAL | Age: 60
Setting detail: OUTPATIENT SURGERY
Discharge: HOME | End: 2025-03-20
Attending: STUDENT IN AN ORGANIZED HEALTH CARE EDUCATION/TRAINING PROGRAM | Admitting: STUDENT IN AN ORGANIZED HEALTH CARE EDUCATION/TRAINING PROGRAM
Payer: MEDICARE

## 2025-03-20 ENCOUNTER — ANESTHESIA (OUTPATIENT)
Facility: HOSPITAL | Age: 60
End: 2025-03-20
Payer: MEDICARE

## 2025-03-20 VITALS
DIASTOLIC BLOOD PRESSURE: 63 MMHG | SYSTOLIC BLOOD PRESSURE: 123 MMHG | HEART RATE: 83 BPM | TEMPERATURE: 97.7 F | BODY MASS INDEX: 31.58 KG/M2 | WEIGHT: 185 LBS | OXYGEN SATURATION: 93 % | RESPIRATION RATE: 25 BRPM | HEIGHT: 64 IN

## 2025-03-20 PROBLEM — J98.51 MEDIASTINITIS: Status: RESOLVED | Noted: 2025-03-18 | Resolved: 2025-03-20

## 2025-03-20 PROBLEM — R59.0 MEDIASTINAL LYMPHADENOPATHY: Status: ACTIVE | Noted: 2025-03-20

## 2025-03-20 LAB
AMYLASE FLD-CCNC: 32 U/L
APPEARANCE FLD: ABNORMAL
BODY FLD TYPE: NORMAL
COLOR FLD: YELLOW
GLUCOSE BLD STRIP.AUTO-MCNC: 153 MG/DL (ref 65–117)
GLUCOSE BLD STRIP.AUTO-MCNC: 252 MG/DL (ref 65–117)
GLUCOSE FLD-MCNC: 151 MG/DL
LDH FLD L TO P-CCNC: 169 U/L
LYMPHOCYTES NFR FLD: 92 %
MONOS+MACROS NFR FLD: 3 %
NEUTROPHILS NFR FLD: 5 %
NUC CELL # FLD: 3566 /CU MM
PH FLD: 7.5
RBC # FLD: >100 /CU MM
SERVICE CMNT-IMP: ABNORMAL
SERVICE CMNT-IMP: ABNORMAL
SPECIMEN SOURCE FLD: ABNORMAL
SPECIMEN SOURCE FLD: NORMAL
TRIGL FLD-MCNC: 38 MG/DL

## 2025-03-20 PROCEDURE — 2500000003 HC RX 250 WO HCPCS: Performed by: NURSE ANESTHETIST, CERTIFIED REGISTERED

## 2025-03-20 PROCEDURE — 6360000002 HC RX W HCPCS: Performed by: NURSE ANESTHETIST, CERTIFIED REGISTERED

## 2025-03-20 PROCEDURE — 7100000001 HC PACU RECOVERY - ADDTL 15 MIN: Performed by: STUDENT IN AN ORGANIZED HEALTH CARE EDUCATION/TRAINING PROGRAM

## 2025-03-20 PROCEDURE — S2900 ROBOTIC SURGICAL SYSTEM: HCPCS | Performed by: STUDENT IN AN ORGANIZED HEALTH CARE EDUCATION/TRAINING PROGRAM

## 2025-03-20 PROCEDURE — 2580000003 HC RX 258: Performed by: ANESTHESIOLOGY

## 2025-03-20 PROCEDURE — C1725 CATH, TRANSLUMIN NON-LASER: HCPCS | Performed by: STUDENT IN AN ORGANIZED HEALTH CARE EDUCATION/TRAINING PROGRAM

## 2025-03-20 PROCEDURE — 88172 CYTP DX EVAL FNA 1ST EA SITE: CPT

## 2025-03-20 PROCEDURE — 88112 CYTOPATH CELL ENHANCE TECH: CPT

## 2025-03-20 PROCEDURE — 82150 ASSAY OF AMYLASE: CPT

## 2025-03-20 PROCEDURE — 2709999900 HC NON-CHARGEABLE SUPPLY: Performed by: STUDENT IN AN ORGANIZED HEALTH CARE EDUCATION/TRAINING PROGRAM

## 2025-03-20 PROCEDURE — C1729 CATH, DRAINAGE: HCPCS | Performed by: STUDENT IN AN ORGANIZED HEALTH CARE EDUCATION/TRAINING PROGRAM

## 2025-03-20 PROCEDURE — 2580000003 HC RX 258: Performed by: NURSE ANESTHETIST, CERTIFIED REGISTERED

## 2025-03-20 PROCEDURE — 82962 GLUCOSE BLOOD TEST: CPT

## 2025-03-20 PROCEDURE — 83986 ASSAY PH BODY FLUID NOS: CPT

## 2025-03-20 PROCEDURE — 2500000003 HC RX 250 WO HCPCS: Performed by: STUDENT IN AN ORGANIZED HEALTH CARE EDUCATION/TRAINING PROGRAM

## 2025-03-20 PROCEDURE — 88173 CYTOPATH EVAL FNA REPORT: CPT

## 2025-03-20 PROCEDURE — 84478 ASSAY OF TRIGLYCERIDES: CPT

## 2025-03-20 PROCEDURE — 3700000001 HC ADD 15 MINUTES (ANESTHESIA): Performed by: STUDENT IN AN ORGANIZED HEALTH CARE EDUCATION/TRAINING PROGRAM

## 2025-03-20 PROCEDURE — 71045 X-RAY EXAM CHEST 1 VIEW: CPT

## 2025-03-20 PROCEDURE — 7100000000 HC PACU RECOVERY - FIRST 15 MIN: Performed by: STUDENT IN AN ORGANIZED HEALTH CARE EDUCATION/TRAINING PROGRAM

## 2025-03-20 PROCEDURE — 31624 DX BRONCHOSCOPE/LAVAGE: CPT | Performed by: STUDENT IN AN ORGANIZED HEALTH CARE EDUCATION/TRAINING PROGRAM

## 2025-03-20 PROCEDURE — 88305 TISSUE EXAM BY PATHOLOGIST: CPT

## 2025-03-20 PROCEDURE — 32555 ASPIRATE PLEURA W/ IMAGING: CPT | Performed by: STUDENT IN AN ORGANIZED HEALTH CARE EDUCATION/TRAINING PROGRAM

## 2025-03-20 PROCEDURE — 3600000009 HC SURGERY ROBOT BASE: Performed by: STUDENT IN AN ORGANIZED HEALTH CARE EDUCATION/TRAINING PROGRAM

## 2025-03-20 PROCEDURE — 2720000010 HC SURG SUPPLY STERILE: Performed by: STUDENT IN AN ORGANIZED HEALTH CARE EDUCATION/TRAINING PROGRAM

## 2025-03-20 PROCEDURE — 6370000000 HC RX 637 (ALT 250 FOR IP): Performed by: ANESTHESIOLOGY

## 2025-03-20 PROCEDURE — 3600000019 HC SURGERY ROBOT ADDTL 15MIN: Performed by: STUDENT IN AN ORGANIZED HEALTH CARE EDUCATION/TRAINING PROGRAM

## 2025-03-20 PROCEDURE — 87015 SPECIMEN INFECT AGNT CONCNTJ: CPT

## 2025-03-20 PROCEDURE — 3700000000 HC ANESTHESIA ATTENDED CARE: Performed by: STUDENT IN AN ORGANIZED HEALTH CARE EDUCATION/TRAINING PROGRAM

## 2025-03-20 PROCEDURE — 7100000010 HC PHASE II RECOVERY - FIRST 15 MIN: Performed by: STUDENT IN AN ORGANIZED HEALTH CARE EDUCATION/TRAINING PROGRAM

## 2025-03-20 PROCEDURE — 82945 GLUCOSE OTHER FLUID: CPT

## 2025-03-20 PROCEDURE — 87070 CULTURE OTHR SPECIMN AEROBIC: CPT

## 2025-03-20 PROCEDURE — 31652 BRONCH EBUS SAMPLNG 1/2 NODE: CPT | Performed by: STUDENT IN AN ORGANIZED HEALTH CARE EDUCATION/TRAINING PROGRAM

## 2025-03-20 PROCEDURE — 83615 LACTATE (LD) (LDH) ENZYME: CPT

## 2025-03-20 PROCEDURE — 89050 BODY FLUID CELL COUNT: CPT

## 2025-03-20 PROCEDURE — 7100000011 HC PHASE II RECOVERY - ADDTL 15 MIN: Performed by: STUDENT IN AN ORGANIZED HEALTH CARE EDUCATION/TRAINING PROGRAM

## 2025-03-20 PROCEDURE — 87205 SMEAR GRAM STAIN: CPT

## 2025-03-20 RX ORDER — MIDAZOLAM HYDROCHLORIDE 2 MG/2ML
2 INJECTION, SOLUTION INTRAMUSCULAR; INTRAVENOUS PRN
Status: DISCONTINUED | OUTPATIENT
Start: 2025-03-20 | End: 2025-03-20 | Stop reason: HOSPADM

## 2025-03-20 RX ORDER — ONDANSETRON 2 MG/ML
INJECTION INTRAMUSCULAR; INTRAVENOUS
Status: DISCONTINUED | OUTPATIENT
Start: 2025-03-20 | End: 2025-03-20 | Stop reason: SDUPTHER

## 2025-03-20 RX ORDER — SODIUM CHLORIDE 9 MG/ML
INJECTION, SOLUTION INTRAVENOUS PRN
Status: DISCONTINUED | OUTPATIENT
Start: 2025-03-20 | End: 2025-03-20 | Stop reason: HOSPADM

## 2025-03-20 RX ORDER — SODIUM CHLORIDE, SODIUM LACTATE, POTASSIUM CHLORIDE, CALCIUM CHLORIDE 600; 310; 30; 20 MG/100ML; MG/100ML; MG/100ML; MG/100ML
INJECTION, SOLUTION INTRAVENOUS
Status: DISCONTINUED | OUTPATIENT
Start: 2025-03-20 | End: 2025-03-20 | Stop reason: SDUPTHER

## 2025-03-20 RX ORDER — FENTANYL CITRATE 50 UG/ML
100 INJECTION, SOLUTION INTRAMUSCULAR; INTRAVENOUS
Status: DISCONTINUED | OUTPATIENT
Start: 2025-03-20 | End: 2025-03-20 | Stop reason: HOSPADM

## 2025-03-20 RX ORDER — PROCHLORPERAZINE EDISYLATE 5 MG/ML
5 INJECTION INTRAMUSCULAR; INTRAVENOUS
Status: DISCONTINUED | OUTPATIENT
Start: 2025-03-20 | End: 2025-03-20 | Stop reason: HOSPADM

## 2025-03-20 RX ORDER — HYDROMORPHONE HYDROCHLORIDE 1 MG/ML
0.5 INJECTION, SOLUTION INTRAMUSCULAR; INTRAVENOUS; SUBCUTANEOUS EVERY 5 MIN PRN
Status: DISCONTINUED | OUTPATIENT
Start: 2025-03-20 | End: 2025-03-20 | Stop reason: HOSPADM

## 2025-03-20 RX ORDER — BUPIVACAINE HYDROCHLORIDE AND EPINEPHRINE 5; 5 MG/ML; UG/ML
INJECTION, SOLUTION EPIDURAL; INTRACAUDAL; PERINEURAL PRN
Status: DISCONTINUED | OUTPATIENT
Start: 2025-03-20 | End: 2025-03-20 | Stop reason: HOSPADM

## 2025-03-20 RX ORDER — SODIUM CHLORIDE 0.9 % (FLUSH) 0.9 %
5-40 SYRINGE (ML) INJECTION EVERY 12 HOURS SCHEDULED
Status: DISCONTINUED | OUTPATIENT
Start: 2025-03-20 | End: 2025-03-20 | Stop reason: HOSPADM

## 2025-03-20 RX ORDER — ACETAMINOPHEN 500 MG
1000 TABLET ORAL ONCE
Status: COMPLETED | OUTPATIENT
Start: 2025-03-20 | End: 2025-03-20

## 2025-03-20 RX ORDER — NALOXONE HYDROCHLORIDE 0.4 MG/ML
INJECTION, SOLUTION INTRAMUSCULAR; INTRAVENOUS; SUBCUTANEOUS PRN
Status: DISCONTINUED | OUTPATIENT
Start: 2025-03-20 | End: 2025-03-20 | Stop reason: HOSPADM

## 2025-03-20 RX ORDER — SODIUM CHLORIDE, SODIUM LACTATE, POTASSIUM CHLORIDE, CALCIUM CHLORIDE 600; 310; 30; 20 MG/100ML; MG/100ML; MG/100ML; MG/100ML
INJECTION, SOLUTION INTRAVENOUS CONTINUOUS
Status: DISCONTINUED | OUTPATIENT
Start: 2025-03-20 | End: 2025-03-20 | Stop reason: HOSPADM

## 2025-03-20 RX ORDER — LIDOCAINE HYDROCHLORIDE 20 MG/ML
INJECTION, SOLUTION EPIDURAL; INFILTRATION; INTRACAUDAL; PERINEURAL
Status: DISCONTINUED | OUTPATIENT
Start: 2025-03-20 | End: 2025-03-20 | Stop reason: SDUPTHER

## 2025-03-20 RX ORDER — PROPOFOL 10 MG/ML
INJECTION, EMULSION INTRAVENOUS
Status: DISCONTINUED | OUTPATIENT
Start: 2025-03-20 | End: 2025-03-20 | Stop reason: SDUPTHER

## 2025-03-20 RX ORDER — LIDOCAINE HYDROCHLORIDE 10 MG/ML
1 INJECTION, SOLUTION EPIDURAL; INFILTRATION; INTRACAUDAL; PERINEURAL
Status: DISCONTINUED | OUTPATIENT
Start: 2025-03-20 | End: 2025-03-20 | Stop reason: HOSPADM

## 2025-03-20 RX ORDER — ROCURONIUM BROMIDE 10 MG/ML
INJECTION, SOLUTION INTRAVENOUS
Status: DISCONTINUED | OUTPATIENT
Start: 2025-03-20 | End: 2025-03-20 | Stop reason: SDUPTHER

## 2025-03-20 RX ORDER — ONDANSETRON 2 MG/ML
4 INJECTION INTRAMUSCULAR; INTRAVENOUS
Status: DISCONTINUED | OUTPATIENT
Start: 2025-03-20 | End: 2025-03-20 | Stop reason: HOSPADM

## 2025-03-20 RX ORDER — HYDRALAZINE HYDROCHLORIDE 20 MG/ML
10 INJECTION INTRAMUSCULAR; INTRAVENOUS
Status: DISCONTINUED | OUTPATIENT
Start: 2025-03-20 | End: 2025-03-20 | Stop reason: HOSPADM

## 2025-03-20 RX ORDER — PHENYLEPHRINE HCL IN 0.9% NACL 0.4MG/10ML
SYRINGE (ML) INTRAVENOUS
Status: DISCONTINUED | OUTPATIENT
Start: 2025-03-20 | End: 2025-03-20 | Stop reason: SDUPTHER

## 2025-03-20 RX ORDER — OXYCODONE HYDROCHLORIDE 5 MG/1
5 TABLET ORAL
Status: DISCONTINUED | OUTPATIENT
Start: 2025-03-20 | End: 2025-03-20 | Stop reason: HOSPADM

## 2025-03-20 RX ORDER — MIDAZOLAM HYDROCHLORIDE 1 MG/ML
INJECTION, SOLUTION INTRAMUSCULAR; INTRAVENOUS
Status: DISCONTINUED | OUTPATIENT
Start: 2025-03-20 | End: 2025-03-20 | Stop reason: SDUPTHER

## 2025-03-20 RX ORDER — FENTANYL CITRATE 50 UG/ML
INJECTION, SOLUTION INTRAMUSCULAR; INTRAVENOUS
Status: DISCONTINUED | OUTPATIENT
Start: 2025-03-20 | End: 2025-03-20 | Stop reason: SDUPTHER

## 2025-03-20 RX ORDER — DEXAMETHASONE SODIUM PHOSPHATE 4 MG/ML
INJECTION, SOLUTION INTRA-ARTICULAR; INTRALESIONAL; INTRAMUSCULAR; INTRAVENOUS; SOFT TISSUE
Status: DISCONTINUED | OUTPATIENT
Start: 2025-03-20 | End: 2025-03-20 | Stop reason: SDUPTHER

## 2025-03-20 RX ORDER — FENTANYL CITRATE 50 UG/ML
25 INJECTION, SOLUTION INTRAMUSCULAR; INTRAVENOUS EVERY 5 MIN PRN
Status: DISCONTINUED | OUTPATIENT
Start: 2025-03-20 | End: 2025-03-20 | Stop reason: HOSPADM

## 2025-03-20 RX ORDER — SODIUM CHLORIDE 0.9 % (FLUSH) 0.9 %
5-40 SYRINGE (ML) INJECTION PRN
Status: DISCONTINUED | OUTPATIENT
Start: 2025-03-20 | End: 2025-03-20 | Stop reason: HOSPADM

## 2025-03-20 RX ORDER — SCOPOLAMINE 1 MG/3D
PATCH, EXTENDED RELEASE TRANSDERMAL
Status: DISCONTINUED | OUTPATIENT
Start: 2025-03-20 | End: 2025-03-20 | Stop reason: SDUPTHER

## 2025-03-20 RX ORDER — SUCCINYLCHOLINE/SOD CL,ISO/PF 200MG/10ML
SYRINGE (ML) INTRAVENOUS
Status: DISCONTINUED | OUTPATIENT
Start: 2025-03-20 | End: 2025-03-20 | Stop reason: SDUPTHER

## 2025-03-20 RX ADMIN — PROPOFOL 150 MG: 10 INJECTION, EMULSION INTRAVENOUS at 08:06

## 2025-03-20 RX ADMIN — DEXAMETHASONE SODIUM PHOSPHATE 4 MG: 4 INJECTION, SOLUTION INTRAMUSCULAR; INTRAVENOUS at 08:25

## 2025-03-20 RX ADMIN — FENTANYL CITRATE 50 MCG: 50 INJECTION INTRAMUSCULAR; INTRAVENOUS at 07:59

## 2025-03-20 RX ADMIN — SUGAMMADEX 160 MG: 100 INJECTION, SOLUTION INTRAVENOUS at 10:20

## 2025-03-20 RX ADMIN — MIDAZOLAM 2 MG: 1 INJECTION INTRAMUSCULAR; INTRAVENOUS at 07:59

## 2025-03-20 RX ADMIN — Medication 80 MCG: at 09:54

## 2025-03-20 RX ADMIN — SCOPALAMINE 1 PATCH: 1 PATCH, EXTENDED RELEASE TRANSDERMAL at 07:45

## 2025-03-20 RX ADMIN — FENTANYL CITRATE 50 MCG: 50 INJECTION INTRAMUSCULAR; INTRAVENOUS at 08:06

## 2025-03-20 RX ADMIN — PROPOFOL 50 MG: 10 INJECTION, EMULSION INTRAVENOUS at 08:12

## 2025-03-20 RX ADMIN — ONDANSETRON 4 MG: 2 INJECTION INTRAMUSCULAR; INTRAVENOUS at 10:20

## 2025-03-20 RX ADMIN — HYDROMORPHONE HYDROCHLORIDE 0.5 MG: 1 INJECTION, SOLUTION INTRAMUSCULAR; INTRAVENOUS; SUBCUTANEOUS at 08:40

## 2025-03-20 RX ADMIN — ROCURONIUM BROMIDE 40 MG: 10 INJECTION, SOLUTION INTRAVENOUS at 08:14

## 2025-03-20 RX ADMIN — Medication 80 MCG: at 09:00

## 2025-03-20 RX ADMIN — PROPOFOL 50 MG: 10 INJECTION, EMULSION INTRAVENOUS at 10:22

## 2025-03-20 RX ADMIN — Medication 120 MG: at 08:06

## 2025-03-20 RX ADMIN — SODIUM CHLORIDE, POTASSIUM CHLORIDE, SODIUM LACTATE AND CALCIUM CHLORIDE: 600; 310; 30; 20 INJECTION, SOLUTION INTRAVENOUS at 07:59

## 2025-03-20 RX ADMIN — Medication 40 MCG: at 08:30

## 2025-03-20 RX ADMIN — ROCURONIUM BROMIDE 10 MG: 10 INJECTION, SOLUTION INTRAVENOUS at 08:06

## 2025-03-20 RX ADMIN — ACETAMINOPHEN 1000 MG: 500 TABLET ORAL at 06:29

## 2025-03-20 RX ADMIN — HYDROMORPHONE HYDROCHLORIDE 0.5 MG: 1 INJECTION, SOLUTION INTRAMUSCULAR; INTRAVENOUS; SUBCUTANEOUS at 08:52

## 2025-03-20 RX ADMIN — Medication 80 MCG: at 08:38

## 2025-03-20 RX ADMIN — LIDOCAINE HYDROCHLORIDE 100 MG: 20 INJECTION, SOLUTION EPIDURAL; INFILTRATION; INTRACAUDAL; PERINEURAL at 08:06

## 2025-03-20 RX ADMIN — Medication 80 MCG: at 10:19

## 2025-03-20 ASSESSMENT — PAIN SCALES - GENERAL: PAINLEVEL_OUTOF10: 7

## 2025-03-20 ASSESSMENT — PAIN - FUNCTIONAL ASSESSMENT
PAIN_FUNCTIONAL_ASSESSMENT: NONE - DENIES PAIN

## 2025-03-20 ASSESSMENT — PAIN DESCRIPTION - LOCATION: LOCATION: GENERALIZED

## 2025-03-20 NOTE — DISCHARGE INSTRUCTIONS
THORACIC SURGERY DISCHARGE INSTRUCTIONS FOLLOWING BRONCHOSCOPY    Follow Up: Your follow up appointment should already be scheduled. But if not, please call the office on the next business day to schedule your appointment to see Dr. Cr at [address] [facility], [address] in 2 weeks after surgery. (Office Number: [phone]). You will have a chest Xray ordered for your follow up. Please complete that prior to your appointment time.     Please call the office [phone] if you have any questions following your discharge.    GENERAL INSTRUCTIONS:  You have received sedation for your procedure:  Do not drive a car until tomorrow morning.  Do not drink alcoholic beverages.  Do not take make important decisions or sign legal documents.  Do not take part in activities requiring coordination or judgement (using a , power tools, etc).    After a bronchoscopy you may experience a sore throat, which can be relieved by throat lozenges or gargling with salt water (1/2 tsp. salt in a glass of warm water)    Blood streaking in your sputum and/or a low grade fever for the first 24 hours is normal.    Diet: Ok to resume regular diet.     Please resume your home medications as directed.    Please call our office at [phone] if you have any of the following symptoms:  Pain that gets worse or not relieved by pain medication  Warmth, redness, or swelling at the skin around the wound  Foul smelling drainage from the incision  Extensive bruising or discoloration  Wound that opens up or pulls apart  Fever above 101F or shaking and chills  Nausea or vomiting  Severe diarrhea or severe constipation  Dizziness or fainting  Chest pain, shortness of breath, or increased work of breathing  Inability to urinate for > 6 hours  Cloudy or smelly urine  In case of any emergency, call 911 and go to the nearest emergency room.      ______________________________________________________________________    Anesthesia Discharge

## 2025-03-20 NOTE — ANESTHESIA PRE PROCEDURE
Other Findings:       Anesthesia Plan      general     ASA 3       Induction: intravenous.    MIPS: Prophylactic antiemetics administered.  Anesthetic plan and risks discussed with patient.                    Judith Curiel DO   3/20/2025

## 2025-03-20 NOTE — ANESTHESIA POSTPROCEDURE EVALUATION
Department of Anesthesiology  Postprocedure Note    Patient: Symone Edmonds  MRN: [id number]  YOB: 1965  Date of evaluation: 3/20/2025    Procedure Summary       Date: 03/20/25 Room / Location: [facility] MAIN OR [address] [place] [address] MAIN OR    Anesthesia Start: 0759 Anesthesia Stop: 1041    Procedure: RIGHT THORACENTESIS, DIAGNOSTIC FLEXIBLE BRONCHOSCOPY WITH BAL, ENDOBRONCHIAL ULTRASOUND WITH BIOPSIES (Right: Bronchus) Diagnosis:       Mediastinitis      (Mediastinitis [J98.51])    Providers: Belinda Cr MD Responsible Provider: Judith Curiel DO    Anesthesia Type: General ASA Status: 3            Anesthesia Type: General    Clayton Phase I:      Clayton Phase II:      Anesthesia Post Evaluation    Patient location during evaluation: PACU  Level of consciousness: awake  Airway patency: patent  Nausea & Vomiting: no nausea  Cardiovascular status: hemodynamically stable  Respiratory status: acceptable  Hydration status: stable  Multimodal analgesia pain management approach  Pain management: adequate    No notable events documented.

## 2025-03-20 NOTE — INTERVAL H&P NOTE
Update History & Physical    The patient's History and Physical of March 18, 2025 was reviewed with the patient and I examined the patient. There was no change. The surgical site was confirmed by the patient and me. Right thoracentesis, right side marked    Plan: The risks, benefits, expected outcome, and alternative to the recommended procedure have been discussed with the patient. Patient understands and wants to proceed with the procedure. If unable to obtain a diagnosis with EBUS will proceed to cervical mediastinoscopy. Cervical mediastinoscopy at risk of bleeding requiring median sternotomy. Patient aware and agrees to proceed.     Electronically signed by Belinda Cr MD on 3/20/2025 at 7:54 AM

## 2025-03-20 NOTE — BRIEF OP NOTE
Brief Postoperative Note      Patient: Symone Edmonds  YOB: 1965  MRN: [id number]    Date of Procedure: 3/20/2025    Pre-OP Diagnosis: Mediastinal lymphadenopathy    Post-Op Diagnosis: Same       Procedure(s):  RIGHT THORACENTESIS, DIAGNOSTIC FLEXIBLE BRONCHOSCOPY WITH BAL, ENDOBRONCHIAL ULTRASOUND WITH BIOPSIES    Surgeon(s):  Belinda Cr MD    Assistant:  Surgical Assistant: Sissy Steven  Physician Assistant: Arelis Wei PA-C    Anesthesia: General    Estimated Blood Loss (mL): less than 50     Complications: None    Specimens:   ID Type Source Tests Collected by Time Destination   1 : right pleural fluid (*4 containers*) Body Fluid Thoracentesis CYTOLOGY, ROYA-GYN Belinda Cr MD 3/20/2025 0830    2 : BAL Respiratory Bronchial Washing CYTOLOGY, NON-GYN Belinda Cr MD 3/20/2025 0853    3 : Station 7 lymphnode cell block Respiratory Bronchial Biopsy CYTOLOGY, NON-GYN Belinda Cr MD 3/20/2025 1020    A : plueral fluid Body Fluid Ascitic Fluid PH, BODY FLUID, GLUCOSE, BODY FLUID, LACTATE DEHYDROGENASE, BODY FLUID, TRIGLYCERIDES, BODY FLUIDS Belinda Cr MD 3/20/2025 0933    B : plueral fluid Blood Blood LACTATE DEHYDROGENASE, PROTEIN, TOTAL Belinda Cr MD 3/20/2025 0933    C : plueral fluid Body Fluid Pleural CULTURE, BODY FLUID (WITH GRAM STAIN) Belinda Cr MD 3/20/2025 0933    D : plueral fluid Body Fluid Other BODY FLUID CELL COUNT Belinda Cr MD 3/20/2025 0933    E : plueral fluid Body Fluid Pleura AMYLASE, BODY FLUID Belinda Cr MD 3/20/2025 0933        Implants:  * No implants in log *      Drains: * No LDAs found *    Findings:  Infection Present At Time Of Surgery (PATOS) (choose all levels that have infection present):  No infection present  Other Findings: Large, very vascular 7 and 10R nodes. Multiple passes with each, unfortunately multiple bloody non-diagnostic passes. Sent additional for cell-block.   500 mL right thoracentesis,

## 2025-03-20 NOTE — OP NOTE
Thoracic Surgery  Operative Note    Patient:  Symone Edmonds 59 y.o. female  YOB: 1965  MRN:  [id number]    Pre-operative Diagnosis: Metastatic, Stage IV Renal Cell Carcinoma; Right pleural effusion, Mediastinal lymphadenopathy    Post-operative Diagnosis: Same    Procedure:  Right Thoracentesis with Ultrasound Guidance (CPT 95833)  Flexible Diagnostic Bronchoscopy with Bronchoalveolar Lavage (CPT 90167)  Endobronchial Ultrasound with Fine Needle Aspiration of 10R and 7 (CPT 27094)    Surgeon: Belinda Cr MD    Assistants: Surgical Assistant: Sissy Steven  Physician Assistant: Arelis Wei PA-C    Anesthesia: Judith Curiel DO    Anesthesia Type:   general anesthesia    Findings:   Infection Present At Time Of Surgery (PATOS) (choose all levels that have infection present): No infection present  Lymph Nodes Sampled: 7 and 10R  500 mL serous fluid removed from right chest  Hyperemic airways bilaterally, mucoid secretions; bulge in right mainstem airway without obvious endobronchial lesion  EBUS of 7 and 10R. However, very bloody biopsies, and bleeding within the airway. Unable to get diagnosis with slides by rapid onsite cytology  Discussed conversion to cervical mediastinoscopy with Dr. Winchester, patient's medical oncologist, and patient's friend. Concern was due to the bleeding associated with FNA of these nodes, and with c-med having increased bleeding, risk of median sternotomy was high. Dr. Winchester did not feel like it was worth the risk to continue.     Indication for Procedure: Symone Edmonds is a 59 y.o. female with mediastinal lymphanopathy that has progressed despite therapy for metastatic renal cell carcinoma. She has undergone two prior EBUS in March 2024 and Dec. 2024. In discussion with her oncologist, we discussed proceeding with another EBUS and possible cervical mediastinoscopy as well as sending pleural fluid for cytology. Discussed with the patient the options of biopsy including

## 2025-03-21 ENCOUNTER — OFFICE VISIT (OUTPATIENT)
Age: 60
End: 2025-03-21
Payer: MEDICARE

## 2025-03-21 VITALS
WEIGHT: 187 LBS | HEART RATE: 84 BPM | BODY MASS INDEX: 33.13 KG/M2 | OXYGEN SATURATION: 99 % | DIASTOLIC BLOOD PRESSURE: 80 MMHG | HEIGHT: 63 IN | SYSTOLIC BLOOD PRESSURE: 120 MMHG

## 2025-03-21 DIAGNOSIS — Z85.528 HX OF RENAL CELL CANCER: ICD-10-CM

## 2025-03-21 DIAGNOSIS — I48.0 PAROXYSMAL ATRIAL FIBRILLATION (HCC): Primary | ICD-10-CM

## 2025-03-21 DIAGNOSIS — I50.32 CHRONIC HEART FAILURE WITH PRESERVED EJECTION FRACTION (HCC): ICD-10-CM

## 2025-03-21 DIAGNOSIS — I10 ESSENTIAL HYPERTENSION WITH GOAL BLOOD PRESSURE LESS THAN 140/90: ICD-10-CM

## 2025-03-21 DIAGNOSIS — E11.9 TYPE 2 DIABETES MELLITUS WITHOUT COMPLICATION, UNSPECIFIED WHETHER LONG TERM INSULIN USE: ICD-10-CM

## 2025-03-21 DIAGNOSIS — F17.210 CIGARETTE NICOTINE DEPENDENCE WITHOUT COMPLICATION: ICD-10-CM

## 2025-03-21 LAB
BACTERIA SPEC CULT: NORMAL
GRAM STN SPEC: NORMAL
SERVICE CMNT-IMP: NORMAL

## 2025-03-21 PROCEDURE — G8427 DOCREV CUR MEDS BY ELIG CLIN: HCPCS | Performed by: INTERNAL MEDICINE

## 2025-03-21 PROCEDURE — 2022F DILAT RTA XM EVC RTNOPTHY: CPT | Performed by: INTERNAL MEDICINE

## 2025-03-21 PROCEDURE — 99214 OFFICE O/P EST MOD 30 MIN: CPT | Performed by: INTERNAL MEDICINE

## 2025-03-21 PROCEDURE — 3044F HG A1C LEVEL LT 7.0%: CPT | Performed by: INTERNAL MEDICINE

## 2025-03-21 PROCEDURE — 93010 ELECTROCARDIOGRAM REPORT: CPT | Performed by: INTERNAL MEDICINE

## 2025-03-21 PROCEDURE — 3074F SYST BP LT 130 MM HG: CPT | Performed by: INTERNAL MEDICINE

## 2025-03-21 PROCEDURE — G8417 CALC BMI ABV UP PARAM F/U: HCPCS | Performed by: INTERNAL MEDICINE

## 2025-03-21 PROCEDURE — 3079F DIAST BP 80-89 MM HG: CPT | Performed by: INTERNAL MEDICINE

## 2025-03-21 PROCEDURE — 3017F COLORECTAL CA SCREEN DOC REV: CPT | Performed by: INTERNAL MEDICINE

## 2025-03-21 PROCEDURE — 93005 ELECTROCARDIOGRAM TRACING: CPT | Performed by: INTERNAL MEDICINE

## 2025-03-21 PROCEDURE — 4004F PT TOBACCO SCREEN RCVD TLK: CPT | Performed by: INTERNAL MEDICINE

## 2025-03-21 NOTE — PROGRESS NOTES
1. Have you been to the ER, urgent care clinic since your last visit?  Hospitalized since your last visit?No    2. Have you seen or consulted any other health care providers outside of the [facility] since your last visit?  Include any pap smears or colon screening. No    
Per Dr. Marley- follow up 1 yr.  
Test blood sugar once daily.  E 11.9 100 each 3    LORazepam (ATIVAN) 0.5 MG tablet Take 1 tablet by mouth every 6 hours as needed for Anxiety.      zinc gluconate 50 MG tablet Take 1 tablet by mouth daily      albuterol sulfate HFA (PROVENTIL;VENTOLIN;PROAIR) 108 (90 Base) MCG/ACT inhaler Inhale 2 puffs into the lungs every 4 hours as needed      ipratropium-albuterol (DUONEB) 0.5-2.5 (3) MG/3ML SOLN nebulizer solution Inhale 3 mLs into the lungs every 6 hours as needed      predniSONE (DELTASONE) 10 MG tablet Take 1 tablet by mouth every morning (Patient not taking: Reported on 3/21/2025)      NIVOLUMAB IV Infuse intravenously (Patient not taking: Reported on 3/21/2025)       No current facility-administered medications for this visit.       Allergies   Allergen Reactions    Nsaids Other (See Comments)     Stomach hurts    Diclofenac Sodium Other (See Comments)    Celecoxib Hives    Hydroxychloroquine Hives    Meloxicam Hives    Oxybutynin Hives and Itching    Oxybutynin Chloride Hives and Itching    Penicillins      Other reaction(s): Unknown (comments)  AS A CHILD-HALLUCINATIONS    Patient screened for any delayed non-IgE-mediated reaction to PCN.        Patient notes the following:    No delayed non-IgE-mediated reaction to PCN       SOCIAL HISTORY:     Social History     Tobacco Use    Smoking status: Every Day     Current packs/day: 1.00     Average packs/day: 0.6 packs/day for 46.2 years (26.2 ttl pk-yrs)     Types: Cigarettes     Start date: 01/2019     Passive exposure: Past    Smokeless tobacco: Never   Vaping Use    Vaping status: Every Day    Substances: THC    Devices: MARIJUANA   Substance Use Topics    Alcohol use: Not Currently    Drug use: Yes     Types: Marijuana (Weed)     Comment: VAPES MARIJUANA DAILY       FAMILY HISTORY:     Family History   Problem Relation Age of Onset    High Cholesterol Mother     Osteoarthritis Mother     Cancer Mother         2020 patient here.    Hypertension Mother

## 2025-03-24 ENCOUNTER — TELEPHONE (OUTPATIENT)
Age: 60
End: 2025-03-24

## 2025-03-24 LAB
BACTERIA SPEC CULT: NORMAL
GRAM STN SPEC: NORMAL
SERVICE CMNT-IMP: NORMAL

## 2025-03-24 NOTE — TELEPHONE ENCOUNTER
Returned call to patient.  Two patient identifiers used.  Patient stated pain is where the fluid was pulled from. She stated she is experiencing shortness of breath/labored breathing when walking and sitting. Patient rated pain 7/10 denies fever but stated she feels like she do. Patient stated she has been taking Tylenol for pain but no relief. Patient stated she has pain when taking deep breaths as well. I made patient aware I will make provider aware of message and will call back. Patient verbalized understanding and thanked for call.     Provider was made aware of message above.     Per Dr. Cr patient needs to go to ER.

## 2025-03-24 NOTE — TELEPHONE ENCOUNTER
Patient called and states she is having some right sided pain, and she states it is causing her labored breathing. Patient states she also has some slight chest pain.

## 2025-03-24 NOTE — TELEPHONE ENCOUNTER
Returned call to patient.  Two patient identifiers used. I made patient aware of provider message regarding ER, patient stated she can see what she can do and thanked for the return call.

## 2025-03-26 ENCOUNTER — TELEPHONE (OUTPATIENT)
Age: 60
End: 2025-03-26

## 2025-03-26 DIAGNOSIS — H92.09 OTALGIA, UNSPECIFIED LATERALITY: Primary | ICD-10-CM

## 2025-03-27 ENCOUNTER — OFFICE VISIT (OUTPATIENT)
Age: 60
End: 2025-03-27
Payer: MEDICARE

## 2025-03-27 VITALS
HEART RATE: 89 BPM | SYSTOLIC BLOOD PRESSURE: 159 MMHG | DIASTOLIC BLOOD PRESSURE: 88 MMHG | BODY MASS INDEX: 33.3 KG/M2 | TEMPERATURE: 98.3 F | WEIGHT: 188 LBS | OXYGEN SATURATION: 95 % | RESPIRATION RATE: 18 BRPM

## 2025-03-27 DIAGNOSIS — R59.1 LYMPHADENOPATHY: Primary | ICD-10-CM

## 2025-03-27 DIAGNOSIS — C64.1 RENAL CELL CANCER, RIGHT (HCC): ICD-10-CM

## 2025-03-27 DIAGNOSIS — C78.00 SECONDARY RENAL CELL CARCINOMA OF LUNG, UNSPECIFIED LATERALITY: Primary | ICD-10-CM

## 2025-03-27 DIAGNOSIS — C64.9 SECONDARY RENAL CELL CARCINOMA OF LUNG, UNSPECIFIED LATERALITY: Primary | ICD-10-CM

## 2025-03-27 PROCEDURE — G8417 CALC BMI ABV UP PARAM F/U: HCPCS | Performed by: INTERNAL MEDICINE

## 2025-03-27 PROCEDURE — 99215 OFFICE O/P EST HI 40 MIN: CPT | Performed by: INTERNAL MEDICINE

## 2025-03-27 PROCEDURE — G8428 CUR MEDS NOT DOCUMENT: HCPCS | Performed by: INTERNAL MEDICINE

## 2025-03-27 PROCEDURE — 3077F SYST BP >= 140 MM HG: CPT | Performed by: INTERNAL MEDICINE

## 2025-03-27 PROCEDURE — 4004F PT TOBACCO SCREEN RCVD TLK: CPT | Performed by: INTERNAL MEDICINE

## 2025-03-27 PROCEDURE — 3079F DIAST BP 80-89 MM HG: CPT | Performed by: INTERNAL MEDICINE

## 2025-03-27 PROCEDURE — 3017F COLORECTAL CA SCREEN DOC REV: CPT | Performed by: INTERNAL MEDICINE

## 2025-03-27 RX ORDER — PREDNISONE 5 MG/1
5 TABLET ORAL DAILY
Qty: 30 TABLET | Refills: 0 | Status: SHIPPED | OUTPATIENT
Start: 2025-03-27 | End: 2025-04-26

## 2025-03-27 NOTE — PROGRESS NOTES
Symone Edmonds is a 59 y.o. female    Chief Complaint   Patient presents with    Follow-up     Follow up of leucocytosis and anemia  Stage IV RCC             1. Have you been to the ER, urgent care clinic since your last visit?  Hospitalized since your last visit?No    2. Have you seen or consulted any other health care providers outside of the [facility] since your last visit?  Include any pap smears or colon screening. No      
3.4 x 3.3 cm, previously measuring 3.1 x 2.1 cm.  Stable small left fissural nodule. Stable scarring and atelectasis right  lower lobe.      CT CAP 2/2025  Mediastinal adenopathy 31 x 31 mm 27 x 25 mm previously.  2-64 measuring 26 x 29 mm subcarinal previously 26 x 25 mm.  8.8 mm nodule abuts the fissure on the left.  No esophageal mass. No endotracheal or endobronchial mass.     Assessment:   1) Renal cell carcinoma-stage IV    Right radical nephrectomy 7/2020-3.3 cm clear-cell carcinoma.  Of note she had a 6 mm left lung nodule detected at that time of uncertain significance.  The right lung nodule was first detected on scans on 12/9/2021 and measured 8 mm at that time    7/2023-growth in bilateral lung nodules with a right lung nodule measuring up to 13 mm in the left lung nodule 8 mm.  PET scan showed some uptake in the right lung nodule but no other uptake otherwise.     9/2023-status post left lung nodule wedge resection and lymph node sampling.-1.3 cm focus of metastatic RCC in the left lung removed.  Nodes were negative.- stage IV RCC  Oligometastatic disease at least to the right lung as early as December 2021 which would be 18 months after her nephrectomy.  Was initially on observation. By 3/2024 she had recurrent leucocytosis and new mediastinal nodes, R effusion. Scans reviewed in TB. Concerning for metastasis. However EBUS bx showed benign tissue only. This does not r/o a malignancy. In fact the nodes appear vascular which suggests an RCC primary. She also may have a locally recurrent soft tissue nodule.   Her original R lung metastasis was moderately FDG avid , her PET from 4/2024 also shows mild activity in the concerning R hilar node.    Suspected low volume presumed RCC stage IV     Gilberto risk- Intermediate  Plan- Ipi+ Nivo x 4 then Nivo alone for 2 years   S/p C4 Ipilimumab and Nivolumab- grade 3 Transaminitis, fever, sob  Then went on to single agent Nivolumab   Scans reviewed from 2/2025 show

## 2025-03-28 ENCOUNTER — HOSPITAL ENCOUNTER (OUTPATIENT)
Facility: HOSPITAL | Age: 60
Setting detail: INFUSION SERIES
End: 2025-03-28

## 2025-03-28 DIAGNOSIS — F34.1 DYSTHYMIA: ICD-10-CM

## 2025-03-28 NOTE — TELEPHONE ENCOUNTER
Please call pt.  Venlafaxine was discontinued by Dr Winchester.  Is pt taking it?  She is due for a visit with me. Please schedule appt.

## 2025-03-30 RX ORDER — ATORVASTATIN CALCIUM 40 MG/1
40 TABLET, FILM COATED ORAL
Qty: 90 TABLET | Refills: 0 | Status: SHIPPED | OUTPATIENT
Start: 2025-03-30

## 2025-03-31 DIAGNOSIS — R59.0 LYMPHADENOPATHY, MEDIASTINAL: Primary | ICD-10-CM

## 2025-03-31 DIAGNOSIS — E03.9 ACQUIRED HYPOTHYROIDISM: ICD-10-CM

## 2025-03-31 RX ORDER — VENLAFAXINE HYDROCHLORIDE 150 MG/1
150 CAPSULE, EXTENDED RELEASE ORAL EVERY MORNING
Qty: 90 CAPSULE | Refills: 0 | Status: SHIPPED | OUTPATIENT
Start: 2025-03-31

## 2025-03-31 NOTE — TELEPHONE ENCOUNTER
Pt is currently still taking the Venlafxine. She took her last dose yesterday and is requesting a knew Rx with enough to get her through to her appt with  on 04/08/25 be sent to:    [facility] - [place], [place] - [address] - P [phone] - F [phone]  [address], [facility] [address]  Phone: [phone]  Fax: [phone]

## 2025-04-02 NOTE — PROGRESS NOTES
[facility] [place] [facility]   [address] [facility], [address] [place] [id number]   W: [phone]  F: [phone]          Reason for Visit:     Symone Edmonds is a 58 y.o. female who is for    Follow up of leucocytosis and anemia  Stage IV RCC        Treatment history for renal cell carcinoma  7/28/2020-right radical nephrectomy-3.3 cm renal cell carcinoma.  She did have a CT chest in August 2020 that showed a 6 mm lung nodule  9/6/2023-stage IV renal cell carcinoma-robotic assisted thoracoscopic right lower lobe wedge resection and mediastinal lymph node dissection  3/2024: CT CAP  with progression (increase in mediastinal and hilar LN)  5/16/2024: Nivolumab + Ipilimumab         History of Present Illness:     Patient is a  58 y.o. female with PMH as below who is seen for abnormal  CBC      She has persistent leucocytosis since 2014, no other CBC abnormalities. She has known R renal cell carcinoma ( 3.3 cm) which she had a R radical nephrectomy on 7/28/2020. She has osteoarthritis and has several flare ups. She had a CT chest 8/2020 that  showed a 6 mm lung nodule which is being observed.  She had a follow-up CT scan with pulmonary on 7/24/2023 which showed increasing bilateral pulmonary nodules.  This prompted a PET CT scan that was done on 8/9/2023.  This showed that the peripheral pulmonary nodule in the right lower lobe had an FDG activity of 3.3, subcentimeter pulmonary nodule in the left lower lobe had no FDG activity.  She was referred to thoracic surgery Dr. Ross and underwent a robotic assisted thoracoscopic right lower lobe wedge resection and mediastinal lymph node dissection on 9/6/2023.  Pathology revealed metastatic renal cell carcinoma in the right lower lobe 1.3 cm nodule. 3/2024 Scans showed progression although EBUS path was benign. Started on Ipi Nivo.     Today is C4. Comes with scans.   She is tired and frustrated. She has noted some unsteadiness though she thinks its  [FreeTextEntry1] : 1mo here for well visit NL duct stenosis - watery eyes   formula and breastmilk 2-3oz every 2-3 hours  normal urine/BM sleeps in crib tummy time  gassy - mylicon helps vitamin d

## 2025-04-03 DIAGNOSIS — R59.0 LYMPHADENOPATHY, MEDIASTINAL: Primary | ICD-10-CM

## 2025-04-07 ENCOUNTER — OFFICE VISIT (OUTPATIENT)
Age: 60
End: 2025-04-07
Payer: MEDICARE

## 2025-04-07 VITALS
OXYGEN SATURATION: 96 % | HEIGHT: 63 IN | HEART RATE: 100 BPM | BODY MASS INDEX: 33.66 KG/M2 | SYSTOLIC BLOOD PRESSURE: 141 MMHG | TEMPERATURE: 98.5 F | RESPIRATION RATE: 21 BRPM | DIASTOLIC BLOOD PRESSURE: 84 MMHG | WEIGHT: 190 LBS

## 2025-04-07 DIAGNOSIS — R59.0 MEDIASTINAL LYMPHADENOPATHY: Primary | ICD-10-CM

## 2025-04-07 DIAGNOSIS — Z85.528 HX OF RENAL CELL CANCER: ICD-10-CM

## 2025-04-07 PROCEDURE — 3079F DIAST BP 80-89 MM HG: CPT | Performed by: STUDENT IN AN ORGANIZED HEALTH CARE EDUCATION/TRAINING PROGRAM

## 2025-04-07 PROCEDURE — G8427 DOCREV CUR MEDS BY ELIG CLIN: HCPCS | Performed by: STUDENT IN AN ORGANIZED HEALTH CARE EDUCATION/TRAINING PROGRAM

## 2025-04-07 PROCEDURE — 3077F SYST BP >= 140 MM HG: CPT | Performed by: STUDENT IN AN ORGANIZED HEALTH CARE EDUCATION/TRAINING PROGRAM

## 2025-04-07 PROCEDURE — G8417 CALC BMI ABV UP PARAM F/U: HCPCS | Performed by: STUDENT IN AN ORGANIZED HEALTH CARE EDUCATION/TRAINING PROGRAM

## 2025-04-07 PROCEDURE — 4004F PT TOBACCO SCREEN RCVD TLK: CPT | Performed by: STUDENT IN AN ORGANIZED HEALTH CARE EDUCATION/TRAINING PROGRAM

## 2025-04-07 PROCEDURE — 3017F COLORECTAL CA SCREEN DOC REV: CPT | Performed by: STUDENT IN AN ORGANIZED HEALTH CARE EDUCATION/TRAINING PROGRAM

## 2025-04-07 PROCEDURE — 99213 OFFICE O/P EST LOW 20 MIN: CPT | Performed by: STUDENT IN AN ORGANIZED HEALTH CARE EDUCATION/TRAINING PROGRAM

## 2025-04-07 ASSESSMENT — PATIENT HEALTH QUESTIONNAIRE - PHQ9
SUM OF ALL RESPONSES TO PHQ QUESTIONS 1-9: 0
2. FEELING DOWN, DEPRESSED OR HOPELESS: NOT AT ALL
SUM OF ALL RESPONSES TO PHQ QUESTIONS 1-9: 0
1. LITTLE INTEREST OR PLEASURE IN DOING THINGS: NOT AT ALL
SUM OF ALL RESPONSES TO PHQ QUESTIONS 1-9: 0
SUM OF ALL RESPONSES TO PHQ QUESTIONS 1-9: 0

## 2025-04-07 NOTE — PROGRESS NOTES
Identified patient with two patient identifiers (name and ). Reviewed chart in preparation for visit and have obtained necessary documentation.    Symone Edmonds is a 59 y.o. female  Chief Complaint   Patient presents with    Post-Op Check     2 weeks s/p RIGHT THORACENTESIS, DIAGNOSTIC FLEXIBLE BRONCHOSCOPY WITH BAL, ENDOBRONCHIAL ULTRASOUND WITH BIOPSIES     BP (!) 142/85 (BP Site: Left Upper Arm, Patient Position: Sitting, BP Cuff Size: Small Adult)   Pulse 100   Temp 98.5 °F (36.9 °C) (Oral)   Resp 21   Ht 1.6 m (5' 3\")   Wt 86.2 kg (190 lb)   SpO2 96%   BMI 33.66 kg/m²     1. Have you been to the ER, urgent care clinic since your last visit?  Hospitalized since your last visit?no    2. Have you seen or consulted any other health care providers outside of the [facility] since your last visit?  Include any pap smears or colon screening. No    Patient and provider made aware of elevated BP x2. Patient asymptomatic. Patient reminded to monitor BP, continue to take BP medications if prescribed, and follow up with PCP/Cardiologist.  Patient expressed understanding and agreement.

## 2025-04-07 NOTE — PROGRESS NOTES
Thoracic Surgery PostOp Clinic Note    Date of Surgery: 3/202/2025  Procedure:  Right Thoracentesis with Ultrasound Guidance (CPT 67655)  Flexible Diagnostic Bronchoscopy with Bronchoalveolar Lavage (CPT 00698)  Endobronchial Ultrasound with Fine Needle Aspiration of 10R and 7 (CPT 79472)      Chief Complaint:   Chief Complaint   Patient presents with    Post-Op Check     2 weeks s/p RIGHT THORACENTESIS, DIAGNOSTIC FLEXIBLE BRONCHOSCOPY WITH BAL, ENDOBRONCHIAL ULTRASOUND WITH BIOPSIES       HPI: Symone Edmonds is a 59 y.o. female who has a history of renal cell carcinoma. She underwent radial right nephrectomy, and then developed a lung metastasis for which she underwent robotic assisted thoracoscopic right lower wedge resection and MLND by Dr. Ross. She has had a persistent increase in mediastinal lymphadenopathy and underwent two prior EBUS's by Dr. Bernardo of [facility] in March 2024 and by Dr. Soto in Dec. 2024. I attempted EBUS again but did not get a diagnostic sample and increased bleeding. There was concern if conversion to med for diagnosis would lead to increased bleeding and need for median sternotomy. After discussion with Dr. Winchester and her friend, we decided to abort without a diagnosis. She has some soreness at the thoracentesis site. No shortness of breath. Overall anxious and tearful.     Physical Exam:  BP (!) 141/84 (BP Site: Right Upper Arm, Patient Position: Sitting, BP Cuff Size: Small Adult)   Pulse 100   Temp 98.5 °F (36.9 °C) (Oral)   Resp 21   Ht 1.6 m (5' 3\")   Wt 86.2 kg (190 lb)   SpO2 96%   BMI 33.66 kg/m²   Gen: appears well, no acute distress  CV: RRR  Resp: normal work of breathing on room air  MSK: skin is warm and dry    Pathology:     Right thoracentesis:  Specimen Source   1: Pleural fluid, ThinPrep and cell block:       CYTOLOGIC INTERPRETATION:   Pleural fluid, ThinPrep and cell block:     Abundant mature lymphocytes   Flow cytometry shows no immunophenotypic evidence of a

## 2025-04-08 ENCOUNTER — OFFICE VISIT (OUTPATIENT)
Age: 60
End: 2025-04-08
Payer: MEDICARE

## 2025-04-08 ENCOUNTER — LAB (OUTPATIENT)
Age: 60
End: 2025-04-08
Payer: MEDICARE

## 2025-04-08 VITALS
OXYGEN SATURATION: 96 % | WEIGHT: 189 LBS | HEIGHT: 64 IN | TEMPERATURE: 98.2 F | HEART RATE: 90 BPM | BODY MASS INDEX: 32.27 KG/M2 | DIASTOLIC BLOOD PRESSURE: 80 MMHG | SYSTOLIC BLOOD PRESSURE: 150 MMHG | RESPIRATION RATE: 20 BRPM

## 2025-04-08 VITALS
SYSTOLIC BLOOD PRESSURE: 169 MMHG | HEART RATE: 87 BPM | WEIGHT: 187 LBS | BODY MASS INDEX: 33.13 KG/M2 | HEIGHT: 63 IN | DIASTOLIC BLOOD PRESSURE: 86 MMHG

## 2025-04-08 DIAGNOSIS — J90 PLEURAL EFFUSION ON RIGHT: ICD-10-CM

## 2025-04-08 DIAGNOSIS — J30.89 SEASONAL ALLERGIC RHINITIS DUE TO OTHER ALLERGIC TRIGGER: ICD-10-CM

## 2025-04-08 DIAGNOSIS — R79.89 HIGH SERUM VITAMIN B12: ICD-10-CM

## 2025-04-08 DIAGNOSIS — C64.9 METASTATIC RENAL CELL CARCINOMA, UNSPECIFIED LATERALITY: ICD-10-CM

## 2025-04-08 DIAGNOSIS — Z29.89 IMMUNOTHERAPY: ICD-10-CM

## 2025-04-08 DIAGNOSIS — E11.22 TYPE 2 DIABETES MELLITUS WITH CHRONIC KIDNEY DISEASE, WITHOUT LONG-TERM CURRENT USE OF INSULIN, UNSPECIFIED CKD STAGE (HCC): ICD-10-CM

## 2025-04-08 DIAGNOSIS — E55.9 VITAMIN D DEFICIENCY: ICD-10-CM

## 2025-04-08 DIAGNOSIS — J44.9 CHRONIC OBSTRUCTIVE PULMONARY DISEASE, UNSPECIFIED COPD TYPE (HCC): ICD-10-CM

## 2025-04-08 DIAGNOSIS — E11.65 TYPE 2 DIABETES MELLITUS WITH HYPERGLYCEMIA, WITHOUT LONG-TERM CURRENT USE OF INSULIN (HCC): ICD-10-CM

## 2025-04-08 DIAGNOSIS — I10 PRIMARY HYPERTENSION: ICD-10-CM

## 2025-04-08 DIAGNOSIS — R59.0 MEDIASTINAL LYMPHADENOPATHY: ICD-10-CM

## 2025-04-08 DIAGNOSIS — E03.9 ACQUIRED HYPOTHYROIDISM: ICD-10-CM

## 2025-04-08 DIAGNOSIS — F34.1 DYSTHYMIA: ICD-10-CM

## 2025-04-08 DIAGNOSIS — Z00.00 MEDICARE ANNUAL WELLNESS VISIT, SUBSEQUENT: Primary | ICD-10-CM

## 2025-04-08 DIAGNOSIS — E03.9 ACQUIRED HYPOTHYROIDISM: Primary | ICD-10-CM

## 2025-04-08 PROCEDURE — 3044F HG A1C LEVEL LT 7.0%: CPT | Performed by: FAMILY MEDICINE

## 2025-04-08 PROCEDURE — G8417 CALC BMI ABV UP PARAM F/U: HCPCS | Performed by: FAMILY MEDICINE

## 2025-04-08 PROCEDURE — G8417 CALC BMI ABV UP PARAM F/U: HCPCS | Performed by: INTERNAL MEDICINE

## 2025-04-08 PROCEDURE — 99214 OFFICE O/P EST MOD 30 MIN: CPT | Performed by: FAMILY MEDICINE

## 2025-04-08 PROCEDURE — 99214 OFFICE O/P EST MOD 30 MIN: CPT | Performed by: INTERNAL MEDICINE

## 2025-04-08 PROCEDURE — 3044F HG A1C LEVEL LT 7.0%: CPT | Performed by: INTERNAL MEDICINE

## 2025-04-08 PROCEDURE — 2022F DILAT RTA XM EVC RTNOPTHY: CPT | Performed by: FAMILY MEDICINE

## 2025-04-08 PROCEDURE — 2022F DILAT RTA XM EVC RTNOPTHY: CPT | Performed by: INTERNAL MEDICINE

## 2025-04-08 PROCEDURE — 3017F COLORECTAL CA SCREEN DOC REV: CPT | Performed by: INTERNAL MEDICINE

## 2025-04-08 PROCEDURE — 3079F DIAST BP 80-89 MM HG: CPT | Performed by: FAMILY MEDICINE

## 2025-04-08 PROCEDURE — G2211 COMPLEX E/M VISIT ADD ON: HCPCS | Performed by: INTERNAL MEDICINE

## 2025-04-08 PROCEDURE — 3077F SYST BP >= 140 MM HG: CPT | Performed by: FAMILY MEDICINE

## 2025-04-08 PROCEDURE — 3023F SPIROM DOC REV: CPT | Performed by: FAMILY MEDICINE

## 2025-04-08 PROCEDURE — 3017F COLORECTAL CA SCREEN DOC REV: CPT | Performed by: FAMILY MEDICINE

## 2025-04-08 PROCEDURE — G8427 DOCREV CUR MEDS BY ELIG CLIN: HCPCS | Performed by: FAMILY MEDICINE

## 2025-04-08 PROCEDURE — 3079F DIAST BP 80-89 MM HG: CPT | Performed by: INTERNAL MEDICINE

## 2025-04-08 PROCEDURE — G0439 PPPS, SUBSEQ VISIT: HCPCS | Performed by: FAMILY MEDICINE

## 2025-04-08 PROCEDURE — 4004F PT TOBACCO SCREEN RCVD TLK: CPT | Performed by: FAMILY MEDICINE

## 2025-04-08 PROCEDURE — G2211 COMPLEX E/M VISIT ADD ON: HCPCS | Performed by: FAMILY MEDICINE

## 2025-04-08 PROCEDURE — 4004F PT TOBACCO SCREEN RCVD TLK: CPT | Performed by: INTERNAL MEDICINE

## 2025-04-08 PROCEDURE — 3077F SYST BP >= 140 MM HG: CPT | Performed by: INTERNAL MEDICINE

## 2025-04-08 PROCEDURE — G8428 CUR MEDS NOT DOCUMENT: HCPCS | Performed by: INTERNAL MEDICINE

## 2025-04-08 RX ORDER — METOPROLOL SUCCINATE 50 MG/1
50 TABLET, EXTENDED RELEASE ORAL DAILY
Qty: 90 TABLET | Refills: 1 | Status: SHIPPED | OUTPATIENT
Start: 2025-04-08

## 2025-04-08 RX ORDER — FLUTICASONE PROPIONATE 50 MCG
2 SPRAY, SUSPENSION (ML) NASAL DAILY
Qty: 48 G | Refills: 3 | Status: SHIPPED | OUTPATIENT
Start: 2025-04-08

## 2025-04-08 RX ORDER — VENLAFAXINE HYDROCHLORIDE 150 MG/1
150 CAPSULE, EXTENDED RELEASE ORAL EVERY MORNING
Qty: 90 CAPSULE | Refills: 3 | Status: SHIPPED | OUTPATIENT
Start: 2025-04-08

## 2025-04-08 SDOH — ECONOMIC STABILITY: FOOD INSECURITY: WITHIN THE PAST 12 MONTHS, THE FOOD YOU BOUGHT JUST DIDN'T LAST AND YOU DIDN'T HAVE MONEY TO GET MORE.: NEVER TRUE

## 2025-04-08 SDOH — ECONOMIC STABILITY: FOOD INSECURITY: WITHIN THE PAST 12 MONTHS, YOU WORRIED THAT YOUR FOOD WOULD RUN OUT BEFORE YOU GOT MONEY TO BUY MORE.: NEVER TRUE

## 2025-04-08 ASSESSMENT — PATIENT HEALTH QUESTIONNAIRE - PHQ9
5. POOR APPETITE OR OVEREATING: NOT AT ALL
1. LITTLE INTEREST OR PLEASURE IN DOING THINGS: SEVERAL DAYS
3. TROUBLE FALLING OR STAYING ASLEEP: NEARLY EVERY DAY
6. FEELING BAD ABOUT YOURSELF - OR THAT YOU ARE A FAILURE OR HAVE LET YOURSELF OR YOUR FAMILY DOWN: NOT AT ALL
SUM OF ALL RESPONSES TO PHQ QUESTIONS 1-9: 10
8. MOVING OR SPEAKING SO SLOWLY THAT OTHER PEOPLE COULD HAVE NOTICED. OR THE OPPOSITE, BEING SO FIGETY OR RESTLESS THAT YOU HAVE BEEN MOVING AROUND A LOT MORE THAN USUAL: NOT AT ALL
SUM OF ALL RESPONSES TO PHQ QUESTIONS 1-9: 10
9. THOUGHTS THAT YOU WOULD BE BETTER OFF DEAD, OR OF HURTING YOURSELF: NOT AT ALL
7. TROUBLE CONCENTRATING ON THINGS, SUCH AS READING THE NEWSPAPER OR WATCHING TELEVISION: NEARLY EVERY DAY
10. IF YOU CHECKED OFF ANY PROBLEMS, HOW DIFFICULT HAVE THESE PROBLEMS MADE IT FOR YOU TO DO YOUR WORK, TAKE CARE OF THINGS AT HOME, OR GET ALONG WITH OTHER PEOPLE: SOMEWHAT DIFFICULT
4. FEELING TIRED OR HAVING LITTLE ENERGY: NEARLY EVERY DAY
2. FEELING DOWN, DEPRESSED OR HOPELESS: NOT AT ALL

## 2025-04-08 NOTE — PROGRESS NOTES
Medicare Annual Wellness Visit    Symone Edmonds is here for Medicare AWV (Patient is here for AWV )    Assessment & Plan   Medicare annual wellness visit, subsequent  Dysthymia  -     venlafaxine (EFFEXOR XR) 150 MG extended release capsule; Take 1 capsule by mouth every morning, Disp-90 capsule, R-3Normal  Seasonal allergic rhinitis due to other allergic trigger  -     fluticasone (FLONASE) 50 MCG/ACT nasal spray; 2 sprays by Each Nostril route daily For allergies, Disp-48 g, R-3Normal  Vitamin D deficiency  -     Vitamin D 25 Hydroxy; Future  High serum vitamin B12  -     Vitamin B12; Future  Primary hypertension  -     metoprolol succinate (TOPROL XL) 50 MG extended release tablet; Take 1 tablet by mouth daily For high BP, Disp-90 tablet, R-1Note change in dose of medication.Normal  -     Lipid Panel; Future  Chronic obstructive pulmonary disease, unspecified COPD type (HCC)  Type 2 diabetes mellitus with chronic kidney disease, without long-term current use of insulin, unspecified CKD stage (HCC)  Acquired hypothyroidism  -     T4, Free; Future  -     TSH; Future  Metastatic renal cell carcinoma, unspecified laterality  Pleural effusion on right  Mediastinal lymphadenopathy    Order labs.  She has appointment for CT scan of chest, abdomen, pelvis.  Follow-up with endocrinology as scheduled.  Follow-up with oncology.  Increase dose metoprolol 50 mg daily.  Monitor blood pressure at home.  Encouraged to quit smoking.  She will contact her therapist to set up counseling sessions.  Continue on current antidepressant regimen.  Patient will look into possibly getting a second opinion from [facility] oncology clinic regarding cancer.     No follow-ups on file.     Subjective   The following acute and/or chronic problems were also addressed today:  Depression, hypertension, COPD, type 2 diabetes, hypothyroidism, metastatic renal cell carcinoma, pleural effusion, mediastinal lymphadenopathy.  Patient had pleural effusion tapped

## 2025-04-08 NOTE — PATIENT INSTRUCTIONS
[facility] Housing Resources*  (Call [employer]/211 if need more resources)    Homeless Connection Line (Local)  What they offer: The line facilitates access to resources and shelter alternatives for those who are currently homeless or 3 days or less away from losing their housing.  They also provide information for the inclement weather shelters when available.    Areas served: [facility], [place], [place], [place], [place],  [facility]  Phone Number: [phone] **Contact this number first. It is a centralized point of entry for services.    Hours of Operation: Monday - Friday 8AM - 9PM; Saturday & Sunday 1PM - 9PM     Housing Resource Center ([place])   o What they offer: The HRC serves as the coordinated point of entry for households experiencing a housing crisis in the [facility]. It provides referral and community resources, homelessness prevention, and rapid rehousing services.   o Areas served: [facility], and [place]; [facility], and [place].   o Phone Number: [phone]     Inclement Weather Shelter   [address] [place] [address]   Main Line: [phone]   Bus Access: [place] [address] via [facility]   [place]: Stop #708 @ [facility]   [place]: Stop #536 at [place] and [facility]      Cold Weather Shelter (November 15 - April 15)   What they offer: Cot, two meals and restroom access for up to 100 individuals (60 men / 40 women)   Areas served: [facility], [place],   [facility]     Hours of Operation: Daily  5PM - 8AM      Extreme Heat & Weather Shelter (April 16 - November 14)   What they offer: Hydration, Snack & Heat relief for up to 100 individuals.   Areas served: [facility], [place], [place], [place], [place], [facility]

## 2025-04-08 NOTE — PROGRESS NOTES
Chief Complaint   Patient presents with    Thyroid Problem    Diabetes       * Since Last Visit: 1/30/2025     On 150mcg of T4 - from immunotherapy (wt based 136mcg)  On brand, takes correctly, no missed doses     Fluid out of right lung - too bloody to test  CT next week      THYROID:  From initial visit: 1/30/2025    Dx hypothyroidism since kidney cancer w/mets to lung treatment (immunotherapy) - after a few doses TSH skyrocketed   Stared on 25mcg, titrated up to 100mcg then 25mcg more added recently for a total of 125mcg --> just stared last week   Wt based 136mcg    Takes in AM with water, eats hours later  No iron, calcium or multi-vitamins within 4 hours     Family History of thyroid disease:  mom - hypothyroid - better w/brand    Thyroid Symptoms  **has decreased energy**, denies change in weight, denies change in appetite, **has sleep issues**, denies hair changes, no skin changes, denies sweats, denies hot/cold intolerance, denies tremor, denies palpitations, **has diarrhea**, no change in menses, denies mood changes    Neck Symptoms  denies dysphagia, denies anterior neck pain, denies neck swelling, notes no nodules    DIABETES   Referred for thyroid  Will touch on DM  Dx 2013   Started on insulin  Then came off   Currently on Metformin - sends her to the bathroom; stared 2 x 500 BID, now 1 BID if no diarrhea, if diarrhea only one in AM   Checks AM -  (highest 121)     Labs/Studies    Lab Results   Component Value Date/Time    TSH 11.40 02/28/2025 09:09 AM    T4FREE 1.1 01/30/2025 10:32 AM     Lab Results   Component Value Date/Time    TSH 11.40 02/28/2025 09:09 AM    TSH 43.40 01/30/2025 10:32 AM    TSH 31.20 01/03/2025 09:50 AM    TSH 35.30 12/20/2024 08:09 AM    TSH 44.00 12/05/2024 01:13 PM    TSH 51.40 11/08/2024 09:00 AM    TSH 91.20 10/11/2024 09:10 AM    TSH 71.30 09/27/2024 08:22 AM    TSH 62.80 08/23/2024 10:14 AM    TSH 90.40 07/18/2024 11:02 AM    TSH 1.38 05/16/2024 10:31 AM    TSH 2.67

## 2025-04-09 LAB
25(OH)D3 SERPL-MCNC: 46.7 NG/ML (ref 30–100)
CHOLEST SERPL-MCNC: 160 MG/DL
EST. AVERAGE GLUCOSE BLD GHB EST-MCNC: 128 MG/DL
HBA1C MFR BLD: 6.1 % (ref 4–5.6)
HDLC SERPL-MCNC: 71 MG/DL
HDLC SERPL: 2.3 (ref 0–5)
LDLC SERPL CALC-MCNC: 73.4 MG/DL (ref 0–100)
T4 FREE SERPL-MCNC: 1.4 NG/DL (ref 0.8–1.5)
TRIGL SERPL-MCNC: 78 MG/DL
TSH SERPL DL<=0.05 MIU/L-ACNC: 1.63 UIU/ML (ref 0.36–3.74)
VIT B12 SERPL-MCNC: >2000 PG/ML (ref 193–986)
VLDLC SERPL CALC-MCNC: 15.6 MG/DL

## 2025-04-10 ENCOUNTER — RESULTS FOLLOW-UP (OUTPATIENT)
Age: 60
End: 2025-04-10

## 2025-04-11 NOTE — RESULT ENCOUNTER NOTE
Lab results show very high vitamin B12 level.  A1c shows good sugar control at 6.1.  Thyroid tests look good.  Cholesterol numbers are fine.  Vitamin D level is back up to goal range

## 2025-04-11 NOTE — TELEPHONE ENCOUNTER
----- Message from Dr. Patricia Suazo MD sent at 4/10/2025  8:58 PM EDT -----  Lab results show very high vitamin B12 level.  A1c shows good sugar control at 6.1.  Thyroid tests look good.  Cholesterol numbers are fine.  Vitamin D level is back up to goal range

## 2025-04-16 ENCOUNTER — HOSPITAL ENCOUNTER (OUTPATIENT)
Facility: HOSPITAL | Age: 60
Discharge: HOME | End: 2025-04-19
Attending: INTERNAL MEDICINE
Payer: MEDICARE

## 2025-04-16 DIAGNOSIS — C78.00 SECONDARY RENAL CELL CARCINOMA OF LUNG, UNSPECIFIED LATERALITY: ICD-10-CM

## 2025-04-16 DIAGNOSIS — C64.9 SECONDARY RENAL CELL CARCINOMA OF LUNG, UNSPECIFIED LATERALITY: ICD-10-CM

## 2025-04-16 DIAGNOSIS — C64.1 RENAL CELL CANCER, RIGHT (HCC): ICD-10-CM

## 2025-04-16 PROCEDURE — 74177 CT ABD & PELVIS W/CONTRAST: CPT

## 2025-04-16 PROCEDURE — 6360000004 HC RX CONTRAST MEDICATION: Performed by: INTERNAL MEDICINE

## 2025-04-16 RX ORDER — IOPAMIDOL 755 MG/ML
100 INJECTION, SOLUTION INTRAVASCULAR
Status: COMPLETED | OUTPATIENT
Start: 2025-04-16 | End: 2025-04-16

## 2025-04-16 RX ADMIN — IOPAMIDOL 100 ML: 755 INJECTION, SOLUTION INTRAVENOUS at 07:20

## 2025-04-22 ENCOUNTER — TELEPHONE (OUTPATIENT)
Age: 60
End: 2025-04-22

## 2025-04-22 DIAGNOSIS — C64.1 RENAL CELL CANCER, RIGHT (HCC): Primary | ICD-10-CM

## 2025-04-22 DIAGNOSIS — E03.2 HYPOTHYROIDISM DUE TO MEDICATION: ICD-10-CM

## 2025-04-22 DIAGNOSIS — C64.1 RENAL CELL CANCER, RIGHT (HCC): ICD-10-CM

## 2025-04-22 NOTE — TELEPHONE ENCOUNTER
HIPAA x 3  SW pt to let her know that labs have been ordered for [facility] and that she needs to have them done asap for her appt on Friday. Pt stated she will go in 4/23 am to have them done. Pt was appreciative of my call.

## 2025-04-23 LAB
ALBUMIN SERPL-MCNC: 3.6 G/DL (ref 3.5–5)
ALBUMIN/GLOB SERPL: 1.3 (ref 1.1–2.2)
ALP SERPL-CCNC: 149 U/L (ref 45–117)
ALT SERPL-CCNC: 24 U/L (ref 12–78)
ANION GAP SERPL CALC-SCNC: 4 MMOL/L (ref 2–12)
AST SERPL-CCNC: 17 U/L (ref 15–37)
BASOPHILS # BLD: 0.16 K/UL (ref 0–0.1)
BASOPHILS NFR BLD: 1 % (ref 0–1)
BILIRUB SERPL-MCNC: 0.2 MG/DL (ref 0.2–1)
BUN SERPL-MCNC: 10 MG/DL (ref 6–20)
BUN/CREAT SERPL: 10 (ref 12–20)
CALCIUM SERPL-MCNC: 9.6 MG/DL (ref 8.5–10.1)
CHLORIDE SERPL-SCNC: 109 MMOL/L (ref 97–108)
CO2 SERPL-SCNC: 29 MMOL/L (ref 21–32)
CREAT SERPL-MCNC: 1.01 MG/DL (ref 0.55–1.02)
DIFFERENTIAL METHOD BLD: ABNORMAL
EOSINOPHIL # BLD: 0.74 K/UL (ref 0–0.4)
EOSINOPHIL NFR BLD: 4.8 % (ref 0–7)
ERYTHROCYTE [DISTWIDTH] IN BLOOD BY AUTOMATED COUNT: 15.6 % (ref 11.5–14.5)
GLOBULIN SER CALC-MCNC: 2.8 G/DL (ref 2–4)
GLUCOSE SERPL-MCNC: 143 MG/DL (ref 65–100)
HCT VFR BLD AUTO: 42.1 % (ref 35–47)
HGB BLD-MCNC: 13.5 G/DL (ref 11.5–16)
IMM GRANULOCYTES # BLD AUTO: 0.09 K/UL (ref 0–0.04)
IMM GRANULOCYTES NFR BLD AUTO: 0.6 % (ref 0–0.5)
LYMPHOCYTES # BLD: 1.38 K/UL (ref 0.8–3.5)
LYMPHOCYTES NFR BLD: 8.9 % (ref 12–49)
MCH RBC QN AUTO: 28 PG (ref 26–34)
MCHC RBC AUTO-ENTMCNC: 32.1 G/DL (ref 30–36.5)
MCV RBC AUTO: 87.3 FL (ref 80–99)
MONOCYTES # BLD: 0.59 K/UL (ref 0–1)
MONOCYTES NFR BLD: 3.8 % (ref 5–13)
NEUTS SEG # BLD: 12.54 K/UL (ref 1.8–8)
NEUTS SEG NFR BLD: 80.9 % (ref 32–75)
NRBC # BLD: 0 K/UL (ref 0–0.01)
NRBC BLD-RTO: 0 PER 100 WBC
PLATELET # BLD AUTO: 245 K/UL (ref 150–400)
PMV BLD AUTO: 10.3 FL (ref 8.9–12.9)
POTASSIUM SERPL-SCNC: 4.7 MMOL/L (ref 3.5–5.1)
PROT SERPL-MCNC: 6.4 G/DL (ref 6.4–8.2)
RBC # BLD AUTO: 4.82 M/UL (ref 3.8–5.2)
SODIUM SERPL-SCNC: 142 MMOL/L (ref 136–145)
TSH SERPL DL<=0.05 MIU/L-ACNC: 2.06 UIU/ML (ref 0.36–3.74)
WBC # BLD AUTO: 15.5 K/UL (ref 3.6–11)

## 2025-04-24 NOTE — PROGRESS NOTES
[facility] [place] at [facility]   [address], [address] [place] [address]   W: [phone]  F: [phone]     Reason for Visit:   Symone Edmonds is a 59 y.o. female who is seen on 4/25/2025 for follow up for:    Follow up of leucocytosis and anemia  Stage IV RCC     Treatment history for renal cell carcinoma  7/28/2020-right radical nephrectomy-3.3 cm renal cell carcinoma.  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 female with PMH as below who is seen for the above in addition to abnormal CBC.    She has persistent leucocytosis since 2014, no other CBC abnormalities. She has known R renal cell carcinoma ( 3.3 cm) which she had a R radical nephrectomy on 7/28/2020. She has osteoarthritis and has several flare ups. She had a CT chest 8/2020 that  showed a 6 mm lung nodule which is being observed.  She had a follow-up CT scan with pulmonary on 7/24/2023 which showed increasing bilateral pulmonary nodules.  This prompted a PET CT scan that was done on 8/9/2023.  This showed that the peripheral pulmonary nodule in the right lower lobe had an FDG activity of 3.3, subcentimeter pulmonary nodule in the left lower lobe had no FDG activity.  She was referred to thoracic surgery Dr. Ross and underwent a robotic assisted thoracoscopic right lower lobe wedge resection and mediastinal lymph node dissection on 9/6/2023.  Pathology revealed metastatic renal cell carcinoma in the right lower lobe 1.3 cm nodule. 3/2024 Scans showed progression although EBUS path was benign. Started on Ipi Nivo ans

## 2025-04-25 ENCOUNTER — HOSPITAL ENCOUNTER (EMERGENCY)
Facility: HOSPITAL | Age: 60
Discharge: HOME | End: 2025-04-25
Attending: STUDENT IN AN ORGANIZED HEALTH CARE EDUCATION/TRAINING PROGRAM
Payer: MEDICARE

## 2025-04-25 ENCOUNTER — TELEMEDICINE (OUTPATIENT)
Age: 60
End: 2025-04-25
Payer: MEDICARE

## 2025-04-25 ENCOUNTER — APPOINTMENT (OUTPATIENT)
Facility: HOSPITAL | Age: 60
End: 2025-04-25
Payer: MEDICARE

## 2025-04-25 VITALS
DIASTOLIC BLOOD PRESSURE: 90 MMHG | SYSTOLIC BLOOD PRESSURE: 164 MMHG | RESPIRATION RATE: 16 BRPM | WEIGHT: 190 LBS | BODY MASS INDEX: 33.66 KG/M2 | OXYGEN SATURATION: 91 % | TEMPERATURE: 98.1 F | HEIGHT: 63 IN | HEART RATE: 79 BPM

## 2025-04-25 DIAGNOSIS — C64.9 SECONDARY RENAL CELL CARCINOMA OF LUNG, UNSPECIFIED LATERALITY (HCC): Primary | ICD-10-CM

## 2025-04-25 DIAGNOSIS — C78.00 SECONDARY RENAL CELL CARCINOMA OF LUNG, UNSPECIFIED LATERALITY (HCC): Primary | ICD-10-CM

## 2025-04-25 DIAGNOSIS — K57.32 DIVERTICULITIS OF DESCENDING COLON: Primary | ICD-10-CM

## 2025-04-25 LAB
ALBUMIN SERPL-MCNC: 3.5 G/DL (ref 3.5–5)
ALBUMIN/GLOB SERPL: 1.1 (ref 1.1–2.2)
ALP SERPL-CCNC: 159 U/L (ref 45–117)
ALT SERPL-CCNC: 26 U/L (ref 12–78)
ANION GAP SERPL CALC-SCNC: 12 MMOL/L (ref 2–12)
APPEARANCE UR: CLEAR
AST SERPL-CCNC: 17 U/L (ref 15–37)
BACTERIA URNS QL MICRO: NEGATIVE /HPF
BASOPHILS # BLD: 0.14 K/UL (ref 0–0.1)
BASOPHILS NFR BLD: 0.8 % (ref 0–1)
BILIRUB SERPL-MCNC: 0.4 MG/DL (ref 0.2–1)
BILIRUB UR QL: NEGATIVE
BUN SERPL-MCNC: 8 MG/DL (ref 6–20)
BUN/CREAT SERPL: 9 (ref 12–20)
CALCIUM SERPL-MCNC: 9.3 MG/DL (ref 8.5–10.1)
CHLORIDE SERPL-SCNC: 103 MMOL/L (ref 97–108)
CO2 SERPL-SCNC: 27 MMOL/L (ref 21–32)
COLOR UR: NORMAL
CREAT SERPL-MCNC: 0.92 MG/DL (ref 0.55–1.02)
DIFFERENTIAL METHOD BLD: ABNORMAL
EOSINOPHIL # BLD: 0.34 K/UL (ref 0–0.4)
EOSINOPHIL NFR BLD: 2 % (ref 0–7)
EPITH CASTS URNS QL MICRO: NORMAL /LPF
ERYTHROCYTE [DISTWIDTH] IN BLOOD BY AUTOMATED COUNT: 15.8 % (ref 11.5–14.5)
GLOBULIN SER CALC-MCNC: 3.3 G/DL (ref 2–4)
GLUCOSE SERPL-MCNC: 112 MG/DL (ref 65–100)
GLUCOSE UR STRIP.AUTO-MCNC: NEGATIVE MG/DL
HCT VFR BLD AUTO: 41.1 % (ref 35–47)
HGB BLD-MCNC: 13.6 G/DL (ref 11.5–16)
HGB UR QL STRIP: NEGATIVE
IMM GRANULOCYTES # BLD AUTO: 0.06 K/UL (ref 0–0.04)
IMM GRANULOCYTES NFR BLD AUTO: 0.4 % (ref 0–0.5)
KETONES UR QL STRIP.AUTO: NEGATIVE MG/DL
LEUKOCYTE ESTERASE UR QL STRIP.AUTO: NEGATIVE
LYMPHOCYTES # BLD: 1.06 K/UL (ref 0.8–3.5)
LYMPHOCYTES NFR BLD: 6.3 % (ref 12–49)
MCH RBC QN AUTO: 28.5 PG (ref 26–34)
MCHC RBC AUTO-ENTMCNC: 33.1 G/DL (ref 30–36.5)
MCV RBC AUTO: 86 FL (ref 80–99)
MONOCYTES # BLD: 0.44 K/UL (ref 0–1)
MONOCYTES NFR BLD: 2.6 % (ref 5–13)
NEUTS SEG # BLD: 14.85 K/UL (ref 1.8–8)
NEUTS SEG NFR BLD: 87.9 % (ref 32–75)
NITRITE UR QL STRIP.AUTO: NEGATIVE
NRBC # BLD: 0 K/UL (ref 0–0.01)
NRBC BLD-RTO: 0 PER 100 WBC
PH UR STRIP: 7 (ref 5–8)
PLATELET # BLD AUTO: 228 K/UL (ref 150–400)
PMV BLD AUTO: 9.5 FL (ref 8.9–12.9)
POTASSIUM SERPL-SCNC: 3.9 MMOL/L (ref 3.5–5.1)
PROT SERPL-MCNC: 6.8 G/DL (ref 6.4–8.2)
PROT UR STRIP-MCNC: NEGATIVE MG/DL
RBC # BLD AUTO: 4.78 M/UL (ref 3.8–5.2)
RBC #/AREA URNS HPF: NORMAL /HPF (ref 0–5)
SODIUM SERPL-SCNC: 142 MMOL/L (ref 136–145)
SP GR UR REFRACTOMETRY: 1.01 (ref 1–1.03)
UROBILINOGEN UR QL STRIP.AUTO: 0.2 EU/DL (ref 0.2–1)
WBC # BLD AUTO: 16.9 K/UL (ref 3.6–11)
WBC URNS QL MICRO: NORMAL /HPF (ref 0–4)

## 2025-04-25 PROCEDURE — 74177 CT ABD & PELVIS W/CONTRAST: CPT

## 2025-04-25 PROCEDURE — G8428 CUR MEDS NOT DOCUMENT: HCPCS | Performed by: INTERNAL MEDICINE

## 2025-04-25 PROCEDURE — 36415 COLL VENOUS BLD VENIPUNCTURE: CPT

## 2025-04-25 PROCEDURE — 81001 URINALYSIS AUTO W/SCOPE: CPT

## 2025-04-25 PROCEDURE — 80053 COMPREHEN METABOLIC PANEL: CPT

## 2025-04-25 PROCEDURE — 99215 OFFICE O/P EST HI 40 MIN: CPT | Performed by: INTERNAL MEDICINE

## 2025-04-25 PROCEDURE — 3017F COLORECTAL CA SCREEN DOC REV: CPT | Performed by: INTERNAL MEDICINE

## 2025-04-25 PROCEDURE — 6360000002 HC RX W HCPCS

## 2025-04-25 PROCEDURE — 99285 EMERGENCY DEPT VISIT HI MDM: CPT

## 2025-04-25 PROCEDURE — 6360000004 HC RX CONTRAST MEDICATION: Performed by: STUDENT IN AN ORGANIZED HEALTH CARE EDUCATION/TRAINING PROGRAM

## 2025-04-25 PROCEDURE — 85025 COMPLETE CBC W/AUTO DIFF WBC: CPT

## 2025-04-25 PROCEDURE — 96374 THER/PROPH/DIAG INJ IV PUSH: CPT

## 2025-04-25 RX ORDER — HYDROCODONE BITARTRATE AND ACETAMINOPHEN 5; 325 MG/1; MG/1
1 TABLET ORAL EVERY 6 HOURS PRN
Qty: 20 TABLET | Refills: 0 | Status: SHIPPED | OUTPATIENT
Start: 2025-04-25 | End: 2025-04-30

## 2025-04-25 RX ORDER — IOPAMIDOL 755 MG/ML
100 INJECTION, SOLUTION INTRAVASCULAR
Status: COMPLETED | OUTPATIENT
Start: 2025-04-25 | End: 2025-04-25

## 2025-04-25 RX ORDER — MORPHINE SULFATE 4 MG/ML
8 INJECTION, SOLUTION INTRAMUSCULAR; INTRAVENOUS ONCE
Status: COMPLETED | OUTPATIENT
Start: 2025-04-25 | End: 2025-04-25

## 2025-04-25 RX ORDER — PREDNISONE 5 MG/1
5 TABLET ORAL DAILY
Qty: 30 TABLET | Refills: 0 | Status: SHIPPED | OUTPATIENT
Start: 2025-04-25 | End: 2025-05-25

## 2025-04-25 RX ADMIN — MORPHINE SULFATE 8 MG: 4 INJECTION INTRAVENOUS at 16:37

## 2025-04-25 RX ADMIN — IOPAMIDOL 100 ML: 755 INJECTION, SOLUTION INTRAVENOUS at 15:30

## 2025-04-25 ASSESSMENT — ENCOUNTER SYMPTOMS: ABDOMINAL PAIN: 1

## 2025-04-25 NOTE — ED PROVIDER NOTES
[facility]  EMERGENCY DEPARTMENT ENCOUNTER      Pt Name: Symone Edmonds  MRN: [id number]  Birthdate 1965  Date of evaluation: 4/25/2025  Provider: George Mccabe PA-C    CHIEF COMPLAINT       Chief Complaint   Patient presents with    Abdominal Pain         HISTORY OF PRESENT ILLNESS   (Location/Symptom, Timing/Onset, Context/Setting, Quality, Duration, Modifying Factors, Severity)  Note limiting factors.   HPI  59-year-old female presenting to ED with left lower quadrant abdominal pain for 24 hours.  Reports pain is worse with cough, palpation of the abdomen, movement.  Denies nausea, vomiting.  Reports 1 episode of diarrhea this morning.  Denies blood in the stool.  Denies fevers, chills.  Past medical history of hysterectomy, nephrectomy secondary to malignancy.    Review of External Medical Records:     Nursing Notes were reviewed.    REVIEW OF SYSTEMS    (2-9 systems for level 4, 10 or more for level 5)     Review of Systems   Gastrointestinal:  Positive for abdominal pain.       Except as noted above the remainder of the review of systems was reviewed and negative.       PAST MEDICAL HISTORY     Past Medical History:   Diagnosis Date    Arthritis     Chronic obstructive pulmonary disease (HCC)     BEGINNING STAGES     Chronic pain 1993    Elevated WBC count 2002 to present    GERD (gastroesophageal reflux disease)     Hyperlipidemia     Hypertension     Hyperthyroidism     Kidney cancer, primary, with metastasis from kidney to other site (HCC)     to lung; s/p surgery and immuntherapy    Kidney stones 2020    Leg swelling 07/2020    Doppler negative    Liver disease 2021    Nausea & vomiting     Obesity     Osteoarthritis     Panic attacks     PONV (postoperative nausea and vomiting)     Restless legs syndrome     Skin cancer     BASAL CELL ON CHEST    Sleep apnea     PT DOES NOT SLEEP WTH CPAP MACHINE; SLEEPS ELEVATED.    Spondylolisthesis of lumbar region     Stage 4 chronic  High Cholesterol Mother     Osteoarthritis Mother     Cancer Mother         2019 patient here.    Hypertension Mother     Thyroid Disease Mother     Glaucoma Mother     Breast Cancer Mother     Arthritis Mother     Heart Disease Father         He passed at [employer]    Osteoarthritis Father     Psychiatric Disorder Father         BIPOLAR    Hypertension Father     Heart Surgery Father     Alcohol Abuse Father     Depression Father     Early Death Father     Heart Attack Father     High Blood Pressure Father     High Cholesterol Father     Mental Illness Father     Substance Abuse Father     Arthritis Father     No Known Problems Sister     Deep Vein Thrombosis Brother         Unknown cause    Hypertension Brother     Osteoarthritis Brother     Diabetes Brother     Obesity Brother     Arthritis Brother     Clotting Disorder Brother     High Blood Pressure Brother     Anesth Problems Neg Hx           SOCIAL HISTORY       Social History     Socioeconomic History    Marital status: Single   Tobacco Use    Smoking status: Every Day     Current packs/day: 1.00     Average packs/day: 0.6 packs/day for 46.3 years (26.3 ttl pk-yrs)     Types: Cigarettes     Start date: 01/2019     Passive exposure: Past    Smokeless tobacco: Never   Vaping Use    Vaping status: Every Day    Substances: THC    Devices: MARIJUANA   Substance and Sexual Activity    Alcohol use: Not Currently    Drug use: Not Currently     Types: Marijuana (Weed)     Comment: VAPES MARIJUANA DAILY    Sexual activity: Not Currently     Social Drivers of Health     Financial Resource Strain: Medium Risk (6/19/2023)    Overall Financial Resource Strain (CARDIA)     Difficulty of Paying Living Expenses: Somewhat hard   Food Insecurity: No Food Insecurity (4/8/2025)    Hunger Vital Sign     Worried About Running Out of Food in the Last Year: Never true     Ran Out of Food in the Last Year: Never true   Transportation Needs: No Transportation Needs (4/8/2025)

## 2025-04-25 NOTE — ED TRIAGE NOTES
Patient arrives with c/o LLQ abdominal pain for the last 24 hours. Had 1 episode of diarrhea this morning. Denies nausea, vomiting, rectal bleeding, or mucus. Took BC powder without relief. Hx of full hysterectomy, cancer (right kidney removed due to cancer 2-3 years ago).

## 2025-04-26 DIAGNOSIS — E03.9 ACQUIRED HYPOTHYROIDISM: ICD-10-CM

## 2025-04-27 RX ORDER — LEVOTHYROXINE SODIUM 150 UG/1
150 TABLET ORAL DAILY
Qty: 30 TABLET | Refills: 3 | Status: SHIPPED | OUTPATIENT
Start: 2025-04-27

## 2025-05-08 ENCOUNTER — HOSPITAL ENCOUNTER (EMERGENCY)
Facility: HOSPITAL | Age: 60
Discharge: HOME | End: 2025-05-08
Attending: EMERGENCY MEDICINE
Payer: MEDICARE

## 2025-05-08 VITALS
HEART RATE: 84 BPM | OXYGEN SATURATION: 96 % | RESPIRATION RATE: 18 BRPM | SYSTOLIC BLOOD PRESSURE: 126 MMHG | WEIGHT: 189.38 LBS | BODY MASS INDEX: 33.55 KG/M2 | DIASTOLIC BLOOD PRESSURE: 71 MMHG | TEMPERATURE: 99.2 F | HEIGHT: 63 IN

## 2025-05-08 DIAGNOSIS — R21 RASH AND OTHER NONSPECIFIC SKIN ERUPTION: Primary | ICD-10-CM

## 2025-05-08 LAB
ALBUMIN SERPL-MCNC: 3.2 G/DL (ref 3.5–5)
ALBUMIN/GLOB SERPL: 1 (ref 1.1–2.2)
ALP SERPL-CCNC: 226 U/L (ref 45–117)
ALT SERPL-CCNC: 330 U/L (ref 12–78)
ANION GAP SERPL CALC-SCNC: 11 MMOL/L (ref 2–12)
AST SERPL-CCNC: 338 U/L (ref 15–37)
BASOPHILS # BLD: 0.05 K/UL (ref 0–0.1)
BASOPHILS NFR BLD: 0.4 % (ref 0–1)
BILIRUB SERPL-MCNC: 0.4 MG/DL (ref 0.2–1)
BUN SERPL-MCNC: 15 MG/DL (ref 6–20)
BUN/CREAT SERPL: 13 (ref 12–20)
CALCIUM SERPL-MCNC: 9.3 MG/DL (ref 8.5–10.1)
CHLORIDE SERPL-SCNC: 100 MMOL/L (ref 97–108)
CO2 SERPL-SCNC: 28 MMOL/L (ref 21–32)
CREAT SERPL-MCNC: 1.2 MG/DL (ref 0.55–1.02)
DIFFERENTIAL METHOD BLD: ABNORMAL
EOSINOPHIL # BLD: 0.4 K/UL (ref 0–0.4)
EOSINOPHIL NFR BLD: 3.3 % (ref 0–7)
ERYTHROCYTE [DISTWIDTH] IN BLOOD BY AUTOMATED COUNT: 15.4 % (ref 11.5–14.5)
GLOBULIN SER CALC-MCNC: 3.3 G/DL (ref 2–4)
GLUCOSE SERPL-MCNC: 139 MG/DL (ref 65–100)
HCT VFR BLD AUTO: 42.2 % (ref 35–47)
HGB BLD-MCNC: 13.8 G/DL (ref 11.5–16)
IMM GRANULOCYTES # BLD AUTO: 0.06 K/UL (ref 0–0.04)
IMM GRANULOCYTES NFR BLD AUTO: 0.5 % (ref 0–0.5)
LYMPHOCYTES # BLD: 0.38 K/UL (ref 0.8–3.5)
LYMPHOCYTES NFR BLD: 3.1 % (ref 12–49)
MCH RBC QN AUTO: 27.4 PG (ref 26–34)
MCHC RBC AUTO-ENTMCNC: 32.7 G/DL (ref 30–36.5)
MCV RBC AUTO: 83.9 FL (ref 80–99)
MONOCYTES # BLD: 0.33 K/UL (ref 0–1)
MONOCYTES NFR BLD: 2.7 % (ref 5–13)
NEUTS SEG # BLD: 10.88 K/UL (ref 1.8–8)
NEUTS SEG NFR BLD: 90 % (ref 32–75)
NRBC # BLD: 0 K/UL (ref 0–0.01)
NRBC BLD-RTO: 0 PER 100 WBC
PLATELET # BLD AUTO: 172 K/UL (ref 150–400)
PMV BLD AUTO: 9.5 FL (ref 8.9–12.9)
POTASSIUM SERPL-SCNC: 4.1 MMOL/L (ref 3.5–5.1)
PROT SERPL-MCNC: 6.5 G/DL (ref 6.4–8.2)
RBC # BLD AUTO: 5.03 M/UL (ref 3.8–5.2)
RBC MORPH BLD: ABNORMAL
SODIUM SERPL-SCNC: 139 MMOL/L (ref 136–145)
WBC # BLD AUTO: 12.1 K/UL (ref 3.6–11)

## 2025-05-08 PROCEDURE — 99283 EMERGENCY DEPT VISIT LOW MDM: CPT

## 2025-05-08 PROCEDURE — 80053 COMPREHEN METABOLIC PANEL: CPT

## 2025-05-08 PROCEDURE — 85025 COMPLETE CBC W/AUTO DIFF WBC: CPT

## 2025-05-08 RX ORDER — HYDROXYZINE HYDROCHLORIDE 25 MG/1
25-50 TABLET, FILM COATED ORAL EVERY 8 HOURS PRN
Qty: 40 TABLET | Refills: 0 | Status: SHIPPED | OUTPATIENT
Start: 2025-05-08 | End: 2025-05-18

## 2025-05-08 RX ORDER — PREDNISONE 50 MG/1
50 TABLET ORAL DAILY
Qty: 5 TABLET | Refills: 0 | Status: SHIPPED | OUTPATIENT
Start: 2025-05-08 | End: 2025-05-13

## 2025-05-08 NOTE — ED TRIAGE NOTES
Pt c/o hives that started after taking an Amoxicillin two Fridays ago Pt last dose was 4-5 days ago. +fevers and nausea. Pt already on steroids

## 2025-05-08 NOTE — ED NOTES
Patient stable at time of discharge. Reviewed discharge instructions, medications, and home care. Allowed time for questions. Patient verbalized understanding. Ambulatory out of department out of department with steady gait accompanied.

## 2025-05-08 NOTE — ED PROVIDER NOTES
[facility]  EMERGENCY DEPARTMENT ENCOUNTER      Pt Name: Symone Edmonds  MRN: [id number]  Birthdate 1965  Date of evaluation: 5/8/2025  Provider: Delroy Rowan DO    CHIEF COMPLAINT       Chief Complaint   Patient presents with    Rash    Allergic Reaction         HISTORY OF PRESENT ILLNESS   (Location/Symptom, Timing/Onset, Context/Setting, Quality, Duration, Modifying Factors, Severity)  Note limiting factors.   59-year-old female comes in for a rash.  She was treated for diverticulitis 2 weeks ago with Augmentin.  She has finished those medications about 5 days ago but notes over the last several days has developed a rash.  Rash is diffuse all over her body.  Itchy rash.  She has been using Benadryl with no relief.  She notes a fever last night as well for which she took Tylenol.  She denies chest pain or any acute other complaints            Review of External Medical Records:     Nursing Notes were reviewed.    REVIEW OF SYSTEMS    (2-9 systems for level 4, 10 or more for level 5)     Review of Systems    Except as noted above the remainder of the review of systems was reviewed and negative.       PAST MEDICAL HISTORY     Past Medical History:   Diagnosis Date    Arthritis     Chronic obstructive pulmonary disease (HCC)     BEGINNING STAGES     Chronic pain 1993    Elevated WBC count 2002 to present    GERD (gastroesophageal reflux disease)     Hyperlipidemia     Hypertension     Hyperthyroidism     Kidney cancer, primary, with metastasis from kidney to other site (HCC)     to lung; s/p surgery and immuntherapy    Kidney stones 2020    Leg swelling 07/2020    Doppler negative    Liver disease 2021    Nausea & vomiting     Obesity     Osteoarthritis     Panic attacks     PONV (postoperative nausea and vomiting)     Restless legs syndrome     Skin cancer     BASAL CELL ON CHEST    Sleep apnea     PT DOES NOT SLEEP WTH CPAP MACHINE; SLEEPS ELEVATED.    Spondylolisthesis of lumbar

## 2025-05-13 ENCOUNTER — CLINICAL DOCUMENTATION (OUTPATIENT)
Facility: HOSPITAL | Age: 60
End: 2025-05-13

## 2025-05-13 NOTE — PROGRESS NOTES
Patient Assistance    Met with: Re-enrolled patient in [facility] for Renal Cell    Navigator Type: Infusion  Documentation Type: Assistance Review  Contact Type: Telephone  Status of Patient Insurance Coverage: Patient has active coverage  Form of PAP Assistance: Foundation Assistance  Patient Assistance Sponsor Name: [employer]          Additional notes:     Drug Name: Opdivo  Form of PAP Assistance: Copay / Foundation (Approved)

## 2025-05-15 ENCOUNTER — TRANSCRIBE ORDERS (OUTPATIENT)
Facility: HOSPITAL | Age: 60
End: 2025-05-15

## 2025-05-15 ENCOUNTER — HOSPITAL ENCOUNTER (OUTPATIENT)
Facility: HOSPITAL | Age: 60
Discharge: HOME | End: 2025-05-18
Attending: INTERNAL MEDICINE
Payer: MEDICARE

## 2025-05-15 DIAGNOSIS — J90 PLEURAL EFFUSION: ICD-10-CM

## 2025-05-15 DIAGNOSIS — J90 PLEURAL EFFUSION: Primary | ICD-10-CM

## 2025-05-15 PROCEDURE — 71046 X-RAY EXAM CHEST 2 VIEWS: CPT

## 2025-05-20 ENCOUNTER — CLINICAL DOCUMENTATION (OUTPATIENT)
Facility: HOSPITAL | Age: 60
End: 2025-05-20

## 2025-05-20 NOTE — PROGRESS NOTES
Patient Assistance    Met with:     Documentation Type: Assistance Review  Contact Type: No Contact  Status of Patient Insurance Coverage: Patient has active coverage  Form of PAP Assistance: Foundation Assistance  Patient Assistance Sponsor Name: [employer]          Additional notes:     Drug Name: Opdivo  Form of PAP Assistance: Copay / Foundation (Approved)

## 2025-05-22 ENCOUNTER — TELEPHONE (OUTPATIENT)
Age: 60
End: 2025-05-22

## 2025-05-22 DIAGNOSIS — C64.1 RENAL CELL CARCINOMA OF RIGHT KIDNEY METASTATIC TO OTHER SITE (HCC): ICD-10-CM

## 2025-05-22 DIAGNOSIS — C64.1 RENAL CELL CANCER, RIGHT (HCC): Primary | ICD-10-CM

## 2025-05-22 NOTE — TELEPHONE ENCOUNTER
----- Message from Dr. Nhi Winchester MD sent at 5/22/2025  2:44 PM EDT -----  Going through the list of people who need follow-up.  She does not have a follow-up with me currently.  Please schedule for a follow-up with me third week of June.  Will need CT neck and chest done

## 2025-05-23 ENCOUNTER — TELEPHONE (OUTPATIENT)
Age: 60
End: 2025-05-23

## 2025-05-23 NOTE — TELEPHONE ENCOUNTER
HIPAA x 3  SW pt to make sure she's aware that Dr. Winchester ordered CT neck/chest prior to her appt on 6/17/25. Pt stated she will call to schedule scans. Pt voiced understanding and was appreciative of my call.

## 2025-05-29 DIAGNOSIS — C64.1 RENAL CELL CANCER, RIGHT (HCC): Primary | ICD-10-CM

## 2025-05-29 RX ORDER — PREDNISONE 5 MG/1
5 TABLET ORAL DAILY
Qty: 30 TABLET | Refills: 0 | OUTPATIENT
Start: 2025-05-29

## 2025-05-29 RX ORDER — PREDNISONE 5 MG/1
TABLET ORAL
Qty: 14 TABLET | Refills: 0 | Status: SHIPPED | OUTPATIENT
Start: 2025-05-29

## 2025-05-30 DIAGNOSIS — E03.9 ACQUIRED HYPOTHYROIDISM: ICD-10-CM

## 2025-05-30 RX ORDER — LEVOTHYROXINE SODIUM 150 UG/1
TABLET ORAL
Qty: 90 TABLET | Refills: 1 | Status: SHIPPED | OUTPATIENT
Start: 2025-05-30

## 2025-06-05 ENCOUNTER — HOSPITAL ENCOUNTER (OUTPATIENT)
Facility: HOSPITAL | Age: 60
Discharge: HOME | End: 2025-06-08
Payer: MEDICARE

## 2025-06-05 DIAGNOSIS — C64.1 RENAL CELL CANCER, RIGHT (HCC): ICD-10-CM

## 2025-06-05 DIAGNOSIS — C64.1 RENAL CELL CARCINOMA OF RIGHT KIDNEY METASTATIC TO OTHER SITE (HCC): ICD-10-CM

## 2025-06-05 PROCEDURE — 6360000004 HC RX CONTRAST MEDICATION

## 2025-06-05 PROCEDURE — 74177 CT ABD & PELVIS W/CONTRAST: CPT

## 2025-06-05 PROCEDURE — 70491 CT SOFT TISSUE NECK W/DYE: CPT

## 2025-06-05 RX ORDER — IOPAMIDOL 755 MG/ML
100 INJECTION, SOLUTION INTRAVASCULAR
Status: COMPLETED | OUTPATIENT
Start: 2025-06-05 | End: 2025-06-05

## 2025-06-05 RX ADMIN — IOPAMIDOL 100 ML: 755 INJECTION, SOLUTION INTRAVENOUS at 10:02

## 2025-06-11 ENCOUNTER — TELEPHONE (OUTPATIENT)
Age: 60
End: 2025-06-11

## 2025-06-11 NOTE — TELEPHONE ENCOUNTER
06/11/2025    Called pt to discuss [facility] jose m and was informed, pt already has a $8,000 jose m but currently isnt taking any oral chemo medication and infusions have been put on hold. Pt wanted to know could jose m be used towards medical expenses. I informed pt [facility] usually only covers the cost of medication but I will call and double check. Will also, search for a jose m to cover medical expenses, in the event of [facility] not offering medical expenses coverage.

## 2025-06-13 RX ORDER — ATORVASTATIN CALCIUM 40 MG/1
TABLET, FILM COATED ORAL
Qty: 90 TABLET | Refills: 3 | Status: SHIPPED | OUTPATIENT
Start: 2025-06-13

## 2025-06-13 NOTE — PROGRESS NOTES
[facility] [place] at [facility]   [address], [address] [place] [address]   W: [phone]  F: [phone]     Reason for Visit:   Symone Edmonds is a 59 y.o. female who is seen on 6/17/2025 for follow up for:    Follow up of leucocytosis and anemia  Stage IV RCC     Treatment history for renal cell carcinoma  7/28/2020-right radical nephrectomy-3.3 cm renal cell carcinoma.  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 female with PMH as below who is seen for the above in addition to abnormal CBC.    She has persistent leucocytosis since 2014, no other CBC abnormalities. She has known R renal cell carcinoma ( 3.3 cm) which she had a R radical nephrectomy on 7/28/2020. She has osteoarthritis and has several flare ups. She had a CT chest 8/2020 that  showed a 6 mm lung nodule which is being observed.  She had a follow-up CT scan with pulmonary on 7/24/2023 which showed increasing bilateral pulmonary nodules.  This prompted a PET CT scan that was done on 8/9/2023.  This showed that the peripheral pulmonary nodule in the right lower lobe had an FDG activity of 3.3, subcentimeter pulmonary nodule in the left lower lobe had no FDG activity.  She was referred to thoracic surgery Dr. Ross and underwent a robotic assisted thoracoscopic right lower lobe wedge resection and mediastinal lymph node dissection on 9/6/2023.  Pathology revealed metastatic renal cell carcinoma in the right lower lobe 1.3 cm nodule. 3/2024 Scans showed progression although EBUS path was benign. Started on Ipi Nivo ans

## 2025-06-17 ENCOUNTER — TELEPHONE (OUTPATIENT)
Age: 60
End: 2025-06-17

## 2025-06-17 ENCOUNTER — HOSPITAL ENCOUNTER (OUTPATIENT)
Facility: HOSPITAL | Age: 60
Discharge: HOME | End: 2025-06-20
Attending: INTERNAL MEDICINE
Payer: MEDICARE

## 2025-06-17 ENCOUNTER — OFFICE VISIT (OUTPATIENT)
Age: 60
End: 2025-06-17
Payer: MEDICARE

## 2025-06-17 VITALS
BODY MASS INDEX: 33.66 KG/M2 | HEART RATE: 85 BPM | TEMPERATURE: 98 F | WEIGHT: 190 LBS | OXYGEN SATURATION: 94 % | SYSTOLIC BLOOD PRESSURE: 158 MMHG | RESPIRATION RATE: 16 BRPM | DIASTOLIC BLOOD PRESSURE: 84 MMHG

## 2025-06-17 DIAGNOSIS — C64.1 RENAL CELL CANCER, RIGHT (HCC): ICD-10-CM

## 2025-06-17 DIAGNOSIS — C64.1 RENAL CELL CANCER, RIGHT (HCC): Primary | ICD-10-CM

## 2025-06-17 DIAGNOSIS — R09.1 PLEURISY: ICD-10-CM

## 2025-06-17 PROCEDURE — 3017F COLORECTAL CA SCREEN DOC REV: CPT | Performed by: INTERNAL MEDICINE

## 2025-06-17 PROCEDURE — 99215 OFFICE O/P EST HI 40 MIN: CPT | Performed by: INTERNAL MEDICINE

## 2025-06-17 PROCEDURE — 71046 X-RAY EXAM CHEST 2 VIEWS: CPT

## 2025-06-17 PROCEDURE — G8417 CALC BMI ABV UP PARAM F/U: HCPCS | Performed by: INTERNAL MEDICINE

## 2025-06-17 PROCEDURE — 3079F DIAST BP 80-89 MM HG: CPT | Performed by: INTERNAL MEDICINE

## 2025-06-17 PROCEDURE — 4004F PT TOBACCO SCREEN RCVD TLK: CPT | Performed by: INTERNAL MEDICINE

## 2025-06-17 PROCEDURE — G8428 CUR MEDS NOT DOCUMENT: HCPCS | Performed by: INTERNAL MEDICINE

## 2025-06-17 PROCEDURE — 3077F SYST BP >= 140 MM HG: CPT | Performed by: INTERNAL MEDICINE

## 2025-06-17 RX ORDER — PREDNISONE 20 MG/1
40 TABLET ORAL DAILY
Qty: 28 TABLET | Refills: 0 | Status: SHIPPED | OUTPATIENT
Start: 2025-06-17 | End: 2025-07-01

## 2025-06-17 NOTE — PROGRESS NOTES
Symone Edmonds is a 59 y.o. female    Chief Complaint   Patient presents with    Follow-up     Follow up of leucocytosis and anemia  Stage IV RCC          1. Have you been to the ER, urgent care clinic since your last visit?  Hospitalized since your last visit? Yes, [place]  2. Have you seen or consulted any other health care providers outside of the [facility] since your last visit?  Include any pap smears or colon screening. Yes, Pulmonary, Cardiology

## 2025-06-17 NOTE — TELEPHONE ENCOUNTER
Dr Winchester sent staff message:    Symptom check please in 2 weeks   Return to see me 1 month     PSR will call to schedule 1 month f/u with Dr. Winchester

## 2025-06-17 NOTE — TELEPHONE ENCOUNTER
S/w pt to schedule 2 wk f/u- pt stated she told team she's out of town 1st wk of July and won't return until 7/5/25. Advised RN will speak with provider to see when to schedule. Pt expressed understanding.       ----- Message from KAILASH CADE RN sent at 6/17/2025  4:07 PM EDT -----  She needs to see Ranulfo in 2 weeks  Sorry if this is a duplicate  ----- Message -----  From: Rosario Shields APRN - NP  Sent: 6/17/2025   2:54 PM EDT  To: DANIELLE Renteria CNP; #    Done  ----- Message -----  From: Valentina Cummings RN  Sent: 6/17/2025   2:42 PM EDT  To: DANIELLE Shetty NP; #    Can one of you put in orders for Symone Edmonds   Prednisone 40 mg oral daily for 2 weeks.    Thanks

## 2025-06-18 NOTE — TELEPHONE ENCOUNTER
S/w pt to advise nurses will call in 2 wks for medication check; f/u with provider in 4 wks scheduled for 7/29/25 at 11am. Pt expressed understanding.

## 2025-06-25 ENCOUNTER — PATIENT MESSAGE (OUTPATIENT)
Age: 60
End: 2025-06-25

## 2025-06-25 DIAGNOSIS — F34.1 DYSTHYMIA: ICD-10-CM

## 2025-06-25 DIAGNOSIS — C64.1 RENAL CELL CANCER, RIGHT (HCC): Primary | ICD-10-CM

## 2025-06-25 RX ORDER — VENLAFAXINE HYDROCHLORIDE 150 MG/1
150 CAPSULE, EXTENDED RELEASE ORAL EVERY MORNING
Qty: 7 CAPSULE | Refills: 0 | Status: SHIPPED
Start: 2025-06-25 | End: 2025-06-25 | Stop reason: SDUPTHER

## 2025-06-25 RX ORDER — VENLAFAXINE HYDROCHLORIDE 150 MG/1
150 CAPSULE, EXTENDED RELEASE ORAL EVERY MORNING
Qty: 90 CAPSULE | Refills: 3 | Status: SHIPPED | OUTPATIENT
Start: 2025-06-25

## 2025-06-25 RX ORDER — PREDNISONE 10 MG/1
TABLET ORAL
Qty: 56 TABLET | Refills: 0 | Status: SHIPPED | OUTPATIENT
Start: 2025-06-25 | End: 2025-07-16

## 2025-06-25 NOTE — TELEPHONE ENCOUNTER
I sent 1 week supply of Effexor  mg to [facility].    I also went ahead and sent again to [facility] today for 90 day supply with refills.   Hydroxyzine Counseling: Patient advised that the medication is sedating and not to drive a car after taking this medication.  Patient informed of potential adverse effects including but not limited to dry mouth, urinary retention, and blurry vision.  The patient verbalized understanding of the proper use and possible adverse effects of hydroxyzine.  All of the patient's questions and concerns were addressed.

## 2025-07-01 ENCOUNTER — TELEPHONE (OUTPATIENT)
Age: 60
End: 2025-07-01

## 2025-07-01 NOTE — TELEPHONE ENCOUNTER
LVM for pt to find out how she's feeling since taking Prednisone, I let her know to rc or send [facility] with update. Awaiting callback.

## 2025-07-10 DIAGNOSIS — C64.1 RENAL CELL CANCER, RIGHT (HCC): Primary | ICD-10-CM

## 2025-07-10 RX ORDER — PREDNISONE 10 MG/1
10 TABLET ORAL DAILY
Qty: 30 TABLET | Refills: 0 | Status: SHIPPED | OUTPATIENT
Start: 2025-07-10 | End: 2025-08-09

## 2025-07-19 NOTE — RESULT ENCOUNTER NOTE
Good news, your echocardiogram showed normal heart function, no structurally abnormalities. Please call the office with further questions.     Dr. Pierson 
meds sent to pharmacy/Yes

## 2025-07-25 RX ORDER — GLIPIZIDE 10 MG/1
TABLET, FILM COATED, EXTENDED RELEASE ORAL
Qty: 60 TABLET | Refills: 0 | Status: SHIPPED | OUTPATIENT
Start: 2025-07-25

## 2025-07-25 NOTE — PROGRESS NOTES
[facility]   [address] [facility], [address] [place] [id number]   W: [phone]  F: [phone]     Reason for Visit:   Symone Edmonds is a 60 y.o. female who is seen on 7/29/2025 for follow up for:    Follow up of leucocytosis and anemia  Stage IV RCC     Treatment history for renal cell carcinoma  7/28/2020-right radical nephrectomy-3.3 cm renal cell carcinoma.  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 female with PMH as below who is seen for the above in addition to abnormal CBC.    She has persistent leucocytosis since 2014, no other CBC abnormalities. She has known R renal cell carcinoma ( 3.3 cm) which she had a R radical nephrectomy on 7/28/2020. She has osteoarthritis and has several flare ups. She had a CT chest 8/2020 that  showed a 6 mm lung nodule which is being observed.  She had a follow-up CT scan with pulmonary on 7/24/2023 which showed increasing bilateral pulmonary nodules.  This prompted a PET CT scan that was done on 8/9/2023.  This showed that the peripheral pulmonary nodule in the right lower lobe had an FDG activity of 3.3, subcentimeter pulmonary nodule in the left lower lobe had no FDG activity.  She was referred to thoracic surgery Dr. Ross and underwent a robotic assisted thoracoscopic right lower lobe wedge resection and mediastinal lymph node dissection on 9/6/2023.  Pathology revealed metastatic renal cell carcinoma in the right lower lobe 1.3 cm nodule. 3/2024 Scans showed progression although EBUS path was benign. Started on Ipi Nivo ans

## 2025-07-29 ENCOUNTER — OFFICE VISIT (OUTPATIENT)
Age: 60
End: 2025-07-29
Payer: MEDICARE

## 2025-07-29 DIAGNOSIS — D36.0 BENIGN NEOPLASM OF MEDIASTINAL LYMPH NODE: Primary | ICD-10-CM

## 2025-07-29 DIAGNOSIS — C64.1 RENAL CELL CANCER, RIGHT (HCC): Primary | ICD-10-CM

## 2025-07-29 PROCEDURE — G8417 CALC BMI ABV UP PARAM F/U: HCPCS | Performed by: INTERNAL MEDICINE

## 2025-07-29 PROCEDURE — 99215 OFFICE O/P EST HI 40 MIN: CPT | Performed by: INTERNAL MEDICINE

## 2025-07-29 PROCEDURE — 4004F PT TOBACCO SCREEN RCVD TLK: CPT | Performed by: INTERNAL MEDICINE

## 2025-07-29 PROCEDURE — 3017F COLORECTAL CA SCREEN DOC REV: CPT | Performed by: INTERNAL MEDICINE

## 2025-07-29 PROCEDURE — G8428 CUR MEDS NOT DOCUMENT: HCPCS | Performed by: INTERNAL MEDICINE

## 2025-07-29 RX ORDER — CLOBETASOL PROPIONATE 0.5 MG/G
OINTMENT TOPICAL
Qty: 1 EACH | Refills: 2 | Status: SHIPPED
Start: 2025-07-29 | End: 2025-08-01

## 2025-07-29 ASSESSMENT — PATIENT HEALTH QUESTIONNAIRE - PHQ9
5. POOR APPETITE OR OVEREATING: NEARLY EVERY DAY
2. FEELING DOWN, DEPRESSED OR HOPELESS: MORE THAN HALF THE DAYS
4. FEELING TIRED OR HAVING LITTLE ENERGY: NEARLY EVERY DAY
6. FEELING BAD ABOUT YOURSELF - OR THAT YOU ARE A FAILURE OR HAVE LET YOURSELF OR YOUR FAMILY DOWN: NOT AT ALL
SUM OF ALL RESPONSES TO PHQ QUESTIONS 1-9: 14
1. LITTLE INTEREST OR PLEASURE IN DOING THINGS: MORE THAN HALF THE DAYS
SUM OF ALL RESPONSES TO PHQ QUESTIONS 1-9: 14
3. TROUBLE FALLING OR STAYING ASLEEP: SEVERAL DAYS
7. TROUBLE CONCENTRATING ON THINGS, SUCH AS READING THE NEWSPAPER OR WATCHING TELEVISION: NEARLY EVERY DAY
8. MOVING OR SPEAKING SO SLOWLY THAT OTHER PEOPLE COULD HAVE NOTICED. OR THE OPPOSITE, BEING SO FIGETY OR RESTLESS THAT YOU HAVE BEEN MOVING AROUND A LOT MORE THAN USUAL: NOT AT ALL
SUM OF ALL RESPONSES TO PHQ QUESTIONS 1-9: 14
SUM OF ALL RESPONSES TO PHQ QUESTIONS 1-9: 14

## 2025-07-30 ENCOUNTER — TELEPHONE (OUTPATIENT)
Age: 60
End: 2025-07-30

## 2025-07-30 DIAGNOSIS — C64.1 RENAL CELL CANCER, RIGHT (HCC): Primary | ICD-10-CM

## 2025-07-30 NOTE — TELEPHONE ENCOUNTER
Oksana called regarding CT order; needs to be split up to CT neck and CT chest, abd, pelv     #[phone]

## 2025-07-31 ENCOUNTER — TELEPHONE (OUTPATIENT)
Age: 60
End: 2025-07-31

## 2025-07-31 NOTE — TELEPHONE ENCOUNTER
0830 HIPAA x2  Called pt concerning mcm sent. Pt stated she went to the bathroom this morning around 3am and there was blood in her urine.  Pt denies urinary retention, dysuria, but did state every time she drinks she has to use the bathroom.   This nurse discussed with Dr. Winchester and she has advised to get pt in with urology today for an urgent appt.    Appt made with urology, Dr. Uriel Salgado at 0930. Pt given appt information.  Pt was appreciative of my call and will update us after her appt today.

## 2025-08-01 DIAGNOSIS — R21 RASH: Primary | ICD-10-CM

## 2025-08-01 DIAGNOSIS — C64.1 MALIGNANT NEOPLASM OF RIGHT KIDNEY, EXCEPT RENAL PELVIS (HCC): ICD-10-CM

## 2025-08-01 RX ORDER — HYDROCORTISONE 25 MG/G
CREAM TOPICAL
Qty: 20 G | Refills: 0 | Status: SHIPPED | OUTPATIENT
Start: 2025-08-01

## 2025-08-06 RX ORDER — DILTIAZEM HYDROCHLORIDE 180 MG/1
CAPSULE, COATED, EXTENDED RELEASE ORAL
Qty: 60 CAPSULE | Refills: 0 | Status: SHIPPED | OUTPATIENT
Start: 2025-08-06

## 2025-08-06 RX ORDER — TIZANIDINE 2 MG/1
TABLET ORAL
Qty: 60 TABLET | Refills: 0 | Status: SHIPPED | OUTPATIENT
Start: 2025-08-06

## 2025-08-06 RX ORDER — TRAZODONE HYDROCHLORIDE 50 MG/1
TABLET ORAL
Qty: 60 TABLET | Refills: 0 | Status: SHIPPED | OUTPATIENT
Start: 2025-08-06

## 2025-08-07 ENCOUNTER — HOSPITAL ENCOUNTER (OUTPATIENT)
Facility: HOSPITAL | Age: 60
Discharge: HOME | End: 2025-08-10
Attending: INTERNAL MEDICINE
Payer: MEDICARE

## 2025-08-07 ENCOUNTER — APPOINTMENT (OUTPATIENT)
Facility: HOSPITAL | Age: 60
End: 2025-08-07
Attending: INTERNAL MEDICINE
Payer: MEDICARE

## 2025-08-07 DIAGNOSIS — C64.1 RENAL CELL CANCER, RIGHT (HCC): ICD-10-CM

## 2025-08-07 PROCEDURE — 70491 CT SOFT TISSUE NECK W/DYE: CPT

## 2025-08-07 PROCEDURE — 6360000004 HC RX CONTRAST MEDICATION: Performed by: INTERNAL MEDICINE

## 2025-08-07 PROCEDURE — 70553 MRI BRAIN STEM W/O & W/DYE: CPT

## 2025-08-07 RX ORDER — GADOTERIDOL 279.3 MG/ML
17 INJECTION INTRAVENOUS
Status: DISCONTINUED | OUTPATIENT
Start: 2025-08-07 | End: 2025-08-11 | Stop reason: HOSPADM

## 2025-08-07 RX ORDER — IOPAMIDOL 612 MG/ML
100 INJECTION, SOLUTION INTRAVASCULAR
Status: COMPLETED | OUTPATIENT
Start: 2025-08-07 | End: 2025-08-07

## 2025-08-07 RX ADMIN — IOPAMIDOL 100 ML: 612 INJECTION, SOLUTION INTRAVENOUS at 08:35

## 2025-08-08 DIAGNOSIS — C64.1 MALIGNANT NEOPLASM OF RIGHT KIDNEY, EXCEPT RENAL PELVIS (HCC): ICD-10-CM

## 2025-08-08 RX ORDER — PROCHLORPERAZINE MALEATE 10 MG
10 TABLET ORAL EVERY 6 HOURS PRN
Qty: 60 TABLET | Refills: 2 | Status: SHIPPED | OUTPATIENT
Start: 2025-08-08

## 2025-08-22 ENCOUNTER — CLINICAL DOCUMENTATION (OUTPATIENT)
Age: 60
End: 2025-08-22

## 2025-08-22 ENCOUNTER — PATIENT MESSAGE (OUTPATIENT)
Age: 60
End: 2025-08-22

## 2025-08-22 ENCOUNTER — OFFICE VISIT (OUTPATIENT)
Age: 60
End: 2025-08-22
Payer: MEDICARE

## 2025-08-22 VITALS
BODY MASS INDEX: 33.31 KG/M2 | HEIGHT: 63 IN | DIASTOLIC BLOOD PRESSURE: 87 MMHG | WEIGHT: 188 LBS | HEART RATE: 82 BPM | SYSTOLIC BLOOD PRESSURE: 171 MMHG

## 2025-08-22 VITALS
HEART RATE: 84 BPM | TEMPERATURE: 98.3 F | DIASTOLIC BLOOD PRESSURE: 85 MMHG | RESPIRATION RATE: 18 BRPM | BODY MASS INDEX: 33.31 KG/M2 | OXYGEN SATURATION: 96 % | HEIGHT: 63 IN | WEIGHT: 188 LBS | SYSTOLIC BLOOD PRESSURE: 140 MMHG

## 2025-08-22 DIAGNOSIS — Z79.899 OTHER LONG TERM (CURRENT) DRUG THERAPY: Primary | ICD-10-CM

## 2025-08-22 DIAGNOSIS — Z79.4 TYPE 2 DIABETES MELLITUS WITH HYPERGLYCEMIA, WITH LONG-TERM CURRENT USE OF INSULIN (HCC): Primary | ICD-10-CM

## 2025-08-22 DIAGNOSIS — C64.1 RENAL CELL CANCER, RIGHT (HCC): Primary | ICD-10-CM

## 2025-08-22 DIAGNOSIS — E11.65 TYPE 2 DIABETES MELLITUS WITH HYPERGLYCEMIA, WITH LONG-TERM CURRENT USE OF INSULIN (HCC): Primary | ICD-10-CM

## 2025-08-22 PROCEDURE — 99215 OFFICE O/P EST HI 40 MIN: CPT | Performed by: INTERNAL MEDICINE

## 2025-08-22 PROCEDURE — 4004F PT TOBACCO SCREEN RCVD TLK: CPT | Performed by: INTERNAL MEDICINE

## 2025-08-22 PROCEDURE — G8427 DOCREV CUR MEDS BY ELIG CLIN: HCPCS | Performed by: INTERNAL MEDICINE

## 2025-08-22 PROCEDURE — 3077F SYST BP >= 140 MM HG: CPT | Performed by: INTERNAL MEDICINE

## 2025-08-22 PROCEDURE — 3017F COLORECTAL CA SCREEN DOC REV: CPT | Performed by: INTERNAL MEDICINE

## 2025-08-22 PROCEDURE — G8417 CALC BMI ABV UP PARAM F/U: HCPCS | Performed by: INTERNAL MEDICINE

## 2025-08-22 PROCEDURE — 3044F HG A1C LEVEL LT 7.0%: CPT | Performed by: INTERNAL MEDICINE

## 2025-08-22 PROCEDURE — 3079F DIAST BP 80-89 MM HG: CPT | Performed by: INTERNAL MEDICINE

## 2025-08-22 PROCEDURE — 2022F DILAT RTA XM EVC RTNOPTHY: CPT | Performed by: INTERNAL MEDICINE

## 2025-08-22 PROCEDURE — G8428 CUR MEDS NOT DOCUMENT: HCPCS | Performed by: INTERNAL MEDICINE

## 2025-08-22 RX ORDER — HYDROCHLOROTHIAZIDE 12.5 MG/1
CAPSULE ORAL
Qty: 2 EACH | Refills: 5 | Status: SHIPPED | OUTPATIENT
Start: 2025-08-22

## 2025-08-22 RX ORDER — INSULIN GLARGINE 100 [IU]/ML
INJECTION, SOLUTION SUBCUTANEOUS
Qty: 15 ML | Refills: 2 | Status: SHIPPED | OUTPATIENT
Start: 2025-08-22

## 2025-08-24 DIAGNOSIS — C64.1 RENAL CELL CANCER, RIGHT (HCC): Primary | ICD-10-CM

## 2025-08-24 DIAGNOSIS — C78.00 SECONDARY RENAL CELL CARCINOMA OF LUNG, UNSPECIFIED LATERALITY (HCC): ICD-10-CM

## 2025-08-24 DIAGNOSIS — Z79.899 OTHER LONG TERM (CURRENT) DRUG THERAPY: ICD-10-CM

## 2025-08-24 DIAGNOSIS — C64.9 SECONDARY RENAL CELL CARCINOMA OF LUNG, UNSPECIFIED LATERALITY (HCC): ICD-10-CM

## 2025-08-24 RX ORDER — SODIUM CHLORIDE 0.9 % (FLUSH) 0.9 %
5-40 SYRINGE (ML) INJECTION PRN
OUTPATIENT
Start: 2025-09-12

## 2025-08-24 RX ORDER — EPINEPHRINE 1 MG/ML
0.3 INJECTION, SOLUTION, CONCENTRATE INTRAVENOUS PRN
OUTPATIENT
Start: 2025-09-12

## 2025-08-24 RX ORDER — MEPERIDINE HYDROCHLORIDE 25 MG/ML
12.5 INJECTION INTRAMUSCULAR; INTRAVENOUS; SUBCUTANEOUS PRN
OUTPATIENT
Start: 2025-09-12

## 2025-08-24 RX ORDER — SODIUM CHLORIDE 9 MG/ML
5-250 INJECTION, SOLUTION INTRAVENOUS PRN
OUTPATIENT
Start: 2025-09-12

## 2025-08-24 RX ORDER — ONDANSETRON 2 MG/ML
8 INJECTION INTRAMUSCULAR; INTRAVENOUS
OUTPATIENT
Start: 2025-09-12

## 2025-08-24 RX ORDER — ALBUTEROL SULFATE 90 UG/1
4 INHALANT RESPIRATORY (INHALATION) PRN
OUTPATIENT
Start: 2025-09-12

## 2025-08-24 RX ORDER — HEPARIN 100 UNIT/ML
500 SYRINGE INTRAVENOUS PRN
OUTPATIENT
Start: 2025-09-12

## 2025-08-24 RX ORDER — SODIUM CHLORIDE 9 MG/ML
INJECTION, SOLUTION INTRAVENOUS PRN
OUTPATIENT
Start: 2025-09-12

## 2025-08-24 RX ORDER — DIPHENHYDRAMINE HYDROCHLORIDE 50 MG/ML
50 INJECTION, SOLUTION INTRAMUSCULAR; INTRAVENOUS
OUTPATIENT
Start: 2025-09-12

## 2025-08-24 RX ORDER — ACETAMINOPHEN 325 MG/1
650 TABLET ORAL
OUTPATIENT
Start: 2025-09-12

## 2025-08-24 RX ORDER — HYDROCORTISONE SODIUM SUCCINATE 100 MG/2ML
100 INJECTION INTRAMUSCULAR; INTRAVENOUS
OUTPATIENT
Start: 2025-09-12

## 2025-08-25 DIAGNOSIS — K21.9 GASTROESOPHAGEAL REFLUX DISEASE, UNSPECIFIED WHETHER ESOPHAGITIS PRESENT: ICD-10-CM

## 2025-08-25 RX ORDER — FUROSEMIDE 20 MG/1
TABLET ORAL
Qty: 90 TABLET | Refills: 1 | Status: SHIPPED | OUTPATIENT
Start: 2025-08-25

## 2025-08-25 RX ORDER — LEVOCETIRIZINE DIHYDROCHLORIDE 5 MG/1
5 TABLET, FILM COATED ORAL DAILY
Qty: 90 TABLET | Refills: 3 | Status: SHIPPED | OUTPATIENT
Start: 2025-08-25

## 2025-08-25 RX ORDER — OMEPRAZOLE 40 MG/1
CAPSULE, DELAYED RELEASE ORAL
Qty: 180 CAPSULE | Refills: 1 | Status: SHIPPED | OUTPATIENT
Start: 2025-08-25

## 2025-08-25 RX ORDER — ROPINIROLE 2 MG/1
TABLET, FILM COATED ORAL
Qty: 270 TABLET | Refills: 1 | Status: SHIPPED | OUTPATIENT
Start: 2025-08-25

## 2025-08-29 ENCOUNTER — OFFICE VISIT (OUTPATIENT)
Age: 60
End: 2025-08-29
Payer: MEDICARE

## 2025-08-29 ENCOUNTER — TELEMEDICINE (OUTPATIENT)
Age: 60
End: 2025-08-29
Payer: MEDICARE

## 2025-08-29 VITALS
OXYGEN SATURATION: 98 % | BODY MASS INDEX: 32.43 KG/M2 | HEART RATE: 102 BPM | SYSTOLIC BLOOD PRESSURE: 148 MMHG | HEIGHT: 63 IN | WEIGHT: 183 LBS | DIASTOLIC BLOOD PRESSURE: 86 MMHG

## 2025-08-29 DIAGNOSIS — I48.0 PAROXYSMAL ATRIAL FIBRILLATION (HCC): ICD-10-CM

## 2025-08-29 DIAGNOSIS — I10 PRIMARY HYPERTENSION: ICD-10-CM

## 2025-08-29 DIAGNOSIS — N30.00 ACUTE CYSTITIS WITHOUT HEMATURIA: Primary | ICD-10-CM

## 2025-08-29 DIAGNOSIS — I10 ESSENTIAL HYPERTENSION WITH GOAL BLOOD PRESSURE LESS THAN 140/90: Primary | ICD-10-CM

## 2025-08-29 DIAGNOSIS — Z90.5 H/O RIGHT NEPHRECTOMY: ICD-10-CM

## 2025-08-29 DIAGNOSIS — Z85.528 HX OF RENAL CELL CANCER: ICD-10-CM

## 2025-08-29 DIAGNOSIS — R59.0 MEDIASTINAL LYMPHADENOPATHY: ICD-10-CM

## 2025-08-29 PROCEDURE — 99214 OFFICE O/P EST MOD 30 MIN: CPT | Performed by: INTERNAL MEDICINE

## 2025-08-29 PROCEDURE — 99213 OFFICE O/P EST LOW 20 MIN: CPT | Performed by: FAMILY MEDICINE

## 2025-08-29 PROCEDURE — 4004F PT TOBACCO SCREEN RCVD TLK: CPT | Performed by: INTERNAL MEDICINE

## 2025-08-29 PROCEDURE — G8427 DOCREV CUR MEDS BY ELIG CLIN: HCPCS | Performed by: INTERNAL MEDICINE

## 2025-08-29 PROCEDURE — 4004F PT TOBACCO SCREEN RCVD TLK: CPT | Performed by: FAMILY MEDICINE

## 2025-08-29 PROCEDURE — G8417 CALC BMI ABV UP PARAM F/U: HCPCS | Performed by: INTERNAL MEDICINE

## 2025-08-29 PROCEDURE — 3017F COLORECTAL CA SCREEN DOC REV: CPT | Performed by: FAMILY MEDICINE

## 2025-08-29 PROCEDURE — 3017F COLORECTAL CA SCREEN DOC REV: CPT | Performed by: INTERNAL MEDICINE

## 2025-08-29 PROCEDURE — G8417 CALC BMI ABV UP PARAM F/U: HCPCS | Performed by: FAMILY MEDICINE

## 2025-08-29 PROCEDURE — G8427 DOCREV CUR MEDS BY ELIG CLIN: HCPCS | Performed by: FAMILY MEDICINE

## 2025-08-29 PROCEDURE — 3077F SYST BP >= 140 MM HG: CPT | Performed by: INTERNAL MEDICINE

## 2025-08-29 PROCEDURE — 3079F DIAST BP 80-89 MM HG: CPT | Performed by: INTERNAL MEDICINE

## 2025-08-29 RX ORDER — SULFAMETHOXAZOLE AND TRIMETHOPRIM 800; 160 MG/1; MG/1
1 TABLET ORAL 2 TIMES DAILY
Qty: 20 TABLET | Refills: 0 | Status: SHIPPED | OUTPATIENT
Start: 2025-08-29 | End: 2025-09-08

## 2025-08-29 RX ORDER — METOPROLOL SUCCINATE 100 MG/1
100 TABLET, EXTENDED RELEASE ORAL DAILY
Qty: 90 TABLET | Refills: 3 | Status: SHIPPED | OUTPATIENT
Start: 2025-08-29

## 2025-08-29 ASSESSMENT — PATIENT HEALTH QUESTIONNAIRE - PHQ9
SUM OF ALL RESPONSES TO PHQ QUESTIONS 1-9: 0
6. FEELING BAD ABOUT YOURSELF - OR THAT YOU ARE A FAILURE OR HAVE LET YOURSELF OR YOUR FAMILY DOWN: NOT AT ALL
3. TROUBLE FALLING OR STAYING ASLEEP: NOT AT ALL
7. TROUBLE CONCENTRATING ON THINGS, SUCH AS READING THE NEWSPAPER OR WATCHING TELEVISION: NOT AT ALL
SUM OF ALL RESPONSES TO PHQ QUESTIONS 1-9: 0
8. MOVING OR SPEAKING SO SLOWLY THAT OTHER PEOPLE COULD HAVE NOTICED. OR THE OPPOSITE, BEING SO FIGETY OR RESTLESS THAT YOU HAVE BEEN MOVING AROUND A LOT MORE THAN USUAL: NOT AT ALL
4. FEELING TIRED OR HAVING LITTLE ENERGY: NOT AT ALL
SUM OF ALL RESPONSES TO PHQ QUESTIONS 1-9: 0
5. POOR APPETITE OR OVEREATING: NOT AT ALL
10. IF YOU CHECKED OFF ANY PROBLEMS, HOW DIFFICULT HAVE THESE PROBLEMS MADE IT FOR YOU TO DO YOUR WORK, TAKE CARE OF THINGS AT HOME, OR GET ALONG WITH OTHER PEOPLE: NOT DIFFICULT AT ALL
2. FEELING DOWN, DEPRESSED OR HOPELESS: NOT AT ALL
SUM OF ALL RESPONSES TO PHQ QUESTIONS 1-9: 0
1. LITTLE INTEREST OR PLEASURE IN DOING THINGS: NOT AT ALL
9. THOUGHTS THAT YOU WOULD BE BETTER OFF DEAD, OR OF HURTING YOURSELF: NOT AT ALL

## 2025-08-29 ASSESSMENT — LIFESTYLE VARIABLES: HOW MANY STANDARD DRINKS CONTAINING ALCOHOL DO YOU HAVE ON A TYPICAL DAY: PATIENT DOES NOT DRINK

## 2025-09-02 DIAGNOSIS — C64.1 RENAL CELL CANCER, RIGHT (HCC): ICD-10-CM

## 2025-09-02 DIAGNOSIS — E03.9 ACQUIRED HYPOTHYROIDISM: Primary | ICD-10-CM

## 2025-09-02 RX ORDER — LEVOTHYROXINE SODIUM 150 UG/1
TABLET ORAL
Qty: 90 TABLET | Refills: 1 | Status: SHIPPED | OUTPATIENT
Start: 2025-09-02

## 2025-09-05 ENCOUNTER — HOSPITAL ENCOUNTER (OUTPATIENT)
Facility: HOSPITAL | Age: 60
Discharge: HOME | End: 2025-09-05
Attending: INTERNAL MEDICINE
Payer: MEDICARE

## 2025-09-05 DIAGNOSIS — C64.1 RENAL CELL CANCER, RIGHT (HCC): ICD-10-CM

## 2025-09-05 PROCEDURE — 74177 CT ABD & PELVIS W/CONTRAST: CPT

## 2025-09-05 PROCEDURE — 6360000004 HC RX CONTRAST MEDICATION: Performed by: INTERNAL MEDICINE

## 2025-09-05 RX ORDER — IOPAMIDOL 755 MG/ML
100 INJECTION, SOLUTION INTRAVASCULAR
Status: COMPLETED | OUTPATIENT
Start: 2025-09-05 | End: 2025-09-05

## 2025-09-05 RX ADMIN — IOPAMIDOL 100 ML: 755 INJECTION, SOLUTION INTRAVENOUS at 09:01

## (undated) DEVICE — BOWL: 32OZ PEELPOUCH 50/CS: Brand: MEDEGEN MEDICAL PRODUCTS, LLC

## (undated) DEVICE — Z DISCONTINUED USE 2751540 TUBING IRRIG L10IN DISP PMP ENDOGATOR

## (undated) DEVICE — PATIENT PROTECTIVE PAD FOR IMP UNIVERSAL LATERAL HIP POSITIONER (ULP) (6/CASE): Brand: PATIENT PROTECTIVE PAD

## (undated) DEVICE — AIRLIFE™ ADULT CUSHION NASAL CANNULA 14 FOOT (4.3) CRUSH-RESISTANT OXYGEN TUBING, AND U/CONNECT-IT ADAPTER: Brand: AIRLIFE™

## (undated) DEVICE — ENDO CARRY-ON PROCEDURE KIT INCLUDES ENZYMATIC SPONGE, GAUZE, BIOHAZARD LABEL, TRAY, LUBRICANT, DIRTY SCOPE LABEL, WATER LABEL, TRAY, DRAWSTRING PAD, AND DEFENDO 4-PIECE KIT.: Brand: ENDO CARRY-ON PROCEDURE KIT

## (undated) DEVICE — SUTURE VCRL SZ 3-0 L18IN ABSRB UD L26MM SH 1/2 CIR J864D

## (undated) DEVICE — TRAP SURG QUAD PARABOLA SLOT DSGN SFTY SCRN TRAPEASE

## (undated) DEVICE — SUTURE ETHBND EXCEL SZ 2 L30IN NONABSORBABLE GRN L40MM V-37 MX69G

## (undated) DEVICE — BAG BELONG PT PERS CLEAR HANDL

## (undated) DEVICE — TROCAR SITE CLOSURE DEVICE: Brand: ENDO CLOSE

## (undated) DEVICE — FORCEPS BX L240CM JAW DIA2.8MM L CAP W/ NDL MIC MESH TOOTH

## (undated) DEVICE — SURGICAL PROCEDURE KIT GEN LAPAROSCOPY LF

## (undated) DEVICE — SET ADMIN 16ML TBNG L100IN 2 Y INJ SITE IV PIGGY BK DISP

## (undated) DEVICE — SOLUTION IRRIG 3000ML 0.9% SOD CHL USP UROMATIC PLAS CONT

## (undated) DEVICE — SUTURE VCRL SZ 2 L54IN ABSRB UD L65MM TP-1 1/2 CIR J880T

## (undated) DEVICE — CANISTER, RIGID, 3000CC: Brand: MEDLINE INDUSTRIES, INC.

## (undated) DEVICE — GARMENT,MEDLINE,DVT,INT,CALF,MED, GEN2: Brand: MEDLINE

## (undated) DEVICE — Device: Brand: MEDICAL ACTION INDUSTRIES

## (undated) DEVICE — SYRINGE MED 10ML LUERLOCK TIP W/O SFTY DISP

## (undated) DEVICE — Device

## (undated) DEVICE — INTRALOCK 4-WAY TRANSPARENT STOPCOCK: Brand: ICU MEDICAL

## (undated) DEVICE — CLICKLINE SCISSORS INSERT: Brand: CLICKLINE

## (undated) DEVICE — SOLUTION IV 1000ML 0.9% SOD CHL

## (undated) DEVICE — TUBING INSUF HEAT STRL 10 FT --

## (undated) DEVICE — TOTAL JOINT - SMH: Brand: MEDLINE INDUSTRIES, INC.

## (undated) DEVICE — MARYLAND JAW LAPAROSCOPIC SEALER/DIVIDER COATED: Brand: LIGASURE

## (undated) DEVICE — CUTTER ENDOSCP L340MM LIN ARTC SGL STROKE FIRING ENDOPATH

## (undated) DEVICE — SYRINGE FLSH IV STD 10 CC W/O CANN PREFILLED NACL POSIFLUSH 306546

## (undated) DEVICE — TUBING, SUCTION, 1/4" X 12', STRAIGHT: Brand: MEDLINE

## (undated) DEVICE — ELECTRODE BLDE L4IN NONINSULATED EDGE

## (undated) DEVICE — LAPAROSCOPIC TROCAR SLEEVE/SINGLE USE: Brand: KII® SLEEVE

## (undated) DEVICE — ALCOHOL RUBBING ISO 16OZ 70%

## (undated) DEVICE — SYRINGE MED 5ML STD CLR PLAS LUERLOCK TIP N CTRL DISP

## (undated) DEVICE — ZIPPERED TOGA, LARGE: Brand: FLYTE

## (undated) DEVICE — HANDPIECE SET WITH BONE CLEANING TIP AND SUCTION TUBE: Brand: INTERPULSE

## (undated) DEVICE — VISUALIZATION SYSTEM: Brand: CLEARIFY

## (undated) DEVICE — SCRUB DRY SURG EZ SCRUB BRUSH PREOPERATIVE GRN

## (undated) DEVICE — ENDOSCOPIC KIT COMPLIANCE ENDOKIT

## (undated) DEVICE — ELECTRODE PT RET AD L9FT HI MOIST COND ADH HYDRGEL CORDED

## (undated) DEVICE — SINGLE USE ASPIRATION NEEDLE NA-U401SX-4025N: Brand: SINGLE USE ASPIRATION NEEDLE

## (undated) DEVICE — SNARE ENDOSCP M L240CM W27MM SHTH DIA2.4MM CHN 2.8MM OVL

## (undated) DEVICE — KENDALL RADIOLUCENT FOAM MONITORING ELECTRODE -RECTANGULAR SHAPE: Brand: KENDALL

## (undated) DEVICE — BITE BLK ENDOSCP AD 54FR GRN POLYETH ENDOSCP W STRP SLD

## (undated) DEVICE — SUTURE STRATAFIX SPRL SZ 1 L14IN ABSRB VLT L48CM CTX 1/2 SXPD2B405

## (undated) DEVICE — CONNECTOR,TUBING,5-IN-1,NON-STERILE: Brand: MEDLINE INDUSTRIES, INC.

## (undated) DEVICE — INFECTION CONTROL KIT SYS

## (undated) DEVICE — NEEDLE HYPO 22GA L1.5IN BLK POLYPR HUB S STL REG BVL STR

## (undated) DEVICE — AIRLIFE™ U/CONNECT-IT OXYGEN TUBING 7 FEET (2.1 M) CRUSH-RESISTANT OXYGEN TUBING, VINYL TIPPED: Brand: AIRLIFE™

## (undated) DEVICE — CONTAINER SPEC 20 ML LID NEUT BUFF FORMALIN 10 % POLYPR STS

## (undated) DEVICE — HOOD: Brand: FLYTE

## (undated) DEVICE — SYRINGE MED 20ML STD CLR PLAS LUERSLIP TIP N CTRL DISP

## (undated) DEVICE — BIT DRL L5IN DIA2MM STD ST S STL TWST BUSA

## (undated) DEVICE — STERILE POLYISOPRENE POWDER-FREE SURGICAL GLOVES: Brand: PROTEXIS

## (undated) DEVICE — GLOVE SURG SZ 65 L12IN FNGR THK79MIL GRN LTX FREE

## (undated) DEVICE — GOWN,SIRUS,POLYRNF,BRTHSLV,XL,30/CS: Brand: MEDLINE

## (undated) DEVICE — NEEDLE ASPIR 21GA L700MM US GUID TREAT DST END FOR EFFICIENT

## (undated) DEVICE — HEWSON SUTURE RETRIEVER: Brand: HEWSON SUTURE RETRIEVER

## (undated) DEVICE — ARM DRAPE

## (undated) DEVICE — DERMABOND SKIN ADH 0.7ML -- DERMABOND ADVANCED 12/BX

## (undated) DEVICE — SYRINGE MED 30ML STD CLR PLAS LUERLOCK TIP N CTRL DISP

## (undated) DEVICE — PREP SKN CHLRAPRP APL 26ML STR --

## (undated) DEVICE — CATHETER PH SUCT 14FR

## (undated) DEVICE — STRAP,POSITIONING,KNEE/BODY,FOAM,4X60": Brand: MEDLINE

## (undated) DEVICE — SUTURE VCRL SZ 2-0 L36IN ABSRB UD L40MM CT 1/2 CIR J957H

## (undated) DEVICE — COVER LT HNDL PLAS RIG 1 PER PK

## (undated) DEVICE — CONTAINER,SPECIMEN,3OZ,OR STRL: Brand: MEDLINE

## (undated) DEVICE — RELOAD STPL H1-2.5X45MM VASC THN TISS WHT 6 ROW B FRM SGL

## (undated) DEVICE — BLADE SAW W098XL354IN THK0047IN CUT THK0047IN SAG

## (undated) DEVICE — 3M™ STERI-DRAPE™ 2 INCISE DRAPE 2050: Brand: STERI-DRAPE™

## (undated) DEVICE — SINGLE USE SUCTION VALVE MAJ-209: Brand: SINGLE USE SUCTION VALVE (STERILE)

## (undated) DEVICE — MAJ-1414 SINGLE USE ADPATER BIOPSY VALV: Brand: SINGLE USE ADAPTOR BIOPSY VALVE

## (undated) DEVICE — SYRINGE MED 10CC ECC TIP W/O NDL

## (undated) DEVICE — SUT STRATA PDS+ 15CM SZ 3-0 SH -- STRATAFIX

## (undated) DEVICE — CANN NASAL O2 CAPNOGRAPHY AD -- FILTERLINE

## (undated) DEVICE — SYRINGE 20ML LL S/C 50

## (undated) DEVICE — NEEDLE HYPO 22GA L1.5IN BLK S STL HUB POLYPR SHLD REG BVL

## (undated) DEVICE — SUTURE SZ 0 27IN 5/8 CIR UR-6  TAPER PT VIOLET ABSRB VICRYL J603H

## (undated) DEVICE — LOCK PERICARDIOCENTESIS SET: Brand: COOK

## (undated) DEVICE — SUTURE VCRL SZ 0 L36IN ABSRB VLT L40MM CT 1/2 CIR J358H

## (undated) DEVICE — SOL IRR SOD CL 0.9% 1000ML BTL --

## (undated) DEVICE — 1200 GUARD II KIT W/5MM TUBE W/O VAC TUBE: Brand: GUARDIAN

## (undated) DEVICE — SUTURE MCRYL SZ 4-0 L27IN ABSRB UD L19MM PS-2 1/2 CIR PRIM Y426H

## (undated) DEVICE — SENSOR PLSE OXMTR NEO AD 40KG ADH DISP NELLCOR

## (undated) DEVICE — 3M™ CUROS™ DISINFECTING CAP FOR NEEDLELESS CONNECTORS 270/CARTON 20 CARTONS/CASE CFF1-270: Brand: CUROS™

## (undated) DEVICE — BW-412T DISP COMBO CLEANING BRUSH: Brand: SINGLE USE COMBINATION CLEANING BRUSH

## (undated) DEVICE — 3M™ IOBAN™ 2 ANTIMICROBIAL INCISE DRAPE 6650EZ: Brand: IOBAN™ 2

## (undated) DEVICE — HYPODERMIC SAFETY NEEDLE: Brand: MAGELLAN

## (undated) DEVICE — TRAP,MUCUS SPECIMEN, 80CC: Brand: MEDLINE

## (undated) DEVICE — ELECTRODE,RADIOTRANSLUCENT,FOAM,3PK: Brand: MEDLINE

## (undated) DEVICE — DRAPE,U/ SHT,SPLIT,PLAS,STERIL: Brand: MEDLINE

## (undated) DEVICE — LAPAROSCOPIC TROCAR SLEEVE/SINGLE USE: Brand: KII® OPTICAL ACCESS SYSTEM

## (undated) DEVICE — ZIPPERED TOGA, 2X LARGE: Brand: FLYTE, SURGICOOL

## (undated) DEVICE — SWABSTICK MEDICATED 1.75 CC SINGLE CHLORAPREP

## (undated) DEVICE — QUILTED PREMIUM COMFORT UNDERPAD,EXTRA HEAVY: Brand: WINGS

## (undated) DEVICE — DECANTER BAG 9": Brand: MEDLINE INDUSTRIES, INC.

## (undated) DEVICE — APPLICATOR MEDICATED 26 CC SOLUTION HI LT ORNG CHLORAPREP

## (undated) DEVICE — LIQUIBAND RAPID ADHESIVE 36/CS 0.8ML: Brand: MEDLINE

## (undated) DEVICE — HANDLE LT SNAP ON ULT DURABLE LENS FOR TRUMPF ALC DISPOSABLE

## (undated) DEVICE — PAD BD MATTRESS 73X32 IN STD CONVOLUTED FOAM LTX FREE

## (undated) DEVICE — COVER MPLR TIP CRV SCIS ACC DA VINCI

## (undated) DEVICE — SYRINGE MEDICAL 3ML CLEAR PLASTIC STANDARD NON CONTROL LUERLOCK TIP DISPOSABLE

## (undated) DEVICE — COVER,MAYO STAND,STERILE: Brand: MEDLINE

## (undated) DEVICE — TROCAR: Brand: KII® OPTICAL ACCESS SYSTEM

## (undated) DEVICE — DRAPE,UTILTY,TAPE,15X26, 4EA/PK: Brand: MEDLINE

## (undated) DEVICE — OBTRTR BLDELSS 8MM DISP -- DA VINCI - SNGL USE

## (undated) DEVICE — SUTURE V-LOC SZ 0 L18IN NONABSORBABLE BLU L37MM GS-21 1/2 VLOCN0326

## (undated) DEVICE — CLIP LIG ABSRB HEM-LOK LG PUR --

## (undated) DEVICE — NEONATAL-ADULT SPO2 SENSOR: Brand: NELLCOR

## (undated) DEVICE — SUTURE PDS II SZ 1 L27IN ABSRB VLT CT-1 L36MM 1/2 CIR Z341H

## (undated) DEVICE — SPONGE GZ W4XL4IN COT 12 PLY TYP VII WVN C FLD DSGN STERILE

## (undated) DEVICE — SOLUTION SURG PREP 26 CC PURPREP

## (undated) DEVICE — SUTURE DEV SZ 0 L6IN N ABSORBABLE

## (undated) DEVICE — CONNECTOR TBNG AUX H2O JET DISP FOR OLY 160/180 SER

## (undated) DEVICE — SET ADMIN 16ML TBNG L100IN 2 Y INJ SITE IV PIGGY BK DISP (ORDER IN MULIPLES OF 48)

## (undated) DEVICE — SINGLE USE BIOPSY VALVE MAJ-210: Brand: SINGLE USE BIOPSY VALVE (STERILE)

## (undated) DEVICE — SUTURE MCRYL SZ 3-0 L27IN ABSRB UD L24MM PS-1 3/8 CIR PRIM Y936H

## (undated) DEVICE — SOLUTION IRRIG 1000ML 09% SOD CHL USP PIC PLAS CONTAINER

## (undated) DEVICE — SUTURE PDS II SZ 0 L36IN ABSRB VLT L36MM CT-1 1/2 CIR Z346H

## (undated) DEVICE — SOLUTION IRRIG 500ML 0.9% SOD CHLO USP POUR PLAS BTL

## (undated) DEVICE — TUBING, SUCTION, 3/16" X 6', STRAIGHT: Brand: MEDLINE

## (undated) DEVICE — Device: Brand: BALLOON

## (undated) DEVICE — DRESSING HYDROCOLLOID BORDER 35X10 IN ALUM PRIMASEAL

## (undated) DEVICE — GLOVE SURG SZ 65 L12IN FNGR THK94MIL STD WHT LTX FREE

## (undated) DEVICE — Z DISCONTINUED NO SUB IDED SET EXTN W/ 4 W STPCOCK M SPIN LOK 36IN

## (undated) DEVICE — TAPE,CLOTH/SILK,CURAD,3"X10YD,LF,40/CS: Brand: CURAD

## (undated) DEVICE — CATH IV AUTOGRD BC BLU 22GA 25 -- INSYTE

## (undated) DEVICE — MARKER,SKIN,WI/RULER AND LABELS: Brand: MEDLINE

## (undated) DEVICE — REM POLYHESIVE ADULT PATIENT RETURN ELECTRODE: Brand: VALLEYLAB

## (undated) DEVICE — YANKAUER,OPEN TIP,W/O VENT,STERILE: Brand: MEDLINE INDUSTRIES, INC.

## (undated) DEVICE — SOLIDIFIER FLUID 3000 CC ABSORB

## (undated) DEVICE — STERILE POLYISOPRENE POWDER-FREE SURGICAL GLOVES WITH EMOLLIENT COATING: Brand: PROTEXIS

## (undated) DEVICE — BAG SPEC BIOHZD LF 2MIL 6X10IN -- CONVERT TO ITEM 357326

## (undated) DEVICE — SOLIDIFIER FLD 3.2OZ 3000CC TRAD IN BTL LIQUI-LOC

## (undated) DEVICE — SEAL UNIV 5-8MM DISP BX/10 -- DA VINCI XI - SNGL USE

## (undated) DEVICE — DRAPE,SPINE,UNIVERSAL,ST,7/CS: Brand: MEDLINE

## (undated) DEVICE — TUBING FLTR PLUME AWAY EVAC W/ SUCT DEV DISP PUREVIEW

## (undated) DEVICE — SYRINGE MED 10ML SLIP TIP BLNT FILL AND LUERLOCK DISP

## (undated) DEVICE — SUTURE ABSORBABLE MONOFILAMENT 2-0 SH 6 IN STRATAFIX SPRL SXPP1B415

## (undated) DEVICE — TIP SUCT CRV REG REDI

## (undated) DEVICE — AGENT HEMSTAT W4XL4IN OXIDIZED REGENERATED CELOS ABSRB SFT

## (undated) DEVICE — ACCESS PLATFORM FOR MINIMALLY INVASIVE SURGERY.: Brand: GELPORT® LAPAROSCOPIC  SYSTEM

## (undated) DEVICE — SYRINGE MED 20ML STD CLR PLAS LUERLOCK TIP N CTRL DISP

## (undated) DEVICE — CATHETER IV 20GA L1IN FEP STR HUB INTROCAN SFTY

## (undated) DEVICE — TOTAL TRAY, 16FR 10ML SIL FOLEY, URN: Brand: MEDLINE

## (undated) DEVICE — SUTURE VCRL SZ 1 L36IN ABSRB UD L36MM CT-1 1/2 CIR J947H